# Patient Record
Sex: FEMALE | Race: WHITE | NOT HISPANIC OR LATINO | Employment: OTHER | URBAN - METROPOLITAN AREA
[De-identification: names, ages, dates, MRNs, and addresses within clinical notes are randomized per-mention and may not be internally consistent; named-entity substitution may affect disease eponyms.]

---

## 2017-03-22 ENCOUNTER — LAB CONVERSION - ENCOUNTER (OUTPATIENT)
Dept: OTHER | Facility: OTHER | Age: 72
End: 2017-03-22

## 2017-03-22 DIAGNOSIS — E11.9 TYPE 2 DIABETES MELLITUS WITHOUT COMPLICATIONS (HCC): ICD-10-CM

## 2017-03-22 DIAGNOSIS — Z12.31 ENCOUNTER FOR SCREENING MAMMOGRAM FOR MALIGNANT NEOPLASM OF BREAST: ICD-10-CM

## 2017-03-22 DIAGNOSIS — E78.5 HYPERLIPIDEMIA: ICD-10-CM

## 2017-03-22 DIAGNOSIS — I10 ESSENTIAL (PRIMARY) HYPERTENSION: ICD-10-CM

## 2017-03-30 ENCOUNTER — TRANSCRIBE ORDERS (OUTPATIENT)
Dept: LAB | Facility: MEDICAL CENTER | Age: 72
End: 2017-03-30

## 2017-03-30 ENCOUNTER — ALLSCRIPTS OFFICE VISIT (OUTPATIENT)
Dept: OTHER | Facility: OTHER | Age: 72
End: 2017-03-30

## 2017-03-30 ENCOUNTER — APPOINTMENT (OUTPATIENT)
Dept: LAB | Facility: MEDICAL CENTER | Age: 72
End: 2017-03-30
Payer: MEDICARE

## 2017-03-30 DIAGNOSIS — E11.9 TYPE 2 DIABETES MELLITUS WITHOUT COMPLICATIONS (HCC): ICD-10-CM

## 2017-03-30 DIAGNOSIS — I10 ESSENTIAL (PRIMARY) HYPERTENSION: ICD-10-CM

## 2017-03-30 DIAGNOSIS — E78.5 HYPERLIPIDEMIA: ICD-10-CM

## 2017-03-30 LAB
ALBUMIN SERPL BCP-MCNC: 3.6 G/DL (ref 3.5–5)
ALP SERPL-CCNC: 71 U/L (ref 46–116)
ALT SERPL W P-5'-P-CCNC: 12 U/L (ref 12–78)
ANION GAP SERPL CALCULATED.3IONS-SCNC: 8 MMOL/L (ref 4–13)
AST SERPL W P-5'-P-CCNC: 6 U/L (ref 5–45)
BILIRUB SERPL-MCNC: 0.66 MG/DL (ref 0.2–1)
BUN SERPL-MCNC: 16 MG/DL (ref 5–25)
CALCIUM SERPL-MCNC: 9.2 MG/DL (ref 8.3–10.1)
CHLORIDE SERPL-SCNC: 99 MMOL/L (ref 100–108)
CHOLEST SERPL-MCNC: 180 MG/DL (ref 50–200)
CO2 SERPL-SCNC: 29 MMOL/L (ref 21–32)
CREAT SERPL-MCNC: 0.93 MG/DL (ref 0.6–1.3)
EST. AVERAGE GLUCOSE BLD GHB EST-MCNC: 192 MG/DL
GFR SERPL CREATININE-BSD FRML MDRD: 59.1 ML/MIN/1.73SQ M
GLUCOSE P FAST SERPL-MCNC: 191 MG/DL (ref 65–99)
HBA1C MFR BLD: 8.3 % (ref 4.2–6.3)
HDLC SERPL-MCNC: 43 MG/DL (ref 40–60)
LDLC SERPL CALC-MCNC: 104 MG/DL (ref 0–100)
POTASSIUM SERPL-SCNC: 4 MMOL/L (ref 3.5–5.3)
PROT SERPL-MCNC: 7.1 G/DL (ref 6.4–8.2)
SODIUM SERPL-SCNC: 136 MMOL/L (ref 136–145)
TRIGL SERPL-MCNC: 163 MG/DL

## 2017-03-30 PROCEDURE — 36415 COLL VENOUS BLD VENIPUNCTURE: CPT

## 2017-03-30 PROCEDURE — 80061 LIPID PANEL: CPT

## 2017-03-30 PROCEDURE — 83036 HEMOGLOBIN GLYCOSYLATED A1C: CPT

## 2017-03-30 PROCEDURE — 80053 COMPREHEN METABOLIC PANEL: CPT

## 2017-06-30 DIAGNOSIS — E11.9 TYPE 2 DIABETES MELLITUS WITHOUT COMPLICATIONS (HCC): ICD-10-CM

## 2017-08-16 DIAGNOSIS — Z12.11 ENCOUNTER FOR SCREENING FOR MALIGNANT NEOPLASM OF COLON: ICD-10-CM

## 2017-08-16 DIAGNOSIS — M89.9 DISORDER OF BONE: ICD-10-CM

## 2017-08-16 DIAGNOSIS — E11.9 TYPE 2 DIABETES MELLITUS WITHOUT COMPLICATIONS (HCC): ICD-10-CM

## 2017-08-16 DIAGNOSIS — I10 ESSENTIAL (PRIMARY) HYPERTENSION: ICD-10-CM

## 2017-08-24 ENCOUNTER — TRANSCRIBE ORDERS (OUTPATIENT)
Dept: LAB | Facility: MEDICAL CENTER | Age: 72
End: 2017-08-24

## 2017-08-24 ENCOUNTER — ALLSCRIPTS OFFICE VISIT (OUTPATIENT)
Dept: OTHER | Facility: OTHER | Age: 72
End: 2017-08-24

## 2017-08-24 ENCOUNTER — LAB (OUTPATIENT)
Dept: LAB | Facility: MEDICAL CENTER | Age: 72
End: 2017-08-24
Payer: MEDICARE

## 2017-08-24 DIAGNOSIS — I10 ESSENTIAL (PRIMARY) HYPERTENSION: ICD-10-CM

## 2017-08-24 DIAGNOSIS — E11.9 TYPE 2 DIABETES MELLITUS WITHOUT COMPLICATIONS (HCC): ICD-10-CM

## 2017-08-24 LAB
ALBUMIN SERPL BCP-MCNC: 3.6 G/DL (ref 3.5–5)
ALP SERPL-CCNC: 77 U/L (ref 46–116)
ALT SERPL W P-5'-P-CCNC: 10 U/L (ref 12–78)
ANION GAP SERPL CALCULATED.3IONS-SCNC: 11 MMOL/L (ref 4–13)
AST SERPL W P-5'-P-CCNC: 12 U/L (ref 5–45)
BILIRUB SERPL-MCNC: 0.87 MG/DL (ref 0.2–1)
BUN SERPL-MCNC: 7 MG/DL (ref 5–25)
CALCIUM SERPL-MCNC: 9.4 MG/DL (ref 8.3–10.1)
CHLORIDE SERPL-SCNC: 94 MMOL/L (ref 100–108)
CO2 SERPL-SCNC: 26 MMOL/L (ref 21–32)
CREAT SERPL-MCNC: 0.84 MG/DL (ref 0.6–1.3)
EST. AVERAGE GLUCOSE BLD GHB EST-MCNC: 183 MG/DL
GFR SERPL CREATININE-BSD FRML MDRD: 69 ML/MIN/1.73SQ M
GLUCOSE P FAST SERPL-MCNC: 169 MG/DL (ref 65–99)
HBA1C MFR BLD: 8 % (ref 4.2–6.3)
LDLC SERPL DIRECT ASSAY-MCNC: 101 MG/DL (ref 0–100)
POTASSIUM SERPL-SCNC: 3.9 MMOL/L (ref 3.5–5.3)
PROT SERPL-MCNC: 7.4 G/DL (ref 6.4–8.2)
SODIUM SERPL-SCNC: 131 MMOL/L (ref 136–145)

## 2017-08-24 PROCEDURE — 36415 COLL VENOUS BLD VENIPUNCTURE: CPT

## 2017-08-24 PROCEDURE — 83721 ASSAY OF BLOOD LIPOPROTEIN: CPT

## 2017-08-24 PROCEDURE — 80053 COMPREHEN METABOLIC PANEL: CPT

## 2017-08-24 PROCEDURE — 83036 HEMOGLOBIN GLYCOSYLATED A1C: CPT

## 2017-09-30 DIAGNOSIS — E11.9 TYPE 2 DIABETES MELLITUS WITHOUT COMPLICATIONS (HCC): ICD-10-CM

## 2017-12-30 DIAGNOSIS — E11.9 TYPE 2 DIABETES MELLITUS WITHOUT COMPLICATIONS (HCC): ICD-10-CM

## 2018-01-13 VITALS
OXYGEN SATURATION: 93 % | WEIGHT: 198.38 LBS | TEMPERATURE: 96.4 F | HEIGHT: 65 IN | DIASTOLIC BLOOD PRESSURE: 80 MMHG | HEART RATE: 90 BPM | BODY MASS INDEX: 33.05 KG/M2 | SYSTOLIC BLOOD PRESSURE: 122 MMHG

## 2018-01-14 VITALS
HEIGHT: 65 IN | WEIGHT: 203.19 LBS | OXYGEN SATURATION: 90 % | SYSTOLIC BLOOD PRESSURE: 124 MMHG | DIASTOLIC BLOOD PRESSURE: 72 MMHG | HEART RATE: 108 BPM | BODY MASS INDEX: 33.85 KG/M2 | TEMPERATURE: 96.8 F

## 2018-01-23 NOTE — PROGRESS NOTES
Assessment   1  Encounter for preventive health examination (V70 0) (Z00 00)    Plan  DM type 2 (diabetes mellitus, type 2)    · (1) HEMOGLOBIN A1C; Status:Active; Requested for:2017;    · Follow-up visit in 4 Months Evaluation and Treatment  Follow-up  Status: Hold For -  Scheduling  Requested for: [id number]  Hyperlipidemia    · (1) LIPID PANEL, FASTING; Status:Active; Requested for:2017;   Hypertension    · (1) COMPREHENSIVE METABOLIC PANEL; Status:Active; Requested for:2017;     Discussion/Summary    Rx fbw  Encouraged pt to monitor blood sugar and stop smoking1      Impression: Subsequent Annual Wellness Visit, with preventive exam as well as age and risk appropriate counseling completed1   Cardiovascular screening and counselin  screening is current1  and counseling was given on maintaining a healthy diet1   Diabetes screening and counselin  screening is current1  and counseling was given on ways to improve physical activity1   Colorectal cancer screening and counselin  the patient declines screening1  and counseling was given on ways to eat a high fiber diet1   Breast cancer screening and counselin  the patient declines screening1   Cervical cancer screening and counselin  the patient declines screening1   Osteoporosis screening and counselin  the patient declines screening1   Abdominal aortic aneurysm screening and counselin  screening not indicated1   Glaucoma screening and counseling: screening is current1  and the patient declines screening1   HIV screening and counselin  the patient declines screening1    Immunizations:1  The patient declines the influenza vaccination1 , the patient declines the pneumococcal vaccination1 , hepatitis B vaccination series is not indicated at this time due to the patient's low risk of devika the disease1 , the patient declines the Zostavax vaccine1 , the patient declines the Td vaccine1  and the patient declines the Tdap vaccine1   Advance Directive Plannin  not complete1 , paperwork and instructions were given to the patient1 , she was encouraged to follow-up with me to discuss her questions and/or decisions1   Advice and education were given regarding1  increasing physical activity1   Patient Discussion:1  plan discussed with the patient1 , follow-up visit needed in 4 months1   The treatment plan was reviewed with the patient/guardian  The patient/guardian understands and agrees with the treatment plan1          Self Referrals: No       1 Amended By: Christo Wadsworth; Mar 30 2017 8:24 PM EST    Chief Complaint  Pt presents for Well Exam  Pt feels well and without complaint at this time  Appetite is normal per patient  No falls  No refills needed today  Advance Directives  Advance Directive [facility]:   The patient is in agreement to receive the Advance Care Planning service    YES - Patient has an advance health care directive  The patient has a living will located  in patient's home  Capacity/Competence: This patient has full decision making capacity for discussion of advance care planning and This patient has full decision making competency for discussion of advance care planning  History of Present Illness  HPI: Pt doing ok   had cabg in November  Still smoking  1    Welcome to [employer] and Wellness Visits: The patient is being seen for the subsequent annual wellness visit  Medicare Screening and Risk Factors1    Hospitalizations:1  no previous hospitalizations1   Once per lifetime medicare screening tests:1  ECG1  and AAA screening US has not yet been done1   Medicare Screening Tests Risk Questions   Abdominal aortic aneurysm risk assessment:1  none indicated1   Osteoporosis risk assessment:1  caucasian1 , over 48years of age2  and tobacco use1   HIV risk assessment:1  none indicated1   Drug and Alcohol Use: The patient is a current cigarette smoker   The patient reports never drinking alcohol  She has never used illicit drugs  Diet and Physical Activity: Current diet includes well balanced meals  She exercises daily  Exercise: walking  Mood Disorder and Cognitive Impairment Screenin    Depression screening1   Negative for symptoms1   She denies feeling down, depressed, or hopeless over the past two weeks1   She denies feeling little interest or pleasure in doing things over the past two weeks1   Cognitive impairment screenin  denies difficulty learning/retaining new information1 , denies difficulty handling complex tasks1 , denies difficulty with reasoning1 , denies difficulty with spatial ability and orientation1 , denies difficulty with language1  and denies difficulty with behavior1   Functional Ability/Level of Safety:1  Hearing is1  normal bilaterally1  and a hearing aid is not used1   She denies hearing difficulties1  The patient is currently1  able to do activities of daily living without limitations1 , able to do instrumental activities of daily living without limitations1 , able to participate in social activities without limitations1  and able to drive without limitations1   Activities of daily living details:1  does not need help using the phone1 , no transportation help needed1 , does not need help shopping1 , no meal preparation help needed1 , does not need help doing housework1 , does not need help doing laundry1 , does not need help managing medications1  and does not need help managing money1   Fall risk factors: 1  The patient fell 0 times in the past 12 months  1   Home safety risk factors: 1  no unfamiliar surroundings1 , no loose rugs1 , no poor household lighting1 , no uneven floors1 , no household clutter1 , grab bars in the bathroom1  and handrails on the stairs1   Advance Directives: Advance directives: no living will and no advance directives     Co-Managers and Medical Equipment/Suppliers: See Patient Care Team   Reviewed Updated [facility]:   Last Medicare Wellness Visit Information was reviewed, patient interviewed, no change since last AWV  Preventive Quality Program 65 and Older: Falls Risk: The patient fell 0 times in the past 12 months  The patient is currently asymptomatic Symptoms Include:  Associated symptoms:  No associated symptoms are reported  1 Amended By: Keturah Martinez; Mar 30 2017 8:20 PM EST    Patient Care Team    Care Team Member Role Specialty Office Number   [facility] [place]  [facility] [phone]     Active Problems   1  Advance directive discussed with patient (V65 49) (Z71 89)  2  At risk for bone density loss (V49 89) (Z91 89)  3  DM type 2 (diabetes mellitus, type 2) (250 00) (E11 9)  4  Encounter for screening for malignant neoplasm of colon (V76 51) (Z12 11)  5  Encounter for screening mammogram for malignant neoplasm of breast (V76 12)   (Z12 31)  6  Hyperlipidemia (272 4) (E78 5)  7  Hypertension (401 9) (I10)  8  Refused influenza vaccine (V64 06) (Z28 21)  9  Refused pneumococcal vaccination (V64 06) (Z28 21)  10  Refuses tetanus, diphtheria, and acellular pertussis (TDaP) vaccination (V64 06)    (Z28 21)  11  Symptomatic menopausal or female climacteric states (627 2) (N95 1)    Past Medical History    · History of Cerebral hemorrhage (431) (I61 9)   · History of hypertension (V12 59) (Z86 79)   · Hyperlipidemia (272 4) (E78 5)   · History of Screening for glaucoma (V80 1) (Z13 5)    The active problems and past medical history were reviewed and updated today  Surgical History    · History of Craniotomy (Therapeutic)    The surgical history was reviewed and updated today  Family History  Mother    · Family history of Breast cancer (174 9) (C50 919)   · Family history of Ovarian cancer (183 0) (C56 9)  Father    · Family history of Diabetes    The family history was reviewed and updated today         Social History    · Always uses seat belt   · Always uses sunscreen   · Current every day smoker (305 1) (F17 200)   · Dental care, occasionally   · Denied: Drug use (305 90) (F19 90)   · No alcohol use   · No living will  The social history was reviewed and updated today  The social history was reviewed and is unchanged  Current Meds  1  Aspirin 81 MG TABS; TAKE 1 TABLET DAILY; Therapy: 89VEV5961 to Recorded  2  GlipiZIDE 10 MG Oral Tablet; Take 1 tablet twice daily; Therapy: 18ADN4514 to (Evaluate:21Xom9711)  Requested for: 43MZM2253; Last   Rx:24Mar2017 Ordered  3  Lisinopril-Hydrochlorothiazide 20-12 5 MG Oral Tablet; take 1 tablet by mouth twice a   day; Therapy: 74IJX4551 to (Evaluate:58Npn4942)  Requested for: 65FHV5131; Last   Rx:09Tnq4031 Ordered  4  MetFORMIN HCl - 500 MG Oral Tablet; take 1 tablet by mouth three times a day; Therapy: 23PVU5734 to (Evaluate:10Jun2017)  Requested for: 41Dwo3761; Last   Rx:76Xbj7323 Ordered  5  Simvastatin 40 MG Oral Tablet; take 1 tablet by mouth once daily; Therapy: 45KRH1519 to (Evaluate:90Dqs4619)  Requested for: 06MQZ0148; Last   Rx:97Hmw4968 Ordered    The medication list was reviewed and updated today  Allergies   1  No Known Drug Allergies    Immunizations   1    Influenza  Temporarily Deferred: Pt refuses, As of: 70TCQ9629, Defer for 2 Years    PPSV  Temporarily Deferred: Pt refuses, As of: 67GMT2054, Defer for 2 Years    Tdap  Temporarily Deferred: Pt refuses, As of: 41OAD1912, Defer for 2 Years     Vitals  Signs    Systolic: 4764   Diastolic: 645    Temperature: 96 4 F  Heart Rate: 90  Height: 5 ft 5 in  Weight: 198 lb 6 08 oz  BMI Calculated: 33 01  BSA Calculated: 1 97  O2 Saturation: 93     1 Amended By: Shamika Brown; Mar 30 2017 8:20 PM EST    Physical Exam    Constitutional1    General appearance: No acute distress, well appearing and well nourished  1    Psychiatric1    Judgment and insight: Normal 1    Orientation to person, place, and time: Normal 1    Recent and remote memory: Intact  1    Mood and affect: Normal 1 1 Amended By: Lavonne العراقي; Mar 30 2017 8:21 PM EST    Health Management  Health Maintenance   Medicare Annual Wellness Visit; every 1 year; Last 99KMO0112; Next Due: 63YZC1636;   Active    Signatures   Electronically signed by : Samanta Gomez DO; Mar 30 2017  8:24PM EST                       (Author)

## 2018-03-13 RX ORDER — GLIPIZIDE 10 MG/1
1 TABLET ORAL 2 TIMES DAILY
COMMUNITY
Start: 2015-09-16 | End: 2018-06-21 | Stop reason: SDUPTHER

## 2018-03-13 RX ORDER — SIMVASTATIN 40 MG
1 TABLET ORAL DAILY
COMMUNITY
Start: 2014-10-02 | End: 2018-11-15 | Stop reason: SDUPTHER

## 2018-03-13 RX ORDER — LISINOPRIL AND HYDROCHLOROTHIAZIDE 20; 12.5 MG/1; MG/1
1 TABLET ORAL 2 TIMES DAILY
COMMUNITY
Start: 2014-10-02 | End: 2018-11-15 | Stop reason: SDUPTHER

## 2018-03-15 ENCOUNTER — OFFICE VISIT (OUTPATIENT)
Dept: INTERNAL MEDICINE CLINIC | Facility: CLINIC | Age: 73
End: 2018-03-15
Payer: MEDICARE

## 2018-03-15 ENCOUNTER — TRANSCRIBE ORDERS (OUTPATIENT)
Dept: LAB | Facility: MEDICAL CENTER | Age: 73
End: 2018-03-15

## 2018-03-15 ENCOUNTER — APPOINTMENT (OUTPATIENT)
Dept: LAB | Facility: MEDICAL CENTER | Age: 73
End: 2018-03-15
Payer: MEDICARE

## 2018-03-15 VITALS
HEIGHT: 65 IN | TEMPERATURE: 97.4 F | HEART RATE: 88 BPM | WEIGHT: 201.25 LBS | BODY MASS INDEX: 33.53 KG/M2 | OXYGEN SATURATION: 90 %

## 2018-03-15 DIAGNOSIS — D50.9 IRON DEFICIENCY ANEMIA, UNSPECIFIED IRON DEFICIENCY ANEMIA TYPE: ICD-10-CM

## 2018-03-15 DIAGNOSIS — E78.2 MIXED HYPERLIPIDEMIA: ICD-10-CM

## 2018-03-15 DIAGNOSIS — E11.9 TYPE 2 DIABETES MELLITUS WITHOUT COMPLICATION, WITHOUT LONG-TERM CURRENT USE OF INSULIN (HCC): Primary | ICD-10-CM

## 2018-03-15 DIAGNOSIS — I10 ESSENTIAL HYPERTENSION: ICD-10-CM

## 2018-03-15 LAB
ALBUMIN SERPL BCP-MCNC: 3.8 G/DL (ref 3.5–5)
ALP SERPL-CCNC: 85 U/L (ref 46–116)
ALT SERPL W P-5'-P-CCNC: 8 U/L (ref 12–78)
ANION GAP SERPL CALCULATED.3IONS-SCNC: 8 MMOL/L (ref 4–13)
AST SERPL W P-5'-P-CCNC: 14 U/L (ref 5–45)
BILIRUB SERPL-MCNC: 0.86 MG/DL (ref 0.2–1)
BUN SERPL-MCNC: 12 MG/DL (ref 5–25)
CALCIUM SERPL-MCNC: 9.2 MG/DL (ref 8.3–10.1)
CHLORIDE SERPL-SCNC: 96 MMOL/L (ref 100–108)
CHOLEST SERPL-MCNC: 180 MG/DL (ref 50–200)
CO2 SERPL-SCNC: 28 MMOL/L (ref 21–32)
CREAT SERPL-MCNC: 0.94 MG/DL (ref 0.6–1.3)
ERYTHROCYTE [DISTWIDTH] IN BLOOD BY AUTOMATED COUNT: 12.1 % (ref 11.6–15.1)
EST. AVERAGE GLUCOSE BLD GHB EST-MCNC: 212 MG/DL
GFR SERPL CREATININE-BSD FRML MDRD: 60 ML/MIN/1.73SQ M
GLUCOSE P FAST SERPL-MCNC: 256 MG/DL (ref 65–99)
HBA1C MFR BLD: 9 % (ref 4.2–6.3)
HCT VFR BLD AUTO: 49.3 % (ref 34.8–46.1)
HDLC SERPL-MCNC: 40 MG/DL (ref 40–60)
HGB BLD-MCNC: 17.1 G/DL (ref 11.5–15.4)
LDLC SERPL CALC-MCNC: 106 MG/DL (ref 0–100)
MCH RBC QN AUTO: 32.3 PG (ref 26.8–34.3)
MCHC RBC AUTO-ENTMCNC: 34.7 G/DL (ref 31.4–37.4)
MCV RBC AUTO: 93 FL (ref 82–98)
PLATELET # BLD AUTO: 219 THOUSANDS/UL (ref 149–390)
PMV BLD AUTO: 10.8 FL (ref 8.9–12.7)
POTASSIUM SERPL-SCNC: 4.1 MMOL/L (ref 3.5–5.3)
PROT SERPL-MCNC: 7.4 G/DL (ref 6.4–8.2)
RBC # BLD AUTO: 5.29 MILLION/UL (ref 3.81–5.12)
SODIUM SERPL-SCNC: 132 MMOL/L (ref 136–145)
TRIGL SERPL-MCNC: 170 MG/DL
WBC # BLD AUTO: 10.55 THOUSAND/UL (ref 4.31–10.16)

## 2018-03-15 PROCEDURE — 99214 OFFICE O/P EST MOD 30 MIN: CPT | Performed by: INTERNAL MEDICINE

## 2018-03-15 PROCEDURE — 80053 COMPREHEN METABOLIC PANEL: CPT

## 2018-03-15 PROCEDURE — 83036 HEMOGLOBIN GLYCOSYLATED A1C: CPT

## 2018-03-15 PROCEDURE — 36415 COLL VENOUS BLD VENIPUNCTURE: CPT

## 2018-03-15 PROCEDURE — 85027 COMPLETE CBC AUTOMATED: CPT

## 2018-03-15 PROCEDURE — 80061 LIPID PANEL: CPT

## 2018-03-15 NOTE — PROGRESS NOTES
Assessment/Plan:      Diagnoses and all orders for this visit:    Type 2 diabetes mellitus without complication, without long-term current use of insulin (HCC)  Pt will go for labs this Am but has been under a lot of stress with caring for  as well as loss of three family members including a grandson  Essential hypertension  -     HEMOGLOBIN A1C W/ EAG ESTIMATION; Future  -     Comprehensive metabolic panel; Future    Mixed hyperlipidemia  -     Comprehensive metabolic panel; Future  -     Lipid panel; Future    Iron deficiency anemia, unspecified iron deficiency anemia type  -     CBC and Platelet; Future      Pt going for labs this AM  Encouraged smoking cessation and increase water intake  Rto 6 months     Patient ID: Collette Caraway is a 75 y o  female  HPI   Pt doing ok  Sob with exertion but is smoking more due to a tough 2017  She lost her grandson in the fall and is still primary caregiver for her   He does have an aide weekdays which is a help for patient  No falls  Tires easily  She has some intermittent back issues likely related to caring for   Review of Systems   Constitutional: Negative  HENT: Negative  Eyes: Negative  Respiratory: Positive for shortness of breath  Cardiovascular: Negative  Gastrointestinal: Negative  Endocrine: Negative  Genitourinary: Negative  Musculoskeletal: Positive for arthralgias  Skin: Negative  Allergic/Immunologic: Negative  Neurological: Negative  Hematological: Negative  Psychiatric/Behavioral: Negative  Physical Exam   Constitutional: She is oriented to person, place, and time  She appears well-developed and well-nourished  She appears distressed  Mild conversational dyspnea   HENT:   Head: Normocephalic and atraumatic  Right Ear: External ear normal    Left Ear: External ear normal    Nose: Nose normal    Mouth/Throat: Oropharynx is clear and moist  No oropharyngeal exudate     Eyes: Conjunctivae and EOM are normal  Pupils are equal, round, and reactive to light  No scleral icterus  Neck: Normal range of motion  Neck supple  No JVD present  Cardiovascular: Normal rate, regular rhythm, normal heart sounds and intact distal pulses  Pulmonary/Chest: Effort normal and breath sounds normal  No respiratory distress  She has no wheezes  She has no rales  She exhibits no tenderness  Abdominal: Soft  Bowel sounds are normal  She exhibits no distension  There is no tenderness  There is no rebound and no guarding  Musculoskeletal: Normal range of motion  Lymphadenopathy:     She has no cervical adenopathy  Neurological: She is alert and oriented to person, place, and time  She has normal reflexes  She displays normal reflexes  No cranial nerve deficit  She exhibits normal muscle tone  Coordination normal    Skin: Skin is warm and dry  No rash noted  No erythema  No pallor  Psychiatric: She has a normal mood and affect  Her behavior is normal  Judgment and thought content normal    Nursing note and vitals reviewed

## 2018-03-15 NOTE — PATIENT INSTRUCTIONS

## 2018-03-19 ENCOUNTER — TELEPHONE (OUTPATIENT)
Dept: INTERNAL MEDICINE CLINIC | Facility: CLINIC | Age: 73
End: 2018-03-19

## 2018-03-19 DIAGNOSIS — E11.9 TYPE 2 DIABETES MELLITUS WITHOUT COMPLICATION, WITHOUT LONG-TERM CURRENT USE OF INSULIN (HCC): Primary | ICD-10-CM

## 2018-06-21 DIAGNOSIS — E11.9 TYPE 2 DIABETES MELLITUS WITHOUT COMPLICATION, WITHOUT LONG-TERM CURRENT USE OF INSULIN (HCC): Primary | ICD-10-CM

## 2018-06-21 RX ORDER — GLIPIZIDE 10 MG/1
TABLET ORAL
Qty: 60 TABLET | Refills: 5 | Status: SHIPPED | OUTPATIENT
Start: 2018-06-21 | End: 2018-12-27 | Stop reason: SDUPTHER

## 2018-07-17 ENCOUNTER — APPOINTMENT (OUTPATIENT)
Dept: LAB | Facility: MEDICAL CENTER | Age: 73
End: 2018-07-17
Payer: MEDICARE

## 2018-07-17 ENCOUNTER — OFFICE VISIT (OUTPATIENT)
Dept: INTERNAL MEDICINE CLINIC | Facility: CLINIC | Age: 73
End: 2018-07-17
Payer: MEDICARE

## 2018-07-17 VITALS
WEIGHT: 202.25 LBS | BODY MASS INDEX: 33.7 KG/M2 | HEIGHT: 65 IN | TEMPERATURE: 97.4 F | HEART RATE: 80 BPM | OXYGEN SATURATION: 91 %

## 2018-07-17 DIAGNOSIS — Z72.0 TOBACCO USE: ICD-10-CM

## 2018-07-17 DIAGNOSIS — Z00.00 MEDICARE ANNUAL WELLNESS VISIT, SUBSEQUENT: ICD-10-CM

## 2018-07-17 DIAGNOSIS — E11.9 TYPE 2 DIABETES MELLITUS WITHOUT COMPLICATION, WITHOUT LONG-TERM CURRENT USE OF INSULIN (HCC): ICD-10-CM

## 2018-07-17 DIAGNOSIS — E11.9 TYPE 2 DIABETES MELLITUS WITHOUT COMPLICATIONS (HCC): ICD-10-CM

## 2018-07-17 DIAGNOSIS — Z11.59 ENCOUNTER FOR HEPATITIS C SCREENING TEST FOR LOW RISK PATIENT: ICD-10-CM

## 2018-07-17 DIAGNOSIS — Z11.59 ENCOUNTER FOR HEPATITIS C SCREENING TEST FOR LOW RISK PATIENT: Primary | ICD-10-CM

## 2018-07-17 LAB
ALBUMIN SERPL BCP-MCNC: 3.8 G/DL (ref 3.5–5)
ALP SERPL-CCNC: 65 U/L (ref 46–116)
ALT SERPL W P-5'-P-CCNC: 14 U/L (ref 12–78)
ANION GAP SERPL CALCULATED.3IONS-SCNC: 5 MMOL/L (ref 4–13)
AST SERPL W P-5'-P-CCNC: 14 U/L (ref 5–45)
BILIRUB SERPL-MCNC: 0.78 MG/DL (ref 0.2–1)
BUN SERPL-MCNC: 10 MG/DL (ref 5–25)
CALCIUM SERPL-MCNC: 9.4 MG/DL (ref 8.3–10.1)
CHLORIDE SERPL-SCNC: 100 MMOL/L (ref 100–108)
CO2 SERPL-SCNC: 28 MMOL/L (ref 21–32)
CREAT SERPL-MCNC: 0.9 MG/DL (ref 0.6–1.3)
CREAT UR-MCNC: 153 MG/DL
EST. AVERAGE GLUCOSE BLD GHB EST-MCNC: 166 MG/DL
GFR SERPL CREATININE-BSD FRML MDRD: 63 ML/MIN/1.73SQ M
GLUCOSE P FAST SERPL-MCNC: 177 MG/DL (ref 65–99)
HBA1C MFR BLD: 7.4 % (ref 4.2–6.3)
LDLC SERPL DIRECT ASSAY-MCNC: 101 MG/DL (ref 0–100)
MICROALBUMIN UR-MCNC: 33.5 MG/L (ref 0–20)
MICROALBUMIN/CREAT 24H UR: 22 MG/G CREATININE (ref 0–30)
POTASSIUM SERPL-SCNC: 3.9 MMOL/L (ref 3.5–5.3)
PROT SERPL-MCNC: 7.3 G/DL (ref 6.4–8.2)
SODIUM SERPL-SCNC: 133 MMOL/L (ref 136–145)

## 2018-07-17 PROCEDURE — 36415 COLL VENOUS BLD VENIPUNCTURE: CPT

## 2018-07-17 PROCEDURE — 83036 HEMOGLOBIN GLYCOSYLATED A1C: CPT

## 2018-07-17 PROCEDURE — 82043 UR ALBUMIN QUANTITATIVE: CPT | Performed by: INTERNAL MEDICINE

## 2018-07-17 PROCEDURE — 86803 HEPATITIS C AB TEST: CPT

## 2018-07-17 PROCEDURE — 83721 ASSAY OF BLOOD LIPOPROTEIN: CPT

## 2018-07-17 PROCEDURE — 82570 ASSAY OF URINE CREATININE: CPT | Performed by: INTERNAL MEDICINE

## 2018-07-17 PROCEDURE — G0439 PPPS, SUBSEQ VISIT: HCPCS | Performed by: INTERNAL MEDICINE

## 2018-07-17 PROCEDURE — 80053 COMPREHEN METABOLIC PANEL: CPT

## 2018-07-17 NOTE — PATIENT INSTRUCTIONS

## 2018-07-17 NOTE — PROGRESS NOTES
Assessment and Plan:    Problem List Items Addressed This Visit     Medicare annual wellness visit, subsequent    Relevant Orders    LDL cholesterol, direct    Type 2 diabetes mellitus without complication, without long-term current use of insulin ([facility]) - Primary    Relevant Orders    HEMOGLOBIN A1C W/ EAG ESTIMATION    Comprehensive metabolic panel    Microalbumin / creatinine urine ratio    LDL cholesterol, direct    Tobacco use        Health Maintenance Due   Topic Date Due    DXA SCAN  1945    Medicare Annual Wellness Visit (AWV)  03/30/2018     Pt defers all immunization  Encouraged smoking cessation  Rx for labs - patient going from here to the lab/she feels doing much better on new Rx and can afford  Did discuss AD and patient provided information  Rto 4 months    HPI:  Ramsey Odom is a 75 y o  female here for her Subsequent Wellness Visit      Patient Active Problem List   Diagnosis    Type II or unspecified type diabetes mellitus without mention of complication, not stated as uncontrolled    Hyperlipidemia    Hypertension    Benign essential hypertension    Medicare annual wellness visit, subsequent    Type 2 diabetes mellitus without complication, without long-term current use of insulin ([facility] [address] )    Tobacco use     Past Medical History:   Diagnosis Date    Hyperlipidemia     last assessed  8/24/17    Hypertension     last assessed 10/2/14     Past Surgical History:   Procedure Laterality Date    CRANIOTOMY       Family History   Problem Relation Age of Onset    Breast cancer Mother     Ovarian cancer Mother     Diabetes Father      History   Smoking Status    Current Every Day Smoker    Types: Cigarettes   Smokeless Tobacco    Never Used     History   Alcohol Use No      History   Drug Use No       Current Outpatient Prescriptions   Medication Sig Dispense Refill    aspirin 81 MG tablet Take 1 tablet by mouth daily      glipiZIDE (GLUCOTROL) 10 mg tablet take 1 tablet by mouth twice a day 60 tablet 5    lisinopril-hydrochlorothiazide (PRINZIDE,ZESTORETIC) 20-12 5 MG per tablet Take 1 tablet by mouth 2 (two) times a day      simvastatin (ZOCOR) 40 mg tablet Take 1 tablet by mouth daily      sitaGLIPtin-metFORMIN (JANUMET)  MG per tablet Take 1 tablet by mouth 2 (two) times a day with meals for 30 days 60 tablet 5     No current facility-administered medications for this visit  No Known Allergies  There is no immunization history for the selected administration types on file for this patient      Patient Care Team:  Denisse Dickey DO as PCP - General  Denisse Dickey DO      Medicare Screening Tests and Risk Assessments:  AWV Clinical     ISAR:   Previous hospitalizations?:  No       Once in a Lifetime Medicare Screening:   EKG performed?:  No    AAA screening performed? (if performed, please add date to Health Maintenance):  No       Medicare Screening Tests and Risk Assessment:   AAA Risk Assessment    None Indicated:  Yes    Osteoporosis Risk Assessment    None indicated:  Yes    HIV Risk Assessment    None indicated:  Yes        Drug and Alcohol Use:   Tobacco use    Cigarettes:  current smoker    Smokeless:  never used smokeless tobacco    Tobacco use duration    Tobacco Cessation Readiness    Alcohol use    Alcohol use:  never    Alcohol Treatment Readiness   Illicit Drug Use    Drug use:  never    Drug type:  no sedative use       Diet & Exercise:   Diet   What is your diet?:  Diabetic   How many servings a day of the following:   Fruits and Vegetables:  3-4 Meat:  1-2   Whole Grains:  2 Simple Carbs:  1   Dairy:  2 Soda:  0   Coffee:  1 Tea:  1   Exercise    Do you currently exercise?:  yes    Frequency:  daily   Times per week:  7     Type of exercise:  walking       Cognitive Impairment Screening:   Depression screening preformed:  Yes Depression screen score:  0    PHQ-9 Depression scale score:  0   Depression screening results:  negative for symptoms   Cognitive Impairment Screening    Do you have difficulty learning or retaining new information?:  No Do you have difficulty handling new tasks?:  No   Do you have difficulty with reasoning?:  No Do you have difficulty with spatial ability and orientation?:  No   Do you have difficulty with language?:  No Do you have difficulty with behavior?:  No       Functional Ability/Level of Safety:   Hearing    Hearing difficulties:  No Bilateral:  normal   Hearing aid:  No    Hearing Impairment Assessment    Hearing status:  No impairment   Do your family members ever complain that you turn on the radio or Nerved[place] V  too loudly?:  No Do you find that other people have to repeat themselves when talking to you?:  No   Do you have difficulty hearing while talking on the phone?:  No Has anyone ever told you that you are speaking too loudly when talking with them?:  No   Do you have trouble hearing the doorbell or phone ringing?:  No Do you have difficulty hearing such that you feel frustrated talking to people?:  No   Do you feel sad because you cannot hear well?:  No Do you feel inconvienced due to your hearing problem?:  No   Do you think you would be a happier person if you could hear better?:  No Would you be willing to go for a hearing aid fitting if suggested?:  No   Current Activities    Status:  limited social activities, unlimited IADL's, unlimited ADL's, no driving   Help needed with the folllowing:    Using the phone:  No Transportation:  Yes   Shopping:  No Preparing Meals:  No   Doing Housework:  No Doing Laundry:  No   Managing Medications:  No Managing Money:  No   ADL    Feeding:  Independant   Oral hygiene and Facial grooming:  Independant   Bathing:  Independant   Upper Body Dressing:  Independant   Lower Body Dressing:  Independant   Toileting:  Independant   Bed Mobility:  Independant   Fall Risk   Have you fallen in the last 12 months?:  No Are you unsteady on your feet?:  No    Are you taking any medications that may cause fatigue or dizziness?:  No   Do you have any chronic conditions that may contribute to a fall?:  Diabetes Do you rush to the bathroom potentially risking a fall?:  No   Injury History   Polypharmacy:  No Antidepressant Use:  No   Sedative Use:  No Antihypertensive Use:  No   Previous Fall:  No Alcohol Use:  No   Deconditioning:  No Visual Impairment:  No   Cogitive Impairment:  No Mmobility Impairment:  No   Postural Hypotension:  No Urinary Incontinence:  No       Home Safety:   Are there hazards in your environment?:  No   If you fell, would you need help to get back up from the ground?:  No Do you have problems or concerns getting in/out of a bed, chair, tub, or toilet?:  No   Do you feel unsteady when walking?:  No Is your activity limited by pain?:  No   Do you have handrails and grab-bars in the home?:  Yes Are emergency numbers kept by the phone and regularly updated?:  Yes   Are you and/or family members aware of the dangers of smoking in bed?:  Yes    Do you have working smoke alarms and fire extinguisher?:  Yes Do all household members know how to use them?:  Yes   Have you left the stove on unsupervised?:  No    Home Safety Risk Factors   Unfamilar with surroundings:  No Uneven floors:  No   Stairs or handrail saftey risk:  No Loose rugs:  No   Household clutter:  No Poor household lighting:  No   No grab bars in bathroom:  No Further evaluation needed:  No       Advanced Directives:   Advanced Directives    Living Will:  No Durable POA for healthcare:  No   Advanced directive:  No    Patient's End of Life Decisions        Urinary Incontinence:   Do you have urinary incontinence?:  No Do you have incomplete emptying?:  No   Do you urinate frequently?:  No Do you have urinary urgency?:  No   Do you have urinary hesitancy?:  No Do you have dysuria (painful and/or difficult urination)?:  No   Do you have nocturia (waking up to urinate)?:  No Do you strain when urinating (have to push to urinate)?:  No   Do you have a weak stream when urinating?:  No Do you have intermittent streaming when urinating?:  No   Do you dribble urine after finishing?:  No    Do you have vaginal pressure?:  No Do you have vaginal dryness?:  No       Glaucoma:            Provider Screening    No data filed

## 2018-07-18 LAB — HCV AB SER QL: NORMAL

## 2018-09-16 DIAGNOSIS — E11.9 TYPE 2 DIABETES MELLITUS WITHOUT COMPLICATION, WITHOUT LONG-TERM CURRENT USE OF INSULIN (HCC): ICD-10-CM

## 2018-09-16 RX ORDER — SITAGLIPTIN AND METFORMIN HYDROCHLORIDE 500; 50 MG/1; MG/1
TABLET, FILM COATED ORAL
Qty: 60 TABLET | Refills: 5 | Status: SHIPPED | OUTPATIENT
Start: 2018-09-16 | End: 2019-03-28 | Stop reason: SDUPTHER

## 2018-11-15 DIAGNOSIS — E78.5 HYPERLIPIDEMIA, UNSPECIFIED HYPERLIPIDEMIA TYPE: Primary | ICD-10-CM

## 2018-11-15 DIAGNOSIS — I10 ESSENTIAL HYPERTENSION: ICD-10-CM

## 2018-11-15 RX ORDER — LISINOPRIL AND HYDROCHLOROTHIAZIDE 20; 12.5 MG/1; MG/1
TABLET ORAL
Qty: 180 TABLET | Refills: 3 | Status: SHIPPED | OUTPATIENT
Start: 2018-11-15 | End: 2020-01-20

## 2018-11-15 RX ORDER — SIMVASTATIN 40 MG
TABLET ORAL
Qty: 90 TABLET | Refills: 3 | Status: SHIPPED | OUTPATIENT
Start: 2018-11-15 | End: 2020-01-20

## 2018-12-11 ENCOUNTER — OFFICE VISIT (OUTPATIENT)
Dept: INTERNAL MEDICINE CLINIC | Facility: CLINIC | Age: 73
End: 2018-12-11
Payer: MEDICARE

## 2018-12-11 ENCOUNTER — APPOINTMENT (OUTPATIENT)
Dept: LAB | Facility: MEDICAL CENTER | Age: 73
End: 2018-12-11
Payer: MEDICARE

## 2018-12-11 VITALS
SYSTOLIC BLOOD PRESSURE: 126 MMHG | WEIGHT: 190.13 LBS | TEMPERATURE: 97.4 F | OXYGEN SATURATION: 92 % | HEIGHT: 65 IN | HEART RATE: 75 BPM | DIASTOLIC BLOOD PRESSURE: 70 MMHG | BODY MASS INDEX: 31.68 KG/M2

## 2018-12-11 DIAGNOSIS — E11.9 TYPE 2 DIABETES MELLITUS WITHOUT COMPLICATION, WITHOUT LONG-TERM CURRENT USE OF INSULIN (HCC): ICD-10-CM

## 2018-12-11 DIAGNOSIS — E11.9 TYPE 2 DIABETES MELLITUS WITHOUT COMPLICATION, WITHOUT LONG-TERM CURRENT USE OF INSULIN (HCC): Primary | ICD-10-CM

## 2018-12-11 DIAGNOSIS — F41.9 ANXIETY: ICD-10-CM

## 2018-12-11 DIAGNOSIS — I10 BENIGN ESSENTIAL HYPERTENSION: ICD-10-CM

## 2018-12-11 LAB
ALBUMIN SERPL BCP-MCNC: 4 G/DL (ref 3.5–5)
ALP SERPL-CCNC: 93 U/L (ref 46–116)
ALT SERPL W P-5'-P-CCNC: 14 U/L (ref 12–78)
ANION GAP SERPL CALCULATED.3IONS-SCNC: 12 MMOL/L (ref 4–13)
AST SERPL W P-5'-P-CCNC: 18 U/L (ref 5–45)
BILIRUB SERPL-MCNC: 1.12 MG/DL (ref 0.2–1)
BUN SERPL-MCNC: 9 MG/DL (ref 5–25)
CALCIUM SERPL-MCNC: 9.5 MG/DL (ref 8.3–10.1)
CHLORIDE SERPL-SCNC: 92 MMOL/L (ref 100–108)
CO2 SERPL-SCNC: 26 MMOL/L (ref 21–32)
CREAT SERPL-MCNC: 0.88 MG/DL (ref 0.6–1.3)
GFR SERPL CREATININE-BSD FRML MDRD: 65 ML/MIN/1.73SQ M
GLUCOSE P FAST SERPL-MCNC: 171 MG/DL (ref 65–99)
LDLC SERPL DIRECT ASSAY-MCNC: 114 MG/DL (ref 0–100)
POTASSIUM SERPL-SCNC: 3.9 MMOL/L (ref 3.5–5.3)
PROT SERPL-MCNC: 8 G/DL (ref 6.4–8.2)
SODIUM SERPL-SCNC: 130 MMOL/L (ref 136–145)

## 2018-12-11 PROCEDURE — 83721 ASSAY OF BLOOD LIPOPROTEIN: CPT

## 2018-12-11 PROCEDURE — 80053 COMPREHEN METABOLIC PANEL: CPT

## 2018-12-11 PROCEDURE — 36415 COLL VENOUS BLD VENIPUNCTURE: CPT

## 2018-12-11 PROCEDURE — 99214 OFFICE O/P EST MOD 30 MIN: CPT | Performed by: INTERNAL MEDICINE

## 2018-12-11 RX ORDER — SERTRALINE HYDROCHLORIDE 25 MG/1
25 TABLET, FILM COATED ORAL DAILY
Qty: 30 TABLET | Refills: 5 | Status: SHIPPED | OUTPATIENT
Start: 2018-12-11 | End: 2019-11-05 | Stop reason: SDUPTHER

## 2018-12-11 NOTE — PROGRESS NOTES
Assessment/Plan:         Diagnoses and all orders for this visit:    Type 2 diabetes mellitus without complication, without long-term current use of insulin ([address] )  Going for labs this Am  She is trying to eat less snacks but is stressed recently due to her husbands decline      Benign essential hypertension  No added salt diet and continue present rx    Other orders  -     Cancel: Mammo screening bilateral w 3d & cad; Future    Anxiety - zoloft 25mg daily in pm She will call if thinks she needs 50mg but was hesitant to start    Rto 3 months/prn  Offered to speak with  on her behalf and pt will let us know   Patient ID: Anna Hernandez is a 75 y o  female  HPI   Pt has been overwhelmed with care of her  Recently aides time was cut for unknown reasons and she is stressed  Sounds like  was over last week to assist with papers etc but he may need to be placed  No chest pain or sob She cannot get out because her hubsnad requires 24 hour care at this point she feels depressed and is aksing for rx    The following portions of the patient's history were reviewed and updated as appropriate:   Past Medical History:   Diagnosis Date    Hyperlipidemia     last assessed  8/24/17    Hypertension     last assessed 10/2/14     Past Surgical History:   Procedure Laterality Date    CRANIOTOMY       Social History     Social History    Marital status:      Spouse name: N/A    Number of children: N/A    Years of education: N/A     Occupational History    Not on file       Social History Main Topics    Smoking status: Current Every Day Smoker     Types: Cigarettes    Smokeless tobacco: Never Used    Alcohol use No    Drug use: No    Sexual activity: Not on file     Other Topics Concern    Not on file     Social History Narrative    Always uses seat belt    Always uses sunscreen    Dental care occasionally    No living will     No Known Allergies      Review of Systems   Constitutional: Positive for fatigue  HENT: Negative  Eyes: Negative  Respiratory: Negative  Cardiovascular: Negative  Gastrointestinal: Negative  Genitourinary: Negative  Musculoskeletal: Positive for arthralgias  Skin: Negative  Neurological: Negative  Hematological: Negative  Psychiatric/Behavioral: The patient is nervous/anxious  Past Medical History:   Diagnosis Date    Hyperlipidemia     last assessed  8/24/17    Hypertension     last assessed 10/2/14     Past Surgical History:   Procedure Laterality Date    CRANIOTOMY       Social History     Social History    Marital status:      Spouse name: N/A    Number of children: N/A    Years of education: N/A     Occupational History    Not on file  Social History Main Topics    Smoking status: Current Every Day Smoker     Types: Cigarettes    Smokeless tobacco: Never Used    Alcohol use No    Drug use: No    Sexual activity: Not on file     Other Topics Concern    Not on file     Social History Narrative    Always uses seat belt    Always uses sunscreen    Dental care occasionally    No living will     No Known Allergies          /70   Pulse 75   Temp (!) 97 4 °F (36 3 °C) (Tympanic)   Ht 5' 5" (1 651 m)   Wt 86 2 kg (190 lb 2 oz)   SpO2 92%   BMI 31 64 kg/m²          Physical Exam   Constitutional: She is oriented to person, place, and time  She appears well-developed and well-nourished  No distress  HENT:   Head: Normocephalic and atraumatic  Right Ear: External ear normal    Left Ear: External ear normal    Nose: Nose normal    Mouth/Throat: Oropharynx is clear and moist  No oropharyngeal exudate  Eyes: Pupils are equal, round, and reactive to light  Conjunctivae and EOM are normal  No scleral icterus  Neck: Normal range of motion  Neck supple  No JVD present  Cardiovascular: Normal rate, regular rhythm, normal heart sounds and intact distal pulses  No murmur heard    Pulmonary/Chest: Effort normal and breath sounds normal    Abdominal: Soft  Bowel sounds are normal    Musculoskeletal: Normal range of motion  Lymphadenopathy:     She has no cervical adenopathy  Neurological: She is alert and oriented to person, place, and time  She has normal reflexes  She displays normal reflexes  No cranial nerve deficit  She exhibits normal muscle tone  Coordination normal    Skin: Skin is warm and dry  She is not diaphoretic  Psychiatric: Her behavior is normal  Judgment normal    Overwhelmed with stress of her  care   Nursing note and vitals reviewed

## 2018-12-27 DIAGNOSIS — E11.9 TYPE 2 DIABETES MELLITUS WITHOUT COMPLICATION, WITHOUT LONG-TERM CURRENT USE OF INSULIN (HCC): ICD-10-CM

## 2018-12-27 RX ORDER — GLIPIZIDE 10 MG/1
TABLET ORAL
Qty: 60 TABLET | Refills: 5 | Status: SHIPPED | OUTPATIENT
Start: 2018-12-27 | End: 2019-03-28 | Stop reason: SDUPTHER

## 2019-03-28 DIAGNOSIS — E11.9 TYPE 2 DIABETES MELLITUS WITHOUT COMPLICATION, WITHOUT LONG-TERM CURRENT USE OF INSULIN (HCC): ICD-10-CM

## 2019-03-28 RX ORDER — SITAGLIPTIN AND METFORMIN HYDROCHLORIDE 500; 50 MG/1; MG/1
TABLET, FILM COATED ORAL
Qty: 60 TABLET | Refills: 5 | Status: SHIPPED | OUTPATIENT
Start: 2019-03-28 | End: 2019-10-04

## 2019-03-28 RX ORDER — GLIPIZIDE 10 MG/1
10 TABLET ORAL 2 TIMES DAILY
Qty: 180 TABLET | Refills: 3 | Status: SHIPPED | OUTPATIENT
Start: 2019-03-28 | End: 2020-05-05

## 2019-04-02 ENCOUNTER — TELEPHONE (OUTPATIENT)
Dept: INTERNAL MEDICINE CLINIC | Facility: CLINIC | Age: 74
End: 2019-04-02

## 2019-05-14 ENCOUNTER — APPOINTMENT (OUTPATIENT)
Dept: LAB | Facility: MEDICAL CENTER | Age: 74
End: 2019-05-14
Payer: MEDICARE

## 2019-05-14 ENCOUNTER — OFFICE VISIT (OUTPATIENT)
Dept: INTERNAL MEDICINE CLINIC | Facility: CLINIC | Age: 74
End: 2019-05-14
Payer: MEDICARE

## 2019-05-14 VITALS
TEMPERATURE: 97.3 F | WEIGHT: 197.13 LBS | HEIGHT: 65 IN | HEART RATE: 126 BPM | BODY MASS INDEX: 32.84 KG/M2 | DIASTOLIC BLOOD PRESSURE: 72 MMHG | SYSTOLIC BLOOD PRESSURE: 134 MMHG | OXYGEN SATURATION: 87 %

## 2019-05-14 DIAGNOSIS — I10 ESSENTIAL HYPERTENSION: ICD-10-CM

## 2019-05-14 DIAGNOSIS — E78.2 MIXED HYPERLIPIDEMIA: ICD-10-CM

## 2019-05-14 DIAGNOSIS — J44.9 CHRONIC OBSTRUCTIVE PULMONARY DISEASE, UNSPECIFIED COPD TYPE (HCC): ICD-10-CM

## 2019-05-14 DIAGNOSIS — Z72.0 TOBACCO USE: ICD-10-CM

## 2019-05-14 DIAGNOSIS — E11.9 TYPE 2 DIABETES MELLITUS WITHOUT COMPLICATION, WITHOUT LONG-TERM CURRENT USE OF INSULIN (HCC): Primary | ICD-10-CM

## 2019-05-14 DIAGNOSIS — E11.9 TYPE 2 DIABETES MELLITUS WITHOUT COMPLICATION, WITHOUT LONG-TERM CURRENT USE OF INSULIN (HCC): ICD-10-CM

## 2019-05-14 LAB
ALBUMIN SERPL BCP-MCNC: 4 G/DL (ref 3.5–5)
ALP SERPL-CCNC: 86 U/L (ref 46–116)
ALT SERPL W P-5'-P-CCNC: 14 U/L (ref 12–78)
ANION GAP SERPL CALCULATED.3IONS-SCNC: 5 MMOL/L (ref 4–13)
AST SERPL W P-5'-P-CCNC: 20 U/L (ref 5–45)
BILIRUB SERPL-MCNC: 0.89 MG/DL (ref 0.2–1)
BUN SERPL-MCNC: 8 MG/DL (ref 5–25)
CALCIUM SERPL-MCNC: 9.3 MG/DL (ref 8.3–10.1)
CHLORIDE SERPL-SCNC: 99 MMOL/L (ref 100–108)
CO2 SERPL-SCNC: 29 MMOL/L (ref 21–32)
CREAT SERPL-MCNC: 0.92 MG/DL (ref 0.6–1.3)
EST. AVERAGE GLUCOSE BLD GHB EST-MCNC: 160 MG/DL
GFR SERPL CREATININE-BSD FRML MDRD: 61 ML/MIN/1.73SQ M
GLUCOSE P FAST SERPL-MCNC: 173 MG/DL (ref 65–99)
HBA1C MFR BLD: 7.2 % (ref 4.2–6.3)
LDLC SERPL DIRECT ASSAY-MCNC: 115 MG/DL (ref 0–100)
POTASSIUM SERPL-SCNC: 4 MMOL/L (ref 3.5–5.3)
PROT SERPL-MCNC: 7.7 G/DL (ref 6.4–8.2)
SODIUM SERPL-SCNC: 133 MMOL/L (ref 136–145)

## 2019-05-14 PROCEDURE — 83721 ASSAY OF BLOOD LIPOPROTEIN: CPT

## 2019-05-14 PROCEDURE — 83036 HEMOGLOBIN GLYCOSYLATED A1C: CPT

## 2019-05-14 PROCEDURE — 80053 COMPREHEN METABOLIC PANEL: CPT

## 2019-05-14 PROCEDURE — 99214 OFFICE O/P EST MOD 30 MIN: CPT | Performed by: INTERNAL MEDICINE

## 2019-05-14 PROCEDURE — 36415 COLL VENOUS BLD VENIPUNCTURE: CPT

## 2019-05-14 RX ORDER — BUDESONIDE AND FORMOTEROL FUMARATE DIHYDRATE 80; 4.5 UG/1; UG/1
2 AEROSOL RESPIRATORY (INHALATION) 2 TIMES DAILY
Qty: 1 INHALER | Refills: 3 | Status: SHIPPED | OUTPATIENT
Start: 2019-05-14 | End: 2019-11-05 | Stop reason: SDUPTHER

## 2019-06-28 DIAGNOSIS — B37.9 YEAST INFECTION: Primary | ICD-10-CM

## 2019-06-28 RX ORDER — FLUCONAZOLE 150 MG/1
150 TABLET ORAL ONCE
Qty: 1 TABLET | Refills: 1 | Status: SHIPPED | OUTPATIENT
Start: 2019-06-28 | End: 2019-06-28

## 2019-09-03 ENCOUNTER — TELEPHONE (OUTPATIENT)
Dept: INTERNAL MEDICINE CLINIC | Facility: CLINIC | Age: 74
End: 2019-09-03

## 2019-10-04 DIAGNOSIS — E11.9 TYPE 2 DIABETES MELLITUS WITHOUT COMPLICATION, WITHOUT LONG-TERM CURRENT USE OF INSULIN (HCC): Primary | ICD-10-CM

## 2019-11-05 ENCOUNTER — OFFICE VISIT (OUTPATIENT)
Dept: INTERNAL MEDICINE CLINIC | Facility: CLINIC | Age: 74
End: 2019-11-05
Payer: MEDICARE

## 2019-11-05 ENCOUNTER — APPOINTMENT (OUTPATIENT)
Dept: LAB | Facility: MEDICAL CENTER | Age: 74
End: 2019-11-05
Payer: MEDICARE

## 2019-11-05 VITALS
BODY MASS INDEX: 30.7 KG/M2 | OXYGEN SATURATION: 95 % | HEIGHT: 65 IN | SYSTOLIC BLOOD PRESSURE: 126 MMHG | HEART RATE: 62 BPM | DIASTOLIC BLOOD PRESSURE: 78 MMHG | TEMPERATURE: 98.1 F | WEIGHT: 184.25 LBS

## 2019-11-05 DIAGNOSIS — Z78.0 ENCOUNTER FOR OSTEOPOROSIS SCREENING IN ASYMPTOMATIC POSTMENOPAUSAL PATIENT: ICD-10-CM

## 2019-11-05 DIAGNOSIS — J44.9 CHRONIC OBSTRUCTIVE PULMONARY DISEASE, UNSPECIFIED COPD TYPE (HCC): ICD-10-CM

## 2019-11-05 DIAGNOSIS — E11.9 TYPE 2 DIABETES MELLITUS WITHOUT COMPLICATION, WITHOUT LONG-TERM CURRENT USE OF INSULIN (HCC): ICD-10-CM

## 2019-11-05 DIAGNOSIS — Z13.820 ENCOUNTER FOR OSTEOPOROSIS SCREENING IN ASYMPTOMATIC POSTMENOPAUSAL PATIENT: ICD-10-CM

## 2019-11-05 DIAGNOSIS — Z72.0 TOBACCO USE: ICD-10-CM

## 2019-11-05 DIAGNOSIS — Z00.00 MEDICARE ANNUAL WELLNESS VISIT, SUBSEQUENT: Primary | ICD-10-CM

## 2019-11-05 DIAGNOSIS — F41.9 ANXIETY: ICD-10-CM

## 2019-11-05 LAB
ALBUMIN SERPL BCP-MCNC: 4.1 G/DL (ref 3.5–5)
ALP SERPL-CCNC: 91 U/L (ref 46–116)
ALT SERPL W P-5'-P-CCNC: 12 U/L (ref 12–78)
ANION GAP SERPL CALCULATED.3IONS-SCNC: 7 MMOL/L (ref 4–13)
AST SERPL W P-5'-P-CCNC: 16 U/L (ref 5–45)
BILIRUB SERPL-MCNC: 1.02 MG/DL (ref 0.2–1)
BUN SERPL-MCNC: 9 MG/DL (ref 5–25)
CALCIUM SERPL-MCNC: 9.6 MG/DL (ref 8.3–10.1)
CHLORIDE SERPL-SCNC: 98 MMOL/L (ref 100–108)
CHOLEST SERPL-MCNC: 171 MG/DL (ref 50–200)
CO2 SERPL-SCNC: 28 MMOL/L (ref 21–32)
CREAT SERPL-MCNC: 0.89 MG/DL (ref 0.6–1.3)
GFR SERPL CREATININE-BSD FRML MDRD: 64 ML/MIN/1.73SQ M
GLUCOSE P FAST SERPL-MCNC: 208 MG/DL (ref 65–99)
HDLC SERPL-MCNC: 44 MG/DL
LDLC SERPL CALC-MCNC: 111 MG/DL (ref 0–100)
NONHDLC SERPL-MCNC: 127 MG/DL
POTASSIUM SERPL-SCNC: 4.2 MMOL/L (ref 3.5–5.3)
PROT SERPL-MCNC: 7.3 G/DL (ref 6.4–8.2)
SL AMB POCT HEMOGLOBIN AIC: 9.2 (ref ?–6.5)
SODIUM SERPL-SCNC: 133 MMOL/L (ref 136–145)
TRIGL SERPL-MCNC: 81 MG/DL

## 2019-11-05 PROCEDURE — 83036 HEMOGLOBIN GLYCOSYLATED A1C: CPT | Performed by: INTERNAL MEDICINE

## 2019-11-05 PROCEDURE — G0439 PPPS, SUBSEQ VISIT: HCPCS | Performed by: INTERNAL MEDICINE

## 2019-11-05 PROCEDURE — 80053 COMPREHEN METABOLIC PANEL: CPT

## 2019-11-05 PROCEDURE — 80061 LIPID PANEL: CPT

## 2019-11-05 PROCEDURE — 36415 COLL VENOUS BLD VENIPUNCTURE: CPT

## 2019-11-05 RX ORDER — BUDESONIDE AND FORMOTEROL FUMARATE DIHYDRATE 80; 4.5 UG/1; UG/1
2 AEROSOL RESPIRATORY (INHALATION) 2 TIMES DAILY
Qty: 1 INHALER | Refills: 3 | Status: SHIPPED | OUTPATIENT
Start: 2019-11-05 | End: 2021-10-18 | Stop reason: SDUPTHER

## 2019-11-05 RX ORDER — SERTRALINE HYDROCHLORIDE 25 MG/1
25 TABLET, FILM COATED ORAL DAILY
Qty: 30 TABLET | Refills: 5 | Status: SHIPPED | OUTPATIENT
Start: 2019-11-05

## 2019-11-05 NOTE — PROGRESS NOTES
Assessment and Plan:     Problem List Items Addressed This Visit        Endocrine    Type 2 diabetes mellitus without complication, without long-term current use of insulin ([facility] )    Relevant Orders    POCT hemoglobin A1c (Completed)    Microalbumin / creatinine urine ratio       Other    Medicare annual wellness visit, subsequent - Primary    Tobacco use    Relevant Medications    budesonide-formoterol (SYMBICORT) 80-4 5 MCG/ACT inhaler      Other Visit Diagnoses     Encounter for osteoporosis screening in asymptomatic postmenopausal patient        Relevant Orders    DXA bone density spine hip and pelvis    Chronic obstructive pulmonary disease, unspecified COPD type ([facility] )        Relevant Medications    budesonide-formoterol (SYMBICORT) 80-4 5 MCG/ACT inhaler    Anxiety        Relevant Medications    sertraline (ZOLOFT) 25 mg tablet      Encouraged smoking cessation   Defers pneumonia and flu shot  Discussed AD and POA and patient will complete  Increase water intake  Restart inhaler for sob  Increase protein and limit snacks   Rto 3 months      Tobacco Cessation Counseling: Tobacco cessation counseling was provided  The patient is sincerely urged to quit consumption of tobacco  She is not ready to quit tobacco  Medication options and side effects of medication not discussed  Preventive health issues were discussed with patient, and age appropriate screening tests were ordered as noted in patient's After Visit Summary  Personalized health advice and appropriate referrals for health education or preventive services given if needed, as noted in patient's After Visit Summary       History of Present Illness:     Patient presents for Medicare Annual Wellness visit    Patient Care Team:  Luc Zaragoza DO as PCP - General  Luc Zaragoza DO     Problem List:     Patient Active Problem List   Diagnosis    Hyperlipidemia    Hypertension    Medicare annual wellness visit, subsequent    Type 2 diabetes mellitus without complication, without long-term current use of insulin ([facility] )    Tobacco use      Past Medical and Surgical History:     Past Medical History:   Diagnosis Date    Hyperlipidemia     last assessed  8/24/17    Hypertension     last assessed 10/2/14     Past Surgical History:   Procedure Laterality Date    CRANIOTOMY        Family History:     Family History   Problem Relation Age of Onset    Breast cancer Mother     Ovarian cancer Mother     Diabetes Father       Social History:     Social History     Socioeconomic History    Marital status:       Spouse name: None    Number of children: None    Years of education: None    Highest education level: None   Occupational History    None   Social Needs    Financial resource strain: None    Food insecurity:     Worry: None     Inability: None    Transportation needs:     Medical: None     Non-medical: None   Tobacco Use    Smoking status: Current Every Day Smoker     Types: Cigarettes    Smokeless tobacco: Never Used   Substance and Sexual Activity    Alcohol use: No    Drug use: No    Sexual activity: None   Lifestyle    Physical activity:     Days per week: None     Minutes per session: None    Stress: None   Relationships    Social connections:     Talks on phone: None     Gets together: None     Attends Mu-ism service: None     Active member of club or organization: None     Attends meetings of clubs or organizations: None     Relationship status: None    Intimate partner violence:     Fear of current or ex partner: None     Emotionally abused: None     Physically abused: None     Forced sexual activity: None   Other Topics Concern    None   Social History Narrative    Always uses seat belt    Always uses sunscreen    Dental care occasionally    No living will       Medications and Allergies:     Current Outpatient Medications   Medication Sig Dispense Refill    aspirin 81 MG tablet Take 1 tablet by mouth daily      glipiZIDE (GLUCOTROL) 10 mg tablet Take 1 tablet (10 mg total) by mouth 2 (two) times a day for 90 days 180 tablet 3    lisinopril-hydrochlorothiazide (PRINZIDE,ZESTORETIC) 20-12 5 MG per tablet take 1 tablet by mouth twice a day 180 tablet 3    sertraline (ZOLOFT) 25 mg tablet Take 1 tablet (25 mg total) by mouth daily 30 tablet 5    simvastatin (ZOCOR) 40 mg tablet take 1 tablet by mouth once daily 90 tablet 3    sitaGLIPtin-metFORMIN (JANUMET)  MG per tablet Take 1 tablet by mouth 2 (two) times a day with meals 60 tablet 5    budesonide-formoterol (SYMBICORT) 80-4 5 MCG/ACT inhaler Inhale 2 puffs 2 (two) times a day Rinse mouth after use  1 Inhaler 3     No current facility-administered medications for this visit  No Known Allergies   Immunizations: There is no immunization history for the selected administration types on file for this patient  Health Maintenance:         Topic Date Due    DXA SCAN  1945    CRC Screening: Colonoscopy  1945    Hepatitis C Screening  Completed         Topic Date Due    HEPATITIS B VACCINES (1 of 3 - Risk 3-dose series) 02/23/1963    DTaP,Tdap,and Td Vaccines (1 - Tdap) 02/23/1965      Medicare Health Risk Assessment:     /78   Pulse 62   Temp 98 1 °F (36 7 °C) (Tympanic)   Ht 5' 5" (1 651 m)   Wt 83 6 kg (184 lb 4 oz)   SpO2 95%   BMI 30 66 kg/m²      Herman Mccain is here for her Subsequent Wellness visit  Last Medicare Wellness visit information reviewed, patient interviewed and updates made to the record today  Health Risk Assessment:   Patient rates overall health as fair  Patient feels that their physical health rating is slightly worse  Eyesight was rated as same  Hearing was rated as same  Patient feels that their emotional and mental health rating is same  Pain experienced in the last 7 days has been none  Patient states that she has experienced no weight loss or gain in last 6 months       Depression Screening:   PHQ-2 Score: 2      Fall Risk Screening: In the past year, patient has experienced: no history of falling in past year      Urinary Incontinence Screening:   Patient has not leaked urine accidently in the last six months  Home Safety:  Patient has trouble with stairs inside or outside of their home  Patient has working smoke alarms and has working carbon monoxide detector  Home safety hazards include: none  Nutrition:   Current diet is Regular  Medications:   Patient is currently taking over-the-counter supplements  OTC medications include: see medication list  Patient is able to manage medications  Activities of Daily Living (ADLs)/Instrumental Activities of Daily Living (IADLs):   Walk and transfer into and out of bed and chair?: Yes  Dress and groom yourself?: Yes    Bathe or shower yourself?: Yes    Feed yourself?  Yes  Do your laundry/housekeeping?: Yes  Manage your money, pay your bills and track your expenses?: Yes  Make your own meals?: Yes    Do your own shopping?: Yes    ADL comments: Patient has daughter at home with her    Previous Hospitalizations:   Any hospitalizations or ED visits within the last 12 months?: No      Advance Care Planning:   Living will: No    Durable POA for healthcare: No    Advanced directive: No    Advanced directive counseling given: Yes    Five wishes given: No    End of Life Decisions reviewed with patient: Yes    Provider agrees with end of life decisions: Yes      Cognitive Screening:   Provider or family/friend/caregiver concerned regarding cognition?: Yes    Cognition Comments: Daughter now lives with patient and is helping her at present pt stable and managing for the most part ADls on her own    PREVENTIVE SCREENINGS      Cardiovascular Screening:    General: Screening Not Indicated and History Lipid Disorder      Diabetes Screening:     General: Screening Not Indicated and History Diabetes      Colorectal Cancer Screening:     General: Screening Not Indicated Breast Cancer Screening:     General: Patient Declines      Cervical Cancer Screening:    General: Screening Not Indicated      Osteoporosis Screening:    General: Patient Declines      Abdominal Aortic Aneurysm (AAA) Screening:        General: Screening Not Indicated      Lung Cancer Screening:     General: Patient Declines      Hepatitis C Screening:    General: Screening Current    Other Counseling Topics:   Calcium and vitamin D intake         Fernando Salazar DO

## 2020-01-20 DIAGNOSIS — I10 ESSENTIAL HYPERTENSION: ICD-10-CM

## 2020-01-20 DIAGNOSIS — E78.5 HYPERLIPIDEMIA, UNSPECIFIED HYPERLIPIDEMIA TYPE: ICD-10-CM

## 2020-01-20 RX ORDER — LISINOPRIL AND HYDROCHLOROTHIAZIDE 20; 12.5 MG/1; MG/1
TABLET ORAL
Qty: 180 TABLET | Refills: 0 | Status: SHIPPED | OUTPATIENT
Start: 2020-01-20 | End: 2020-01-21 | Stop reason: SDUPTHER

## 2020-01-20 RX ORDER — SIMVASTATIN 40 MG
TABLET ORAL
Qty: 90 TABLET | Refills: 0 | Status: SHIPPED | OUTPATIENT
Start: 2020-01-20 | End: 2020-01-21 | Stop reason: SDUPTHER

## 2020-01-21 DIAGNOSIS — E11.9 TYPE 2 DIABETES MELLITUS WITHOUT COMPLICATION, WITHOUT LONG-TERM CURRENT USE OF INSULIN (HCC): ICD-10-CM

## 2020-01-21 DIAGNOSIS — E78.5 HYPERLIPIDEMIA, UNSPECIFIED HYPERLIPIDEMIA TYPE: ICD-10-CM

## 2020-01-21 DIAGNOSIS — I10 ESSENTIAL HYPERTENSION: ICD-10-CM

## 2020-01-21 RX ORDER — SIMVASTATIN 40 MG
40 TABLET ORAL DAILY
Qty: 30 TABLET | Refills: 3 | Status: SHIPPED | OUTPATIENT
Start: 2020-01-21 | End: 2020-07-01 | Stop reason: SDUPTHER

## 2020-01-21 RX ORDER — LISINOPRIL AND HYDROCHLOROTHIAZIDE 20; 12.5 MG/1; MG/1
1 TABLET ORAL 2 TIMES DAILY
Qty: 60 TABLET | Refills: 3 | Status: SHIPPED | OUTPATIENT
Start: 2020-01-21 | End: 2020-07-01 | Stop reason: SDUPTHER

## 2020-05-05 DIAGNOSIS — E11.9 TYPE 2 DIABETES MELLITUS WITHOUT COMPLICATION, WITHOUT LONG-TERM CURRENT USE OF INSULIN (HCC): ICD-10-CM

## 2020-05-05 RX ORDER — GLIPIZIDE 10 MG/1
TABLET ORAL
Qty: 180 TABLET | Refills: 3 | Status: SHIPPED | OUTPATIENT
Start: 2020-05-05 | End: 2021-05-31

## 2020-07-01 DIAGNOSIS — I10 ESSENTIAL HYPERTENSION: ICD-10-CM

## 2020-07-01 DIAGNOSIS — E78.5 HYPERLIPIDEMIA, UNSPECIFIED HYPERLIPIDEMIA TYPE: ICD-10-CM

## 2020-07-01 RX ORDER — SIMVASTATIN 40 MG
40 TABLET ORAL DAILY
Qty: 30 TABLET | Refills: 3 | Status: SHIPPED | OUTPATIENT
Start: 2020-07-01 | End: 2021-01-12 | Stop reason: SDUPTHER

## 2020-07-01 RX ORDER — LISINOPRIL AND HYDROCHLOROTHIAZIDE 20; 12.5 MG/1; MG/1
1 TABLET ORAL 2 TIMES DAILY
Qty: 60 TABLET | Refills: 3 | Status: SHIPPED | OUTPATIENT
Start: 2020-07-01 | End: 2021-01-12 | Stop reason: SDUPTHER

## 2020-07-06 DIAGNOSIS — E11.9 TYPE 2 DIABETES MELLITUS WITHOUT COMPLICATION, WITHOUT LONG-TERM CURRENT USE OF INSULIN (HCC): ICD-10-CM

## 2020-10-13 DIAGNOSIS — E11.9 TYPE 2 DIABETES MELLITUS WITHOUT COMPLICATION, WITHOUT LONG-TERM CURRENT USE OF INSULIN (HCC): ICD-10-CM

## 2021-01-12 DIAGNOSIS — I10 ESSENTIAL HYPERTENSION: ICD-10-CM

## 2021-01-12 DIAGNOSIS — E78.5 HYPERLIPIDEMIA, UNSPECIFIED HYPERLIPIDEMIA TYPE: ICD-10-CM

## 2021-01-12 RX ORDER — LISINOPRIL AND HYDROCHLOROTHIAZIDE 20; 12.5 MG/1; MG/1
1 TABLET ORAL 2 TIMES DAILY
Qty: 180 TABLET | Refills: 3 | Status: SHIPPED | OUTPATIENT
Start: 2021-01-12 | End: 2022-02-07 | Stop reason: SDUPTHER

## 2021-01-12 RX ORDER — SIMVASTATIN 40 MG
40 TABLET ORAL DAILY
Qty: 90 TABLET | Refills: 3 | Status: SHIPPED | OUTPATIENT
Start: 2021-01-12 | End: 2021-01-15 | Stop reason: SDUPTHER

## 2021-01-15 ENCOUNTER — TELEMEDICINE (OUTPATIENT)
Dept: INTERNAL MEDICINE CLINIC | Facility: CLINIC | Age: 76
End: 2021-01-15
Payer: MEDICARE

## 2021-01-15 VITALS — BODY MASS INDEX: 30.66 KG/M2 | WEIGHT: 184 LBS | HEIGHT: 65 IN

## 2021-01-15 DIAGNOSIS — E55.9 VITAMIN D DEFICIENCY: ICD-10-CM

## 2021-01-15 DIAGNOSIS — E11.9 TYPE 2 DIABETES MELLITUS WITHOUT COMPLICATION, WITHOUT LONG-TERM CURRENT USE OF INSULIN (HCC): ICD-10-CM

## 2021-01-15 DIAGNOSIS — E78.5 HYPERLIPIDEMIA, UNSPECIFIED HYPERLIPIDEMIA TYPE: ICD-10-CM

## 2021-01-15 DIAGNOSIS — Z00.00 MEDICARE ANNUAL WELLNESS VISIT, SUBSEQUENT: Primary | ICD-10-CM

## 2021-01-15 PROCEDURE — 1123F ACP DISCUSS/DSCN MKR DOCD: CPT | Performed by: INTERNAL MEDICINE

## 2021-01-15 PROCEDURE — G0439 PPPS, SUBSEQ VISIT: HCPCS | Performed by: INTERNAL MEDICINE

## 2021-01-15 RX ORDER — SIMVASTATIN 40 MG
40 TABLET ORAL DAILY
Qty: 90 TABLET | Refills: 3 | Status: SHIPPED | OUTPATIENT
Start: 2021-01-15 | End: 2022-02-07 | Stop reason: SDUPTHER

## 2021-01-15 NOTE — PROGRESS NOTES
Assessment and Plan:     Problem List Items Addressed This Visit        Endocrine    Type 2 diabetes mellitus without complication, without long-term current use of insulin ([address] )    Relevant Orders    HEMOGLOBIN A1C W/ EAG ESTIMATION       Other    Medicare annual wellness visit, subsequent - Primary      Arrange for mobil lab draw  Low fat diet Fall precautions  Stop smoking  Rto 6 months/foot exam    Tobacco Cessation Counseling: Tobacco cessation counseling was provided  The patient is sincerely urged to quit consumption of tobacco  She is not ready to quit tobacco  Medication options not discussed  Preventive health issues were discussed with patient, and age appropriate screening tests were ordered as noted in patient's After Visit Summary  Personalized health advice and appropriate referrals for health education or preventive services given if needed, as noted in patient's After Visit Summary       History of Present Illness:     Patient presents for Medicare Annual Wellness visit    Patient Care Team:  Ashtyn Cevallos DO as PCP - General  Ashtyn Cevallos DO     Problem List:     Patient Active Problem List   Diagnosis    Hyperlipidemia    Hypertension    Medicare annual wellness visit, subsequent    Type 2 diabetes mellitus without complication, without long-term current use of insulin ([address] )    Tobacco use      Past Medical and Surgical History:     Past Medical History:   Diagnosis Date    Hyperlipidemia     last assessed  8/24/17    Hypertension     last assessed 10/2/14     Past Surgical History:   Procedure Laterality Date    CRANIOTOMY        Family History:     Family History   Problem Relation Age of Onset    Breast cancer Mother     Ovarian cancer Mother     Diabetes Father       Social History:     E-Cigarette/Vaping    E-Cigarette Use Never User      E-Cigarette/Vaping Substances    Nicotine No     THC No     CBD No     Flavoring No     Other No     Unknown No      Social History     Socioeconomic History    Marital status:      Spouse name: None    Number of children: None    Years of education: None    Highest education level: None   Occupational History    None   Social Needs    Financial resource strain: None    Food insecurity     Worry: None     Inability: None    Transportation needs     Medical: None     Non-medical: None   Tobacco Use    Smoking status: Current Every Day Smoker     Types: Cigarettes    Smokeless tobacco: Never Used   Substance and Sexual Activity    Alcohol use: No    Drug use: No    Sexual activity: None   Lifestyle    Physical activity     Days per week: None     Minutes per session: None    Stress: None   Relationships    Social connections     Talks on phone: None     Gets together: None     Attends Alevism service: None     Active member of club or organization: None     Attends meetings of clubs or organizations: None     Relationship status: None    Intimate partner violence     Fear of current or ex partner: None     Emotionally abused: None     Physically abused: None     Forced sexual activity: None   Other Topics Concern    None   Social History Narrative    Always uses seat belt    Always uses sunscreen    Dental care occasionally    No living will      Medications and Allergies:     Current Outpatient Medications   Medication Sig Dispense Refill    aspirin 81 MG tablet Take 1 tablet by mouth daily      budesonide-formoterol (SYMBICORT) 80-4 5 MCG/ACT inhaler Inhale 2 puffs 2 (two) times a day Rinse mouth after use   1 Inhaler 3    glipiZIDE (GLUCOTROL) 10 mg tablet take 1 tablet by mouth twice a day 180 tablet 3    lisinopril-hydrochlorothiazide (PRINZIDE,ZESTORETIC) 20-12 5 MG per tablet Take 1 tablet by mouth 2 (two) times a day 180 tablet 3    sertraline (ZOLOFT) 25 mg tablet Take 1 tablet (25 mg total) by mouth daily 30 tablet 5    simvastatin (ZOCOR) 40 mg tablet Take 1 tablet (40 mg total) by mouth daily 90 tablet 3    sitaGLIPtin-metFORMIN (JANUMET)  MG per tablet Take 1 tablet by mouth 2 (two) times a day with meals 180 tablet 3     No current facility-administered medications for this visit  No Known Allergies   Immunizations: There is no immunization history for the selected administration types on file for this patient  Health Maintenance:         Topic Date Due    DXA SCAN  1945    Hepatitis C Screening  Completed         Topic Date Due    DTaP,Tdap,and Td Vaccines (1 - Tdap) 02/23/1965    Pneumococcal Vaccine: 65+ Years (1 of 1 - PPSV23) 02/23/2009      Medicare Health Risk Assessment:     Ht 5' 5" (1 651 m)   Wt 83 5 kg (184 lb)   BMI 30 62 kg/m²      Denise Leggett is here for her Subsequent Wellness visit  Last Medicare Wellness visit information reviewed, patient interviewed, no change since last AWV  Health Risk Assessment:   Patient rates overall health as good  Patient feels that their physical health rating is same  Eyesight was rated as same  Hearing was rated as same  Patient feels that their emotional and mental health rating is same  Pain experienced in the last 7 days has been none  Patient states that she has experienced no weight loss or gain in last 6 months  Depression Screening:   PHQ-2 Score: 0      Fall Risk Screening: In the past year, patient has experienced: no history of falling in past year      Urinary Incontinence Screening:   Patient has not leaked urine accidently in the last six months  Home Safety:  Patient does not have trouble with stairs inside or outside of their home  Patient has working smoke alarms and has working carbon monoxide detector  Home safety hazards include: none  Nutrition:   Current diet is Regular  Medications:   Patient is currently taking over-the-counter supplements  OTC medications include: see medication list  Patient is able to manage medications       Activities of Daily Living (ADLs)/Instrumental Activities of Daily Living (IADLs):   Walk and transfer into and out of bed and chair?: Yes  Dress and groom yourself?: Yes    Bathe or shower yourself?: Yes    Feed yourself? Yes  Do your laundry/housekeeping?: No  Manage your money, pay your bills and track your expenses?: Yes  Make your own meals?: No    Do your own shopping?: No    Previous Hospitalizations:   Any hospitalizations or ED visits within the last 12 months?: No      Advance Care Planning:   Living will: No    Advanced directive: No    End of Life Decisions reviewed with patient: Yes    Provider agrees with end of life decisions: Yes      Cognitive Screening:   Provider or family/friend/caregiver concerned regarding cognition?: No    PREVENTIVE SCREENINGS      Cardiovascular Screening:    General: History Lipid Disorder and Risks and Benefits Discussed    Due for: Lipid Panel      Diabetes Screening:     General: History Diabetes and Risks and Benefits Discussed    Due for: Blood Glucose      Colorectal Cancer Screening:     General: Screening Not Indicated      Breast Cancer Screening:     General: Patient Declines      Cervical Cancer Screening:    General: Screening Not Indicated      Osteoporosis Screening:    General: Patient Declines      Abdominal Aortic Aneurysm (AAA) Screening:        General: Patient Declines      Lung Cancer Screening:     General: Screening Not Indicated      Hepatitis C Screening:    General: Screening Current    Other Counseling Topics:   Calcium and vitamin D intake and regular weightbearing exercise  BMI Counseling: Body mass index is 30 62 kg/m²  The BMI is above normal  Nutrition recommendations include 3-5 servings of fruits/vegetables daily        Yash Dave DO  Virtual AWV Consent    Reason for visit is tcm    Encounter provider Yash Dave DO    Provider located at [address] [place] [phone]      Recent Visits  No visits were found meeting these conditions  Showing recent visits within past 7 days and meeting all other requirements     Today's Visits  Date Type Provider Dept   01/15/21 Telemedicine Colonel Immanuel DO [facility] [address] today's visits and meeting all other requirements     Future Appointments  No visits were found meeting these conditions  Showing future appointments within next 150 days and meeting all other requirements      It was my intent to perform this visit via video technology but the patient was not able to do a video connection so the visit was completed via audio telephone only  After connecting through televideo, the patient was identified by name and date of birth  Karlo Mcallister was informed that this is a telemedicine visit and that the visit is being conducted through telephone  My office door was closed  No one else was in the room  She acknowledged consent and understanding of privacy and security of the video platform  The patient has agreed to participate and understands they can discontinue the visit at any time    Patient is aware this is a billable service

## 2021-05-30 DIAGNOSIS — E11.9 TYPE 2 DIABETES MELLITUS WITHOUT COMPLICATION, WITHOUT LONG-TERM CURRENT USE OF INSULIN (HCC): ICD-10-CM

## 2021-05-31 RX ORDER — GLIPIZIDE 10 MG/1
TABLET ORAL
Qty: 180 TABLET | Refills: 3 | Status: SHIPPED | OUTPATIENT
Start: 2021-05-31 | End: 2022-02-07 | Stop reason: SDUPTHER

## 2021-07-15 ENCOUNTER — TELEMEDICINE (OUTPATIENT)
Dept: INTERNAL MEDICINE CLINIC | Facility: CLINIC | Age: 76
End: 2021-07-15
Payer: MEDICARE

## 2021-07-15 ENCOUNTER — TELEPHONE (OUTPATIENT)
Dept: LAB | Facility: HOSPITAL | Age: 76
End: 2021-07-15

## 2021-07-15 DIAGNOSIS — I10 ESSENTIAL HYPERTENSION: ICD-10-CM

## 2021-07-15 DIAGNOSIS — Z72.0 TOBACCO USE: ICD-10-CM

## 2021-07-15 DIAGNOSIS — E11.9 TYPE 2 DIABETES MELLITUS WITHOUT COMPLICATION, WITHOUT LONG-TERM CURRENT USE OF INSULIN (HCC): Primary | ICD-10-CM

## 2021-07-15 PROCEDURE — 99441 PR PHYS/QHP TELEPHONE EVALUATION 5-10 MIN: CPT | Performed by: INTERNAL MEDICINE

## 2021-07-15 NOTE — PROGRESS NOTES
Virtual Brief Visit    Verification of patient location:    Patient is currently located in the state of [place]  Patient is currently located in a state in which I am licensed    Assessment/Plan:    Problem List Items Addressed This Visit        Endocrine    Type 2 diabetes mellitus without complication, without long-term current use of insulin ([address] ) - Primary       Cardiovascular and Mediastinum    Hypertension       Other    Tobacco use      Arrange for mobile lab draw as per pt request  Smoking cessation encouraged   She does have someone in the home who can get groceries and meds   Will call pt with lab results         Reason for visit is routine followup   Chief Complaint   Patient presents with    Virtual Brief Visit     Pt would like the mobile lab to come if you want lab work  Encounter provider Homar Alex DO    Provider located at [address] [place] [id number]    Recent Visits  No visits were found meeting these conditions  Showing recent visits within past 7 days and meeting all other requirements  Today's Visits  Date Type Provider Dept   07/15/21 Telemedicine Homar Alex DO [facility] [address] today's visits and meeting all other requirements  Future Appointments  No visits were found meeting these conditions  Showing future appointments within next 150 days and meeting all other requirements       After connecting through telephone, the patient was identified by name and date of birth  Marily Smith was informed that this is a telemedicine visit and that the visit is being conducted through telephone  My office door was closed  No one else was in the room  She acknowledged consent and understanding of privacy and security of the platform  The patient has agreed to participate and understands she can discontinue the visit at any time  Patient is aware this is a billable service       Balaji Morales Rosie Mcclure is a 67 y o  female Pt has transport issues and smart phone access She feels well overall and has in home live in now so has company and person to assist with errands Pt stays home almost all the time    HPI   Pt still smoking but no acute respiratory issues No chest pain or sob No falls Appetite normal for her She denies weight loss or change in bowels but does not want invasive testing if issues develop She is asking about mobile labs draw as she does not leave the home generally     Past Medical History:   Diagnosis Date    Hyperlipidemia     last assessed  8/24/17    Hypertension     last assessed 10/2/14       Past Surgical History:   Procedure Laterality Date    CRANIOTOMY         Current Outpatient Medications   Medication Sig Dispense Refill    aspirin 81 MG tablet Take 1 tablet by mouth daily      glipiZIDE (GLUCOTROL) 10 mg tablet take 1 tablet by mouth twice a day 180 tablet 3    lisinopril-hydrochlorothiazide (PRINZIDE,ZESTORETIC) 20-12 5 MG per tablet Take 1 tablet by mouth 2 (two) times a day 180 tablet 3    sertraline (ZOLOFT) 25 mg tablet Take 1 tablet (25 mg total) by mouth daily 30 tablet 5    simvastatin (ZOCOR) 40 mg tablet Take 1 tablet (40 mg total) by mouth daily 90 tablet 3    sitaGLIPtin-metFORMIN (JANUMET)  MG per tablet Take 1 tablet by mouth 2 (two) times a day with meals 180 tablet 3    budesonide-formoterol (SYMBICORT) 80-4 5 MCG/ACT inhaler Inhale 2 puffs 2 (two) times a day Rinse mouth after use  (Patient not taking: Reported on 7/15/2021) 1 Inhaler 3     No current facility-administered medications for this visit  No Known Allergies    Review of Systems   Constitutional: Negative  Negative for chills and fever  HENT: Positive for congestion and postnasal drip  Eyes: Negative for visual disturbance  Respiratory: Positive for shortness of breath  Negative for cough  Cardiovascular: Negative for chest pain, palpitations and leg swelling  Gastrointestinal: Negative for abdominal distention and abdominal pain  Genitourinary: Negative for difficulty urinating, flank pain and frequency  Musculoskeletal: Positive for arthralgias and myalgias  Negative for gait problem  Neurological: Negative for dizziness, light-headedness and headaches  Psychiatric/Behavioral: Negative for sleep disturbance  The patient is not nervous/anxious  There were no vitals filed for this visit  I spent 10 minutes directly with the patient during this visit    VIRTUAL VISIT Estrella Andujar verbally agrees to participate in [employer]  Pt is aware that [employer] could be limited without vital signs or the ability to perform a full hands-on physical exam  Tiffani A Barb Lomax understands she or the provider may request at any time to terminate the video visit and request the patient to seek care or treatment in person

## 2021-07-20 ENCOUNTER — TELEPHONE (OUTPATIENT)
Dept: INTERNAL MEDICINE CLINIC | Facility: CLINIC | Age: 76
End: 2021-07-20

## 2021-07-20 ENCOUNTER — TELEPHONE (OUTPATIENT)
Dept: LAB | Facility: HOSPITAL | Age: 76
End: 2021-07-20

## 2021-07-20 NOTE — TELEPHONE ENCOUNTER
Pt called for next appt , when would you like to see her again, mobile lab did not do draw yet     She has to call them back to schedule appt,

## 2021-07-26 ENCOUNTER — TELEPHONE (OUTPATIENT)
Dept: LAB | Facility: HOSPITAL | Age: 76
End: 2021-07-26

## 2021-08-03 ENCOUNTER — APPOINTMENT (OUTPATIENT)
Dept: LAB | Facility: HOSPITAL | Age: 76
End: 2021-08-03
Payer: MEDICARE

## 2021-08-03 DIAGNOSIS — E78.5 HYPERLIPIDEMIA, UNSPECIFIED HYPERLIPIDEMIA TYPE: ICD-10-CM

## 2021-08-03 DIAGNOSIS — E55.9 VITAMIN D DEFICIENCY: ICD-10-CM

## 2021-08-03 DIAGNOSIS — E11.9 TYPE 2 DIABETES MELLITUS WITHOUT COMPLICATION, WITHOUT LONG-TERM CURRENT USE OF INSULIN (HCC): ICD-10-CM

## 2021-08-03 LAB
25(OH)D3 SERPL-MCNC: 10.4 NG/ML (ref 30–100)
ALBUMIN SERPL BCP-MCNC: 3.5 G/DL (ref 3.5–5)
ALP SERPL-CCNC: 70 U/L (ref 46–116)
ALT SERPL W P-5'-P-CCNC: 14 U/L (ref 12–78)
ANION GAP SERPL CALCULATED.3IONS-SCNC: 5 MMOL/L (ref 4–13)
AST SERPL W P-5'-P-CCNC: 14 U/L (ref 5–45)
BILIRUB SERPL-MCNC: 0.71 MG/DL (ref 0.2–1)
BUN SERPL-MCNC: 11 MG/DL (ref 5–25)
CALCIUM SERPL-MCNC: 9.2 MG/DL (ref 8.3–10.1)
CHLORIDE SERPL-SCNC: 96 MMOL/L (ref 100–108)
CHOLEST SERPL-MCNC: 157 MG/DL (ref 50–200)
CO2 SERPL-SCNC: 32 MMOL/L (ref 21–32)
CREAT SERPL-MCNC: 0.84 MG/DL (ref 0.6–1.3)
EST. AVERAGE GLUCOSE BLD GHB EST-MCNC: 157 MG/DL
GFR SERPL CREATININE-BSD FRML MDRD: 67 ML/MIN/1.73SQ M
GLUCOSE P FAST SERPL-MCNC: 142 MG/DL (ref 65–99)
HBA1C MFR BLD: 7.1 %
HDLC SERPL-MCNC: 47 MG/DL
LDLC SERPL CALC-MCNC: 92 MG/DL (ref 0–100)
NONHDLC SERPL-MCNC: 110 MG/DL
POTASSIUM SERPL-SCNC: 3.6 MMOL/L (ref 3.5–5.3)
PROT SERPL-MCNC: 7 G/DL (ref 6.4–8.2)
SODIUM SERPL-SCNC: 133 MMOL/L (ref 136–145)
TRIGL SERPL-MCNC: 91 MG/DL

## 2021-08-03 PROCEDURE — 82306 VITAMIN D 25 HYDROXY: CPT

## 2021-08-03 PROCEDURE — 36415 COLL VENOUS BLD VENIPUNCTURE: CPT

## 2021-08-03 PROCEDURE — 80061 LIPID PANEL: CPT

## 2021-08-03 PROCEDURE — 83036 HEMOGLOBIN GLYCOSYLATED A1C: CPT

## 2021-08-03 PROCEDURE — 80053 COMPREHEN METABOLIC PANEL: CPT

## 2021-08-05 DIAGNOSIS — E55.9 VITAMIN D DEFICIENCY: Primary | ICD-10-CM

## 2021-08-05 RX ORDER — ERGOCALCIFEROL 1.25 MG/1
50000 CAPSULE ORAL WEEKLY
Qty: 12 CAPSULE | Refills: 3 | Status: SHIPPED | OUTPATIENT
Start: 2021-08-05 | End: 2022-02-07 | Stop reason: SDUPTHER

## 2021-08-05 NOTE — TELEPHONE ENCOUNTER
----- Message from Mery Navarrete DO sent at 8/4/2021  6:09 AM EDT -----  Blood sugar and cholesterol improved control  Vit d is low recommend vit d 15177 iu weekly

## 2021-10-18 ENCOUNTER — OFFICE VISIT (OUTPATIENT)
Dept: FAMILY MEDICINE CLINIC | Facility: CLINIC | Age: 76
End: 2021-10-18
Payer: MEDICARE

## 2021-10-18 ENCOUNTER — TELEPHONE (OUTPATIENT)
Dept: LAB | Facility: HOSPITAL | Age: 76
End: 2021-10-18

## 2021-10-18 VITALS
BODY MASS INDEX: 30.56 KG/M2 | TEMPERATURE: 96.7 F | HEART RATE: 90 BPM | HEIGHT: 65 IN | SYSTOLIC BLOOD PRESSURE: 126 MMHG | WEIGHT: 183.4 LBS | DIASTOLIC BLOOD PRESSURE: 78 MMHG | RESPIRATION RATE: 18 BRPM

## 2021-10-18 DIAGNOSIS — J44.9 CHRONIC OBSTRUCTIVE PULMONARY DISEASE, UNSPECIFIED COPD TYPE (HCC): Primary | ICD-10-CM

## 2021-10-18 DIAGNOSIS — E11.9 TYPE 2 DIABETES MELLITUS WITHOUT COMPLICATION, WITHOUT LONG-TERM CURRENT USE OF INSULIN (HCC): ICD-10-CM

## 2021-10-18 DIAGNOSIS — R26.2 AMBULATORY DYSFUNCTION: ICD-10-CM

## 2021-10-18 DIAGNOSIS — Z72.0 TOBACCO USE: ICD-10-CM

## 2021-10-18 PROCEDURE — 99214 OFFICE O/P EST MOD 30 MIN: CPT | Performed by: INTERNAL MEDICINE

## 2021-10-18 RX ORDER — BUDESONIDE AND FORMOTEROL FUMARATE DIHYDRATE 80; 4.5 UG/1; UG/1
2 AEROSOL RESPIRATORY (INHALATION) 2 TIMES DAILY
Qty: 10.2 G | Refills: 1 | Status: SHIPPED | OUTPATIENT
Start: 2021-10-18

## 2021-11-02 ENCOUNTER — TELEPHONE (OUTPATIENT)
Dept: FAMILY MEDICINE CLINIC | Facility: CLINIC | Age: 76
End: 2021-11-02

## 2021-11-02 DIAGNOSIS — R09.81 CONGESTION OF NASAL SINUS: Primary | ICD-10-CM

## 2021-11-02 RX ORDER — GUAIFENESIN 600 MG
1200 TABLET, EXTENDED RELEASE 12 HR ORAL 2 TIMES DAILY
Qty: 20 TABLET | Refills: 0 | Status: SHIPPED | OUTPATIENT
Start: 2021-11-02 | End: 2021-11-12

## 2021-12-01 ENCOUNTER — TELEPHONE (OUTPATIENT)
Dept: FAMILY MEDICINE CLINIC | Facility: CLINIC | Age: 76
End: 2021-12-01

## 2021-12-25 DIAGNOSIS — E11.9 TYPE 2 DIABETES MELLITUS WITHOUT COMPLICATION, WITHOUT LONG-TERM CURRENT USE OF INSULIN (HCC): ICD-10-CM

## 2021-12-26 RX ORDER — SITAGLIPTIN AND METFORMIN HYDROCHLORIDE 500; 50 MG/1; MG/1
TABLET, FILM COATED ORAL
Qty: 180 TABLET | Refills: 3 | Status: SHIPPED | OUTPATIENT
Start: 2021-12-26 | End: 2022-02-07 | Stop reason: SDUPTHER

## 2022-02-07 DIAGNOSIS — E78.5 HYPERLIPIDEMIA, UNSPECIFIED HYPERLIPIDEMIA TYPE: ICD-10-CM

## 2022-02-07 DIAGNOSIS — E55.9 VITAMIN D DEFICIENCY: ICD-10-CM

## 2022-02-07 DIAGNOSIS — I10 ESSENTIAL HYPERTENSION: ICD-10-CM

## 2022-02-07 DIAGNOSIS — E11.9 TYPE 2 DIABETES MELLITUS WITHOUT COMPLICATION, WITHOUT LONG-TERM CURRENT USE OF INSULIN (HCC): ICD-10-CM

## 2022-02-07 RX ORDER — ERGOCALCIFEROL 1.25 MG/1
50000 CAPSULE ORAL WEEKLY
Qty: 12 CAPSULE | Refills: 3 | Status: SHIPPED | OUTPATIENT
Start: 2022-02-07

## 2022-02-07 RX ORDER — GLIPIZIDE 10 MG/1
10 TABLET ORAL 2 TIMES DAILY
Qty: 180 TABLET | Refills: 3 | Status: SHIPPED | OUTPATIENT
Start: 2022-02-07

## 2022-02-07 RX ORDER — LISINOPRIL AND HYDROCHLOROTHIAZIDE 20; 12.5 MG/1; MG/1
1 TABLET ORAL 2 TIMES DAILY
Qty: 180 TABLET | Refills: 3 | Status: SHIPPED | OUTPATIENT
Start: 2022-02-07

## 2022-02-07 RX ORDER — SITAGLIPTIN AND METFORMIN HYDROCHLORIDE 500; 50 MG/1; MG/1
1 TABLET, FILM COATED ORAL 2 TIMES DAILY WITH MEALS
Qty: 180 TABLET | Refills: 3 | Status: SHIPPED | OUTPATIENT
Start: 2022-02-07

## 2022-02-07 RX ORDER — SIMVASTATIN 40 MG
40 TABLET ORAL DAILY
Qty: 90 TABLET | Refills: 3 | Status: SHIPPED | OUTPATIENT
Start: 2022-02-07

## 2022-05-03 ENCOUNTER — RA CDI HCC (OUTPATIENT)
Dept: OTHER | Facility: HOSPITAL | Age: 77
End: 2022-05-03

## 2022-05-03 NOTE — PROGRESS NOTES
[id number]  [facility], [place] [facility]  coding opportunities          Chart Reviewed number of suggestions sent to Provider: 1     Patients Insurance     Medicare Insurance: [employer]

## 2022-07-26 ENCOUNTER — TELEPHONE (OUTPATIENT)
Dept: FAMILY MEDICINE CLINIC | Facility: CLINIC | Age: 77
End: 2022-07-26

## 2022-07-26 NOTE — TELEPHONE ENCOUNTER
----- Message from Nurys Fisher, DO sent at 7/22/2022 10:08 AM EDT -----  Regarding: r/s  When things stabilize hopefully next week if you want to offer fan phone AWV that is ok - she was here last visit in person but I know transport is a challenge for her

## 2022-08-13 ENCOUNTER — APPOINTMENT (EMERGENCY)
Dept: CT IMAGING | Facility: HOSPITAL | Age: 77
DRG: 190 | End: 2022-08-13
Payer: MEDICARE

## 2022-08-13 ENCOUNTER — HOSPITAL ENCOUNTER (INPATIENT)
Facility: HOSPITAL | Age: 77
LOS: 4 days | Discharge: HOME HEALTH | DRG: 190 | End: 2022-08-17
Attending: EMERGENCY MEDICINE | Admitting: INTERNAL MEDICINE
Payer: MEDICARE

## 2022-08-13 ENCOUNTER — APPOINTMENT (EMERGENCY)
Dept: RADIOLOGY | Facility: HOSPITAL | Age: 77
DRG: 190 | End: 2022-08-13
Payer: MEDICARE

## 2022-08-13 DIAGNOSIS — I48.91 NEW ONSET A-FIB (HCC): ICD-10-CM

## 2022-08-13 DIAGNOSIS — J96.22 ACUTE ON CHRONIC RESPIRATORY FAILURE WITH HYPOXIA AND HYPERCAPNIA (HCC): ICD-10-CM

## 2022-08-13 DIAGNOSIS — L30.9 DERMATITIS: ICD-10-CM

## 2022-08-13 DIAGNOSIS — J44.9 CHRONIC OBSTRUCTIVE PULMONARY DISEASE, UNSPECIFIED COPD TYPE (HCC): ICD-10-CM

## 2022-08-13 DIAGNOSIS — I48.20 CHRONIC ATRIAL FIBRILLATION (HCC): ICD-10-CM

## 2022-08-13 DIAGNOSIS — R09.02 HYPOXIA: Primary | ICD-10-CM

## 2022-08-13 DIAGNOSIS — Z72.0 TOBACCO USE: ICD-10-CM

## 2022-08-13 DIAGNOSIS — R91.8 PULMONARY NODULES: ICD-10-CM

## 2022-08-13 DIAGNOSIS — E11.65 TYPE 2 DIABETES MELLITUS WITH HYPERGLYCEMIA, WITHOUT LONG-TERM CURRENT USE OF INSULIN (HCC): ICD-10-CM

## 2022-08-13 DIAGNOSIS — J96.21 ACUTE ON CHRONIC RESPIRATORY FAILURE WITH HYPOXIA AND HYPERCAPNIA (HCC): ICD-10-CM

## 2022-08-13 DIAGNOSIS — E80.6 HYPERBILIRUBINEMIA: ICD-10-CM

## 2022-08-13 DIAGNOSIS — R91.8 MULTIPLE PULMONARY NODULES: ICD-10-CM

## 2022-08-13 DIAGNOSIS — J44.1 COPD WITH ACUTE EXACERBATION (HCC): ICD-10-CM

## 2022-08-13 DIAGNOSIS — J44.1 COPD EXACERBATION (HCC): ICD-10-CM

## 2022-08-13 PROBLEM — J96.01 ACUTE RESPIRATORY FAILURE WITH HYPOXIA (HCC): Status: ACTIVE | Noted: 2022-08-13

## 2022-08-13 PROBLEM — L25.4: Status: ACTIVE | Noted: 2022-08-13

## 2022-08-13 PROBLEM — L25.4: Status: RESOLVED | Noted: 2022-08-13 | Resolved: 2022-08-13

## 2022-08-13 PROBLEM — N18.9 CKD (CHRONIC KIDNEY DISEASE): Status: ACTIVE | Noted: 2022-08-13

## 2022-08-13 PROBLEM — N18.31 STAGE 3A CHRONIC KIDNEY DISEASE (HCC): Status: ACTIVE | Noted: 2022-08-13

## 2022-08-13 LAB
ALBUMIN SERPL BCP-MCNC: 3.8 G/DL (ref 3.5–5)
ALP SERPL-CCNC: 77 U/L (ref 46–116)
ALT SERPL W P-5'-P-CCNC: 12 U/L (ref 12–78)
ANION GAP SERPL CALCULATED.3IONS-SCNC: 10 MMOL/L (ref 4–13)
APTT PPP: 26 SECONDS (ref 23–37)
AST SERPL W P-5'-P-CCNC: 17 U/L (ref 5–45)
BASE EX.OXY STD BLDV CALC-SCNC: 50 % (ref 60–80)
BASE EXCESS BLDV CALC-SCNC: 7.2 MMOL/L
BASOPHILS # BLD AUTO: 0.11 THOUSANDS/ΜL (ref 0–0.1)
BASOPHILS NFR BLD AUTO: 1 % (ref 0–1)
BILIRUB DIRECT SERPL-MCNC: 0.39 MG/DL (ref 0–0.2)
BILIRUB SERPL-MCNC: 1.01 MG/DL (ref 0.2–1)
BUN SERPL-MCNC: 12 MG/DL (ref 5–25)
CALCIUM SERPL-MCNC: 9 MG/DL (ref 8.3–10.1)
CARDIAC TROPONIN I PNL SERPL HS: 16 NG/L (ref 8–18)
CHLORIDE SERPL-SCNC: 91 MMOL/L (ref 96–108)
CO2 SERPL-SCNC: 34 MMOL/L (ref 21–32)
CREAT SERPL-MCNC: 1.08 MG/DL (ref 0.6–1.3)
D DIMER PPP FEU-MCNC: 1.53 UG/ML FEU
EOSINOPHIL # BLD AUTO: 0.42 THOUSAND/ΜL (ref 0–0.61)
EOSINOPHIL NFR BLD AUTO: 3 % (ref 0–6)
ERYTHROCYTE [DISTWIDTH] IN BLOOD BY AUTOMATED COUNT: 12.3 % (ref 11.6–15.1)
FLUAV RNA RESP QL NAA+PROBE: NEGATIVE
FLUBV RNA RESP QL NAA+PROBE: NEGATIVE
GFR SERPL CREATININE-BSD FRML MDRD: 49 ML/MIN/1.73SQ M
GLUCOSE SERPL-MCNC: 185 MG/DL (ref 65–140)
GLUCOSE SERPL-MCNC: 260 MG/DL (ref 65–140)
HCO3 BLDV-SCNC: 35.5 MMOL/L (ref 24–30)
HCT VFR BLD AUTO: 52.2 % (ref 34.8–46.1)
HGB BLD-MCNC: 17.3 G/DL (ref 11.5–15.4)
IMM GRANULOCYTES # BLD AUTO: 0.05 THOUSAND/UL (ref 0–0.2)
IMM GRANULOCYTES NFR BLD AUTO: 0 % (ref 0–2)
INR PPP: 0.97 (ref 0.84–1.19)
LACTATE SERPL-SCNC: 1.6 MMOL/L (ref 0.5–2)
LYMPHOCYTES # BLD AUTO: 1.26 THOUSANDS/ΜL (ref 0.6–4.47)
LYMPHOCYTES NFR BLD AUTO: 10 % (ref 14–44)
MAGNESIUM SERPL-MCNC: 1.3 MG/DL (ref 1.6–2.6)
MCH RBC QN AUTO: 30.5 PG (ref 26.8–34.3)
MCHC RBC AUTO-ENTMCNC: 33.1 G/DL (ref 31.4–37.4)
MCV RBC AUTO: 92 FL (ref 82–98)
MONOCYTES # BLD AUTO: 0.89 THOUSAND/ΜL (ref 0.17–1.22)
MONOCYTES NFR BLD AUTO: 7 % (ref 4–12)
NEUTROPHILS # BLD AUTO: 10.28 THOUSANDS/ΜL (ref 1.85–7.62)
NEUTS SEG NFR BLD AUTO: 79 % (ref 43–75)
NRBC BLD AUTO-RTO: 0 /100 WBCS
NT-PROBNP SERPL-MCNC: 1426 PG/ML
O2 CT BLDV-SCNC: 12.9 ML/DL
PCO2 BLDV: 62.8 MM HG (ref 42–50)
PH BLDV: 7.37 [PH] (ref 7.3–7.4)
PLATELET # BLD AUTO: 248 THOUSANDS/UL (ref 149–390)
PMV BLD AUTO: 10.6 FL (ref 8.9–12.7)
PO2 BLDV: 27.4 MM HG (ref 35–45)
POTASSIUM SERPL-SCNC: 3.4 MMOL/L (ref 3.5–5.3)
PROT SERPL-MCNC: 7.8 G/DL (ref 6.4–8.4)
PROTHROMBIN TIME: 13.1 SECONDS (ref 11.6–14.5)
RBC # BLD AUTO: 5.68 MILLION/UL (ref 3.81–5.12)
RSV RNA RESP QL NAA+PROBE: NEGATIVE
SARS-COV-2 RNA RESP QL NAA+PROBE: NEGATIVE
SODIUM SERPL-SCNC: 135 MMOL/L (ref 135–147)
TSH SERPL DL<=0.05 MIU/L-ACNC: 1.29 UIU/ML (ref 0.45–4.5)
WBC # BLD AUTO: 13.01 THOUSAND/UL (ref 4.31–10.16)

## 2022-08-13 PROCEDURE — 82948 REAGENT STRIP/BLOOD GLUCOSE: CPT

## 2022-08-13 PROCEDURE — 90715 TDAP VACCINE 7 YRS/> IM: CPT | Performed by: EMERGENCY MEDICINE

## 2022-08-13 PROCEDURE — 82805 BLOOD GASES W/O2 SATURATION: CPT | Performed by: EMERGENCY MEDICINE

## 2022-08-13 PROCEDURE — 80048 BASIC METABOLIC PNL TOTAL CA: CPT | Performed by: EMERGENCY MEDICINE

## 2022-08-13 PROCEDURE — 94640 AIRWAY INHALATION TREATMENT: CPT

## 2022-08-13 PROCEDURE — 96375 TX/PRO/DX INJ NEW DRUG ADDON: CPT

## 2022-08-13 PROCEDURE — 36415 COLL VENOUS BLD VENIPUNCTURE: CPT | Performed by: EMERGENCY MEDICINE

## 2022-08-13 PROCEDURE — 83605 ASSAY OF LACTIC ACID: CPT | Performed by: EMERGENCY MEDICINE

## 2022-08-13 PROCEDURE — 99223 1ST HOSP IP/OBS HIGH 75: CPT | Performed by: INTERNAL MEDICINE

## 2022-08-13 PROCEDURE — 0241U HB NFCT DS VIR RESP RNA 4 TRGT: CPT | Performed by: EMERGENCY MEDICINE

## 2022-08-13 PROCEDURE — 94760 N-INVAS EAR/PLS OXIMETRY 1: CPT

## 2022-08-13 PROCEDURE — 90471 IMMUNIZATION ADMIN: CPT

## 2022-08-13 PROCEDURE — 71045 X-RAY EXAM CHEST 1 VIEW: CPT

## 2022-08-13 PROCEDURE — 87040 BLOOD CULTURE FOR BACTERIA: CPT | Performed by: EMERGENCY MEDICINE

## 2022-08-13 PROCEDURE — 85610 PROTHROMBIN TIME: CPT | Performed by: EMERGENCY MEDICINE

## 2022-08-13 PROCEDURE — 80076 HEPATIC FUNCTION PANEL: CPT | Performed by: EMERGENCY MEDICINE

## 2022-08-13 PROCEDURE — 93005 ELECTROCARDIOGRAM TRACING: CPT

## 2022-08-13 PROCEDURE — 83880 ASSAY OF NATRIURETIC PEPTIDE: CPT | Performed by: EMERGENCY MEDICINE

## 2022-08-13 PROCEDURE — 83735 ASSAY OF MAGNESIUM: CPT | Performed by: EMERGENCY MEDICINE

## 2022-08-13 PROCEDURE — 84443 ASSAY THYROID STIM HORMONE: CPT | Performed by: EMERGENCY MEDICINE

## 2022-08-13 PROCEDURE — 96366 THER/PROPH/DIAG IV INF ADDON: CPT

## 2022-08-13 PROCEDURE — 99285 EMERGENCY DEPT VISIT HI MDM: CPT | Performed by: EMERGENCY MEDICINE

## 2022-08-13 PROCEDURE — G1004 CDSM NDSC: HCPCS

## 2022-08-13 PROCEDURE — 99285 EMERGENCY DEPT VISIT HI MDM: CPT

## 2022-08-13 PROCEDURE — 85025 COMPLETE CBC W/AUTO DIFF WBC: CPT | Performed by: EMERGENCY MEDICINE

## 2022-08-13 PROCEDURE — 85379 FIBRIN DEGRADATION QUANT: CPT | Performed by: EMERGENCY MEDICINE

## 2022-08-13 PROCEDURE — 96365 THER/PROPH/DIAG IV INF INIT: CPT

## 2022-08-13 PROCEDURE — 84484 ASSAY OF TROPONIN QUANT: CPT | Performed by: EMERGENCY MEDICINE

## 2022-08-13 PROCEDURE — 70450 CT HEAD/BRAIN W/O DYE: CPT

## 2022-08-13 PROCEDURE — 85730 THROMBOPLASTIN TIME PARTIAL: CPT | Performed by: EMERGENCY MEDICINE

## 2022-08-13 PROCEDURE — 71275 CT ANGIOGRAPHY CHEST: CPT

## 2022-08-13 RX ORDER — MAGNESIUM SULFATE HEPTAHYDRATE 40 MG/ML
2 INJECTION, SOLUTION INTRAVENOUS ONCE
Status: COMPLETED | OUTPATIENT
Start: 2022-08-13 | End: 2022-08-13

## 2022-08-13 RX ORDER — DIPHENHYDRAMINE HYDROCHLORIDE 50 MG/ML
12.5 INJECTION INTRAMUSCULAR; INTRAVENOUS ONCE
Status: COMPLETED | OUTPATIENT
Start: 2022-08-13 | End: 2022-08-13

## 2022-08-13 RX ORDER — BUDESONIDE AND FORMOTEROL FUMARATE DIHYDRATE 80; 4.5 UG/1; UG/1
2 AEROSOL RESPIRATORY (INHALATION) 2 TIMES DAILY
Status: DISCONTINUED | OUTPATIENT
Start: 2022-08-13 | End: 2022-08-17 | Stop reason: HOSPADM

## 2022-08-13 RX ORDER — IPRATROPIUM BROMIDE AND ALBUTEROL SULFATE 2.5; .5 MG/3ML; MG/3ML
3 SOLUTION RESPIRATORY (INHALATION)
Status: DISCONTINUED | OUTPATIENT
Start: 2022-08-13 | End: 2022-08-13

## 2022-08-13 RX ORDER — ONDANSETRON 2 MG/ML
4 INJECTION INTRAMUSCULAR; INTRAVENOUS EVERY 6 HOURS PRN
Status: DISCONTINUED | OUTPATIENT
Start: 2022-08-13 | End: 2022-08-17 | Stop reason: HOSPADM

## 2022-08-13 RX ORDER — NICOTINE 21 MG/24HR
1 PATCH, TRANSDERMAL 24 HOURS TRANSDERMAL DAILY
Status: DISCONTINUED | OUTPATIENT
Start: 2022-08-14 | End: 2022-08-17 | Stop reason: HOSPADM

## 2022-08-13 RX ORDER — INSULIN LISPRO 100 [IU]/ML
1-5 INJECTION, SOLUTION INTRAVENOUS; SUBCUTANEOUS
Status: DISCONTINUED | OUTPATIENT
Start: 2022-08-14 | End: 2022-08-17 | Stop reason: HOSPADM

## 2022-08-13 RX ORDER — CEFTRIAXONE 1 G/50ML
1000 INJECTION, SOLUTION INTRAVENOUS ONCE
Status: COMPLETED | OUTPATIENT
Start: 2022-08-13 | End: 2022-08-14

## 2022-08-13 RX ORDER — POLYETHYLENE GLYCOL 3350 17 G/17G
17 POWDER, FOR SOLUTION ORAL DAILY PRN
Status: DISCONTINUED | OUTPATIENT
Start: 2022-08-13 | End: 2022-08-17 | Stop reason: HOSPADM

## 2022-08-13 RX ORDER — PERMETHRIN 50 MG/G
CREAM TOPICAL ONCE
Status: DISCONTINUED | OUTPATIENT
Start: 2022-08-13 | End: 2022-08-14

## 2022-08-13 RX ORDER — ALBUTEROL SULFATE 2.5 MG/3ML
2.5 SOLUTION RESPIRATORY (INHALATION) EVERY 6 HOURS PRN
Status: DISCONTINUED | OUTPATIENT
Start: 2022-08-13 | End: 2022-08-17 | Stop reason: HOSPADM

## 2022-08-13 RX ORDER — PRAVASTATIN SODIUM 40 MG
80 TABLET ORAL
Status: DISCONTINUED | OUTPATIENT
Start: 2022-08-14 | End: 2022-08-17 | Stop reason: HOSPADM

## 2022-08-13 RX ORDER — ASPIRIN 81 MG/1
81 TABLET ORAL DAILY
Status: DISCONTINUED | OUTPATIENT
Start: 2022-08-14 | End: 2022-08-17 | Stop reason: HOSPADM

## 2022-08-13 RX ORDER — ENOXAPARIN SODIUM 100 MG/ML
40 INJECTION SUBCUTANEOUS
Status: DISCONTINUED | OUTPATIENT
Start: 2022-08-14 | End: 2022-08-17 | Stop reason: HOSPADM

## 2022-08-13 RX ORDER — CALCIUM CARBONATE 200(500)MG
1000 TABLET,CHEWABLE ORAL DAILY PRN
Status: DISCONTINUED | OUTPATIENT
Start: 2022-08-13 | End: 2022-08-17 | Stop reason: HOSPADM

## 2022-08-13 RX ORDER — ACETAMINOPHEN 325 MG/1
650 TABLET ORAL EVERY 6 HOURS PRN
Status: DISCONTINUED | OUTPATIENT
Start: 2022-08-13 | End: 2022-08-17 | Stop reason: HOSPADM

## 2022-08-13 RX ORDER — HYDROXYZINE HYDROCHLORIDE 10 MG/1
10 TABLET, FILM COATED ORAL EVERY 6 HOURS PRN
Status: DISCONTINUED | OUTPATIENT
Start: 2022-08-13 | End: 2022-08-17 | Stop reason: HOSPADM

## 2022-08-13 RX ORDER — INSULIN LISPRO 100 [IU]/ML
1-5 INJECTION, SOLUTION INTRAVENOUS; SUBCUTANEOUS
Status: DISCONTINUED | OUTPATIENT
Start: 2022-08-13 | End: 2022-08-17 | Stop reason: HOSPADM

## 2022-08-13 RX ORDER — SERTRALINE HYDROCHLORIDE 25 MG/1
25 TABLET, FILM COATED ORAL DAILY
Status: DISCONTINUED | OUTPATIENT
Start: 2022-08-14 | End: 2022-08-17 | Stop reason: HOSPADM

## 2022-08-13 RX ORDER — METHYLPREDNISOLONE SODIUM SUCCINATE 125 MG/2ML
80 INJECTION, POWDER, LYOPHILIZED, FOR SOLUTION INTRAMUSCULAR; INTRAVENOUS ONCE
Status: COMPLETED | OUTPATIENT
Start: 2022-08-13 | End: 2022-08-13

## 2022-08-13 RX ADMIN — METHYLPREDNISOLONE SODIUM SUCCINATE 80 MG: 125 INJECTION, POWDER, FOR SOLUTION INTRAMUSCULAR; INTRAVENOUS at 16:40

## 2022-08-13 RX ADMIN — HYDROXYZINE HYDROCHLORIDE 10 MG: 10 TABLET ORAL at 23:22

## 2022-08-13 RX ADMIN — MAGNESIUM SULFATE HEPTAHYDRATE 2 G: 40 INJECTION, SOLUTION INTRAVENOUS at 17:43

## 2022-08-13 RX ADMIN — DIPHENHYDRAMINE HYDROCHLORIDE 12.5 MG: 50 INJECTION, SOLUTION INTRAMUSCULAR; INTRAVENOUS at 17:30

## 2022-08-13 RX ADMIN — TETANUS TOXOID, REDUCED DIPHTHERIA TOXOID AND ACELLULAR PERTUSSIS VACCINE, ADSORBED 0.5 ML: 5; 2.5; 8; 8; 2.5 SUSPENSION INTRAMUSCULAR at 16:52

## 2022-08-13 RX ADMIN — CEFTRIAXONE 1000 MG: 1 INJECTION, SOLUTION INTRAVENOUS at 23:22

## 2022-08-13 RX ADMIN — IPRATROPIUM BROMIDE AND ALBUTEROL SULFATE 3 ML: 2.5; .5 SOLUTION RESPIRATORY (INHALATION) at 21:16

## 2022-08-13 RX ADMIN — HYDROCHLOROTHIAZIDE: 12.5 TABLET ORAL at 23:23

## 2022-08-13 RX ADMIN — INSULIN LISPRO 2 UNITS: 100 INJECTION, SOLUTION INTRAVENOUS; SUBCUTANEOUS at 23:21

## 2022-08-13 RX ADMIN — IPRATROPIUM BROMIDE AND ALBUTEROL SULFATE 3 ML: 2.5; .5 SOLUTION RESPIRATORY (INHALATION) at 16:40

## 2022-08-13 RX ADMIN — IOHEXOL 70 ML: 350 INJECTION, SOLUTION INTRAVENOUS at 18:25

## 2022-08-13 NOTE — ED PROVIDER NOTES
History  Chief Complaint   Patient presents with    Rash     Pt stated head started itching which then spread to her back, abd, arms, legs, neck with rash noted  Pt states started about 2-3 weeks ago  Pt states her daughter had same thing approximately 2 months ago  79-year-old female with history of COPD not normally on oxygen hypertension hyperlipidemia type 2 diabetes and tobacco use disorder  presents with 2-3 week history of rash  It is itchy started to her head spread to her neck back abdomen and arms  The most recent outbreak was to the deltoid region  Apparently a daughter had a similar rash to 3 months ago the patient cannot tell me if she received see if treatment for this the daughter did call in saying the patient is more confused and that the daughter did treat her with a lice preparation  But there did not seem to be a change  Patient is denying any fever chills she has no headache no cough she feels her breathing is at baseline there has been no nausea vomiting diarrhea no abdominal or back pain she feels her leg swelling is at baseline  No history of any trauma or falls  Patient is oriented to [facility] hospital and date of birth  She was found to be hypoxic at triage with sats of 85%  She is placed on 2 L nasal cannula oxygen is 96% she can give both her made in and  name  Prior to Admission Medications   Prescriptions Last Dose Informant Patient Reported? Taking?   aspirin 81 MG tablet 8/13/2022 at Unknown time  Yes Yes   Sig: Take 1 tablet by mouth daily   budesonide-formoterol (SYMBICORT) 80-4 5 MCG/ACT inhaler More than a month at Unknown time  No No   Sig: Inhale 2 puffs 2 (two) times a day Rinse mouth after use     ergocalciferol (VITAMIN D2) 50,000 units 8/13/2022 at Unknown time  No Yes   Sig: Take 1 capsule (50,000 Units total) by mouth once a week   glipiZIDE (GLUCOTROL) 10 mg tablet 8/13/2022 at Unknown time  No Yes   Sig: Take 1 tablet (10 mg total) by mouth 2 (two) times a day   lisinopril-hydrochlorothiazide (PRINZIDE,ZESTORETIC) 20-12 5 MG per tablet 8/13/2022 at Unknown time  No Yes   Sig: Take 1 tablet by mouth 2 (two) times a day   sertraline (ZOLOFT) 25 mg tablet 8/13/2022 at Unknown time  No Yes   Sig: Take 1 tablet (25 mg total) by mouth daily   simvastatin (ZOCOR) 40 mg tablet 8/13/2022 at Unknown time  No Yes   Sig: Take 1 tablet (40 mg total) by mouth daily   sitaGLIPtin-metFORMIN (Janumet)  MG per tablet 8/13/2022 at Unknown time  No Yes   Sig: Take 1 tablet by mouth 2 (two) times a day with meals      Facility-Administered Medications: None       Past Medical History:   Diagnosis Date    Hyperlipidemia     last assessed  8/24/17    Hypertension     last assessed 10/2/14       Past Surgical History:   Procedure Laterality Date    CRANIOTOMY         Family History   Problem Relation Age of Onset    Breast cancer Mother     Ovarian cancer Mother     Diabetes Father      I have reviewed and agree with the history as documented  E-Cigarette/Vaping    E-Cigarette Use Never User      E-Cigarette/Vaping Substances    Nicotine No     THC No     CBD No     Flavoring No     Other No     Unknown No      Social History     Tobacco Use    Smoking status: Current Every Day Smoker     Packs/day: 1 00     Types: Cigarettes    Smokeless tobacco: Never Used   Vaping Use    Vaping Use: Never used   Substance Use Topics    Alcohol use: Never    Drug use: No       Review of Systems   Constitutional: Negative for activity change, chills and fever  HENT: Negative for congestion, ear pain, rhinorrhea, sneezing and sore throat  Eyes: Negative for discharge  Respiratory: Negative for cough and shortness of breath  Cardiovascular: Negative for chest pain and leg swelling  Gastrointestinal: Negative for abdominal pain, blood in stool, diarrhea, nausea and vomiting  Endocrine: Negative for polyuria     Genitourinary: Negative for dysuria, frequency and urgency  Musculoskeletal: Negative for back pain and myalgias  Skin: Positive for rash  Neurological: Negative for dizziness, speech difficulty, weakness, light-headedness, numbness and headaches  Hematological: Negative for adenopathy  Psychiatric/Behavioral: Positive for confusion (Per daughter over the phone)  All other systems reviewed and are negative  Physical Exam  Physical Exam  Vitals and nursing note reviewed  Constitutional:       General: She is not in acute distress  Appearance: She is not ill-appearing or toxic-appearing  Comments: Patient is scratching her scalp  Patient appears chronically ill with protein calorie malnourishment   HENT:      Head: Normocephalic  Right Ear: Tympanic membrane normal       Left Ear: Tympanic membrane normal       Mouth/Throat:      Mouth: Mucous membranes are moist       Pharynx: No oropharyngeal exudate or posterior oropharyngeal erythema  Comments: Patient has whitish plaques to the tongue  Eyes:      General:         Right eye: No discharge  Left eye: No discharge  Extraocular Movements: Extraocular movements intact  Conjunctiva/sclera: Conjunctivae normal       Pupils: Pupils are equal, round, and reactive to light  Cardiovascular:      Rate and Rhythm: Normal rate and regular rhythm  Pulmonary:      Effort: Pulmonary effort is normal       Comments: Able to speak full sentences distant breath sounds no wheezing sats are 96% on 2 L  Abdominal:      General: Abdomen is flat  Bowel sounds are normal  There is no distension  Tenderness: There is no abdominal tenderness  There is no right CVA tenderness, left CVA tenderness or guarding  Musculoskeletal:      Cervical back: Normal range of motion and neck supple  Right lower leg: Edema present  Left lower leg: Edema present        Comments: Trace edema bilaterally   Skin:     Comments: Patient has excoriated papules to the extensor surfaces of the arms shoulders and low back region the scalp has plaques with some scabbed lesions  There is no rash in the creases such as between the digits in the antecubital region in the inguinal region there is some to the bilateral cheeks  The palms and soles are spared  Neurological:      General: No focal deficit present  Mental Status: She is alert  Cranial Nerves: No cranial nerve deficit  Sensory: No sensory deficit  Motor: No weakness  Coordination: Coordination normal       Deep Tendon Reflexes: Reflexes normal       Comments: Oriented to self date of birth place after hypoxia is corrected   Psychiatric:      Comments: Patient is agitated by the itching  Vital Signs  ED Triage Vitals [08/13/22 1548]   Temperature Pulse Respirations Blood Pressure SpO2   98 2 °F (36 8 °C) 98 18 142/82 (S) (!) 86 %      Temp Source Heart Rate Source Patient Position - Orthostatic VS BP Location FiO2 (%)   Temporal Monitor Sitting Left arm --      Pain Score       No Pain           Vitals:    08/13/22 1900 08/13/22 1930 08/13/22 2113 08/13/22 2125   BP: 102/59 100/64 164/82 164/82   Pulse: 66 79 84 76   Patient Position - Orthostatic VS: Sitting Sitting Sitting          Visual Acuity      ED Medications  Medications   aspirin (ECOTRIN LOW STRENGTH) EC tablet 81 mg (has no administration in time range)   budesonide-formoterol (SYMBICORT) 80-4 5 MCG/ACT inhaler 2 puff (2 puffs Inhalation Not Given 8/13/22 2146)   lisinopril-hydrochlorothiazide (PRINZIDE 20/12  5) combo dose ( Oral Given 8/13/22 2323)   sertraline (ZOLOFT) tablet 25 mg (has no administration in time range)   pravastatin (PRAVACHOL) tablet 80 mg (has no administration in time range)   acetaminophen (TYLENOL) tablet 650 mg (has no administration in time range)   ondansetron (ZOFRAN) injection 4 mg (has no administration in time range)   polyethylene glycol (MIRALAX) packet 17 g (has no administration in time range)   calcium carbonate (TUMS) chewable tablet 1,000 mg (has no administration in time range)   nicotine (NICODERM CQ) 21 mg/24 hr TD 24 hr patch 1 patch (has no administration in time range)   insulin lispro (HumaLOG) 100 units/mL subcutaneous injection 1-5 Units (has no administration in time range)   insulin lispro (HumaLOG) 100 units/mL subcutaneous injection 1-5 Units (2 Units Subcutaneous Given 8/13/22 2321)   hydrOXYzine HCL (ATARAX) tablet 10 mg (10 mg Oral Given 8/13/22 2322)   albuterol inhalation solution 2 5 mg (has no administration in time range)   permethrin (NIX) 1 % lotion ( Topical Not Given 8/13/22 2318)   permethrin (ELIMITE) 5 % cream ( Topical Not Given 8/13/22 2318)   enoxaparin (LOVENOX) subcutaneous injection 40 mg (has no administration in time range)   methylPREDNISolone sodium succinate (Solu-MEDROL) injection 80 mg (80 mg Intravenous Given 8/13/22 1640)   tetanus-diphtheria-acellular pertussis (BOOSTRIX) IM injection 0 5 mL (0 5 mL Intramuscular Given 8/13/22 1652)   diphenhydrAMINE (BENADRYL) injection 12 5 mg (12 5 mg Intravenous Given 8/13/22 1730)   magnesium sulfate 2 g/50 mL IVPB (premix) 2 g (0 g Intravenous Stopped 8/13/22 2005)   iohexol (OMNIPAQUE) 350 MG/ML injection (MULTI-DOSE) 70 mL (70 mL Intravenous Given 8/13/22 1825)   cefTRIAXone (ROCEPHIN) IVPB (premix in dextrose) 1,000 mg 50 mL (0 mg Intravenous Stopped 8/14/22 0005)       Diagnostic Studies  Results Reviewed     Procedure Component Value Units Date/Time    Blood culture #1 [[id number]] Collected: 08/13/22 1630    Lab Status: Preliminary result Specimen: Blood from Arm, Right Updated: 08/14/22 0203     Blood Culture Received in Microbiology Lab  Culture in Progress  Blood culture #2 [[id number]] Collected: 08/13/22 1631    Lab Status: Preliminary result Specimen: Blood from Arm, Left Updated: 08/14/22 0203     Blood Culture Received in Microbiology Lab  Culture in Progress      FLU/RSV/COVID - if FLU/RSV clinically relevant [[id number]]  (Normal) Collected: 08/13/22 1631    Lab Status: Final result Specimen: Nares from Nose Updated: 08/13/22 1821     SARS-CoV-2 Negative     INFLUENZA A PCR Negative     INFLUENZA B PCR Negative     RSV PCR Negative    Narrative:      FOR PEDIATRIC PATIENTS - copy/paste COVID Guidelines URL to browser: [email or web address]    SARS-CoV-2 assay is a Nucleic Acid Amplification assay intended for the  qualitative detection of nucleic acid from SARS-CoV-2 in nasopharyngeal  swabs  Results are for the presumptive identification of SARS-CoV-2 RNA  Positive results are indicative of infection with SARS-CoV-2, the virus  causing COVID-19, but do not rule out bacterial infection or co-infection  with other viruses  Laboratories within the [place] and its  territories are required to report all positive results to the appropriate  public health authorities  Negative results do not preclude SARS-CoV-2  infection and should not be used as the sole basis for treatment or other  patient management decisions  Negative results must be combined with  clinical observations, patient history, and epidemiological information  This test has not been FDA cleared or approved  This test has been authorized by FDA under an Emergency Use Authorization  (EUA)  This test is only authorized for the duration of time the  declaration that circumstances exist justifying the authorization of the  emergency use of an in vitro diagnostic tests for detection of SARS-CoV-2  virus and/or diagnosis of COVID-19 infection under section 564(b)(1) of  the Act, 21 U  S C  166WMU-7(E)(6), unless the authorization is terminated  or revoked sooner  The test has been validated but independent review by FDA  and CLIA is pending  Test performed using [employer] GeneXpert: This RT-PCR assay targets N2,  a region unique to SARS-CoV-2   A conserved region in the E-gene was chosen  for pan-Sarbecovirus detection which includes SARS-CoV-2  Hepatic function panel [[id number]]  (Abnormal) Collected: 08/13/22 1630    Lab Status: Final result Specimen: Blood from Arm, Right Updated: 08/13/22 1703     Total Bilirubin 1 01 mg/dL      Bilirubin, Direct 0 39 mg/dL      Alkaline Phosphatase 77 U/L      AST 17 U/L      ALT 12 U/L      Total Protein 7 8 g/dL      Albumin 3 8 g/dL     TSH, 3rd generation with Free T4 reflex [[id number]]  (Normal) Collected: 08/13/22 1630    Lab Status: Final result Specimen: Blood from Arm, Right Updated: 08/13/22 1703     TSH 3RD GENERATON 1 289 uIU/mL     Narrative:      Patients undergoing fluorescein dye angiography may retain small amounts of fluorescein in the body for 48-72 hours post procedure  Samples containing fluorescein can produce falsely depressed TSH values  If the patient had this procedure,a specimen should be resubmitted post fluorescein clearance  Magnesium [[id number]]  (Abnormal) Collected: 08/13/22 1630    Lab Status: Final result Specimen: Blood from Arm, Right Updated: 08/13/22 1703     Magnesium 1 3 mg/dL     NT-BNP PRO [[id number]]  (Abnormal) Collected: 08/13/22 1630    Lab Status: Final result Specimen: Blood from Arm, Right Updated: 08/13/22 1703     NT-proBNP 1,426 pg/mL     High Sensitivity Troponin I Random [[id number]]  (Normal) Collected: 08/13/22 1630    Lab Status: Final result Specimen: Blood from Arm, Right Updated: 08/13/22 1702     HS TnI random 16 ng/L     Lactic acid [[id number]]  (Normal) Collected: 08/13/22 1630    Lab Status: Final result Specimen: Blood from Arm, Right Updated: 08/13/22 1658     LACTIC ACID 1 6 mmol/L     Narrative:      Result may be elevated if tourniquet was used during collection      Basic metabolic panel [[id number]]  (Abnormal) Collected: 08/13/22 1630    Lab Status: Final result Specimen: Blood from Arm, Right Updated: 08/13/22 1656     Sodium 135 mmol/L      Potassium 3 4 mmol/L      Chloride 91 mmol/L CO2 34 mmol/L      ANION GAP 10 mmol/L      BUN 12 mg/dL      Creatinine 1 08 mg/dL      Glucose 185 mg/dL      Calcium 9 0 mg/dL      eGFR 49 ml/min/1 73sq m     Narrative:      [place] guidelines for Chronic Kidney Disease (CKD):     Stage 1 with normal or high GFR (GFR > 90 mL/min/1 73 square meters)    Stage 2 Mild CKD (GFR = 60-89 mL/min/1 73 square meters)    Stage 3A Moderate CKD (GFR = 45-59 mL/min/1 73 square meters)    Stage 3B Moderate CKD (GFR = 30-44 mL/min/1 73 square meters)    Stage 4 Severe CKD (GFR = 15-29 mL/min/1 73 square meters)    Stage 5 End Stage CKD (GFR <15 mL/min/1 73 square meters)  Note: GFR calculation is accurate only with a steady state creatinine    D-Dimer [[id number]]  (Abnormal) Collected: 08/13/22 1630    Lab Status: Final result Specimen: Blood from Arm, Right Updated: 08/13/22 1655     D-Dimer, Quant 1 53 ug/ml FEU     Narrative: In the evaluation for possible pulmonary embolism, in the appropriate (Well's Score of 4 or less) patient, the age adjusted d-dimer cutoff for this patient can be calculated as:    Age x 0 01 (in ug/mL) for Age-adjusted D-dimer exclusion threshold for a patient over 50 years      Protime-INR [[id number]]  (Normal) Collected: 08/13/22 1630    Lab Status: Final result Specimen: Blood from Arm, Right Updated: 08/13/22 1651     Protime 13 1 seconds      INR 0 97    APTT [[id number]]  (Normal) Collected: 08/13/22 1630    Lab Status: Final result Specimen: Blood from Arm, Right Updated: 08/13/22 1651     PTT 26 seconds     Blood gas, venous [[id number]]  (Abnormal) Collected: 08/13/22 1630    Lab Status: Final result Specimen: Blood from Arm, Right Updated: 08/13/22 1642     pH, [place] 7 370     pCO2, [place] 62 8 mm Hg      pO2, [place] 27 4 mm Hg      HCO3, [place] 35 5 mmol/L      Base Excess, [place] 7 2 mmol/L      O2 Content, [place] 12 9 ml/dL      O2 HGB, VENOUS 50 0 %     CBC and differential [[id number]]  (Abnormal) Collected: 08/13/22 1630    Lab Status: Final result Specimen: Blood from Arm, Right Updated: 08/13/22 1639     WBC 13 01 Thousand/uL      RBC 5 68 Million/uL      Hemoglobin 17 3 g/dL      Hematocrit 52 2 %      MCV 92 fL      MCH 30 5 pg      MCHC 33 1 g/dL      RDW 12 3 %      MPV 10 6 fL      Platelets 785 Thousands/uL      nRBC 0 /100 WBCs      Neutrophils Relative 79 %      Immat GRANS % 0 %      Lymphocytes Relative 10 %      Monocytes Relative 7 %      Eosinophils Relative 3 %      Basophils Relative 1 %      Neutrophils Absolute 10 28 Thousands/µL      Immature Grans Absolute 0 05 Thousand/uL      Lymphocytes Absolute 1 26 Thousands/µL      Monocytes Absolute 0 89 Thousand/µL      Eosinophils Absolute 0 42 Thousand/µL      Basophils Absolute 0 11 Thousands/µL     UA w Reflex to Microscopic w Reflex to Culture [[id number]]     Lab Status: No result Specimen: Urine                  CTA ED chest PE study   Final Result by Juan David Lowry MD (08/13 1841)      There is no pulmonary embolism  There are 2 solid pulmonary nodules, larger 4 mm in the right upper lobe  Based on current Fleischner Society 2017 Guidelines on incidental pulmonary nodule, optional follow-up CT at 12 months can be considered  Severe pectus excavatum deformity  Workstation performed: [id number]         CT head without contrast   Final Result by Juan David Lowry MD (08/13 1833)      No acute intracranial abnormality  Workstation performed: [id number]         XR chest 1 view portable   ED Interpretation by Vannesa Godoy MD (08/13 1734)   Read by me; Radiologist to provide formal interpretation   Cardiomegally increased pulm vasc                   Procedures  ECG 12 Lead Documentation Only    Date/Time: 8/13/2022 4:50 PM  Performed by: Vannesa Godoy MD  Authorized by: Vannsea Godoy MD     Indications / Diagnosis:  Hypoxia  ECG reviewed by me, the ED Provider: yes    Patient location: ED  Previous ECG:     Previous ECG:  Unavailable (no previous)  Rate:     ECG rate:  64    ECG rate assessment: normal    Rhythm:     Rhythm: atrial fibrillation    QRS:     QRS axis:  Right  Comments:      Atrial fib new onset controlled with nonspecific T-wave changes             ED Course  ED Course as of 08/14/22 0204   Sat Aug 13, 2022   1755 Patient updated with planned CT head and CTA chest   1926 Contacting [facility] for hospitalization                               SBIRT 20yo+    Flowsheet Row Most Recent Value   SBIRT (25 yo +)    In order to provide better care to our patients, we are screening all of our patients for alcohol and drug use  Would it be okay to ask you these screening questions? Yes Filed at: 08/13/2022 1655   Initial Alcohol Screen: US AUDIT-C     1  How often do you have a drink containing alcohol? 0 Filed at: 08/13/2022 1655   2  How many drinks containing alcohol do you have on a typical day you are drinking? 0 Filed at: 08/13/2022 1655   3a  Male UNDER 65: How often do you have five or more drinks on one occasion? 0 Filed at: 08/13/2022 1655   3b  FEMALE Any Age, or MALE 65+: How often do you have 4 or more drinks on one occassion? 0 Filed at: 08/13/2022 1655   Audit-C Score 0 Filed at: 08/13/2022 1655   NICOLASA: How many times in the past year have you    Used an illegal drug or used a prescription medication for non-medical reasons?  Never Filed at: 08/13/2022 1655          Wells' Criteria for PE    Flowsheet Row Most Recent Value   Wells' Criteria for PE    Clinical signs and symptoms of DVT 0 Filed at: 08/13/2022 1735   PE is primary diagnosis or equally likely 3 Filed at: 08/13/2022 1735   HR >100 0 Filed at: 08/13/2022 1735   Immobilization at least 3 days or Surgery in the previous 4 weeks 0 Filed at: 08/13/2022 1735   Previous, objectively diagnosed PE or DVT 0 Filed at: 08/13/2022 1735   Hemoptysis 0 Filed at: 08/13/2022 1735   Malignancy with treatment within 6 months or palliative 0 Filed at: 08/13/2022 1735   Wells' Criteria Total 3 Filed at: 08/13/2022 1735                [facility]  Number of Diagnoses or Management Options  Diagnosis management comments: Mdm:  With respect to the patient's dermatitis  Patient has a nonspecific dermatitis  There may be some mild secondary infection to the area but no maximino cellulitis  It is sparing of the palms soles and antecubital and inguinal creases  Not consistent with a parasitic infestation currently  With respect to the patient's hypoxia this may be secondary to COPD patient denies having COPD although this is listed in the chart  Will initiate Solu-Medrol DuoNeb evaluate for CHF pneumonia COVID and pulmonary embolism  This time patient's neuro exam is nonfocal patient has a remote history of head bleed according to the daughter plan on doing CT of the head will await return of other blood work before ordering CT scans        Disposition  Final diagnoses:   Hypoxia   COPD with acute exacerbation ([address] )   New onset a-fib ([address] ) - rate controlled   Dermatitis   Pulmonary nodules - incidental may require followup     Time reflects when diagnosis was documented in both MDM as applicable and the Disposition within this note     Time User Action Codes Description Comment    8/13/2022  7:22 PM Daina Dole Add [R09 02] Hypoxia     8/13/2022  7:22 PM Vallarie Bill [J44 1] COPD with acute exacerbation ([address] )     8/13/2022  7:23 PM Daina Dole Add [I48 91] New onset a-fib ([address] )     8/13/2022  7:23 PM Gregary Coop [I48 91] New onset a-fib ([address] ) rate controlls    8/13/2022  7:23 PM Gregary Coop [I48 91] New onset a-fib ([address] ) rate controlled    8/13/2022  7:24 PM Daina Dole Add [L30 9] Dermatitis     8/13/2022  7:26 PM Daina Dole Add [R91 8] Pulmonary nodules     8/13/2022  7:26 PM Daina Dole Modify [R91 8] Pulmonary nodules incidental may require followup      ED Disposition     ED Disposition   Admit    Condition   Stable    Date/Time   Sat Aug 13, 2022  7:22 PM    Comment   Case was discussed with Bang Dunn, [facility]  and the patient's admission status was agreed to be Admission Status: inpatient status to the service of Dr David Cuevas    None         Current Discharge Medication List      CONTINUE these medications which have NOT CHANGED    Details   aspirin 81 MG tablet Take 1 tablet by mouth daily      ergocalciferol (VITAMIN D2) 50,000 units Take 1 capsule (50,000 Units total) by mouth once a week  Qty: 12 capsule, Refills: 3    Associated Diagnoses: Vitamin D deficiency      glipiZIDE (GLUCOTROL) 10 mg tablet Take 1 tablet (10 mg total) by mouth 2 (two) times a day  Qty: 180 tablet, Refills: 3    Associated Diagnoses: Type 2 diabetes mellitus without complication, without long-term current use of insulin ([facility])      lisinopril-hydrochlorothiazide (PRINZIDE,ZESTORETIC) 20-12 5 MG per tablet Take 1 tablet by mouth 2 (two) times a day  Qty: 180 tablet, Refills: 3    Associated Diagnoses: Essential hypertension      sertraline (ZOLOFT) 25 mg tablet Take 1 tablet (25 mg total) by mouth daily  Qty: 30 tablet, Refills: 5    Associated Diagnoses: Anxiety      simvastatin (ZOCOR) 40 mg tablet Take 1 tablet (40 mg total) by mouth daily  Qty: 90 tablet, Refills: 3    Associated Diagnoses: Hyperlipidemia, unspecified hyperlipidemia type      sitaGLIPtin-metFORMIN (Janumet)  MG per tablet Take 1 tablet by mouth 2 (two) times a day with meals  Qty: 180 tablet, Refills: 3    Associated Diagnoses: Type 2 diabetes mellitus without complication, without long-term current use of insulin ([facility])      budesonide-formoterol (SYMBICORT) 80-4 5 MCG/ACT inhaler Inhale 2 puffs 2 (two) times a day Rinse mouth after use  Qty: 10 2 g, Refills: 1    Associated Diagnoses: Chronic obstructive pulmonary disease, unspecified COPD type ([facility] [address] [address] );  Tobacco use             No discharge procedures on file      PDMP Review     None          ED Provider  Electronically Signed by           Jessica Nj MD  08/14/22 9521

## 2022-08-13 NOTE — ED NOTES
Per registration Daughter states "Patient has history of brain bleed and has since been confused  However today is the first that patient confused last name " Daughter requesting patient be checked for increased confusion  Provider made aware         Lacey Santacruz RN  08/13/22 2376

## 2022-08-13 NOTE — ED NOTES
Patient requesting something to help with itching  Provider made aware       Gustavo [place], LENA  08/13/22 8180

## 2022-08-14 PROBLEM — R09.02 HYPOXIA: Status: ACTIVE | Noted: 2021-10-18

## 2022-08-14 PROBLEM — E87.6 HYPOKALEMIA: Status: ACTIVE | Noted: 2022-08-14

## 2022-08-14 PROBLEM — Z86.79 HISTORY OF INTRACRANIAL HEMORRHAGE: Status: ACTIVE | Noted: 2022-08-14

## 2022-08-14 PROBLEM — R91.8 MULTIPLE PULMONARY NODULES: Status: ACTIVE | Noted: 2022-08-14

## 2022-08-14 PROBLEM — B85.0 HEAD LICE: Status: ACTIVE | Noted: 2022-08-14

## 2022-08-14 PROBLEM — G93.41 ACUTE METABOLIC ENCEPHALOPATHY: Status: ACTIVE | Noted: 2022-08-14

## 2022-08-14 PROBLEM — E80.6 HYPERBILIRUBINEMIA: Status: ACTIVE | Noted: 2022-08-14

## 2022-08-14 PROBLEM — E11.65 TYPE 2 DIABETES MELLITUS WITH HYPERGLYCEMIA, WITHOUT LONG-TERM CURRENT USE OF INSULIN (HCC): Status: ACTIVE | Noted: 2022-08-14

## 2022-08-14 PROBLEM — E11.65 TYPE 2 DIABETES MELLITUS WITH HYPERGLYCEMIA, WITHOUT LONG-TERM CURRENT USE OF INSULIN (HCC): Status: ACTIVE | Noted: 2018-07-17

## 2022-08-14 PROBLEM — E55.9 VITAMIN D DEFICIENCY: Status: ACTIVE | Noted: 2022-08-14

## 2022-08-14 PROBLEM — E87.1 HYPONATREMIA: Status: ACTIVE | Noted: 2022-08-14

## 2022-08-14 PROBLEM — B86 SCABIES: Status: ACTIVE | Noted: 2022-08-14

## 2022-08-14 LAB
ALBUMIN SERPL BCP-MCNC: 3.4 G/DL (ref 3.5–5)
ALP SERPL-CCNC: 69 U/L (ref 46–116)
ALT SERPL W P-5'-P-CCNC: 9 U/L (ref 12–78)
ANION GAP SERPL CALCULATED.3IONS-SCNC: 8 MMOL/L (ref 4–13)
AST SERPL W P-5'-P-CCNC: 8 U/L (ref 5–45)
BACTERIA UR QL AUTO: ABNORMAL /HPF
BILIRUB SERPL-MCNC: 0.61 MG/DL (ref 0.2–1)
BILIRUB UR QL STRIP: ABNORMAL
BUN SERPL-MCNC: 12 MG/DL (ref 5–25)
CALCIUM ALBUM COR SERPL-MCNC: 9.5 MG/DL (ref 8.3–10.1)
CALCIUM SERPL-MCNC: 9 MG/DL (ref 8.3–10.1)
CHLORIDE SERPL-SCNC: 92 MMOL/L (ref 96–108)
CLARITY UR: CLEAR
CO2 SERPL-SCNC: 34 MMOL/L (ref 21–32)
COLOR UR: YELLOW
CREAT SERPL-MCNC: 0.97 MG/DL (ref 0.6–1.3)
ERYTHROCYTE [DISTWIDTH] IN BLOOD BY AUTOMATED COUNT: 12.3 % (ref 11.6–15.1)
GFR SERPL CREATININE-BSD FRML MDRD: 56 ML/MIN/1.73SQ M
GLUCOSE SERPL-MCNC: 117 MG/DL (ref 65–140)
GLUCOSE SERPL-MCNC: 125 MG/DL (ref 65–140)
GLUCOSE SERPL-MCNC: 261 MG/DL (ref 65–140)
GLUCOSE SERPL-MCNC: 279 MG/DL (ref 65–140)
GLUCOSE UR STRIP-MCNC: ABNORMAL MG/DL
HCT VFR BLD AUTO: 50.1 % (ref 34.8–46.1)
HGB BLD-MCNC: 16.6 G/DL (ref 11.5–15.4)
HGB UR QL STRIP.AUTO: NEGATIVE
KETONES UR STRIP-MCNC: ABNORMAL MG/DL
LEUKOCYTE ESTERASE UR QL STRIP: ABNORMAL
MAGNESIUM SERPL-MCNC: 1.9 MG/DL (ref 1.6–2.6)
MCH RBC QN AUTO: 30.6 PG (ref 26.8–34.3)
MCHC RBC AUTO-ENTMCNC: 33.1 G/DL (ref 31.4–37.4)
MCV RBC AUTO: 92 FL (ref 82–98)
MUCOUS THREADS UR QL AUTO: ABNORMAL
NITRITE UR QL STRIP: NEGATIVE
NON-SQ EPI CELLS URNS QL MICRO: ABNORMAL /HPF
PH UR STRIP.AUTO: 6 [PH]
PLATELET # BLD AUTO: 213 THOUSANDS/UL (ref 149–390)
PMV BLD AUTO: 11 FL (ref 8.9–12.7)
POTASSIUM SERPL-SCNC: 4 MMOL/L (ref 3.5–5.3)
PROT SERPL-MCNC: 7.2 G/DL (ref 6.4–8.4)
PROT UR STRIP-MCNC: NEGATIVE MG/DL
RBC # BLD AUTO: 5.43 MILLION/UL (ref 3.81–5.12)
RBC #/AREA URNS AUTO: ABNORMAL /HPF
SODIUM SERPL-SCNC: 134 MMOL/L (ref 135–147)
SP GR UR STRIP.AUTO: 1.02 (ref 1–1.03)
UROBILINOGEN UR QL STRIP.AUTO: 2 E.U./DL
WBC # BLD AUTO: 8.08 THOUSAND/UL (ref 4.31–10.16)
WBC #/AREA URNS AUTO: ABNORMAL /HPF

## 2022-08-14 PROCEDURE — 85027 COMPLETE CBC AUTOMATED: CPT | Performed by: PHYSICIAN ASSISTANT

## 2022-08-14 PROCEDURE — 81001 URINALYSIS AUTO W/SCOPE: CPT | Performed by: INTERNAL MEDICINE

## 2022-08-14 PROCEDURE — 80053 COMPREHEN METABOLIC PANEL: CPT | Performed by: PHYSICIAN ASSISTANT

## 2022-08-14 PROCEDURE — 82948 REAGENT STRIP/BLOOD GLUCOSE: CPT

## 2022-08-14 PROCEDURE — 99232 SBSQ HOSP IP/OBS MODERATE 35: CPT | Performed by: INTERNAL MEDICINE

## 2022-08-14 PROCEDURE — 87086 URINE CULTURE/COLONY COUNT: CPT | Performed by: INTERNAL MEDICINE

## 2022-08-14 PROCEDURE — 83735 ASSAY OF MAGNESIUM: CPT | Performed by: PHYSICIAN ASSISTANT

## 2022-08-14 RX ORDER — INSULIN GLARGINE 100 [IU]/ML
5 INJECTION, SOLUTION SUBCUTANEOUS EVERY MORNING
Status: DISCONTINUED | OUTPATIENT
Start: 2022-08-14 | End: 2022-08-16

## 2022-08-14 RX ORDER — METHYLPREDNISOLONE SODIUM SUCCINATE 40 MG/ML
40 INJECTION, POWDER, LYOPHILIZED, FOR SOLUTION INTRAMUSCULAR; INTRAVENOUS EVERY 12 HOURS SCHEDULED
Status: DISCONTINUED | OUTPATIENT
Start: 2022-08-14 | End: 2022-08-16

## 2022-08-14 RX ORDER — MELATONIN
2000 DAILY
Status: DISCONTINUED | OUTPATIENT
Start: 2022-08-15 | End: 2022-08-17 | Stop reason: HOSPADM

## 2022-08-14 RX ORDER — CEFTRIAXONE 1 G/50ML
1000 INJECTION, SOLUTION INTRAVENOUS EVERY 24 HOURS
Status: DISCONTINUED | OUTPATIENT
Start: 2022-08-14 | End: 2022-08-16

## 2022-08-14 RX ORDER — PERMETHRIN 50 MG/G
CREAM TOPICAL ONCE
Status: COMPLETED | OUTPATIENT
Start: 2022-08-14 | End: 2022-08-14

## 2022-08-14 RX ORDER — LISINOPRIL 20 MG/1
20 TABLET ORAL DAILY
Status: DISCONTINUED | OUTPATIENT
Start: 2022-08-15 | End: 2022-08-17 | Stop reason: HOSPADM

## 2022-08-14 RX ADMIN — HYDROCHLOROTHIAZIDE: 12.5 TABLET ORAL at 08:59

## 2022-08-14 RX ADMIN — SERTRALINE 25 MG: 25 TABLET, FILM COATED ORAL at 08:59

## 2022-08-14 RX ADMIN — PERMETHRIN: 1 LOTION TOPICAL at 08:59

## 2022-08-14 RX ADMIN — PRAVASTATIN SODIUM 80 MG: 40 TABLET ORAL at 16:38

## 2022-08-14 RX ADMIN — METHYLPREDNISOLONE SODIUM SUCCINATE 40 MG: 40 INJECTION, POWDER, FOR SOLUTION INTRAMUSCULAR; INTRAVENOUS at 16:38

## 2022-08-14 RX ADMIN — PERMETHRIN: 50 CREAM TOPICAL at 08:59

## 2022-08-14 RX ADMIN — INSULIN GLARGINE 5 UNITS: 100 INJECTION, SOLUTION SUBCUTANEOUS at 09:02

## 2022-08-14 RX ADMIN — ENOXAPARIN SODIUM 40 MG: 40 INJECTION SUBCUTANEOUS at 08:59

## 2022-08-14 RX ADMIN — BUDESONIDE AND FORMOTEROL FUMARATE DIHYDRATE 2 PUFF: 80; 4.5 AEROSOL RESPIRATORY (INHALATION) at 18:43

## 2022-08-14 RX ADMIN — INSULIN LISPRO 2 UNITS: 100 INJECTION, SOLUTION INTRAVENOUS; SUBCUTANEOUS at 08:07

## 2022-08-14 RX ADMIN — BUDESONIDE AND FORMOTEROL FUMARATE DIHYDRATE 2 PUFF: 80; 4.5 AEROSOL RESPIRATORY (INHALATION) at 09:00

## 2022-08-14 RX ADMIN — CEFTRIAXONE 1000 MG: 1 INJECTION, SOLUTION INTRAVENOUS at 22:09

## 2022-08-14 RX ADMIN — ASPIRIN 81 MG: 81 TABLET, COATED ORAL at 08:59

## 2022-08-14 RX ADMIN — INSULIN LISPRO 3 UNITS: 100 INJECTION, SOLUTION INTRAVENOUS; SUBCUTANEOUS at 21:48

## 2022-08-14 RX ADMIN — HYDROXYZINE HYDROCHLORIDE 10 MG: 10 TABLET ORAL at 21:45

## 2022-08-14 NOTE — PROGRESS NOTES
[address] [facility] [address]  Progress Note - Kamlesh Bray 1945, 65 y o  female MRN: [id number]  Unit/Bed#: 403-01 Encounter: [id number]  Primary Care Provider: Sharon Abdul DO   Date and time admitted to hospital: 8/13/2022  3:59 PM    * Hypoxia  Assessment & Plan  · Presented with an oxygen saturation level in the 80's%  · Not on any home supplemental oxygen  · Possible underlying obstructive lung disease/COPD with her significant tobacco use history  · Currently requiring 2 lpm of continuous supplemental oxygen to maintain oxygen saturation levels at 90% and above  · COVID-19 testing is negative    · Treat with methylprednisone 40 mg IV every 12 hours, which will also treat the dermatitis  · Utilize ceftriaxone 1000 mg IV every 24 hours  · Continue Symbicort inhaler  · Follow the procalcitonin level  · Nebulizer treatments via the respiratory protocol  · Incentive spirometry 10 times per hour while awake  · Consult Pulmonology    Type 2 diabetes mellitus with hyperglycemia, without long-term current use of insulin [facility])  Assessment & Plan  Lab Results   Component Value Date    HGBA1C 7 1 (H) 08/03/2021       Recent Labs     08/13/22  2152 08/14/22  1109   POCGLU 260* 125       Blood Sugar Average: Last 72 hrs:  (P) 192 5     · Check a new hemoglobin A1c level  · Utilize lantus 5 units SQ Qdaily  · Insulin sliding scale with blood glucose monitoring ACHS  · Continue lisinopril for renal protection   Outpatient follow-up with PCP in regards to this matter    Hyperbilirubinemia  Assessment & Plan  · Mild hyperbilirubinemia  · Follow the total bilirubin level    Hypokalemia  Assessment & Plan  · Now resolved  · Follow the potassium level and magnesium level    Hyponatremia  Assessment & Plan  · Likely pseudohyponatremia in the setting of hyperglycemia  · Follow the sodium level as the hypoglycemia is corrected    Results from last 7 days   Lab Units 08/14/22  0436 08/13/22  1630   SODIUM mmol/L 134* 135   POTASSIUM mmol/L 4 0 3 4*   CHLORIDE mmol/L 92* 91*   CO2 mmol/L 34* 34*   BUN mg/dL 12 12   CREATININE mg/dL 0 97 1 08   CALCIUM mg/dL 9 0 9 0         History of intracranial hemorrhage  Assessment & Plan  · History of intracranial hemorrhage  · No acute intracranial abnormality on CT scan of the head without contrast on 65/57/4191    Acute metabolic encephalopathy  Assessment & Plan  · Presented in the emergency department with confusion  · Likely due to hypoxia  · Her mental status has improved now that the hypoxia has resolved  Multiple pulmonary nodules  Assessment & Plan  · Need outpatient surveillance with Pulmonology and with PCP    CTA of the chest/PE protocol (08/13/2022): IMPRESSION:     There is no pulmonary embolism      There are 2 solid pulmonary nodules, larger 4 mm in the right upper lobe  Based on current Fleischner Society 2017 Guidelines on incidental pulmonary nodule, optional follow-up CT at 12 months can be considered      Head lice  Assessment & Plan  · Utilize permethrin shampoo    Dermatitis  Assessment & Plan  · Month long history of skin rash with itching  · Began in scalp and traveled downward, covers entire body  · exoriations with open small macular wounds, no pustules noted  · Daughter had similar rash which resolved, daughter did give patient lice treatment at home  · Scalp has multiple plaques but does not appear overly consistent with psoriasis  · Skin lesions appear most consistent with scabies  · Will treat with permethrin 1% to scalp and 5% to body  · Treat with ceftriaxone 1000 mg IV every 24 hours for a possible superimposed cellulitis in addition to a possible COPD exacerbation  · Should follow up with Dermatology  · Wound care consult    Chronic atrial fibrillation ([facility] [address] )  Assessment & Plan  · Reported as new onset on EKG however pmhx from [facility] shows she has had this for many years  · Currently rate controlled without any AV-mariano blocking agents  · Stroke risk score is a 5, highly recommended for [facility], patient had been initiated on coumadin by Cardiology in the past but was taken off of this due to history of ICH  · Could consider Eliquis 2 5 mg BID, recommend for discussion of risks vs benefits with Cardiology outpatient  · For now will continue aspirin 81 mg daily      Tobacco use  Assessment & Plan  · Long history of cigarette smoking  · Nicotine replacement while inpatient  · Smoking cessation counseling  · The patient is not interested in quitting smoking  VTE Pharmacologic Prophylaxis: VTE Score: 6 High Risk (Score >/= 5) - Pharmacological DVT Prophylaxis Ordered: enoxaparin (Lovenox)  Sequential Compression Devices Ordered  Patient Centered Rounds: I performed bedside rounds with nursing staff today  Education and Discussions with Family / Patient: Updated  (daughter) via phone  [place])    Time Spent for Care: 30 minutes  More than 50% of total time spent on counseling and coordination of care as described above  Current Length of Stay: 1 day(s)  Current Patient Status: Inpatient   Certification Statement: The patient will continue to require additional inpatient hospital stay due to the need for IV antibiotic treatment, for IV methylprednisolone treatment, and with the patient requiring 2 lpm of continuous supplemental oxygen to maintain adequate oxygen saturation levels  Discharge Plan: Anticipate discharge in 24-48 hrs to home with home services  Code Status: Level 3 - DNAR and DNI    Subjective: The patient was seen and examined  The generalized pruritus is somewhat improved  No chest pain  No shortness of breath  No abdominal pain  No nausea or vomiting        Objective:     Vitals:   Temp (24hrs), Av °F (36 7 °C), Min:97 4 °F (36 3 °C), Max:98 3 °F (36 8 °C)    Temp:  [97 4 °F (36 3 °C)-98 3 °F (36 8 °C)] 98 3 °F (36 8 °C)  HR:  [66-98] 77  Resp:  [17-20] 17  BP: (100-164)/(55-84) 149/71  SpO2:  [86 %-97 %] 94 %  Body mass index is 25 42 kg/m²  Input and Output Summary (last 24 hours):      Intake/Output Summary (Last 24 hours) at 8/14/2022 1452  Last data filed at 8/14/2022 1224  Gross per 24 hour   Intake 230 ml   Output --   Net 230 ml       Physical Exam:   Physical Exam   General:  NAD, follows commands  HEENT:  NC/AT, mucous membranes moist  Neck:  Supple, No JVP elevation  CV:  + S1, + S2, Irregularly irregular rhythm, Regular rate  Pulm:  Lung fields are CTA bilaterally  Abd:  Soft, Non-tender, Non-distended  Ext:  No clubbing/cyanosis/edema  Skin:  Multiple erythematous scabbed lesions on the bilateral upper extremities/anterior chest wall/back  Neuro:  Awake, alert, oriented  Psych:  Normal mood and affect      Additional Data:    Labs:  Results from last 7 days   Lab Units 08/14/22  0436 08/13/22  1630   WBC Thousand/uL 8 08 13 01*   HEMOGLOBIN g/dL 16 6* 17 3*   HEMATOCRIT % 50 1* 52 2*   PLATELETS Thousands/uL 213 248   NEUTROS PCT %  --  79*   LYMPHS PCT %  --  10*   MONOS PCT %  --  7   EOS PCT %  --  3     Results from last 7 days   Lab Units 08/14/22  0436   SODIUM mmol/L 134*   POTASSIUM mmol/L 4 0   CHLORIDE mmol/L 92*   CO2 mmol/L 34*   BUN mg/dL 12   CREATININE mg/dL 0 97   ANION GAP mmol/L 8   CALCIUM mg/dL 9 0   ALBUMIN g/dL 3 4*   TOTAL BILIRUBIN mg/dL 0 61   ALK PHOS U/L 69   ALT U/L 9*   AST U/L 8   GLUCOSE RANDOM mg/dL 261*     Results from last 7 days   Lab Units 08/13/22  1630   INR  0 97     Results from last 7 days   Lab Units 08/14/22  1109 08/13/22  2152   POC GLUCOSE mg/dl 125 260*         Results from last 7 days   Lab Units 08/13/22  1630   LACTIC ACID mmol/L 1 6       Lines/Drains:  Invasive Devices  Report    Peripheral Intravenous Line  Duration           Peripheral IV 08/13/22 Left Hand <1 day                      Imaging: Reviewed radiology reports from this admission including: chest CT scan    Recent Cultures (last 7 days):   Results from last 7 days   Lab Units 08/13/22  1631 08/13/22  1630   BLOOD CULTURE  Received in Microbiology Lab  Culture in Progress  Received in Microbiology Lab  Culture in Progress  Last 24 Hours Medication List:   Current Facility-Administered Medications   Medication Dose Route Frequency Provider Last Rate    acetaminophen  650 mg Oral Q6H PRN Nancy Cai PA-C      albuterol  2 5 mg Nebulization Q6H PRN Nadinemarily Sandy,       aspirin  81 mg Oral Daily Nancy Cai PA-C      budesonide-formoterol  2 puff Inhalation BID Nancy Cai PA-C      calcium carbonate  1,000 mg Oral Daily PRN Nancy Cai PA-C      cefTRIAXone  1,000 mg Intravenous Q24H [facility] Vika,       enoxaparin  40 mg Subcutaneous Q24H [facility] Nancy Cai PA-C      hydrOXYzine HCL  10 mg Oral Q6H PRN Nancy Cai PA-C      insulin glargine  5 Units Subcutaneous QAM [facility] Sandy,       insulin lispro  1-5 Units Subcutaneous TID AC Nancy Cai PA-C      insulin lispro  1-5 Units Subcutaneous HS Nancy Cai PA-C      [START ON 8/15/2022] lisinopril  20 mg Oral Daily [facility]      methylPREDNISolone sodium succinate  40 mg Intravenous Q12H [facility]      nicotine  1 patch Transdermal Daily Nancy Cai PA-C      ondansetron  4 mg Intravenous Q6H PRN Nancy Cai PA-C      polyethylene glycol  17 g Oral Daily PRN Nancy Cai PA-C      pravastatin  80 mg Oral Daily With Dinner Nancy Cai PA-C      sertraline  25 mg Oral Daily Nancy Cai PA-C          Today, Patient Was Seen By: Akshat Rich DO    **Please Note: This note may have been constructed using a voice recognition system  **

## 2022-08-14 NOTE — INCIDENTAL FINDINGS
The following findings require follow up:  Radiographic finding   Finding:  CTA of the chest/PE protocol (08/13/2022): There are 2 solid pulmonary nodules, larger 4 mm in the right upper lobe  Based on current Fleischner Society 2017 Guidelines on incidental pulmonary nodule, optional follow-up CT at 12 months can be considered  LUNGS: There are multiple pulmonary nodules, which will be described on series 3:  Image 23, right upper lobe, solid, smooth bordered, 2 mm   Image 30, right upper lobe, solid, smooth bordered, 4 mm      Follow up required: yes   Follow up should be done within 1 week(s)    Please notify the following clinician to assist with the follow up:   Dr Nurys Fisher and outpatient follow-up with Pulmonology

## 2022-08-14 NOTE — ASSESSMENT & PLAN NOTE
Lab Results   Component Value Date    HGBA1C 7 1 (H) 08/03/2021       Recent Labs     08/13/22  2152 08/14/22  1109   POCGLU 260* 125       Blood Sugar Average: Last 72 hrs:  (P) 192 5     · Check a new hemoglobin A1c level  · Utilize lantus 5 units SQ Qdaily  · Insulin sliding scale with blood glucose monitoring ACHS  · Continue lisinopril for renal protection   Outpatient follow-up with PCP in regards to this matter

## 2022-08-14 NOTE — PLAN OF CARE
Problem: Potential for Falls  Goal: Patient will remain free of falls  Description: INTERVENTIONS:  - Educate patient/family on patient safety including physical limitations  - Instruct patient to call for assistance with activity   - Consult OT/PT to assist with strengthening/mobility   - Keep Call bell within reach  - Keep bed low and locked with side rails adjusted as appropriate  - Keep care items and personal belongings within reach  - Initiate and maintain comfort rounds  - Make Fall Risk Sign visible to staff  - Offer Toileting every 1-2 Hours, in advance of need  - Initiate/Maintain bed and chair alarm  - Obtain necessary fall risk management equipment: fall socks and call bell within reach  - Apply yellow socks and bracelet for high fall risk patients  - Consider moving patient to room near nurses station  Outcome: Progressing     Problem: PAIN - ADULT  Goal: Verbalizes/displays adequate comfort level or baseline comfort level  Description: Interventions:  - Encourage patient to monitor pain and request assistance  - Assess pain using appropriate pain scale  - Administer analgesics based on type and severity of pain and evaluate response  - Implement non-pharmacological measures as appropriate and evaluate response  - Consider cultural and social influences on pain and pain management  - Notify physician/advanced practitioner if interventions unsuccessful or patient reports new pain  Outcome: Progressing     Problem: INFECTION - ADULT  Goal: Absence or prevention of progression during hospitalization  Description: INTERVENTIONS:  - Assess and monitor for signs and symptoms of infection  - Monitor lab/diagnostic results  - Monitor all insertion sites, i e  indwelling lines, tubes, and drains  - Monitor endotracheal if appropriate and nasal secretions for changes in amount and color  - [place] appropriate cooling/warming therapies per order  - Administer medications as ordered  - Instruct and encourage patient and family to use good hand hygiene technique  - Identify and instruct in appropriate isolation precautions for identified infection/condition  Outcome: Progressing     Problem: SAFETY ADULT  Goal: Patient will remain free of falls  Description: INTERVENTIONS:  - Educate patient/family on patient safety including physical limitations  - Instruct patient to call for assistance with activity   - Consult OT/PT to assist with strengthening/mobility   - Keep Call bell within reach  - Keep bed low and locked with side rails adjusted as appropriate  - Keep care items and personal belongings within reach  - Initiate and maintain comfort rounds  - Make Fall Risk Sign visible to staff  - Offer Toileting every 1-2 Hours, in advance of need  - Initiate/Maintain bed and chair alarm  - Obtain necessary fall risk management equipment: fall socks and call bell within reach  - Apply yellow socks and bracelet for high fall risk patients  - Consider moving patient to room near nurses station  Outcome: Progressing  Goal: Maintain or return to baseline ADL function  Description: INTERVENTIONS:  -  Assess patient's ability to carry out ADLs; assess patient's baseline for ADL function and identify physical deficits which impact ability to perform ADLs (bathing, care of mouth/teeth, toileting, grooming, dressing, etc )  - Assess/evaluate cause of self-care deficits   - Assess range of motion  - Assess patient's mobility; develop plan if impaired  - Assess patient's need for assistive devices and provide as appropriate  - Encourage maximum independence but intervene and supervise when necessary  - Involve family in performance of ADLs  - Assess for home care needs following discharge   - Consider OT consult to assist with ADL evaluation and planning for discharge  - Provide patient education as appropriate  Outcome: Progressing  Goal: Maintains/Returns to pre admission functional level  Description: INTERVENTIONS:  - Perform BMAT or MOVE assessment daily    - Set and communicate daily mobility goal to care team and patient/family/caregiver  - Collaborate with rehabilitation services on mobility goals if consulted  - Perform Range of Motion 3-4 times a day  - Reposition patient every 1-2 hours  - Dangle patient 3-4 times a day  - Stand patient 3-4 times a day  - Ambulate patient 3-4 times a day  - Out of bed to chair 3-4 times a day   - Out of bed for meals 3-4 times a day  - Out of bed for toileting  - Record patient progress and toleration of activity level   Outcome: Progressing     Problem: DISCHARGE PLANNING  Goal: Discharge to home or other facility with appropriate resources  Description: INTERVENTIONS:  - Identify barriers to discharge w/patient and caregiver  - Arrange for needed discharge resources and transportation as appropriate  - Identify discharge learning needs (meds, wound care, etc )  - Arrange for interpretive services to assist at discharge as needed  - Refer to Case Management Department for coordinating discharge planning if the patient needs post-hospital services based on physician/advanced practitioner order or complex needs related to functional status, cognitive ability, or social support system  Outcome: Progressing     Problem: Knowledge Deficit  Goal: Patient/family/caregiver demonstrates understanding of disease process, treatment plan, medications, and discharge instructions  Description: Complete learning assessment and assess knowledge base    Interventions:  - Provide teaching at level of understanding  - Provide teaching via preferred learning methods  Outcome: Progressing     Problem: RESPIRATORY - ADULT  Goal: Achieves optimal ventilation and oxygenation  Description: INTERVENTIONS:  - Assess for changes in respiratory status  - Assess for changes in mentation and behavior  - Position to facilitate oxygenation and minimize respiratory effort  - Oxygen administered by appropriate delivery if ordered  - Initiate smoking cessation education as indicated  - Encourage broncho-pulmonary hygiene including cough, deep breathe, Incentive Spirometry  - Assess the need for suctioning and aspirate as needed  - Assess and instruct to report SOB or any respiratory difficulty  - Respiratory Therapy support as indicated  Outcome: Progressing     Problem: METABOLIC, FLUID AND ELECTROLYTES - ADULT  Goal: Electrolytes maintained within normal limits  Description: INTERVENTIONS:  - Monitor labs and assess patient for signs and symptoms of electrolyte imbalances  - Administer electrolyte replacement as ordered  - Monitor response to electrolyte replacements, including repeat lab results as appropriate  - Instruct patient on fluid and nutrition as appropriate  Outcome: Progressing  Goal: Fluid balance maintained  Description: INTERVENTIONS:  - Monitor labs   - Monitor I/O and WT  - Instruct patient on fluid and nutrition as appropriate  - Assess for signs & symptoms of volume excess or deficit  Outcome: Progressing  Goal: Glucose maintained within target range  Description: INTERVENTIONS:  - Monitor Blood Glucose as ordered  - Assess for signs and symptoms of hyperglycemia and hypoglycemia  - Administer ordered medications to maintain glucose within target range  - Assess nutritional intake and initiate nutrition service referral as needed  Outcome: Progressing

## 2022-08-14 NOTE — ASSESSMENT & PLAN NOTE
· Need outpatient surveillance with Pulmonology and with PCP    CTA of the chest/PE protocol (08/13/2022): IMPRESSION:     There is no pulmonary embolism      There are 2 solid pulmonary nodules, larger 4 mm in the right upper lobe  Based on current Fleischner Society 2017 Guidelines on incidental pulmonary nodule, optional follow-up CT at 12 months can be considered

## 2022-08-14 NOTE — ASSESSMENT & PLAN NOTE
· Presented in the emergency department with confusion  · Likely due to hypoxia  · Her mental status has improved now that the hypoxia has resolved

## 2022-08-14 NOTE — ASSESSMENT & PLAN NOTE
· Oxygen saturation as low as 75% in the ED  · Good saturations with 2L O2  · Patient denies feeling any symptoms  · Likely that patient has needed oxygen for some time due to her COPD given her VBG shows well compensated respiratory acidosis  · D dimer elevated, negative for PE and no leg swelling or pain  · Does not appear volume overloaded  · Minimal expiratory wheezes on examination, did receive IV solumedrol in the ED for possible COPD exacerbation  · Home O2 eval needed prior to discharge

## 2022-08-14 NOTE — H&P
[address]  1945, 65 y o  female MRN: [id number]  Unit/Bed#: 403-01 Encounter: [id number]  Primary Care Provider: Stefani Mclain DO   Date and time admitted to hospital: 8/13/2022  3:59 PM    * Acute respiratory failure with hypoxia (HCC)  Assessment & Plan  · Oxygen saturation as low as 75% in the ED  · Good saturations with 2L O2  · Patient denies feeling any symptoms  · Likely that patient has needed oxygen for some time due to her COPD given her VBG shows well compensated respiratory acidosis  · D dimer elevated, negative for PE and no leg swelling or pain  · Does not appear volume overloaded  · Minimal expiratory wheezes on examination, did receive IV solumedrol in the ED for possible COPD exacerbation  · Home O2 eval needed prior to discharge    Dermatitis  Assessment & Plan  · Month long history of skin rash with itching  · Began in scalp and traveled downward, covers entire body  · exoriations with open small macular wounds, no pustules noted  · Daughter had similar rash which resolved, daughter did give patient lice treatment at home  · Scalp has multiple plaques but does not appear overly consistent with psoriasis  · Skin lesions appear most consistent with scabies  · Will treat with permethrin 1% to scalp and 5% to body  · Should follow up with dermatology  · Wound care consult    Chronic atrial fibrillation ([facility] [address] )  Assessment & Plan  · Reported as new onset on EKG however pmhx from [facility] shows she has had this for many years  · Currently rate controlled  · Stroke risk score is a 5, highly recommended for [facility] [place], patient had been initiated on coumadin by cardiology in the past but was taken off of this due to history of [address]  · Could consider Eliquis 2 5 mg BID, recommend for discussion of risks vs benefits with cardiology outpatient  · BP is soft and with rates controlled, will hold off on BB/CCB therapy      Stage 3a chronic kidney disease ([facility] [address] )  Assessment & Plan  Lab Results   Component Value Date    EGFR 49 08/13/2022    EGFR 67 08/03/2021    EGFR 64 11/05/2019    CREATININE 1 08 08/13/2022    CREATININE 0 84 08/03/2021    CREATININE 0 89 11/05/2019     Near baseline kidney function  Avoid nephrotoxins if possible    Chronic obstructive pulmonary disease, unspecified COPD type ([facility] [address] )  Assessment & Plan  · Does not appear to be in exacerbation at this time  · Did receive 80 mg IV solumedrol in the ED  · Hold off on further steroids for now  · Respiratory on board  · Patient supposed to be taking symbicort at home but reports that she does not take this  · Should follow with pulmonology on discharge    Tobacco use  Assessment & Plan  · Smokes 1 ppd  · Nicotine replacement while inpatient  · Cessation counseling    Type 2 diabetes mellitus without complication, without long-term current use of insulin [facility])  Assessment & Plan  Lab Results   Component Value Date    HGBA1C 7 1 (H) 08/03/2021       Recent Labs     08/13/22  2152   POCGLU 260*       Blood Sugar Average: Last 72 hrs:  (P) 260     · A1c near goal  · Takes janumet and glipizide at home  · Hold oral hypoglycemics while inpatient  · SSI and ADA diet  · Hypoglycemia protocol        VTE Pharmacologic Prophylaxis: VTE Score: 6 High Risk (Score >/= 5) - Pharmacological DVT Prophylaxis Ordered: enoxaparin (Lovenox)  Sequential Compression Devices Ordered  Code Status: Level 1 - Full Code   Discussion with family: Patient declined call to   Anticipated Length of Stay: Patient will be admitted on an inpatient basis with an anticipated length of stay of greater than 2 midnights secondary to hypoxia, rash  Total Time for Visit, including Counseling / Coordination of Care: 45 minutes Greater than 50% of this total time spent on direct patient counseling and coordination of care      Chief Complaint: diffuse rash    History of Present Illness:  Collette Caraway is a 65 y o  female with a PMH of a fib, ICH, COPD, DM who presents with generalized body rash  Patient has had this rash for approximately 1 month  Daughter also had similar rash which resolved on its own  Patient's daughter did use lice treatment for patient but it did not seem to improve the rash  Rash began in her scalp and work his way down her body  Patient denies fevers, chills, shortness of breath  Although she denies shortness of Breath, the patient was found to have hypoxia in the ED and was placed on 2 L of oxygen with good improvement  She does have history of COPD and does not take her daily Symbicort as had been prescribed  No volume overload and no PE on CTA  Noted to have AFib on her EKG with good rate control  Upon review of her history from [facility] notes patient has a chronic history of AFib which was treated with Coumadin in the past but that was discontinued due to her history of brain hemorrhage  She was given 1 dose of ceftriaxone in the ED along with Benadryl and Solu-Medrol  Solu-Medrol given for both her hypoxia with suspected COPD exacerbation and for the itching  No current wheezes appreciated on examination at this time  Review of Systems:  Review of Systems   Constitutional: Negative for activity change, appetite change, chills, fatigue and fever  Eyes: Negative for photophobia and visual disturbance  Respiratory: Negative for cough, shortness of breath, wheezing and stridor  Cardiovascular: Negative for chest pain, palpitations and leg swelling  Gastrointestinal: Negative for abdominal pain, constipation, diarrhea, nausea and vomiting  Endocrine: Negative for polydipsia, polyphagia and polyuria  Genitourinary: Negative for dysuria, frequency and hematuria  Musculoskeletal: Negative for arthralgias, back pain, gait problem and joint swelling  Skin: Positive for rash and wound  Allergic/Immunologic: Negative for environmental allergies, food allergies and immunocompromised state     Neurological: Negative for dizziness, tremors, weakness and light-headedness  Hematological: Negative for adenopathy  Does not bruise/bleed easily  Psychiatric/Behavioral: Negative for confusion  The patient is not nervous/anxious  Past Medical and Surgical History:   Past Medical History:   Diagnosis Date    Hyperlipidemia     last assessed  8/24/17    Hypertension     last assessed 10/2/14       Past Surgical History:   Procedure Laterality Date    CRANIOTOMY         Meds/Allergies:  Prior to Admission medications    Medication Sig Start Date End Date Taking? Authorizing Provider   aspirin 81 MG tablet Take 1 tablet by mouth daily 10/2/14  Yes Historical Provider, MD   ergocalciferol (VITAMIN D2) 50,000 units Take 1 capsule (50,000 Units total) by mouth once a week 2/7/22  Yes Shaan Meyers DO   glipiZIDE (GLUCOTROL) 10 mg tablet Take 1 tablet (10 mg total) by mouth 2 (two) times a day 2/7/22  Yes Shaan Meyers DO   lisinopril-hydrochlorothiazide (PRINZIDE,ZESTORETIC) 20-12 5 MG per tablet Take 1 tablet by mouth 2 (two) times a day 2/7/22  Yes Shaan Meyers DO   sertraline (ZOLOFT) 25 mg tablet Take 1 tablet (25 mg total) by mouth daily 11/5/19  Yes Shaan Meyers DO   simvastatin (ZOCOR) 40 mg tablet Take 1 tablet (40 mg total) by mouth daily 2/7/22  Yes Shaan Meyers DO   sitaGLIPtin-metFORMIN (Janumet)  MG per tablet Take 1 tablet by mouth 2 (two) times a day with meals 2/7/22  Yes Shaan Meyers DO   budesonide-formoterol (SYMBICORT) 80-4 5 MCG/ACT inhaler Inhale 2 puffs 2 (two) times a day Rinse mouth after use  10/18/21   Shaan Meyers DO     I have reviewed home medications using recent Epic encounter  Allergies: No Known Allergies    Social History:  Marital Status:     Occupation: none  Patient Pre-hospital Living Situation: Home  Patient Pre-hospital Level of Mobility: walks  Patient Pre-hospital Diet Restrictions: none  Substance Use History:   Social History Substance and Sexual Activity   Alcohol Use Never     Social History     Tobacco Use   Smoking Status Current Every Day Smoker    Packs/day: 1 00    Types: Cigarettes   Smokeless Tobacco Never Used     Social History     Substance and Sexual Activity   Drug Use No       Family History:  Family History   Problem Relation Age of Onset    Breast cancer Mother     Ovarian cancer Mother     Diabetes Father        Physical Exam:     Vitals:   Blood Pressure: 164/82 (08/13/22 2125)  Pulse: 76 (08/13/22 2125)  Temperature: 98 °F (36 7 °C) (08/13/22 2113)  Temp Source: Oral (08/13/22 2113)  Respirations: 18 (08/13/22 2113)  Height: 5' 5" (165 1 cm) (08/13/22 2056)  Weight - Scale: 69 3 kg (152 lb 12 5 oz) (08/13/22 2056)  SpO2: 91 % (08/13/22 2125)    Physical Exam  Vitals and nursing note reviewed  Constitutional:       Appearance: She is not ill-appearing  HENT:      Head: Normocephalic and atraumatic  Cardiovascular:      Rate and Rhythm: Normal rate  Rhythm irregular  Pulses: Normal pulses  Heart sounds: Normal heart sounds  No murmur heard  No gallop  Pulmonary:      Effort: Pulmonary effort is normal       Breath sounds: Wheezing present  No rhonchi or rales  Abdominal:      General: Bowel sounds are normal       Palpations: Abdomen is soft  Tenderness: There is no abdominal tenderness  There is no guarding or rebound  Musculoskeletal:         General: Normal range of motion  Cervical back: Normal range of motion and neck supple  Right lower leg: No edema  Left lower leg: No edema  Skin:     Findings: Rash present  Comments: Generalized skin rash of open papules, no pustules  exoriations  Plaque in scalp   Neurological:      General: No focal deficit present  Mental Status: She is alert and oriented to person, place, and time     Psychiatric:         Mood and Affect: Mood normal          Behavior: Behavior normal           Additional Data:     Lab Results:  Results from last 7 days   Lab Units 08/13/22  1630   WBC Thousand/uL 13 01*   HEMOGLOBIN g/dL 17 3*   HEMATOCRIT % 52 2*   PLATELETS Thousands/uL 248   NEUTROS PCT % 79*   LYMPHS PCT % 10*   MONOS PCT % 7   EOS PCT % 3     Results from last 7 days   Lab Units 08/13/22  1630   SODIUM mmol/L 135   POTASSIUM mmol/L 3 4*   CHLORIDE mmol/L 91*   CO2 mmol/L 34*   BUN mg/dL 12   CREATININE mg/dL 1 08   ANION GAP mmol/L 10   CALCIUM mg/dL 9 0   ALBUMIN g/dL 3 8   TOTAL BILIRUBIN mg/dL 1 01*   ALK PHOS U/L 77   ALT U/L 12   AST U/L 17   GLUCOSE RANDOM mg/dL 185*     Results from last 7 days   Lab Units 08/13/22  1630   INR  0 97     Results from last 7 days   Lab Units 08/13/22  2152   POC GLUCOSE mg/dl 260*         Results from last 7 days   Lab Units 08/13/22  1630   LACTIC ACID mmol/L 1 6       Imaging: Reviewed radiology reports from this admission including: chest xray, chest CT scan and CT head  CTA ED chest PE study   Final Result by Chelita Harvey MD (08/13 1841)      There is no pulmonary embolism  There are 2 solid pulmonary nodules, larger 4 mm in the right upper lobe  Based on current Fleischner Society 2017 Guidelines on incidental pulmonary nodule, optional follow-up CT at 12 months can be considered  Severe pectus excavatum deformity  Workstation performed: [id number]         CT head without contrast   Final Result by Chelita Harvey MD (08/13 1833)      No acute intracranial abnormality  Workstation performed: [id number]         XR chest 1 view portable   ED Interpretation by Edvin Kovacs MD (08/13 0324)   Read by me; Radiologist to provide formal interpretation  Cardiomegally increased pulm vasc            EKG and Other Studies Reviewed on Admission:   · EKG: Atrial fibrillation  HR 79     ** Please Note: This note has been constructed using a voice recognition system   **

## 2022-08-14 NOTE — ASSESSMENT & PLAN NOTE
· Reported as new onset on EKG however pmhx from [facility] shows she has had this for many years  · Currently rate controlled without any AV-mariano blocking agents  · Stroke risk score is a 5, highly recommended for [facility], patient had been initiated on coumadin by Cardiology in the past but was taken off of this due to history of [address]  · Could consider Eliquis 2 5 mg BID, recommend for discussion of risks vs benefits with Cardiology outpatient  · For now will continue aspirin 81 mg daily

## 2022-08-14 NOTE — ASSESSMENT & PLAN NOTE
· Month long history of skin rash with itching  · Began in scalp and traveled downward, covers entire body  · exoriations with open small macular wounds, no pustules noted  · Daughter had similar rash which resolved, daughter did give patient lice treatment at home  · Scalp has multiple plaques but does not appear overly consistent with psoriasis  · Skin lesions appear most consistent with scabies  · Will treat with permethrin 1% to scalp and 5% to body  · Should follow up with dermatology  · Wound care consult

## 2022-08-14 NOTE — ASSESSMENT & PLAN NOTE
· Does not appear to be in exacerbation at this time  · Did receive 80 mg IV solumedrol in the ED  · Hold off on further steroids for now  · Respiratory on board  · Patient supposed to be taking symbicort at home but reports that she does not take this  · Should follow with pulmonology on discharge

## 2022-08-14 NOTE — ASSESSMENT & PLAN NOTE
· History of intracranial hemorrhage  · No acute intracranial abnormality on CT scan of the head without contrast on 08/13/2022

## 2022-08-14 NOTE — ASSESSMENT & PLAN NOTE
· Month long history of skin rash with itching  · Began in scalp and traveled downward, covers entire body  · exoriations with open small macular wounds, no pustules noted  · Daughter had similar rash which resolved, daughter did give patient lice treatment at home  · Scalp has multiple plaques but does not appear overly consistent with psoriasis  · Skin lesions appear most consistent with scabies  · Will treat with permethrin 1% to scalp and 5% to body  · Treat with ceftriaxone 1000 mg IV every 24 hours for a possible superimposed cellulitis in addition to a possible COPD exacerbation  · Should follow up with Dermatology  · Wound care consult

## 2022-08-14 NOTE — ASSESSMENT & PLAN NOTE
Lab Results   Component Value Date    EGFR 49 08/13/2022    EGFR 67 08/03/2021    EGFR 64 11/05/2019    CREATININE 1 08 08/13/2022    CREATININE 0 84 08/03/2021    CREATININE 0 89 11/05/2019     Near baseline kidney function  Avoid nephrotoxins if possible

## 2022-08-14 NOTE — ASSESSMENT & PLAN NOTE
· Long history of cigarette smoking  · Nicotine replacement while inpatient  · Smoking cessation counseling  · The patient is not interested in quitting smoking

## 2022-08-14 NOTE — ASSESSMENT & PLAN NOTE
· Reported as new onset on EKG however pmhx from [facility] shows she has had this for many years  · Currently rate controlled  · Stroke risk score is a 5, highly recommended for [facility], patient had been initiated on coumadin by cardiology in the past but was taken off of this due to history of [address]  · Could consider Eliquis 2 5 mg BID, recommend for discussion of risks vs benefits with cardiology outpatient  · BP is soft and with rates controlled, will hold off on BB/CCB therapy

## 2022-08-14 NOTE — ASSESSMENT & PLAN NOTE
Lab Results   Component Value Date    HGBA1C 7 1 (H) 08/03/2021       Recent Labs     08/13/22  2152   POCGLU 260*       Blood Sugar Average: Last 72 hrs:  (P) 260     · A1c near goal  · Takes janumet and glipizide at home  · Hold oral hypoglycemics while inpatient  · SSI and ADA diet  · Hypoglycemia protocol

## 2022-08-14 NOTE — ASSESSMENT & PLAN NOTE
Lab Results   Component Value Date    EGFR 56 08/14/2022    EGFR 49 08/13/2022    EGFR 67 08/03/2021    CREATININE 0 97 08/14/2022    CREATININE 1 08 08/13/2022    CREATININE 0 84 08/03/2021     Near baseline kidney function  Avoid nephrotoxins if possible

## 2022-08-14 NOTE — ASSESSMENT & PLAN NOTE
Lab Results   Component Value Date    HGBA1C 7 1 (H) 08/03/2021       Recent Labs     08/13/22  2152 08/14/22  1109   POCGLU 260* 125       Blood Sugar Average: Last 72 hrs:  (P) 192 5     · A1c near goal  · Takes janumet and glipizide at home  · Hold oral hypoglycemics while inpatient  · SSI and ADA diet  · Hypoglycemia protocol

## 2022-08-14 NOTE — ASSESSMENT & PLAN NOTE
· Presented with an oxygen saturation level in the 80's%  · Not on any home supplemental oxygen  · Possible underlying obstructive lung disease/COPD with her significant tobacco use history  · Currently requiring 2 lpm of continuous supplemental oxygen to maintain oxygen saturation levels at 90% and above  · COVID-19 testing is negative    · Treat with methylprednisone 40 mg IV every 12 hours, which will also treat the dermatitis  · Utilize ceftriaxone 1000 mg IV every 24 hours  · Continue Symbicort inhaler  · Follow the procalcitonin level  · Nebulizer treatments via the respiratory protocol  · Incentive spirometry 10 times per hour while awake  · Consult Pulmonology

## 2022-08-15 ENCOUNTER — APPOINTMENT (INPATIENT)
Dept: NON INVASIVE DIAGNOSTICS | Facility: HOSPITAL | Age: 77
DRG: 190 | End: 2022-08-15
Payer: MEDICARE

## 2022-08-15 ENCOUNTER — PATIENT OUTREACH (OUTPATIENT)
Dept: FAMILY MEDICINE CLINIC | Facility: CLINIC | Age: 77
End: 2022-08-15

## 2022-08-15 DIAGNOSIS — Z71.89 ENCOUNTER FOR COUNSELING FOR CARE MANAGEMENT OF PATIENT WITH CHRONIC CONDITIONS AND COMPLEX HEALTH NEEDS USING NURSE-BASED MODEL: Primary | ICD-10-CM

## 2022-08-15 PROBLEM — J96.22 ACUTE ON CHRONIC RESPIRATORY FAILURE WITH HYPOXIA AND HYPERCAPNIA (HCC): Status: ACTIVE | Noted: 2021-10-18

## 2022-08-15 PROBLEM — J96.21 ACUTE ON CHRONIC RESPIRATORY FAILURE WITH HYPOXIA AND HYPERCAPNIA (HCC): Status: ACTIVE | Noted: 2021-10-18

## 2022-08-15 PROBLEM — J44.1 COPD EXACERBATION (HCC): Status: ACTIVE | Noted: 2022-08-15

## 2022-08-15 LAB
GLUCOSE SERPL-MCNC: 141 MG/DL (ref 65–140)
GLUCOSE SERPL-MCNC: 201 MG/DL (ref 65–140)
GLUCOSE SERPL-MCNC: 238 MG/DL (ref 65–140)
GLUCOSE SERPL-MCNC: 254 MG/DL (ref 65–140)
LAAS-AP2: 26.4 CM2
LAAS-AP4: 23.1 CM2
MV E'TISSUE VEL-SEP: 7 CM/S
RIGHT ATRIAL 2D VOLUME: 18 ML
RIGHT ATRIUM AREA SYSTOLE A4C: 9.2 CM2
RIGHT VENTRICLE ID DIMENSION: 2.3 CM
SL CV LEFT ATRIUM LENGTH A2C: 7.7 CM
SL CV LV EF: 60
TR MAX PG: 11 MMHG
TR PEAK VELOCITY: 1.7 M/S
TRICUSPID VALVE PEAK E WAVE VELOCITY: 0.16 M/S
TRICUSPID VALVE PEAK REGURGITATION VELOCITY: 1.68 M/S

## 2022-08-15 PROCEDURE — 93306 TTE W/DOPPLER COMPLETE: CPT | Performed by: INTERNAL MEDICINE

## 2022-08-15 PROCEDURE — 82948 REAGENT STRIP/BLOOD GLUCOSE: CPT

## 2022-08-15 PROCEDURE — 99232 SBSQ HOSP IP/OBS MODERATE 35: CPT | Performed by: FAMILY MEDICINE

## 2022-08-15 PROCEDURE — 97166 OT EVAL MOD COMPLEX 45 MIN: CPT

## 2022-08-15 PROCEDURE — 93306 TTE W/DOPPLER COMPLETE: CPT

## 2022-08-15 PROCEDURE — 99223 1ST HOSP IP/OBS HIGH 75: CPT | Performed by: INTERNAL MEDICINE

## 2022-08-15 PROCEDURE — 97163 PT EVAL HIGH COMPLEX 45 MIN: CPT

## 2022-08-15 RX ADMIN — ENOXAPARIN SODIUM 40 MG: 40 INJECTION SUBCUTANEOUS at 09:28

## 2022-08-15 RX ADMIN — METHYLPREDNISOLONE SODIUM SUCCINATE 40 MG: 40 INJECTION, POWDER, FOR SOLUTION INTRAMUSCULAR; INTRAVENOUS at 09:28

## 2022-08-15 RX ADMIN — LISINOPRIL 20 MG: 20 TABLET ORAL at 09:28

## 2022-08-15 RX ADMIN — BUDESONIDE AND FORMOTEROL FUMARATE DIHYDRATE 2 PUFF: 80; 4.5 AEROSOL RESPIRATORY (INHALATION) at 09:33

## 2022-08-15 RX ADMIN — PRAVASTATIN SODIUM 80 MG: 40 TABLET ORAL at 17:13

## 2022-08-15 RX ADMIN — SERTRALINE 25 MG: 25 TABLET, FILM COATED ORAL at 09:27

## 2022-08-15 RX ADMIN — METHYLPREDNISOLONE SODIUM SUCCINATE 40 MG: 40 INJECTION, POWDER, FOR SOLUTION INTRAMUSCULAR; INTRAVENOUS at 22:08

## 2022-08-15 RX ADMIN — INSULIN LISPRO 1 UNITS: 100 INJECTION, SOLUTION INTRAVENOUS; SUBCUTANEOUS at 22:08

## 2022-08-15 RX ADMIN — ASPIRIN 81 MG: 81 TABLET, COATED ORAL at 09:27

## 2022-08-15 RX ADMIN — UMECLIDINIUM 1 PUFF: 62.5 AEROSOL, POWDER ORAL at 17:13

## 2022-08-15 RX ADMIN — CEFTRIAXONE 1000 MG: 1 INJECTION, SOLUTION INTRAVENOUS at 22:09

## 2022-08-15 RX ADMIN — INSULIN GLARGINE 5 UNITS: 100 INJECTION, SOLUTION SUBCUTANEOUS at 09:28

## 2022-08-15 RX ADMIN — CHOLECALCIFEROL TAB 25 MCG (1000 UNIT) 2000 UNITS: 25 TAB at 09:27

## 2022-08-15 RX ADMIN — BUDESONIDE AND FORMOTEROL FUMARATE DIHYDRATE 2 PUFF: 80; 4.5 AEROSOL RESPIRATORY (INHALATION) at 17:16

## 2022-08-15 RX ADMIN — INSULIN LISPRO 2 UNITS: 100 INJECTION, SOLUTION INTRAVENOUS; SUBCUTANEOUS at 09:28

## 2022-08-15 RX ADMIN — INSULIN LISPRO 2 UNITS: 100 INJECTION, SOLUTION INTRAVENOUS; SUBCUTANEOUS at 17:13

## 2022-08-15 NOTE — WOUND OSTOMY CARE
Consult Note - Wound   Shalonda Salcedo 65 y o  female MRN: [id number]  Unit/Bed#: 403-01 Encounter: [id number]        History and Present Illness:  65year old female admitted with acute on chronic respiratory failure with hypoxia and hypercapnia  Wound care consulted for generalized rash with excoriations  Patient history significant for tobacco use, a/fib, dermatitis, head lice, DDM2, COPD, and HTN  Assessment Findings:   Patient was not seen in person for assessment  Assessment performed looking at images of rash  Patient has been treated with Promethrin for head lice/scabies to the body on 8/14/2022  Image taken of upper back, appears to be diffuse pink rash  Per RN, rash is over the "entire body"  Patient is documented as continent, minimal assist and can actively turn herself in the bed and feed herself  Skin Care Plan:   1  Skin nourishing cream to the entire skin daily and PRN  2  Elevate heels off of bed with pillows to offload  3  Apply Hydraguard to b/l heels, buttocks and sacrum BID and PRN  4   Turn patient Q2 hours    Wounds:       Reviewed plan of care with primary RN Augustine Barthel  Recommendations written as orders  Wound care team to follow weekly while admitted  Questions or concerns [phone] [facility] Nurse    Nahum ORTEGAN, RN, [facility] [place]

## 2022-08-15 NOTE — CONSULTS
Pulmonary Medicine-Consultation  Viki Howard 65 y o  female MRN: [id number]  Unit/Bed#: 403-01 Encounter: [id number]      Assessment  1  Acute hypoxic respiratory insufficiency, 2 L nasal cannula  2  History of COPD, unknown severity  3  Severe pectus excavatum deformity  4  Multiple pulmonary nodules  5  Type 2 diabetes mellitus  6  Hyperbilirubinemia  7  Hypokalemia/hyponatremia  8  History of intracerebral hemorrhage    Plan:  · Currently on 2 L nasal cannula, suspect acute hypoxic respiratory insufficiency likely related to underlying COPD, also suspect she has chronic hypoxia  · Is a current everyday smoker, has been smoking 1 pack per day for the past 25 years, known underlying obstructive disease severity  · Review of CTA of the chest upon admission does not show any evidence of pulmonary embolism, there to solid pulmonary nodules measuring up to 4 mm in the right upper lobe  · Agree with steroid dosing currently for management of acute COPD exacerbation, continue 40 mg q 12 for today can look to transition to daily by tomorrow  · She is very clear that she does not have any plans to cut down or quit smoking, she says that she will "smoke as soon as she is out of the hospital"   · Has been maintained on Symbicort previously, would add LAMA given likely underlying obstructive lung disease, can plan for discharge with Trelegy  · Will need ambulatory pulse oximetry assessment prior to discharge, suspect she will need to be discharged home on oxygen  · Will need outpatient pulmonary follow-up and outpatient pulmonary function test    Will continue to follow    History of Present Illness   Physician Requesting Consult: Rosie Rodas MD  Reason for Consult / Principal Problem:  Acute hypoxic respiratory insufficiency, COPD exacerbation    HPI: Viki Howard is a 65 y o   female who presented to [address] [place] [address] with complaints of a rash    Penelope Jada has an extensive medical history including atrial fibrillation, COPD, diabetes who initially presented with a 1 month history of a rash  Although she did not complain of shortness of breath upon arrival to the ER on 08/14, she was found to be hypoxic and placed on 2 L nasal cannula with improvement  In terms of her pulmonary history, she does have a history of COPD but does not report taking her Symbicort as prescribed  She underwent a CTA upon arrival to the ER which did not show any evidence of PE  She was given 1 dose of ceftriaxone in the ER as well as Solu-Medrol for her hypoxia and suspected COPD exacerbation  She was admitted to the medical floor, pulmonary is consulted for further recommendations  In terms of pulmonary history, she does have a history of COPD, there are no pulmonary function tests available for review  She has been maintained on 2 L nasal cannula during her admission  She has been started on treatment for COPD exacerbation and continued on her Symbicort  Inpatient consult to Pulmonology  Consult performed by: Caren Tucker MD  Consult ordered by: Deondre Zuñiga DO          Review of Systems   Constitutional: Negative for activity change, chills, fever and unexpected weight change  HENT: Negative for congestion, rhinorrhea and voice change  Respiratory: Negative for cough, chest tightness, shortness of breath and wheezing  Cardiovascular: Negative for chest pain and palpitations  Gastrointestinal: Negative for abdominal distention, constipation, diarrhea, nausea and vomiting  Endocrine: Negative for cold intolerance and heat intolerance  Genitourinary: Negative for dysuria, frequency and urgency  Musculoskeletal: Negative for arthralgias, joint swelling and myalgias  Skin: Negative for color change, pallor and rash  Neurological: Negative for dizziness, weakness and numbness  Psychiatric/Behavioral: Negative for agitation and confusion  The patient is not nervous/anxious          Historical Information   Past Medical History:   Diagnosis Date    Hyperlipidemia     last assessed  8/24/17    Hypertension     last assessed 10/2/14     Past Surgical History:   Procedure Laterality Date    CRANIOTOMY       Social History   Social History     Substance and Sexual Activity   Alcohol Use Never     Social History     Substance and Sexual Activity   Drug Use No     Social History     Tobacco Use   Smoking Status Current Every Day Smoker    Packs/day: 1 00    Types: Cigarettes   Smokeless Tobacco Never Used     E-Cigarette/Vaping    E-Cigarette Use Never User      E-Cigarette/Vaping Substances    Nicotine No     THC No     CBD No     Flavoring No     Other No     Unknown No        Family History: non-contributory    Meds/Allergies   all current active meds have been reviewed    No Known Allergies    Objective   Vitals: Blood pressure 151/76, pulse 86, temperature 98 1 °F (36 7 °C), temperature source Oral, resp  rate 18, height 5' 5" (1 651 m), weight 75 3 kg (166 lb), SpO2 90 %  ,Body mass index is 27 62 kg/m²  Intake/Output Summary (Last 24 hours) at 8/15/2022 0836  Last data filed at 8/14/2022 1717  Gross per 24 hour   Intake 420 ml   Output --   Net 420 ml     Invasive Devices  Report    Peripheral Intravenous Line  Duration           Peripheral IV 08/14/22 Dorsal (posterior); Left Forearm <1 day                Physical Exam  Constitutional:       Appearance: Normal appearance  HENT:      Mouth/Throat:      Mouth: Mucous membranes are moist    Cardiovascular:      Rate and Rhythm: Normal rate and regular rhythm  Pulmonary:      Effort: No respiratory distress  Breath sounds: No wheezing  Comments: Decreased breath sounds at the bases  Abdominal:      General: Abdomen is flat  Palpations: Abdomen is soft  Musculoskeletal:         General: No swelling  Normal range of motion  Cervical back: Normal range of motion and neck supple  Right lower leg: No edema        Left lower leg: No edema  Skin:     General: Skin is warm  Neurological:      General: No focal deficit present  Mental Status: She is alert and oriented to person, place, and time  Psychiatric:         Mood and Affect: Mood normal          Behavior: Behavior normal          Lab Results: I have personally reviewed pertinent lab results  Imaging Studies: I have personally reviewed pertinent reports  and I have personally reviewed pertinent films in PACS     EKG, Pathology, and Other Studies: I have personally reviewed pertinent reports  and I have personally reviewed pertinent films in PACS     Pulmonary Results (PFTs, PSG): I have personally reviewed pertinent reports  and I have personally reviewed pertinent films in PACS     VTE Prophylaxis: Heparin    Code Status: Level 3 - DNAR and DNI    None    Portions of the record may have been created with voice recognition software  Occasional wrong word or "sound a like" substitutions may have occurred due to the inherent limitations of voice recognition software  Read the chart carefully and recognize, using context, where substitutions have occurred

## 2022-08-15 NOTE — PHYSICAL THERAPY NOTE
Physical Therapy Evaluation    Patient Name: Stephanie Salguero    BBMJN'E Date: 8/15/2022     Problem List  Principal Problem:    Acute on chronic respiratory failure with hypoxia and hypercapnia (HCC)  Active Problems:    Tobacco use    Chronic atrial fibrillation ([address] )    Dermatitis    Head lice    Multiple pulmonary nodules    Acute metabolic encephalopathy    History of intracranial hemorrhage    Hyponatremia    Hypokalemia    Hyperbilirubinemia    Type 2 diabetes mellitus with hyperglycemia, without long-term current use of insulin (HCC)    Vitamin D deficiency    COPD exacerbation (HCC)       Past Medical History  Past Medical History:   Diagnosis Date    Hyperlipidemia     last assessed  8/24/17    Hypertension     last assessed 10/2/14        Past Surgical History  Past Surgical History:   Procedure Laterality Date    CRANIOTOMY             08/15/22 1346   PT Last Visit   PT Visit Date 08/15/22   Note Type   Note type Evaluation   Pain Assessment   Pain Assessment Tool 0-10   Pain Score No Pain   Restrictions/Precautions   Weight Bearing Precautions Per Order No   Other Precautions Contact/isolation;Multiple lines;O2;Fall Risk   Home Living   Type of [address] [place] [address] Ave One level;Stairs to enter with rails  ([address])   Bathroom Accessibility Accessible   Additional Comments pt reports using RW at baseline, however then states she does not own a RW   Prior Function   Level of [place] Independent with ADLs and functional mobility   Lives With Alone   Receives Help From Family   ADL Assistance Independent   IADLs Independent   Falls in the last 6 months 1 to 4   Vocational Retired   Comments (-) driving   General   Family/Caregiver Present No   Cognition   Overall Cognitive Status Impaired   Attention Attends with cues to redirect   Orientation Level Oriented to person;Oriented to place;Oriented to time;Disoriented to situation   Following Commands Follows one step commands without difficulty   Subjective   Subjective "I'm a "   RLE Assessment   RLE Assessment WFL  (assessed functionally)   LLE Assessment   LLE Assessment WFL  (assessed functionally)   Bed Mobility   Additional Comments pt seated EOB at start of session; OOB at end of session   Transfers   Sit to Stand 5  Supervision   Additional items Increased time required;Verbal cues   Stand to Sit 5  Supervision   Additional items Increased time required;Verbal cues   Additional Comments RW used   Ambulation/Elevation   Gait pattern Short stride; Foward flexed;Decreased foot clearance; Improper Weight shift   Gait Assistance 5  Supervision   Additional items Verbal cues   Assistive Device Rolling walker   Distance 50'   Balance   Static Sitting Good   Dynamic Sitting Good   Static Standing Fair +   Dynamic Standing [address]  (with RW)   Endurance Deficit   Endurance Deficit Yes   Activity Tolerance   Activity Tolerance Patient limited by fatigue   Assessment   Prognosis Good   Problem List Decreased strength;Decreased endurance; Impaired balance;Decreased mobility; Decreased safety awareness   Assessment Patient is a 65 y o  female evaluated by Physical Therapy s/p admit to [address] [facility] on 8/13/2022 with admitting diagnosis of: Dermatitis, Rash, Hypoxia, Pulmonary nodules, New onset a-fib, COPD with acute exacerbation, and principal problem of: Acute on chronic respiratory failure with hypoxia and hypercapnia  PT was consulted to assess patient's functional mobility and discharge needs  Ordered are PT Evaluation and treatment with activity level of: up with assistance  Comorbidities affecting patient's physical performance at time of assessment include: HTN, HLD, hx of craniotomy  Personal factors affecting the patient at time of IE include: ambulating with assistive device, step(s) to enter home, inability to navigate community distances and limited insight into impairments  Please locate objective findings from PT assessment regarding body systems outlined above  Upon evaluation, pt able to perform all functional mobility with SUP, RW, and increased time  Moderate verbal cuing provided for safety awareness and direction  Pt able to ambulate 50' before taking seated rest break; no true LOB experienced  SpO2 and HR remained WFL on 3L O2 throughout  Pt requesting use of RW for ambulation and states that's what she uses, however upon further questioning pt reports she does not have one at home  Recommend pt be given RW prior to d/c  The patient's AM-PAC Basic Mobility Inpatient Short Form Raw Score is 22  A Raw score of greater than 16 suggests the patient may benefit from discharge to home  Please also refer to the recommendation of the Physical Therapist for safe discharge planning  Co treatment with OT secondary to complex medical condition of pt, possible A of 2 required to achieve and maintain transitional movements, requiring the need of skilled therapeutic intervention of 2 therapists to achieve delivery of services  Pt will benefit from continued PT intervention during LOS to address current deficits, increase LOF, and facilitate safe d/c to next level of care when medically appropriate  D/c recommendation at this time is home with home health rehabilitation  Goals   Patient Goals to go home   LTG Expiration Date 08/29/22   Long Term Goal #1 Pt will participate in B LE strengthening exercises to facilitate improved functional activity tolerance  Pt will perform all functional transfers and bed mobility mod(I) with good safety awareness  Pt will ambulate 250' mod(I) with LRAD while maintaining good functional dynamic balance  Pt will ascend/descend 3 stairs with HR and SUP to reflect the ability to safely navigate the home  Plan   Treatment/Interventions Functional transfer training;LE strengthening/ROM; Elevations; Therapeutic exercise; Endurance training;Bed mobility;Gait training   Recommendation   PT Discharge Recommendation Home with home health rehabilitation   [place] [facility]   [address]   Turning in Bed Without Bedrails 4   Lying on Back to Sitting on Edge of Flat Bed 4   Moving Bed to Chair 4   Standing Up From Chair 4   Walk in Room 3   Climb 3-5 Stairs 3   Basic Mobility Inpatient Raw Score 22   Basic Mobility Standardized Score 47 4   Highest Level Of Mobility   JH-HLM Goal 7: Walk 25 feet or more   JH-HLM Achieved 7: Walk 25 feet or more   End of Consult   Patient Position at End of Consult Bedside chair;Bed/Chair alarm activated; All needs within reach

## 2022-08-15 NOTE — PLAN OF CARE
Problem: Potential for Falls  Goal: Patient will remain free of falls  Description: INTERVENTIONS:  - Educate patient/family on patient safety including physical limitations  - Instruct patient to call for assistance with activity   - Consult OT/PT to assist with strengthening/mobility   - Keep Call bell within reach  - Keep bed low and locked with side rails adjusted as appropriate  - Keep care items and personal belongings within reach  - Initiate and maintain comfort rounds  - Make Fall Risk Sign visible to staff  - Offer Toileting every 1-2 Hours, in advance of need  - Initiate/Maintain bed and chair alarm  - Obtain necessary fall risk management equipment: fall socks and call bell within reach  - Apply yellow socks and bracelet for high fall risk patients  - Consider moving patient to room near nurses station  Outcome: Progressing     Problem: SAFETY ADULT  Goal: Patient will remain free of falls  Description: INTERVENTIONS:  - Educate patient/family on patient safety including physical limitations  - Instruct patient to call for assistance with activity   - Consult OT/PT to assist with strengthening/mobility   - Keep Call bell within reach  - Keep bed low and locked with side rails adjusted as appropriate  - Keep care items and personal belongings within reach  - Initiate and maintain comfort rounds  - Make Fall Risk Sign visible to staff  - Offer Toileting every 1-2 Hours, in advance of need  - Initiate/Maintain bed and chair alarm  - Obtain necessary fall risk management equipment: fall socks and call bell within reach  - Apply yellow socks and bracelet for high fall risk patients  - Consider moving patient to room near nurses station  Outcome: Progressing     Problem: DISCHARGE PLANNING  Goal: Discharge to home or other facility with appropriate resources  Description: INTERVENTIONS:  - Identify barriers to discharge w/patient and caregiver  - Arrange for needed discharge resources and transportation as appropriate  - Identify discharge learning needs (meds, wound care, etc )  - Arrange for interpretive services to assist at discharge as needed  - Refer to Case Management Department for coordinating discharge planning if the patient needs post-hospital services based on physician/advanced practitioner order or complex needs related to functional status, cognitive ability, or social support system  Outcome: Progressing     Problem: RESPIRATORY - ADULT  Goal: Achieves optimal ventilation and oxygenation  Description: INTERVENTIONS:  - Assess for changes in respiratory status  - Assess for changes in mentation and behavior  - Position to facilitate oxygenation and minimize respiratory effort  - Oxygen administered by appropriate delivery if ordered  - Initiate smoking cessation education as indicated  - Encourage broncho-pulmonary hygiene including cough, deep breathe, Incentive Spirometry  - Assess the need for suctioning and aspirate as needed  - Assess and instruct to report SOB or any respiratory difficulty  - Respiratory Therapy support as indicated  Outcome: Progressing     Problem: MOBILITY - ADULT  Goal: Maintain or return to baseline ADL function  Description: INTERVENTIONS:  -  Assess patient's ability to carry out ADLs; assess patient's baseline for ADL function and identify physical deficits which impact ability to perform ADLs (bathing, care of mouth/teeth, toileting, grooming, dressing, etc )  - Assess/evaluate cause of self-care deficits   - Assess range of motion  - Assess patient's mobility; develop plan if impaired  - Assess patient's need for assistive devices and provide as appropriate  - Encourage maximum independence but intervene and supervise when necessary  - Involve family in performance of ADLs  - Assess for home care needs following discharge   - Consider OT consult to assist with ADL evaluation and planning for discharge  - Provide patient education as appropriate  Outcome: Progressing

## 2022-08-15 NOTE — ASSESSMENT & PLAN NOTE
· Reported as new onset on EKG however pmhx from [facility] shows she has had this for many years  · Currently rate controlled without any AV-mariano blocking agents  · Stroke risk score is a 5, highly recommended for [facility], patient had been initiated on coumadin by Cardiology in the past but was taken off of this due to history of [address]  · Could consider Eliquis 2 5 mg BID, recommend for discussion of risks vs benefits with Cardiology outpatient  · For now will continue aspirin 81 mg daily

## 2022-08-15 NOTE — PROGRESS NOTES
[address] [facility]  Progress Note - Jose Ritchie 1945, 65 y o  female MRN: [id number]  Unit/Bed#: 403-01 Encounter: [id number]  Primary Care Provider: Berry Lane DO   Date and time admitted to hospital: 8/13/2022  3:59 PM    * Acute on chronic respiratory failure with hypoxia and hypercapnia (HCC)  Assessment & Plan  · Presented with an oxygen saturation level in the 80's%  · Not on any home supplemental oxygen  · Possible underlying obstructive lung disease/COPD with her significant tobacco use history  · Currently requiring 2 lpm of continuous supplemental oxygen to maintain oxygen saturation levels at 90% and above  · COVID-19 testing is negative  · Treat with methylprednisone 40 mg IV every 12 hours, which will also treat the dermatitis  · Utilize ceftriaxone 1000 mg IV every 24 hours  · Continue Symbicort inhaler  · Follow the procalcitonin level  · Nebulizer treatments via the respiratory protocol  · Incentive spirometry 10 times per hour while awake  · Consult Pulmonology  · Suspect underlying chronic hypoxia and will need oxygen requirement study, may require oxygen on discharge    COPD exacerbation [facility])  Assessment & Plan  Patient presents with respiratory failure  Suspected COPD exacerbation, will need formal diagnosis upon discharge  Continue IV steroids, nebulizer treatments  Pulmonology consult, appreciate input  Will need to establish outpatient follow-up  Smoking cessation counseling    Vitamin D deficiency  Assessment & Plan  · Treat with cholecalciferol 2000 I  U  PO Qdaily    Type 2 diabetes mellitus with hyperglycemia, without long-term current use of insulin [facility])  Assessment & Plan  Lab Results   Component Value Date    HGBA1C 7 1 (H) 08/03/2021       Recent Labs     08/14/22  1109 08/14/22  1619 08/14/22  2140 08/15/22  0719   POCGLU 125 117 279* 254*       Blood Sugar Average: Last 72 hrs:  (P) 207     · Check a new hemoglobin A1c level  · Utilize lantus 5 units SQ Qdaily  · Insulin sliding scale with blood glucose monitoring ACHS  · Continue lisinopril for renal protection   Outpatient follow-up with PCP in regards to this matter    History of intracranial hemorrhage  Assessment & Plan  · History of intracranial hemorrhage  · No acute intracranial abnormality on CT scan of the head without contrast on 08/13/2022  · Anticoagulation has been on hold, will need follow-up with Neurosurgery to evaluate if anticoagulation can be resumed    Acute metabolic encephalopathy  Assessment & Plan  · Presented in the emergency department with confusion  · Likely due to hypoxia  · Her mental status has improved now that the hypoxia has resolved  Multiple pulmonary nodules  Assessment & Plan  · Need outpatient surveillance with Pulmonology and with PCP    CTA of the chest/PE protocol (08/13/2022): IMPRESSION:     There is no pulmonary embolism      There are 2 solid pulmonary nodules, larger 4 mm in the right upper lobe  Based on current Fleischner Society 2017 Guidelines on incidental pulmonary nodule, optional follow-up CT at 12 months can be considered      Head lice  Assessment & Plan  · Utilize permethrin shampoo    Dermatitis  Assessment & Plan  · Month long history of skin rash with itching  · Began in scalp and traveled downward, covers entire body  · exoriations with open small macular wounds, no pustules noted  · Daughter had similar rash which resolved, daughter did give patient lice treatment at home  · Scalp has multiple plaques but does not appear overly consistent with psoriasis  · Skin lesions appear most consistent with scabies  · Will treat with permethrin 1% to scalp and 5% to body  · Treat with ceftriaxone 1000 mg IV every 24 hours for a possible superimposed cellulitis in addition to a possible COPD exacerbation  · Should follow up with Dermatology  · Wound care consult    Chronic atrial fibrillation ([address] )  Assessment & Plan  · Reported as new onset on EKG however pmhx from [facility] shows she has had this for many years  · Currently rate controlled without any AV-mariano blocking agents  · Stroke risk score is a 5, highly recommended for [facility], patient had been initiated on coumadin by Cardiology in the past but was taken off of this due to history of ICH  · Could consider Eliquis 2 5 mg BID, recommend for discussion of risks vs benefits with Cardiology outpatient  · For now will continue aspirin 81 mg daily      Tobacco use  Assessment & Plan  · Long history of cigarette smoking  · Nicotine replacement while inpatient  · Smoking cessation counseling  · The patient is not interested in quitting smoking  VTE Pharmacologic Prophylaxis: VTE Score: 6 High Risk (Score >/= 5) - Pharmacological DVT Prophylaxis Ordered: enoxaparin (Lovenox)  Sequential Compression Devices Ordered  Patient Centered Rounds: I performed bedside rounds with nursing staff today  Discussions with Specialists or Other Care Team Provider:  Multidisciplinary meeting    Education and Discussions with Family / Patient: Updated  (daughter) via phone  Time Spent for Care: 30 minutes  More than 50% of total time spent on counseling and coordination of care as described above  Current Length of Stay: 2 day(s)  Current Patient Status: Inpatient   Certification Statement: The patient will continue to require additional inpatient hospital stay due to close monitoring, IV steroids   Discharge Plan: Anticipate discharge in 24-48 hrs to home with home services  Code Status: Level 3 - DNAR and DNI    Subjective:   Patient seen and examined  She did not offer significant complaints  She is noted have low oxygen saturations and is placed back to nasal cannula  She denies shortness of breath      Objective:     Vitals:   Temp (24hrs), Av 1 °F (36 7 °C), Min:98 °F (36 7 °C), Max:98 3 °F (36 8 °C)    Temp:  [98 °F (36 7 °C)-98 3 °F (36 8 °C)] 98 1 °F (36 7 °C)  HR:  [77-86] 86  Resp:  [17-18] 18  BP: (149-151)/(71-77) 151/76  SpO2:  [81 %-94 %] 90 %  Body mass index is 27 62 kg/m²  Input and Output Summary (last 24 hours): Intake/Output Summary (Last 24 hours) at 8/15/2022 1140  Last data filed at 8/15/2022 0842  Gross per 24 hour   Intake 540 ml   Output --   Net 540 ml       Physical Exam:   Physical Exam  Constitutional:       General: She is not in acute distress  HENT:      Head: Normocephalic and atraumatic  Nose: No congestion  Mouth/Throat:      Pharynx: Oropharynx is clear  Eyes:      Conjunctiva/sclera: Conjunctivae normal    Cardiovascular:      Rate and Rhythm: Normal rate and regular rhythm  Heart sounds: No murmur heard  Pulmonary:      Effort: No respiratory distress  Breath sounds: Decreased air movement present  Wheezing present  Abdominal:      General: There is no distension  Tenderness: There is no abdominal tenderness  There is no guarding  Musculoskeletal:      Right lower leg: No edema  Left lower leg: No edema  Skin:     Comments: Diffuse erythematous rash chest and back coalescing maculopapular    Neurological:      Mental Status: Mental status is at baseline     Psychiatric:         Mood and Affect: Mood normal           Additional Data:     Labs:  Results from last 7 days   Lab Units 08/14/22  0436 08/13/22  1630   WBC Thousand/uL 8 08 13 01*   HEMOGLOBIN g/dL 16 6* 17 3*   HEMATOCRIT % 50 1* 52 2*   PLATELETS Thousands/uL 213 248   NEUTROS PCT %  --  79*   LYMPHS PCT %  --  10*   MONOS PCT %  --  7   EOS PCT %  --  3     Results from last 7 days   Lab Units 08/14/22  0436   SODIUM mmol/L 134*   POTASSIUM mmol/L 4 0   CHLORIDE mmol/L 92*   CO2 mmol/L 34*   BUN mg/dL 12   CREATININE mg/dL 0 97   ANION GAP mmol/L 8   CALCIUM mg/dL 9 0   ALBUMIN g/dL 3 4*   TOTAL BILIRUBIN mg/dL 0 61   ALK PHOS U/L 69   ALT U/L 9*   AST U/L 8   GLUCOSE RANDOM mg/dL 261*     Results from last 7 days   Lab Units 08/13/22  1630   INR  0 97     Results from last 7 days   Lab Units 08/15/22  0719 08/14/22  2140 08/14/22  1619 08/14/22  1109 08/13/22  2152   POC GLUCOSE mg/dl 254* 279* 117 125 260*         Results from last 7 days   Lab Units 08/13/22  1630   LACTIC ACID mmol/L 1 6       Lines/Drains:  Invasive Devices  Report    Peripheral Intravenous Line  Duration           Peripheral IV 08/14/22 Dorsal (posterior); Left Forearm <1 day                      Imaging: No pertinent imaging reviewed  Recent Cultures (last 7 days):   Results from last 7 days   Lab Units 08/13/22  1631 08/13/22  1630   BLOOD CULTURE  No Growth at 24 hrs  No Growth at 24 hrs         Last 24 Hours Medication List:   Current Facility-Administered Medications   Medication Dose Route Frequency Provider Last Rate    acetaminophen  650 mg Oral Q6H PRN Craige Reagin, PA-C      albuterol  2 5 mg Nebulization Q6H PRN [facility], DO      aspirin  81 mg Oral Daily Craige Reagin, PA-C      budesonide-formoterol  2 puff Inhalation BID Octavioige Reagin, PA-C      calcium carbonate  1,000 mg Oral Daily PRN Craige Reagin, PA-C      cefTRIAXone  1,000 mg Intravenous Q24H [facility], DO 1,000 mg (08/14/22 2209)    cholecalciferol  2,000 Units Oral Daily [facility], DO      enoxaparin  40 mg Subcutaneous Q24H [address] Octavioige Reagin, PA-C      hydrOXYzine HCL  10 mg Oral Q6H PRN Craige Reagin, PA-C      insulin glargine  5 Units Subcutaneous QAM [facility], DO      insulin lispro  1-5 Units Subcutaneous TID AC Octavioige Reagin, PA-C      insulin lispro  1-5 Units Subcutaneous HS Jacqui Reagin, PA-C      lisinopril  20 mg Oral Daily [facility], DO      methylPREDNISolone sodium succinate  40 mg Intravenous Q12H [address] [facility],       nicotine  1 patch Transdermal Daily Craige Reagin, PA-C      ondansetron  4 mg Intravenous Q6H PRN Craige Reagin, PA-C      polyethylene glycol  17 g Oral Daily PRN Octavioige Reagin, PA-C      pravastatin  80 mg Oral Daily With [employer] Angelia Conroy PA-C      sertraline  25 mg Oral Daily Alex Timmons PA-C          Today, Patient Was Seen By: Sarah Mora MD    **Please Note: This note may have been constructed using a voice recognition system  **

## 2022-08-15 NOTE — PROGRESS NOTES
Outpatient Care Management Note: In basket referral for complex care management received from IP CM  Chart review completed  Will outreach after discharge

## 2022-08-15 NOTE — ASSESSMENT & PLAN NOTE
· Presented with an oxygen saturation level in the 80's%  · Not on any home supplemental oxygen  · Possible underlying obstructive lung disease/COPD with her significant tobacco use history  · Currently requiring 2 lpm of continuous supplemental oxygen to maintain oxygen saturation levels at 90% and above  · COVID-19 testing is negative    · Treat with methylprednisone 40 mg IV every 12 hours, which will also treat the dermatitis  · Utilize ceftriaxone 1000 mg IV every 24 hours  · Continue Symbicort inhaler  · Follow the procalcitonin level  · Nebulizer treatments via the respiratory protocol  · Incentive spirometry 10 times per hour while awake  · Consult Pulmonology  · Suspect underlying chronic hypoxia and will need oxygen requirement study, may require oxygen on discharge

## 2022-08-15 NOTE — PLAN OF CARE
Problem: OCCUPATIONAL THERAPY ADULT  Goal: Performs self-care activities at highest level of function for planned discharge setting  See evaluation for individualized goals  Description: Treatment Interventions: ADL retraining, Functional transfer training, UE strengthening/ROM, Endurance training, Patient/family training, Equipment evaluation/education, Activityengagement, Cognitive reorientation          See flowsheet documentation for full assessment, interventions and recommendations  Note: Limitation: Decreased ADL status, Decreased UE strength, Decreased Safe judgement during ADL, Decreased endurance, Decreased high-level ADLs, Decreased self-care trans     Assessment: Pt is a 65 y o  female seen for OT evaluation s/p admit to [facility] on 8/13/2022 w/ Acute on chronic respiratory failure with hypoxia and hypercapnia ([facility] [address] )  Comorbidities affecting pt's functional performance at time of assessment include: HTN, hyperlipidemia, hypoxia and hypercapnia, COPD, DM 2, intracranial hemorrhage, metabolic encephalopathy, head lice, dermatitis  Personal factors affecting pt at time of IE include:steps to enter environment, limited home support, behavioral pattern, difficulty performing ADLS, difficulty performing IADLS , limited insight into deficits, compliance, decreased initiation and engagement  and health management   Prior to admission, pt was (I) with ADLs and (A) with IADLs with use of no device  Upon evaluation: Pt requires (S) level with use of RW during mobility 2* the following deficits impacting occupational performance: weakness, decreased strength, decreased balance, decreased tolerance, impaired initiation, impaired problem solving, decreased safety awareness and impaired interpersonal skills  Pt to benefit from continued skilled OT tx while in the hospital to address deficits as defined above and maximize level of functional independence w ADL's and functional mobility   Occupational Performance areas to address include: grooming, bathing/shower, toilet hygiene, dressing, functional mobility, community mobility and clothing management  The patient's raw score on the AM-PAC Daily Activity inpatient short form is 21, standardized score is 44 27, greater than 39 4  Patients at this level are likely to benefit from discharge to home  Please refer to the recommendation of the Occupational Therapist for safe discharge planning  Co treatment with PT secondary to complex medical condition of pt, possible A of 2 required to achieve and maintain transitional movements, requiring the need of skilled therapeutic intervention of 2 therapists to achieve delivery of services       OT Discharge Recommendation: Home with home health rehabilitation

## 2022-08-15 NOTE — ASSESSMENT & PLAN NOTE
· History of intracranial hemorrhage  · No acute intracranial abnormality on CT scan of the head without contrast on 08/13/2022  · Anticoagulation has been on hold, will need follow-up with Neurosurgery to evaluate if anticoagulation can be resumed

## 2022-08-15 NOTE — ASSESSMENT & PLAN NOTE
Patient presents with respiratory failure  Suspected COPD exacerbation, will need formal diagnosis upon discharge  Continue IV steroids, nebulizer treatments  Pulmonology consult, appreciate input  Will need to establish outpatient follow-up  Smoking cessation counseling

## 2022-08-15 NOTE — PLAN OF CARE
Problem: Potential for Falls  Goal: Patient will remain free of falls  Description: INTERVENTIONS:  - Educate patient/family on patient safety including physical limitations  - Instruct patient to call for assistance with activity (standby assist and walker)  - Consult OT/PT to assist with strengthening/mobility   - Keep Call bell within reach  - Keep bed low and locked with side rails adjusted as appropriate  - Keep care items and personal belongings within reach  - Initiate and maintain comfort rounds  - Make Fall Risk Sign visible to staff (high fall risk)  - Offer Toileting every 1-2 Hours, in advance of need  - Initiate/Maintain bed/chair alarm  - Obtain necessary fall risk management equipment: nonskid footwear, call bell within reach  - Apply yellow socks and bracelet for high fall risk patients  - Consider moving patient to room near nurses station  Outcome: Progressing     Problem: PAIN - ADULT  Goal: Verbalizes/displays adequate comfort level or baseline comfort level  Description: Interventions:  - Encourage patient to monitor pain and request assistance  - Assess pain using appropriate pain scale (0-10 pain scale)  - Administer analgesics based on type and severity of pain and evaluate response  - Implement non-pharmacological measures as appropriate and evaluate response  - Consider cultural and social influences on pain and pain management  - Notify physician/advanced practitioner if interventions unsuccessful or patient reports new pain  Outcome: Progressing     Problem: INFECTION - ADULT  Goal: Absence or prevention of progression during hospitalization  Description: INTERVENTIONS:  - Assess and monitor for signs and symptoms of infection  - Monitor lab/diagnostic results  - Monitor all insertion sites, i e  indwelling lines  - Administer medications as ordered  - Instruct and encourage patient and family to use good hand hygiene technique  - Identify and instruct in appropriate isolation precautions for identified infection/condition (strict contact precautions)  Outcome: Progressing     Problem: SAFETY ADULT  Goal: Patient will remain free of falls  Description: INTERVENTIONS:  - Educate patient/family on patient safety including physical limitations  - Instruct patient to call for assistance with activity (standby assist and walker)  - Consult OT/PT to assist with strengthening/mobility   - Keep Call bell within reach  - Keep bed low and locked with side rails adjusted as appropriate  - Keep care items and personal belongings within reach  - Initiate and maintain comfort rounds  - Make Fall Risk Sign visible to staff (high fall risk)  - Offer Toileting every 1-2 Hours, in advance of need  - Initiate/Maintain bed/chair alarm  - Obtain necessary fall risk management equipment: nonskid footwear, call bell within reach  - Apply yellow socks and bracelet for high fall risk patients  - Consider moving patient to room near nurses station  Outcome: Progressing  Goal: Maintain or return to baseline ADL function  Description: INTERVENTIONS:  - Educate patient/family on patient safety including physical limitations  - Instruct patient to call for assistance with activity (standby assist and walker)  - Consult OT/PT to assist with strengthening/mobility   - Keep Call bell within reach  - Keep bed low and locked with side rails adjusted as appropriate  - Keep care items and personal belongings within reach  - Initiate and maintain comfort rounds  - Make Fall Risk Sign visible to staff (high fall risk)  - Offer Toileting every 1-2 Hours, in advance of need  - Initiate/Maintain bed/chair alarm  - Obtain necessary fall risk management equipment: nonskid footwear, call bell within reach  - Apply yellow socks and bracelet for high fall risk patients  - Consider moving patient to room near nurses station  Outcome: Progressing  Goal: Maintains/Returns to pre admission functional level  Description: INTERVENTIONS:  - Assess patient's ability to carry out ADLs; (min assist)  - Assess/evaluate cause of self-care deficits (fatigue, forgetfulness, oxygen)  - Assess range of motion  - Assess patient's mobility; (standby assist and walker)  - Assess patient's need for assistive devices and provide as appropriate  - Encourage maximum independence but intervene and supervise when necessary  - Involve family in performance of ADLs  - Assess for home care needs following discharge   - Consider OT consult to assist with ADL evaluation and planning for discharge  - Provide patient education as appropriate  Outcome: Progressing     Problem: DISCHARGE PLANNING  Goal: Discharge to home or other facility with appropriate resources  Description: INTERVENTIONS:  - Identify barriers to discharge w/patient and caregiver  - Arrange for needed discharge resources and transportation as appropriate  - Identify discharge learning needs (meds, wound care, etc )  - Refer to Case Management Department for coordinating discharge planning if the patient needs post-hospital services based on physician/advanced practitioner order or complex needs related to functional status, cognitive ability, or social support system  Outcome: Progressing     Problem: Knowledge Deficit  Goal: Patient/family/caregiver demonstrates understanding of disease process, treatment plan, medications, and discharge instructions  Description: Complete learning assessment and assess knowledge base    Interventions:  - Provide teaching at level of understanding  - Provide teaching via preferred learning methods  Outcome: Progressing     Problem: RESPIRATORY - ADULT  Goal: Achieves optimal ventilation and oxygenation  Description: INTERVENTIONS:  - Assess for changes in respiratory status  - Assess for changes in mentation and behavior  - Position to facilitate oxygenation and minimize respiratory effort  - Oxygen administered by appropriate delivery if ordered  - Initiate smoking cessation education (cessation materials provided)  - Encourage broncho-pulmonary hygiene including cough, deep breathe, Incentive Spirometry  - Assess and instruct to report SOB or any respiratory difficulty  - Respiratory Therapy support as indicated  Outcome: Progressing     Problem: METABOLIC, FLUID AND ELECTROLYTES - ADULT  Goal: Electrolytes maintained within normal limits  Description: INTERVENTIONS:  - Monitor labs and assess patient for signs and symptoms of electrolyte imbalances  - Administer electrolyte replacement as ordered  - Monitor response to electrolyte replacements, including repeat lab results as appropriate  - Instruct patient on fluid and nutrition as appropriate  Outcome: Progressing  Goal: Fluid balance maintained  Description: INTERVENTIONS:  - Monitor labs   - Monitor I/O and WT  - Instruct patient on fluid and nutrition as appropriate  - Assess for signs & symptoms of volume excess or deficit  Outcome: Progressing  Goal: Glucose maintained within target range  Description: INTERVENTIONS:  - Monitor Blood Glucose as ordered  - Assess for signs and symptoms of hyperglycemia and hypoglycemia  - Administer ordered medications to maintain glucose within target range  - Assess nutritional intake and initiate nutrition service referral as needed  Outcome: Progressing     Problem: MOBILITY - ADULT  Goal: Maintain or return to baseline ADL function  Description: INTERVENTIONS:  - Educate patient/family on patient safety including physical limitations  - Instruct patient to call for assistance with activity (standby assist and walker)  - Consult OT/PT to assist with strengthening/mobility   - Keep Call bell within reach  - Keep bed low and locked with side rails adjusted as appropriate  - Keep care items and personal belongings within reach  - Initiate and maintain comfort rounds  - Make Fall Risk Sign visible to staff (high fall risk)  - Offer Toileting every 1-2 Hours, in advance of need  - Initiate/Maintain bed/chair alarm  - Obtain necessary fall risk management equipment: nonskid footwear, call bell within reach  - Apply yellow socks and bracelet for high fall risk patients  - Consider moving patient to room near nurses station  Outcome: Progressing  Goal: Maintains/Returns to pre admission functional level  Description: INTERVENTIONS:  -  Assess patient's ability to carry out ADLs; (min assist)  - Assess/evaluate cause of self-care deficits (fatigue, forgetfulness, oxygen)  - Assess range of motion  - Assess patient's mobility; (standby assist and walker)  - Assess patient's need for assistive devices and provide as appropriate  - Encourage maximum independence but intervene and supervise when necessary  - Involve family in performance of ADLs  - Assess for home care needs following discharge   - Consider OT consult to assist with ADL evaluation and planning for discharge  - Provide patient education as appropriate  Outcome: Progressing     Problem: SKIN/TISSUE INTEGRITY - ADULT  Goal: Skin Integrity remains intact(Skin Breakdown Prevention)  Description: Assess:  -Perform Steven assessment every shift  -Clean and moisturize skin every shift  -Inspect skin when repositioning, toileting, and assisting with ADLS  -Assess under medical devices   -Assess extremities for adequate circulation and sensation     Bed Management:  -Have minimal linens on bed & keep smooth, unwrinkled  -Change linens as needed when moist or perspiring  -Avoid sitting or lying in one position for more than 1-2 hours while in bed    Toileting:  -Offer bedside commode  -Assess for incontinence every 1-2 hours and prn  -Use incontinent care products after each incontinent episode     Activity:  -Mobilize patient 3-4 times a day  -Encourage activity and walks on unit  -Encourage or provide ROM exercises   -Turn and reposition patient every 2 Hours  -Use appropriate equipment to lift or move patient in bed  -Instruct/ Assist with weight shifting every 2 hours when out of bed in chair  -Consider limitation of chair time 4 hour intervals    Skin Care:  -Avoid use of baby powder, tape, friction and shearing, hot water or constrictive clothing  -Relieve pressure over bony prominences   -Do not massage red bony areas    Next Steps:  -Teach patient strategies to minimize risks    -Consider consults to  interdisciplinary teams   Outcome: Progressing

## 2022-08-15 NOTE — ASSESSMENT & PLAN NOTE
Lab Results   Component Value Date    HGBA1C 7 1 (H) 08/03/2021       Recent Labs     08/14/22  1109 08/14/22  1619 08/14/22  2140 08/15/22  0719   POCGLU 125 117 279* 254*       Blood Sugar Average: Last 72 hrs:  (P) 207     · Check a new hemoglobin A1c level  · Utilize lantus 5 units SQ Qdaily  · Insulin sliding scale with blood glucose monitoring ACHS  · Continue lisinopril for renal protection   Outpatient follow-up with PCP in regards to this matter

## 2022-08-15 NOTE — OCCUPATIONAL THERAPY NOTE
Occupational Therapy Evaluation     Patient Name: Lesa Curry Date: 8/15/2022  Problem List  Principal Problem:    Acute on chronic respiratory failure with hypoxia and hypercapnia (HCC)  Active Problems:    Tobacco use    Chronic atrial fibrillation (HCC)    Dermatitis    Head lice    Multiple pulmonary nodules    Acute metabolic encephalopathy    History of intracranial hemorrhage    Hyponatremia    Hypokalemia    Hyperbilirubinemia    Type 2 diabetes mellitus with hyperglycemia, without long-term current use of insulin (HCC)    Vitamin D deficiency    COPD exacerbation ([facility] )    Past Medical History  Past Medical History:   Diagnosis Date    Hyperlipidemia     last assessed  8/24/17    Hypertension     last assessed 10/2/14     Past Surgical History  Past Surgical History:   Procedure Laterality Date    CRANIOTOMY             08/15/22 1345   OT Last Visit   OT Visit Date 08/15/22   Note Type   Note type Evaluation   Restrictions/Precautions   Weight Bearing Precautions Per Order No   Other Precautions Contact/isolation; Chair Alarm; Fall Risk;O2   Pain Assessment   Pain Score No Pain   Home Living   Type of [address] [place] [address] Av One level;Performs ADLs on one level; Able to live on main level with bedroom/bathroom;Stairs to enter with rails; Other (Comment)  (3 CLARK c HR)   Bathroom Accessibility Accessible   Home Equipment Other (Comment)  (pt reports RW however then states she does not have RW)   Additional Comments pt is poor historian   Prior Function   Level of [place] Independent with ADLs and functional mobility   Lives With Alone   ADL Assistance Independent   IADLs Independent   Falls in the last 6 months 1 to 4   Vocational Retired   Comments pt does not drive   Psychosocial   Psychosocial (WDL) WDL   Subjective   Subjective "are we ready to go? ADL   Where Assessed Edge of bed   LB Dressing Assistance 5  Supervision/Setup   LB Dressing Deficit Don/doff R sock; Don/doff L sock   Bed Mobility   Additional Comments pt seated EOB at start of session and in chair at end of session; 3L O2 during session and SpO2 >90% throughout session   Transfers   Sit to Stand 5  Supervision   Additional items Impulsive;Verbal cues  (RW)   Stand to Sit 5  Supervision   Additional items Impulsive;Verbal cues  (RW)   Additional Comments pt performs functional transfers with (S) and use of RW; no significant LOB, however mild instability requiring cues for safety   Functional Mobility   Functional Mobility 5  Supervision   Additional Comments pt performs functional mobility in room ~50 ft with mild instability and cues for safety with RW   Additional items Rolling walker   Balance   Static Sitting Good   Dynamic Sitting Good   Static Standing Fair +   Dynamic Standing Fair   Ambulatory Fair   Activity Tolerance   Activity Tolerance Patient limited by fatigue   RUE Assessment   RUE Assessment WFL   LUE Assessment   LUE Assessment WFL   Hand Function   Gross Motor Coordination Functional   Fine Motor Coordination Functional   Sensation   Light Touch No apparent deficits   Sharp/Dull No apparent deficits   Cognition   Overall Cognitive Status Impaired   Arousal/Participation Alert   Attention Within functional limits   Orientation Level Oriented to person;Oriented to place;Oriented to time;Disoriented to situation   Memory Decreased long term memory   Following Commands Follows one step commands without difficulty   Assessment   Limitation Decreased ADL status; Decreased UE strength;Decreased Safe judgement during ADL;Decreased endurance;Decreased high-level ADLs; Decreased self-care trans   Assessment Pt is a 65 y o  female seen for OT evaluation s/p admit to [facility] on 8/13/2022 w/ Acute on chronic respiratory failure with hypoxia and hypercapnia ([facility] )    Comorbidities affecting pt's functional performance at time of assessment include: HTN, hyperlipidemia, hypoxia and hypercapnia, COPD, DM 2, intracranial hemorrhage, metabolic encephalopathy, head lice, dermatitis  Personal factors affecting pt at time of IE include:steps to enter environment, limited home support, behavioral pattern, difficulty performing ADLS, difficulty performing IADLS , limited insight into deficits, compliance, decreased initiation and engagement  and health management   Prior to admission, pt was (I) with ADLs and (A) with IADLs with use of no device  Upon evaluation: Pt requires (S) level with use of RW during mobility 2* the following deficits impacting occupational performance: weakness, decreased strength, decreased balance, decreased tolerance, impaired initiation, impaired problem solving, decreased safety awareness and impaired interpersonal skills  Pt to benefit from continued skilled OT tx while in the hospital to address deficits as defined above and maximize level of functional independence w ADL's and functional mobility  Occupational Performance areas to address include: grooming, bathing/shower, toilet hygiene, dressing, functional mobility, community mobility and clothing management  The patient's raw score on the AM-PAC Daily Activity inpatient short form is 21, standardized score is 44 27, greater than 39 4  Patients at this level are likely to benefit from discharge to home  Please refer to the recommendation of the Occupational Therapist for safe discharge planning  Co treatment with PT secondary to complex medical condition of pt, possible A of 2 required to achieve and maintain transitional movements, requiring the need of skilled therapeutic intervention of 2 therapists to achieve delivery of services     Goals   Patient Goals to go home   Short Term Goal  pt will perform UE strengthening exercises   Long Term Goal #1 pt will demonstrate toilet transfers and hygiene at (I) level   Long Term Goal #2 pt will demonstrate functional mobility with RW at mod (I) level   Long Term Goal pt will demonstrate UB/LB bathing and grooming tasks at (I) level   Plan   Treatment Interventions ADL retraining;Functional transfer training;UE strengthening/ROM; Endurance training;Patient/family training;Equipment evaluation/education; Activityengagement;Cognitive reorientation   Goal Expiration Date 08/29/22   OT Frequency 3-5x/wk   Recommendation   OT Discharge Recommendation Home with home health rehabilitation   AM-PAC Daily Activity Inpatient   Lower Body Dressing 3   Bathing 3   Toileting 3   Upper Body Dressing 4   Grooming 4   Eating 4   Daily Activity Raw Score 21   Daily Activity Standardized Score (Calc for Raw Score >=11) 44 27   AM-PAC Applied Cognition Inpatient   Following a Speech/Presentation 4   Understanding Ordinary Conversation 4   Taking Medications 3   Remembering Where Things Are Placed or Put Away 2   Remembering List of 4-5 Errands 2   Taking Care of Complicated Tasks 2   Applied Cognition Raw Score 17   Applied Cognition Standardized Score 36 52

## 2022-08-15 NOTE — CASE MANAGEMENT
Case Management Assessment & Discharge Planning Note    Patient name Gasper Tobar /[id number] MRN [id number]  : 1945 Date 8/15/2022       Current Admission Date: 2022  Current Admission Diagnosis:Acute on chronic respiratory failure with hypoxia and hypercapnia [facility])   Patient Active Problem List    Diagnosis Date Noted    COPD exacerbation ([facility] )     Head lice     Multiple pulmonary nodules 2022    Acute metabolic encephalopathy     History of intracranial hemorrhage 2022    Hyponatremia 2022    Hypokalemia 2022    Hyperbilirubinemia 2022    Type 2 diabetes mellitus with hyperglycemia, without long-term current use of insulin ([facility] ) 2022    Vitamin D deficiency 2022    [facility] 2022    Dermatitis 2022    Acute on chronic respiratory failure with hypoxia and hypercapnia ([facility] ) 10/18/2021    Ambulatory dysfunction 10/18/2021    Medicare annual wellness visit, subsequent 2018    Tobacco use 2018    Type II or unspecified type diabetes mellitus without mention of complication, not stated as uncontrolled 10/02/2014    Hyperlipidemia 10/02/2014    Hypertension 10/02/2014      LOS (days): 2  Geometric Mean LOS (GMLOS) (days): 4 30  Days to GMLOS:2 6     OBJECTIVE:    Risk of Unplanned Readmission Score: 12 08         Current admission status: Inpatient       Preferred Pharmacy:   [address] [place] [address], [address] [place] [id number]  Phone: [phone] Fax: [phone]    Primary Care Provider: Nolvia Brown DO    Primary Insurance: MEDICARE  Secondary Insurance:     ASSESSMENT:  [facility], [facility] Representative - Daughter   Primary Phone: [phone] (Mobile)               Advance Directives  Does patient have a [facility]?: No  Was patient offered paperwork?: Yes (declines)  Does patient currently have a Health Care decision maker?: No  Does patient have Advance Directives?: Yes (declines)  Primary Contact: Courtney Metzger (Daughter)   [phone] (M)         Readmission Root Cause  30 Day Readmission: No    Patient Information  Admitted from[de-identified] Home  Mental Status: Alert  During Assessment patient was accompanied by: Not accompanied during assessment  Assessment information provided by[de-identified] Patient  Primary Caregiver: Self  Support Systems: Daughter  [place] of Residence: [facility] do you live in?: [address] entry access options   Select all that apply : Stairs  Number of steps to enter home : 3  Type of Current Residence: [address] [place] home  Upon entering residence, is there a bedroom on the main floor (no further steps)?: Yes  Upon entering residence, is there a bathroom on the main floor (no further steps)?: Yes  In the last 12 months, was there a time when you were not able to pay the mortgage or rent on time?: No  In the last 12 months, how many places have you lived?: 1  In the last 12 months, was there a time when you did not have a steady place to sleep or slept in a shelter (including now)?: No  Homeless/housing insecurity resource given?: N/A  Living Arrangements: Lives Alone (states her daughter stays there sometimes)  Is patient a ?: No  Pt states its a bungalow type home    Activities of Daily Living Prior to Admission  Functional Status: Independent  Completes ADLs independently?: Yes  Ambulates independently?: Yes  Does patient use assisted devices?: No  Does patient currently own DME?: No  Does patient have a history of Outpatient Therapy (PT/OT)?: No  Does the patient have a history of Short-Term Rehab?: No  Does patient have a history of HHC?: No  Does patient currently have [address]?: No         Patient Information Continued  Does patient have prescription coverage?: Yes  Within the past 12 months, you worried that your food would run out before you got the money to buy more : Never true  Within the past 12 months, the food you bought just didn't last and you didn't have money to get more : Never true  Food insecurity resource given?: N/A  Does patient receive dialysis treatments?: No  Does patient have a history of substance abuse?: No  Does patient have a history of Mental Health Diagnosis?: No    PHQ 2/9 Screening   Reviewed PHQ 2/9 Depression Screening Score?: No    Means of Transportation  Means of Transport to Appts[de-identified] Other (Comment) (states issues with transportation  CM will provide pt with STS application and will also refer pt to OP CM)  In the past 12 months, has lack of transportation kept you from medical appointments or from getting medications?: Yes  In the past 12 months, has lack of transportation kept you from meetings, work, or from getting things needed for daily living?: Yes  Was application for public transport provided?: Yes        DISCHARGE DETAILS:    Discharge planning discussed with[de-identified] patient        CM contacted family/caregiver?: No- see comments (declines)  Were Treatment Team discharge recommendations reviewed with patient/caregiver?: Yes  Did patient/caregiver verbalize understanding of patient care needs?: Yes  Were patient/caregiver advised of the risks associated with not following Treatment Team discharge recommendations?: Yes         [address]         Is the patient interested in [facility] at discharge?: No    DME Referral Provided  Referral made for DME?: No         Would you like to participate in our [facility] service program?  : Yes       Discharge Destination Plan[de-identified] Home  Transport at Discharge : Other (Comment) (pt likely will need transportation home on dc)      Plans at this time are home on dc with OP follow up; provided pt with STS application and pt also referred to OP CM to follow up after dc  Will continue to follow and assist in dc planning

## 2022-08-15 NOTE — CASE MANAGEMENT
Case Management Assessment & Discharge Planning Note    Patient name Ama Minor  Location /744-39 MRN [id number]  : 1945 Date 8/15/2022       Current Admission Date: 2022  Current Admission Diagnosis:Hypoxia   Patient Active Problem List    Diagnosis Date Noted    Head lice     Multiple pulmonary nodules 2022    Acute metabolic encephalopathy 7959    History of intracranial hemorrhage 2022    Hyponatremia 2022    Hypokalemia 2022    Hyperbilirubinemia 2022    Type 2 diabetes mellitus with hyperglycemia, without long-term current use of insulin ([facility] ) 2022    Vitamin D deficiency 2022    [facility] ([facility] ) 2022    Dermatitis 2022    Hypoxia 10/18/2021    Ambulatory dysfunction 10/18/2021    Medicare annual wellness visit, subsequent 2018    Tobacco use 2018    Type II or unspecified type diabetes mellitus without mention of complication, not stated as uncontrolled 10/02/2014    Hyperlipidemia 10/02/2014    Hypertension 10/02/2014      LOS (days): 2  Geometric Mean LOS (GMLOS) (days): 4 30  Days to GMLOS:2 7     OBJECTIVE:    Risk of Unplanned Readmission Score: 12 08         Current admission status: Inpatient       Preferred Pharmacy:   [address] [place] [address], [address] [place] [id number]  Phone: [phone] Fax: [phone]    Primary Care Provider: Iris Barton DO    Primary Insurance: MEDICARE  Secondary Insurance:     ASSESSMENT:  [facility], [address] [place] [address] Representative - Daughter   Primary Phone: [phone] (Mobile)               Advance Directives  Does patient have a [address] [facility] [address]?: No  Was patient offered paperwork?: Yes (declines)  Does patient currently have a Health Care decision maker?: No  Does patient have Advance Directives?: Yes (declines)  Primary Contact: Giulia Fields (Daughter)   [phone] (M)         Readmission Root Cause  30 Day Readmission: No    Patient Information  Admitted from[de-identified] Home  Mental Status: Alert  During Assessment patient was accompanied by: Not accompanied during assessment  Assessment information provided by[de-identified] Patient  Primary Caregiver: Self  Support Systems: Daughter  [address] of Residence: One [facility] do you live in?: [address] entry access options   Select all that apply : Stairs  Number of steps to enter home : 3  Type of Current Residence: 2 story home  Upon entering residence, is there a bedroom on the main floor (no further steps)?: Yes  Upon entering residence, is there a bathroom on the main floor (no further steps)?: Yes  In the last 12 months, was there a time when you were not able to pay the mortgage or rent on time?: No  In the last 12 months, how many places have you lived?: 1  In the last 12 months, was there a time when you did not have a steady place to sleep or slept in a shelter (including now)?: No  Homeless/housing insecurity resource given?: N/A  Living Arrangements: Lives Alone (states her daughter stays there sometimes)  Is patient a ?: No    Activities of Daily Living Prior to Admission  Functional Status: Independent  Completes ADLs independently?: Yes  Ambulates independently?: Yes  Does patient use assisted devices?: No  Does patient currently own DME?: No  Does patient have a history of Outpatient Therapy (PT/OT)?: No  Does the patient have a history of Short-Term Rehab?: No  Does patient have a history of HHC?: No  Does patient currently have [facility]?: No         Patient Information Continued  Does patient have prescription coverage?: Yes  Within the past 12 months, you worried that your food would run out before you got the money to buy more : Never true  Within the past 12 months, the food you bought just didn't last and you didn't have money to get more : Never true  Food insecurity resource given?: N/A  Does patient receive dialysis treatments?: No  Does patient have a history of substance abuse?: No  Does patient have a history of Mental Health Diagnosis?: No    PHQ 2/9 Screening   Reviewed PHQ 2/9 Depression Screening Score?: No    Means of Transportation  Means of Transport to Appts[de-identified] Other (Comment) (states issues with transportation  CM will provide pt with STS application and will also refer pt to OP CM)  In the past 12 months, has lack of transportation kept you from medical appointments or from getting medications?: Yes  In the past 12 months, has lack of transportation kept you from meetings, work, or from getting things needed for daily living?: Yes  Was application for public transport provided?: Yes        DISCHARGE DETAILS:    Discharge planning discussed with[de-identified] patient        CM contacted family/caregiver?: No- see comments (declines)  Were Treatment Team discharge recommendations reviewed with patient/caregiver?: Yes  Did patient/caregiver verbalize understanding of patient care needs?: Yes  Were patient/caregiver advised of the risks associated with not following Treatment Team discharge recommendations?: Yes         [address] [place] [address]         Is the patient interested in [place] [address] at discharge?: No    DME Referral Provided  Referral made for DME?: No         Would you like to participate in our [address] service program?  : Yes       Discharge Destination Plan[de-identified] Home  Transport at Discharge : Other (Comment) (pt likely will need transportation home on dc)      Plans at this time are home on dc with OP follow up; pt was provided with STS application and pt also referred to OP CM to follow up after dc  Cm will continue to follow and assist in dc planning

## 2022-08-15 NOTE — DISCHARGE INSTR - OTHER ORDERS
Skin Care Plan:   1  Skin nourishing cream to the entire skin daily and PRN  2  Elevate heels off of bed with pillows to offload  3  Apply Hydraguard to b/l heels, buttocks and sacrum BID and PRN  4   Turn patient Q2 hours

## 2022-08-16 ENCOUNTER — TELEPHONE (OUTPATIENT)
Dept: PULMONOLOGY | Facility: CLINIC | Age: 77
End: 2022-08-16

## 2022-08-16 LAB
ATRIAL RATE: 64 BPM
DME PARACHUTE DELIVERY DATE REQUESTED: NORMAL
DME PARACHUTE DELIVERY DATE REQUESTED: NORMAL
DME PARACHUTE DELIVERY NOTE: NORMAL
DME PARACHUTE ITEM DESCRIPTION: NORMAL
DME PARACHUTE ORDER STATUS: NORMAL
DME PARACHUTE ORDER STATUS: NORMAL
DME PARACHUTE SUPPLIER NAME: NORMAL
DME PARACHUTE SUPPLIER NAME: NORMAL
DME PARACHUTE SUPPLIER PHONE: NORMAL
DME PARACHUTE SUPPLIER PHONE: NORMAL
GLUCOSE SERPL-MCNC: 124 MG/DL (ref 65–140)
GLUCOSE SERPL-MCNC: 186 MG/DL (ref 65–140)
GLUCOSE SERPL-MCNC: 229 MG/DL (ref 65–140)
GLUCOSE SERPL-MCNC: 290 MG/DL (ref 65–140)
QRS AXIS: 252 DEGREES
QRSD INTERVAL: 94 MS
QT INTERVAL: 412 MS
QTC INTERVAL: 425 MS
T WAVE AXIS: 56 DEGREES
VENTRICULAR RATE: 64 BPM

## 2022-08-16 PROCEDURE — 99232 SBSQ HOSP IP/OBS MODERATE 35: CPT | Performed by: INTERNAL MEDICINE

## 2022-08-16 PROCEDURE — 99232 SBSQ HOSP IP/OBS MODERATE 35: CPT | Performed by: FAMILY MEDICINE

## 2022-08-16 PROCEDURE — 93010 ELECTROCARDIOGRAM REPORT: CPT | Performed by: INTERNAL MEDICINE

## 2022-08-16 PROCEDURE — 87081 CULTURE SCREEN ONLY: CPT | Performed by: INTERNAL MEDICINE

## 2022-08-16 PROCEDURE — 97530 THERAPEUTIC ACTIVITIES: CPT

## 2022-08-16 PROCEDURE — 82948 REAGENT STRIP/BLOOD GLUCOSE: CPT

## 2022-08-16 PROCEDURE — 97116 GAIT TRAINING THERAPY: CPT

## 2022-08-16 RX ORDER — PREDNISONE 10 MG/1
TABLET ORAL
Qty: 30 TABLET | Refills: 0 | Status: SHIPPED | OUTPATIENT
Start: 2022-08-16 | End: 2022-08-28

## 2022-08-16 RX ORDER — METHYLPREDNISOLONE SODIUM SUCCINATE 40 MG/ML
40 INJECTION, POWDER, LYOPHILIZED, FOR SOLUTION INTRAMUSCULAR; INTRAVENOUS DAILY
Status: DISCONTINUED | OUTPATIENT
Start: 2022-08-17 | End: 2022-08-17 | Stop reason: HOSPADM

## 2022-08-16 RX ORDER — NICOTINE 21 MG/24HR
1 PATCH, TRANSDERMAL 24 HOURS TRANSDERMAL DAILY
Qty: 28 PATCH | Refills: 0 | Status: SHIPPED | OUTPATIENT
Start: 2022-08-17 | End: 2022-09-23

## 2022-08-16 RX ORDER — INSULIN GLARGINE 100 [IU]/ML
10 INJECTION, SOLUTION SUBCUTANEOUS EVERY MORNING
Status: DISCONTINUED | OUTPATIENT
Start: 2022-08-17 | End: 2022-08-17 | Stop reason: HOSPADM

## 2022-08-16 RX ORDER — BUDESONIDE AND FORMOTEROL FUMARATE DIHYDRATE 80; 4.5 UG/1; UG/1
2 AEROSOL RESPIRATORY (INHALATION) 2 TIMES DAILY
Qty: 10.2 G | Refills: 0 | Status: SHIPPED | OUTPATIENT
Start: 2022-08-16

## 2022-08-16 RX ORDER — ALBUTEROL SULFATE 2.5 MG/3ML
2.5 SOLUTION RESPIRATORY (INHALATION) EVERY 6 HOURS PRN
Qty: 10 ML | Refills: 0 | Status: SHIPPED | OUTPATIENT
Start: 2022-08-16

## 2022-08-16 RX ADMIN — CHOLECALCIFEROL TAB 25 MCG (1000 UNIT) 2000 UNITS: 25 TAB at 08:31

## 2022-08-16 RX ADMIN — ENOXAPARIN SODIUM 40 MG: 40 INJECTION SUBCUTANEOUS at 08:29

## 2022-08-16 RX ADMIN — UMECLIDINIUM 1 PUFF: 62.5 AEROSOL, POWDER ORAL at 08:32

## 2022-08-16 RX ADMIN — LISINOPRIL 20 MG: 20 TABLET ORAL at 08:31

## 2022-08-16 RX ADMIN — SERTRALINE 25 MG: 25 TABLET, FILM COATED ORAL at 08:31

## 2022-08-16 RX ADMIN — PRAVASTATIN SODIUM 80 MG: 40 TABLET ORAL at 17:21

## 2022-08-16 RX ADMIN — METHYLPREDNISOLONE SODIUM SUCCINATE 40 MG: 40 INJECTION, POWDER, FOR SOLUTION INTRAMUSCULAR; INTRAVENOUS at 08:31

## 2022-08-16 RX ADMIN — INSULIN LISPRO 3 UNITS: 100 INJECTION, SOLUTION INTRAVENOUS; SUBCUTANEOUS at 17:22

## 2022-08-16 RX ADMIN — INSULIN GLARGINE 5 UNITS: 100 INJECTION, SOLUTION SUBCUTANEOUS at 08:29

## 2022-08-16 RX ADMIN — BUDESONIDE AND FORMOTEROL FUMARATE DIHYDRATE 2 PUFF: 80; 4.5 AEROSOL RESPIRATORY (INHALATION) at 17:22

## 2022-08-16 RX ADMIN — INSULIN LISPRO 1 UNITS: 100 INJECTION, SOLUTION INTRAVENOUS; SUBCUTANEOUS at 12:17

## 2022-08-16 RX ADMIN — ASPIRIN 81 MG: 81 TABLET, COATED ORAL at 08:31

## 2022-08-16 RX ADMIN — BUDESONIDE AND FORMOTEROL FUMARATE DIHYDRATE 2 PUFF: 80; 4.5 AEROSOL RESPIRATORY (INHALATION) at 08:32

## 2022-08-16 RX ADMIN — INSULIN LISPRO 2 UNITS: 100 INJECTION, SOLUTION INTRAVENOUS; SUBCUTANEOUS at 08:29

## 2022-08-16 NOTE — CASE MANAGEMENT
Case Management Discharge Planning Note    Patient name Alberta Baca  Location /433-06 MRN [id number]  : 1945 Date 2022       Current Admission Date: 2022  Current Admission Diagnosis:Acute on chronic respiratory failure with hypoxia and hypercapnia [facility])   Patient Active Problem List    Diagnosis Date Noted    [facility] ) 2249    Head lice     Multiple pulmonary nodules 2022    Acute metabolic encephalopathy     History of intracranial hemorrhage 2022    Hyponatremia 2022    Hypokalemia 2022    Hyperbilirubinemia 2022    Type 2 diabetes mellitus with hyperglycemia, without long-term current use of insulin ([place] [address] ) 2022    Vitamin D deficiency 2022    Chronic atrial fibrillation ([facility] ) 2022    Dermatitis 2022    Acute on chronic respiratory failure with hypoxia and hypercapnia ([facility] ) 10/18/2021    Ambulatory dysfunction 10/18/2021    Medicare annual wellness visit, subsequent 2018    Tobacco use 2018    Type II or unspecified type diabetes mellitus without mention of complication, not stated as uncontrolled 10/02/2014    Hyperlipidemia 10/02/2014    Hypertension 10/02/2014      LOS (days): 3  Geometric Mean LOS (GMLOS) (days): 4 30  Days to GMLOS:1 5     OBJECTIVE:  Risk of Unplanned Readmission Score: 12 44         Current admission status: Inpatient   Preferred Pharmacy:   [address] [place] [address]  Phone: [phone] Fax: [phone]    [address] [place], [address] [place] [address] [facility] 94 [facility] 38 210 [facility]  Phone: [phone] Fax: [phone]    Primary Care Provider: Adriane Florez DO    Primary Insurance: MEDICARE  Secondary Insurance:     DISCHARGE DETAILS:    Discharge planning discussed with[de-identified] patient and daughter:Razia Palominorhiannon  Freedom of Choice: Yes  Comments - Freedom of Choice: referral [facility]  CM contacted family/caregiver?: Yes  Were Treatment Team discharge recommendations reviewed with patient/caregiver?: Yes  Did patient/caregiver verbalize understanding of patient care needs?: Yes  Were patient/caregiver advised of the risks associated with not following Treatment Team discharge recommendations?: Yes    Contacts  Patient Contacts: Kalyn Evans  Relationship to Patient[de-identified] Family  Contact Method: Phone  Phone Number: [phone]  Reason/Outcome: Discharge [address]         Is the patient interested in [place] at discharge?: Yes  [address] requested[de-identified] Nursing, Occupational Therapy, Physical Therapy, Medical Social Work  Home Health Agency Name[de-identified] Other ([facility])  [facility] Provider[de-identified] PCP  [address] Needed[de-identified] COPD Management, Oxygen Via Nasal Cannula, Diabetes Management  Oxygen LPM Ordered (if applicable based on home health services needed):: 2 LPM  Homebound Criteria Met[de-identified] Uses an Assist Device (i e  cane, walker, etc), Requires the Assistance of Another Person for Safe Ambulation or to Leave the Home  Supporting Clincal Findings[de-identified] Requires Oxygen, Fatigues Easliy in [employer] with pt's daughter Kalyn Evans [phone]  Per Brii Curran, she resides with her mom and assists her at home  Brii Curran aware we went through [facility] beds as there is no transportation at the present time for pt/family to be able to get to the pharmacy to  her prescriptions  Pt also does not have a nebulizer and going home on nebulizer treatments, pt to be provided with a nebulizer on dc from [address]  Brii Curran also states her mom will need transportation home on dc which CM will arrange  CM will also be following up with pt's Care Manager:Rosalinda at [facility] as pt's daughter stated Zoilaciera Noland calls and outreaches them at home   CM called [facility] and Jose Eduardo Noland is out today and will be back in tomorrow  CM left a message for Melina Sorto to call CM back tomorrow  Pt and daughter are also agreeable to [facility] on dc (RN, PT, OT and SW)  Referral made to [facility], waiting to hear back  Daughter also aware we provided pt with STS application and daughter will complete and mail to STS

## 2022-08-16 NOTE — ASSESSMENT & PLAN NOTE
· Presented with an oxygen saturation level in the 80's%  · Not on any home supplemental oxygen  · Possible underlying obstructive lung disease/COPD with her significant tobacco use history  · Currently requiring 2 lpm of continuous supplemental oxygen to maintain oxygen saturation levels at 90% and above  · COVID-19 testing is negative    · Treat with methylprednisone 40 mg IV every 12 hours, which will also treat the dermatitis  · Will discontinue ceftriaxone with no evidence of infection  · Continue Symbicort inhaler  · Follow the procalcitonin level  · Nebulizer treatments via the respiratory protocol  · Incentive spirometry 10 times per hour while awake  · Pulmonology input appreciated, will need to establish an outpatient follow up   · Suspect underlying chronic hypoxia and will need oxygen requirement study, may require oxygen on discharge  · 2D echo unremarkable, preserved LVEF, no evidence of pulmonary hypertension

## 2022-08-16 NOTE — PHYSICAL THERAPY NOTE
PHYSICAL THERAPY NOTE          Patient Name: Flakita Peng  RBNOQ'I Date: 8/16/2022 08/16/22 0921   PT Last Visit   PT Visit Date 08/16/22   Note Type   Note Type Treatment   Pain Assessment   Pain Assessment Tool 0-10   Pain Score No Pain   Restrictions/Precautions   Weight Bearing Precautions Per Order No   Other Precautions   (Contact/isolation; Multiple lines; O2; Fall Risk)   General   Family/Caregiver Present No   Cognition   Overall Cognitive Status Impaired   Arousal/Participation Alert   Following Commands Follows one step commands without difficulty   Subjective   Subjective Agreeable to therapy   Bed Mobility   Additional Comments seated EOB at start of session   Transfers   Sit to Stand 5  Supervision   Additional items Increased time required;Verbal cues   Stand to Sit 5  Supervision   Additional items Increased time required   Stand pivot 5  Supervision   Additional items Verbal cues   Toilet transfer 4  Minimal assistance   Additional items Assist x 1; Increased time required;Verbal cues;Standard toilet   Additional Comments RW used   Ambulation/Elevation   Gait pattern   (Short stride; Foward flexed; Decreased foot clearance; Improper Weight shift)   Gait Assistance 5  Supervision   Additional items Verbal cues   Assistive Device Rolling walker   Distance 200'   Balance   Static Sitting Good   Dynamic Sitting Good   Static Standing Fair +   Dynamic Standing Fair   Ambulatory Fair  (RW)   Endurance Deficit   Endurance Deficit Yes   Activity Tolerance   Activity Tolerance Patient limited by fatigue   Assessment   Prognosis Good   Problem List Decreased strength;Decreased endurance; Impaired balance;Decreased mobility; Decreased safety awareness   Assessment Pt  seen for PT treatment session this date with interventions consisting of transfers and  gait training w/ emphasis on improving pt's ability to ambulate   Pt  Requiring cues for sequence and safety  In comparison to previous session, Pt  With improvements in activity tolerance  Good tolerance to increased distance with no episodes LOB  Pt is in need of continued activity in PT to improve strength balance endurance mobility transfers and ambulation with return to maximize LOF  From PT/mobility standpoint, recommendation at time of d/c would be home health rehab  in order to promote return to PLOF and independence  The patient's AM-PAC Basic Mobility Inpatient Short Form Raw Score is 22  A Raw score of greater than 16 suggests the patient may benefit from discharge to home  Please also refer to physical therapy recommendation for safe DC planning  Goals   LTG Expiration Date 08/29/22   PT Treatment Day 1   Plan   Treatment/Interventions   (Functional transfer training; LE strengthening/ROM; Elevations; Therapeutic exercise; Endurance training; Bed mobility; Gait training)   Progress Progressing toward goals   PT Frequency 3-5x/wk   Recommendation   PT Discharge Recommendation Home with home health rehabilitation   Yamilka 8 in Bed Without Bedrails 4   Lying on Back to Sitting on Edge of Flat Bed 4   Moving Bed to Chair 4   Standing Up From Chair 4   Walk in Room 3   Climb 3-5 Stairs 3   Basic Mobility Inpatient Raw Score 22   Basic Mobility Standardized Score 47 4   Highest Level Of Mobility   JH-HLM Goal 7: Walk 25 feet or more   JH-HLM Achieved 7: Walk 25 feet or more   Education   Education Provided Mobility training   Patient Demonstrates verbal understanding   End of Consult   Patient Position at End of Consult Seated edge of bed; All needs within reach   End of Consult Comments discussed POC with PT

## 2022-08-16 NOTE — CASE MANAGEMENT
Case Management Discharge Planning Note    Patient name Collette Caraway  Location /308-50 MRN [id number]  : 1945 Date 2022       Current Admission Date: 2022  Current Admission Diagnosis:Acute on chronic respiratory failure with hypoxia and hypercapnia [facility])   Patient Active Problem List    Diagnosis Date Noted    [facility]     Head lice     Multiple pulmonary nodules 2022    Acute metabolic encephalopathy     History of intracranial hemorrhage 2022    Hyponatremia 2022    Hypokalemia 2022    Hyperbilirubinemia 2022    Type 2 diabetes mellitus with hyperglycemia, without long-term current use of insulin ([place] [address] ) 2022    Vitamin D deficiency 2022    Chronic atrial fibrillation ([facility] 75 ) 2022    Dermatitis 2022    Acute on chronic respiratory failure with hypoxia and hypercapnia ([facility] ) 10/18/2021    Ambulatory dysfunction 10/18/2021    Medicare annual wellness visit, subsequent 2018    Tobacco use 2018    Type II or unspecified type diabetes mellitus without mention of complication, not stated as uncontrolled 10/02/2014    Hyperlipidemia 10/02/2014    Hypertension 10/02/2014      LOS (days): 3  Geometric Mean LOS (GMLOS) (days): 4 30  Days to GMLOS:1 5     OBJECTIVE:  Risk of Unplanned Readmission Score: 12 44         Current admission status: Inpatient   Preferred Pharmacy:   [facility] [address], [address] [place] [address]  Phone: [phone] Fax: [phone]    [address] [place], [address] [place] [address] [facility] 94 [place] [facility] 38 210 [facility]  Phone: [phone] Fax: [phone]    Primary Care Provider: Deedee Olguin DO    Primary Insurance: MEDICARE  Secondary Insurance:     DISCHARGE DETAILS:  Went through [address] to [facility] for pt's medications for dc (5 new prescriptions)   Will be courieried up here with tomorrows run in the am

## 2022-08-16 NOTE — TELEPHONE ENCOUNTER
Patient scheduled for 09/01/2022 at 8:40 AM with Dr Rosanna Gudino             ----- Message from Ambrose Breaux MD sent at 8/16/2022 12:02 PM EDT -----  Can we please plan for hospital follow up appointment in 2-3 weeks with any provider      [place]

## 2022-08-16 NOTE — PROGRESS NOTES
[address] [facility] [address]  Progress Note - Kamlesh Bray 1945, 65 y o  female MRN: [id number]  Unit/Bed#: 403-01 Encounter: [id number]  Primary Care Provider: Sharon Abdul DO   Date and time admitted to hospital: 8/13/2022  3:59 PM    * Acute on chronic respiratory failure with hypoxia and hypercapnia (HCC)  Assessment & Plan  · Presented with an oxygen saturation level in the 80's%  · Not on any home supplemental oxygen  · Possible underlying obstructive lung disease/COPD with her significant tobacco use history  · Currently requiring 2 lpm of continuous supplemental oxygen to maintain oxygen saturation levels at 90% and above  · COVID-19 testing is negative    · Treat with methylprednisone 40 mg IV every 12 hours, which will also treat the dermatitis  · Will discontinue ceftriaxone with no evidence of infection  · Continue Symbicort inhaler  · Follow the procalcitonin level  · Nebulizer treatments via the respiratory protocol  · Incentive spirometry 10 times per hour while awake  · Pulmonology input appreciated, will need to establish an outpatient follow up   · Suspect underlying chronic hypoxia and will need oxygen requirement study, may require oxygen on discharge  · 2D echo unremarkable, preserved LVEF, no evidence of pulmonary hypertension    COPD exacerbation ([facility] [address] )  Assessment & Plan  Patient presents with respiratory failure  Suspected COPD exacerbation, will need formal diagnosis upon discharge  Continue IV steroids, nebulizer treatments  Pulmonology consult, appreciate input  Will need to establish outpatient follow-up  Smoking cessation counseling    Type 2 diabetes mellitus with hyperglycemia, without long-term current use of insulin [facility])  Assessment & Plan  Lab Results   Component Value Date    HGBA1C 7 1 (H) 08/03/2021       Recent Labs     08/15/22  1607 08/15/22  2030 08/16/22  0739 08/16/22  1140   POCGLU 238* 201* 229* 186*       Blood Sugar Average: Last 72 hrs:  (P) 203 · Check a new hemoglobin A1c level  · Currently hyperglycemic, suspect steroid induced  · Increased lantus to 10 units SQ Qdaily  · Insulin sliding scale with blood glucose monitoring ACHS  · Continue lisinopril for renal protection   Outpatient follow-up with PCP in regards to this matter    History of intracranial hemorrhage  Assessment & Plan  · History of intracranial hemorrhage  · No acute intracranial abnormality on CT scan of the head without contrast on 08/13/2022  · Anticoagulation has been on hold, will need follow-up with Neurosurgery to evaluate if anticoagulation can be resumed    Acute metabolic encephalopathy  Assessment & Plan  · Presented in the emergency department with confusion  · Likely due to hypoxia  · Her mental status has improved now that the hypoxia has resolved  Multiple pulmonary nodules  Assessment & Plan  · Need outpatient surveillance with Pulmonology and with PCP    CTA of the chest/PE protocol (08/13/2022): IMPRESSION:     There is no pulmonary embolism      There are 2 solid pulmonary nodules, larger 4 mm in the right upper lobe  Based on current Fleischner Society 2017 Guidelines on incidental pulmonary nodule, optional follow-up CT at 12 months can be considered      Head lice  Assessment & Plan  · Utilize permethrin shampoo    Chronic atrial fibrillation (HCC)  Assessment & Plan  · Reported as new onset on EKG however pmhx from [facility] shows she has had this for many years  · Currently rate controlled without any AV-mariano blocking agents  · Stroke risk score is a 5, highly recommended for [facility], patient had been initiated on coumadin by Cardiology in the past but was taken off of this due to history of [address]  · For now will continue aspirin 81 mg daily  · Will need to discuss with Neurosurgery if [facility] could be resumed   · 2D echo reviewed, preserved LVEF, unremarkable      Tobacco use  Assessment & Plan  · Long history of cigarette smoking  · Nicotine replacement while inpatient  · Smoking cessation counseling  · The patient is not interested in quitting smoking  VTE Pharmacologic Prophylaxis: VTE Score: 6 High Risk (Score >/= 5) - Pharmacological DVT Prophylaxis Ordered: enoxaparin (Lovenox)  Sequential Compression Devices Ordered  Patient Centered Rounds: I performed bedside rounds with nursing staff today  Discussions with Specialists or Other Care Team Provider: multidisciplinary meeting    Education and Discussions with Family / Patient: Updated  (daughter) via phone  Time Spent for Care: 30 minutes  More than 50% of total time spent on counseling and coordination of care as described above  Current Length of Stay: 3 day(s)  Current Patient Status: Inpatient   Certification Statement: The patient will continue to require additional inpatient hospital stay due to IV steroids  Discharge Plan: Anticipate discharge tomorrow to home with home services  Code Status: Level 3 - DNAR and DNI    Subjective:   Patient seen and examined  She reports feeling well, no complaints  Working with PT/OT  Objective:     Vitals:   Temp (24hrs), Av 2 °F (36 8 °C), Min:98 °F (36 7 °C), Max:98 5 °F (36 9 °C)    Temp:  [98 °F (36 7 °C)-98 5 °F (36 9 °C)] 98 °F (36 7 °C)  HR:  [61-72] 61  Resp:  [17-20] 18  BP: (100-176)/(62-85) 176/85  SpO2:  [93 %-95 %] 95 %  Body mass index is 27 62 kg/m²  Input and Output Summary (last 24 hours): Intake/Output Summary (Last 24 hours) at 2022 1242  Last data filed at 2022 1236  Gross per 24 hour   Intake 1730 ml   Output --   Net 1730 ml       Physical Exam:   Physical Exam  Constitutional:       General: She is not in acute distress  HENT:      Mouth/Throat:      Pharynx: Oropharynx is clear  Cardiovascular:      Rate and Rhythm: Normal rate and regular rhythm  Heart sounds: No murmur heard  Pulmonary:      Effort: No respiratory distress        Breath sounds: Decreased breath sounds and wheezing present  Abdominal:      General: There is no distension  Tenderness: There is no abdominal tenderness  There is no guarding  Musculoskeletal:      Right lower leg: No edema  Left lower leg: No edema  Skin:     General: Skin is warm and dry  Comments: Diffuse erythematous maculopapular rash   Neurological:      Mental Status: Mental status is at baseline  Additional Data:     Labs:  Results from last 7 days   Lab Units 08/14/22  0436 08/13/22  1630   WBC Thousand/uL 8 08 13 01*   HEMOGLOBIN g/dL 16 6* 17 3*   HEMATOCRIT % 50 1* 52 2*   PLATELETS Thousands/uL 213 248   NEUTROS PCT %  --  79*   LYMPHS PCT %  --  10*   MONOS PCT %  --  7   EOS PCT %  --  3     Results from last 7 days   Lab Units 08/14/22  0436   SODIUM mmol/L 134*   POTASSIUM mmol/L 4 0   CHLORIDE mmol/L 92*   CO2 mmol/L 34*   BUN mg/dL 12   CREATININE mg/dL 0 97   ANION GAP mmol/L 8   CALCIUM mg/dL 9 0   ALBUMIN g/dL 3 4*   TOTAL BILIRUBIN mg/dL 0 61   ALK PHOS U/L 69   ALT U/L 9*   AST U/L 8   GLUCOSE RANDOM mg/dL 261*     Results from last 7 days   Lab Units 08/13/22  1630   INR  0 97     Results from last 7 days   Lab Units 08/16/22  1140 08/16/22  0739 08/15/22  2030 08/15/22  1607 08/15/22  1155 08/15/22  0719 08/14/22  2140 08/14/22  1619 08/14/22  1109 08/13/22  2152   POC GLUCOSE mg/dl 186* 229* 201* 238* 141* 254* 279* 117 125 260*         Results from last 7 days   Lab Units 08/13/22  1630   LACTIC ACID mmol/L 1 6       Lines/Drains:  Invasive Devices  Report    Peripheral Intravenous Line  Duration           Peripheral IV 08/14/22 Dorsal (posterior); Left Forearm 1 day                      Imaging: Reviewed radiology reports from this admission including: ECHO and Personally reviewed the following imaging: ECHO     Recent Cultures (last 7 days):   Results from last 7 days   Lab Units 08/14/22  1600 08/13/22  1631 08/13/22  1630   BLOOD CULTURE   --  No Growth at 48 hrs  No Growth at 48 hrs     URINE CULTURE Culture too young- will reincubate  --   --        Last 24 Hours Medication List:   Current Facility-Administered Medications   Medication Dose Route Frequency Provider Last Rate    acetaminophen  650 mg Oral Q6H PRN Rudene Roles, PA-C      albuterol  2 5 mg Nebulization Q6H PRN [place] Vika, DO      aspirin  81 mg Oral Daily Rudene Roles, PA-C      budesonide-formoterol  2 puff Inhalation BID Rudene Roles, PA-C      calcium carbonate  1,000 mg Oral Daily PRN Rudene Roles, PA-C      cholecalciferol  2,000 Units Oral Daily [place] Fine, DO      enoxaparin  40 mg Subcutaneous Q24H [address] Rudene Roles, PA-C      hydrOXYzine HCL  10 mg Oral Q6H PRN Rudene Roles, PA-C      insulin glargine  5 Units Subcutaneous QAM [facility] [place] [facility], DO      insulin lispro  1-5 Units Subcutaneous TID AC Rudene Roles, PA-C      insulin lispro  1-5 Units Subcutaneous HS Rudene Roles, PA-C      lisinopril  20 mg Oral Daily [place] Vika, DO      methylPREDNISolone sodium succinate  40 mg Intravenous Q12H [address] [place] [facility], DO      nicotine  1 patch Transdermal Daily Rudene Roles, PA-C      ondansetron  4 mg Intravenous Q6H PRN Rudene Roles, PA-C      polyethylene glycol  17 g Oral Daily PRN Rudene Roles, PA-C      pravastatin  80 mg Oral Daily With Dinner Rudene Roles, PA-C      sertraline  25 mg Oral Daily Rudene Roles, PA-C      umeclidinium bromide  1 puff Inhalation Daily Russell Louise MD          Today, Patient Was Seen By: Jo Valdez MD    **Please Note: This note may have been constructed using a voice recognition system  **

## 2022-08-16 NOTE — PROGRESS NOTES
Progress Note - Pulmonary   Hirabrett Briggs 65 y o  female MRN: [id number]  Unit/Bed#: 403-01 Encounter: [id number]      Assessment:  1  Acute hypoxic respiratory insufficiency, 2 L nasal cannula  2  History of COPD, unknown severity  3  Severe pectus excavatum deformity  4  Multiple pulmonary nodules  5  Type 2 diabetes mellitus  6  Hyperbilirubinemia  7  Hypokalemia/hyponatremia  8  History of intracerebral hemorrhage    Plan:  · Currently on 2 L nasal cannula, suspect acute hypoxic respiratory insufficiency likely related to underlying COPD, also suspect she has chronic hypoxia  · Is a current everyday smoker, has been smoking 1 pack per day for the past 25 years, known underlying obstructive disease severity  · Review of CTA of the chest upon admission does not show any evidence of pulmonary embolism, there to solid pulmonary nodules measuring up to 4 mm in the right upper lobe  · Continue IV steroids for today, plan to transition to prednisone 40 mg tomorrow with wean by 10 mg every 3 days  · She is very clear that she does not have any plans to cut down or quit smoking, she says that she will "smoke as soon as she is out of the hospital"   · Has been maintained on Symbicort previously, added LAMA given likely underlying obstructive lung disease, can plan for discharge with Trelegy  · Will need ambulatory pulse oximetry assessment prior to discharge, suspect she will need to be discharged home on oxygen  · Will need outpatient pulmonary follow-up and outpatient pulmonary function test  · Will set up outpatient appointment   · Will sign off, please re-call with specific questions     Subjective:   "I feel okay"    Vitals: Blood pressure (!) 176/85, pulse 61, temperature 98 °F (36 7 °C), temperature source Oral, resp  rate 18, height 5' 5" (1 651 m), weight 75 3 kg (166 lb), SpO2 95 %  , Body mass index is 27 62 kg/m²        Intake/Output Summary (Last 24 hours) at 8/16/2022 9693  Last data filed at 8/16/2022 8639  Gross per 24 hour   Intake 1550 ml   Output --   Net 1550 ml       Physical Exam  Gen: Awake, alert, oriented x 3, no acute distress  HEENT: Mucous membranes moist, no oral lesions, no thrush  NECK: No accessory muscle use, JVP not elevated  Cardiac: Regular, single S1, single S2, no murmurs, no rubs, no gallops  Lungs: Decreased breath sounds at the bases  Abdomen: normoactive bowel sounds, soft nontender, nondistended, no rebound or rigidity, no guarding  Extremities: no cyanosis, no clubbing, no edema    Labs: I have personally reviewed pertinent lab results          Cha Elias MD

## 2022-08-16 NOTE — CASE MANAGEMENT
Case Management Discharge Planning Note    Patient name Perlita Soto  Location /322-73 MRN [id number]  : 1945 Date 2022       Current Admission Date: 2022  Current Admission Diagnosis:Acute on chronic respiratory failure with hypoxia and hypercapnia [facility])   Patient Active Problem List    Diagnosis Date Noted    [facility]     Head lice     Multiple pulmonary nodules 2022    Acute metabolic encephalopathy     History of intracranial hemorrhage 2022    Hyponatremia 2022    Hypokalemia 2022    Hyperbilirubinemia 2022    Type 2 diabetes mellitus with hyperglycemia, without long-term current use of insulin ([facility] ) 2022    Vitamin D deficiency 2022    Chronic atrial fibrillation ([facility] ) 2022    Dermatitis 2022    Acute on chronic respiratory failure with hypoxia and hypercapnia ([facility] ) 10/18/2021    Ambulatory dysfunction 10/18/2021    Medicare annual wellness visit, subsequent 2018    Tobacco use 2018    Type II or unspecified type diabetes mellitus without mention of complication, not stated as uncontrolled 10/02/2014    Hyperlipidemia 10/02/2014    Hypertension 10/02/2014      LOS (days): 3  Geometric Mean LOS (GMLOS) (days): 4 30  Days to GMLOS:1 5     OBJECTIVE:  Risk of Unplanned Readmission Score: 12 44         Current admission status: Inpatient   Preferred Pharmacy:   [address] [place] [address], [address] [place] [address] [address] [address]  Phone: [phone] Fax: [phone]    [address] [place], [address] [facility] [address]  Phone: [phone] Fax: [phone]    Primary Care Provider: Jesu Tobar DO    Primary Insurance: MEDICARE  Secondary Insurance:     DISCHARGE DETAILS:    DME Referral Provided  Referral made for DME?: Yes  DME referral completed for the following items[de-identified] Nebulizer  DME Supplier Name[de-identified] [employer]  Pt being provided with nebulizer form consignment closet-order put through [address] [place] [address]

## 2022-08-16 NOTE — PLAN OF CARE
Problem: Potential for Falls  Goal: Patient will remain free of falls  Description: INTERVENTIONS:  - Educate patient/family on patient safety including physical limitations  - Instruct patient to call for assistance with activity (standby assist and walker)  - Consult OT/PT to assist with strengthening/mobility   - Keep Call bell within reach  - Keep bed low and locked with side rails adjusted as appropriate  - Keep care items and personal belongings within reach  - Initiate and maintain comfort rounds  - Make Fall Risk Sign visible to staff (high fall risk)  - Offer Toileting every 1-2 Hours, in advance of need  - Initiate/Maintain bed/chair alarm  - Obtain necessary fall risk management equipment: nonskid footwear, call bell within reach  - Apply yellow socks and bracelet for high fall risk patients  - Consider moving patient to room near nurses station  Outcome: Progressing     Problem: PAIN - ADULT  Goal: Verbalizes/displays adequate comfort level or baseline comfort level  Description: Interventions:  - Encourage patient to monitor pain and request assistance  - Assess pain using appropriate pain scale (0-10 pain scale)  - Administer analgesics based on type and severity of pain and evaluate response  - Implement non-pharmacological measures as appropriate and evaluate response  - Consider cultural and social influences on pain and pain management  - Notify physician/advanced practitioner if interventions unsuccessful or patient reports new pain  Outcome: Progressing     Problem: INFECTION - ADULT  Goal: Absence or prevention of progression during hospitalization  Description: INTERVENTIONS:  - Assess and monitor for signs and symptoms of infection  - Monitor lab/diagnostic results  - Monitor all insertion sites, i e  indwelling lines  - Administer medications as ordered  - Instruct and encourage patient and family to use good hand hygiene technique  - Identify and instruct in appropriate isolation precautions for identified infection/condition (strict contact precautions)  Outcome: Progressing     Problem: SAFETY ADULT  Goal: Patient will remain free of falls  Description: INTERVENTIONS:  - Educate patient/family on patient safety including physical limitations  - Instruct patient to call for assistance with activity (standby assist and walker)  - Consult OT/PT to assist with strengthening/mobility   - Keep Call bell within reach  - Keep bed low and locked with side rails adjusted as appropriate  - Keep care items and personal belongings within reach  - Initiate and maintain comfort rounds  - Make Fall Risk Sign visible to staff (high fall risk)  - Offer Toileting every 1-2 Hours, in advance of need  - Initiate/Maintain bed/chair alarm  - Obtain necessary fall risk management equipment: nonskid footwear, call bell within reach  - Apply yellow socks and bracelet for high fall risk patients  - Consider moving patient to room near nurses station  Outcome: Progressing  Goal: Maintain or return to baseline ADL function  Description: INTERVENTIONS:  - Educate patient/family on patient safety including physical limitations  - Instruct patient to call for assistance with activity (standby assist and walker)  - Consult OT/PT to assist with strengthening/mobility   - Keep Call bell within reach  - Keep bed low and locked with side rails adjusted as appropriate  - Keep care items and personal belongings within reach  - Initiate and maintain comfort rounds  - Make Fall Risk Sign visible to staff (high fall risk)  - Offer Toileting every 1-2 Hours, in advance of need  - Initiate/Maintain bed/chair alarm  - Obtain necessary fall risk management equipment: nonskid footwear, call bell within reach  - Apply yellow socks and bracelet for high fall risk patients  - Consider moving patient to room near nurses station  Outcome: Progressing  Goal: Maintains/Returns to pre admission functional level  Description: INTERVENTIONS:  - Assess patient's ability to carry out ADLs; (min assist)  - Assess/evaluate cause of self-care deficits (fatigue, forgetfulness, oxygen)  - Assess range of motion  - Assess patient's mobility; (standby assist and walker)  - Assess patient's need for assistive devices and provide as appropriate  - Encourage maximum independence but intervene and supervise when necessary  - Involve family in performance of ADLs  - Assess for home care needs following discharge   - Consider OT consult to assist with ADL evaluation and planning for discharge  - Provide patient education as appropriate  Outcome: Progressing     Problem: DISCHARGE PLANNING  Goal: Discharge to home or other facility with appropriate resources  Description: INTERVENTIONS:  - Identify barriers to discharge w/patient and caregiver  - Arrange for needed discharge resources and transportation as appropriate  - Identify discharge learning needs (meds, wound care, etc )  - Refer to Case Management Department for coordinating discharge planning if the patient needs post-hospital services based on physician/advanced practitioner order or complex needs related to functional status, cognitive ability, or social support system  Outcome: Progressing     Problem: Knowledge Deficit  Goal: Patient/family/caregiver demonstrates understanding of disease process, treatment plan, medications, and discharge instructions  Description: Complete learning assessment and assess knowledge base    Interventions:  - Provide teaching at level of understanding  - Provide teaching via preferred learning methods  Outcome: Progressing     Problem: RESPIRATORY - ADULT  Goal: Achieves optimal ventilation and oxygenation  Description: INTERVENTIONS:  - Assess for changes in respiratory status  - Assess for changes in mentation and behavior  - Position to facilitate oxygenation and minimize respiratory effort  - Oxygen administered by appropriate delivery if ordered  - Initiate smoking cessation education (cessation materials provided)  - Encourage broncho-pulmonary hygiene including cough, deep breathe, Incentive Spirometry  - Assess and instruct to report SOB or any respiratory difficulty  - Respiratory Therapy support as indicated  Outcome: Progressing     Problem: METABOLIC, FLUID AND ELECTROLYTES - ADULT  Goal: Electrolytes maintained within normal limits  Description: INTERVENTIONS:  - Monitor labs and assess patient for signs and symptoms of electrolyte imbalances  - Administer electrolyte replacement as ordered  - Monitor response to electrolyte replacements, including repeat lab results as appropriate  - Instruct patient on fluid and nutrition as appropriate  Outcome: Progressing  Goal: Fluid balance maintained  Description: INTERVENTIONS:  - Monitor labs   - Monitor I/O and WT  - Instruct patient on fluid and nutrition as appropriate  - Assess for signs & symptoms of volume excess or deficit  Outcome: Progressing  Goal: Glucose maintained within target range  Description: INTERVENTIONS:  - Monitor Blood Glucose as ordered  - Assess for signs and symptoms of hyperglycemia and hypoglycemia  - Administer ordered medications to maintain glucose within target range  - Assess nutritional intake and initiate nutrition service referral as needed  Outcome: Progressing     Problem: SKIN/TISSUE INTEGRITY - ADULT  Goal: Skin Integrity remains intact(Skin Breakdown Prevention)  Description: Assess:  -Perform Steven assessment every shift  -Clean and moisturize skin every shift  -Inspect skin when repositioning, toileting, and assisting with ADLS  -Assess under medical devices   -Assess extremities for adequate circulation and sensation     Bed Management:  -Have minimal linens on bed & keep smooth, unwrinkled  -Change linens as needed when moist or perspiring  -Avoid sitting or lying in one position for more than 1-2 hours while in bed    Toileting:  -Offer bedside commode  -Assess for incontinence every 1-2 hours and prn  -Use incontinent care products after each incontinent episode     Activity:  -Mobilize patient 3-4 times a day  -Encourage activity and walks on unit  -Encourage or provide ROM exercises   -Turn and reposition patient every 2 Hours  -Use appropriate equipment to lift or move patient in bed  -Instruct/ Assist with weight shifting every 2 hours when out of bed in chair  -Consider limitation of chair time 4 hour intervals    Skin Care:  -Avoid use of baby powder, tape, friction and shearing, hot water or constrictive clothing  -Relieve pressure over bony prominences   -Do not massage red bony areas    Next Steps:  -Teach patient strategies to minimize risks    -Consider consults to  interdisciplinary teams   Outcome: Progressing     Problem: MOBILITY - ADULT  Goal: Maintain or return to baseline ADL function  Description: INTERVENTIONS:  - Educate patient/family on patient safety including physical limitations  - Instruct patient to call for assistance with activity (standby assist and walker)  - Consult OT/PT to assist with strengthening/mobility   - Keep Call bell within reach  - Keep bed low and locked with side rails adjusted as appropriate  - Keep care items and personal belongings within reach  - Initiate and maintain comfort rounds  - Make Fall Risk Sign visible to staff (high fall risk)  - Offer Toileting every 1-2 Hours, in advance of need  - Initiate/Maintain bed/chair alarm  - Obtain necessary fall risk management equipment: nonskid footwear, call bell within reach  - Apply yellow socks and bracelet for high fall risk patients  - Consider moving patient to room near nurses station  Outcome: Progressing  Goal: Maintains/Returns to pre admission functional level  Description: INTERVENTIONS:  -  Assess patient's ability to carry out ADLs; (min assist)  - Assess/evaluate cause of self-care deficits (fatigue, forgetfulness, oxygen)  - Assess range of motion  - Assess patient's mobility; (standby assist and walker)  - Assess patient's need for assistive devices and provide as appropriate  - Encourage maximum independence but intervene and supervise when necessary  - Involve family in performance of ADLs  - Assess for home care needs following discharge   - Consider OT consult to assist with ADL evaluation and planning for discharge  - Provide patient education as appropriate  Outcome: Progressing

## 2022-08-16 NOTE — ASSESSMENT & PLAN NOTE
· Reported as new onset on EKG however pmhx from [facility] shows she has had this for many years  · Currently rate controlled without any AV-mariano blocking agents  · Stroke risk score is a 5, highly recommended for [facility], patient had been initiated on coumadin by Cardiology in the past but was taken off of this due to history of [address]  · For now will continue aspirin 81 mg daily  · Will need to discuss with Neurosurgery if [facility] could be resumed   · 2D echo reviewed, preserved LVEF, unremarkable

## 2022-08-16 NOTE — ASSESSMENT & PLAN NOTE
Lab Results   Component Value Date    HGBA1C 7 1 (H) 08/03/2021       Recent Labs     08/15/22  1607 08/15/22  2030 08/16/22  0739 08/16/22  1140   POCGLU 238* 201* 229* 186*       Blood Sugar Average: Last 72 hrs:  (P) 203     · Check a new hemoglobin A1c level  · Currently hyperglycemic, suspect steroid induced  · Increased lantus to 10 units SQ Qdaily  · Insulin sliding scale with blood glucose monitoring ACHS  · Continue lisinopril for renal protection   Outpatient follow-up with PCP in regards to this matter

## 2022-08-16 NOTE — CASE MANAGEMENT
Case Management Discharge Planning Note    Patient name Ama Minor  Location /412-96 MRN [id number]  : 1945 Date 2022       Current Admission Date: 2022  Current Admission Diagnosis:Acute on chronic respiratory failure with hypoxia and hypercapnia [facility])   Patient Active Problem List    Diagnosis Date Noted    COPD exacerbation ([facility] )     Head lice     Multiple pulmonary nodules 2022    Acute metabolic encephalopathy     History of intracranial hemorrhage 2022    Hyponatremia 2022    Hypokalemia 2022    Hyperbilirubinemia 2022    Type 2 diabetes mellitus with hyperglycemia, without long-term current use of insulin ([place] [address] ) 2022    Vitamin D deficiency 2022    Chronic atrial fibrillation ([place] [address] ) 2022    Dermatitis 2022    Acute on chronic respiratory failure with hypoxia and hypercapnia ([place] [address] ) 10/18/2021    Ambulatory dysfunction 10/18/2021    Medicare annual wellness visit, subsequent 2018    Tobacco use 2018    Type II or unspecified type diabetes mellitus without mention of complication, not stated as uncontrolled 10/02/2014    Hyperlipidemia 10/02/2014    Hypertension 10/02/2014      LOS (days): 3  Geometric Mean LOS (GMLOS) (days): 4 30  Days to GMLOS:1 6     OBJECTIVE:  Risk of Unplanned Readmission Score: 12 44         Current admission status: Inpatient   Preferred Pharmacy:   [address] [place] [address], [address] [place] [id number]  Phone: [phone] Fax: [phone]    Primary Care Provider: Iris Barton DO    Primary Insurance: MEDICARE  Secondary Insurance:     DISCHARGE DETAILS:    Discharge planning discussed with[de-identified] patient  Freedom of Choice: Yes  Comments - Freedom of Choice: [employer] for a walker       DME Referral Provided  Referral made for DME?: Yes  DME referral completed for the following items[de-identified] Debbie Mo  DME Supplier Name[de-identified] [employer]

## 2022-08-17 ENCOUNTER — TRANSITIONAL CARE MANAGEMENT (OUTPATIENT)
Dept: FAMILY MEDICINE CLINIC | Facility: CLINIC | Age: 77
End: 2022-08-17

## 2022-08-17 VITALS
SYSTOLIC BLOOD PRESSURE: 129 MMHG | DIASTOLIC BLOOD PRESSURE: 98 MMHG | WEIGHT: 166 LBS | HEIGHT: 65 IN | OXYGEN SATURATION: 90 % | HEART RATE: 76 BPM | RESPIRATION RATE: 18 BRPM | BODY MASS INDEX: 27.66 KG/M2 | TEMPERATURE: 97.9 F

## 2022-08-17 PROBLEM — D72.829 LEUKOCYTOSIS: Status: ACTIVE | Noted: 2022-08-17

## 2022-08-17 LAB
ANION GAP SERPL CALCULATED.3IONS-SCNC: 6 MMOL/L (ref 4–13)
BACTERIA UR CULT: NORMAL
BASOPHILS # BLD AUTO: 0.02 THOUSANDS/ΜL (ref 0–0.1)
BASOPHILS NFR BLD AUTO: 0 % (ref 0–1)
BUN SERPL-MCNC: 23 MG/DL (ref 5–25)
CALCIUM SERPL-MCNC: 9.1 MG/DL (ref 8.3–10.1)
CHLORIDE SERPL-SCNC: 96 MMOL/L (ref 96–108)
CO2 SERPL-SCNC: 34 MMOL/L (ref 21–32)
CREAT SERPL-MCNC: 0.98 MG/DL (ref 0.6–1.3)
EOSINOPHIL # BLD AUTO: 0.08 THOUSAND/ΜL (ref 0–0.61)
EOSINOPHIL NFR BLD AUTO: 1 % (ref 0–6)
ERYTHROCYTE [DISTWIDTH] IN BLOOD BY AUTOMATED COUNT: 12.4 % (ref 11.6–15.1)
GFR SERPL CREATININE-BSD FRML MDRD: 55 ML/MIN/1.73SQ M
GLUCOSE SERPL-MCNC: 146 MG/DL (ref 65–140)
GLUCOSE SERPL-MCNC: 160 MG/DL (ref 65–140)
GLUCOSE SERPL-MCNC: 195 MG/DL (ref 65–140)
HCT VFR BLD AUTO: 51.1 % (ref 34.8–46.1)
HGB BLD-MCNC: 16.7 G/DL (ref 11.5–15.4)
IMM GRANULOCYTES # BLD AUTO: 0.06 THOUSAND/UL (ref 0–0.2)
IMM GRANULOCYTES NFR BLD AUTO: 0 % (ref 0–2)
LYMPHOCYTES # BLD AUTO: 1.97 THOUSANDS/ΜL (ref 0.6–4.47)
LYMPHOCYTES NFR BLD AUTO: 13 % (ref 14–44)
MCH RBC QN AUTO: 30.5 PG (ref 26.8–34.3)
MCHC RBC AUTO-ENTMCNC: 32.7 G/DL (ref 31.4–37.4)
MCV RBC AUTO: 93 FL (ref 82–98)
MONOCYTES # BLD AUTO: 1.12 THOUSAND/ΜL (ref 0.17–1.22)
MONOCYTES NFR BLD AUTO: 7 % (ref 4–12)
NEUTROPHILS # BLD AUTO: 12.32 THOUSANDS/ΜL (ref 1.85–7.62)
NEUTS SEG NFR BLD AUTO: 79 % (ref 43–75)
NRBC BLD AUTO-RTO: 0 /100 WBCS
PLATELET # BLD AUTO: 322 THOUSANDS/UL (ref 149–390)
PMV BLD AUTO: 12 FL (ref 8.9–12.7)
POTASSIUM SERPL-SCNC: 4.1 MMOL/L (ref 3.5–5.3)
RBC # BLD AUTO: 5.48 MILLION/UL (ref 3.81–5.12)
SODIUM SERPL-SCNC: 136 MMOL/L (ref 135–147)
WBC # BLD AUTO: 15.57 THOUSAND/UL (ref 4.31–10.16)

## 2022-08-17 PROCEDURE — 85025 COMPLETE CBC W/AUTO DIFF WBC: CPT | Performed by: FAMILY MEDICINE

## 2022-08-17 PROCEDURE — 97530 THERAPEUTIC ACTIVITIES: CPT

## 2022-08-17 PROCEDURE — 82948 REAGENT STRIP/BLOOD GLUCOSE: CPT

## 2022-08-17 PROCEDURE — 99239 HOSP IP/OBS DSCHRG MGMT >30: CPT | Performed by: FAMILY MEDICINE

## 2022-08-17 PROCEDURE — 97116 GAIT TRAINING THERAPY: CPT

## 2022-08-17 PROCEDURE — 80048 BASIC METABOLIC PNL TOTAL CA: CPT | Performed by: FAMILY MEDICINE

## 2022-08-17 PROCEDURE — 94761 N-INVAS EAR/PLS OXIMETRY MLT: CPT

## 2022-08-17 PROCEDURE — 94760 N-INVAS EAR/PLS OXIMETRY 1: CPT

## 2022-08-17 RX ADMIN — METHYLPREDNISOLONE SODIUM SUCCINATE 40 MG: 40 INJECTION, POWDER, FOR SOLUTION INTRAMUSCULAR; INTRAVENOUS at 08:30

## 2022-08-17 RX ADMIN — LISINOPRIL 20 MG: 20 TABLET ORAL at 08:30

## 2022-08-17 RX ADMIN — CHOLECALCIFEROL TAB 25 MCG (1000 UNIT) 2000 UNITS: 25 TAB at 08:30

## 2022-08-17 RX ADMIN — ASPIRIN 81 MG: 81 TABLET, COATED ORAL at 08:29

## 2022-08-17 RX ADMIN — SERTRALINE 25 MG: 25 TABLET, FILM COATED ORAL at 08:30

## 2022-08-17 RX ADMIN — INSULIN GLARGINE 10 UNITS: 100 INJECTION, SOLUTION SUBCUTANEOUS at 08:30

## 2022-08-17 RX ADMIN — INSULIN LISPRO 1 UNITS: 100 INJECTION, SOLUTION INTRAVENOUS; SUBCUTANEOUS at 12:15

## 2022-08-17 RX ADMIN — UMECLIDINIUM 1 PUFF: 62.5 AEROSOL, POWDER ORAL at 08:30

## 2022-08-17 RX ADMIN — INSULIN LISPRO 1 UNITS: 100 INJECTION, SOLUTION INTRAVENOUS; SUBCUTANEOUS at 08:31

## 2022-08-17 RX ADMIN — BUDESONIDE AND FORMOTEROL FUMARATE DIHYDRATE 2 PUFF: 80; 4.5 AEROSOL RESPIRATORY (INHALATION) at 08:32

## 2022-08-17 NOTE — RESPIRATORY THERAPY NOTE
Home Oxygen Qualifying Test     Patient name: Alexandro Dumont        : 1945   Date of Test:  2022  Nyla Rendon on chronic respiratory failure with hypoxia and hypercapnia    Home Oxygen Test:    **Medicare Guidelines require item(s) 1-5 on all ambulatory patients or 1 and 2 on non-ambulatory patients  1  Baseline SPO2 on Room Air at rest 93 %   a  If <= 88% on Room Air add O2 via NC to obtain SpO2 >=88%  If LPM needed, document LPM  needed to reach =>88%    2  SPO2 during exertion on Room Air 90  %  a  During exertion monitor SPO2  If SPO2 increases >=89%, do not add supplemental oxygen    3  SPO2 on Oxygen at Rest na % at na LPM    4  SPO2 during exertion on Oxygen na % at na LPM    5  Test performed during exertion activity  Walking  236 ft, 10 minutes      []  Supplemental Home Oxygen is indicated  [x]  Client does not qualify for home oxygen      Respiratory Additional Notes- pt ambulated with PT and respiratory with 2 pulse ox's for accuracy, pt used walker, RA, mask, gown, head hat    Marquis Lazcano, RT

## 2022-08-17 NOTE — PHYSICAL THERAPY NOTE
PHYSICAL THERAPY NOTE          Patient Name: Perlita GUZMÁN Date: 8/17/2022 08/17/22 1000   PT Last Visit   PT Visit Date 08/17/22   Note Type   Note Type Treatment   Restrictions/Precautions   Weight Bearing Precautions Per Order No   Other Precautions   (Contact/isolation; Chair Alarm; Fall Risk)   General   Response to Previous Treatment Patient with no complaints from previous session  Family/Caregiver Present No   Cognition   Overall Cognitive Status Impaired   Arousal/Participation Alert; Cooperative   Subjective   Subjective Pt  would like to ambulate  Reports she doesn't walk much at home, maybe occasionally out to her back porch  Bed Mobility   Additional Comments OOB sitting on couch at start of session   Transfers   Sit to Stand 5  Supervision   Additional items Verbal cues   Stand to Sit 5  Supervision   Additional items Armrests; Verbal cues   Stand pivot 5  Supervision   Additional items Verbal cues   Additional Comments tx training sit<>stand x 5 with cues for technique and safety   Ambulation/Elevation   Gait pattern   (Short stride; Foward flexed; Decreased foot clearance; Improper Weight shift)   Gait Assistance 5  Supervision   Additional items Assist x 1;Verbal cues   Assistive Device Rolling walker   Distance 240'   Balance   Static Sitting Good   Dynamic Sitting Fair +   Static Standing Fair   Dynamic Standing Fair   Ambulatory Fair -  (RW)   Endurance Deficit   Endurance Deficit Yes   Endurance Deficit Description SpO2 WFL's during ambulation RA   Activity Tolerance   Activity Tolerance Patient limited by fatigue   Assessment   Prognosis Good   Problem List Decreased strength;Decreased endurance; Impaired balance;Decreased mobility; Decreased safety awareness   Assessment Pt  seen for PT treatment session this date with interventions consisting of transfers and  gait training w/ emphasis on improving pt's ability to ambulate  Pt  Requiring cues for sequence and safety  Cues for hand placement and safety for sit<>stand transfers  In comparison to previous session, Pt  With improvements in activity tolerance  Pt is in need of continued activity in PT to improve strength balance endurance mobility transfers and ambulation with return to maximize LOF  From PT/mobility standpoint, recommendation at time of d/c would be home health rehab  in order to promote return to PLOF and independence  The patient's AM-PAC Basic Mobility Inpatient Short Form Raw Score is 22  A Raw score of greater than 16 suggests the patient may benefit from discharge to home  Please also refer to physical therapy recommendation for safe DC planning  Goals   LTG Expiration Date 08/29/22   PT Treatment Day 2   Plan   Treatment/Interventions   (Functional transfer training; LE strengthening/ROM; Elevations; Therapeutic exercise; Endurance training; Bed mobility; Gait training)   Progress Progressing toward goals   PT Frequency 3-5x/wk   Recommendation   PT Discharge Recommendation Home with home health rehabilitation   Yamilka 8 in Bed Without Bedrails 4   Lying on Back to Sitting on Edge of Flat Bed 4   Moving Bed to Chair 4   Standing Up From Chair 4   Walk in Room 3   Climb 3-5 Stairs 3   Basic Mobility Inpatient Raw Score 22   Basic Mobility Standardized Score 47 4   Highest Level Of Mobility   JH-HLM Goal 7: Walk 25 feet or more   JH-HLM Achieved 7: Walk 25 feet or more   Education   Education Provided Mobility training   Patient Demonstrates verbal understanding   End of Consult   Patient Position at End of Consult Bedside chair;Bed/Chair alarm activated; All needs within reach   End of Consult Comments discussed POC with PT

## 2022-08-17 NOTE — DISCHARGE SUMMARY
[facility] [address] [place]  Discharge- Kary Sifuentes 1945, 65 y o  female MRN: [id number]  Unit/Bed#: 403-01 Encounter: [id number]  Primary Care Provider: Emanuel Yusuf DO   Date and time admitted to hospital: 8/13/2022  3:59 PM    * Acute respiratory failure with hypoxia ([facility] )  Assessment & Plan  · Presented with an oxygen saturation level in the 80's%  · Not on any home supplemental oxygen - initially suspected underlying chronic hypoxic, however, this has been ruled out as patient is successfully weaned to room air  · Suspected underlying obstructive lung disease/COPD with her significant tobacco use history  · Required 2 lpm supplemental oxygen, transitioned to room air with no oxygen requirements at time of discharge at rest or on exertion per respiratory therapy evaluation  · COVID-19 testing is negative    · Received IV steroids, discharged on prednisone taper  · Continue Symbicort inhaler on discharge  · Nebulizer treatments on discharge, nebulizer machine provided to the patient prior to discharge  · Pulmonology input appreciated, will need to establish an outpatient follow up   · 2D echo unremarkable, preserved LVEF, no evidence of pulmonary hypertension      Leukocytosis  Assessment & Plan  Most likely steroid-induced  Will need to obtain repeat CBC after completion of course of steroids     COPD exacerbation ([facility] )  Assessment & Plan  Patient presents with respiratory failure  Suspected COPD exacerbation, will need formal diagnosis upon discharge, PFT's  No oxygen requirements at time of discharge  Discharged on a prednisone taper, nebulizer treatments and Symbicort as well as Incruse Ellipta    Type 2 diabetes mellitus with hyperglycemia, without long-term current use of insulin [facility])  Assessment & Plan  Lab Results   Component Value Date    HGBA1C 7 1 (H) 08/03/2021       Recent Labs     08/16/22  1140 08/16/22  1535 08/16/22  2051 08/17/22  0734   POCGLU 186* 290* 124 160*       Blood Sugar Average: Last 72 hrs:  (P) 992 [id number]     · Hemoglobin A1c 7 1, acceptable for geriatric age  · Currently hyperglycemic, suspect steroid induced   Resume home regimen, outpatient follow-up    History of intracranial hemorrhage  Assessment & Plan  · History of intracranial hemorrhage  · No acute intracranial abnormality on CT scan of the head without contrast on 08/13/2022  · Anticoagulation has been on hold, will need follow-up with Neurosurgery to evaluate if anticoagulation can be resumed    Acute metabolic encephalopathy  Assessment & Plan  · Presented in the emergency department with confusion  · Likely due to hypoxia  · Her mental status has improved now that the hypoxia has resolved  Multiple pulmonary nodules  Assessment & Plan  · Need outpatient surveillance with Pulmonology and with PCP    CTA of the chest/PE protocol (08/13/2022): IMPRESSION:     There is no pulmonary embolism      There are 2 solid pulmonary nodules, larger 4 mm in the right upper lobe  Based on current Fleischner Society 2017 Guidelines on incidental pulmonary nodule, optional follow-up CT at 12 months can be considered      Head lice  Assessment & Plan  · Treated with permethrin shampoo    Dermatitis  Assessment & Plan  · Month long history of skin rash with itching  · Began in scalp and traveled downward, covers entire body  · exoriations with open small macular wounds, no pustules noted  · Daughter had similar rash which resolved, daughter did give patient lice treatment at home  · Scalp has multiple plaques but does not appear overly consistent with psoriasis  · Skin lesions appear most consistent with scabies  · Treated with permethrin 1% to scalp and 5% to body  · Treat with ceftriaxone 1000 mg IV every 24 hours for a possible superimposed cellulitis in addition to a possible COPD exacerbation  · Should follow up with Dermatology      Chronic atrial fibrillation [facility])  Assessment & Plan  · Reported as new onset on EKG however pmhx from [facility] shows she has had this for many years  · Currently rate controlled without any AV-mariano blocking agents  · Stroke risk score is a 5, highly recommended for [facility] [place], patient had been initiated on coumadin by Cardiology in the past but was taken off of this due to history of ICH  · For now will continue aspirin 81 mg daily  · Will need to f/u with Neurosurgery if [facility] [place] could be resumed   · 2D echo reviewed, preserved LVEF, unremarkable      Tobacco use  Assessment & Plan  · Long history of cigarette smoking  · Nicotine replacement while inpatient  · Smoking cessation counseling  · The patient is not interested in quitting smoking  Medical Problems             Resolved Problems  Date Reviewed: 8/17/2022          Resolved    Topical food dermatitis 8/13/2022     Resolved by  Alex Timmons PA-C              Discharging Physician / Practitioner: Sarah Mora MD  PCP: Paul Rand DO  Admission Date:   Admission Orders (From admission, onward)     Ordered        08/13/22 2000  INPATIENT ADMISSION  Once                      Discharge Date: 08/17/22    Consultations During Hospital Stay:  · Pulmonology    Procedures Performed:   CTA ED chest PE study   Final Result by Juan David Lowry MD (08/13 1841)      There is no pulmonary embolism  There are 2 solid pulmonary nodules, larger 4 mm in the right upper lobe  Based on current Fleischner Society 2017 Guidelines on incidental pulmonary nodule, optional follow-up CT at 12 months can be considered  Severe pectus excavatum deformity  Workstation performed: [id number]         CT head without contrast   Final Result by uJan David Lowry MD (08/13 2543)      No acute intracranial abnormality  Workstation performed: [id number]         XR chest 1 view portable   ED Interpretation by Vannesa Godoy MD (08/13 3401)   Read by me; Radiologist to provide formal interpretation   Cardiomegally increased pulm [facility]        Final Result by Manohar العراقي MD (08/14 3759)      No acute cardiopulmonary disease  Workstation performed: [id number]               Significant Findings / Test Results:   Results from last 7 days   Lab Units 08/17/22  0501   WBC Thousand/uL 15 57*   HEMOGLOBIN g/dL 16 7*   HEMATOCRIT % 51 1*   PLATELETS Thousands/uL 322     Results from last 7 days   Lab Units 08/17/22  0501   SODIUM mmol/L 136   POTASSIUM mmol/L 4 1   CHLORIDE mmol/L 96   CO2 mmol/L 34*   BUN mg/dL 23   CREATININE mg/dL 0 98   CALCIUM mg/dL 9 1         Incidental Findings:   · None      Test Results Pending at Discharge (will require follow up): · None     Outpatient Tests Requested:  · None    Complications:  None    Reason for Admission:  Shortness of breath    Hospital Course:     Alberta Baca is a 65 y o  female patient who originally presented to the hospital on 8/13/2022 due to generalized rash, shortness of breath  Found be in acute hypoxic respiratory failure secondary to suspected not previously diagnosed COPD exacerbation  Patient initially required supplemental oxygen, fortunately was able to weaned to room air time discharge  She did undergo treatment IV steroids and is being discharged on a prednisone taper  In regards to patient's diffuse rash, there was concern for possibility of scabies  Patient was also suspected for head lice  She underwent treatment with permethrin and is urged to follow-up with her PCP short-term basis for further diagnostics and treatment  The patient was discharged home with VNA in improved and stable condition  Plan of care was discussed with the patient's daughter over the phone  Medications were provided to the patient prior to her discharge  Please see above list of diagnoses and related plan for additional information  Condition at Discharge: stable    Discharge Day Visit / Exam:   Subjective:  Patient seen and examined    She voices no new complaints and is eager to be discharged home  She is on room air  Vitals: Blood Pressure: 129/98 (08/17/22 0737)  Pulse: 76 (08/17/22 0737)  Temperature: 97 9 °F (36 6 °C) (08/17/22 0737)  Temp Source: Oral (08/17/22 0737)  Respirations: 18 (08/17/22 0737)  Height: 5' 5" (165 1 cm) (08/15/22 1055)  Weight - Scale: 75 3 kg (166 lb) (08/15/22 1055)  SpO2: 90 % (08/17/22 0737)  Exam:   Physical Exam  Constitutional:       General: She is not in acute distress  HENT:      Head: Normocephalic and atraumatic  Nose: No congestion  Eyes:      Conjunctiva/sclera: Conjunctivae normal    Cardiovascular:      Rate and Rhythm: Normal rate and regular rhythm  Pulmonary:      Effort: No respiratory distress  Breath sounds: Decreased air movement present  Abdominal:      General: There is no distension  Tenderness: There is no abdominal tenderness  There is no guarding  Musculoskeletal:      Right lower leg: No edema  Left lower leg: No edema  Skin:     Comments: Generalized pruritic erythematous maculopapular rash   Neurological:      Mental Status: Mental status is at baseline  Psychiatric:         Mood and Affect: Mood is anxious  Discussion with Family: Updated  (daughter) via phone  Discharge instructions/Information to patient and family:   See after visit summary for information provided to patient and family  Provisions for Follow-Up Care:  See after visit summary for information related to follow-up care and any pertinent home health orders  Disposition:   Home with VNA Services (Reminder: Complete face to face encounter)    Planned Readmission:  No     Discharge Statement:  I spent 35 minutes discharging the patient  This time was spent on the day of discharge  I had direct contact with the patient on the day of discharge   Greater than 50% of the total time was spent examining patient, answering all patient questions, arranging and discussing plan of care with patient as well as directly providing post-discharge instructions  Additional time then spent on discharge activities  Discharge Medications:  See after visit summary for reconciled discharge medications provided to patient and/or family        **Please Note: This note may have been constructed using a voice recognition system**

## 2022-08-17 NOTE — NURSING NOTE
Pt meds were delivered via bedside   Meds included; incruse, nicotine patch, Prednisone, albuterol, symbicort,

## 2022-08-17 NOTE — ASSESSMENT & PLAN NOTE
· Month long history of skin rash with itching  · Began in scalp and traveled downward, covers entire body  · exoriations with open small macular wounds, no pustules noted  · Daughter had similar rash which resolved, daughter did give patient lice treatment at home  · Scalp has multiple plaques but does not appear overly consistent with psoriasis  · Skin lesions appear most consistent with scabies  · Treated with permethrin 1% to scalp and 5% to body  · Treat with ceftriaxone 1000 mg IV every 24 hours for a possible superimposed cellulitis in addition to a possible COPD exacerbation  · Should follow up with Dermatology

## 2022-08-17 NOTE — ASSESSMENT & PLAN NOTE
· Reported as new onset on EKG however pmhx from [facility] shows she has had this for many years  · Currently rate controlled without any AV-mariano blocking agents  · Stroke risk score is a 5, highly recommended for [facility], patient had been initiated on coumadin by Cardiology in the past but was taken off of this due to history of [address]  · For now will continue aspirin 81 mg daily  · Will need to f/u with Neurosurgery if [facility] could be resumed   · 2D echo reviewed, preserved LVEF, unremarkable

## 2022-08-17 NOTE — WOUND OSTOMY CARE
Progress Note - Wound   Di Ripple 65 y o  female MRN: [id number]  Unit/Bed#: 403-01 Encounter: [id number]       History and Present Illness:  65year old female admitted with acute on chronic respiratory failure with hypoxia and hypercapnia  Wound care consulted for generalized rash with excoriations  Patient history significant for tobacco use, a/fib, dermatitis, head lice, DDM2, COPD, and HTN  Seen today for follow up as initial consult was remote  Treated for head lice scabies  Patient scheduled for discharge to home today  Reviewed skin care with patient  Assessment:   Dry rash of bilateral upper extremities  Rash resolved, upper back  Wound 08/17/22 Arm Anterior;Right; Inferior (Active)   Wound Image   08/17/22 0947   Treatments Cleansed 08/17/22 0947   Dressing Open to air 08/17/22 0947       Wound 08/17/22 Arm Anterior;Left; Inferior (Active)   Wound Image   08/17/22 0948   Treatments Cleansed;Site care 08/17/22 0948   Dressing Open to air 08/17/22 0948          Skin Care Plan:   1  Skin nourishing cream to the entire skin daily and PRN  2  Elevate heels off of bed with pillows to offload  3  Apply Hydraguard to b/l heels, buttocks and sacrum BID and PRN  4   Turn patient Q2 hours      Wound Care will sign off  Call or Tigertext with any questions    Tonie ORTEGAN RN

## 2022-08-17 NOTE — ASSESSMENT & PLAN NOTE
· Presented with an oxygen saturation level in the 80's%  · Not on any home supplemental oxygen  · Possible underlying obstructive lung disease/COPD with her significant tobacco use history  · Required 2 lpm supplemental oxygen, transitioned to room air with no oxygen requirements at time of discharge at rest or on exertion per respiratory therapy evaluation  · COVID-19 testing is negative    · Received IV steroids, discharged on prednisone taper  · Continue Symbicort inhaler on discharge  · Nebulizer treatments on discharge, nebulizer machine provided to the patient prior to discharge  · Pulmonology input appreciated, will need to establish an outpatient follow up   · 2D echo unremarkable, preserved LVEF, no evidence of pulmonary hypertension

## 2022-08-17 NOTE — CASE MANAGEMENT
Case Management Discharge Planning Note    Patient name Bozena Capps  Location /810-56 MRN [id number]  : 1945 Date 2022       Current Admission Date: 2022  Current Admission Diagnosis:Acute on chronic respiratory failure with hypoxia and hypercapnia [facility])   Patient Active Problem List    Diagnosis Date Noted    Leukocytosis 2022    [facility] exacerb[facility] )     Head lice     Multiple pulmonary nodules 2022    Acute metabolic encephalopathy     History of intracranial hemorrhage 2022    Hyponatremia 2022    Hypokalemia 2022    Hyperbilirubinemia 2022    Type 2 diabetes mellitus with hyperglycemia, without long-term current use of insulin ([facility] ) 2022    Vitamin D deficiency 2022    [facility] 2022    Dermatitis 2022    Acute on chronic respiratory failure with hypoxia and hypercapnia ([facility] ) 10/18/2021    Ambulatory dysfunction 10/18/2021    Medicare annual wellness visit, subsequent 2018    Tobacco use 2018    Type II or unspecified type diabetes mellitus without mention of complication, not stated as uncontrolled 10/02/2014    Hyperlipidemia 10/02/2014    Hypertension 10/02/2014      LOS (days): 4  Geometric Mean LOS (GMLOS) (days): 4 30  Days to GMLOS:0 6     OBJECTIVE:  Risk of Unplanned Readmission Score: 12 66         Current admission status: Inpatient   Preferred Pharmacy:   [address] [place] [address], [address] [place] [address] [id number] [facility] [place] [address]  Phone: [phone] Fax: [phone]    [address], [address] [address] [facility] [address] [facility]  Phone: [phone] Fax: [phone]    Primary Care Provider: Sonny Sargent DO    Primary Insurance: MEDICARE  Secondary Insurance:     DISCHARGE DETAILS:    Discharge planning discussed with[de-identified] Patient and Dtr Brandon Kin  Freedom of Choice: Yes     CM contacted family/caregiver?: Yes    Contacts  Patient Contacts: Shellydevon Mcknight  Relationship to Patient[de-identified] Family  Contact Method: Phone  Phone Number: [phone]  Reason/Outcome: Emergency Contact, Discharge Planning    Other Referral/Resources/Interventions Provided:  Interventions: [facility]    Treatment Team Recommendation: Home with [address]  Discharge Destination Plan[de-identified] Home with [place] at Discharge : Other (Comment) ([facility])  Dispatcher Contacted: Yes  Number/Name of Dispatcher: [facility] Spoke with [facility] by Hao and Unit #): [place] [facility]  ETA of Transport (Date): 08/17/22  ETA of Transport (Time): 1500        Additional Comments: Met with patient provided with nebulizer  Discussed HHC and name of agency  Patient reports dtr Brandon Kin will be home and will assist into house at discharge  Patient needs transport  Call to [employer] and spoke with [facility] and scheduled [facility] between Zachery Ortiz  Updated nurse, and patient    VM left for Brandon Kin with update

## 2022-08-17 NOTE — ASSESSMENT & PLAN NOTE
Lab Results   Component Value Date    HGBA1C 7 1 (H) 08/03/2021       Recent Labs     08/16/22  1140 08/16/22  1535 08/16/22 2051 08/17/22  0734   POCGLU 186* 290* 124 160*       Blood Sugar Average: Last 72 hrs:  (P) 933 [id number]     · Hemoglobin A1c 7 1, acceptable for geriatric age  · Currently hyperglycemic, suspect steroid induced   Resume home regimen, outpatient follow-up

## 2022-08-17 NOTE — ASSESSMENT & PLAN NOTE
Patient presents with respiratory failure  Suspected COPD exacerbation, will need formal diagnosis upon discharge, PFT's  No oxygen requirements at time of discharge  Discharged on a prednisone taper, nebulizer treatments and Symbicort as well as Incruse Ellipta

## 2022-08-18 ENCOUNTER — PATIENT OUTREACH (OUTPATIENT)
Dept: FAMILY MEDICINE CLINIC | Facility: CLINIC | Age: 77
End: 2022-08-18

## 2022-08-18 ENCOUNTER — TELEPHONE (OUTPATIENT)
Dept: FAMILY MEDICINE CLINIC | Facility: CLINIC | Age: 77
End: 2022-08-18

## 2022-08-18 DIAGNOSIS — E11.9 TYPE 2 DIABETES MELLITUS WITHOUT COMPLICATION, WITHOUT LONG-TERM CURRENT USE OF INSULIN (HCC): Primary | ICD-10-CM

## 2022-08-18 NOTE — TELEPHONE ENCOUNTER
Trudy with [facility] called, said when pt was d/c from hospital, zoloft 25mg was listed on her med list, but pt's daughter said she hasn't taken this in a while  Asking if she's still supposed to be on this? Also, knows she is on Janumet, but asking if you wanted pt to be checking her BS at home?

## 2022-08-18 NOTE — PROGRESS NOTES
Outpatient Care Management Note:  Outreach call attempted to Catalino Hoffman  Number listed is not in service  Call attempted to Tiffani's daughter, Patricia Oconnell  Message left for patient to please return call  Contact information left on message  Call placed to Pola Salazar at [facility]  Catalino Hoffman has only care management services in place  She was interested in home delivered meals but as the daughter lives there and is able to provide meals, she is not eligible  Questioned transportation  Per Edgard Panda daughter told her that her SO was providing transportation for her mother  Advised Pola Salazar that was apparently no longer the case  I also made Pola Salazar aware that an STS application was provided by the IP CM  Pola Salazar has also been unable to reach Catalino Hoffman for several weeks due to the number not being in service

## 2022-08-19 LAB
BACTERIA BLD CULT: NORMAL
BACTERIA BLD CULT: NORMAL
DME PARACHUTE DELIVERY DATE ACTUAL: NORMAL
DME PARACHUTE DELIVERY DATE REQUESTED: NORMAL
DME PARACHUTE ITEM DESCRIPTION: NORMAL
DME PARACHUTE ITEM DESCRIPTION: NORMAL
DME PARACHUTE ORDER STATUS: NORMAL
DME PARACHUTE SUPPLIER NAME: NORMAL
DME PARACHUTE SUPPLIER PHONE: NORMAL

## 2022-08-19 RX ORDER — BLOOD-GLUCOSE METER
1 EACH MISCELLANEOUS DAILY
Qty: 1 KIT | Refills: 0 | Status: SHIPPED | OUTPATIENT
Start: 2022-08-19

## 2022-08-19 RX ORDER — LANCETS
1 EACH MISCELLANEOUS DAILY
COMMUNITY
End: 2022-08-19 | Stop reason: SDUPTHER

## 2022-08-19 RX ORDER — LANCETS
1 EACH MISCELLANEOUS DAILY
Qty: 100 EACH | Refills: 2 | Status: SHIPPED | OUTPATIENT
Start: 2022-08-19

## 2022-08-19 RX ORDER — BLOOD-GLUCOSE METER
1 EACH MISCELLANEOUS DAILY
COMMUNITY
End: 2022-08-19 | Stop reason: SDUPTHER

## 2022-08-19 NOTE — TELEPHONE ENCOUNTER
If pt has not been taking zoloft ok to hold for now  Pt is diabetic so if they can check sugars that would be helpful

## 2022-08-22 ENCOUNTER — PATIENT OUTREACH (OUTPATIENT)
Dept: FAMILY MEDICINE CLINIC | Facility: CLINIC | Age: 77
End: 2022-08-22

## 2022-08-22 DIAGNOSIS — E11.9 TYPE 2 DIABETES MELLITUS WITHOUT COMPLICATION, WITHOUT LONG-TERM CURRENT USE OF INSULIN (HCC): ICD-10-CM

## 2022-08-22 DIAGNOSIS — Z78.9 NEED FOR FOLLOW-UP BY SOCIAL WORKER: Primary | ICD-10-CM

## 2022-08-22 NOTE — PROGRESS NOTES
Referral received from [facility]  CM to offer assistance to patient  Patient was provided with STS application  Placed call to patient to introduce self and offer assistance  Phone rang for several minutes and there was no answer  SW CM will attempt another outreach within one week

## 2022-08-22 NOTE — TELEPHONE ENCOUNTER
The glucometer you sent to pharmacy for her is to expensive  She does have an [address] at home but the test strips are   Needs test strips for this machine  Would you please prescribe these for her?

## 2022-08-23 ENCOUNTER — TELEPHONE (OUTPATIENT)
Dept: FAMILY MEDICINE CLINIC | Facility: CLINIC | Age: 77
End: 2022-08-23

## 2022-08-23 LAB — MRSA NOSE QL CULT: NORMAL

## 2022-08-23 NOTE — TELEPHONE ENCOUNTER
Elpidio Rodriguez with [facility] PT said he did the initial eval with pt today, but pt doesn't want PT so they will not be following up with her

## 2022-08-25 ENCOUNTER — PATIENT OUTREACH (OUTPATIENT)
Dept: FAMILY MEDICINE CLINIC | Facility: CLINIC | Age: 77
End: 2022-08-25

## 2022-08-25 DIAGNOSIS — E11.9 TYPE 2 DIABETES MELLITUS WITHOUT COMPLICATION, WITHOUT LONG-TERM CURRENT USE OF INSULIN (HCC): ICD-10-CM

## 2022-08-25 NOTE — PROGRESS NOTES
Outpatient Care Management Note:  Outreach call attempted to Tiffani's daughter, Radhika Spicer, as Tiffani's phone is not in service  Message left for patient to please return call  Contact information left on message

## 2022-08-29 ENCOUNTER — PATIENT OUTREACH (OUTPATIENT)
Dept: FAMILY MEDICINE CLINIC | Facility: CLINIC | Age: 77
End: 2022-08-29

## 2022-08-29 NOTE — PROGRESS NOTES
Outpatient Care Management Note:  No response to telephone outreach attempts  [facility] letter mailed per protocol

## 2022-08-29 NOTE — LETTER
Date: 08/29/22    Dear Lavern Leslie,   My name is Radha Dickens; I am a registered nurse care manager working with [address] [place] [address]  I have not been able to reach you and would like to set a time that I can talk with you over the phone or in-person  My work is to help patients that have complex medical conditions get the care they need  This includes patients who may have been in the hospital or emergency room  I have enclosed information for you  Please call me with any questions you may have  I look forward to speaking with you    Sincerely,  Radha Dickens RN, BSN  [phone]  Outpatient Care Manager

## 2022-08-30 ENCOUNTER — PATIENT OUTREACH (OUTPATIENT)
Dept: FAMILY MEDICINE CLINIC | Facility: CLINIC | Age: 77
End: 2022-08-30

## 2022-08-30 NOTE — PROGRESS NOTES
JERAMIE CM closing referral at this time as patient has not responded to outreach attempts  RN CM sent [facility] letter and JERAMIE CM will remain available for any needs

## 2022-09-12 ENCOUNTER — PATIENT OUTREACH (OUTPATIENT)
Dept: FAMILY MEDICINE CLINIC | Facility: CLINIC | Age: 77
End: 2022-09-12

## 2022-09-12 NOTE — PROGRESS NOTES
Outpatient Care Management Note:  No response to telephone outreach or [facility] letter  Closing care management episode at this time

## 2022-09-14 ENCOUNTER — TELEPHONE (OUTPATIENT)
Dept: FAMILY MEDICINE CLINIC | Facility: CLINIC | Age: 77
End: 2022-09-14

## 2022-09-14 NOTE — TELEPHONE ENCOUNTER
Marya Pro with [facility] called and said pt's BP has been running low, was 98/58 today  Pt takes her lisinopril-hctz 20-12 5mg BID  Said she has swelling in her left lower extremity and denies any HA, dizziness, or SOB  Please advise

## 2022-09-20 ENCOUNTER — TELEPHONE (OUTPATIENT)
Dept: FAMILY MEDICINE CLINIC | Facility: CLINIC | Age: 77
End: 2022-09-20

## 2022-09-20 NOTE — TELEPHONE ENCOUNTER
Decrease lisinopril to once daily and if already ont hat then hold for bp less than or equal to 100    Pateint as per last note needs to setup followup visit as I have not seen since the hospital for me to address ongoing issues

## 2022-09-20 NOTE — TELEPHONE ENCOUNTER
Hayde with [facility] called to give update on pt  Said her BP is still low, was 92/54 today, BS was 224  Said she has a moist cough and wheezing so she did a covid test on her which was negative  Pt is doing her nebulizer treatments  Please advise

## 2022-09-20 NOTE — TELEPHONE ENCOUNTER
Pt cannot monitor BP at home, does not have a cuff and only has home health once a week  Daughter asking if you want pt to take only half a tab daily instead until she comes in for appt? I will have  schedule pt

## 2022-09-20 NOTE — TELEPHONE ENCOUNTER
Leatha Ingram and daughter made aware  Daughter currently working on getting pt transportation to bring her into office

## 2022-09-21 ENCOUNTER — APPOINTMENT (OUTPATIENT)
Dept: RADIOLOGY | Facility: HOSPITAL | Age: 77
DRG: 177 | End: 2022-09-21
Payer: MEDICARE

## 2022-09-21 ENCOUNTER — TELEPHONE (OUTPATIENT)
Dept: FAMILY MEDICINE CLINIC | Facility: CLINIC | Age: 77
End: 2022-09-21

## 2022-09-21 ENCOUNTER — HOSPITAL ENCOUNTER (INPATIENT)
Facility: HOSPITAL | Age: 77
LOS: 1 days | Discharge: HOME HEALTH | DRG: 177 | End: 2022-09-23
Attending: EMERGENCY MEDICINE | Admitting: INTERNAL MEDICINE
Payer: MEDICARE

## 2022-09-21 DIAGNOSIS — U07.1 COVID-19 VIRUS INFECTION: Primary | ICD-10-CM

## 2022-09-21 DIAGNOSIS — I48.21 PERMANENT ATRIAL FIBRILLATION (HCC): ICD-10-CM

## 2022-09-21 DIAGNOSIS — R09.02 HYPOXIA: ICD-10-CM

## 2022-09-21 DIAGNOSIS — J44.1 COPD EXACERBATION (HCC): ICD-10-CM

## 2022-09-21 DIAGNOSIS — R79.0 LOW MAGNESIUM LEVEL: ICD-10-CM

## 2022-09-21 DIAGNOSIS — E11.9 TYPE 2 DIABETES MELLITUS WITHOUT COMPLICATION, WITHOUT LONG-TERM CURRENT USE OF INSULIN (HCC): ICD-10-CM

## 2022-09-21 DIAGNOSIS — E87.6 LOW BLOOD POTASSIUM: ICD-10-CM

## 2022-09-21 DIAGNOSIS — E55.9 VITAMIN D DEFICIENCY: ICD-10-CM

## 2022-09-21 DIAGNOSIS — I10 PRIMARY HYPERTENSION: ICD-10-CM

## 2022-09-21 DIAGNOSIS — J18.9 LLL PNEUMONIA: ICD-10-CM

## 2022-09-21 DIAGNOSIS — J96.01 ACUTE RESPIRATORY FAILURE WITH HYPOXIA (HCC): ICD-10-CM

## 2022-09-21 DIAGNOSIS — E83.52 HYPERCALCEMIA: ICD-10-CM

## 2022-09-21 DIAGNOSIS — E83.42 HYPOMAGNESEMIA: ICD-10-CM

## 2022-09-21 DIAGNOSIS — R79.89 D-DIMER, ELEVATED: ICD-10-CM

## 2022-09-21 DIAGNOSIS — R31.29 MICROSCOPIC HEMATURIA: ICD-10-CM

## 2022-09-21 LAB
ALBUMIN SERPL BCP-MCNC: 3 G/DL (ref 3.5–5)
ALP SERPL-CCNC: 81 U/L (ref 46–116)
ALT SERPL W P-5'-P-CCNC: 19 U/L (ref 12–78)
ANION GAP SERPL CALCULATED.3IONS-SCNC: 7 MMOL/L (ref 4–13)
AST SERPL W P-5'-P-CCNC: 17 U/L (ref 5–45)
BASOPHILS # BLD AUTO: 0.1 THOUSANDS/ΜL (ref 0–0.1)
BASOPHILS NFR BLD AUTO: 1 % (ref 0–1)
BILIRUB SERPL-MCNC: 0.88 MG/DL (ref 0.2–1)
BUN SERPL-MCNC: 19 MG/DL (ref 5–25)
CALCIUM ALBUM COR SERPL-MCNC: 10.3 MG/DL (ref 8.3–10.1)
CALCIUM SERPL-MCNC: 9.5 MG/DL (ref 8.3–10.1)
CARDIAC TROPONIN I PNL SERPL HS: 21 NG/L
CHLORIDE SERPL-SCNC: 96 MMOL/L (ref 96–108)
CO2 SERPL-SCNC: 37 MMOL/L (ref 21–32)
CREAT SERPL-MCNC: 1.07 MG/DL (ref 0.6–1.3)
D DIMER PPP FEU-MCNC: 1.2 UG/ML FEU
EOSINOPHIL # BLD AUTO: 0.06 THOUSAND/ΜL (ref 0–0.61)
EOSINOPHIL NFR BLD AUTO: 1 % (ref 0–6)
ERYTHROCYTE [DISTWIDTH] IN BLOOD BY AUTOMATED COUNT: 12.1 % (ref 11.6–15.1)
FLUAV RNA RESP QL NAA+PROBE: NEGATIVE
FLUBV RNA RESP QL NAA+PROBE: NEGATIVE
GFR SERPL CREATININE-BSD FRML MDRD: 49 ML/MIN/1.73SQ M
GLUCOSE SERPL-MCNC: 174 MG/DL (ref 65–140)
HCT VFR BLD AUTO: 50.6 % (ref 34.8–46.1)
HGB BLD-MCNC: 16.3 G/DL (ref 11.5–15.4)
IMM GRANULOCYTES # BLD AUTO: 0.08 THOUSAND/UL (ref 0–0.2)
IMM GRANULOCYTES NFR BLD AUTO: 1 % (ref 0–2)
LYMPHOCYTES # BLD AUTO: 1.22 THOUSANDS/ΜL (ref 0.6–4.47)
LYMPHOCYTES NFR BLD AUTO: 10 % (ref 14–44)
MAGNESIUM SERPL-MCNC: 1.5 MG/DL (ref 1.6–2.6)
MCH RBC QN AUTO: 30.5 PG (ref 26.8–34.3)
MCHC RBC AUTO-ENTMCNC: 32.2 G/DL (ref 31.4–37.4)
MCV RBC AUTO: 95 FL (ref 82–98)
MONOCYTES # BLD AUTO: 1.01 THOUSAND/ΜL (ref 0.17–1.22)
MONOCYTES NFR BLD AUTO: 8 % (ref 4–12)
NEUTROPHILS # BLD AUTO: 10.09 THOUSANDS/ΜL (ref 1.85–7.62)
NEUTS SEG NFR BLD AUTO: 79 % (ref 43–75)
NRBC BLD AUTO-RTO: 0 /100 WBCS
NT-PROBNP SERPL-MCNC: 2158 PG/ML
PLATELET # BLD AUTO: 281 THOUSANDS/UL (ref 149–390)
PMV BLD AUTO: 12.2 FL (ref 8.9–12.7)
POTASSIUM SERPL-SCNC: 3 MMOL/L (ref 3.5–5.3)
PROCALCITONIN SERPL-MCNC: 0.08 NG/ML
PROT SERPL-MCNC: 7.6 G/DL (ref 6.4–8.4)
RBC # BLD AUTO: 5.34 MILLION/UL (ref 3.81–5.12)
RSV RNA RESP QL NAA+PROBE: NEGATIVE
SARS-COV-2 RNA RESP QL NAA+PROBE: POSITIVE
SODIUM SERPL-SCNC: 140 MMOL/L (ref 135–147)
TSH SERPL DL<=0.05 MIU/L-ACNC: 1.79 UIU/ML (ref 0.45–4.5)
WBC # BLD AUTO: 12.56 THOUSAND/UL (ref 4.31–10.16)

## 2022-09-21 PROCEDURE — 93005 ELECTROCARDIOGRAM TRACING: CPT

## 2022-09-21 PROCEDURE — 99285 EMERGENCY DEPT VISIT HI MDM: CPT

## 2022-09-21 PROCEDURE — 83880 ASSAY OF NATRIURETIC PEPTIDE: CPT | Performed by: EMERGENCY MEDICINE

## 2022-09-21 PROCEDURE — 94640 AIRWAY INHALATION TREATMENT: CPT

## 2022-09-21 PROCEDURE — 84145 PROCALCITONIN (PCT): CPT | Performed by: EMERGENCY MEDICINE

## 2022-09-21 PROCEDURE — 71045 X-RAY EXAM CHEST 1 VIEW: CPT

## 2022-09-21 PROCEDURE — 96365 THER/PROPH/DIAG IV INF INIT: CPT

## 2022-09-21 PROCEDURE — 85379 FIBRIN DEGRADATION QUANT: CPT | Performed by: EMERGENCY MEDICINE

## 2022-09-21 PROCEDURE — 80053 COMPREHEN METABOLIC PANEL: CPT | Performed by: EMERGENCY MEDICINE

## 2022-09-21 PROCEDURE — 0241U HB NFCT DS VIR RESP RNA 4 TRGT: CPT | Performed by: EMERGENCY MEDICINE

## 2022-09-21 PROCEDURE — 87040 BLOOD CULTURE FOR BACTERIA: CPT | Performed by: EMERGENCY MEDICINE

## 2022-09-21 PROCEDURE — 36415 COLL VENOUS BLD VENIPUNCTURE: CPT | Performed by: EMERGENCY MEDICINE

## 2022-09-21 PROCEDURE — 85025 COMPLETE CBC W/AUTO DIFF WBC: CPT | Performed by: EMERGENCY MEDICINE

## 2022-09-21 PROCEDURE — 84443 ASSAY THYROID STIM HORMONE: CPT | Performed by: EMERGENCY MEDICINE

## 2022-09-21 PROCEDURE — 96375 TX/PRO/DX INJ NEW DRUG ADDON: CPT

## 2022-09-21 PROCEDURE — 96367 TX/PROPH/DG ADDL SEQ IV INF: CPT

## 2022-09-21 PROCEDURE — 84484 ASSAY OF TROPONIN QUANT: CPT | Performed by: EMERGENCY MEDICINE

## 2022-09-21 PROCEDURE — 83735 ASSAY OF MAGNESIUM: CPT | Performed by: EMERGENCY MEDICINE

## 2022-09-21 PROCEDURE — 99285 EMERGENCY DEPT VISIT HI MDM: CPT | Performed by: EMERGENCY MEDICINE

## 2022-09-21 RX ORDER — IPRATROPIUM BROMIDE AND ALBUTEROL SULFATE 2.5; .5 MG/3ML; MG/3ML
3 SOLUTION RESPIRATORY (INHALATION)
Status: DISCONTINUED | OUTPATIENT
Start: 2022-09-21 | End: 2022-09-22

## 2022-09-21 RX ORDER — CEFTRIAXONE 1 G/50ML
1000 INJECTION, SOLUTION INTRAVENOUS ONCE
Status: COMPLETED | OUTPATIENT
Start: 2022-09-21 | End: 2022-09-21

## 2022-09-21 RX ORDER — POTASSIUM CHLORIDE 20 MEQ/1
40 TABLET, EXTENDED RELEASE ORAL ONCE
Status: COMPLETED | OUTPATIENT
Start: 2022-09-21 | End: 2022-09-21

## 2022-09-21 RX ORDER — MAGNESIUM SULFATE HEPTAHYDRATE 40 MG/ML
2 INJECTION, SOLUTION INTRAVENOUS ONCE
Status: COMPLETED | OUTPATIENT
Start: 2022-09-21 | End: 2022-09-21

## 2022-09-21 RX ORDER — METHYLPREDNISOLONE SODIUM SUCCINATE 125 MG/2ML
80 INJECTION, POWDER, LYOPHILIZED, FOR SOLUTION INTRAMUSCULAR; INTRAVENOUS ONCE
Status: COMPLETED | OUTPATIENT
Start: 2022-09-21 | End: 2022-09-21

## 2022-09-21 RX ORDER — AZITHROMYCIN 250 MG/1
500 TABLET, FILM COATED ORAL ONCE
Status: COMPLETED | OUTPATIENT
Start: 2022-09-21 | End: 2022-09-21

## 2022-09-21 RX ADMIN — CEFTRIAXONE 1000 MG: 1 INJECTION, SOLUTION INTRAVENOUS at 21:47

## 2022-09-21 RX ADMIN — MAGNESIUM SULFATE HEPTAHYDRATE 2 G: 40 INJECTION, SOLUTION INTRAVENOUS at 20:12

## 2022-09-21 RX ADMIN — IPRATROPIUM BROMIDE AND ALBUTEROL SULFATE 3 ML: 2.5; .5 SOLUTION RESPIRATORY (INHALATION) at 17:36

## 2022-09-21 RX ADMIN — POTASSIUM CHLORIDE 40 MEQ: 1500 TABLET, EXTENDED RELEASE ORAL at 20:07

## 2022-09-21 RX ADMIN — METHYLPREDNISOLONE SODIUM SUCCINATE 80 MG: 125 INJECTION, POWDER, FOR SOLUTION INTRAMUSCULAR; INTRAVENOUS at 17:35

## 2022-09-21 RX ADMIN — AZITHROMYCIN MONOHYDRATE 500 MG: 250 TABLET ORAL at 20:07

## 2022-09-21 RX ADMIN — IPRATROPIUM BROMIDE AND ALBUTEROL SULFATE 3 ML: 2.5; .5 SOLUTION RESPIRATORY (INHALATION) at 20:14

## 2022-09-21 NOTE — ED PROVIDER NOTES
History  Chief Complaint   Patient presents with    Decreased Oxygen Level     Patient arrives via EMS; per EMS, home health nurses were at patient's home and stated patient's O2 saturation dropped into the 80s; patient placed on 4 L nasal cannula oxygen by EMS and O2 saturation increased to 92-93%; patient 87-88% on room air upon triage; patient stating "I would like to go home" upon triage     23-year-old female with history of type 2 diabetes hyperlipidemia hypertension COPD not on home oxygen chronic atrial fib on aspirin 81 mg for anticoagulation history of intracranial hemorrhage multiple pulmonary nodules and a chronic dermatitis patient was admitted in August from 31 60 98 with a COPD exacerbation she received antibiotics IV steroids and a prednisone taper she is brought in by ambulance today after home health noted she was hypoxic with oxygen on room air into the mid 80s  She was placed on 4 L nasal cannula her sats improved 93%  Patient does have a cough but does not bring up any phlegm she is denying any fever chills she has no chest pain or pleuritic pain she has had no lower extremity edema she denies upper respiratory complaints no abdominal pain no nausea vomiting diarrhea no dysuria or increased urinary frequency  Prior to Admission Medications   Prescriptions Last Dose Informant Patient Reported? Taking? Accu-Chek FastClix Lancets MISC Unknown at Unknown time  No No   Sig: Use 1 each daily to test blood sugars  Blood Glucose Monitoring Suppl (Accu-Chek Flores Plus) w/Device KIT   No No   Sig: Use 1 kit daily to test blood sugars     albuterol (2 5 mg/3 mL) 0 083 % nebulizer solution Unknown at Unknown time  No No   Sig: Take 3 mL (2 5 mg total) by nebulization every 6 (six) hours as needed for shortness of breath   aspirin 81 MG tablet Unknown at Unknown time  Yes No   Sig: Take 1 tablet by mouth daily   budesonide-formoterol (SYMBICORT) 80-4 5 MCG/ACT inhaler 9/20/2022 at Unknown time No Yes   Sig: Inhale 2 puffs 2 (two) times a day Rinse mouth after use  ergocalciferol (VITAMIN D2) 50,000 units Unknown at Unknown time  No No   Sig: Take 1 capsule (50,000 Units total) by mouth once a week   glipiZIDE (GLUCOTROL) 10 mg tablet Unknown at Unknown time  No No   Sig: Take 1 tablet (10 mg total) by mouth 2 (two) times a day   glucose blood test strip   Yes No   Sig: Use 1 each 4 (four) times a day Use as instructed   glucose blood test strip   No No   Sig: Use 1 each daily to test blood sugars  lisinopril-hydrochlorothiazide (PRINZIDE,ZESTORETIC) 20-12 5 MG per tablet Unknown at Unknown time  No No   Sig: Take 1 tablet by mouth 2 (two) times a day   nicotine (NICODERM CQ) 21 mg/24 hr TD 24 hr patch Not Taking at Unknown time  No No   Sig: Place 1 patch on the skin daily   Patient not taking: Reported on 9/21/2022   sertraline (ZOLOFT) 25 mg tablet Unknown at Unknown time  No No   Sig: Take 1 tablet (25 mg total) by mouth daily   simvastatin (ZOCOR) 40 mg tablet 9/20/2022 at Unknown time  No Yes   Sig: Take 1 tablet (40 mg total) by mouth daily   sitaGLIPtin-metFORMIN (Janumet)  MG per tablet Unknown at Unknown time  No No   Sig: Take 1 tablet by mouth 2 (two) times a day with meals   umeclidinium bromide (INCRUSE ELLIPTA) 62 5 mcg/inh AEPB inhaler Unknown at Unknown time  No No   Sig: Inhale 1 puff daily      Facility-Administered Medications: None       Past Medical History:   Diagnosis Date    Hyperlipidemia     last assessed  8/24/17    Hypertension     last assessed 10/2/14       Past Surgical History:   Procedure Laterality Date    CRANIOTOMY         Family History   Problem Relation Age of Onset    Breast cancer Mother     Ovarian cancer Mother     Diabetes Father      I have reviewed and agree with the history as documented      E-Cigarette/Vaping    E-Cigarette Use Never User      E-Cigarette/Vaping Substances    Nicotine No     THC No     CBD No     Flavoring No     Other No     Unknown No      Social History     Tobacco Use    Smoking status: Current Every Day Smoker     Packs/day: 1 00     Types: Cigarettes    Smokeless tobacco: Never Used   Vaping Use    Vaping Use: Never used   Substance Use Topics    Alcohol use: Never    Drug use: No       Review of Systems   Constitutional: Negative for activity change, chills, diaphoresis and fever  HENT: Negative for congestion, ear pain, rhinorrhea, sneezing and sore throat  Eyes: Negative for discharge  Respiratory: Positive for cough and shortness of breath  Cardiovascular: Negative for chest pain and leg swelling  Gastrointestinal: Negative for abdominal pain, blood in stool, diarrhea, nausea and vomiting  Endocrine: Negative for polyuria  Genitourinary: Negative for difficulty urinating, dysuria, frequency and urgency  Musculoskeletal: Negative for back pain and myalgias  Skin: Negative for rash  Neurological: Negative for dizziness, weakness, light-headedness, numbness and headaches  Hematological: Negative for adenopathy  Psychiatric/Behavioral: Negative for confusion  All other systems reviewed and are negative  Physical Exam  Physical Exam  Vitals and nursing note reviewed  Constitutional:       General: She is not in acute distress  Appearance: She is well-developed  She is not diaphoretic  HENT:      Head: Normocephalic and atraumatic  Right Ear: Tympanic membrane and external ear normal       Left Ear: Tympanic membrane and external ear normal       Nose: Nose normal       Mouth/Throat:      Pharynx: No oropharyngeal exudate  Eyes:      General:         Right eye: No discharge  Left eye: No discharge  Conjunctiva/sclera: Conjunctivae normal       Pupils: Pupils are equal, round, and reactive to light  Neck:      Comments: No midline or paraspinous tenderness  Cardiovascular:      Rate and Rhythm: Normal rate and regular rhythm        Heart sounds: Normal heart sounds  Pulmonary:      Effort: No respiratory distress  Comments: rr 18 no conversational dyspnea; Sat 90% on 4L distant BS no rales no wheezing  Abdominal:      General: Bowel sounds are normal  There is no distension  Palpations: Abdomen is soft  Tenderness: There is no abdominal tenderness  There is no right CVA tenderness, left CVA tenderness, guarding or rebound  Comments: Back no midline or CVA tenderness   Musculoskeletal:         General: No tenderness or deformity  Normal range of motion  Cervical back: Normal range of motion and neck supple  Skin:     General: Skin is warm and dry  Neurological:      General: No focal deficit present  Mental Status: She is alert and oriented to person, place, and time  Cranial Nerves: No cranial nerve deficit  Sensory: No sensory deficit  Motor: No weakness or abnormal muscle tone        Coordination: Coordination normal    Psychiatric:         Mood and Affect: Mood normal          Vital Signs  ED Triage Vitals [09/21/22 1510]   Temperature Pulse Respirations Blood Pressure SpO2   98 °F (36 7 °C) 82 18 148/80 (!) 88 %      Temp Source Heart Rate Source Patient Position - Orthostatic VS BP Location FiO2 (%)   Temporal Monitor -- -- --      Pain Score       No Pain           Vitals:    09/21/22 1510 09/21/22 1912   BP: 148/80 125/62   Pulse: 82 96         Visual Acuity      ED Medications  Medications   ipratropium-albuterol (DUO-NEB) 0 5-2 5 mg/3 mL inhalation solution 3 mL (3 mL Nebulization Given 9/21/22 2014)   methylPREDNISolone sodium succinate (Solu-MEDROL) injection 80 mg (80 mg Intravenous Given 9/21/22 1735)   magnesium sulfate 2 g/50 mL IVPB (premix) 2 g (0 g Intravenous Stopped 9/21/22 2042)   potassium chloride (K-DUR,KLOR-CON) CR tablet 40 mEq (40 mEq Oral Given 9/21/22 2007)   azithromycin (ZITHROMAX) tablet 500 mg (500 mg Oral Given 9/21/22 2007)   cefTRIAXone (ROCEPHIN) IVPB (premix in dextrose) 1,000 mg 50 mL (0 mg Intravenous Stopped 9/21/22 3256)       Diagnostic Studies  Results Reviewed     Procedure Component Value Units Date/Time    HS Troponin I 4hr [[id number]]     Lab Status: No result Specimen: Blood     FLU/RSV/COVID - if FLU/RSV clinically relevant [[id number]]  (Abnormal) Collected: 09/21/22 1659    Lab Status: Final result Specimen: Nares from Nasopharyngeal Swab Updated: 09/21/22 1801     SARS-CoV-2 Positive     INFLUENZA A PCR Negative     INFLUENZA B PCR Negative     RSV PCR Negative    Narrative:      FOR PEDIATRIC PATIENTS - copy/paste COVID Guidelines URL to browser: [email or web address]  [employer]    SARS-CoV-2 assay is a Nucleic Acid Amplification assay intended for the  qualitative detection of nucleic acid from SARS-CoV-2 in nasopharyngeal  swabs  Results are for the presumptive identification of SARS-CoV-2 RNA  Positive results are indicative of infection with SARS-CoV-2, the virus  causing COVID-19, but do not rule out bacterial infection or co-infection  with other viruses  Laboratories within the [place] and its  territories are required to report all positive results to the appropriate  public health authorities  Negative results do not preclude SARS-CoV-2  infection and should not be used as the sole basis for treatment or other  patient management decisions  Negative results must be combined with  clinical observations, patient history, and epidemiological information  This test has not been FDA cleared or approved  This test has been authorized by FDA under an Emergency Use Authorization  (EUA)  This test is only authorized for the duration of time the  declaration that circumstances exist justifying the authorization of the  emergency use of an in vitro diagnostic tests for detection of SARS-CoV-2  virus and/or diagnosis of COVID-19 infection under section 564(b)(1) of  the Act, 21 U  S C  317TGS-7(G)(7), unless the authorization is terminated  or revoked sooner  The test has been validated but independent review by FDA  and CLIA is pending  Test performed using [employer] Gene[employer]pert: This RT-PCR assay targets N2,  a region unique to SARS-CoV-2  A conserved region in the E-gene was chosen  for pan-Sarbecovirus detection which includes SARS-CoV-2  According to CMS-2020-01-R, this platform meets the definition of high-throughput technology  Procalcitonin [[id number]]  (Normal) Collected: 09/21/22 1659    Lab Status: Final result Specimen: Blood from Arm, Right Updated: 09/21/22 1751     Procalcitonin 0 08 ng/ml     NT-BNP PRO [[id number]]  (Abnormal) Collected: 09/21/22 1659    Lab Status: Final result Specimen: Blood from Arm, Right Updated: 09/21/22 1749     NT-proBNP 2,158 pg/mL     Magnesium [[id number]]  (Abnormal) Collected: 09/21/22 1659    Lab Status: Final result Specimen: Blood from Arm, Right Updated: 09/21/22 1749     Magnesium 1 5 mg/dL     TSH, 3rd generation with Free T4 reflex [[id number]]  (Normal) Collected: 09/21/22 1659    Lab Status: Final result Specimen: Blood from Arm, Right Updated: 09/21/22 1749     TSH 3RD GENERATON 1 793 uIU/mL     Narrative:      Patients undergoing fluorescein dye angiography may retain small amounts of fluorescein in the body for 48-72 hours post procedure  Samples containing fluorescein can produce falsely depressed TSH values  If the patient had this procedure,a specimen should be resubmitted post fluorescein clearance        HS Troponin I 2hr [[id number]]     Lab Status: No result Specimen: Blood     HS Troponin 0hr (reflex protocol) [[id number]]  (Normal) Collected: 09/21/22 1659    Lab Status: Final result Specimen: Blood from Arm, Right Updated: 09/21/22 1748     hs TnI 0hr 21 ng/L     Comprehensive metabolic panel [[id number]]  (Abnormal) Collected: 09/21/22 1659    Lab Status: Final result Specimen: Blood from Arm, Right Updated: 09/21/22 1741     Sodium 140 mmol/L      Potassium 3 0 mmol/L Chloride 96 mmol/L      CO2 37 mmol/L      ANION GAP 7 mmol/L      BUN 19 mg/dL      Creatinine 1 07 mg/dL      Glucose 174 mg/dL      Calcium 9 5 mg/dL      Corrected Calcium 10 3 mg/dL      AST 17 U/L      ALT 19 U/L      Alkaline Phosphatase 81 U/L      Total Protein 7 6 g/dL      Albumin 3 0 g/dL      Total Bilirubin 0 88 mg/dL      eGFR 49 ml/min/1 73sq m     Narrative:      [place] guidelines for Chronic Kidney Disease (CKD):     Stage 1 with normal or high GFR (GFR > 90 mL/min/1 73 square meters)    Stage 2 Mild CKD (GFR = 60-89 mL/min/1 73 square meters)    Stage 3A Moderate CKD (GFR = 45-59 mL/min/1 73 square meters)    Stage 3B Moderate CKD (GFR = 30-44 mL/min/1 73 square meters)    Stage 4 Severe CKD (GFR = 15-29 mL/min/1 73 square meters)    Stage 5 End Stage CKD (GFR <15 mL/min/1 73 square meters)  Note: GFR calculation is accurate only with a steady state creatinine    D-Dimer [[id number]]  (Abnormal) Collected: 09/21/22 1659    Lab Status: Final result Specimen: Blood from Arm, Right Updated: 09/21/22 1739     D-Dimer, Quant 1 20 ug/ml FEU     Narrative: In the evaluation for possible pulmonary embolism, in the appropriate (Well's Score of 4 or less) patient, the age adjusted d-dimer cutoff for this patient can be calculated as:    Age x 0 01 (in ug/mL) for Age-adjusted D-dimer exclusion threshold for a patient over 50 years      CBC and differential [[id number]]  (Abnormal) Collected: 09/21/22 1659    Lab Status: Final result Specimen: Blood from Arm, Right Updated: 09/21/22 1724     WBC 12 56 Thousand/uL      RBC 5 34 Million/uL      Hemoglobin 16 3 g/dL      Hematocrit 50 6 %      MCV 95 fL      MCH 30 5 pg      MCHC 32 2 g/dL      RDW 12 1 %      MPV 12 2 fL      Platelets 640 Thousands/uL      nRBC 0 /100 WBCs      Neutrophils Relative 79 %      Immat GRANS % 1 %      Lymphocytes Relative 10 %      Monocytes Relative 8 %      Eosinophils Relative 1 % Basophils Relative 1 %      Neutrophils Absolute 10 09 Thousands/µL      Immature Grans Absolute 0 08 Thousand/uL      Lymphocytes Absolute 1 22 Thousands/µL      Monocytes Absolute 1 01 Thousand/µL      Eosinophils Absolute 0 06 Thousand/µL      Basophils Absolute 0 10 Thousands/µL     Blood culture #2 [[id number]] Collected: 09/21/22 1659    Lab Status: In process Specimen: Blood from Arm, Right Updated: 09/21/22 1721    Blood culture #1 [[id number]] Collected: 09/21/22 1659    Lab Status: In process Specimen: Blood from Arm, Left Updated: 09/21/22 1721                 XR chest 1 view portable   ED Interpretation by Goldy Robles MD (09/21 1938)   Read by me; Radiologist to provide formal interpretation  Verses 8/13/2022 chest x-ray concern for left basilar infiltrate as there is increased loss of the left hemidiaphragm versus prior                 Procedures  ECG 12 Lead Documentation Only    Date/Time: 9/21/2022 5:30 PM  Performed by: Goldy Robles MD  Authorized by: Goldy Robles MD     Indications / Diagnosis:  Sob   ECG reviewed by me, the ED Provider: yes    Patient location:  ED  Previous ECG:     Previous ECG:  Unavailable (epic muse unavailable)  Rate:     ECG rate:  76    ECG rate assessment: normal    Rhythm:     Rhythm: atrial fibrillation    QRS:     QRS axis:  Normal  Comments:      Incomplete RBBB nonspecific twave changes             ED Course  ED Course as of 09/21/22 2337   Wed Sep 21, 2022   1619 Patient is refusing hopitialization has medical decision making capacity agreeable to ER evaluation   1627 Checked in with respiratory therapy only way patient is able to qualify for Home O2 is to be admitted  1929 Extensively reviewed lab and x-ray findings with her chart open  Patient continues to refuse admission she does not want admission for consideration of home oxygen or for consideration of remdesivir IV therapy for COVID    She declines a CTA of the chest to evaluate for possibility of pulmonary embolism or pneumonia  She is agreeable to outpatient Paxil of id agreeable to supplementation of her magnesium here in the emergency department as well as oral supplementation of her potassium  She received a dose of IV Rocephin and Zithromax  For potentially a COPD exacerbation or pneumonia and she is willing to consider Paxlovid as an outpatient for her [address] She is alert and oriented and has medical decision capacity her current oxygen saturations are 92% on supplemental oxygen  She knows that she could die from [place] that she could have a clot in her lungs which could also cause death   She adamantly refuses to be hospitalized she just wants to go home to be with her dog   2151 Patient is declining admission but has no transporation to return home RN contacting hospital supervisor   2211 [facility] will take patient home; RN to verify meds will continue meds and O2 until transport arrives   2321 I reviewed with the patient that [facility] would not be able to pick the patient up till the morning she cannot recall her medications specifically patient is [facility] and date of birth does not know the president she is now agreeable to staying as even though she prefers to go home she has no way of getting there under her own power so she may as well received treatment in the hospital   Will contact [facility] for hospitalization   2331 Case reivewed with KEVIN Mims recommends obs med surg to Dr Poonam Rendon service                                             MDM  Number of Diagnoses or Management Options  Diagnosis management comments: Mdm:  Patient is adamantly refusing admission will proceed with ER workup to determine whether there is any life-threatening condition such as acute coronary syndrome significant infection possible pulmonary embolism re-presented patient patient continues to refuse hospitalization will discharge against medical advice covering for COPD antibiotics exacerbation with antibiotics and steroids  Disposition  Final diagnoses:   COVID-19 virus infection   Hypoxia   LLL pneumonia   D-dimer, elevated   Low magnesium level   Low blood potassium     Time reflects when diagnosis was documented in both MDM as applicable and the Disposition within this note     Time User Action Codes Description Comment    9/21/2022  8:14 PM Haroldine Amen Add [U07 1] COVID-19 virus infection     9/21/2022  8:14 PM Haroldine Amen Add [R09 02] Hypoxia     9/21/2022  8:14 PM Haroldine Amen Add [J18 9] LLL pneumonia     9/21/2022  8:14 PM Haroldine Amen Add [R79 89] D-dimer, elevated     9/21/2022  8:15 PM Juliana Alejo Add [R79 0] Low magnesium level     9/21/2022  8:15 PM Haroldine Amen Add [E87 6] Low blood potassium       ED Disposition     ED Disposition   Admit    Condition   Stable    Date/Time   Wed Sep 21, 2022 11:33 PM    Comment   Case reivewed with Marilin Nettles, PAC observations admission to   [facility]    None         Patient's Medications   Discharge Prescriptions    No medications on file       No discharge procedures on file      PDMP Review     None          ED Provider  Electronically Signed by           Yajaira Lyle MD  09/21/22 4066

## 2022-09-21 NOTE — TELEPHONE ENCOUNTER
called visiting pt at this time pt was seen by nurses aide this morning as well  Daughter asked  to call because pts SPO2 stats were between 88 and 83 all day, pt was also having trouble breathing, unable to stand, and episodes of altered mental status  Did advise pt needed to go to ER

## 2022-09-22 ENCOUNTER — APPOINTMENT (OUTPATIENT)
Dept: CT IMAGING | Facility: HOSPITAL | Age: 77
DRG: 177 | End: 2022-09-22
Payer: MEDICARE

## 2022-09-22 ENCOUNTER — APPOINTMENT (OUTPATIENT)
Dept: NON INVASIVE DIAGNOSTICS | Facility: HOSPITAL | Age: 77
DRG: 177 | End: 2022-09-22
Payer: MEDICARE

## 2022-09-22 PROBLEM — J18.9 LLL PNEUMONIA: Status: ACTIVE | Noted: 2022-09-22

## 2022-09-22 PROBLEM — J44.9 COPD (CHRONIC OBSTRUCTIVE PULMONARY DISEASE) (HCC): Status: ACTIVE | Noted: 2022-09-22

## 2022-09-22 PROBLEM — E83.42 HYPOMAGNESEMIA: Status: ACTIVE | Noted: 2022-09-22

## 2022-09-22 PROBLEM — J96.01 ACUTE RESPIRATORY FAILURE WITH HYPOXIA (HCC): Status: ACTIVE | Noted: 2022-09-22

## 2022-09-22 PROBLEM — U07.1 COVID-19: Status: ACTIVE | Noted: 2022-09-22

## 2022-09-22 LAB
ALBUMIN SERPL BCP-MCNC: 2.6 G/DL (ref 3.5–5)
ALP SERPL-CCNC: 74 U/L (ref 46–116)
ALT SERPL W P-5'-P-CCNC: 8 U/L (ref 12–78)
ANION GAP SERPL CALCULATED.3IONS-SCNC: 6 MMOL/L (ref 4–13)
AST SERPL W P-5'-P-CCNC: 14 U/L (ref 5–45)
ATRIAL RATE: 125 BPM
ATRIAL RATE: 136 BPM
BACTERIA UR QL AUTO: ABNORMAL /HPF
BASOPHILS # BLD AUTO: 0.02 THOUSANDS/ΜL (ref 0–0.1)
BASOPHILS NFR BLD AUTO: 0 % (ref 0–1)
BILIRUB SERPL-MCNC: 0.49 MG/DL (ref 0.2–1)
BILIRUB UR QL STRIP: NEGATIVE
BUN SERPL-MCNC: 20 MG/DL (ref 5–25)
CALCIUM ALBUM COR SERPL-MCNC: 10.3 MG/DL (ref 8.3–10.1)
CALCIUM SERPL-MCNC: 9.2 MG/DL (ref 8.3–10.1)
CHLORIDE SERPL-SCNC: 97 MMOL/L (ref 96–108)
CK SERPL-CCNC: 36 U/L (ref 26–192)
CLARITY UR: CLEAR
CO2 SERPL-SCNC: 33 MMOL/L (ref 21–32)
COLOR UR: YELLOW
CREAT SERPL-MCNC: 0.98 MG/DL (ref 0.6–1.3)
CRP SERPL QL: 49.8 MG/L
EOSINOPHIL # BLD AUTO: 0 THOUSAND/ΜL (ref 0–0.61)
EOSINOPHIL NFR BLD AUTO: 0 % (ref 0–6)
ERYTHROCYTE [DISTWIDTH] IN BLOOD BY AUTOMATED COUNT: 12.2 % (ref 11.6–15.1)
GFR SERPL CREATININE-BSD FRML MDRD: 55 ML/MIN/1.73SQ M
GLUCOSE P FAST SERPL-MCNC: 318 MG/DL (ref 65–99)
GLUCOSE SERPL-MCNC: 124 MG/DL (ref 65–140)
GLUCOSE SERPL-MCNC: 167 MG/DL (ref 65–140)
GLUCOSE SERPL-MCNC: 177 MG/DL (ref 65–140)
GLUCOSE SERPL-MCNC: 248 MG/DL (ref 65–140)
GLUCOSE SERPL-MCNC: 276 MG/DL (ref 65–140)
GLUCOSE SERPL-MCNC: 318 MG/DL (ref 65–140)
GLUCOSE UR STRIP-MCNC: NEGATIVE MG/DL
HCT VFR BLD AUTO: 46.7 % (ref 34.8–46.1)
HGB BLD-MCNC: 15.2 G/DL (ref 11.5–15.4)
HGB UR QL STRIP.AUTO: NEGATIVE
IMM GRANULOCYTES # BLD AUTO: 0.07 THOUSAND/UL (ref 0–0.2)
IMM GRANULOCYTES NFR BLD AUTO: 1 % (ref 0–2)
KETONES UR STRIP-MCNC: NEGATIVE MG/DL
LEUKOCYTE ESTERASE UR QL STRIP: ABNORMAL
LYMPHOCYTES # BLD AUTO: 0.6 THOUSANDS/ΜL (ref 0.6–4.47)
LYMPHOCYTES NFR BLD AUTO: 7 % (ref 14–44)
MCH RBC QN AUTO: 31 PG (ref 26.8–34.3)
MCHC RBC AUTO-ENTMCNC: 32.5 G/DL (ref 31.4–37.4)
MCV RBC AUTO: 95 FL (ref 82–98)
MONOCYTES # BLD AUTO: 0.19 THOUSAND/ΜL (ref 0.17–1.22)
MONOCYTES NFR BLD AUTO: 2 % (ref 4–12)
NEUTROPHILS # BLD AUTO: 8.29 THOUSANDS/ΜL (ref 1.85–7.62)
NEUTS SEG NFR BLD AUTO: 90 % (ref 43–75)
NITRITE UR QL STRIP: NEGATIVE
NON-SQ EPI CELLS URNS QL MICRO: ABNORMAL /HPF
NRBC BLD AUTO-RTO: 0 /100 WBCS
PH UR STRIP.AUTO: 5.5 [PH]
PLATELET # BLD AUTO: 222 THOUSANDS/UL (ref 149–390)
PMV BLD AUTO: 12.2 FL (ref 8.9–12.7)
POTASSIUM SERPL-SCNC: 4.3 MMOL/L (ref 3.5–5.3)
PROCALCITONIN SERPL-MCNC: 0.05 NG/ML
PROT SERPL-MCNC: 6.8 G/DL (ref 6.4–8.4)
PROT UR STRIP-MCNC: NEGATIVE MG/DL
QRS AXIS: 205 DEGREES
QRS AXIS: 237 DEGREES
QRSD INTERVAL: 92 MS
QRSD INTERVAL: 94 MS
QT INTERVAL: 414 MS
QT INTERVAL: 430 MS
QTC INTERVAL: 440 MS
QTC INTERVAL: 483 MS
RBC # BLD AUTO: 4.9 MILLION/UL (ref 3.81–5.12)
RBC #/AREA URNS AUTO: ABNORMAL /HPF
SODIUM SERPL-SCNC: 136 MMOL/L (ref 135–147)
SP GR UR STRIP.AUTO: 1.02 (ref 1–1.03)
T WAVE AXIS: -47 DEGREES
T WAVE AXIS: -54 DEGREES
UROBILINOGEN UR QL STRIP.AUTO: 2 E.U./DL
VENTRICULAR RATE: 68 BPM
VENTRICULAR RATE: 76 BPM
WBC # BLD AUTO: 9.17 THOUSAND/UL (ref 4.31–10.16)
WBC #/AREA URNS AUTO: ABNORMAL /HPF

## 2022-09-22 PROCEDURE — G1004 CDSM NDSC: HCPCS

## 2022-09-22 PROCEDURE — 84145 PROCALCITONIN (PCT): CPT

## 2022-09-22 PROCEDURE — 36415 COLL VENOUS BLD VENIPUNCTURE: CPT

## 2022-09-22 PROCEDURE — 94664 DEMO&/EVAL PT USE INHALER: CPT

## 2022-09-22 PROCEDURE — 93970 EXTREMITY STUDY: CPT

## 2022-09-22 PROCEDURE — 71275 CT ANGIOGRAPHY CHEST: CPT

## 2022-09-22 PROCEDURE — 80053 COMPREHEN METABOLIC PANEL: CPT

## 2022-09-22 PROCEDURE — 99223 1ST HOSP IP/OBS HIGH 75: CPT | Performed by: INTERNAL MEDICINE

## 2022-09-22 PROCEDURE — 85025 COMPLETE CBC W/AUTO DIFF WBC: CPT

## 2022-09-22 PROCEDURE — 81001 URINALYSIS AUTO W/SCOPE: CPT | Performed by: INTERNAL MEDICINE

## 2022-09-22 PROCEDURE — 93010 ELECTROCARDIOGRAM REPORT: CPT | Performed by: INTERNAL MEDICINE

## 2022-09-22 PROCEDURE — 86140 C-REACTIVE PROTEIN: CPT

## 2022-09-22 PROCEDURE — 82550 ASSAY OF CK (CPK): CPT

## 2022-09-22 PROCEDURE — 94760 N-INVAS EAR/PLS OXIMETRY 1: CPT

## 2022-09-22 PROCEDURE — 82948 REAGENT STRIP/BLOOD GLUCOSE: CPT

## 2022-09-22 PROCEDURE — 93971 EXTREMITY STUDY: CPT | Performed by: SURGERY

## 2022-09-22 RX ORDER — AZITHROMYCIN 250 MG/1
500 TABLET, FILM COATED ORAL EVERY 24 HOURS
Status: DISCONTINUED | OUTPATIENT
Start: 2022-09-22 | End: 2022-09-23 | Stop reason: HOSPADM

## 2022-09-22 RX ORDER — INSULIN GLARGINE 100 [IU]/ML
10 INJECTION, SOLUTION SUBCUTANEOUS EVERY MORNING
Status: DISCONTINUED | OUTPATIENT
Start: 2022-09-22 | End: 2022-09-23 | Stop reason: HOSPADM

## 2022-09-22 RX ORDER — INSULIN LISPRO 100 [IU]/ML
1-5 INJECTION, SOLUTION INTRAVENOUS; SUBCUTANEOUS
Status: DISCONTINUED | OUTPATIENT
Start: 2022-09-22 | End: 2022-09-22

## 2022-09-22 RX ORDER — PRAVASTATIN SODIUM 40 MG
80 TABLET ORAL
Status: DISCONTINUED | OUTPATIENT
Start: 2022-09-22 | End: 2022-09-23 | Stop reason: HOSPADM

## 2022-09-22 RX ORDER — LISINOPRIL 10 MG/1
10 TABLET ORAL DAILY
Status: DISCONTINUED | OUTPATIENT
Start: 2022-09-23 | End: 2022-09-23 | Stop reason: HOSPADM

## 2022-09-22 RX ORDER — ASPIRIN 81 MG/1
81 TABLET, CHEWABLE ORAL DAILY
Status: DISCONTINUED | OUTPATIENT
Start: 2022-09-22 | End: 2022-09-23 | Stop reason: HOSPADM

## 2022-09-22 RX ORDER — DEXAMETHASONE SODIUM PHOSPHATE 4 MG/ML
6 INJECTION, SOLUTION INTRA-ARTICULAR; INTRALESIONAL; INTRAMUSCULAR; INTRAVENOUS; SOFT TISSUE EVERY 24 HOURS
Status: DISCONTINUED | OUTPATIENT
Start: 2022-09-22 | End: 2022-09-23 | Stop reason: HOSPADM

## 2022-09-22 RX ORDER — INSULIN LISPRO 100 [IU]/ML
1-5 INJECTION, SOLUTION INTRAVENOUS; SUBCUTANEOUS
Status: DISCONTINUED | OUTPATIENT
Start: 2022-09-22 | End: 2022-09-23 | Stop reason: HOSPADM

## 2022-09-22 RX ORDER — METHYLPREDNISOLONE SODIUM SUCCINATE 40 MG/ML
40 INJECTION, POWDER, LYOPHILIZED, FOR SOLUTION INTRAMUSCULAR; INTRAVENOUS DAILY
Status: DISCONTINUED | OUTPATIENT
Start: 2022-09-22 | End: 2022-09-22

## 2022-09-22 RX ORDER — BENZONATATE 100 MG/1
100 CAPSULE ORAL 3 TIMES DAILY PRN
Status: DISCONTINUED | OUTPATIENT
Start: 2022-09-22 | End: 2022-09-23 | Stop reason: HOSPADM

## 2022-09-22 RX ORDER — NICOTINE 21 MG/24HR
1 PATCH, TRANSDERMAL 24 HOURS TRANSDERMAL DAILY
Status: DISCONTINUED | OUTPATIENT
Start: 2022-09-22 | End: 2022-09-23 | Stop reason: HOSPADM

## 2022-09-22 RX ORDER — METHYLPREDNISOLONE SODIUM SUCCINATE 40 MG/ML
40 INJECTION, POWDER, LYOPHILIZED, FOR SOLUTION INTRAMUSCULAR; INTRAVENOUS EVERY 8 HOURS
Status: DISCONTINUED | OUTPATIENT
Start: 2022-09-22 | End: 2022-09-22

## 2022-09-22 RX ORDER — BUDESONIDE AND FORMOTEROL FUMARATE DIHYDRATE 80; 4.5 UG/1; UG/1
2 AEROSOL RESPIRATORY (INHALATION) 2 TIMES DAILY
Status: DISCONTINUED | OUTPATIENT
Start: 2022-09-22 | End: 2022-09-23 | Stop reason: HOSPADM

## 2022-09-22 RX ORDER — DEXAMETHASONE SODIUM PHOSPHATE 4 MG/ML
6 INJECTION, SOLUTION INTRA-ARTICULAR; INTRALESIONAL; INTRAMUSCULAR; INTRAVENOUS; SOFT TISSUE EVERY 24 HOURS
Status: DISCONTINUED | OUTPATIENT
Start: 2022-09-22 | End: 2022-09-22

## 2022-09-22 RX ORDER — SERTRALINE HYDROCHLORIDE 25 MG/1
25 TABLET, FILM COATED ORAL DAILY
Status: DISCONTINUED | OUTPATIENT
Start: 2022-09-22 | End: 2022-09-23 | Stop reason: HOSPADM

## 2022-09-22 RX ORDER — CEFTRIAXONE 1 G/50ML
1000 INJECTION, SOLUTION INTRAVENOUS EVERY 24 HOURS
Status: DISCONTINUED | OUTPATIENT
Start: 2022-09-22 | End: 2022-09-23 | Stop reason: HOSPADM

## 2022-09-22 RX ORDER — ENOXAPARIN SODIUM 100 MG/ML
1 INJECTION SUBCUTANEOUS EVERY 12 HOURS SCHEDULED
Status: DISCONTINUED | OUTPATIENT
Start: 2022-09-22 | End: 2022-09-23 | Stop reason: HOSPADM

## 2022-09-22 RX ORDER — ALBUTEROL SULFATE 2.5 MG/3ML
2.5 SOLUTION RESPIRATORY (INHALATION) EVERY 6 HOURS PRN
Status: DISCONTINUED | OUTPATIENT
Start: 2022-09-22 | End: 2022-09-23 | Stop reason: HOSPADM

## 2022-09-22 RX ORDER — ALBUTEROL SULFATE 90 UG/1
2 AEROSOL, METERED RESPIRATORY (INHALATION)
Status: DISCONTINUED | OUTPATIENT
Start: 2022-09-22 | End: 2022-09-23 | Stop reason: HOSPADM

## 2022-09-22 RX ORDER — ACETAMINOPHEN 325 MG/1
650 TABLET ORAL EVERY 6 HOURS PRN
Status: DISCONTINUED | OUTPATIENT
Start: 2022-09-22 | End: 2022-09-23 | Stop reason: HOSPADM

## 2022-09-22 RX ADMIN — INSULIN LISPRO 3 UNITS: 100 INJECTION, SOLUTION INTRAVENOUS; SUBCUTANEOUS at 08:02

## 2022-09-22 RX ADMIN — ASPIRIN 81 MG CHEWABLE TABLET 81 MG: 81 TABLET CHEWABLE at 09:50

## 2022-09-22 RX ADMIN — PRAVASTATIN SODIUM 80 MG: 40 TABLET ORAL at 16:37

## 2022-09-22 RX ADMIN — ENOXAPARIN SODIUM 60 MG: 60 INJECTION SUBCUTANEOUS at 20:41

## 2022-09-22 RX ADMIN — DEXAMETHASONE SODIUM PHOSPHATE 6 MG: 4 INJECTION, SOLUTION INTRAMUSCULAR; INTRAVENOUS at 09:50

## 2022-09-22 RX ADMIN — IOHEXOL 85 ML: 350 INJECTION, SOLUTION INTRAVENOUS at 00:54

## 2022-09-22 RX ADMIN — BUDESONIDE AND FORMOTEROL FUMARATE DIHYDRATE 2 PUFF: 80; 4.5 AEROSOL RESPIRATORY (INHALATION) at 21:48

## 2022-09-22 RX ADMIN — CEFTRIAXONE 1000 MG: 1 INJECTION, SOLUTION INTRAVENOUS at 20:42

## 2022-09-22 RX ADMIN — ALBUTEROL SULFATE 2 PUFF: 90 AEROSOL, METERED RESPIRATORY (INHALATION) at 08:03

## 2022-09-22 RX ADMIN — SERTRALINE 25 MG: 25 TABLET, FILM COATED ORAL at 09:48

## 2022-09-22 RX ADMIN — IPRATROPIUM BROMIDE AND ALBUTEROL SULFATE 3 ML: 2.5; .5 SOLUTION RESPIRATORY (INHALATION) at 02:18

## 2022-09-22 RX ADMIN — AZITHROMYCIN MONOHYDRATE 500 MG: 250 TABLET ORAL at 20:41

## 2022-09-22 RX ADMIN — HYDROCHLOROTHIAZIDE: 12.5 TABLET ORAL at 09:48

## 2022-09-22 RX ADMIN — BUDESONIDE AND FORMOTEROL FUMARATE DIHYDRATE 2 PUFF: 80; 4.5 AEROSOL RESPIRATORY (INHALATION) at 09:55

## 2022-09-22 RX ADMIN — INSULIN LISPRO 2 UNITS: 100 INJECTION, SOLUTION INTRAVENOUS; SUBCUTANEOUS at 11:48

## 2022-09-22 RX ADMIN — REMDESIVIR 200 MG: 100 INJECTION, POWDER, LYOPHILIZED, FOR SOLUTION INTRAVENOUS at 02:03

## 2022-09-22 RX ADMIN — ENOXAPARIN SODIUM 60 MG: 60 INJECTION SUBCUTANEOUS at 09:52

## 2022-09-22 RX ADMIN — INSULIN LISPRO 1 UNITS: 100 INJECTION, SOLUTION INTRAVENOUS; SUBCUTANEOUS at 21:48

## 2022-09-22 RX ADMIN — ALBUTEROL SULFATE 2 PUFF: 90 AEROSOL, METERED RESPIRATORY (INHALATION) at 16:17

## 2022-09-22 RX ADMIN — INSULIN GLARGINE 10 UNITS: 100 INJECTION, SOLUTION SUBCUTANEOUS at 09:52

## 2022-09-22 RX ADMIN — ALBUTEROL SULFATE 2 PUFF: 90 AEROSOL, METERED RESPIRATORY (INHALATION) at 20:43

## 2022-09-22 RX ADMIN — UMECLIDINIUM 1 PUFF: 62.5 AEROSOL, POWDER ORAL at 09:54

## 2022-09-22 RX ADMIN — ENOXAPARIN SODIUM 60 MG: 60 INJECTION SUBCUTANEOUS at 02:04

## 2022-09-22 NOTE — ASSESSMENT & PLAN NOTE
· Patient initially saturating on room air at mid 80s  Arrived to ED on 4 L saturating at 93%  She has now been weaned to room air saturating at 91%  · Patient did test positive for COVID-19 in the ED  Please see assessment plan for COVID-19  · Does not appear to be COPD exacerbation  · Chest x-ray did show questionable evidence of left lower lobe patchy infiltrate    · White blood cells are elevated 12 56  · Procalcitonin within normal limits  · Please see assessment plan for left lower lobe pneumonia  · Respiratory protocol  · Continue nasal cannula oxygen as needed to maintain O2 sats above 92%

## 2022-09-22 NOTE — ASSESSMENT & PLAN NOTE
Lab Results   Component Value Date    HGBA1C 7 1 (H) 08/03/2021       No results for input(s): POCGLU in the last 72 hours      Blood Sugar Average: Last 72 hrs:  ·  blood glucose 174 upon arrival  · Will hold Janumet, Glucotrol  · Sliding scale insulin  · A c  HS fingerstick glucose checks

## 2022-09-22 NOTE — H&P
[address]  1945, 65 y o  female MRN: [id number]  Unit/Bed#: UF89 Encounter: [id number]  Primary Care Provider: Pervis Primrose, DO   Date and time admitted to hospital: 9/21/2022  3:06 PM    * Acute respiratory failure with hypoxia [facility])  Assessment & Plan  · Patient initially saturating on room air at mid 80s  Arrived to ED on 4 L saturating at 93%  She has now been weaned to room air saturating at 91%  · Patient did test positive for COVID-19 in the ED  Please see assessment plan for COVID-19  · Does not appear to be COPD exacerbation  · Chest x-ray did show questionable evidence of left lower lobe patchy infiltrate  · White blood cells are elevated 12 56  · Procalcitonin within normal limits  · Please see assessment plan for left lower lobe pneumonia  · Respiratory protocol  · Continue nasal cannula oxygen as needed to maintain O2 sats above 92%    COVID-19  Assessment & Plan  · Patient reports feeling under the weather over the past several days  Presented after being found by home health to have O2 sats in mid 80s on room air  Required 4 L upon presentation of nasal cannula oxygen  Has now been weaned to room air saturating at 91%  · Patient did test positive for COVID-19 in the ED  · Chest x-ray did show evidence of left lower lobe patchy infiltrate  · White blood cells are elevated at 12 56  · Procalcitonin within normal limits  · D-dimer elevated at 1 20  · Initially, patient refused CTA of chest   She is now agreeable  · Will get CT of chest PE study  · Due to elevated D-dimer, COVID per COVID pathway will initiate on weight based Lovenox  · Solu-Medrol 40 mg IV Q 24 per COVID pathway  · Remdesivir IV Q 24  · Blood culture sent  · Monitor white blood cells, procalcitonin, CRP,    LLL pneumonia  Assessment & Plan  · Patient presented with no acute complaints of shortness of breath  However, she was saturating in mid 80s on room air    She does not use oxygen at home  · Chest x-ray did show evidence of left lower lobe patchy infiltrate  · White blood cells elevated at 12 56  · Procalcitonin within normal limits  · Blood culture sent  · Sputum culture ordered  · Respiratory protocol  · One issue patient on ceftriaxone 1000 mg IV Q 24  · Azithromycin 500 mg p o  Q 24  · Follow-up white blood cells, procalcitonin a m  COPD (chronic obstructive pulmonary disease) ([facility])  Assessment & Plan  · Does not appear to be in acute exacerbation  · Continue home inhalers  · Solu-Medrol 40 mg IV Q 24    Hypomagnesemia  Assessment & Plan  · Magnesium 1 5 upon presentation  · Patient administered 2 g IV magnesium sulfate in the ED  · Recheck with a m  Labs    Type 2 diabetes mellitus with hyperglycemia, without long-term current use of insulin ([facility])  Assessment & Plan  Lab Results   Component Value Date    HGBA1C 7 1 (H) 08/03/2021       No results for input(s): POCGLU in the last 72 hours  Blood Sugar Average: Last 72 hrs:  ·  blood glucose 174 upon arrival  · Will hold Janumet, Glucotrol  · Sliding scale insulin  · A c  HS fingerstick glucose checks    Hypokalemia  Assessment & Plan  · Potassium 3 0 upon arrival  · Patient received 40 mEq K-Dur in the ED  · Recheck with a m  Labs    Tobacco use  Assessment & Plan  · Nicotine patch    Hypertension  Assessment & Plan  · Continue Prinzide 20/12 5 oral daily  · Current blood pressure is 125/62       VTE Pharmacologic Prophylaxis: VTE Score: 9 High Risk (Score >/= 5) - Pharmacological DVT Prophylaxis Ordered: enoxaparin (Lovenox)  Sequential Compression Devices Ordered  Code Status: Level 3 - DNAR and DNI   Discussion with family: Patient declined call to   Anticipated Length of Stay: Patient will be admitted on an observation basis with an anticipated length of stay of less than 2 midnights secondary to acute respiratory failure with hypoxia, LLL pneumonia, COVId 19       Total Time for Visit, including Counseling / Coordination of Care: 60 minutes Greater than 50% of this total time spent on direct patient counseling and coordination of care  Chief Complaint: decreased O2 level     History of Present Illness:  Harpreet Spencer is a 65 y o  female with a PMH of COPD, hyperlipidemia, hypertension, type 2 diabetes who presents with a low oxygen level noticed by home health earlier today  The patient states over the past several days she has felt under the weather  As well, she has developed a dry cough which is not bring up any phlegm  She denies any fevers, chills, chest pain  Denies any lower extremity edema  She denies any congestion, rhinorrhea, headaches, lightheadedness, dizziness  She was recently hospitalized with COPD exacerbation  She was brought in by EMS today after a home health aide noticed that she was hypoxic on room air  It is noted that her oxygen saturations were in the mid 80s on room air at home  She was placed on 4 L nasal cannula with sats improving to 93%  Since then, her O2 sats have improved to 91% on room air  The patient was found to be COVID positive  As well, found have elevated white blood cells  Procalcitonin normal   Chest x-ray suspicious for left lower lobe pneumonia  D-dimer elevated  Patient initially not agreeable to CTA  She is now agreeable to CTA  Review of Systems:  Review of Systems   Constitutional: Positive for fatigue  Negative for chills and fever  HENT: Negative for congestion, ear pain, rhinorrhea and sore throat  Eyes: Negative for pain and visual disturbance  Respiratory: Positive for cough  Negative for shortness of breath and wheezing  Cardiovascular: Negative for chest pain, palpitations and leg swelling  Gastrointestinal: Negative for abdominal distention, abdominal pain, constipation, diarrhea, nausea and vomiting  Endocrine: Negative for polydipsia and polyuria  Genitourinary: Negative for dysuria and hematuria  Musculoskeletal: Negative for arthralgias and back pain  Skin: Negative for color change and rash  Neurological: Negative for dizziness, seizures, syncope, facial asymmetry, weakness, light-headedness, numbness and headaches  Psychiatric/Behavioral: Negative for agitation and confusion  All other systems reviewed and are negative  Past Medical and Surgical History:   Past Medical History:   Diagnosis Date    Hyperlipidemia     last assessed  8/24/17    Hypertension     last assessed 10/2/14       Past Surgical History:   Procedure Laterality Date    CRANIOTOMY         Meds/Allergies:  Prior to Admission medications    Medication Sig Start Date End Date Taking? Authorizing Provider   budesonide-formoterol (SYMBICORT) 80-4 5 MCG/ACT inhaler Inhale 2 puffs 2 (two) times a day Rinse mouth after use  8/16/22  Yes Katherine Patricia MD   simvastatin (ZOCOR) 40 mg tablet Take 1 tablet (40 mg total) by mouth daily 2/7/22  Yes Kunal Brown, DO   Accu-Chek FastClix Lancets MISC Use 1 each daily to test blood sugars  8/19/22   Kunal Brown DO   albuterol (2 5 mg/3 mL) 0 083 % nebulizer solution Take 3 mL (2 5 mg total) by nebulization every 6 (six) hours as needed for shortness of breath 8/16/22   Katherine Patricia MD   aspirin 81 MG tablet Take 1 tablet by mouth daily 10/2/14   Historical Provider, MD   Blood Glucose Monitoring Suppl (Accu-Chek Flores Plus) w/Device KIT Use 1 kit daily to test blood sugars  8/19/22   Kunal Brown DO   ergocalciferol (VITAMIN D2) 50,000 units Take 1 capsule (50,000 Units total) by mouth once a week 2/7/22   Kunal Brown DO   glipiZIDE (GLUCOTROL) 10 mg tablet Take 1 tablet (10 mg total) by mouth 2 (two) times a day 2/7/22   Kunal Brown DO   glucose blood test strip Use 1 each 4 (four) times a day Use as instructed    Historical Provider, MD   glucose blood test strip Use 1 each daily to test blood sugars   8/25/22   Kunal Brown DO lisinopril-hydrochlorothiazide (PRINZIDE,ZESTORETIC) 20-12 5 MG per tablet Take 1 tablet by mouth 2 (two) times a day 2/7/22   Sandra Damon DO   nicotine (NICODERM CQ) 21 mg/24 hr TD 24 hr patch Place 1 patch on the skin daily  Patient not taking: Reported on 9/21/2022 8/17/22   Zonia Daniel MD   sertraline (ZOLOFT) 25 mg tablet Take 1 tablet (25 mg total) by mouth daily 11/5/19   Sandra Damon DO   sitaGLIPtin-metFORMIN (Janumet)  MG per tablet Take 1 tablet by mouth 2 (two) times a day with meals 2/7/22   Sandra Damon DO   umeclidinium bromide (INCRUSE ELLIPTA) 62 5 mcg/inh AEPB inhaler Inhale 1 puff daily 8/17/22   Zonia Daniel MD     I have reviewed home medications with patient personally  Allergies: No Known Allergies    Social History:  Marital Status:    Occupation: retired  Patient Pre-hospital Living Situation: Home  Patient Pre-hospital Level of Mobility: walks  Patient Pre-hospital Diet Restrictions: none   Substance Use History:   Social History     Substance and Sexual Activity   Alcohol Use Never     Social History     Tobacco Use   Smoking Status Current Every Day Smoker    Packs/day: 1 00    Types: Cigarettes   Smokeless Tobacco Never Used     Social History     Substance and Sexual Activity   Drug Use No       Family History:  Family History   Problem Relation Age of Onset    Breast cancer Mother     Ovarian cancer Mother     Diabetes Father        Physical Exam:     Vitals:   Blood Pressure: 125/62 (09/21/22 1912)  Pulse: 96 (09/21/22 1912)  Temperature: 98 °F (36 7 °C) (09/21/22 1510)  Temp Source: Temporal (09/21/22 1510)  Respirations: 20 (09/21/22 1912)  Height: 5' 5" (165 1 cm) (09/21/22 1510)  Weight - Scale: 61 4 kg (135 lb 5 8 oz) (09/21/22 1510)  SpO2: 91 % (09/21/22 1912)    Physical Exam  Vitals and nursing note reviewed  Constitutional:       General: She is not in acute distress  Appearance: Normal appearance   She is well-developed  She is ill-appearing  HENT:      Head: Normocephalic and atraumatic  Nose: Congestion present  No rhinorrhea  Mouth/Throat:      Mouth: Mucous membranes are moist       Pharynx: Oropharynx is clear  No oropharyngeal exudate or posterior oropharyngeal erythema  Eyes:      General: No scleral icterus  Right eye: No discharge  Left eye: No discharge  Extraocular Movements: Extraocular movements intact  Conjunctiva/sclera: Conjunctivae normal       Pupils: Pupils are equal, round, and reactive to light  Cardiovascular:      Rate and Rhythm: Normal rate and regular rhythm  Pulses: Normal pulses  Heart sounds: Normal heart sounds  No murmur heard  No friction rub  No gallop  Pulmonary:      Effort: Pulmonary effort is normal  No respiratory distress  Breath sounds: Normal breath sounds  No wheezing, rhonchi or rales  Abdominal:      General: Abdomen is flat  Bowel sounds are normal  There is no distension  Palpations: Abdomen is soft  Tenderness: There is no abdominal tenderness  There is no right CVA tenderness, left CVA tenderness, guarding or rebound  Musculoskeletal:      Cervical back: Neck supple  Right lower leg: No edema  Left lower leg: No edema  Skin:     General: Skin is warm and dry  Capillary Refill: Capillary refill takes less than 2 seconds  Neurological:      General: No focal deficit present  Mental Status: She is alert and oriented to person, place, and time  Mental status is at baseline  Cranial Nerves: No cranial nerve deficit  Motor: No weakness        Coordination: Coordination normal    Psychiatric:         Mood and Affect: Mood normal          Behavior: Behavior normal           Additional Data:     Lab Results:  Results from last 7 days   Lab Units 09/21/22  1659   WBC Thousand/uL 12 56*   HEMOGLOBIN g/dL 16 3*   HEMATOCRIT % 50 6*   PLATELETS Thousands/uL 281   NEUTROS PCT % 79*   LYMPHS PCT % 10*   MONOS PCT % 8   EOS PCT % 1     Results from last 7 days   Lab Units 09/21/22  1659   SODIUM mmol/L 140   POTASSIUM mmol/L 3 0*   CHLORIDE mmol/L 96   CO2 mmol/L 37*   BUN mg/dL 19   CREATININE mg/dL 1 07   ANION GAP mmol/L 7   CALCIUM mg/dL 9 5   ALBUMIN g/dL 3 0*   TOTAL BILIRUBIN mg/dL 0 88   ALK PHOS U/L 81   ALT U/L 19   AST U/L 17   GLUCOSE RANDOM mg/dL 174*                 Results from last 7 days   Lab Units 09/21/22  1659   PROCALCITONIN ng/ml 0 08       Imaging: Reviewed radiology reports from this admission including: chest xray  XR chest 1 view portable   ED Interpretation by Michael Chase MD (09/21 1938)   Read by me; Radiologist to provide formal interpretation  Verses 8/13/2022 chest x-ray concern for left basilar infiltrate as there is increased loss of the left hemidiaphragm versus prior      CTA chest pe study    (Results Pending)       EKG and Other Studies Reviewed on Admission:   · EKG: NSR  HR 87     ** Please Note: This note has been constructed using a voice recognition system   **

## 2022-09-22 NOTE — ASSESSMENT & PLAN NOTE
· Magnesium 1 5 upon presentation  · Patient administered 2 g IV magnesium sulfate in the ED  · Recheck with berta goode   Labs

## 2022-09-22 NOTE — ED NOTES
Patient to be admitted  [facility] notified and aware of transport no longer being needed at this time       Génesis Keen RN  09/21/22 2033

## 2022-09-22 NOTE — RESPIRATORY THERAPY NOTE
RT Protocol Note  Meaghan Moreno 65 y o  female MRN: [id number]  Unit/Bed#: FV53 Encounter: [id number]    Assessment    Principal Problem:    Acute respiratory failure with hypoxia ([facility] )  Active Problems:    Hypertension    Tobacco use    Hypokalemia    Type 2 diabetes mellitus with hyperglycemia, without long-term current use of insulin ([facility])    COVID-19    LLL pneumonia    Hypomagnesemia    COPD (chronic obstructive pulmonary disease) ([facility] )      Home Pulmonary Medications:    Symbicort MDI       Past Medical History:   Diagnosis Date    Hyperlipidemia     last assessed  8/24/17    Hypertension     last assessed 10/2/14     Social History     Socioeconomic History    Marital status:      Spouse name: None    Number of children: None    Years of education: None    Highest education level: None   Occupational History    None   Tobacco Use    Smoking status: Current Every Day Smoker     Packs/day: 1 00     Types: Cigarettes    Smokeless tobacco: Never Used   Vaping Use    Vaping Use: Never used   Substance and Sexual Activity    Alcohol use: Never    Drug use: No    Sexual activity: Not Currently   Other Topics Concern    None   Social History Narrative    Always uses seat belt    Always uses sunscreen    Dental care occasionally    No living will     Social Determinants of Health     Financial Resource Strain: Not on file   Food Insecurity: No Food Insecurity    Worried About Running Out of Food in the Last Year: Never true    Jessa of Food in the Last Year: Never true   Transportation Needs: Unmet Transportation Needs    Lack of Transportation (Medical):  Yes    Lack of Transportation (Non-Medical): Yes   Physical Activity: Not on file   Stress: Not on file   Social Connections: Not on file   Intimate Partner Violence: Not on file   Housing Stability: [address] Unable to Pay for Housing in the Last Year: No    Number of [place] in the Last Year: 1    Unstable Housing in the Last Year: No       Subjective         Objective    Physical Exam:   Assessment Type: Assess only  General Appearance: Alert, Awake  Respiratory Pattern: Normal  Chest Assessment: Chest expansion symmetrical  Bilateral Breath Sounds: Diminished  Cough: Non-productive  O2 Device: NC    Vitals:  Blood pressure 144/63, pulse 62, temperature 98 °F (36 7 °C), temperature source Temporal, resp  rate 16, height 5' 5" (1 651 m), weight 61 4 kg (135 lb 5 8 oz), SpO2 97 %  Imaging and other studies: I have personally reviewed pertinent reports  O2 Device: NC     Plan    Respiratory Plan: Home Bronchodilator Patient pathway  Airway Clearance Plan: Incentive Spirometer       Will make MDi with Alkbuterol TID 2 puffs

## 2022-09-22 NOTE — ASSESSMENT & PLAN NOTE
· Patient reports feeling under the weather over the past several days  Presented after being found by home health to have O2 sats in mid 80s on room air  Required 4 L upon presentation of nasal cannula oxygen    Has now been weaned to room air saturating at 91%  · Patient did test positive for COVID-19 in the ED  · Chest x-ray did show evidence of left lower lobe patchy infiltrate  · White blood cells are elevated at 12 56  · Procalcitonin within normal limits  · D-dimer elevated at 1 20  · Initially, patient refused CTA of chest   She is now agreeable  · Will get CT of chest PE study  · Due to elevated D-dimer, COVID per COVID pathway will initiate on weight based Lovenox  · Solu-Medrol 40 mg IV Q 24 per COVID pathway  · Remdesivir IV Q 24  · Blood culture sent  · Monitor white blood cells, procalcitonin, CRP,

## 2022-09-22 NOTE — PLAN OF CARE
Problem: PAIN - ADULT  Goal: Verbalizes/displays adequate comfort level or baseline comfort level  Description: Interventions:  - Encourage patient to monitor pain and request assistance  - Assess pain using appropriate pain scale  - Administer analgesics based on type and severity of pain and evaluate response  - Implement non-pharmacological measures as appropriate and evaluate response  - Consider cultural and social influences on pain and pain management  - Notify physician/advanced practitioner if interventions unsuccessful or patient reports new pain  Outcome: Progressing     Problem: INFECTION - ADULT  Goal: Absence or prevention of progression during hospitalization  Description: INTERVENTIONS:  - Assess and monitor for signs and symptoms of infection  - Monitor lab/diagnostic results  - Monitor all insertion sites, i e  indwelling lines, tubes, and drains  - Monitor endotracheal if appropriate and nasal secretions for changes in amount and color  - [place] appropriate cooling/warming therapies per order  - Administer medications as ordered  - Instruct and encourage patient and family to use good hand hygiene technique  - Identify and instruct in appropriate isolation precautions for identified infection/condition  Outcome: Progressing  Goal: Absence of fever/infection during neutropenic period  Description: INTERVENTIONS:  - Monitor WBC    Outcome: Progressing     Problem: SAFETY ADULT  Goal: Patient will remain free of falls  Description: INTERVENTIONS:  - Educate patient/family on patient safety including physical limitations  - Instruct patient to call for assistance with activity   - Consult OT/PT to assist with strengthening/mobility   - Keep Call bell within reach  - Keep bed low and locked with side rails adjusted as appropriate  - Keep care items and personal belongings within reach  - Initiate and maintain comfort rounds  - Make Fall Risk Sign visible to staff  - Offer Toileting every 2 Hours, in advance of need  - Initiate/Maintain bed/chair alarm  - Obtain necessary fall risk management equipment: alarms  - Apply yellow socks and bracelet for high fall risk patients  - Consider moving patient to room near nurses station  Outcome: Progressing  Goal: Maintain or return to baseline ADL function  Description: INTERVENTIONS:  -  Assess patient's ability to carry out ADLs; assess patient's baseline for ADL function and identify physical deficits which impact ability to perform ADLs (bathing, care of mouth/teeth, toileting, grooming, dressing, etc )  - Assess/evaluate cause of self-care deficits   - Assess range of motion  - Assess patient's mobility; develop plan if impaired  - Assess patient's need for assistive devices and provide as appropriate  - Encourage maximum independence but intervene and supervise when necessary  - Involve family in performance of ADLs  - Assess for home care needs following discharge   - Consider OT consult to assist with ADL evaluation and planning for discharge  - Provide patient education as appropriate  Outcome: Progressing  Goal: Maintains/Returns to pre admission functional level  Description: INTERVENTIONS:  - Perform BMAT or MOVE assessment daily    - Set and communicate daily mobility goal to care team and patient/family/caregiver  - Collaborate with rehabilitation services on mobility goals if consulted  - Perform Range of Motion 4 times a day  - Reposition patient every 2 hours    - Dangle patient 4 times a day  - Stand patient 4 times a day  - Ambulate patient 4 times a day  - Out of bed to chair 3 times a day   - Out of bed for meals 3 times a day  - Out of bed for toileting  - Record patient progress and toleration of activity level   Outcome: Progressing     Problem: DISCHARGE PLANNING  Goal: Discharge to home or other facility with appropriate resources  Description: INTERVENTIONS:  - Identify barriers to discharge w/patient and caregiver  - Arrange for needed discharge resources and transportation as appropriate  - Identify discharge learning needs (meds, wound care, etc )  - Arrange for interpretive services to assist at discharge as needed  - Refer to Case Management Department for coordinating discharge planning if the patient needs post-hospital services based on physician/advanced practitioner order or complex needs related to functional status, cognitive ability, or social support system  Outcome: Progressing     Problem: Knowledge Deficit  Goal: Patient/family/caregiver demonstrates understanding of disease process, treatment plan, medications, and discharge instructions  Description: Complete learning assessment and assess knowledge base    Interventions:  - Provide teaching at level of understanding  - Provide teaching via preferred learning methods  Outcome: Progressing     Problem: CARDIOVASCULAR - ADULT  Goal: Maintains optimal cardiac output and hemodynamic stability  Description: INTERVENTIONS:  - Monitor I/O, vital signs and rhythm  - Monitor for S/S and trends of decreased cardiac output  - Administer and titrate ordered vasoactive medications to optimize hemodynamic stability  - Assess quality of pulses, skin color and temperature  - Assess for signs of decreased coronary artery perfusion  - Instruct patient to report change in severity of symptoms  Outcome: Progressing  Goal: Absence of cardiac dysrhythmias or at baseline rhythm  Description: INTERVENTIONS:  - Continuous cardiac monitoring, vital signs, obtain 12 lead EKG if ordered  - Administer antiarrhythmic and heart rate control medications as ordered  - Monitor electrolytes and administer replacement therapy as ordered  Outcome: Progressing     Problem: RESPIRATORY - ADULT  Goal: Achieves optimal ventilation and oxygenation  Description: INTERVENTIONS:  - Assess for changes in respiratory status  - Assess for changes in mentation and behavior  - Position to facilitate oxygenation and minimize respiratory effort  - Oxygen administered by appropriate delivery if ordered  - Initiate smoking cessation education as indicated  - Encourage broncho-pulmonary hygiene including cough, deep breathe, Incentive Spirometry  - Assess the need for suctioning and aspirate as needed  - Assess and instruct to report SOB or any respiratory difficulty  - Respiratory Therapy support as indicated  Outcome: Progressing     Problem: SKIN/TISSUE INTEGRITY - ADULT  Goal: Skin Integrity remains intact(Skin Breakdown Prevention)  Description: Assess:  -Perform Steven assessment every shift  -Clean and moisturize skin every 2 ours  -Inspect skin when repositioning, toileting, and assisting with ADLS  -Assess under medical devices such as p ulse ox every shift  -Assess extremities for adequate circulation and sensation     Bed Management:  -Have minimal linens on bed & keep smooth, unwrinkled  -Change linens as needed when moist or perspiring  -Avoid sitting or lying in one position for more than 2 hours while in bed  -Keep HOB at 30 degrees     Toileting:  -Offer bedside commode  -Assess for incontinence every 2 hours  -Use incontinent care products after each incontinent episode such as breifs    Activity:  -Mobilize patient 4 times a day  -Encourage activity and walks on unit  -Encourage or provide ROM exercises   -Turn and reposition patient every 2 Hours  -Use appropriate equipment to lift or move patient in bed  -Instruct/ Assist with weight shifting every 2 hours when out of bed in chair  -Consider limitation of chair time 4 hour intervals    Skin Care:  -Avoid use of baby powder, tape, friction and shearing, hot water or constrictive clothing  -Relieve pressure over bony prominences using alevyn  -Do not massage red bony areas    Next Steps:  -Teach patient strategies to minimize risks such as weight shift    Outcome: Progressing

## 2022-09-22 NOTE — INCIDENTAL FINDINGS
The following findings require follow up:  Radiographic finding   Finding:  CTA of the chest/PE protocol (09/22/2022): IMPRESSION:     Left lower lobe consolidation with small patchy opacity in the lingula, concerning for pneumonia      No pulmonary embolus identified      Small pulmonary nodules, as described  Based on current Fleischner Society 2017 Guidelines on incidental pulmonary nodule, no routine follow-up is needed if the patient is low risk  If the patient is high risk, optional follow-up chest CT at 12 months can be considered       Follow up required:  Yes   Follow up should be done within 1 week(s)    Please notify the following clinician to assist with the follow up:   Dr Iris Barton (PCP) and outpatient follow-up with Pulmonology

## 2022-09-22 NOTE — CONSULTS
Consultation - Pulmonary Medicine   Stephanie Salguero 65 y o  female MRN: [id number]  Unit/Bed#: 311-52 Encounter: [id number]      Assessment/Plan:    1  Acute hypoxic respiratory failure  1  Currently requiring 2L NC   2  No O2 requirement at baseline  3  Recommend home O2 eval prior to discharge  4  Keep oxygen saturation above 88%  5  Pulmonary toilet:  Increase activity as tolerated, out of bed as tolerated, cough and deep breathing as encouraged, incentive spirometry q 1 hour  2  Suspected COPD of unknown severity without acute exacerbation  1  Systemic steroids as below  2  Continue Symbicort and Incruse  3  Continue p r n  Albuterol HFA/nebs  4  Would benefit from outpatient pulmonary follow-up and PFTs  3  COVID infection  1  Follow mild protocol   2  Agree with 5 days of remdesivir   3  Complete 10 days of decadron 6 mg daily  4  No need for baricitinib currently  5  Trend CRP every 1-2 days based on clinical response  6  Ruled out PE on CTA- continue weight based lovenox per covid protocol   4  LLL PNA  1  Pending:  Blood cultures x2, sputum culture  2  Trend WBC  3  Continue ceftriaxone and azithromycin to complete minimum 5 day course  4  Repeat CXR in 6-8 weeks to confirm resolution  5  Pulmonary nodules  1  Small sub-4mm nodules noted on CTA chest PE study that will require 12 month follow up CT chest given patient high risk status    History of Present Illness   Physician Requesting Consult: Benjy Jimenez DO  Reason for Consult / Principal Problem: COVID, COPD  Hx and PE limited by: n/a  Chief Complaint: shortness of breath  HPI: Stephanie Salguero is a 65 y o   female who presented to [facility] with complaints of shortness of breath/hypoxia  Patient's past medical history positive for suspected COPD of unknown severity, hyperlipidemia, hypertension, diabetes mellitus type 2  Patient had recent hospitalization last month for COPD exacerbation    Patient reported feeling well at time of discharge but over time reported vague progressive worsening dyspnea on exertion  Also endorses chronic cough productive of clear sputum intermittent wheezing  No chest pain or palpitations  Denies fevers or chills prior to admission  On the day of admission she reports that her home health aide noticed that her oxygen levels were low  EMS was called and she was brought to the ED  There she was placed on 4 L nasal cannula with improvement in saturations to 93%  Patient found to have hypokalemia, leukocytosis, elevated CRP, positive D-dimer, positive COVID PCR  CTA chest PE study negative for PE but did show left lower lobe consolidation along with small stable pulmonary nodules  Pulmonary was consulted help manage this  Presently, patient is seen eating her lunch in no acute distress  Patient states that she feels much better compared to when she 1st came in thin that her shortness breath is improved but she continues to have cough  She also feels weak  From a pulmonary perspective, patient does not follow a pulmonologist   She reports she has had history of COPD for many years  Does not recall she had pulmonary function tests in the past   Patient does not have supplemental oxygen at baseline  Per chart review she has Symbicort and Incruse at home however patient states that she does not use anything on a daily basis and usually only uses albuterol when she feels she needs it  Patient is a current everyday smoker stating that she smoked at least a pack a day for 40 years  Actively trying to quit back  She states that she will quit smoking after this admission      Inpatient consult to Pulmonology  Consult performed by: Javi Mercado PA-C  Consult ordered by: Jacqui Delgado PA-C          Review of Systems    Historical Information   Past Medical History:   Diagnosis Date    Hyperlipidemia     last assessed  8/24/17    Hypertension     last assessed 10/2/14     Past Surgical History:   Procedure Laterality Date    CRANIOTOMY       Social History   Social History     Substance and Sexual Activity   Alcohol Use Never     Social History     Substance and Sexual Activity   Drug Use No     Social History     Tobacco Use   Smoking Status Current Every Day Smoker    Packs/day: 1 00    Types: Cigarettes   Smokeless Tobacco Never Used     E-Cigarette/Vaping    E-Cigarette Use Never User      E-Cigarette/Vaping Substances    Nicotine No     THC No     CBD No     Flavoring No     Other No     Unknown No      Occupational History: never worked    Family History:   Family History   Problem Relation Age of Onset   [facility] Breast cancer Mother     Ovarian cancer Mother     Diabetes Father        Meds/Allergies   all current active meds have been reviewed, pertinent pulmonary meds have been reviewed and current meds:   Current Facility-Administered Medications   Medication Dose Route Frequency    acetaminophen (TYLENOL) tablet 650 mg  650 mg Oral Q6H PRN    albuterol (PROVENTIL HFA,VENTOLIN HFA) inhaler 2 puff  2 puff Inhalation TID    albuterol inhalation solution 2 5 mg  2 5 mg Nebulization Q6H PRN    aspirin chewable tablet 81 mg  81 mg Oral Daily    azithromycin (ZITHROMAX) tablet 500 mg  500 mg Oral Q24H    benzonatate (TESSALON PERLES) capsule 100 mg  100 mg Oral TID PRN    budesonide-formoterol (SYMBICORT) 80-4 5 MCG/ACT inhaler 2 puff  2 puff Inhalation BID    cefTRIAXone (ROCEPHIN) IVPB (premix in dextrose) 1,000 mg 50 mL  1,000 mg Intravenous Q24H    dexamethasone (DECADRON) injection 6 mg  6 mg Intravenous Q24H    enoxaparin (LOVENOX) subcutaneous injection 60 mg  1 mg/kg Subcutaneous Q12H [facility]    insulin glargine (LANTUS) subcutaneous injection 10 Units 0 1 mL  10 Units Subcutaneous QAM    insulin lispro (HumaLOG) 100 units/mL subcutaneous injection 1-5 Units  1-5 Units Subcutaneous TID AC    insulin lispro (HumaLOG) 100 units/mL subcutaneous injection 1-5 Units  1-5 Units Subcutaneous HS    [START ON 9/23/2022] lisinopril (ZESTRIL) tablet 10 mg  10 mg Oral Daily    nicotine (NICODERM CQ) 21 mg/24 hr TD 24 hr patch 1 patch  1 patch Transdermal Daily    pravastatin (PRAVACHOL) tablet 80 mg  80 mg Oral Daily With Dinner    [START ON 9/23/2022] remdesivir (Veklury) 100 mg in sodium chloride 0 9 % 270 mL IVPB  100 mg Intravenous Q24H    sertraline (ZOLOFT) tablet 25 mg  25 mg Oral Daily    umeclidinium bromide (INCRUSE ELLIPTA) 62 5 mcg/inh inhaler AEPB 1 puff  1 puff Inhalation Daily       No Known Allergies    Objective   Vitals: Blood pressure 126/68, pulse 102, temperature 98 °F (36 7 °C), temperature source Temporal, resp  rate 16, height 5' 5" (1 651 m), weight 61 4 kg (135 lb 5 8 oz), SpO2 91 %  2L NC,Body mass index is 22 53 kg/m²  Intake/Output Summary (Last 24 hours) at 9/22/2022 1429  Last data filed at 9/21/2022 2217  Gross per 24 hour   Intake 100 ml   Output --   Net 100 ml     Invasive Devices  Report    Peripheral Intravenous Line  Duration           Peripheral IV 09/21/22 Right Antecubital <1 day                Physical Exam  Vitals and nursing note reviewed  Constitutional:       General: She is not in acute distress  Appearance: Normal appearance  HENT:      Head: Normocephalic and atraumatic  Nose: Nose normal       Mouth/Throat:      Mouth: Mucous membranes are moist       Pharynx: Oropharynx is clear  Eyes:      Extraocular Movements: Extraocular movements intact  Conjunctiva/sclera: Conjunctivae normal    Cardiovascular:      Rate and Rhythm: Normal rate and regular rhythm  Pulses: Normal pulses  Heart sounds: No murmur heard  Pulmonary:      Effort: Pulmonary effort is normal       Breath sounds: No wheezing  Comments: Decreased in left base  Abdominal:      General: Bowel sounds are normal       Palpations: Abdomen is soft  Tenderness: There is no abdominal tenderness     Musculoskeletal:      Cervical back: Normal range of motion and neck supple  No muscular tenderness  Right lower leg: No edema  Left lower leg: No edema  Skin:     General: Skin is warm and dry  Neurological:      General: No focal deficit present  Mental Status: She is alert  Mental status is at baseline  Psychiatric:         Mood and Affect: Mood normal          Behavior: Behavior normal          Lab Results:   I have personally reviewed pertinent lab results  , ABG: No results found for: PHART, DIT0CNM, PO2ART, KDA7CQI, B6EUGAUE, BEART, SOURCE, BNP: No results found for: BNP, CBC:   Lab Results   Component Value Date    WBC 9 17 09/22/2022    HGB 15 2 09/22/2022    HCT 46 7 (H) 09/22/2022    MCV 95 09/22/2022     09/22/2022    MCH 31 0 09/22/2022    MCHC 32 5 09/22/2022    RDW 12 2 09/22/2022    MPV 12 2 09/22/2022    NRBC 0 09/22/2022   , CMP:   Lab Results   Component Value Date    SODIUM 136 09/22/2022    K 4 3 09/22/2022    CL 97 09/22/2022    CO2 33 (H) 09/22/2022    BUN 20 09/22/2022    CREATININE 0 98 09/22/2022    CALCIUM 9 2 09/22/2022    AST 14 09/22/2022    ALT 8 (L) 09/22/2022    ALKPHOS 74 09/22/2022    EGFR 55 09/22/2022   , PT/INR: No results found for: PT, INR, Troponin: No results found for: TROPONINI    Imaging Studies: I have personally reviewed pertinent reports  and I have personally reviewed pertinent films in PACS     CTA chest PE study 09/22/2022     IMPRESSION:     Left lower lobe consolidation with small patchy opacity in the lingula, concerning for pneumonia      No pulmonary embolus identified      Small pulmonary nodules, as described  Based on current Fleischner Society 2017 Guidelines on incidental pulmonary nodule, no routine follow-up is needed if the patient is low risk  If the patient is high risk, optional follow-up chest CT at 12 months   can be considered  EKG, Pathology, and Other Studies: I have personally reviewed pertinent reports         Pulmonary Results (PFTs, PSG): none to review      VTE Prophylaxis: Enoxaparin (Lovenox)    Code Status: Level 3 - DNAR and DNI    Portions of the record may have been created with voice recognition software  Occasional wrong word or "sound a like" substitutions may have occurred due to the inherent limitations of voice recognition software  Read the chart carefully and recognize, using context, where substitutions have occurred

## 2022-09-22 NOTE — ASSESSMENT & PLAN NOTE
· Patient presented with no acute complaints of shortness of breath  However, she was saturating in mid 80s on room air  She does not use oxygen at home  · Chest x-ray did show evidence of left lower lobe patchy infiltrate  · White blood cells elevated at 12 56  · Procalcitonin within normal limits  · Blood culture sent  · Sputum culture ordered  · Respiratory protocol  · One issue patient on ceftriaxone 1000 mg IV Q 24  · Azithromycin 500 mg p o  Q 24  · Follow-up white blood cells, procalcitonin a m

## 2022-09-22 NOTE — ED NOTES
[facility] contacted regarding patient transport  Per [facility], ED charge nurse will be notified via [facility] of pickup time when available       Wes Pastor RN  09/21/22 [phone]

## 2022-09-23 ENCOUNTER — TRANSITIONAL CARE MANAGEMENT (OUTPATIENT)
Dept: FAMILY MEDICINE CLINIC | Facility: CLINIC | Age: 77
End: 2022-09-23

## 2022-09-23 VITALS
HEART RATE: 66 BPM | HEIGHT: 65 IN | TEMPERATURE: 97.4 F | RESPIRATION RATE: 17 BRPM | OXYGEN SATURATION: 89 % | DIASTOLIC BLOOD PRESSURE: 65 MMHG | SYSTOLIC BLOOD PRESSURE: 121 MMHG | WEIGHT: 135.36 LBS | BODY MASS INDEX: 22.55 KG/M2

## 2022-09-23 PROBLEM — R09.02 HYPOXIA: Status: ACTIVE | Noted: 2022-09-22

## 2022-09-23 PROBLEM — R31.29 MICROSCOPIC HEMATURIA: Status: ACTIVE | Noted: 2022-09-23

## 2022-09-23 PROBLEM — R82.81 PYURIA: Status: ACTIVE | Noted: 2022-09-23

## 2022-09-23 PROBLEM — I48.91 NEW ONSET ATRIAL FIBRILLATION (HCC): Status: ACTIVE | Noted: 2022-09-23

## 2022-09-23 PROBLEM — M79.89 SWELLING OF RIGHT HAND: Status: ACTIVE | Noted: 2022-09-23

## 2022-09-23 PROBLEM — I48.21 PERMANENT ATRIAL FIBRILLATION (HCC): Status: ACTIVE | Noted: 2022-09-23

## 2022-09-23 PROBLEM — E83.52 HYPERCALCEMIA: Status: ACTIVE | Noted: 2022-09-23

## 2022-09-23 PROBLEM — J12.82 PNEUMONIA DUE TO COVID-19 VIRUS: Status: ACTIVE | Noted: 2022-09-22

## 2022-09-23 LAB
ALBUMIN SERPL BCP-MCNC: 2.8 G/DL (ref 3.5–5)
ALP SERPL-CCNC: 80 U/L (ref 46–116)
ALT SERPL W P-5'-P-CCNC: 10 U/L (ref 12–78)
ANION GAP SERPL CALCULATED.3IONS-SCNC: 8 MMOL/L (ref 4–13)
AST SERPL W P-5'-P-CCNC: 30 U/L (ref 5–45)
BASOPHILS # BLD AUTO: 0.05 THOUSANDS/ΜL (ref 0–0.1)
BASOPHILS NFR BLD AUTO: 0 % (ref 0–1)
BILIRUB SERPL-MCNC: 0.66 MG/DL (ref 0.2–1)
BUN SERPL-MCNC: 27 MG/DL (ref 5–25)
CALCIUM ALBUM COR SERPL-MCNC: 10.5 MG/DL (ref 8.3–10.1)
CALCIUM SERPL-MCNC: 9.5 MG/DL (ref 8.3–10.1)
CHLORIDE SERPL-SCNC: 98 MMOL/L (ref 96–108)
CK SERPL-CCNC: 96 U/L (ref 26–192)
CO2 SERPL-SCNC: 31 MMOL/L (ref 21–32)
CREAT SERPL-MCNC: 0.97 MG/DL (ref 0.6–1.3)
CRP SERPL QL: 28.5 MG/L
D DIMER PPP FEU-MCNC: 1.09 UG/ML FEU
DME PARACHUTE DELIVERY DATE EXPECTED: NORMAL
DME PARACHUTE DELIVERY DATE EXPECTED: NORMAL
DME PARACHUTE DELIVERY DATE REQUESTED: NORMAL
DME PARACHUTE DELIVERY DATE REQUESTED: NORMAL
DME PARACHUTE DELIVERY NOTE: NORMAL
DME PARACHUTE ITEM DESCRIPTION: NORMAL
DME PARACHUTE ORDER STATUS: NORMAL
DME PARACHUTE ORDER STATUS: NORMAL
DME PARACHUTE SUPPLIER NAME: NORMAL
DME PARACHUTE SUPPLIER NAME: NORMAL
DME PARACHUTE SUPPLIER PHONE: NORMAL
DME PARACHUTE SUPPLIER PHONE: NORMAL
EOSINOPHIL # BLD AUTO: 0 THOUSAND/ΜL (ref 0–0.61)
EOSINOPHIL NFR BLD AUTO: 0 % (ref 0–6)
ERYTHROCYTE [DISTWIDTH] IN BLOOD BY AUTOMATED COUNT: 12 % (ref 11.6–15.1)
EST. AVERAGE GLUCOSE BLD GHB EST-MCNC: 177 MG/DL
GFR SERPL CREATININE-BSD FRML MDRD: 56 ML/MIN/1.73SQ M
GLUCOSE SERPL-MCNC: 111 MG/DL (ref 65–140)
GLUCOSE SERPL-MCNC: 112 MG/DL (ref 65–140)
HBA1C MFR BLD: 7.8 %
HCT VFR BLD AUTO: 46.5 % (ref 34.8–46.1)
HGB BLD-MCNC: 15.5 G/DL (ref 11.5–15.4)
IMM GRANULOCYTES # BLD AUTO: 0.16 THOUSAND/UL (ref 0–0.2)
IMM GRANULOCYTES NFR BLD AUTO: 1 % (ref 0–2)
LYMPHOCYTES # BLD AUTO: 1.73 THOUSANDS/ΜL (ref 0.6–4.47)
LYMPHOCYTES NFR BLD AUTO: 9 % (ref 14–44)
MAGNESIUM SERPL-MCNC: 1.7 MG/DL (ref 1.6–2.6)
MCH RBC QN AUTO: 30.3 PG (ref 26.8–34.3)
MCHC RBC AUTO-ENTMCNC: 33.3 G/DL (ref 31.4–37.4)
MCV RBC AUTO: 91 FL (ref 82–98)
MONOCYTES # BLD AUTO: 1.08 THOUSAND/ΜL (ref 0.17–1.22)
MONOCYTES NFR BLD AUTO: 5 % (ref 4–12)
NEUTROPHILS # BLD AUTO: 17.25 THOUSANDS/ΜL (ref 1.85–7.62)
NEUTS SEG NFR BLD AUTO: 85 % (ref 43–75)
NRBC BLD AUTO-RTO: 0 /100 WBCS
PHOSPHATE SERPL-MCNC: 3 MG/DL (ref 2.3–4.1)
PLATELET # BLD AUTO: 231 THOUSANDS/UL (ref 149–390)
PMV BLD AUTO: 12.3 FL (ref 8.9–12.7)
POTASSIUM SERPL-SCNC: 4.8 MMOL/L (ref 3.5–5.3)
PROT SERPL-MCNC: 7.1 G/DL (ref 6.4–8.4)
RBC # BLD AUTO: 5.11 MILLION/UL (ref 3.81–5.12)
SODIUM SERPL-SCNC: 137 MMOL/L (ref 135–147)
WBC # BLD AUTO: 20.27 THOUSAND/UL (ref 4.31–10.16)

## 2022-09-23 PROCEDURE — 80053 COMPREHEN METABOLIC PANEL: CPT | Performed by: INTERNAL MEDICINE

## 2022-09-23 PROCEDURE — 85025 COMPLETE CBC W/AUTO DIFF WBC: CPT | Performed by: INTERNAL MEDICINE

## 2022-09-23 PROCEDURE — 84100 ASSAY OF PHOSPHORUS: CPT | Performed by: INTERNAL MEDICINE

## 2022-09-23 PROCEDURE — 83036 HEMOGLOBIN GLYCOSYLATED A1C: CPT | Performed by: INTERNAL MEDICINE

## 2022-09-23 PROCEDURE — 85379 FIBRIN DEGRADATION QUANT: CPT | Performed by: INTERNAL MEDICINE

## 2022-09-23 PROCEDURE — 83735 ASSAY OF MAGNESIUM: CPT | Performed by: INTERNAL MEDICINE

## 2022-09-23 PROCEDURE — 86140 C-REACTIVE PROTEIN: CPT | Performed by: INTERNAL MEDICINE

## 2022-09-23 PROCEDURE — 82550 ASSAY OF CK (CPK): CPT | Performed by: INTERNAL MEDICINE

## 2022-09-23 PROCEDURE — 94761 N-INVAS EAR/PLS OXIMETRY MLT: CPT

## 2022-09-23 PROCEDURE — 82948 REAGENT STRIP/BLOOD GLUCOSE: CPT

## 2022-09-23 PROCEDURE — 99239 HOSP IP/OBS DSCHRG MGMT >30: CPT | Performed by: INTERNAL MEDICINE

## 2022-09-23 RX ORDER — LISINOPRIL 10 MG/1
10 TABLET ORAL DAILY
Qty: 30 TABLET | Refills: 0 | Status: SHIPPED | OUTPATIENT
Start: 2022-09-24 | End: 2022-09-23 | Stop reason: SDUPTHER

## 2022-09-23 RX ORDER — MAGNESIUM SULFATE 1 G/100ML
1 INJECTION INTRAVENOUS ONCE
Status: COMPLETED | OUTPATIENT
Start: 2022-09-23 | End: 2022-09-23

## 2022-09-23 RX ORDER — DEXAMETHASONE 6 MG/1
6 TABLET ORAL
Qty: 8 TABLET | Refills: 0 | Status: SHIPPED | OUTPATIENT
Start: 2022-09-24

## 2022-09-23 RX ORDER — DEXAMETHASONE 6 MG/1
6 TABLET ORAL
Qty: 8 TABLET | Refills: 0 | Status: SHIPPED | OUTPATIENT
Start: 2022-09-24 | End: 2022-09-23 | Stop reason: SDUPTHER

## 2022-09-23 RX ORDER — MELATONIN
2000 DAILY
Qty: 60 TABLET | Refills: 0 | Status: SHIPPED | OUTPATIENT
Start: 2022-09-23

## 2022-09-23 RX ORDER — AZITHROMYCIN 500 MG/1
500 TABLET, FILM COATED ORAL EVERY 24 HOURS
Qty: 3 TABLET | Refills: 0 | Status: SHIPPED | OUTPATIENT
Start: 2022-09-23 | End: 2022-09-26

## 2022-09-23 RX ORDER — AZITHROMYCIN 500 MG/1
500 TABLET, FILM COATED ORAL EVERY 24 HOURS
Qty: 3 TABLET | Refills: 0 | Status: SHIPPED | OUTPATIENT
Start: 2022-09-23 | End: 2022-09-23 | Stop reason: SDUPTHER

## 2022-09-23 RX ORDER — LISINOPRIL 10 MG/1
10 TABLET ORAL DAILY
Qty: 30 TABLET | Refills: 0 | Status: SHIPPED | OUTPATIENT
Start: 2022-09-24

## 2022-09-23 RX ORDER — MELATONIN
2000 DAILY
Qty: 60 TABLET | Refills: 0 | Status: SHIPPED | OUTPATIENT
Start: 2022-09-23 | End: 2022-09-23 | Stop reason: SDUPTHER

## 2022-09-23 RX ORDER — SITAGLIPTIN AND METFORMIN HYDROCHLORIDE 500; 50 MG/1; MG/1
1 TABLET, FILM COATED ORAL
Qty: 180 TABLET | Refills: 0
Start: 2022-09-23

## 2022-09-23 RX ORDER — CEFPODOXIME PROXETIL 200 MG/1
200 TABLET, FILM COATED ORAL 2 TIMES DAILY
Qty: 6 TABLET | Refills: 0 | Status: SHIPPED | OUTPATIENT
Start: 2022-09-23 | End: 2022-09-23 | Stop reason: SDUPTHER

## 2022-09-23 RX ORDER — ACETAMINOPHEN 325 MG/1
650 TABLET ORAL EVERY 6 HOURS PRN
Refills: 0
Start: 2022-09-23

## 2022-09-23 RX ORDER — CEFPODOXIME PROXETIL 200 MG/1
200 TABLET, FILM COATED ORAL 2 TIMES DAILY
Qty: 6 TABLET | Refills: 0 | Status: SHIPPED | OUTPATIENT
Start: 2022-09-23 | End: 2022-09-26

## 2022-09-23 RX ADMIN — ASPIRIN 81 MG CHEWABLE TABLET 81 MG: 81 TABLET CHEWABLE at 08:41

## 2022-09-23 RX ADMIN — MAGNESIUM SULFATE IN DEXTROSE 1 G: 10 INJECTION, SOLUTION INTRAVENOUS at 08:42

## 2022-09-23 RX ADMIN — DEXAMETHASONE SODIUM PHOSPHATE 6 MG: 4 INJECTION, SOLUTION INTRAMUSCULAR; INTRAVENOUS at 08:41

## 2022-09-23 RX ADMIN — INSULIN GLARGINE 10 UNITS: 100 INJECTION, SOLUTION SUBCUTANEOUS at 08:41

## 2022-09-23 RX ADMIN — UMECLIDINIUM 1 PUFF: 62.5 AEROSOL, POWDER ORAL at 08:41

## 2022-09-23 RX ADMIN — BUDESONIDE AND FORMOTEROL FUMARATE DIHYDRATE 2 PUFF: 80; 4.5 AEROSOL RESPIRATORY (INHALATION) at 17:33

## 2022-09-23 RX ADMIN — ALBUTEROL SULFATE 2 PUFF: 90 AEROSOL, METERED RESPIRATORY (INHALATION) at 16:05

## 2022-09-23 RX ADMIN — BUDESONIDE AND FORMOTEROL FUMARATE DIHYDRATE 2 PUFF: 80; 4.5 AEROSOL RESPIRATORY (INHALATION) at 08:56

## 2022-09-23 RX ADMIN — SERTRALINE 25 MG: 25 TABLET, FILM COATED ORAL at 08:42

## 2022-09-23 RX ADMIN — PRAVASTATIN SODIUM 80 MG: 40 TABLET ORAL at 16:05

## 2022-09-23 RX ADMIN — ENOXAPARIN SODIUM 60 MG: 60 INJECTION SUBCUTANEOUS at 08:41

## 2022-09-23 RX ADMIN — ALBUTEROL SULFATE 2 PUFF: 90 AEROSOL, METERED RESPIRATORY (INHALATION) at 08:56

## 2022-09-23 RX ADMIN — MAGNESIUM OXIDE TAB 400 MG (241.3 MG ELEMENTAL MG) 400 MG: 400 (241.3 MG) TAB at 16:05

## 2022-09-23 RX ADMIN — REMDESIVIR 100 MG: 100 INJECTION, POWDER, LYOPHILIZED, FOR SOLUTION INTRAVENOUS at 01:29

## 2022-09-23 RX ADMIN — LISINOPRIL 10 MG: 10 TABLET ORAL at 08:42

## 2022-09-23 NOTE — RESPIRATORY THERAPY NOTE
[phone] pt given POC by respiratory therapist Ryan Partida and instructions for use  Pt had no further questions, said daughter will be helping her  Case management also gave a finger pulse ox to pt for home use  batterries placed in and instructions on use given

## 2022-09-23 NOTE — ASSESSMENT & PLAN NOTE
· Due to infiltration of a peripheral IV  · Apply ice to the right hand   Outpatient follow-up with PCP in regards to this matter

## 2022-09-23 NOTE — ASSESSMENT & PLAN NOTE
· Checked a urine culture is pending at the time of discharge  · Receiving antibiotic treatment for the pneumonia, which will also treat possible acute cystitis  · Outpatient Urology evaluation

## 2022-09-23 NOTE — ASSESSMENT & PLAN NOTE
· The patient was seen in consultation by Pulmonology    · Not in acute COPD exacerbation per Pulmonology  · Treated with dexamethasone 6 mg IV every 24 hours per the COVID-19 treatment protocol during the hospitalization  · Discharge on dexamethasone 6 mg PO every 24 hours for 8 days to complete the 10-day treatment course per the COVID-19 treatment protocol  · Continue her home inhaler regimen upon discharge  · Outpatient follow-up with Pulmonology

## 2022-09-23 NOTE — DISCHARGE SUMMARY
[address] [facility] [address] [place]  Discharge- [place] 1945, 65 y o  female MRN: [id number]  Unit/Bed#: 987-64 Encounter: [id number]  Primary Care Provider: Sang Ruiz DO   Date and time admitted to hospital: 9/21/2022  3:06 PM    * Pneumonia due to COVID-19 virus  Assessment & Plan  · Not on any home supplemental oxygen treatment chronically  · Initially required 4 lpm of continuous supplemental oxygen to maintain oxygen saturation levels at 90% and above  · Received 2 doses of IV remdesivir during the hospitalization  · The patient did not want to remain hospitalized any longer for additional IV remdesivir treatment  · Treated with dexamethasone 6 mg IV every 24 hours per the COVID-19 treatment protocol during the hospitalization  · Discharge on dexamethasone 6 mg PO every 24 hours for 8 days to complete the 10-day treatment course per the COVID-19 treatment protocol  · Utilized lovenox 1 mg/kg SQ every 12 hours based on the elevated D-dimer level per the COVID-19 treatment protocol  · Discharge on eliquis 2 5 mg PO BID for 30 days with the patient still requiring continuous supplemental oxygen at the time of discharge  · Likely a superimposed bacterial pneumonia  · The patient was seen in consultation by Pulmonology    · She was treated with ceftriaxone 1000 mg IV every 24 hours and azithromycin 500 mg PO every 24 hours for 2 days during the hospitalization  · Discharge on cefpodoxime 200 mg PO BID for 3 days and azithromycin 500 mg PO every 24 hours for 3 days to complete a 5-day antibiotic treatment course  · Will need outpatient surveillance imaging with her PCP  · Outpatient follow-up with Pulmonology     Latest Reference Range & Units 09/21/22 16:59 09/22/22 05:47   C-REACTIVE PROTEIN <3 0 mg/L  49 8 (H)   D-Dimer, Quant <0 50 ug/ml FEU 1 20 (H)    (H): Data is abnormally high    Hypoxia  Assessment & Plan  · Not on any home supplemental oxygen treatment chronically  · Initially required 4 lpm of continuous supplemental oxygen to maintain oxygen saturation levels at 90% and above  · Due to COVID-19 virus infection in addition to a superimposed bacterial pneumonia  · The patient did qualify for home supplemental oxygen therapy upon discharge based on the following evaluation by Respiratory Therapy:  "Home Oxygen Qualifying Test      Patient name:  Hai Becker        :  1945   Date of Test:   2022             Diagnosis:    Home Oxygen Test:    **Medicare Guidelines require item(s) 1-5 on all ambulatory patients or 1 and 2 on non-ambulatory patients      1  Baseline SPO2 on Room Air at rest 91 %   a        1  SPO2 during exertion on Room Air 86  %  a  During exertion monitor SPO2  If SPO2 increases >=89%, do not add supplemental oxygen     2  SPO2 on Oxygen at Rest 95 % at 2 LPM     3  SPO2 during exertion on Oxygen 91 % at 2 LPM     4  Test performed during exertion activity  [x]? Supplemental Home Oxygen is indicated      []? Client does not qualify for home oxygen      Respiratory Additional Notes- Patient has Covid diagnosis and was walked in her room  With minimal exertion she was noted to have increased SOB  Discussed living situation at home and she stated that her living space is small with bathroom nearby, no steps   Daughter does her shopping and some care giving      Jina Credit, RT"    · Outpatient follow-up with Pulmonology      Permanent atrial fibrillation [facility])  Assessment & Plan  · Had previously been taken off of anticoagulation due to intracranial hemorrhage  · Will be on eliquis 2 5 mg PO BID for 30 days as DVT prophylaxis for the COVID-19 virus infection  · Has been on aspirin 81 mg PO Qdaily  · Needs outpatient follow-up with Cardiology to determine if she should be on long-term anticoagulation again    Microscopic hematuria  Assessment & Plan  · Checked a urine culture is pending at the time of discharge  · Receiving antibiotic treatment for the pneumonia, which will also treat possible acute cystitis  · Outpatient Urology evaluation    Pyuria  Assessment & Plan  · Checked a urine culture is pending at the time of discharge  · Receiving antibiotic treatment for the pneumonia, which will also treat possible acute cystitis    Hypercalcemia  Assessment & Plan  · Check an ionized calcium level, intact-PTH level, and PTH-related peptide after discharge  · Outpatient Endocrinology evaluation    Swelling of right hand  Assessment & Plan  · Due to infiltration of a peripheral IV  · Apply ice to the right hand   Outpatient follow-up with PCP in regards to this matter    COPD (chronic obstructive pulmonary disease) ([facility] [address] )  Assessment & Plan  · The patient was seen in consultation by Pulmonology  · Not in acute COPD exacerbation per Pulmonology  · Treated with dexamethasone 6 mg IV every 24 hours per the COVID-19 treatment protocol during the hospitalization  · Discharge on dexamethasone 6 mg PO every 24 hours for 8 days to complete the 10-day treatment course per the COVID-19 treatment protocol  · Continue her home inhaler regimen upon discharge  · Outpatient follow-up with Pulmonology    Hypomagnesemia  Assessment & Plan  · Improved with IV magnesium sulfate replacement therapy  · Follow the magnesium level after discharge with her PCP    Results from last 7 days   Lab Units 09/23/22  0455 09/21/22  1659   MAGNESIUM mg/dL 1 7 1 5*         LLL pneumonia  Assessment & Plan  · Likely a superimposed bacterial pneumonia  · The patient was seen in consultation by Pulmonology    · She was treated with ceftriaxone 1000 mg IV every 24 hours and azithromycin 500 mg PO every 24 hours for 2 days during the hospitalization  · Discharge on cefpodoxime 200 mg PO BID for 3 days and azithromycin 500 mg PO every 24 hours for 3 days to complete a 5-day antibiotic treatment course  · Will need outpatient surveillance imaging with her PCP  · Outpatient follow-up with Pulmonology    CTA of the chest/PE protocol (09/22/2022): IMPRESSION:     Left lower lobe consolidation with small patchy opacity in the lingula, concerning for pneumonia      No pulmonary embolus identified  Vitamin D deficiency  Assessment & Plan  · Discontinue ergocalciferol  · Discharged on cholecalciferol 2000 I  U  PO Qdaily  · Check a vitamin D 25-OH level in 1-2 months   Outpatient follow-up with PCP in regards to this matter    Type 2 diabetes mellitus with hyperglycemia, without long-term current use of insulin [facility])  Assessment & Plan  Lab Results   Component Value Date    HGBA1C 7 1 (H) 08/03/2021       Recent Labs     09/22/22  1557 09/22/22  2034 09/22/22  2126 09/23/22  0802   POCGLU 124 167* 177* 112       Blood Sugar Average: Last 72 hrs:  · (P) 184     · Treated with lantus 10 units daily during the hospitalization with her PO diabetic medications all held during the hospitalization  · Restart her PO diabetic medication regimen upon discharge  · Continue lisinopril for renal protection  · Outpatient Endocrinology evaluation    Hypokalemia  Assessment & Plan  · Resolved with potassium chloride supplementation treatment during the hospitalization  · Follow the potassium level after discharge with her PCP    Results from last 7 days   Lab Units 09/23/22  0455 09/22/22  0547 09/21/22  1659   SODIUM mmol/L 137 136 140   POTASSIUM mmol/L 4 8 4 3 3 0*   CHLORIDE mmol/L 98 97 96   CO2 mmol/L 31 33* 37*   BUN mg/dL 27* 20 19   CREATININE mg/dL 0 97 0 98 1 07   CALCIUM mg/dL 9 5 9 2 9 5         Multiple pulmonary nodules  Assessment & Plan  · Outpatient Pulmonology evaluation  · Outpatient surveillance imaging with PCP    CTA of the chest/PE protocol (09/22/2022): IMPRESSION:     Left lower lobe consolidation with small patchy opacity in the lingula, concerning for pneumonia      No pulmonary embolus identified      Small pulmonary nodules, as described   Based on current Fleischner Society 2017 Guidelines on incidental pulmonary nodule, no routine follow-up is needed if the patient is low risk  If the patient is high risk, optional follow-up chest CT at 12 months can be considered  Tobacco use  Assessment & Plan  · A nicotine patch was utilized during the hospitalization  · Smoking cessation counseling was given to the patient during the hospitalization  Essential hypertension  Assessment & Plan  · Discontinue the lisinopril-hydrochlorothiazide combination medication  · Discharge on lisinopril 10 mg PO Qdaily   Outpatient follow-up with PCP in regards to this matter    Discharging Physician / Practitioner: Agustín Cam DO  PCP: Irma Sam DO  Admission Date:   Admission Orders (From admission, onward)     Ordered        09/22/22 0826  Inpatient Admission  Once            09/21/22 2334  Place in Observation  Once                      Discharge Date: 09/23/22    Consultations During Hospital Stay:  · Pulmonology    Procedures Performed:   · None    Significant Findings / Test Results:   XR chest 1 view portable    Result Date: 9/22/2022  Impression: Infiltrates in the lingula and left lower lobe  Workstation performed: [id number]     CTA chest pe study    Status: Final result               PACS Images     Show images for CTA chest pe study      Study Result    Narrative & Impression   CTA - CHEST WITH IV CONTRAST - PULMONARY ANGIOGRAM     INDICATION:   Dyspnea, covid, elevated Ddimer  Patient has confirmed COVID-19      COMPARISON: Multiple priors most recently chest radiograph 9/21/2022     TECHNIQUE: CTA examination of the chest was performed using angiographic technique according to a protocol specifically tailored to evaluate for pulmonary embolism  Axial, sagittal, and coronal 2D reformatted images were created from the source data and   submitted for interpretation    In addition, coronal 3D MIP postprocessing was performed on the acquisition scanner      Radiation dose length product (DLP) for this visit:  311 mGy-cm   This examination, like all CT scans performed in the [facility], was performed utilizing techniques to minimize radiation dose exposure, including the use of iterative   reconstruction and automated exposure control      IV Contrast:  85 mL of iohexol (OMNIPAQUE)     FINDINGS:     PULMONARY ARTERIAL TREE:  No pulmonary embolus is seen       LUNGS:  Left lower lobe consolidation with small patchy opacity in the lingula  Small subpleural opacity medial right upper lobe (series 3, image 31), may represent subsegmental atelectasis      0 2 cm subpleural pulmonary nodule right upper lobe (series 3, image 24) appear  0 3 cm right upper lobe subpleural pulmonary nodule (series 3 image 38)  These are unchanged from recent CT of 8/13/2022      PLEURA:  Unremarkable      HEART/GREAT VESSELS:  Thoracic aortic and coronary artery atherosclerosis  No thoracic aortic aneurysm      MEDIASTINUM AND MICHAEL:  Unremarkable      CHEST WALL AND LOWER NECK:   Pectus excavatum with mass effect on the heart      VISUALIZED STRUCTURES IN THE UPPER ABDOMEN:  Unremarkable      OSSEOUS STRUCTURES:  Spinal degenerative changes are noted  No acute fracture or destructive osseous lesion      IMPRESSION:     Left lower lobe consolidation with small patchy opacity in the lingula, concerning for pneumonia      No pulmonary embolus identified      Small pulmonary nodules, as described  Based on current Fleischner Society 2017 Guidelines on incidental pulmonary nodule, no routine follow-up is needed if the patient is low risk  If the patient is high risk, optional follow-up chest CT at 12 months   can be considered           The study was marked in EPIC for immediate notification       VAS lower limb venous duplex study, complete bilateral    Status: Final result               PACS Images     Show images for VAS lower limb venous duplex study, complete bilateral      Study Result    Narrative & Impression [facility] REPORT  CLINICAL:  Indications:  Physician wants to determine patency of the venous system secondary to elevated  d-dimer, hypoxia and covid positive  CONCLUSION:     Impression:  RIGHT LOWER LIMB: Limited  No gross evidence of acute deep vein thrombosis in the CFV, FV, Popliteal,  Gastrocnemius, Posterior Tibial and Peroneal Veins  Doppler evaluation shows a normal response to augmentation maneuvers     LEFT LOWER LIMB: Limited  No gross evidence of acute deep vein thrombosis in the CFV, FV, Popliteal,  Gastrocnemius, Posterior Tibial and Peroneal Veins  Doppler evaluation shows a normal response to augmentation maneuvers     Limited protocol performed     Technical findings were faxed to chart     SIGNATURE:  Electronically Signed by: Lucho Serna on 2022-09-22 03:51:15 PM     ECG 12 lead  Order: [id number]   Status: Final result     Visible to patient: No (inaccessible in  [facility])     Next appt: None     0 Result Notes         important suggestion  Newer results are available  Click to view them now       Component Ref Range & Units 9/21/22 1712 8/13/22 1646   Ventricular Rate BPM 76  64    Atrial Rate   64    IN Interval ms      QRSD Interval ms 94  94    QT Interval ms 430  412    QTC Interval ms 483  425    P Axis degrees      QRS Axis degrees 205  252    T Wave Axis degrees -54  56              Narrative & Impression    -Suspect arm lead reversal, interpretation assumes no reversal  Atrial fibrillation  Incomplete right bundle branch block  Right ventricular hypertrophy with repolarization abnormality  Anterolateral infarct (cited on or before 13-AUG-2022)  Abnormal ECG  Confirmed by Abdullahi Abraham ([id number]) on 9/22/2022 1:34:31 PM      Specimen Collected: 09/21/22 17:12 Last Resulted: 09/22/22 13:34           ECG 12 lead  Order: [id number]   Status: Final result     Visible to patient: No (inaccessible in [facility])     Next appt: None     0 Result Notes    Component Ref Range & Units 9/21/22 1718 9/21/22 1712 8/13/22 1646   Ventricular Rate BPM 68  76  64    Atrial Rate   136  64    MN Interval ms        QRSD Interval ms 92  94  94    QT Interval ms 414  430  412    QTC Interval ms 440  483  425    P [place] degrees        QRS [place] degrees 237  205  252    T Wave Axis degrees -47  -54  56              Narrative & Impression    -Suspect arm lead reversal, interpretation assumes no reversal  Atrial fibrillation  Incomplete right bundle branch block  Right ventricular hypertrophy with repolarization abnormality  Anterolateral infarct (cited on or before 13-AUG-2022)  Abnormal ECG  When compared with ECG of 21-SEP-2022 17:12,  No significant change was found  Confirmed by Anisha Smallwood ([id number]) on 9/22/2022 1:34:39 PM      Specimen Collected: 09/21/22 17:18 Last Resulted: 09/22/22 13:34           Results from last 7 days   Lab Units 09/23/22  0455 09/22/22  0547 09/21/22  1659   WBC Thousand/uL 20 27* 9 17 12 56*   HEMOGLOBIN g/dL 15 5* 15 2 16 3*   HEMATOCRIT % 46 5* 46 7* 50 6*   PLATELETS Thousands/uL 231 222 281     Results from last 7 days   Lab Units 09/23/22  0455 09/22/22  0547 09/21/22  1659   SODIUM mmol/L 137 136 140   POTASSIUM mmol/L 4 8 4 3 3 0*   CHLORIDE mmol/L 98 97 96   CO2 mmol/L 31 33* 37*   BUN mg/dL 27* 20 19   CREATININE mg/dL 0 97 0 98 1 07   CALCIUM mg/dL 9 5 9 2 9 5     Results from last 7 days   Lab Units 09/23/22  0455 09/21/22  1659   MAGNESIUM mg/dL 1 7 1 5*     Results from last 7 days   Lab Units 09/23/22  0455 09/22/22  0547 09/21/22  1659   ALK PHOS U/L 80 74 81   ALT U/L 10* 8* 19   AST U/L 30 14 17     Microbiology Results (last 21 days)    Collected Updated Procedure Result Status Patient Facility Result Comment    09/21/2022 1659 09/21/2022 1801 FLU/RSV/COVID - if FLU/RSV clinically relevant [[id number]]    (Abnormal)   Nares from Nasopharyngeal Swab    Final result [facility] PATIENTS - copy/paste COVID Guidelines URL to browser: [email or web address]   SARS-CoV-2 assay is a Nucleic Acid Amplification assay intended for the   qualitative detection of nucleic acid from SARS-CoV-2 in nasopharyngeal   swabs  Results are for the presumptive identification of SARS-CoV-2 RNA  Positive results are indicative of infection with SARS-CoV-2, the virus   causing COVID-19, but do not rule out bacterial infection or co-infection   with other viruses  Laboratories within the [place] and its   territories are required to report all positive results to the appropriate   public health authorities  Negative results do not preclude SARS-CoV-2   infection and should not be used as the sole basis for treatment or other   patient management decisions  Negative results must be combined with   clinical observations, patient history, and epidemiological information  This test has not been FDA cleared or approved  This test has been authorized by FDA under an Emergency Use Authorization   (EUA)  This test is only authorized for the duration of time the   declaration that circumstances exist justifying the authorization of the   emergency use of an in vitro diagnostic tests for detection of SARS-CoV-2   virus and/or diagnosis of COVID-19 infection under section 564(b)(1) of   the Act, 21 U  S C  438LWL-1(E)(4), unless the authorization is terminated   or revoked sooner  The test has been validated but independent review by FDA   and CLIA is pending  Test performed using [employer] DEPT.pert: This RT-PCR assay targets N2,   a region unique to SARS-CoV-2  A conserved region in the E-gene was chosen   for pan-Sarbecovirus detection which includes SARS-CoV-2  According to CMS-2020-01-R, this platform meets the definition of high-[employer] technology      Component Value   SARS-CoV-2 Positive Abnormal    INFLUENZA A PCR Negative   INFLUENZA B PCR Negative RSV PCR Negative          09/21/2022 1659 09/22/2022 2303 Blood culture #1 [[id number]]   Blood from Arm, Left    Preliminary result [address]  Component Value   Blood Culture No Growth at 24 hrs  P             09/21/2022 1659 09/22/2022 2303 Blood culture #2 [[id number]]   Blood from Arm, Right    Preliminary result [address]  Component Value   Blood Culture No Growth at 24 hrs  P                 Incidental Findings:   · As above     Test Results Pending at Discharge (will require follow-up): · Blood cultures x 2 sets from 09/21/2022     Outpatient Tests Requested:  · CBC with diff , CMP, Magnesium level, Phosphorus level, Ionized Calcium level, Intact PTH level, and PTH-related peptide level  · Outpatient PFT's (pulmonary function tests) and outpatient sleep study with Pulmonology    Complications:  None    Reason for Admission:  COVID-19 pneumonia with hypoxia    Hospital Course:   Luca Ferguson is a 65 y o  female patient who originally presented to the hospital on 9/21/2022 due to shortness of breath and hypoxia  Please see above list of diagnoses and related plan for additional information  Condition at Discharge: stable    Discharge Day Visit / Exam:   Subjective: The patient was seen and examined  The patient is doing better  No chest pain  No shortness of breath  No abdominal pain  No nausea or vomiting      Vitals: Blood Pressure: 121/65 (09/23/22 0806)  Pulse: 66 (09/23/22 0806)  Temperature: (!) 97 4 °F (36 3 °C) (09/23/22 0806)  Temp Source: Temporal (09/21/22 1510)  Respirations: 17 (09/23/22 0804)  Height: 5' 5" (165 1 cm) (09/21/22 1510)  Weight - Scale: 61 4 kg (135 lb 5 8 oz) (09/21/22 1510)  SpO2: (!) 89 % (09/23/22 0806)  Exam:   Physical Exam   General:  NAD, follows commands  HEENT:  NC/AT, mucous membranes moist  Neck:  Supple, No JVP elevation  CV:  + S1, + S2, Irregularly irregular rhythm, Regular rate  Pulm:  Decreased breath sounds bilaterally with crackles at the left base  Abd:  Soft, Non-tender, Non-distended  Ext:  No clubbing/cyanosis/edema  Skin:  No rashes  Neuro:  Awake, alert, oriented  Psych:  Normal mood and affect      Discussion with Family: Updated  (daughter) via phone  Discharge instructions/Information to patient and family:  See after visit summary for information provided to patient and family  Provisions for Follow-Up Care:  See after visit summary for information related to follow-up care and any pertinent home health orders  Disposition:   Home with VNA Services (Reminder: Complete face to face encounter)    Planned Readmission:  No     Discharge Statement:  I spent 45 minutes discharging the patient  This time was spent on the day of discharge  I had direct contact with the patient on the day of discharge  Greater than 50% of the total time was spent examining patient, answering all patient questions, arranging and discussing plan of care with patient as well as directly providing post-discharge instructions  Additional time then spent on discharge activities  Discharge Medications:  See after visit summary for reconciled discharge medications provided to patient and/or family        **Please Note: This note may have been constructed using a voice recognition system**

## 2022-09-23 NOTE — CASE MANAGEMENT
Case Management Assessment & Discharge Planning Note    Patient name Lavern Leslie  Location /740-39 MRN [id number]  : 1945 Date 2022       Current Admission Date: 2022  Current Admission Diagnosis:Acute respiratory failure with hypoxia [facility])   Patient Active Problem List    Diagnosis Date Noted    [facility] ) 2022    COVID-19 2022    LLL pneumonia 2022    Hypomagnesemia 2022    COPD (chronic obstructive pulmonary disease) ([facility] ) 2022    Leukocytosis 2022    COPD exacerbation ([place] [address] ) 10/79/2394    Head lice 48/10/3378    Multiple pulmonary nodules 2022    Acute metabolic encephalopathy     History of intracranial hemorrhage 2022    Hyponatremia 2022    Hypokalemia 2022    Hyperbilirubinemia 2022    Type 2 diabetes mellitus with hyperglycemia, without long-term current use of insulin ([facility] ) 2022    Vitamin D deficiency 2022    Chronic atrial fibrillation ([place] [address] ) 2022    Dermatitis 2022    Acute on chronic respiratory failure with hypoxia and hypercapnia ([facility] ) 10/18/2021    Ambulatory dysfunction 10/18/2021    Medicare annual wellness visit, subsequent 2018    Tobacco use 2018    Type II diabetes mellitus ([facility] ) 10/02/2014    Hyperlipidemia 10/02/2014    Hypertension 10/02/2014      LOS (days): 1  Geometric Mean LOS (GMLOS) (days): 5 40  Days to GMLOS:4 3     OBJECTIVE:    Risk of Unplanned Readmission Score: 18 96         Current admission status: Inpatient       Preferred Pharmacy:   [address] [place] [id number]  Phone: [phone] Fax: [phone]    [address]  Phone: [phone] Fax: [phone]    Primary Care Provider: DO Jahaira Cortez Insurance: MEDICARE  Secondary Insurance:     ASSESSMENT:  [facility], [address] - Daughter   Primary Phone: [phone] (Mobile)               Advance Directives  Does patient have a [address]?: Yes (Dolly Calix)  Does patient have Advance Directives?: No  Was patient offered paperwork?: Yes (pt/family declines)  Primary Contact: Marysol Hernandez (Daughter)   [phone] (M)         Readmission Root Cause  30 Day Readmission: No    Patient Information  Admitted from[de-identified] Home  Mental Status: Alert  During Assessment patient was accompanied by: Not accompanied during assessment  Assessment information provided by[de-identified] Patient, Daughter  Primary Caregiver: Self  Support Systems: Daughter  [place] of Residence: [address]  What city do you live in?: [address] entry access options   Select all that apply : Stairs  Number of steps to enter home : 3  Type of Current Residence: [address] [place] home  Upon entering residence, is there a bedroom on the main floor (no further steps)?: Yes  Upon entering residence, is there a bathroom on the main floor (no further steps)?: Yes  In the last 12 months, was there a time when you were not able to pay the mortgage or rent on time?: No  In the last 12 months, how many places have you lived?: 1  Homeless/housing insecurity resource given?: N/A  Living Arrangements: Lives w/ Daughter  Is patient a ?: No    Activities of Daily Living Prior to Admission  Functional Status: Assistance  Completes ADLs independently?: No  Level of ADL dependence: Assistance  Ambulates independently?: Yes  Does patient use assisted devices?: Yes  Assisted Devices (DME) used: Luanne Smith  Does patient currently own DME?: Yes  What DME does the patient currently own?: Luanne Smith  Does patient have a history of Outpatient Therapy (PT/OT)?: No  Does the patient have a history of Short-Term Rehab?: No  Does patient have a history of HHC?: Yes  Does patient currently have [facility]?: Yes (active [facility])    Current Home Health Care  Type of Current Home Care Services: Nurse visit, Home PT, [address][de-identified] Other (please enter name in comment) ([facility])  [address] Provider[de-identified] PCP    Patient Information Continued  Income Source: Pension/detention  Does patient have prescription coverage?: Yes  Within the past 12 months, you worried that your food would run out before you got the money to buy more : Never true  Within the past 12 months, the food you bought just didn't last and you didn't have money to get more : Never true  Food insecurity resource given?: N/A  Does patient receive dialysis treatments?: No  Does patient have a history of substance abuse?: No  Does patient have a history of Mental Health Diagnosis?: No    PHQ 2/9 Screening   Reviewed PHQ 2/9 Depression Screening Score?: No    Means of Transportation  Means of Transport to Appts[de-identified] Other (Comment) (issues with transportation, no one drives  Daughter sent in application for [facility] (pending))  In the past 12 months, has lack of transportation kept you from medical appointments or from getting medications?: Yes  In the past 12 months, has lack of transportation kept you from meetings, work, or from getting things needed for daily living?: Yes  Was application for public transport provided?: Yes (last hospitalization, daughter completed and sent in to STS)        DISCHARGE DETAILS:  Plans at this time are home on dc with OP follow up; resumption of [facility]; probable need for home 02 on dc; and pt will need transportation home on dc  CM will continue to follow and assist in dc planning    (OP CM had attempted outreach after last hospitalization and closed it out as they were unable to reach pt/family)

## 2022-09-23 NOTE — ASSESSMENT & PLAN NOTE
· Outpatient Pulmonology evaluation  · Outpatient surveillance imaging with PCP    CTA of the chest/PE protocol (09/22/2022): IMPRESSION:     Left lower lobe consolidation with small patchy opacity in the lingula, concerning for pneumonia      No pulmonary embolus identified      Small pulmonary nodules, as described  Based on current Fleischner Society 2017 Guidelines on incidental pulmonary nodule, no routine follow-up is needed if the patient is low risk  If the patient is high risk, optional follow-up chest CT at 12 months can be considered

## 2022-09-23 NOTE — CASE MANAGEMENT
Case Management Discharge Planning Note    Patient name Reno Layman  Location /039-82 MRN [id number]  : 1945 Date 2022       Current Admission Date: 2022  Current Admission Diagnosis:Acute respiratory failure with hypoxia [facility])   Patient Active Problem List    Diagnosis Date Noted    Swelling of right hand 2022    Hypercalcemia 2022    [facility] failure with [facility] ) 2022    COVID-19 2022    LLL pneumonia 2022    Hypomagnesemia 2022    COPD (chronic obstructive pulmonary disease) ([facility] ) 2022    Leukocytosis 2022    COPD exacerbation ([facility] )     Head lice     Multiple pulmonary nodules 2022    Acute metabolic encephalopathy     History of intracranial hemorrhage 2022    Hyponatremia 2022    Hypokalemia 2022    Hyperbilirubinemia 2022    Type 2 diabetes mellitus with hyperglycemia, without long-term current use of insulin ([facility] ) 2022    Vitamin D deficiency 2022    Chronic atrial fibrillation [facility] ) 2022    Dermatitis 2022    Acute on chronic respiratory failure with hypoxia and hypercapnia ([facility] ) 10/18/2021    Ambulatory dysfunction 10/18/2021    Medicare annual wellness visit, subsequent 2018    Tobacco use 2018    Type II diabetes mellitus ([facility] ) 10/02/2014    Hyperlipidemia 10/02/2014    Hypertension 10/02/2014      LOS (days): 1  Geometric Mean LOS (GMLOS) (days): 5 40  Days to GMLOS:4 2     OBJECTIVE:  Risk of Unplanned Readmission Score: 20 39         Current admission status: Inpatient   Preferred Pharmacy:   [address] [place] [address], [address] [place] [id number]  Phone: [phone] Fax: [phone] [address] [place] [address] [place] [address]  Phone: [phone] Fax: [phone]    Primary Care Provider: Pervis Primrose, DO    Primary Insurance: MEDICARE  Secondary Insurance:     DISCHARGE DETAILS:CM needs to go through Med[facility] beds from [facility] as pt's daughter states they do not have money until the 2rd  Physician sent to [address] to cover the cost  CM pushed the ambulance transport off until 5:30pm as the  will not be here until 4-4:30 with the medications  Pt's daughter was notified

## 2022-09-23 NOTE — ASSESSMENT & PLAN NOTE
· Checked a urine culture is pending at the time of discharge  · Receiving antibiotic treatment for the pneumonia, which will also treat possible acute cystitis

## 2022-09-23 NOTE — RESPIRATORY THERAPY NOTE
Home Oxygen Qualifying Test     Patient name: Dale Patel        : 1945   Date of Test:  2022  Diagnosis:    Home Oxygen Test:    **Medicare Guidelines require item(s) 1-5 on all ambulatory patients or 1 and 2 on non-ambulatory patients  1  Baseline SPO2 on Room Air at rest 91 %   a      2  SPO2 during exertion on Room Air 86  %  a  During exertion monitor SPO2  If SPO2 increases >=89%, do not add supplemental oxygen    3  SPO2 on Oxygen at Rest 95 % at 2 LPM    4  SPO2 during exertion on Oxygen 91 % at 2 LPM    5  Test performed during exertion activity  [x]  Supplemental Home Oxygen is indicated  []  Client does not qualify for home oxygen  Respiratory Additional Notes- Patient has Covid diagnosis and was walked in her room  With minimal exertion she was noted to have increased SOB  Discussed living situation at home and she stated that her living space is small with bathroom nearby, no steps  Daughter does her shopping and some care giving      Thelma Rowland, RT

## 2022-09-23 NOTE — PLAN OF CARE
Problem: PAIN - ADULT  Goal: Verbalizes/displays adequate comfort level or baseline comfort level  Description: Interventions:  - Encourage patient to monitor pain and request assistance  - Assess pain using appropriate pain scale  - Administer analgesics based on type and severity of pain and evaluate response  - Implement non-pharmacological measures as appropriate and evaluate response  - Consider cultural and social influences on pain and pain management  - Notify physician/advanced practitioner if interventions unsuccessful or patient reports new pain  Outcome: Progressing     Problem: INFECTION - ADULT  Goal: Absence or prevention of progression during hospitalization  Description: INTERVENTIONS:  - Assess and monitor for signs and symptoms of infection  - Monitor lab/diagnostic results  - Monitor all insertion sites, i e  indwelling lines, tubes, and drains  - Monitor endotracheal if appropriate and nasal secretions for changes in amount and color  - [place] appropriate cooling/warming therapies per order  - Administer medications as ordered  - Instruct and encourage patient and family to use good hand hygiene technique  - Identify and instruct in appropriate isolation precautions for identified infection/condition  Outcome: Progressing  Goal: Absence of fever/infection during neutropenic period  Description: INTERVENTIONS:  - Monitor WBC    Outcome: Progressing     Problem: SAFETY ADULT  Goal: Patient will remain free of falls  Description: INTERVENTIONS:  - Educate patient/family on patient safety including physical limitations  - Instruct patient to call for assistance with activity   - Consult OT/PT to assist with strengthening/mobility   - Keep Call bell within reach  - Keep bed low and locked with side rails adjusted as appropriate  - Keep care items and personal belongings within reach  - Initiate and maintain comfort rounds  - Make Fall Risk Sign visible to staff  - Offer Toileting every 2 Hours, in advance of need  - Initiate/Maintain bed/chair alarm  - Obtain necessary fall risk management equipment: alarms  - Apply yellow socks and bracelet for high fall risk patients  - Consider moving patient to room near nurses station  Outcome: Progressing  Goal: Maintain or return to baseline ADL function  Description: INTERVENTIONS:  -  Assess patient's ability to carry out ADLs; assess patient's baseline for ADL function and identify physical deficits which impact ability to perform ADLs (bathing, care of mouth/teeth, toileting, grooming, dressing, etc )  - Assess/evaluate cause of self-care deficits   - Assess range of motion  - Assess patient's mobility; develop plan if impaired  - Assess patient's need for assistive devices and provide as appropriate  - Encourage maximum independence but intervene and supervise when necessary  - Involve family in performance of ADLs  - Assess for home care needs following discharge   - Consider OT consult to assist with ADL evaluation and planning for discharge  - Provide patient education as appropriate  Outcome: Progressing  Goal: Maintains/Returns to pre admission functional level  Description: INTERVENTIONS:  - Perform BMAT or MOVE assessment daily    - Set and communicate daily mobility goal to care team and patient/family/caregiver  - Collaborate with rehabilitation services on mobility goals if consulted  - Perform Range of Motion 4 times a day  - Reposition patient every 2 hours    - Dangle patient 4 times a day  - Stand patient 4 times a day  - Ambulate patient 4 times a day  - Out of bed to chair 3 times a day   - Out of bed for meals 3 times a day  - Out of bed for toileting  - Record patient progress and toleration of activity level   Outcome: Progressing     Problem: DISCHARGE PLANNING  Goal: Discharge to home or other facility with appropriate resources  Description: INTERVENTIONS:  - Identify barriers to discharge w/patient and caregiver  - Arrange for needed discharge resources and transportation as appropriate  - Identify discharge learning needs (meds, wound care, etc )  - Arrange for interpretive services to assist at discharge as needed  - Refer to Case Management Department for coordinating discharge planning if the patient needs post-hospital services based on physician/advanced practitioner order or complex needs related to functional status, cognitive ability, or social support system  Outcome: Progressing     Problem: Knowledge Deficit  Goal: Patient/family/caregiver demonstrates understanding of disease process, treatment plan, medications, and discharge instructions  Description: Complete learning assessment and assess knowledge base    Interventions:  - Provide teaching at level of understanding  - Provide teaching via preferred learning methods  Outcome: Progressing     Problem: CARDIOVASCULAR - ADULT  Goal: Maintains optimal cardiac output and hemodynamic stability  Description: INTERVENTIONS:  - Monitor I/O, vital signs and rhythm  - Monitor for S/S and trends of decreased cardiac output  - Administer and titrate ordered vasoactive medications to optimize hemodynamic stability  - Assess quality of pulses, skin color and temperature  - Assess for signs of decreased coronary artery perfusion  - Instruct patient to report change in severity of symptoms  Outcome: Progressing  Goal: Absence of cardiac dysrhythmias or at baseline rhythm  Description: INTERVENTIONS:  - Continuous cardiac monitoring, vital signs, obtain 12 lead EKG if ordered  - Administer antiarrhythmic and heart rate control medications as ordered  - Monitor electrolytes and administer replacement therapy as ordered  Outcome: Progressing     Problem: RESPIRATORY - ADULT  Goal: Achieves optimal ventilation and oxygenation  Description: INTERVENTIONS:  - Assess for changes in respiratory status  - Assess for changes in mentation and behavior  - Position to facilitate oxygenation and minimize respiratory effort  - Oxygen administered by appropriate delivery if ordered  - Initiate smoking cessation education as indicated  - Encourage broncho-pulmonary hygiene including cough, deep breathe, Incentive Spirometry  - Assess the need for suctioning and aspirate as needed  - Assess and instruct to report SOB or any respiratory difficulty  - Respiratory Therapy support as indicated  Outcome: Progressing     Problem: SKIN/TISSUE INTEGRITY - ADULT  Goal: Skin Integrity remains intact(Skin Breakdown Prevention)  Description: Assess:  -Perform Steven assessment every shift  -Clean and moisturize skin every 2 ours  -Inspect skin when repositioning, toileting, and assisting with ADLS  -Assess under medical devices such as p ulse ox every shift  -Assess extremities for adequate circulation and sensation     Bed Management:  -Have minimal linens on bed & keep smooth, unwrinkled  -Change linens as needed when moist or perspiring  -Avoid sitting or lying in one position for more than 2 hours while in bed  -Keep HOB at 30 degrees     Toileting:  -Offer bedside commode  -Assess for incontinence every 2 hours  -Use incontinent care products after each incontinent episode such as breifs    Activity:  -Mobilize patient 4 times a day  -Encourage activity and walks on unit  -Encourage or provide ROM exercises   -Turn and reposition patient every 2 Hours  -Use appropriate equipment to lift or move patient in bed  -Instruct/ Assist with weight shifting every 2 hours when out of bed in chair  -Consider limitation of chair time 4 hour intervals    Skin Care:  -Avoid use of baby powder, tape, friction and shearing, hot water or constrictive clothing  -Relieve pressure over bony prominences using alevyn  -Do not massage red bony areas    Next Steps:  -Teach patient strategies to minimize risks such as weight shift    Outcome: Progressing     Problem: Nutrition/Hydration-ADULT  Goal: Nutrient/Hydration intake appropriate for improving, restoring or maintaining nutritional needs  Description: Monitor and assess patient's nutrition/hydration status for malnutrition  Collaborate with interdisciplinary team and initiate plan and interventions as ordered  Monitor patient's weight and dietary intake as ordered or per policy  Utilize nutrition screening tool and intervene as necessary  Determine patient's food preferences and provide high-protein, high-caloric foods as appropriate       INTERVENTIONS:  - Monitor oral intake, urinary output, labs, and treatment plans  - Assess nutrition and hydration status and recommend course of action  - Evaluate amount of meals eaten  - Assist patient with eating if necessary   - Allow adequate time for meals  - Recommend/ encourage appropriate diets, oral nutritional supplements, and vitamin/mineral supplements  - Order, calculate, and assess calorie counts as needed  - Recommend, monitor, and adjust tube feedings and TPN/PPN based on assessed needs  - Assess need for intravenous fluids  - Provide specific nutrition/hydration education as appropriate  - Include patient/family/caregiver in decisions related to nutrition  Outcome: Progressing     Problem: Potential for Falls  Goal: Patient will remain free of falls  Description: INTERVENTIONS:  - Educate patient/family on patient safety including physical limitations  - Instruct patient to call for assistance with activity   - Consult OT/PT to assist with strengthening/mobility   - Keep Call bell within reach  - Keep bed low and locked with side rails adjusted as appropriate  - Keep care items and personal belongings within reach  - Initiate and maintain comfort rounds  - Make Fall Risk Sign visible to staff  - Offer Toileting every 2 Hours, in advance of need  - Initiate/Maintain bed/chair alarm  - Obtain necessary fall risk management equipment: alarms  - Apply yellow socks and bracelet for high fall risk patients  - Consider moving patient to room near nurses station  Outcome: Progressing     Problem: MOBILITY - ADULT  Goal: Maintain or return to baseline ADL function  Description: INTERVENTIONS:  -  Assess patient's ability to carry out ADLs; assess patient's baseline for ADL function and identify physical deficits which impact ability to perform ADLs (bathing, care of mouth/teeth, toileting, grooming, dressing, etc )  - Assess/evaluate cause of self-care deficits   - Assess range of motion  - Assess patient's mobility; develop plan if impaired  - Assess patient's need for assistive devices and provide as appropriate  - Encourage maximum independence but intervene and supervise when necessary  - Involve family in performance of ADLs  - Assess for home care needs following discharge   - Consider OT consult to assist with ADL evaluation and planning for discharge  - Provide patient education as appropriate  Outcome: Progressing  Goal: Maintains/Returns to pre admission functional level  Description: INTERVENTIONS:  - Perform BMAT or MOVE assessment daily    - Set and communicate daily mobility goal to care team and patient/family/caregiver  - Collaborate with rehabilitation services on mobility goals if consulted  - Perform Range of Motion 4 times a day  - Reposition patient every 2 hours    - Dangle patient 4 times a day  - Stand patient 4 times a day  - Ambulate patient 4 times a day  - Out of bed to chair 3 times a day   - Out of bed for meals 3 times a day  - Out of bed for toileting  - Record patient progress and toleration of activity level   Outcome: Progressing

## 2022-09-23 NOTE — ASSESSMENT & PLAN NOTE
· Discontinue ergocalciferol  · Discharged on cholecalciferol 2000 I  U  PO Qdaily  · Check a vitamin D 25-OH level in 1-2 months   Outpatient follow-up with PCP in regards to this matter

## 2022-09-23 NOTE — ASSESSMENT & PLAN NOTE
· Likely a superimposed bacterial pneumonia  · The patient was seen in consultation by Pulmonology  · She was treated with ceftriaxone 1000 mg IV every 24 hours and azithromycin 500 mg PO every 24 hours for 2 days during the hospitalization  · Discharge on cefpodoxime 200 mg PO BID for 3 days and azithromycin 500 mg PO every 24 hours for 3 days to complete a 5-day antibiotic treatment course  · Will need outpatient surveillance imaging with her PCP  · Outpatient follow-up with Pulmonology    CTA of the chest/PE protocol (09/22/2022): IMPRESSION:     Left lower lobe consolidation with small patchy opacity in the lingula, concerning for pneumonia      No pulmonary embolus identified

## 2022-09-23 NOTE — ASSESSMENT & PLAN NOTE
· Not on any home supplemental oxygen treatment chronically  · Initially required 4 lpm of continuous supplemental oxygen to maintain oxygen saturation levels at 90% and above  · Received 2 doses of IV remdesivir during the hospitalization  · The patient did not want to remain hospitalized any longer for additional IV remdesivir treatment  · Treated with dexamethasone 6 mg IV every 24 hours per the COVID-19 treatment protocol during the hospitalization  · Discharge on dexamethasone 6 mg PO every 24 hours for 8 days to complete the 10-day treatment course per the COVID-19 treatment protocol  · Utilized lovenox 1 mg/kg SQ every 12 hours based on the elevated D-dimer level per the COVID-19 treatment protocol  · Discharge on eliquis 2 5 mg PO BID for 30 days with the patient still requiring continuous supplemental oxygen at the time of discharge  · Likely a superimposed bacterial pneumonia  · The patient was seen in consultation by Pulmonology    · She was treated with ceftriaxone 1000 mg IV every 24 hours and azithromycin 500 mg PO every 24 hours for 2 days during the hospitalization  · Discharge on cefpodoxime 200 mg PO BID for 3 days and azithromycin 500 mg PO every 24 hours for 3 days to complete a 5-day antibiotic treatment course  · Will need outpatient surveillance imaging with her PCP  · Outpatient follow-up with Pulmonology     Latest Reference Range & Units 09/21/22 16:59 09/22/22 05:47   C-REACTIVE PROTEIN <3 0 mg/L  49 8 (H)   D-Dimer, Quant <0 50 ug/ml FEU 1 20 (H)    (H): Data is abnormally high

## 2022-09-23 NOTE — ASSESSMENT & PLAN NOTE
· Not on any home supplemental oxygen treatment chronically  · Initially required 4 lpm of continuous supplemental oxygen to maintain oxygen saturation levels at 90% and above  · Due to COVID-19 virus infection in addition to a superimposed bacterial pneumonia  · The patient did qualify for home supplemental oxygen therapy upon discharge based on the following evaluation by Respiratory Therapy:  "Home Oxygen Qualifying Test      Patient name: Indio Gillespie        : 1945   Date of Test:  2022             Diagnosis:    Home Oxygen Test:    **Medicare Guidelines require item(s) 1-5 on all ambulatory patients or 1 and 2 on non-ambulatory patients      1  Baseline SPO2 on Room Air at rest 91 %   a        1  SPO2 during exertion on Room Air 86  %  a  During exertion monitor SPO2  If SPO2 increases >=89%, do not add supplemental oxygen     2  SPO2 on Oxygen at Rest 95 % at 2 LPM     3  SPO2 during exertion on Oxygen 91 % at 2 LPM     4  Test performed during exertion activity  [x]? Supplemental Home Oxygen is indicated      []? Client does not qualify for home oxygen      Respiratory Additional Notes- Patient has Covid diagnosis and was walked in her room  With minimal exertion she was noted to have increased SOB  Discussed living situation at home and she stated that her living space is small with bathroom nearby, no steps   Daughter does her shopping and some care giving      Ced Angry, RT"    · Outpatient follow-up with Pulmonology

## 2022-09-23 NOTE — ASSESSMENT & PLAN NOTE
· Improved with IV magnesium sulfate replacement therapy  · Follow the magnesium level after discharge with her PCP    Results from last 7 days   Lab Units 09/23/22  0455 09/21/22  1659   MAGNESIUM mg/dL 1 7 1 5*

## 2022-09-23 NOTE — ASSESSMENT & PLAN NOTE
· Resolved with potassium chloride supplementation treatment during the hospitalization  · Follow the potassium level after discharge with her PCP    Results from last 7 days   Lab Units 09/23/22  0455 09/22/22  0547 09/21/22  1659   SODIUM mmol/L 137 136 140   POTASSIUM mmol/L 4 8 4 3 3 0*   CHLORIDE mmol/L 98 97 96   CO2 mmol/L 31 33* 37*   BUN mg/dL 27* 20 19   CREATININE mg/dL 0 97 0 98 1 07   CALCIUM mg/dL 9 5 9 2 9 5

## 2022-09-23 NOTE — ASSESSMENT & PLAN NOTE
Lab Results   Component Value Date    HGBA1C 7 1 (H) 08/03/2021       Recent Labs     09/22/22  1557 09/22/22 2034 09/22/22 2126 09/23/22  0802   POCGLU 124 167* 177* 112       Blood Sugar Average: Last 72 hrs:  · (P) 184     · Treated with lantus 10 units daily during the hospitalization with her PO diabetic medications all held during the hospitalization  · Restart her PO diabetic medication regimen upon discharge  · Continue lisinopril for renal protection  · Outpatient Endocrinology evaluation

## 2022-09-23 NOTE — ASSESSMENT & PLAN NOTE
· Had previously been taken off of anticoagulation due to intracranial hemorrhage  · Will be on eliquis 2 5 mg PO BID for 30 days as DVT prophylaxis for the COVID-19 virus infection  · Has been on aspirin 81 mg PO Qdaily  · Needs outpatient follow-up with Cardiology to determine if she should be on long-term anticoagulation again

## 2022-09-23 NOTE — CASE MANAGEMENT
Case Management Discharge Planning Note    Patient name Collette Caraway  Location /514-26 MRN [id number]  : 1945 Date 2022       Current Admission Date: 2022  Current Admission Diagnosis:Acute respiratory failure with hypoxia [facility])   Patient Active Problem List    Diagnosis Date Noted    Swelling of right hand 2022    Hypercalcemia 2022    Acute respiratory failure with hypoxia ([facility] ) 2022    COVID-19 2022    LLL pneumonia 2022    Hypomagnesemia 2022    COPD (chronic obstructive pulmonary disease) ([facility] ) 2022    Leukocytosis 2022    COPD exacerbation ([facility] )     Head lice     Multiple pulmonary nodules 2022    Acute metabolic encephalopathy     History of intracranial hemorrhage 2022    Hyponatremia 2022    Hypokalemia 2022    Hyperbilirubinemia 2022    Type 2 diabetes mellitus with hyperglycemia, without long-term current use of insulin [facility] 2022    Vitamin D deficiency 2022    Chronic atrial fibrillation ([facility] ) 2022    Dermatitis 2022    Acute on chronic respiratory failure with hypoxia and hypercapnia ([facility] ) 10/18/2021    Ambulatory dysfunction 10/18/2021    Medicare annual wellness visit, subsequent 2018    Tobacco use 2018    Type II diabetes mellitus ([facility] ) 10/02/2014    Hyperlipidemia 10/02/2014    Hypertension 10/02/2014      LOS (days): 1  Geometric Mean LOS (GMLOS) (days): 5 40  Days to GMLOS:4 3     OBJECTIVE:  Risk of Unplanned Readmission Score: 18 96         Current admission status: Inpatient   Preferred Pharmacy:   [address] [place] [address], [address] [place] [id number]  Phone: [phone] Fax: [phone] [address] [place],, [facility] [address] [place] [address]  Phone: [phone] Fax: [phone]    Primary Care Provider: Francisco J Vela DO    Primary Insurance: MEDICARE  Secondary Insurance:     DISCHARGE DETAILS:     Discharge Destination Plan[de-identified] Home with [address] [employer] [facility] ([address])  Transport at Discharge : BLS Ambulance        Transported by Hao and Unit #):  Arielle  ETA of Transport (Date): 09/23/22  ETA of Transport (Time): 1600

## 2022-09-23 NOTE — PLAN OF CARE
Problem: PAIN - ADULT  Goal: Verbalizes/displays adequate comfort level or baseline comfort level  Description: Interventions:  - Encourage patient to monitor pain and request assistance  - Assess pain using appropriate pain scale  - Administer analgesics based on type and severity of pain and evaluate response  - Implement non-pharmacological measures as appropriate and evaluate response  - Consider cultural and social influences on pain and pain management  - Notify physician/advanced practitioner if interventions unsuccessful or patient reports new pain  Outcome: Progressing     Problem: INFECTION - ADULT  Goal: Absence or prevention of progression during hospitalization  Description: INTERVENTIONS:  - Assess and monitor for signs and symptoms of infection  - Monitor lab/diagnostic results  - Monitor all insertion sites, i e  indwelling lines, tubes, and drains  - Monitor endotracheal if appropriate and nasal secretions for changes in amount and color  - [place] appropriate cooling/warming therapies per order  - Administer medications as ordered  - Instruct and encourage patient and family to use good hand hygiene technique  - Identify and instruct in appropriate isolation precautions for identified infection/condition  Outcome: Progressing  Goal: Absence of fever/infection during neutropenic period  Description: INTERVENTIONS:  - Monitor WBC    Outcome: Progressing     Problem: SAFETY ADULT  Goal: Patient will remain free of falls  Description: INTERVENTIONS:  - Educate patient/family on patient safety including physical limitations  - Instruct patient to call for assistance with activity   - Consult OT/PT to assist with strengthening/mobility   - Keep Call bell within reach  - Keep bed low and locked with side rails adjusted as appropriate  - Keep care items and personal belongings within reach  - Initiate and maintain comfort rounds  - Make Fall Risk Sign visible to staff  - Offer Toileting every 2 Hours, in advance of need  - Initiate/Maintain bed/chair alarm  - Obtain necessary fall risk management equipment: alarms  - Apply yellow socks and bracelet for high fall risk patients  - Consider moving patient to room near nurses station  Outcome: Progressing  Goal: Maintain or return to baseline ADL function  Description: INTERVENTIONS:  -  Assess patient's ability to carry out ADLs; assess patient's baseline for ADL function and identify physical deficits which impact ability to perform ADLs (bathing, care of mouth/teeth, toileting, grooming, dressing, etc )  - Assess/evaluate cause of self-care deficits   - Assess range of motion  - Assess patient's mobility; develop plan if impaired  - Assess patient's need for assistive devices and provide as appropriate  - Encourage maximum independence but intervene and supervise when necessary  - Involve family in performance of ADLs  - Assess for home care needs following discharge   - Consider OT consult to assist with ADL evaluation and planning for discharge  - Provide patient education as appropriate  Outcome: Progressing  Goal: Maintains/Returns to pre admission functional level  Description: INTERVENTIONS:  - Perform BMAT or MOVE assessment daily    - Set and communicate daily mobility goal to care team and patient/family/caregiver  - Collaborate with rehabilitation services on mobility goals if consulted  - Perform Range of Motion 4 times a day  - Reposition patient every 2 hours    - Dangle patient 4 times a day  - Stand patient 4 times a day  - Ambulate patient 4 times a day  - Out of bed to chair 3 times a day   - Out of bed for meals 3 times a day  - Out of bed for toileting  - Record patient progress and toleration of activity level   Outcome: Progressing     Problem: DISCHARGE PLANNING  Goal: Discharge to home or other facility with appropriate resources  Description: INTERVENTIONS:  - Identify barriers to discharge w/patient and caregiver  - Arrange for needed discharge resources and transportation as appropriate  - Identify discharge learning needs (meds, wound care, etc )  - Arrange for interpretive services to assist at discharge as needed  - Refer to Case Management Department for coordinating discharge planning if the patient needs post-hospital services based on physician/advanced practitioner order or complex needs related to functional status, cognitive ability, or social support system  Outcome: Progressing     Problem: Knowledge Deficit  Goal: Patient/family/caregiver demonstrates understanding of disease process, treatment plan, medications, and discharge instructions  Description: Complete learning assessment and assess knowledge base    Interventions:  - Provide teaching at level of understanding  - Provide teaching via preferred learning methods  Outcome: Progressing     Problem: CARDIOVASCULAR - ADULT  Goal: Maintains optimal cardiac output and hemodynamic stability  Description: INTERVENTIONS:  - Monitor I/O, vital signs and rhythm  - Monitor for S/S and trends of decreased cardiac output  - Administer and titrate ordered vasoactive medications to optimize hemodynamic stability  - Assess quality of pulses, skin color and temperature  - Assess for signs of decreased coronary artery perfusion  - Instruct patient to report change in severity of symptoms  Outcome: Progressing  Goal: Absence of cardiac dysrhythmias or at baseline rhythm  Description: INTERVENTIONS:  - Continuous cardiac monitoring, vital signs, obtain 12 lead EKG if ordered  - Administer antiarrhythmic and heart rate control medications as ordered  - Monitor electrolytes and administer replacement therapy as ordered  Outcome: Progressing     Problem: RESPIRATORY - ADULT  Goal: Achieves optimal ventilation and oxygenation  Description: INTERVENTIONS:  - Assess for changes in respiratory status  - Assess for changes in mentation and behavior  - Position to facilitate oxygenation and minimize respiratory effort  - Oxygen administered by appropriate delivery if ordered  - Initiate smoking cessation education as indicated  - Encourage broncho-pulmonary hygiene including cough, deep breathe, Incentive Spirometry  - Assess the need for suctioning and aspirate as needed  - Assess and instruct to report SOB or any respiratory difficulty  - Respiratory Therapy support as indicated  Outcome: Progressing     Problem: SKIN/TISSUE INTEGRITY - ADULT  Goal: Skin Integrity remains intact(Skin Breakdown Prevention)  Description: Assess:  -Perform Steven assessment every shift  -Clean and moisturize skin every 2 ours  -Inspect skin when repositioning, toileting, and assisting with ADLS  -Assess under medical devices such as p ulse ox every shift  -Assess extremities for adequate circulation and sensation     Bed Management:  -Have minimal linens on bed & keep smooth, unwrinkled  -Change linens as needed when moist or perspiring  -Avoid sitting or lying in one position for more than 2 hours while in bed  -Keep HOB at 30 degrees     Toileting:  -Offer bedside commode  -Assess for incontinence every 2 hours  -Use incontinent care products after each incontinent episode such as breifs    Activity:  -Mobilize patient 4 times a day  -Encourage activity and walks on unit  -Encourage or provide ROM exercises   -Turn and reposition patient every 2 Hours  -Use appropriate equipment to lift or move patient in bed  -Instruct/ Assist with weight shifting every 2 hours when out of bed in chair  -Consider limitation of chair time 4 hour intervals    Skin Care:  -Avoid use of baby powder, tape, friction and shearing, hot water or constrictive clothing  -Relieve pressure over bony prominences using alevyn  -Do not massage red bony areas    Next Steps:  -Teach patient strategies to minimize risks such as weight shift    Outcome: Progressing     Problem: Nutrition/Hydration-ADULT  Goal: Nutrient/Hydration intake appropriate for improving, restoring or maintaining nutritional needs  Description: Monitor and assess patient's nutrition/hydration status for malnutrition  Collaborate with interdisciplinary team and initiate plan and interventions as ordered  Monitor patient's weight and dietary intake as ordered or per policy  Utilize nutrition screening tool and intervene as necessary  Determine patient's food preferences and provide high-protein, high-caloric foods as appropriate       INTERVENTIONS:  - Monitor oral intake, urinary output, labs, and treatment plans  - Assess nutrition and hydration status and recommend course of action  - Evaluate amount of meals eaten  - Assist patient with eating if necessary   - Allow adequate time for meals  - Recommend/ encourage appropriate diets, oral nutritional supplements, and vitamin/mineral supplements  - Order, calculate, and assess calorie counts as needed  - Recommend, monitor, and adjust tube feedings and TPN/PPN based on assessed needs  - Assess need for intravenous fluids  - Provide specific nutrition/hydration education as appropriate  - Include patient/family/caregiver in decisions related to nutrition  Outcome: Progressing     Problem: Potential for Falls  Goal: Patient will remain free of falls  Description: INTERVENTIONS:  - Educate patient/family on patient safety including physical limitations  - Instruct patient to call for assistance with activity   - Consult OT/PT to assist with strengthening/mobility   - Keep Call bell within reach  - Keep bed low and locked with side rails adjusted as appropriate  - Keep care items and personal belongings within reach  - Initiate and maintain comfort rounds  - Make Fall Risk Sign visible to staff  - Offer Toileting every 2 Hours, in advance of need  - Initiate/Maintain bed/chair alarm  - Obtain necessary fall risk management equipment: alarms  - Apply yellow socks and bracelet for high fall risk patients  - Consider moving patient to room near nurses station  Outcome: Progressing     Problem: MOBILITY - ADULT  Goal: Maintain or return to baseline ADL function  Description: INTERVENTIONS:  -  Assess patient's ability to carry out ADLs; assess patient's baseline for ADL function and identify physical deficits which impact ability to perform ADLs (bathing, care of mouth/teeth, toileting, grooming, dressing, etc )  - Assess/evaluate cause of self-care deficits   - Assess range of motion  - Assess patient's mobility; develop plan if impaired  - Assess patient's need for assistive devices and provide as appropriate  - Encourage maximum independence but intervene and supervise when necessary  - Involve family in performance of ADLs  - Assess for home care needs following discharge   - Consider OT consult to assist with ADL evaluation and planning for discharge  - Provide patient education as appropriate  Outcome: Progressing  Goal: Maintains/Returns to pre admission functional level  Description: INTERVENTIONS:  - Perform BMAT or MOVE assessment daily    - Set and communicate daily mobility goal to care team and patient/family/caregiver  - Collaborate with rehabilitation services on mobility goals if consulted  - Perform Range of Motion 4 times a day  - Reposition patient every 2 hours    - Dangle patient 4 times a day  - Stand patient 4 times a day  - Ambulate patient 4 times a day  - Out of bed to chair 3 times a day   - Out of bed for meals 3 times a day  - Out of bed for toileting  - Record patient progress and toleration of activity level   Outcome: Progressing

## 2022-09-23 NOTE — ASSESSMENT & PLAN NOTE
· Check an ionized calcium level, intact-PTH level, and PTH-related peptide after discharge  · Outpatient Endocrinology evaluation

## 2022-09-23 NOTE — NURSING NOTE
Pt discharged to home with [facility]   D/c instructions and meds to bed sent with pt to home  Pt left via stretcher

## 2022-09-25 LAB
BACTERIA BLD CULT: NORMAL
BACTERIA BLD CULT: NORMAL

## 2022-09-26 LAB
BACTERIA BLD CULT: NORMAL
BACTERIA BLD CULT: NORMAL
DME PARACHUTE DELIVERY DATE ACTUAL: NORMAL
DME PARACHUTE DELIVERY DATE ACTUAL: NORMAL
DME PARACHUTE DELIVERY DATE EXPECTED: NORMAL
DME PARACHUTE DELIVERY DATE EXPECTED: NORMAL
DME PARACHUTE DELIVERY DATE REQUESTED: NORMAL
DME PARACHUTE DELIVERY DATE REQUESTED: NORMAL
DME PARACHUTE DELIVERY NOTE: NORMAL
DME PARACHUTE ITEM DESCRIPTION: NORMAL
DME PARACHUTE ORDER STATUS: NORMAL
DME PARACHUTE ORDER STATUS: NORMAL
DME PARACHUTE SUPPLIER NAME: NORMAL
DME PARACHUTE SUPPLIER NAME: NORMAL
DME PARACHUTE SUPPLIER PHONE: NORMAL
DME PARACHUTE SUPPLIER PHONE: NORMAL

## 2022-09-28 ENCOUNTER — TELEMEDICINE (OUTPATIENT)
Dept: FAMILY MEDICINE CLINIC | Facility: CLINIC | Age: 77
End: 2022-09-28
Payer: MEDICARE

## 2022-09-28 ENCOUNTER — TELEPHONE (OUTPATIENT)
Dept: FAMILY MEDICINE CLINIC | Facility: CLINIC | Age: 77
End: 2022-09-28

## 2022-09-28 DIAGNOSIS — J96.21 ACUTE ON CHRONIC RESPIRATORY FAILURE WITH HYPOXIA AND HYPERCAPNIA (HCC): ICD-10-CM

## 2022-09-28 DIAGNOSIS — E11.65 TYPE 2 DIABETES MELLITUS WITH HYPERGLYCEMIA, WITHOUT LONG-TERM CURRENT USE OF INSULIN (HCC): ICD-10-CM

## 2022-09-28 DIAGNOSIS — U07.1 PNEUMONIA DUE TO COVID-19 VIRUS: Primary | ICD-10-CM

## 2022-09-28 DIAGNOSIS — J44.9 CHRONIC OBSTRUCTIVE PULMONARY DISEASE, UNSPECIFIED COPD TYPE (HCC): ICD-10-CM

## 2022-09-28 DIAGNOSIS — G93.41 ACUTE METABOLIC ENCEPHALOPATHY: ICD-10-CM

## 2022-09-28 DIAGNOSIS — J12.82 PNEUMONIA DUE TO COVID-19 VIRUS: Primary | ICD-10-CM

## 2022-09-28 DIAGNOSIS — J96.22 ACUTE ON CHRONIC RESPIRATORY FAILURE WITH HYPOXIA AND HYPERCAPNIA (HCC): ICD-10-CM

## 2022-09-28 PROBLEM — J44.1 COPD EXACERBATION (HCC): Status: RESOLVED | Noted: 2022-08-15 | Resolved: 2022-09-28

## 2022-09-28 PROCEDURE — 99496 TRANSJ CARE MGMT HIGH F2F 7D: CPT | Performed by: INTERNAL MEDICINE

## 2022-09-28 NOTE — PROGRESS NOTES
Virtual TCM Visit:    Verification of patient location:    Patient is located in the following state in which I hold an active license [place]        Assessment/Plan:        Problem List Items Addressed This Visit        Endocrine    Type 2 diabetes mellitus with hyperglycemia, without long-term current use of insulin (HCC)       Respiratory    Acute on chronic respiratory failure with hypoxia and hypercapnia (HCC)    Pneumonia due to COVID-19 virus - Primary    COPD (chronic obstructive pulmonary disease) ([facility] )       Nervous and Auditory    Acute metabolic encephalopathy       Pt daughter states she is applying for STS and will be able to come to appts in future  'Plan followup in about 6 weeks so she can get here with STS  Increase fluids  Oxygen at least at HS and prn  Should have pulmonary followup once transportation set  Fall preacutions  Has rx for labs to be done - either by homecare or when she comes to our office can get at lab or even mobile draw if needed Rx for wheelchair as pt would benefit from having in the home to make transfer and movement within the home safer and more accessible for patient and her daughter         Reason for visit is tcm    Encounter provider Nolvia Brown DO       Provider located at [facility] [address] [id number]      Recent Visits  Date Type Provider Dept   [facility]   Showing recent visits within past 7 days and meeting all other requirements  Today's Visits  Date Type Provider Dept   09/28/22 Telemedicine Nolvia Brown DO [facility]   Showing today's visits and meeting all other requirements  Future Appointments  No visits were found meeting these conditions  Showing future appointments within next 150 days and meeting all other requirements       After connecting through televideo, the patient was identified by name and date of birth   Gasper Panda was informed that this is a telemedicine visit and that the visit is being conducted through [facility] and patient was informed this is a secure, HIPAA-complaint platform  She agrees to proceed     My office door was closed  No one else was in the room  She acknowledged consent and understanding of privacy and security of the video platform  The patient has agreed to participate and understands they can discontinue the visit at any time  Patient is aware this is a billable service  Subjective:     Patient ID: Shayla Pelletier is a 65 y o  female  HPI   Pt recently admitted with generalized weakness and sob Known copd and found to have covid pneumonia She is home with her daughter and doing better She now can get around the house and to the bathroom She needs assist with her showering but has homecare and aides no falls Appetite fair her daughter helps with meals and medications Bp has been running lower so meds being adjusted with homecare followup BP She is working on getting sts setup so she can get to appts better in future She has home oxygen and uses more as needed and at night She no longer smokes She does not regularly check BS but grazing more recently and not eating as much     Review of Systems   Constitutional: Positive for activity change and fatigue  Negative for chills and fever  HENT: Positive for congestion and postnasal drip  Respiratory: Positive for cough and shortness of breath  Cardiovascular: Positive for leg swelling  Negative for chest pain and palpitations  Gastrointestinal: Negative for abdominal distention and abdominal pain  Genitourinary: Positive for frequency  Musculoskeletal: Positive for arthralgias and gait problem  Neurological: Positive for light-headedness  Negative for dizziness and headaches  Psychiatric/Behavioral: Negative for sleep disturbance  The patient is not nervous/anxious  Objective: There were no vitals filed for this visit      Physical Exam  Vitals reviewed  Constitutional:       General: She is not in acute distress  Appearance: Normal appearance  She is ill-appearing  She is not toxic-appearing or diaphoretic  HENT:      Head: Normocephalic and atraumatic  Right Ear: External ear normal       Left Ear: External ear normal       Nose: Nose normal       Mouth/Throat:      Mouth: Mucous membranes are dry  Eyes:      General: No scleral icterus  Pulmonary:      Effort: No respiratory distress  Comments: Conversational dyspnea intermittent cough  Skin:     General: Skin is dry  Coloration: Skin is pale  Neurological:      General: No focal deficit present  Mental Status: She is alert and oriented to person, place, and time  Mental status is at baseline  Psychiatric:         Mood and Affect: Mood normal          Behavior: Behavior normal          Thought Content: Thought content normal          Judgment: Judgment normal              Transitional Care Management Review:  Nina Allen is a 65 y o  female here for TCM follow up  During the TCM phone call patient stated:    TCM Call     Date and time call was made  9/23/2022 12:42 PM    Hospital care reviewed  Records reviewed    Patient was hospitialized at  [address] [place] [address]    Date of Admission  09/21/22    Date of discharge  09/23/22    Diagnosis  covid 19, acute respiratory failure w hypoxia    Disposition  Home    Were the patients medications reviewed and updated  No      TCM Call     Scheduled for follow up? Yes    I have advised the patient to call PCP with any new or worsening symptoms  Celeste Epley,     Counseling  Family          I spent 15 minutes with the patient during this visit  Tico Myles DO      VIRTUAL VISIT DISCLAIMER    Nina Allen verbally agrees to participate in [employer]   Pt is aware that [employer] could be limited without vital signs or the ability to perform a full hands-on physical exam  Tiffani TOOTIE Noyola understands she or the provider may request at any time to terminate the video visit and request the patient to seek care or treatment in person

## 2022-09-29 ENCOUNTER — TELEPHONE (OUTPATIENT)
Dept: FAMILY MEDICINE CLINIC | Facility: CLINIC | Age: 77
End: 2022-09-29

## 2022-09-29 DIAGNOSIS — J44.1 COPD EXACERBATION (HCC): ICD-10-CM

## 2022-09-29 NOTE — TELEPHONE ENCOUNTER
Physical therapist called stated he saw pt yesterday for evaluation  He stated he is going to plan to see pt once a week for 3 weeks for physical therapy  Physical therapist asking if you think that would be ok for pt

## 2022-10-04 ENCOUNTER — TELEPHONE (OUTPATIENT)
Dept: FAMILY MEDICINE CLINIC | Facility: CLINIC | Age: 77
End: 2022-10-04

## 2022-10-04 DIAGNOSIS — R21 RASH: Primary | ICD-10-CM

## 2022-10-04 RX ORDER — DIAPER,BRIEF,INFANT-TODD,DISP
EACH MISCELLANEOUS 4 TIMES DAILY PRN
Qty: 30 G | Refills: 0 | Status: SHIPPED | OUTPATIENT
Start: 2022-10-04

## 2022-10-04 NOTE — TELEPHONE ENCOUNTER
Pt called stating she is having some itchy with a rash she has  Pt's daughter also was on phone stated pt was hospitalized in September when d/c she saw dermatology which prescribed a triamcinolone acetonide 0 1% cream which doesn't seem to be helping with the itch  Daughter wondering if you can prescribe something to help with the itching and rash  If prescribe please send to [facility] in [place]

## 2022-10-05 ENCOUNTER — TELEPHONE (OUTPATIENT)
Dept: FAMILY MEDICINE CLINIC | Facility: CLINIC | Age: 77
End: 2022-10-05

## 2022-10-05 DIAGNOSIS — J44.9 CHRONIC OBSTRUCTIVE PULMONARY DISEASE, UNSPECIFIED COPD TYPE (HCC): ICD-10-CM

## 2022-10-05 DIAGNOSIS — J44.1 COPD EXACERBATION (HCC): Primary | ICD-10-CM

## 2022-10-05 NOTE — TELEPHONE ENCOUNTER
Pt was quite ill and has underlying lung disase so her recovery will take some time and she may require the oxygen longer term  Alena will be calling to setup in person visit as discussed once she has STS   They could consider meals on wheels and using Boost or ensure to rebuild muscle/help with weight gain   She should also setup followup with Pulmonary and Cardiology as she will need followup for underlying conditions

## 2022-10-05 NOTE — TELEPHONE ENCOUNTER
Arsen Mela expressed concern with pt's mental status and overall health declining  Said she's noticed more confusion, weight loss (165lbs down to 135 within a few months), and said her legs look very thin  Said she's using her O2 at home (2 Liters) and her O2 sats have been okay  Asking if you want a virtual visit with her/ what you recommend?

## 2022-10-06 ENCOUNTER — TELEPHONE (OUTPATIENT)
Dept: FAMILY MEDICINE CLINIC | Facility: CLINIC | Age: 77
End: 2022-10-06

## 2022-10-06 DIAGNOSIS — R26.2 AMBULATORY DYSFUNCTION: Primary | ICD-10-CM

## 2022-10-06 DIAGNOSIS — J44.9 CHRONIC OBSTRUCTIVE PULMONARY DISEASE, UNSPECIFIED COPD TYPE (HCC): ICD-10-CM

## 2022-10-06 DIAGNOSIS — J44.1 COPD EXACERBATION (HCC): ICD-10-CM

## 2022-10-06 NOTE — TELEPHONE ENCOUNTER
I put an order in for a wheelchair for pt, Romulo Apple from [facility] asking if you can add that you and the pt discussed and agreed upon the need for a wheelchair in last chart note  Said the virtual visit is fine to use

## 2022-10-13 PROBLEM — B85.0 HEAD LICE: Status: RESOLVED | Noted: 2022-08-14 | Resolved: 2022-10-13

## 2022-10-19 ENCOUNTER — TELEPHONE (OUTPATIENT)
Dept: FAMILY MEDICINE CLINIC | Facility: CLINIC | Age: 77
End: 2022-10-19

## 2022-11-10 LAB
DME PARACHUTE DELIVERY DATE ACTUAL: NORMAL
DME PARACHUTE DELIVERY DATE REQUESTED: NORMAL
DME PARACHUTE DELIVERY NOTE: NORMAL
DME PARACHUTE ITEM DESCRIPTION: NORMAL
DME PARACHUTE ORDER STATUS: NORMAL
DME PARACHUTE SUPPLIER NAME: NORMAL
DME PARACHUTE SUPPLIER PHONE: NORMAL

## 2022-11-16 ENCOUNTER — TELEPHONE (OUTPATIENT)
Dept: FAMILY MEDICINE CLINIC | Facility: CLINIC | Age: 77
End: 2022-11-16

## 2022-11-16 DIAGNOSIS — F32.A DEPRESSION, UNSPECIFIED DEPRESSION TYPE: ICD-10-CM

## 2022-11-16 DIAGNOSIS — E55.9 VITAMIN D DEFICIENCY: Primary | ICD-10-CM

## 2022-11-16 RX ORDER — ERGOCALCIFEROL 1.25 MG/1
50000 CAPSULE ORAL WEEKLY
Qty: 12 CAPSULE | Refills: 0 | Status: SHIPPED | OUTPATIENT
Start: 2022-11-16

## 2022-11-16 RX ORDER — SERTRALINE HYDROCHLORIDE 25 MG/1
25 TABLET, FILM COATED ORAL DAILY
Qty: 30 TABLET | Refills: 5 | Status: SHIPPED | OUTPATIENT
Start: 2022-11-16

## 2022-11-16 NOTE — TELEPHONE ENCOUNTER
Pt's visiting nurse called stated pt's feet and calfs are swollen again  Visiting nurse was wondering if pt should go back on the lisinopril hctz 20-12 5  Also nurse had mentioned pt is on vitamin d3 1000 two tablets once a day  Pt's family asking if pt can go back on the 50,000 once a week due to it being covered by insurances  Pt's bp was 122/76,hr was 79, spo2 was 95%, and temp was 97 9 today  Pt's daughter had mentioned about Zoloft for pt since she is experiencing some depression  Pt uses [facility] in [place]

## 2022-11-16 NOTE — TELEPHONE ENCOUNTER
Vit d sent and 25mg zoloft    If she would resume the lisinopril hctz only take half a tablet and should not continue plain lisinopril 10mg

## 2022-11-21 PROBLEM — J18.9 LLL PNEUMONIA: Status: RESOLVED | Noted: 2022-09-22 | Resolved: 2022-11-21

## 2022-11-22 DIAGNOSIS — I10 PRIMARY HYPERTENSION: ICD-10-CM

## 2022-11-22 PROBLEM — J12.82 PNEUMONIA DUE TO COVID-19 VIRUS: Status: RESOLVED | Noted: 2022-09-22 | Resolved: 2022-11-22

## 2022-11-22 PROBLEM — U07.1 PNEUMONIA DUE TO COVID-19 VIRUS: Status: RESOLVED | Noted: 2022-09-22 | Resolved: 2022-11-22

## 2022-11-22 RX ORDER — LISINOPRIL AND HYDROCHLOROTHIAZIDE 12.5; 1 MG/1; MG/1
1 TABLET ORAL DAILY
COMMUNITY
End: 2022-11-22 | Stop reason: SDUPTHER

## 2022-11-22 RX ORDER — LISINOPRIL AND HYDROCHLOROTHIAZIDE 12.5; 1 MG/1; MG/1
1 TABLET ORAL DAILY
Qty: 30 TABLET | Refills: 3 | Status: SHIPPED | OUTPATIENT
Start: 2022-11-22

## 2022-11-22 NOTE — TELEPHONE ENCOUNTER
Nurse feels the dose of Lisinopril / HCTZ 10mg/12 5 would be fine  Pt still has =2 pitting edema and is non-compliant with elevating her legs  Would you send a script to [facility] for that dose?   TY

## 2022-11-22 NOTE — TELEPHONE ENCOUNTER
Ekta Jerry, nurse with Home care called and family would like to know if you could prescribe her Lisinopril/HCTZ 10mg/6 25 as the 20mg/12 5 are to hard to break in half  This nurse did not see that the medication comes in 10mg/6 25  Please advise  Uses [facility] in [address] [place] [address] [place]

## 2022-12-01 ENCOUNTER — TELEPHONE (OUTPATIENT)
Dept: FAMILY MEDICINE CLINIC | Facility: CLINIC | Age: 77
End: 2022-12-01

## 2022-12-01 DIAGNOSIS — J98.8 CONGESTION OF RESPIRATORY TRACT: Primary | ICD-10-CM

## 2022-12-01 RX ORDER — DEXTROMETHORPHAN HYDROBROMIDE AND PROMETHAZINE HYDROCHLORIDE 15; 6.25 MG/5ML; MG/5ML
5 SOLUTION ORAL 4 TIMES DAILY PRN
Qty: 240 ML | Refills: 1 | Status: SHIPPED | OUTPATIENT
Start: 2022-12-01

## 2022-12-01 NOTE — TELEPHONE ENCOUNTER
C/o nose running all the time, clear, wanted to know if you can rx something for her to dry it up? She is on oxygen, the nose piece is irritating her nose, is off sometimes  Can you rx for a mask for her?

## 2022-12-01 NOTE — TELEPHONE ENCOUNTER
Sent phenergan dm but often with the oxygen patients have clear runny drainage  Generally with oxygen it is nasal cannula - only hi flow oxygen at the hospital uses the mask

## 2022-12-02 ENCOUNTER — TELEPHONE (OUTPATIENT)
Dept: FAMILY MEDICINE CLINIC | Facility: CLINIC | Age: 77
End: 2022-12-02

## 2022-12-02 DIAGNOSIS — F32.A DEPRESSION, UNSPECIFIED DEPRESSION TYPE: Primary | ICD-10-CM

## 2022-12-02 NOTE — TELEPHONE ENCOUNTER
She feels she can use an antidepressant, she sits all day with her head down, and has no motivation    ? zoloft  Uses [facility] [place]

## 2022-12-08 ENCOUNTER — TELEPHONE (OUTPATIENT)
Dept: FAMILY MEDICINE CLINIC | Facility: CLINIC | Age: 77
End: 2022-12-08

## 2022-12-08 DIAGNOSIS — R60.0 LOCALIZED EDEMA: Primary | ICD-10-CM

## 2022-12-08 RX ORDER — FUROSEMIDE 20 MG/1
20 TABLET ORAL DAILY
Qty: 3 TABLET | Refills: 0 | Status: SHIPPED | OUTPATIENT
Start: 2022-12-08 | End: 2022-12-12 | Stop reason: SDUPTHER

## 2022-12-08 NOTE — TELEPHONE ENCOUNTER
She has +2 pitting edema, she was put on lisinopril with hctz 12 5 about one and half weeks ago, and there is no difference  She even tried compression stocking  She is non compliant with leg elevation  ? Diuretic for a few days?

## 2022-12-08 NOTE — TELEPHONE ENCOUNTER
Lasxi daily for 3 days sent She was to setup outpatient cardiology followup I do not see done/appt so they should call to schedule

## 2022-12-12 ENCOUNTER — TELEPHONE (OUTPATIENT)
Dept: FAMILY MEDICINE CLINIC | Facility: CLINIC | Age: 77
End: 2022-12-12

## 2022-12-12 DIAGNOSIS — R60.0 LOCALIZED EDEMA: ICD-10-CM

## 2022-12-12 RX ORDER — FUROSEMIDE 20 MG/1
20 TABLET ORAL DAILY
Qty: 7 TABLET | Refills: 0 | Status: SHIPPED | OUTPATIENT
Start: 2022-12-12

## 2022-12-12 NOTE — TELEPHONE ENCOUNTER
7 days sent for Lasix  Needs to see cardiology  BMP ordered - should have done this week to check kidney function/potassium  If no change in symptoms go to ER May need IV medications

## 2022-12-12 NOTE — TELEPHONE ENCOUNTER
You had ordered a three days worth of Lasix 20 milligrams  Patient has completed it  However, she still has about a + 2 to a + 3 adima in her left lower extremity and a + 1 to her right lower extremity  And she still has not made an appointment with cardiology  Please advise

## 2023-02-12 ENCOUNTER — HOSPITAL ENCOUNTER (INPATIENT)
Facility: HOSPITAL | Age: 78
LOS: 3 days | Discharge: NURSING, REHAB OR LONG-TERM CARE | End: 2023-02-16
Attending: EMERGENCY MEDICINE | Admitting: INTERNAL MEDICINE

## 2023-02-12 ENCOUNTER — APPOINTMENT (EMERGENCY)
Dept: CT IMAGING | Facility: HOSPITAL | Age: 78
End: 2023-02-12

## 2023-02-12 ENCOUNTER — APPOINTMENT (EMERGENCY)
Dept: RADIOLOGY | Facility: HOSPITAL | Age: 78
End: 2023-02-12

## 2023-02-12 DIAGNOSIS — R29.90 STROKE-LIKE SYMPTOMS: ICD-10-CM

## 2023-02-12 DIAGNOSIS — E87.6 ACUTE HYPOKALEMIA: ICD-10-CM

## 2023-02-12 DIAGNOSIS — E11.649 TYPE 2 DIABETES MELLITUS WITH HYPOGLYCEMIA WITHOUT COMA, WITHOUT LONG-TERM CURRENT USE OF INSULIN (HCC): ICD-10-CM

## 2023-02-12 DIAGNOSIS — E11.9 TYPE 2 DIABETES MELLITUS WITHOUT COMPLICATION, WITHOUT LONG-TERM CURRENT USE OF INSULIN (HCC): ICD-10-CM

## 2023-02-12 DIAGNOSIS — I10 ESSENTIAL HYPERTENSION: ICD-10-CM

## 2023-02-12 DIAGNOSIS — E11.649 HYPOGLYCEMIA ASSOCIATED WITH TYPE 2 DIABETES MELLITUS (HCC): Primary | ICD-10-CM

## 2023-02-12 DIAGNOSIS — L60.9 NAIL ABNORMALITY: ICD-10-CM

## 2023-02-12 DIAGNOSIS — I10 PRIMARY HYPERTENSION: ICD-10-CM

## 2023-02-12 DIAGNOSIS — R19.7 DIARRHEA: ICD-10-CM

## 2023-02-12 DIAGNOSIS — I48.91 ATRIAL FIBRILLATION (HCC): ICD-10-CM

## 2023-02-12 LAB
ANION GAP SERPL CALCULATED.3IONS-SCNC: 8 MMOL/L (ref 4–13)
APTT PPP: 29 SECONDS (ref 23–37)
BUN SERPL-MCNC: 10 MG/DL (ref 5–25)
CALCIUM SERPL-MCNC: 8.9 MG/DL (ref 8.4–10.2)
CHLORIDE SERPL-SCNC: 94 MMOL/L (ref 96–108)
CO2 SERPL-SCNC: 35 MMOL/L (ref 21–32)
CREAT SERPL-MCNC: 0.73 MG/DL (ref 0.6–1.3)
ERYTHROCYTE [DISTWIDTH] IN BLOOD BY AUTOMATED COUNT: 13.6 % (ref 11.6–15.1)
GFR SERPL CREATININE-BSD FRML MDRD: 79 ML/MIN/1.73SQ M
GLUCOSE SERPL-MCNC: 79 MG/DL (ref 65–140)
HCT VFR BLD AUTO: 48.9 % (ref 34.8–46.1)
HGB BLD-MCNC: 15.6 G/DL (ref 11.5–15.4)
INR PPP: 1.04 (ref 0.84–1.19)
MCH RBC QN AUTO: 28.9 PG (ref 26.8–34.3)
MCHC RBC AUTO-ENTMCNC: 31.9 G/DL (ref 31.4–37.4)
MCV RBC AUTO: 91 FL (ref 82–98)
PLATELET # BLD AUTO: 239 THOUSANDS/UL (ref 149–390)
PMV BLD AUTO: 10.4 FL (ref 8.9–12.7)
POTASSIUM SERPL-SCNC: 3 MMOL/L (ref 3.5–5.3)
PROTHROMBIN TIME: 13.8 SECONDS (ref 11.6–14.5)
RBC # BLD AUTO: 5.39 MILLION/UL (ref 3.81–5.12)
SODIUM SERPL-SCNC: 137 MMOL/L (ref 135–147)
WBC # BLD AUTO: 12.35 THOUSAND/UL (ref 4.31–10.16)

## 2023-02-12 RX ADMIN — IOHEXOL 85 ML: 350 INJECTION, SOLUTION INTRAVENOUS at 23:41

## 2023-02-13 ENCOUNTER — APPOINTMENT (INPATIENT)
Dept: CT IMAGING | Facility: HOSPITAL | Age: 78
End: 2023-02-13

## 2023-02-13 PROBLEM — R19.7 DIARRHEA: Status: ACTIVE | Noted: 2023-02-13

## 2023-02-13 PROBLEM — E11.649 TYPE 2 DIABETES MELLITUS WITH HYPOGLYCEMIA WITHOUT COMA, WITHOUT LONG-TERM CURRENT USE OF INSULIN (HCC): Status: ACTIVE | Noted: 2022-08-14
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
## 2023-02-13 LAB
2HR DELTA HS TROPONIN: 4 NG/L
4HR DELTA HS TROPONIN: 5 NG/L
ALBUMIN SERPL BCP-MCNC: 3.7 G/DL (ref 3.5–5)
ALP SERPL-CCNC: 64 U/L (ref 34–104)
ALT SERPL W P-5'-P-CCNC: 4 U/L (ref 7–52)
ANION GAP SERPL CALCULATED.3IONS-SCNC: 9 MMOL/L (ref 4–13)
AST SERPL W P-5'-P-CCNC: 17 U/L (ref 13–39)
BASOPHILS # BLD AUTO: 0.08 THOUSANDS/ÂΜL (ref 0–0.1)
BASOPHILS NFR BLD AUTO: 1 % (ref 0–1)
BILIRUB SERPL-MCNC: 0.83 MG/DL (ref 0.2–1)
BILIRUB UR QL STRIP: NEGATIVE
BUN SERPL-MCNC: 8 MG/DL (ref 5–25)
CALCIUM SERPL-MCNC: 8.5 MG/DL (ref 8.4–10.2)
CARDIAC TROPONIN I PNL SERPL HS: 27 NG/L
CARDIAC TROPONIN I PNL SERPL HS: 31 NG/L
CARDIAC TROPONIN I PNL SERPL HS: 32 NG/L
CHLORIDE SERPL-SCNC: 98 MMOL/L (ref 96–108)
CLARITY UR: CLEAR
CO2 SERPL-SCNC: 31 MMOL/L (ref 21–32)
COLOR UR: YELLOW
CREAT SERPL-MCNC: 0.66 MG/DL (ref 0.6–1.3)
EOSINOPHIL # BLD AUTO: 0.17 THOUSAND/ÂΜL (ref 0–0.61)
EOSINOPHIL NFR BLD AUTO: 2 % (ref 0–6)
ERYTHROCYTE [DISTWIDTH] IN BLOOD BY AUTOMATED COUNT: 13.5 % (ref 11.6–15.1)
FLUAV RNA RESP QL NAA+PROBE: NEGATIVE
FLUBV RNA RESP QL NAA+PROBE: NEGATIVE
GFR SERPL CREATININE-BSD FRML MDRD: 84 ML/MIN/1.73SQ M
GLUCOSE P FAST SERPL-MCNC: 13 MG/DL (ref 65–99)
GLUCOSE SERPL-MCNC: 102 MG/DL (ref 65–140)
GLUCOSE SERPL-MCNC: 102 MG/DL (ref 65–140)
GLUCOSE SERPL-MCNC: 104 MG/DL (ref 65–140)
GLUCOSE SERPL-MCNC: 13 MG/DL (ref 65–140)
GLUCOSE SERPL-MCNC: 22 MG/DL (ref 65–140)
GLUCOSE SERPL-MCNC: 23 MG/DL (ref 65–140)
GLUCOSE SERPL-MCNC: 24 MG/DL (ref 65–140)
GLUCOSE SERPL-MCNC: 27 MG/DL (ref 65–140)
GLUCOSE SERPL-MCNC: 28 MG/DL (ref 65–140)
GLUCOSE SERPL-MCNC: 31 MG/DL (ref 65–140)
GLUCOSE SERPL-MCNC: 34 MG/DL (ref 65–140)
GLUCOSE SERPL-MCNC: 67 MG/DL (ref 65–140)
GLUCOSE SERPL-MCNC: 88 MG/DL (ref 65–140)
GLUCOSE SERPL-MCNC: 93 MG/DL (ref 65–140)
GLUCOSE SERPL-MCNC: 93 MG/DL (ref 65–140)
GLUCOSE UR STRIP-MCNC: NEGATIVE MG/DL
HCT VFR BLD AUTO: 49.7 % (ref 34.8–46.1)
HGB BLD-MCNC: 15.4 G/DL (ref 11.5–15.4)
HGB UR QL STRIP.AUTO: NEGATIVE
IMM GRANULOCYTES # BLD AUTO: 0.04 THOUSAND/UL (ref 0–0.2)
IMM GRANULOCYTES NFR BLD AUTO: 0 % (ref 0–2)
KETONES UR STRIP-MCNC: NEGATIVE MG/DL
LEUKOCYTE ESTERASE UR QL STRIP: NEGATIVE
LYMPHOCYTES # BLD AUTO: 1.38 THOUSANDS/ÂΜL (ref 0.6–4.47)
LYMPHOCYTES NFR BLD AUTO: 12 % (ref 14–44)
MAGNESIUM SERPL-MCNC: 1.7 MG/DL (ref 1.9–2.7)
MCH RBC QN AUTO: 27.9 PG (ref 26.8–34.3)
MCHC RBC AUTO-ENTMCNC: 31 G/DL (ref 31.4–37.4)
MCV RBC AUTO: 90 FL (ref 82–98)
MONOCYTES # BLD AUTO: 0.91 THOUSAND/ÂΜL (ref 0.17–1.22)
MONOCYTES NFR BLD AUTO: 8 % (ref 4–12)
NEUTROPHILS # BLD AUTO: 9 THOUSANDS/ÂΜL (ref 1.85–7.62)
NEUTS SEG NFR BLD AUTO: 77 % (ref 43–75)
NITRITE UR QL STRIP: NEGATIVE
NRBC BLD AUTO-RTO: 0 /100 WBCS
PH UR STRIP.AUTO: 6 [PH]
PHOSPHATE SERPL-MCNC: 3.3 MG/DL (ref 2.3–4.1)
PLATELET # BLD AUTO: 231 THOUSANDS/UL (ref 149–390)
PLATELET # BLD AUTO: 261 THOUSANDS/UL (ref 149–390)
PMV BLD AUTO: 10.4 FL (ref 8.9–12.7)
PMV BLD AUTO: 11 FL (ref 8.9–12.7)
POTASSIUM SERPL-SCNC: 3.1 MMOL/L (ref 3.5–5.3)
PROT SERPL-MCNC: 6.3 G/DL (ref 6.4–8.4)
PROT UR STRIP-MCNC: NEGATIVE MG/DL
RBC # BLD AUTO: 5.52 MILLION/UL (ref 3.81–5.12)
RSV RNA RESP QL NAA+PROBE: NEGATIVE
SARS-COV-2 RNA RESP QL NAA+PROBE: NEGATIVE
SODIUM SERPL-SCNC: 138 MMOL/L (ref 135–147)
SP GR UR STRIP.AUTO: 1.01 (ref 1–1.03)
UROBILINOGEN UR QL STRIP.AUTO: 0.2 E.U./DL
WBC # BLD AUTO: 11.58 THOUSAND/UL (ref 4.31–10.16)

## 2023-02-13 RX ORDER — ALBUTEROL SULFATE 2.5 MG/3ML
2.5 SOLUTION RESPIRATORY (INHALATION) EVERY 6 HOURS PRN
Status: DISCONTINUED | OUTPATIENT
Start: 2023-02-13 | End: 2023-02-16 | Stop reason: HOSPADM

## 2023-02-13 RX ORDER — DEXTROSE MONOHYDRATE 25 G/50ML
50 INJECTION, SOLUTION INTRAVENOUS ONCE
Status: COMPLETED | OUTPATIENT
Start: 2023-02-13 | End: 2023-02-13

## 2023-02-13 RX ORDER — POTASSIUM CHLORIDE 20 MEQ/1
40 TABLET, EXTENDED RELEASE ORAL ONCE
Status: COMPLETED | OUTPATIENT
Start: 2023-02-13 | End: 2023-02-13

## 2023-02-13 RX ORDER — BUDESONIDE AND FORMOTEROL FUMARATE DIHYDRATE 80; 4.5 UG/1; UG/1
2 AEROSOL RESPIRATORY (INHALATION) 2 TIMES DAILY
Status: DISCONTINUED | OUTPATIENT
Start: 2023-02-13 | End: 2023-02-16 | Stop reason: HOSPADM

## 2023-02-13 RX ORDER — SODIUM CHLORIDE, SODIUM GLUCONATE, SODIUM ACETATE, POTASSIUM CHLORIDE, MAGNESIUM CHLORIDE, SODIUM PHOSPHATE, DIBASIC, AND POTASSIUM PHOSPHATE .53; .5; .37; .037; .03; .012; .00082 G/100ML; G/100ML; G/100ML; G/100ML; G/100ML; G/100ML; G/100ML
75 INJECTION, SOLUTION INTRAVENOUS CONTINUOUS
Status: DISCONTINUED | OUTPATIENT
Start: 2023-02-13 | End: 2023-02-13

## 2023-02-13 RX ORDER — ACETAMINOPHEN 325 MG/1
650 TABLET ORAL EVERY 6 HOURS PRN
Status: DISCONTINUED | OUTPATIENT
Start: 2023-02-13 | End: 2023-02-16 | Stop reason: HOSPADM

## 2023-02-13 RX ORDER — FUROSEMIDE 20 MG/1
20 TABLET ORAL DAILY
Status: DISCONTINUED | OUTPATIENT
Start: 2023-02-13 | End: 2023-02-13

## 2023-02-13 RX ORDER — POTASSIUM CHLORIDE 14.9 MG/ML
20 INJECTION INTRAVENOUS ONCE
Status: COMPLETED | OUTPATIENT
Start: 2023-02-13 | End: 2023-02-13

## 2023-02-13 RX ORDER — ONDANSETRON 2 MG/ML
4 INJECTION INTRAMUSCULAR; INTRAVENOUS EVERY 6 HOURS PRN
Status: DISCONTINUED | OUTPATIENT
Start: 2023-02-13 | End: 2023-02-16 | Stop reason: HOSPADM

## 2023-02-13 RX ORDER — NICOTINE 21 MG/24HR
1 PATCH, TRANSDERMAL 24 HOURS TRANSDERMAL DAILY
Status: DISCONTINUED | OUTPATIENT
Start: 2023-02-13 | End: 2023-02-16 | Stop reason: HOSPADM

## 2023-02-13 RX ORDER — HEPARIN SODIUM 5000 [USP'U]/ML
5000 INJECTION, SOLUTION INTRAVENOUS; SUBCUTANEOUS EVERY 8 HOURS SCHEDULED
Status: DISCONTINUED | OUTPATIENT
Start: 2023-02-13 | End: 2023-02-16 | Stop reason: HOSPADM

## 2023-02-13 RX ORDER — OXYCODONE HYDROCHLORIDE 5 MG/1
2.5 TABLET ORAL EVERY 4 HOURS PRN
Status: DISCONTINUED | OUTPATIENT
Start: 2023-02-13 | End: 2023-02-14

## 2023-02-13 RX ORDER — MELATONIN
2000 DAILY
Status: DISCONTINUED | OUTPATIENT
Start: 2023-02-13 | End: 2023-02-16 | Stop reason: HOSPADM

## 2023-02-13 RX ORDER — ASPIRIN 81 MG/1
81 TABLET ORAL DAILY
Status: DISCONTINUED | OUTPATIENT
Start: 2023-02-13 | End: 2023-02-16 | Stop reason: HOSPADM

## 2023-02-13 RX ORDER — MAGNESIUM SULFATE HEPTAHYDRATE 40 MG/ML
2 INJECTION, SOLUTION INTRAVENOUS ONCE
Status: COMPLETED | OUTPATIENT
Start: 2023-02-13 | End: 2023-02-13

## 2023-02-13 RX ORDER — DEXTROSE MONOHYDRATE 100 MG/ML
125 INJECTION, SOLUTION INTRAVENOUS CONTINUOUS
Status: DISCONTINUED | OUTPATIENT
Start: 2023-02-13 | End: 2023-02-14

## 2023-02-13 RX ORDER — DEXTROSE AND SODIUM CHLORIDE 5; .9 G/100ML; G/100ML
100 INJECTION, SOLUTION INTRAVENOUS CONTINUOUS
Status: DISCONTINUED | OUTPATIENT
Start: 2023-02-13 | End: 2023-02-13

## 2023-02-13 RX ORDER — PRAVASTATIN SODIUM 40 MG
80 TABLET ORAL
Status: DISCONTINUED | OUTPATIENT
Start: 2023-02-13 | End: 2023-02-16 | Stop reason: HOSPADM

## 2023-02-13 RX ORDER — LISINOPRIL 10 MG/1
10 TABLET ORAL DAILY
Status: DISCONTINUED | OUTPATIENT
Start: 2023-02-13 | End: 2023-02-16 | Stop reason: HOSPADM

## 2023-02-13 RX ORDER — HYDROCHLOROTHIAZIDE 12.5 MG/1
12.5 TABLET ORAL DAILY
Status: DISCONTINUED | OUTPATIENT
Start: 2023-02-13 | End: 2023-02-16 | Stop reason: HOSPADM

## 2023-02-13 RX ORDER — INSULIN LISPRO 100 [IU]/ML
1-5 INJECTION, SOLUTION INTRAVENOUS; SUBCUTANEOUS
Status: DISCONTINUED | OUTPATIENT
Start: 2023-02-13 | End: 2023-02-13

## 2023-02-13 RX ADMIN — DEXTROSE MONOHYDRATE 50 ML: 25 INJECTION, SOLUTION INTRAVENOUS at 06:25

## 2023-02-13 RX ADMIN — DEXTROSE MONOHYDRATE 125 ML/HR: 100 INJECTION, SOLUTION INTRAVENOUS at 11:41

## 2023-02-13 RX ADMIN — ACETAMINOPHEN 650 MG: 325 TABLET, FILM COATED ORAL at 16:49

## 2023-02-13 RX ADMIN — HEPARIN SODIUM 5000 UNITS: 5000 INJECTION INTRAVENOUS; SUBCUTANEOUS at 05:43

## 2023-02-13 RX ADMIN — CHOLECALCIFEROL TAB 25 MCG (1000 UNIT) 2000 UNITS: 25 TAB at 08:21

## 2023-02-13 RX ADMIN — SODIUM CHLORIDE 500 ML: 0.9 INJECTION, SOLUTION INTRAVENOUS at 01:27

## 2023-02-13 RX ADMIN — BUDESONIDE AND FORMOTEROL FUMARATE DIHYDRATE 2 PUFF: 80; 4.5 AEROSOL RESPIRATORY (INHALATION) at 18:03

## 2023-02-13 RX ADMIN — DEXTROSE MONOHYDRATE 125 ML/HR: 100 INJECTION, SOLUTION INTRAVENOUS at 22:50

## 2023-02-13 RX ADMIN — UMECLIDINIUM 1 PUFF: 62.5 AEROSOL, POWDER ORAL at 08:23

## 2023-02-13 RX ADMIN — HEPARIN SODIUM 5000 UNITS: 5000 INJECTION INTRAVENOUS; SUBCUTANEOUS at 21:15

## 2023-02-13 RX ADMIN — SERTRALINE HYDROCHLORIDE 50 MG: 50 TABLET ORAL at 08:21

## 2023-02-13 RX ADMIN — DEXTROSE MONOHYDRATE 50 ML: 25 INJECTION, SOLUTION INTRAVENOUS at 12:59

## 2023-02-13 RX ADMIN — DEXTROSE AND SODIUM CHLORIDE 100 ML/HR: 5; .9 INJECTION, SOLUTION INTRAVENOUS at 09:31

## 2023-02-13 RX ADMIN — OXYCODONE HYDROCHLORIDE 2.5 MG: 5 TABLET ORAL at 17:11

## 2023-02-13 RX ADMIN — HYDROCHLOROTHIAZIDE 12.5 MG: 12.5 TABLET ORAL at 08:21

## 2023-02-13 RX ADMIN — BUDESONIDE AND FORMOTEROL FUMARATE DIHYDRATE 2 PUFF: 80; 4.5 AEROSOL RESPIRATORY (INHALATION) at 08:23

## 2023-02-13 RX ADMIN — PRAVASTATIN SODIUM 80 MG: 40 TABLET ORAL at 16:49

## 2023-02-13 RX ADMIN — POTASSIUM CHLORIDE 20 MEQ: 14.9 INJECTION, SOLUTION INTRAVENOUS at 01:27

## 2023-02-13 RX ADMIN — DEXTROSE AND SODIUM CHLORIDE 1000 ML: 5; .9 INJECTION, SOLUTION INTRAVENOUS at 00:12

## 2023-02-13 RX ADMIN — SODIUM CHLORIDE, SODIUM GLUCONATE, SODIUM ACETATE, POTASSIUM CHLORIDE, MAGNESIUM CHLORIDE, SODIUM PHOSPHATE, DIBASIC, AND POTASSIUM PHOSPHATE 75 ML/HR: .53; .5; .37; .037; .03; .012; .00082 INJECTION, SOLUTION INTRAVENOUS at 03:00

## 2023-02-13 RX ADMIN — HEPARIN SODIUM 5000 UNITS: 5000 INJECTION INTRAVENOUS; SUBCUTANEOUS at 14:12

## 2023-02-13 RX ADMIN — MAGNESIUM SULFATE HEPTAHYDRATE 2 G: 40 INJECTION, SOLUTION INTRAVENOUS at 11:15

## 2023-02-13 RX ADMIN — LISINOPRIL 10 MG: 10 TABLET ORAL at 08:21

## 2023-02-13 RX ADMIN — ASPIRIN 81 MG: 81 TABLET, COATED ORAL at 08:21

## 2023-02-13 RX ADMIN — POTASSIUM CHLORIDE 40 MEQ: 1500 TABLET, EXTENDED RELEASE ORAL at 01:26

## 2023-02-13 RX ADMIN — FUROSEMIDE 20 MG: 20 TABLET ORAL at 08:21

## 2023-02-13 NOTE — PLAN OF CARE
Problem: PAIN - ADULT  Goal: Verbalizes/displays adequate comfort level or baseline comfort level  Description: Interventions:  - Encourage patient to monitor pain and request assistance  - Assess pain using appropriate pain scale  - Administer analgesics based on type and severity of pain and evaluate response  - Implement non-pharmacological measures as appropriate and evaluate response  - Consider cultural and social influences on pain and pain management  - Notify physician/advanced practitioner if interventions unsuccessful or patient reports new pain  Outcome: Progressing     Problem: INFECTION - ADULT  Goal: Absence or prevention of progression during hospitalization  Description: INTERVENTIONS:  - Assess and monitor for signs and symptoms of infection  - Monitor lab/diagnostic results  - [place] appropriate cooling/warming therapies per order  - Administer medications as ordered  - Instruct and encourage patient and family to use good hand hygiene technique  - Identify and instruct in appropriate isolation precautions for identified infection/condition  Outcome: Progressing     Problem: SAFETY ADULT  Goal: Patient will remain free of falls  Description: INTERVENTIONS:  - Educate patient/family on patient safety including physical limitations  - Instruct patient to call for assistance with activity   - Consult OT/PT to assist with strengthening/mobility   - Keep Call bell within reach  - Keep bed low and locked with side rails adjusted as appropriate  - Keep care items and personal belongings within reach  - Initiate and maintain comfort rounds  - Make Fall Risk Sign visible to staff  - Offer Toileting every 4 Hours, in advance of need  - Initiate/Maintain bed alarm  - Obtain necessary fall risk management equipment:nonskid socks  - Apply yellow socks and bracelet for high fall risk patients  - Consider moving patient to room near nurses station  Outcome: Progressing  Goal: Maintain or return to baseline ADL function  Description: INTERVENTIONS:  -  Assess patient's ability to carry out ADLs; assess patient's baseline for ADL function and identify physical deficits which impact ability to perform ADLs (bathing, care of mouth/teeth, toileting, grooming, dressing, etc )  - Assess/evaluate cause of self-care deficits   - Assess range of motion  - Assess patient's mobility; develop plan if impaired  - Assess patient's need for assistive devices and provide as appropriate  - Encourage maximum independence but intervene and supervise when necessary  - Involve family in performance of ADLs  - Assess for home care needs following discharge   - Consider OT consult to assist with ADL evaluation and planning for discharge  - Provide patient education as appropriate  Outcome: Progressing  Goal: Maintains/Returns to pre admission functional level  Description: INTERVENTIONS:  - Perform BMAT or MOVE assessment daily    - Set and communicate daily mobility goal to care team and patient/family/caregiver  - Collaborate with rehabilitation services on mobility goals if consulted  - Perform Range of Motion 3 times a day  - Reposition patient every 3 hours    - Dangle patient 3 times a day  - Stand patient 3 times a day  - Ambulate patient 3 times a day  - Out of bed to chair 3 times a day   - Out of bed for meals 3 times a day  - Out of bed for toileting  - Record patient progress and toleration of activity level   Outcome: Progressing     Problem: DISCHARGE PLANNING  Goal: Discharge to home or other facility with appropriate resources  Description: INTERVENTIONS:  - Identify barriers to discharge w/patient and caregiver  - Arrange for needed discharge resources and transportation as appropriate  - Identify discharge learning needs (meds, wound care, etc )  - Arrange for interpretive services to assist at discharge as needed  - Refer to Case Management Department for coordinating discharge planning if the patient needs post-hospital services based on physician/advanced practitioner order or complex needs related to functional status, cognitive ability, or social support system  Outcome: Progressing     Problem: Knowledge Deficit  Goal: Patient/family/caregiver demonstrates understanding of disease process, treatment plan, medications, and discharge instructions  Description: Complete learning assessment and assess knowledge base    Interventions:  - Provide teaching at level of understanding  - Provide teaching via preferred learning methods  Outcome: Progressing     Problem: RESPIRATORY - ADULT  Goal: Achieves optimal ventilation and oxygenation  Description: INTERVENTIONS:  - Assess for changes in respiratory status  - Assess for changes in mentation and behavior  - Position to facilitate oxygenation and minimize respiratory effort  - Oxygen administered by appropriate delivery if ordered  - Initiate smoking cessation education as indicated  - Encourage broncho-pulmonary hygiene including cough, deep breathe, Incentive Spirometry  - Assess the need for suctioning and aspirate as needed  - Assess and instruct to report SOB or any respiratory difficulty  - Respiratory Therapy support as indicated  Outcome: Progressing     Problem: METABOLIC, FLUID AND ELECTROLYTES - ADULT  Goal: Electrolytes maintained within normal limits  Description: INTERVENTIONS:  - Monitor labs and assess patient for signs and symptoms of electrolyte imbalances  - Administer electrolyte replacement as ordered  - Monitor response to electrolyte replacements, including repeat lab results as appropriate  - Instruct patient on fluid and nutrition as appropriate  Outcome: Progressing

## 2023-02-13 NOTE — H&P
[address]  1945, 65 y o  female MRN: [id number]  Unit/Bed#: BT17 Encounter: [id number]  Primary Care Provider: Simone Trujillo DO   Date and time admitted to hospital: 2/12/2023 11:20 PM    * Acute metabolic encephalopathy  Assessment & Plan  · Patient presented with acute onset of confusion, dysarthria, right arm weakness  Initially, stroke alert was called in ED  Patient was found to be hypoglycemic on presentation  1 hypoglycemia improved, patient's symptoms entirely resolved  · Blood sugar upon presentation 79   53 for EMS in the field  · CTA of head negative for any large vessel occlusion  · Case was discussed with neurology who does not recommend any further stroke work-up as patient's symptoms improved entirely following treatment of blood sugar  · will continue to monitor patient's symptoms  · Please see assessment and plan for type 2 diabetes mellitus with hypoglycemia without coma without long-term current use of insulin    Type 2 diabetes mellitus with hypoglycemia without coma, without long-term current use of insulin (HCC)  Assessment & Plan  Lab Results   Component Value Date    HGBA1C 7 8 (H) 09/23/2022       No results for input(s): POCGLU in the last 72 hours  Blood Sugar Average: Last 72 hrs:  · Blood glucose 79 on arrival   53 in field for EMS  Patient was initially having dysarthria, confusion however, after treated with dextrose in the field as well as fluids containing dextrose in the ED symptoms entirely resolved  · Suspect hypoglycemia in setting of diarrheal illness  · We will hold home oral medications  · Sliding scale insulin ordered  · ACHS fingerstick glucose checks    Diarrhea  Assessment & Plan  · Patient presents with diarrhea over the past 24 hours  · IV fluids isolate 75 mL/h  · Check stool bacterial PCR, rotavirus    No risk factors for C  difficile identified      COPD (chronic obstructive pulmonary disease) [facility])  Assessment & Plan  · Not in acute exacerbation at this time  · Continue home inhalers    Essential hypertension  Assessment & Plan  · Current blood pressure is 153/63  · Continue lisinopril hydrochlorothiazide 10-12 5 oral daily      VTE Pharmacologic Prophylaxis: VTE Score: 10 High Risk (Score >/= 5) - Pharmacological DVT Prophylaxis Ordered: heparin  Sequential Compression Devices Ordered  Code Status: Level 3 DNR DNI  Discussion with family: Patient declined call to   Anticipated Length of Stay: Patient will be admitted on an observation basis with an anticipated length of stay of less than 2 midnights secondary to Metabolic encephalopathy, hypoglycemia, diarrhea   Total Time for Visit, including Counseling / Coordination of Care: 60 minutes Greater than 50% of this total time spent on direct patient counseling and coordination of care  Chief Complaint: Slurred speech, confusion    History of Present Illness:  River Henson is a 65 y o  female with a PMH of hypertension, type 2 diabetes, COPD, hyperlipidemia who presents with acute onset of slurred speech, confusion ending earlier tonight  Patient had an episode of difficulty speaking as well as mild confusion  As well per EMS, patient had a right-sided arm drift  Patient has had several days of nonbloody diarrhea  No vomiting  No fevers, chills  In field, patient was found to have blood sugar of 53 by EMS  She was treated with dextrose in the field  Upon arrival, stroke alert was called  CT, CTA of head were negative for any intracranial abnormalities  Patient greatly improved after receiving IV fluids containing dextrose  Currently has no deficits  Speech is understandable  Case was discussed with neuro by ED provider who recommends no further stroke work-up as symptoms fully resolved with improvement of hypoglycemia  Review of Systems:  Review of Systems   Constitutional: Positive for fatigue   Negative for chills and fever  HENT: Negative for ear pain and sore throat  Eyes: Negative for pain and visual disturbance  Respiratory: Negative for cough, shortness of breath and wheezing  Cardiovascular: Negative for chest pain, palpitations and leg swelling  Gastrointestinal: Positive for diarrhea  Negative for abdominal pain, constipation, nausea and vomiting  Genitourinary: Negative for dysuria and hematuria  Musculoskeletal: Negative for arthralgias and back pain  Skin: Negative for color change and rash  Neurological: Positive for speech difficulty and weakness  Negative for seizures and syncope  Psychiatric/Behavioral: Positive for confusion  Negative for agitation  All other systems reviewed and are negative  Past Medical and Surgical History:   Past Medical History:   Diagnosis Date   • Hyperlipidemia     last assessed  8/24/17   • Hypertension     last assessed 10/2/14       Past Surgical History:   Procedure Laterality Date   • CRANIOTOMY         Meds/Allergies:  Prior to Admission medications    Medication Sig Start Date End Date Taking? Authorizing Provider   Accu-Chek FastClix Lancets MISC Use 1 each daily to test blood sugars  8/19/22   Gabrielle Gomez,    acetaminophen (TYLENOL) 325 mg tablet Take 2 tablets (650 mg total) by mouth every 6 (six) hours as needed for mild pain Do not exceed a total of 3 grams of tylenol/acetaminophen in a 24-hour period   9/23/22   Pernell Sandy DO   albuterol (2 5 mg/3 mL) 0 083 % nebulizer solution Take 3 mL (2 5 mg total) by nebulization every 6 (six) hours as needed for shortness of breath 8/16/22   Regina Gale MD   apixaban (Eliquis) 2 5 mg Take 1 tablet (2 5 mg total) by mouth 2 (two) times a day To prevent a blood clot/DVT prophylaxis with COVID and requiring oxygen only for 30 days then stop 9/23/22 10/23/22  Pernell Sandy DO   aspirin 81 MG tablet Take 1 tablet by mouth daily 10/2/14   Historical Provider, MD   Blood Glucose Monitoring Suppl (Accu-Chek Flores Plus) w/Device KIT Use 1 kit daily to test blood sugars  8/19/22   Sandra Damon DO   budesonide-formoterol (SYMBICORT) 80-4 5 MCG/ACT inhaler Inhale 2 puffs 2 (two) times a day Rinse mouth after use   8/16/22   Regina Anand MD   cholecalciferol (VITAMIN D3) 1,000 units tablet Take 2 tablets (2,000 Units total) by mouth daily Take in place of ergocalciferol to treat vitamin D deficiency 9/23/22   Uziel Sandy DO   dexamethasone (DECADRON) 6 mg tablet Take 1 tablet (6 mg total) by mouth daily with breakfast For COVID treatment requiring supplemental oxygen 9/24/22   Uziel Sandy DO   ergocalciferol (VITAMIN D2) 50,000 units Take 1 capsule (50,000 Units total) by mouth once a week 11/16/22   Sandra Damon DO   furosemide (LASIX) 20 mg tablet Take 1 tablet (20 mg total) by mouth daily 12/12/22   Sandra Damon DO   glipiZIDE (GLUCOTROL) 10 mg tablet Take 1 tablet (10 mg total) by mouth 2 (two) times a day 2/7/22   Sandra Damon DO   glucose blood test strip Use 1 each 4 (four) times a day Use as instructed    Historical Provider, MD   hydrocortisone 1 % cream Apply topically 4 (four) times a day as needed for rash 10/4/22   Sandra Damon DO   lisinopril (ZESTRIL) 10 mg tablet Take 1 tablet (10 mg total) by mouth daily Take this in place of the combination medication with lisinopril and hydrochlorothiazide to treat high blood pressure 9/24/22   Uziel Sandy DO   lisinopril-hydrochlorothiazide (PRINZIDE,ZESTORETIC) 10-12 5 MG per tablet Take 1 tablet by mouth daily 11/22/22   Sandra Damon DO   Promethazine-DM [facility]) 6 25-15 mg/5 mL oral syrup Take 5 mL by mouth 4 (four) times a day as needed (congestion) 12/1/22   Sandra Damon DO   sertraline (Zoloft) 50 mg tablet Take 1 tablet (50 mg total) by mouth daily 12/2/22   Sandra Damon DO   simvastatin (ZOCOR) 40 mg tablet Take 1 tablet (40 mg total) by mouth daily 2/7/22 Eyad Mccloud DO   sitaGLIPtin-metFORMIN (Janumet)  MG per tablet Take 1 tablet by mouth daily with breakfast 9/23/22   Demetrio Sandy DO   umeclidinium bromide (INCRUSE ELLIPTA) 62 5 mcg/inh AEPB inhaler Inhale 1 puff daily 9/29/22   Eyad Mccloud DO     I have reviewed home medications with patient personally  Allergies: No Known Allergies    Social History:  Marital Status:    Occupation: retired  Patient Pre-hospital Living Situation: Home  Patient Pre-hospital Level of Mobility: walks  Patient Pre-hospital Diet Restrictions: none   Substance Use History:   Social History     Substance and Sexual Activity   Alcohol Use Never     Social History     Tobacco Use   Smoking Status Every Day   • Packs/day: 1 00   • Types: Cigarettes   Smokeless Tobacco Never     Social History     Substance and Sexual Activity   Drug Use No       Family History:  Family History   Problem Relation Age of Onset   • Breast cancer Mother    • Ovarian cancer Mother    • Diabetes Father        Physical Exam:     Vitals:   Blood Pressure: 156/70 (02/13/23 0100)  Pulse: 83 (02/13/23 0100)  Temperature: 98 °F (36 7 °C) (02/13/23 0027)  Temp Source: Oral (02/13/23 0027)  Respirations: 20 (02/13/23 0100)  Weight - Scale: 66 8 kg (147 lb 4 3 oz) (02/12/23 2323)  SpO2: 97 % (02/13/23 0100)    Physical Exam  Vitals and nursing note reviewed  Constitutional:       General: She is not in acute distress  Appearance: Normal appearance  She is well-developed  She is not ill-appearing  HENT:      Head: Normocephalic and atraumatic  Nose: Nose normal  No congestion or rhinorrhea  Mouth/Throat:      Mouth: Mucous membranes are moist       Pharynx: Oropharynx is clear  No oropharyngeal exudate or posterior oropharyngeal erythema  Eyes:      General: No scleral icterus  Right eye: No discharge  Left eye: No discharge  Extraocular Movements: Extraocular movements intact  Conjunctiva/sclera: Conjunctivae normal       Pupils: Pupils are equal, round, and reactive to light  Cardiovascular:      Rate and Rhythm: Normal rate  Rhythm irregularly irregular  Pulses: Normal pulses  Heart sounds: Normal heart sounds, S1 normal and S2 normal  No murmur heard  No friction rub  No gallop  Pulmonary:      Effort: Pulmonary effort is normal  No respiratory distress  Breath sounds: Normal breath sounds  No wheezing, rhonchi or rales  Abdominal:      General: Abdomen is flat  Bowel sounds are normal  There is no distension  Palpations: Abdomen is soft  Tenderness: There is no abdominal tenderness  There is no guarding or rebound  Musculoskeletal:         General: No swelling  Cervical back: Neck supple  Right lower leg: No edema  Left lower leg: No edema  Skin:     General: Skin is warm and dry  Capillary Refill: Capillary refill takes less than 2 seconds  Neurological:      General: No focal deficit present  Mental Status: She is alert and oriented to person, place, and time  Mental status is at baseline  GCS: GCS eye subscore is 4  GCS verbal subscore is 5  GCS motor subscore is 6  Cranial Nerves: Cranial nerves 2-12 are intact  No cranial nerve deficit or dysarthria  Sensory: Sensation is intact  Motor: Motor function is intact  No weakness  Coordination: Coordination is intact   Coordination normal    Psychiatric:         Mood and Affect: Mood normal          Behavior: Behavior normal           Additional Data:     Lab Results:  Results from last 7 days   Lab Units 02/12/23  2329   WBC Thousand/uL 12 35*   HEMOGLOBIN g/dL 15 6*   HEMATOCRIT % 48 9*   PLATELETS Thousands/uL 239     Results from last 7 days   Lab Units 02/12/23  2329   SODIUM mmol/L 137   POTASSIUM mmol/L 3 0*   CHLORIDE mmol/L 94*   CO2 mmol/L 35*   BUN mg/dL 10   CREATININE mg/dL 0 73   ANION GAP mmol/L 8   CALCIUM mg/dL 8 9   GLUCOSE RANDOM mg/dL 79     Results from last 7 days   Lab Units 02/12/23  2329   INR  1 04                   Lines/Drains:  Invasive Devices     Peripheral Intravenous Line  Duration           Peripheral IV 02/12/23 Right;Ventral (anterior) Forearm <1 day                    Imaging: Reviewed radiology reports from this admission including: chest xray and CT head  X-ray chest 1 view portable   ED Interpretation by Remo Barthel, DO (02/13 0014)   No acute cardiopulmonary disease  CTA stroke alert (head/neck)   Final Result by Neville Heard DO (02/13 0008)      No evidence of large vessel occlusion  If symptoms persist MRI should be obtained to evaluate for occult infarct  I personally discussed this study with Latoya Martinez on 2/12/2023 at 11:53 PM                                     Workstation performed: [id number]         CT stroke alert brain   Final Result by Neville Heard DO (02/12 2343)      No acute intracranial abnormality  I personally discussed this study with Latoya Martinez on 2/12/2023 at 11:39 PM                 Workstation performed: [id number]             EKG and Other Studies Reviewed on Admission:   · EKG: Atrial fibrillation  HR 84     ** Please Note: This note has been constructed using a voice recognition system   **

## 2023-02-13 NOTE — ASSESSMENT & PLAN NOTE
Lab Results   Component Value Date    HGBA1C 7 8 (H) 09/23/2022       No results for input(s): POCGLU in the last 72 hours  Blood Sugar Average: Last 72 hrs:  · Blood glucose 79 on arrival   53 in field for EMS    Patient was initially having dysarthria, confusion however, after treated with dextrose in the field as well as fluids containing dextrose in the ED symptoms entirely resolved  · Suspect hypoglycemia in setting of diarrheal illness  · We will hold home oral medications  · Sliding scale insulin ordered  · ACHS fingerstick glucose checks

## 2023-02-13 NOTE — PROGRESS NOTES
[facility] [address] [place]  Progress Note - Perlita Soto 1945, 65 y o  female MRN: [id number]  Unit/Bed#: 403-01 Encounter: [id number]  Primary Care Provider: Jesu Tobar DO   Date and time admitted to hospital: 2/12/2023 11:20 PM    * Type 2 diabetes mellitus with hypoglycemia without coma, without long-term current use of insulin [facility])  Assessment & Plan  Lab Results   Component Value Date    HGBA1C 7 8 (H) 09/23/2022       Recent Labs     02/13/23  1013 02/13/23  1104 02/13/23  1217 02/13/23  1305   POCGLU 24* 31* 27* 34*       Blood Sugar Average: Last 72 hrs:  · (P) 34 625 (02/12/23) Blood glucose 79 on arrival  53 in field for EMS  Patient initially having dysarthria, confusion however, after treated with dextrose in the field as well as fluids containing dextrose in the ED symptoms entirely resolved  · Suspect hypoglycemia in setting of diarrheal illness  · We will hold home oral medications  · Discontinue sliding scale insulin  · Hourly fingerstick glucose checks  · Discontinue D5 in NS; replace with D10 infusion    Acute metabolic encephalopathy  Assessment & Plan  · Patient is AAOx4  CN 2-12 are intact  No focal deficit  No confusion, dysarthria, or extremity weakness  · Blood sugar is presently stable on D10 infusion  · CTA of head negative for any large vessel occlusion  · Will continue to monitor patient's symptoms  · Please see assessment and plan for type 2 diabetes mellitus with hypoglycemia without coma without long-term current use of insulin    Diarrhea  Assessment & Plan  · No bowel movement since admission  · IV fluids isolate 75 mL/h  · Awaiting results of stool bacterial PCR, rotavirus   No risk factors for C  difficile identified      Fall  Assessment & Plan  · Patient sustained fall on unit when attempting to ambulate without assitance  · CT head revealed no abnormalities  · CT cervical spine revealed no fracture or malalignment, and revealed mild septal thickening and groundglass opacity at the lung apices    COPD (chronic obstructive pulmonary disease) (HCC)  Assessment & Plan  · Not in acute exacerbation at this time  · Continue home inhalers    Essential hypertension  Assessment & Plan  · Current blood pressure is 185/86  · Continue lisinopril hydrochlorothiazide 10-12 5 oral daily      VTE Pharmacologic Prophylaxis: VTE Score: 10 High Risk (Score >/= 5) - Pharmacological DVT Prophylaxis Ordered: heparin  Sequential Compression Devices Ordered  Discussions with Specialists or Other Care Team Provider: Dr Brandy Quezada, nurse team, case management    Education and Discussions with Family / Patient: Provider will contact family for collateral information    Time Spent for Care: 70 minutes  More than 50% of total time spent on counseling and coordination of care as described above  Current Length of Stay: 0 day(s)  Current Patient Status: Inpatient   Certification Statement: The patient, admitted on an observation basis, will now require > 2 midnight hospital stay due to need for medical treatment of hypoglycemia  Code Status: Level 3 - DNAR and DNI    Subjective:   Patient seen at bedside in acute distress  She appears to have labored breathing  Unable to assess patient as she was minimally responsive due to current hypoglycemic episode with blood glucose at 23  She was stabilized on D10 infusion  Reportedly, patient sustained a fall in her room earlier today when attempting to ambulate without assistance  Provider returned to reassess when patient was alert and oriented  Patient is AAOx4  She reports pain in her buttocks due to fall today and moderate light-headedness  She reports falling several times since yesterday  No further complaints  Collateral information obtained from daughter, Ashley Huff: Patient began to have diarrhea on Friday night, with no other symptoms  Behavior abruptly changed last night  No recent change in weight, appetite, or fluid intake   Patient does not have routine at-home blood sugar checks due to inability for family to afford glucose meter or strips  Daughter is asking if patient can be discharged with a glucose meter and strips  Review of Systems - History obtained from the patient  General ROS: negative for - chills, fatigue, fever or sleep disturbance  Respiratory ROS: denies any SOB or wheezing  Cardiovascular ROS: no chest pain or dyspnea on exertion  Gastrointestinal ROS: no abdominal pain, change in bowel habits, or black or bloody stools  Genito-Urinary ROS: no dysuria, trouble voiding, or hematuria  Musculoskeletal ROS: positive for - pain in buttock following fall today  Neurological ROS: no TIA or stroke symptoms; negative for - behavioral changes, confusion, dizziness, headaches, numbness/tingling, speech problems or visual changes; positive for - light-headedness, memory issues    Objective:     Vitals:   Temp (24hrs), Av 3 °F (36 8 °C), Min:98 °F (36 7 °C), Max:98 7 °F (37 1 °C)    Temp:  [98 °F (36 7 °C)-98 7 °F (37 1 °C)] 98 4 °F (36 9 °C)  HR:  [60-89] 76  Resp:  [15-20] 15  BP: (134-190)/(63-86) 168/86  SpO2:  [83 %-99 %] 95 %  Body mass index is 22 66 kg/m²  Input and Output Summary (last 24 hours): Intake/Output Summary (Last 24 hours) at 2023 1701  Last data filed at 2023 0838  Gross per 24 hour   Intake 450 ml   Output 100 ml   Net 350 ml       Physical Exam:   Physical Exam  Vitals reviewed  Constitutional:       General: She is awake  She is in acute distress  HENT:      Head: Normocephalic  Cardiovascular:      Rate and Rhythm: Normal rate and regular rhythm  Heart sounds: Normal heart sounds  Pulmonary:      Effort: Pulmonary effort is normal       Breath sounds: Stridor present  Wheezing present  Abdominal:      General: Bowel sounds are normal  There is no distension  Palpations: Abdomen is soft  Skin:     General: Skin is warm and dry        Capillary Refill: Capillary refill takes less than 2 seconds  Neurological:      General: No focal deficit present  Mental Status: She is alert and oriented to person, place, and time  GCS: GCS eye subscore is 4  GCS verbal subscore is 5  GCS motor subscore is 6  Cranial Nerves: Cranial nerves 2-12 are intact  Sensory: No sensory deficit  Motor: Weakness: pain with movement due to recent fall  Coordination: Coordination is intact  Finger-Nose-Finger Test normal    Psychiatric:         Mood and Affect: Mood normal          Behavior: Behavior normal          Thought Content: Thought content normal        Additional Data:     Labs:  Results from last 7 days   Lab Units 02/13/23  0427   WBC Thousand/uL 11 58*   HEMOGLOBIN g/dL 15 4   HEMATOCRIT % 49 7*   PLATELETS Thousands/uL 261   NEUTROS PCT % 77*   LYMPHS PCT % 12*   MONOS PCT % 8   EOS PCT % 2     Results from last 7 days   Lab Units 02/13/23  0427   SODIUM mmol/L 138   POTASSIUM mmol/L 3 1*   CHLORIDE mmol/L 98   CO2 mmol/L 31   BUN mg/dL 8   CREATININE mg/dL 0 66   ANION GAP mmol/L 9   CALCIUM mg/dL 8 5   ALBUMIN g/dL 3 7   TOTAL BILIRUBIN mg/dL 0 83   ALK PHOS U/L 64   ALT U/L 4*   AST U/L 17   GLUCOSE RANDOM mg/dL 13*     Results from last 7 days   Lab Units 02/12/23  2329   INR  1 04     Results from last 7 days   Lab Units 02/13/23  1622 02/13/23  1507 02/13/23  1305 02/13/23  1217 02/13/23  1104 02/13/23  1013 02/13/23  0952 02/13/23  0928 02/13/23  0855 02/13/23  0709   POC GLUCOSE mg/dl 93 104 34* 27* 31* 24* 22* 28* 23* 88               Lines/Drains:  Invasive Devices     Peripheral Intravenous Line  Duration           Peripheral IV 02/13/23 Left Antecubital <1 day                Imaging: Reviewed radiology reports from this admission including: chest xray  X-ray Impression: Chronic left lower lobe opacity - history of Covid pneumonia      Recent Cultures (last 7 days):       Last 24 Hours Medication List:   Current Facility-Administered Medications Medication Dose Route Frequency Provider Last Rate   • acetaminophen  650 mg Oral Q6H PRN Vanda Balbuena PA-C     • albuterol  2 5 mg Nebulization Q6H PRN Vanda Balbuena PA-C     • aspirin  81 mg Oral Daily Vanda Balbuena PA-C     • budesonide-formoterol  2 puff Inhalation BID Vanda Balbuena PA-C     • cholecalciferol  2,000 Units Oral Daily Vanda Balbuena PA-C     • dextrose  125 mL/hr Intravenous Continuous Tucker Cheng  mL/hr (02/13/23 1141)   • heparin (porcine)  5,000 Units Subcutaneous [facility] Elmer Butler PA-C     • lisinopril  10 mg Oral Daily Vanda Balbuena PA-C      And   • hydrochlorothiazide  12 5 mg Oral Daily Vanda Balbuena PA-C     • nicotine  1 patch Transdermal Daily Vanda Balbuena PA-C     • ondansetron  4 mg Intravenous Q6H PRN Vanda Balbuena PA-C     • oxyCODONE  2 5 mg Oral Q4H PRN Tucker Cheng DO     • pravastatin  80 mg Oral Daily With AILEEN Lagos     • sertraline  50 mg Oral Daily Vanda Balbuena PA-C     • umeclidinium  1 puff Inhalation Daily Vanda Balbuena PA-C          Today, Patient Was Seen By: Cookie Sepulveda DO    **Please Note: This note may have been constructed using a voice recognition system  **

## 2023-02-13 NOTE — ASSESSMENT & PLAN NOTE
· Patient presents with diarrhea over the past 24 hours  · IV fluids isolate 75 mL/h  · Check stool bacterial PCR, rotavirus    No risk factors for C  difficile identified

## 2023-02-13 NOTE — NURSING NOTE
Pt became lethargic and confused sugar was checked and was less than 24  Pt given orange juice, soda, and some ice cream which she ate half  Dr Viet Altman was notified and D5 in NS was administered as ordered

## 2023-02-13 NOTE — PHYSICAL THERAPY NOTE
Physical Therapy Evaluation    Patient Name: Simon Muniz    ZYHUM'X Date: 2/13/2023     Problem List  Principal Problem:    Acute metabolic encephalopathy  Active Problems:    Essential hypertension    Type 2 diabetes mellitus with hypoglycemia without coma, without long-term current use of insulin ([facility])    COPD (chronic obstructive pulmonary disease) ([facility] [address] )    Diarrhea       Past Medical History  Past Medical History:   Diagnosis Date    Hyperlipidemia     last assessed  8/24/17    Hypertension     last assessed 10/2/14        Past Surgical History  Past Surgical History:   Procedure Laterality Date    CRANIOTOMY             02/13/23 0858   PT Last Visit   PT Visit Date 02/13/23   Note Type   Note type Evaluation   Pain Assessment   Pain Assessment Tool 0-10   Pain Score No Pain   Restrictions/Precautions   Weight Bearing Precautions Per Order No   Other Precautions Fall Risk;Bed Alarm;Cognitive   Home Living   Type of [address] [place] [address] One level;Stairs to enter with rails  (3 CLARK c HR)   Bathroom Shower/Tub Tub/shower unit   Bathroom Toilet Standard   Bathroom Accessibility Accessible   Home Equipment Walker   Prior Function   Level of [place] Independent with functional mobility; Independent with ADLs; Independent with IADLS   Lives With Alone   Receives Help From Family   IADLs Family/Friend/Other provides transportation   Falls in the last 6 months 0   Vocational Retired   General   Family/Caregiver Present No   Cognition   Overall Cognitive Status Impaired   Arousal/Participation Lethargic   Orientation Level Oriented to person;Disoriented to place; Disoriented to time;Disoriented to situation   Following Commands Follows one step commands inconsistently   Subjective   Subjective "not really"   RLE Assessment   RLE Assessment X  (4-/5 grossly)   LLE Assessment   LLE Assessment X  (4-/5 grossly)   Bed Mobility   Supine to Sit 3  Moderate assistance Additional items Assist x 1; Increased time required;Verbal cues   Sit to Supine 3  Moderate assistance   Additional items Assist x 2; Increased time required;Verbal cues;LE management   Transfers   Sit to Stand Unable to assess   Stand to Sit Unable to assess   Ambulation/Elevation   Gait pattern Not appropriate; Not tested   Balance   Static Sitting Fair -   Dynamic Sitting Poor +   Static Standing Zero   Endurance Deficit   Endurance Deficit Yes   Endurance Deficit Description pt presenting lethargic at baseline   Activity Tolerance   Activity Tolerance Patient limited by fatigue   Assessment   Prognosis Good   Problem List Decreased strength;Decreased endurance; Impaired balance;Decreased mobility; Decreased cognition; Impaired judgement;Decreased safety awareness   Assessment Patient is a 65 y o  female evaluated by Physical Therapy s/p admit to [address] [place] [address],[address] on 2/12/2023 with admitting diagnosis of: Atrial fibrillation, Diarrhea, Acute hypokalemia, Stroke-like symptoms, Hypoglycemia associated with type 2 diabetes mellitus, and principal problem of: Acute metabolic encephalopathy  PT was consulted to assess patient's functional mobility and discharge needs  Ordered are PT Evaluation and treatment  Comorbidities affecting patient's physical performance at time of assessment include: HTN, HLD  Personal factors affecting the patient at time of IE include: step(s) to enter home, inability to navigate community distances, impaired cognition, impaired safety awareness, decreased initiation and engagement, inability/difficulty performing IADLs and inability/difficulty performing ADLs  Please locate objective findings from PT assessment regarding body systems outlined above  Upon evaluation, pt requiring modA x 1-2, increased time, and frequent verbal cuing to perform bed mobility  Pt lethargic and slow to respond in conversation, but appearing agreeable to PT intervention   Seated EOB, pt unable to maintain upright seated posture without min-modA and demonstrated difficulty following simple 1-step commands  BP found to be elevated at 190/78  D/t safety concerns, further mobility not attempted and pt returned to supine  Nursing staff informed about pt's condition  PCA presented to check BS and found it to be < 23; then encouraging pt to drink soda and juice  HR and SpO2 remained WFL on RA throughout  The patient's AM-PAC Basic Mobility Inpatient Short Form Raw Score is 8  A Raw score of less than or equal to 16 suggests the patient may benefit from discharge to post-acute rehabilitation services  Please also refer to the recommendation of the Physical Therapist for safe discharge planning  Co treatment with OT secondary to complex medical condition of pt, possible A of 2 required to achieve and maintain transitional movements, requiring the need of skilled therapeutic intervention of 2 therapists to achieve delivery of services  Pt will benefit from continued PT intervention during LOS to address current deficits, increase LOF, and facilitate safe d/c to next level of care when medically appropriate  D/c recommendation at this time is post-acute rehabilitation services  Goals   Patient Goals none stated   LTG Expiration Date 02/27/23   Long Term Goal #1 Pt will participate in B LE strengthening exercises to facilitate improved functional activity tolerance  Pt will perform all functional transfers and bed mobility mod(I) with good safety awareness  Pt will ambulate 250' mod(I) with LRAD while maintaining good functional dynamic balance  Pt will ascend/descend 3 stairs with HR and SUP to reflect the ability to safely navigate the home  Plan   Treatment/Interventions Functional transfer training;LE strengthening/ROM; Elevations; Therapeutic exercise; Endurance training;Bed mobility;Gait training   PT Frequency 3-5x/wk   Recommendation   PT Discharge Recommendation Post acute rehabilitation services   AM-PAC Basic Mobility Inpatient   Turning in Flat Bed Without Bedrails 2   Lying on Back to Sitting on Edge of Flat Bed Without Bedrails 2   Moving Bed to Chair 1   Standing Up From Chair Using Arms 1   Walk in Room 1   Climb 3-5 Stairs With Railing 1   Basic Mobility Inpatient Raw Score 8   Turning Head Towards Sound 3   Follow Simple Instructions 2   Low Function Basic Mobility Raw Score 13   Low Function Basic Mobility Standardized Score 20 14   Highest Level Of Mobility   -[facility] Goal 3: Sit at edge of bed   -[facility] Achieved 3: Sit at edge of bed   End of Consult   Patient Position at End of Consult Supine;Bed/Chair alarm activated; All needs within reach

## 2023-02-13 NOTE — PLAN OF CARE
Problem: OCCUPATIONAL THERAPY ADULT  Goal: Performs self-care activities at highest level of function for planned discharge setting  See evaluation for individualized goals  Description: Treatment Interventions: ADL retraining, Functional transfer training, UE strengthening/ROM, Patient/family training, Endurance training, Equipment evaluation/education, Activityengagement, Cognitive reorientation          See flowsheet documentation for full assessment, interventions and recommendations  Note: Limitation: Decreased ADL status, Decreased UE strength, Decreased Safe judgement during ADL, Decreased endurance, Decreased self-care trans, Decreased high-level ADLs     Assessment: Pt is a 65 y o  female seen for OT evaluation s/p admit to [facility] on 0/69/7127 w/ Acute metabolic encephalopathy  Comorbidities affecting pt's functional performance at time of assessment include: HTN, HLD, metabolic encephalopathy, DM, diarrhea, COPD  Personal factors affecting pt at time of IE include:steps to enter environment, difficulty performing ADLS, difficulty performing IADLS , decreased initiation and engagement  and health management   Prior to admission, pt was (I) with ADLs and IADLs with use of no device during mobility  Upon evaluation: Pt requires mod-max (A) with bed mobility 2* the following deficits impacting occupational performance: weakness, decreased strength, decreased balance, decreased tolerance, impaired initiation, impaired memory, impaired sequencing, impaired problem solving, decreased safety awareness and impaired interpersonal skills  Pt to benefit from continued skilled OT tx while in the hospital to address deficits as defined above and maximize level of functional independence w ADL's and functional mobility  Occupational Performance areas to address include: grooming, bathing/shower, toilet hygiene, dressing, functional mobility, community mobility and clothing management   The patient's raw score on the AM-PAC Daily Activity inpatient short form is 13, standardized score is 32 03, less than 39 4  Patients at this level are likely to benefit from discharge to post-acute rehabilitation services  Please refer to the recommendation of the Occupational Therapist for safe discharge planning  Pt benefited from co-evaluation of skilled OT and PT therapists in order to most appropriately address functional deficits d/t extensive assistance required for safe functional mobility, decreased activity tolerance, and regression from functioning level prior to admission and/or onset of present illness  OT/PT objectives were addressed separately; please see PT note for specific goal areas targeted       OT Discharge Recommendation: Post acute rehabilitation services

## 2023-02-13 NOTE — ASSESSMENT & PLAN NOTE
· Patient sustained fall on unit when attempting to ambulate without assitance  · CT head revealed no abnormalities  · CT cervical spine revealed no fracture or malalignment, and revealed mild septal thickening and groundglass opacity at the lung apices

## 2023-02-13 NOTE — PLAN OF CARE
Problem: PHYSICAL THERAPY ADULT  Goal: Performs mobility at highest level of function for planned discharge setting  See evaluation for individualized goals  Description: Treatment/Interventions: Functional transfer training, LE strengthening/ROM, Elevations, Therapeutic exercise, Endurance training, Bed mobility, Gait training          See flowsheet documentation for full assessment, interventions and recommendations  Note: Prognosis: Good  Problem List: Decreased strength, Decreased endurance, Impaired balance, Decreased mobility, Decreased cognition, Impaired judgement, Decreased safety awareness  Assessment: Patient is a 65 y o  female evaluated by Physical Therapy s/p admit to [address] [place] [address],[address] on 2/12/2023 with admitting diagnosis of: Atrial fibrillation, Diarrhea, Acute hypokalemia, Stroke-like symptoms, Hypoglycemia associated with type 2 diabetes mellitus, and principal problem of: Acute metabolic encephalopathy  PT was consulted to assess patient's functional mobility and discharge needs  Ordered are PT Evaluation and treatment  Comorbidities affecting patient's physical performance at time of assessment include: HTN, HLD  Personal factors affecting the patient at time of IE include: step(s) to enter home, inability to navigate community distances, impaired cognition, impaired safety awareness, decreased initiation and engagement, inability/difficulty performing IADLs and inability/difficulty performing ADLs  Please locate objective findings from PT assessment regarding body systems outlined above  Upon evaluation, pt requiring modA x 1-2, increased time, and frequent verbal cuing to perform bed mobility  Pt lethargic and slow to respond in conversation, but appearing agreeable to PT intervention  Seated EOB, pt unable to maintain upright seated posture without min-modA and demonstrated difficulty following simple 1-step commands  BP found to be elevated at 190/78   D/t safety concerns, further mobility not attempted and pt returned to supine  Nursing staff informed about pt's condition  PCA presented to check BS and found it to be < 23; then encouraging pt to drink soda and juice  HR and SpO2 remained WFL on RA throughout  The patient's AM-PAC Basic Mobility Inpatient Short Form Raw Score is 8  A Raw score of less than or equal to 16 suggests the patient may benefit from discharge to post-acute rehabilitation services  Please also refer to the recommendation of the Physical Therapist for safe discharge planning  Co treatment with OT secondary to complex medical condition of pt, possible A of 2 required to achieve and maintain transitional movements, requiring the need of skilled therapeutic intervention of 2 therapists to achieve delivery of services  Pt will benefit from continued PT intervention during LOS to address current deficits, increase LOF, and facilitate safe d/c to next level of care when medically appropriate  D/c recommendation at this time is post-acute rehabilitation services  PT Discharge Recommendation: Post acute rehabilitation services    See flowsheet documentation for full assessment

## 2023-02-13 NOTE — CASE MANAGEMENT
Case Management Assessment    Patient name Xiang Greene  Location /[id number] MRN [id number]  : 1945 Date 2023       Current Admission Date: 2023  Current Admission Diagnosis:Type 2 diabetes mellitus with hypoglycemia without coma, without long-term current use of insulin [facility])   Patient Active Problem List    Diagnosis Date Noted   • Diarrhea 2023   • Swelling of right hand 2022   • Hypercalcemia 2022   • Pyuria 2022   • Microscopic hematuria 2022   • Permanent atrial fibrillation ([facility] [address] ) 2022   • Hypoxia 2022   • Hypomagnesemia 2022   • COPD (chronic obstructive pulmonary disease) ([facility] [address] ) 2022   • Leukocytosis 2022   • Multiple pulmonary nodules 2022   • Acute metabolic encephalopathy    • History of intracranial hemorrhage 2022   • Hyponatremia 2022   • Hypokalemia 2022   • Hyperbilirubinemia 2022   • Type 2 diabetes mellitus with hypoglycemia without coma, without long-term current use of insulin ([facility] [address] ) 2022   • Vitamin D deficiency 2022   • Chronic atrial fibrillation ([facility] [address] ) 2022   • Dermatitis 2022   • Acute on chronic respiratory failure with hypoxia and hypercapnia ([facility] [address] ) 10/18/2021   • Ambulatory dysfunction 10/18/2021   • Medicare annual wellness visit, subsequent 2018   • Tobacco use 2018   • Hyperlipidemia 10/02/2014   • Essential hypertension 10/02/2014      LOS (days): 0  Geometric Mean LOS (GMLOS) (days): 3 90  Days to GMLOS:3 8     OBJECTIVE:    Risk of Unplanned Readmission Score: 19 2         Current admission status: Inpatient       Preferred Pharmacy:   [facility] [address], [address] [place] [id number]  Phone: [phone] Fax: [phone]    [address] [facility]  Phone: [phone] Fax: [phone]    Primary Care Provider: Sonny Sargent DO    Primary Insurance: MEDICARE  Secondary Insurance: COMMERCIAL MISCELLANEOUS    ASSESSMENT:  Active Health Care Proxies     [address] - Daughter   Primary Phone: [phone] (Mobile)               Advance Directives  Does patient have a [address]?: No  Was patient offered paperwork?: Yes (daughter Marita Peterson states she is POA, request for copy)  Does patient currently have a Health Care decision maker?: Yes, please see Health Care Proxy section  Does patient have Advance Directives?: Yes  Advance Directives: Power of  for health care  Was patient offered paperwork?: Yes  Primary Contact: david medellin         Readmission Root Cause  30 Day Readmission: No    Patient Information  Admitted from[de-identified] Home  Mental Status: Alert (patient not lethargic due to low blood sugar this am  spoke with daughter via phone)  During Assessment patient was accompanied by: Not accompanied during assessment  Assessment information provided by[de-identified] Daughter (spoke with daughter david via phone)  Primary Caregiver: Family  Caregiver's Name[de-identified] Mortimer Lizz Relationship to Patient[de-identified] Family Member  Support Systems: Self, Daughter  [place] of Residence: [address] do you live in?:  [facility] entry access options   Select all that apply : Stairs  Number of steps to enter home : 2  Do the steps have railings?: No  Type of Current Residence: [facility]  In the last 12 months, was there a time when you were not able to pay the mortgage or rent on time?: No  In the last 12 months, how many places have you lived?: 1  In the last 12 months, was there a time when you did not have a steady place to sleep or slept in a shelter (including now)?: No  Homeless/housing insecurity resource given?: N/A  Living Arrangements: Lives w/ Daughter  Is patient a ?: No    Activities of Daily Living Prior to Admission  Functional Status: Assistance  Completes ADLs independently?: No  Level of ADL dependence: Assistance  Ambulates independently?: Yes  Does patient use assisted devices?: Yes  Assisted Devices (DME) used: Luanne Foyer, Shower Chair, Wheelchair, Home Oxygen concentrator  DME Company Name (respiratory supplies): [employer]  O2 Rate(s): 2  Does patient currently own DME?: Yes  What DME does the patient currently own?: Luanne Foyer, Wheelchair, Shower Chair  Does patient have a history of Outpatient Therapy (PT/OT)?: No  Does the patient have a history of Short-Term Rehab?: No  Does patient have a history of HHC?: Yes ([facility] home care)  Does patient currently have [address]?: No         Patient Information Continued  Income Source: SSI/SSD  Does patient have prescription coverage?: Yes  Within the past 12 months, you worried that your food would run out before you got the money to buy more : Never true  Within the past 12 months, the food you bought just didn't last and you didn't have money to get more : Never true  Food insecurity resource given?: N/A  Does patient receive dialysis treatments?: No  Does patient have a history of substance abuse?: No  Does patient have a history of Mental Health Diagnosis?: Yes  Is patient receiving treatment for mental health?: No  Patient declined treatment information  (anxiety and depression   follows with pcp for same)  Has patient received inpatient treatment related to mental health in the last 2 years?: No         Means of Transportation  Means of Transport to Appts[de-identified] Other (Comment) (patient most recently set up with [facility], daughter can not currently drive)  In the past 12 months, has lack of transportation kept you from medical appointments or from getting medications?: No  In the past 12 months, has lack of transportation kept you from meetings, work, or from getting things needed for daily living?: No  Was application for public transport provided?: No (patient recently [facility] with [facility])  Cm met with the patient to evaluate the patients prior function and living situation and any barriers to d/c and form a safe d/c plan  Cm also evaluated the patient for any services in the home or needs for services  CM also discussed with patient that their preferences will be taken into account/consideration  Pt admitted with acute metabolic encephalopathy  Therapy currently recommending STR but daughter is hoping for her to medically improve and come back home with [facility]  She thinks her mother will decline STR once her confusion clears  Daughter is currently in process of getting approved to be her paid caregiver  She is with her 24/7  [facility] and [facility]  Her goal would be for her mom to return home with [facility]  Cm to follow for discharge needs

## 2023-02-13 NOTE — ED PROVIDER NOTES
History  Chief Complaint   Patient presents with   • STROKE Alert     Per EMS right sided arm drift  Slurred speech  BS 53, given D10 by EMS     Major Greenfield is a 65y o  year old female with PMH of Afib, DM, HTN, HLD Traumatic brain injury presenting to the [facility] ED for evaluation of acute onset weakness and slurred speech  Patient reported to have generalized abdominal cramping and numerous episodes of diarrhea over the last 2 days  Approximately 1 5 hours prior to arrival, the patient was with her daughter when she became acutely confused  The patient was reported to have slurring of her speech and weakness on the right side of her body prompting the daughter to [facility]  EMS noted patient to have right hemiparesis, slurred speech  A fingerstick glucose was checked by EMS and reported a value of 53  The patient was given 15 g of D10 IV with resolution of hypoglycemia  The patient was reported to have resolution of the right arm/leg weakness though persistent dysarthria on arrival to the emergency department  Per patient's daughter, medication list includes 81 mg aspirin daily though reportedly does not take Eliquis daily  History provided by:  Patient, EMS personnel and relative  History limited by:  Mental status change   used: No    STROKE Alert      Prior to Admission Medications   Prescriptions Last Dose Informant Patient Reported? Taking? Accu-Chek FastClix Lancets MISC   No No   Sig: Use 1 each daily to test blood sugars  Blood Glucose Monitoring Suppl (Accu-Chek Flores Plus) w/Device KIT   No No   Sig: Use 1 kit daily to test blood sugars     Promethazine-DM (PHENERGAN-DM) 6 25-15 mg/5 mL oral syrup   No No   Sig: Take 5 mL by mouth 4 (four) times a day as needed (congestion)   acetaminophen (TYLENOL) 325 mg tablet   No No   Sig: Take 2 tablets (650 mg total) by mouth every 6 (six) hours as needed for mild pain Do not exceed a total of 3 grams of tylenol/acetaminophen in a 24-hour period  albuterol (2 5 mg/3 mL) 0 083 % nebulizer solution   No No   Sig: Take 3 mL (2 5 mg total) by nebulization every 6 (six) hours as needed for shortness of breath   apixaban (Eliquis) 2 5 mg   No No   Sig: Take 1 tablet (2 5 mg total) by mouth 2 (two) times a day To prevent a blood clot/DVT prophylaxis with COVID and requiring oxygen only for 30 days then stop   aspirin 81 MG tablet   Yes No   Sig: Take 1 tablet by mouth daily   budesonide-formoterol (SYMBICORT) 80-4 5 MCG/ACT inhaler   No No   Sig: Inhale 2 puffs 2 (two) times a day Rinse mouth after use     cholecalciferol (VITAMIN D3) 1,000 units tablet   No No   Sig: Take 2 tablets (2,000 Units total) by mouth daily Take in place of ergocalciferol to treat vitamin D deficiency   dexamethasone (DECADRON) 6 mg tablet   No No   Sig: Take 1 tablet (6 mg total) by mouth daily with breakfast For COVID treatment requiring supplemental oxygen   ergocalciferol (VITAMIN D2) 50,000 units   No No   Sig: Take 1 capsule (50,000 Units total) by mouth once a week   furosemide (LASIX) 20 mg tablet   No No   Sig: Take 1 tablet (20 mg total) by mouth daily   glipiZIDE (GLUCOTROL) 10 mg tablet   No No   Sig: Take 1 tablet (10 mg total) by mouth 2 (two) times a day   glucose blood test strip   Yes No   Sig: Use 1 each 4 (four) times a day Use as instructed   hydrocortisone 1 % cream   No No   Sig: Apply topically 4 (four) times a day as needed for rash   lisinopril (ZESTRIL) 10 mg tablet   No No   Sig: Take 1 tablet (10 mg total) by mouth daily Take this in place of the combination medication with lisinopril and hydrochlorothiazide to treat high blood pressure   lisinopril-hydrochlorothiazide (PRINZIDE,ZESTORETIC) 10-12 5 MG per tablet   No No   Sig: Take 1 tablet by mouth daily   sertraline (Zoloft) 50 mg tablet   No No   Sig: Take 1 tablet (50 mg total) by mouth daily   simvastatin (ZOCOR) 40 mg tablet   No No   Sig: Take 1 tablet (40 mg total) by mouth daily sitaGLIPtin-metFORMIN (Janumet)  MG per tablet   No No   Sig: Take 1 tablet by mouth daily with breakfast   umeclidinium bromide (INCRUSE ELLIPTA) 62 5 mcg/inh AEPB inhaler   No No   Sig: Inhale 1 puff daily      Facility-Administered Medications: None       Past Medical History:   Diagnosis Date   • Hyperlipidemia     last assessed  8/24/17   • Hypertension     last assessed 10/2/14       Past Surgical History:   Procedure Laterality Date   • CRANIOTOMY         Family History   Problem Relation Age of Onset   • Breast cancer Mother    • Ovarian cancer Mother    • Diabetes Father      I have reviewed and agree with the history as documented  E-Cigarette/Vaping   • E-Cigarette Use Never User      E-Cigarette/Vaping Substances   • Nicotine No    • THC No    • CBD No    • Flavoring No    • Other No    • Unknown No      Social History     Tobacco Use   • Smoking status: Every Day     Packs/day: 1 00     Types: Cigarettes   • Smokeless tobacco: Never   Vaping Use   • Vaping Use: Never used   Substance Use Topics   • Alcohol use: Never   • Drug use: No       Review of Systems   Unable to perform ROS: Mental status change       Physical Exam  Physical Exam  Vitals and nursing note reviewed  Constitutional:       General: She is not in acute distress  Appearance: Normal appearance  She is well-developed  She is not ill-appearing, toxic-appearing or diaphoretic  HENT:      Head: Normocephalic and atraumatic  Nose: No congestion or rhinorrhea  Mouth/Throat:      Mouth: Mucous membranes are dry  Eyes:      General:         Right eye: No discharge  Left eye: No discharge  Cardiovascular:      Rate and Rhythm: Normal rate and regular rhythm  Pulmonary:      Effort: Pulmonary effort is normal  No accessory muscle usage or respiratory distress  Breath sounds: Normal breath sounds  No stridor  No decreased breath sounds, wheezing, rhonchi or rales     Abdominal:      General: Bowel sounds are normal  There is no distension  Palpations: Abdomen is soft  Tenderness: There is no abdominal tenderness  There is no guarding or rebound  Musculoskeletal:      Cervical back: Normal range of motion and neck supple  No rigidity  Right lower leg: No tenderness  No edema  Left lower leg: No tenderness  No edema  Skin:     Capillary Refill: Capillary refill takes less than 2 seconds  Findings: No rash  Neurological:      Mental Status: She is alert  She is confused  GCS: GCS eye subscore is 4  GCS verbal subscore is 4  GCS motor subscore is 6  Cranial Nerves: Dysarthria present  No facial asymmetry  Sensory: Sensation is intact  Motor: Motor function is intact  Coordination: Coordination is intact  Comments: Oriented to person and place, not year  Slow, dysarthric speech  Strength +5/5 in bilateral UE/LE  No vertical nystagmus noted  CN III, IV and VI intact  Cerebellar testing: Normal FNF without ataxia  No pronator drift noted     Psychiatric:         Mood and Affect: Mood normal          Behavior: Behavior normal          Vital Signs  ED Triage Vitals   Temperature Pulse Respirations Blood Pressure SpO2   02/13/23 0027 02/12/23 2323 02/12/23 2323 02/12/23 2323 02/12/23 2323   98 °F (36 7 °C) 78 20 (!) 184/74 95 %      Temp Source Heart Rate Source Patient Position - Orthostatic VS BP Location FiO2 (%)   02/13/23 0027 02/12/23 2323 02/12/23 2323 02/12/23 2323 --   Oral Monitor Lying Right arm       Pain Score       02/13/23 0030       No Pain           Vitals:    02/13/23 0015 02/13/23 0030 02/13/23 0045 02/13/23 0100   BP: 153/63 162/66 134/63 156/70   Pulse: 66 63 62 83   Patient Position - Orthostatic VS: Sitting Sitting Lying Sitting         Visual Acuity  Visual Acuity    Flowsheet Row Most Recent Value   L Pupil Size (mm) 3   R Pupil Size (mm) 3          ED Medications  Medications   dextrose 5 % and sodium chloride 0 9 % bolus 1,000 mL (1,000 mL Intravenous New Bag 2/13/23 0012)   potassium chloride 20 mEq IVPB (premix) (has no administration in time range)   potassium chloride (K-DUR,KLOR-CON) CR tablet 40 mEq (has no administration in time range)   sodium chloride 0 9 % bolus 500 mL (has no administration in time range)   iohexol (OMNIPAQUE) 350 MG/ML injection (SINGLE-DOSE) 85 mL (85 mL Intravenous Given 2/12/23 2341)       Diagnostic Studies  Results Reviewed     Procedure Component Value Units Date/Time    FLU/RSV/COVID - if FLU/RSV clinically relevant [[id number]]  (Normal) Collected: 02/12/23 2355    Lab Status: Final result Specimen: Nares from Nose Updated: 02/13/23 0040     SARS-CoV-2 Negative     INFLUENZA A PCR Negative     INFLUENZA B PCR Negative     RSV PCR Negative    Narrative:      FOR PEDIATRIC PATIENTS - copy/paste COVID Guidelines URL to browser: [email or web address]  [employer] [employer]    SARS-CoV-2 assay is a Nucleic Acid Amplification assay intended for the  qualitative detection of nucleic acid from SARS-CoV-2 in nasopharyngeal  swabs  Results are for the presumptive identification of SARS-CoV-2 RNA  Positive results are indicative of infection with SARS-CoV-2, the virus  causing COVID-19, but do not rule out bacterial infection or co-infection  with other viruses  Laboratories within the [place] and its  territories are required to report all positive results to the appropriate  public health authorities  Negative results do not preclude SARS-CoV-2  infection and should not be used as the sole basis for treatment or other  patient management decisions  Negative results must be combined with  clinical observations, patient history, and epidemiological information  This test has not been FDA cleared or approved  This test has been authorized by FDA under an Emergency Use Authorization  (EUA)   This test is only authorized for the duration of time the  declaration that circumstances exist justifying the authorization of the  emergency use of an in vitro diagnostic tests for detection of SARS-CoV-2  virus and/or diagnosis of COVID-19 infection under section 564(b)(1) of  the Act, 21 U  S C  635OED-5(R)(5), unless the authorization is terminated  or revoked sooner  The test has been validated but independent review by FDA  and CLIA is pending  Test performed using [employer] Gene[employer]pert: This RT-PCR assay targets N2,  a region unique to SARS-CoV-2  A conserved region in the E-gene was chosen  for pan-Sarbecovirus detection which includes SARS-CoV-2  According to CMS-2020-01-R, this platform meets the definition of high-throughput technology      UA w Reflex to Microscopic w Reflex to Culture [[id number]]     Lab Status: No result Specimen: Urine     HS Troponin I 4hr [[id number]]     Lab Status: No result Specimen: Blood     HS Troponin I 2hr [[id number]]     Lab Status: No result Specimen: Blood     HS Troponin 0hr (reflex protocol) [[id number]]  (Normal) Collected: 02/12/23 2329    Lab Status: Final result Specimen: Blood from Arm, Left Updated: 02/13/23 0002     hs TnI 0hr 27 ng/L     Basic metabolic panel [[id number]]  (Abnormal) Collected: 02/12/23 2329    Lab Status: Final result Specimen: Blood from Arm, Left Updated: 02/12/23 2352     Sodium 137 mmol/L      Potassium 3 0 mmol/L      Chloride 94 mmol/L      CO2 35 mmol/L      ANION GAP 8 mmol/L      BUN 10 mg/dL      Creatinine 0 73 mg/dL      Glucose 79 mg/dL      Calcium 8 9 mg/dL      eGFR 79 ml/min/1 73sq m     Narrative:      G[place]mez guidelines for Chronic Kidney Disease (CKD):   •  Stage 1 with normal or high GFR (GFR > 90 mL/min/1 73 square meters)  •  Stage 2 Mild CKD (GFR = 60-89 mL/min/1 73 square meters)  •  Stage 3A Moderate CKD (GFR = 45-59 mL/min/1 73 square meters)  •  Stage 3B Moderate CKD (GFR = 30-44 mL/min/1 73 square meters)  •  Stage 4 Severe CKD (GFR = 15-29 mL/min/1 73 square meters)  •  Stage 5 End Stage CKD (GFR <15 mL/min/1 73 square meters)  Note: GFR calculation is accurate only with a steady state creatinine    Protime-INR [[id number]]  (Normal) Collected: 02/12/23 2329    Lab Status: Final result Specimen: Blood from Arm, Left Updated: 02/12/23 2350     Protime 13 8 seconds      INR 1 04    APTT [[id number]]  (Normal) Collected: 02/12/23 2329    Lab Status: Final result Specimen: Blood from Arm, Left Updated: 02/12/23 2350     PTT 29 seconds     CBC and Platelet [[id number]]  (Abnormal) Collected: 02/12/23 2329    Lab Status: Final result Specimen: Blood from Arm, Left Updated: 02/12/23 2338     WBC 12 35 Thousand/uL      RBC 5 39 Million/uL      Hemoglobin 15 6 g/dL      Hematocrit 48 9 %      MCV 91 fL      MCH 28 9 pg      MCHC 31 9 g/dL      RDW 13 6 %      Platelets 666 Thousands/uL      MPV 10 4 fL                  X-ray chest 1 view portable   ED Interpretation by Remo Barthel, DO (02/13 0014)   No acute cardiopulmonary disease  CTA stroke alert (head/neck)   Final Result by Neville Heard DO (02/13 0008)      No evidence of large vessel occlusion  If symptoms persist MRI should be obtained to evaluate for occult infarct  I personally discussed this study with Latoya Martinez on 2/12/2023 at 11:53 PM                                     Workstation performed: [id number]         CT stroke alert brain   Final Result by Neville Heard DO (02/12 2343)      No acute intracranial abnormality               I personally discussed this study with Latoya Martinez on 2/12/2023 at 11:39 PM                 Workstation performed: [id number]                    Procedures  CriticalCare Time  Performed by: Remo Barthel, DO  Authorized by: Remo Barthel, DO     Critical care provider statement:     Critical care time (minutes):  41    Critical care start time:  2/12/2023 11:20 PM    Critical care end time:  2/13/2023 12:01 AM    Critical care time was exclusive of: Separately billable procedures and treating other patients and teaching time    Critical care was necessary to treat or prevent imminent or life-threatening deterioration of the following conditions:  CNS failure or compromise, endocrine crisis and dehydration    Critical care was time spent personally by me on the following activities:  Blood draw for specimens, development of treatment plan with patient or surrogate, obtaining history from patient or surrogate, discussions with consultants, evaluation of patient's response to treatment, examination of patient, review of old charts, re-evaluation of patient's condition, ordering and review of radiographic studies, ordering and review of laboratory studies and ordering and performing treatments and interventions    I assumed direction of critical care for this patient from another provider in my specialty: no               ED Course  ED Course as of 02/13/23 0120   Sun Feb 12, 2023   2350 Procedure Note: EKG  Date/Time: 02/12/23 11:50 PM   Interpreted by: Clay Christianson DO  Indications / Diagnosis: altered mental status  ECG reviewed by me, the ED Provider: yes   The EKG demonstrates:  Rhythm: atrial fibrillation 77 BPM  Intervals: QT intervals 466ms  Axis: Right axis deviation  QRS/Blocks: Normal QRS  ST Changes: No acute ST/T waves changes  No CLARK  No TWI  Mon Feb 13, 2023   0000 D/w radiology, no LVO or intracranial hemorrhage  D/w neurology Dr Ravi He, more likely metabolic in nature, not candidate for TNK    0019 Patient reassessed  She is alert and oriented x3, speech is not dysarthric and is reportedly improved per patient  I reviewed the labs and CT results with the patient  Given hypoglycemia and severe dehydration secondary to diarrhea, will admit for observation                    Stroke Assessment     Row Name 02/12/23 5002             NIH Stroke Scale    Interval Baseline      Level of Consciousness (1a ) 0      LOC Questions (1b ) 1      LOC Commands (1c ) 0      Best Gaze (2 ) 0      Visual (3 ) 0      Facial Palsy (4 ) 0      Motor Arm, Left (5a ) 0      Motor Arm, Right (5b ) 0      Motor Leg, Left (6a ) 0      Motor Leg, Right (6b ) 0      Limb Ataxia (7 ) 0      Sensory (8 ) 0      Best Language (9 ) 1      Dysarthria (10 ) 1      Extinction and Inattention (11 ) (Formerly Neglect) 0      Total 3              Flowsheet Row Most Recent Value   Thrombolytic Decision Options    Thrombolytic Decision Patient not a candidate  Patient is not a candidate options Symptoms resolved/clearly non disabling  Medical Decision Making    65 y o  female presenting for hypoglycemia and stroke-like symptoms  Patient evaluated on arrival to the emergency department  Symptoms initially thought to be related to hypoglycemia though persistent dysarthria following correction of hypoglycemia by EMS prior to arrival for which a stroke alert was initiated  Will order labs and CT to evaluate for acute ischemic stroke, intracranial hemorrhage, ACS or other electrolyte abnormalities  Diarrhea possibly viral in nature though was admitted to hospital for pneumonia and treated with antibiotics September 2022, may require additional testing to exclude Cdiff infection  Patient treated with D5 normal saline bolus with improvement of neurologic status symptoms and return to baseline mental status  I reviewed the labs and imaging results with the patient and also her daughter via telephone  Given the weakness, dehydration and hypoglycemia following diarrheal illness, will admit for further treatment and evaluation  Impression: hypoglycemia and dehydration in the setting of acute diarrheal illness  Will admit for further evaluation and management  Patient care discussed with SLIM who will assume care and admit patient to the hospital  I have discussed with the patient our recommendation of inpatient admission for further medical care   I have answered all of the patient's questions and concerns  The patient is in agreement with the plan to proceed with admission to the hospital      Acute hypokalemia: acute illness or injury  Atrial fibrillation [facility]): acute illness or injury  Diarrhea: self-limited or minor problem  Hypoglycemia associated with type 2 diabetes mellitus ([facility] ): acute illness or injury  Stroke-like symptoms: acute illness or injury  Amount and/or Complexity of Data Reviewed  Independent Historian: guardian and EMS     Details: d/w daughter via phone  Labs: ordered  Radiology: ordered and independent interpretation performed  Risk  Prescription drug management  Decision regarding hospitalization  Disposition  Final diagnoses:   Stroke-like symptoms   Hypoglycemia associated with type 2 diabetes mellitus (HCC)   Atrial fibrillation ([facility] )   Diarrhea   Acute hypokalemia     Time reflects when diagnosis was documented in both MDM as applicable and the Disposition within this note     Time User Action Codes Description Comment    2/12/2023 11:24 PM Mumtaz Meron Add [R29 90] Stroke-like symptoms     2/13/2023 12:47 AM Mumtaz Meron Add [E11 649] Hypoglycemia associated with type 2 diabetes mellitus ([facility] )     2/13/2023 12:47 AM Mumtaz Meron Add [I48 91] Atrial fibrillation ([facility] )     2/13/2023 12:47 AM Mumtaz Meron Add [R19 7] Diarrhea     2/13/2023  1:20 AM Mumtaz Meron Add [E87 6] Acute hypokalemia       ED Disposition     ED Disposition   Admit    Condition   Stable    Date/Time   Mon Feb 13, 2023 12:46 AM    Comment   Case was discussed with NEGRITA and the patient's admission status was agreed to be Admission Status: observation status to the service of Dr Gavin Keane  Follow-up Information    None         Patient's Medications   Discharge Prescriptions    No medications on file       No discharge procedures on file      PDMP Review     None          ED Provider  Electronically Signed by Geronimo Shelton DO  02/13/23 6168

## 2023-02-13 NOTE — NURSING NOTE
Pt sugar's remain low Dr Nicole Taylor discontinued the D5 in NS and replaced with D10  Pt ate some of her lunch tray as well  Will recheck BS in 20 min

## 2023-02-13 NOTE — ASSESSMENT & PLAN NOTE
· Patient presented with acute onset of confusion, dysarthria, right arm weakness  Initially, stroke alert was called in ED  Patient was found to be hypoglycemic on presentation  1 hypoglycemia improved, patient's symptoms entirely resolved  · Blood sugar upon presentation 79   53 for EMS in the field  · CTA of head negative for any large vessel occlusion  · Case was discussed with neurology who does not recommend any further stroke work-up as patient's symptoms improved entirely following treatment of blood sugar  · will continue to monitor patient's symptoms    · Please see assessment and plan for type 2 diabetes mellitus with hypoglycemia without coma without long-term current use of insulin

## 2023-02-13 NOTE — ASSESSMENT & PLAN NOTE
· Patient is AAOx4  CN 2-12 are intact  No focal deficit  No confusion, dysarthria, or extremity weakness  · Blood sugar is presently stable on D10 infusion  · CTA of head negative for any large vessel occlusion  · Will continue to monitor patient's symptoms    · Please see assessment and plan for type 2 diabetes mellitus with hypoglycemia without coma without long-term current use of insulin

## 2023-02-13 NOTE — ASSESSMENT & PLAN NOTE
· No bowel movement since admission  · IV fluids isolate 75 mL/h  · Awaiting results of stool bacterial PCR, rotavirus   No risk factors for C  difficile identified

## 2023-02-13 NOTE — OCCUPATIONAL THERAPY NOTE
Occupational Therapy Evaluation     Patient Name: Collette Caraway  [id number] Date: 2/13/2023  Problem List  Principal Problem:    Acute metabolic encephalopathy  Active Problems:    Essential hypertension    Type 2 diabetes mellitus with hypoglycemia without coma, without long-term current use of insulin ([facility])    COPD (chronic obstructive pulmonary disease) ([facility] )    Diarrhea    Past Medical History  Past Medical History:   Diagnosis Date    Hyperlipidemia     last assessed  8/24/17    Hypertension     last assessed 10/2/14     Past Surgical History  Past Surgical History:   Procedure Laterality Date    CRANIOTOMY               02/13/23 0859   OT Last Visit   OT Visit Date 02/13/23   Note Type   Note type Evaluation   Pain Assessment   Pain Score No Pain   Restrictions/Precautions   Weight Bearing Precautions Per Order No   Other Precautions Bed Alarm; Fall Risk; Chair Alarm   Home Living   Type of [address] [place] [address] One level;Performs ADLs on one level; Able to live on main level with bedroom/bathroom;Stairs to enter with rails; Other (Comment)  (3 [address] c HR)   Bathroom Shower/Tub Tub/shower unit   Bathroom Toilet Standard   Bathroom Equipment Grab bars in shower; Shower chair   [address] [place]   Additional Comments pt reports no device at baseline during functional mobility   Prior Function   Level of [place] Independent with ADLs; Independent with functional mobility; Independent with IADLS   Lives With Alone   IADLs Family/Friend/Other provides transportation   Falls in the last 6 months 0   Vocational Retired   Comments pt is poor historian currently due to low BS during evaluation; accuracy is questionable   Subjective   Subjective "i can do it"   ADL   Where Assessed Edge of bed   LB Dressing Assistance 2  Maximal Assistance   LB Dressing Deficit Don/doff R sock; Don/doff L sock   Additional Comments pt with max (A) currently due to decreased inability to follow commands or initiate tasks   Bed Mobility   Supine to Sit 3  Moderate assistance   Additional items Assist x 1;Bedrails; Increased time required;Verbal cues   Sit to Supine 3  Moderate assistance   Additional items Assist x 2;Bedrails; Increased time required;Verbal cues;LE management   Additional Comments pt seated EOB for ~10 min with mod (A) for sitting balance at times; pt with poor trunk stability and appears confused; pt with R facial droop and elevated BP at 190/78 as well as decreased  strength to the R UE; pt with blank expression; nurse called to room and BS taken at less than 23; returned pt to supind position and PCA present to administer orange juice   Transfers   Sit to Stand Unable to assess   Stand to Sit Unable to assess   Additional Comments did not assess due to low BS   Functional Mobility   Additional Comments did not assess   Balance   Static Sitting Fair -   Dynamic Sitting Poor +   Activity Tolerance   Activity Tolerance Patient limited by fatigue;Treatment limited secondary to medical complications (Comment)   RUE Assessment   RUE Assessment X  (decreased  strength as well as 4/5 grossly; WFL AROM)   LUE Assessment   LUE Assessment WFL   Hand Function   Gross Motor Coordination Functional   Fine Motor Coordination Impaired   Hand Function Comments impaired to R UE   Sensation   Light Touch No apparent deficits   Sharp/Dull No apparent deficits   Proprioception   Proprioception No apparent deficits   Vision-Basic Assessment   Current Vision No visual deficits   Vision - Complex Assessment   Ocular Range of Motion Intact   Psychosocial   Psychosocial (WDL) X   Patient Behaviors/Mood Flat affect   Cognition   Overall Cognitive Status Impaired   Arousal/Participation Alert   Attention Attends with cues to redirect   Orientation Level Oriented to person;Disoriented to place; Disoriented to time;Disoriented to situation   Memory Decreased long term memory;Decreased short term memory;Decreased recall of recent events   Following Commands Follows one step commands without difficulty   Assessment   Limitation Decreased ADL status; Decreased UE strength;Decreased Safe judgement during ADL;Decreased endurance;Decreased self-care trans;Decreased high-level ADLs   Assessment Pt is a 65 y o  female seen for OT evaluation s/p admit to [facility] on 9/63/7109 w/ Acute metabolic encephalopathy  Comorbidities affecting pt's functional performance at time of assessment include: HTN, HLD, metabolic encephalopathy, DM, diarrhea, COPD  Personal factors affecting pt at time of IE include:steps to enter environment, difficulty performing ADLS, difficulty performing IADLS , decreased initiation and engagement  and health management   Prior to admission, pt was (I) with ADLs and IADLs with use of no device during mobility  Upon evaluation: Pt requires mod-max (A) with bed mobility 2* the following deficits impacting occupational performance: weakness, decreased strength, decreased balance, decreased tolerance, impaired initiation, impaired memory, impaired sequencing, impaired problem solving, decreased safety awareness and impaired interpersonal skills  Pt to benefit from continued skilled OT tx while in the hospital to address deficits as defined above and maximize level of functional independence w ADL's and functional mobility  Occupational Performance areas to address include: grooming, bathing/shower, toilet hygiene, dressing, functional mobility, community mobility and clothing management  The patient's raw score on the AM-PAC Daily Activity inpatient short form is 13, standardized score is 32 03, less than 39 4  Patients at this level are likely to benefit from discharge to post-acute rehabilitation services  Please refer to the recommendation of the Occupational Therapist for safe discharge planning   Pt benefited from co-evaluation of skilled OT and PT therapists in order to most appropriately address functional deficits d/t extensive assistance required for safe functional mobility, decreased activity tolerance, and regression from functioning level prior to admission and/or onset of present illness  OT/PT objectives were addressed separately; please see PT note for specific goal areas targeted  Goals   Patient Goals to go home   Short Term Goal  pt will perform UE strengthening exercises   Long Term Goal #1 pt will demonstrate toilet transfers and hygiene at (I) level   Long Term Goal #2 pt will demonstrate functional mobility with RW at mod (I) level   Long Term Goal pt will demonstrate UB/LB bathing and grooming tasks at min (A) level   Plan   Treatment Interventions ADL retraining;Functional transfer training;UE strengthening/ROM; Patient/family training; Endurance training;Equipment evaluation/education; Activityengagement;Cognitive reorientation   Goal Expiration Date 02/27/23   OT Frequency 3-5x/wk   Recommendation   OT Discharge Recommendation Post acute rehabilitation services   AM-PAC Daily Activity Inpatient   Lower Body Dressing 2   Bathing 2   Toileting 2   Upper Body Dressing 2   Grooming 2   Eating 3   Daily Activity Raw Score 13   Daily Activity Standardized Score (Calc for Raw Score >=11) 32 03   AM-PAC Applied Cognition Inpatient   Following a Speech/Presentation 3   Understanding Ordinary Conversation 3   Taking Medications 2   Remembering Where Things Are Placed or Put Away 1   Remembering List of 4-5 Errands 1   Taking Care of Complicated Tasks 1   Applied Cognition Raw Score 11   Applied Cognition Standardized Score 27 03

## 2023-02-13 NOTE — ASSESSMENT & PLAN NOTE
Lab Results   Component Value Date    HGBA1C 7 8 (H) 09/23/2022       Recent Labs     02/13/23  1013 02/13/23  1104 02/13/23  1217 02/13/23  1305   POCGLU 24* 31* 27* 34*       Blood Sugar Average: Last 72 hrs:  · (P) 34 625 (02/12/23) Blood glucose 79 on arrival  53 in field for EMS   Patient initially having dysarthria, confusion however, after treated with dextrose in the field as well as fluids containing dextrose in the ED symptoms entirely resolved  · Suspect hypoglycemia in setting of diarrheal illness  · We will hold home oral medications  · Discontinue sliding scale insulin  · Hourly fingerstick glucose checks  · Discontinue D5 in NS; replace with D10 infusion

## 2023-02-14 ENCOUNTER — APPOINTMENT (INPATIENT)
Dept: RADIOLOGY | Facility: HOSPITAL | Age: 78
End: 2023-02-14

## 2023-02-14 LAB
CAMPYLOBACTER DNA SPEC NAA+PROBE: NORMAL
GLUCOSE SERPL-MCNC: 126 MG/DL (ref 65–140)
GLUCOSE SERPL-MCNC: 152 MG/DL (ref 65–140)
GLUCOSE SERPL-MCNC: 164 MG/DL (ref 65–140)
GLUCOSE SERPL-MCNC: 169 MG/DL (ref 65–140)
GLUCOSE SERPL-MCNC: 196 MG/DL (ref 65–140)
GLUCOSE SERPL-MCNC: 198 MG/DL (ref 65–140)
GLUCOSE SERPL-MCNC: 225 MG/DL (ref 65–140)
GLUCOSE SERPL-MCNC: 230 MG/DL (ref 65–140)
RV AG STL QL: NEGATIVE
SALMONELLA DNA SPEC QL NAA+PROBE: NORMAL
SHIGA TOXIN STX GENE SPEC NAA+PROBE: NORMAL
SHIGELLA DNA SPEC QL NAA+PROBE: NORMAL

## 2023-02-14 RX ORDER — PERPHENAZINE 16 MG/1
TABLET, FILM COATED ORAL
Qty: 200 STRIP | Refills: 0 | Status: SHIPPED | OUTPATIENT
Start: 2023-02-14 | End: 2023-02-16

## 2023-02-14 RX ORDER — LIDOCAINE 50 MG/G
1 PATCH TOPICAL DAILY
Status: DISCONTINUED | OUTPATIENT
Start: 2023-02-14 | End: 2023-02-16 | Stop reason: HOSPADM

## 2023-02-14 RX ORDER — OXYCODONE HYDROCHLORIDE 5 MG/1
2.5 TABLET ORAL EVERY 4 HOURS PRN
Status: DISCONTINUED | OUTPATIENT
Start: 2023-02-14 | End: 2023-02-16 | Stop reason: HOSPADM

## 2023-02-14 RX ORDER — BLOOD-GLUCOSE TRANSMITTER
1 EACH MISCELLANEOUS CONTINUOUS
Qty: 1 EACH | Refills: 0 | Status: ON HOLD | OUTPATIENT
Start: 2023-02-14 | End: 2023-05-15

## 2023-02-14 RX ORDER — OXYCODONE HYDROCHLORIDE 5 MG/1
5 TABLET ORAL EVERY 4 HOURS PRN
Status: DISCONTINUED | OUTPATIENT
Start: 2023-02-14 | End: 2023-02-16 | Stop reason: HOSPADM

## 2023-02-14 RX ORDER — LANCETS
EACH MISCELLANEOUS
Qty: 2 EACH | Refills: 0 | Status: SHIPPED | OUTPATIENT
Start: 2023-02-14 | End: 2023-02-16

## 2023-02-14 RX ORDER — ACETAMINOPHEN 325 MG/1
650 TABLET ORAL EVERY 6 HOURS
Status: DISCONTINUED | OUTPATIENT
Start: 2023-02-14 | End: 2023-02-16 | Stop reason: HOSPADM

## 2023-02-14 RX ORDER — BLOOD-GLUCOSE,RECEIVER,CONT
1 EACH MISCELLANEOUS CONTINUOUS
Qty: 1 EACH | Refills: 0 | Status: ON HOLD | OUTPATIENT
Start: 2023-02-14

## 2023-02-14 RX ORDER — BLOOD-GLUCOSE SENSOR
1 EACH MISCELLANEOUS CONTINUOUS
Qty: 4 EACH | Refills: 0 | Status: SHIPPED | OUTPATIENT
Start: 2023-02-18 | End: 2023-02-28

## 2023-02-14 RX ORDER — BLOOD-GLUCOSE METER
1 KIT MISCELLANEOUS
Qty: 1 KIT | Refills: 0 | Status: SHIPPED | OUTPATIENT
Start: 2023-02-14 | End: 2023-02-16 | Stop reason: CLARIF

## 2023-02-14 RX ADMIN — SERTRALINE HYDROCHLORIDE 50 MG: 50 TABLET ORAL at 09:23

## 2023-02-14 RX ADMIN — HEPARIN SODIUM 5000 UNITS: 5000 INJECTION INTRAVENOUS; SUBCUTANEOUS at 21:50

## 2023-02-14 RX ADMIN — ACETAMINOPHEN 650 MG: 325 TABLET ORAL at 21:50

## 2023-02-14 RX ADMIN — HYDROCHLOROTHIAZIDE 12.5 MG: 12.5 TABLET ORAL at 09:23

## 2023-02-14 RX ADMIN — DEXTROSE MONOHYDRATE 125 ML/HR: 100 INJECTION, SOLUTION INTRAVENOUS at 07:23

## 2023-02-14 RX ADMIN — LIDOCAINE 1 PATCH: 50 PATCH CUTANEOUS at 10:28

## 2023-02-14 RX ADMIN — BUDESONIDE AND FORMOTEROL FUMARATE DIHYDRATE 2 PUFF: 80; 4.5 AEROSOL RESPIRATORY (INHALATION) at 09:24

## 2023-02-14 RX ADMIN — LISINOPRIL 10 MG: 10 TABLET ORAL at 09:23

## 2023-02-14 RX ADMIN — ACETAMINOPHEN 650 MG: 325 TABLET ORAL at 10:26

## 2023-02-14 RX ADMIN — BUDESONIDE AND FORMOTEROL FUMARATE DIHYDRATE 2 PUFF: 80; 4.5 AEROSOL RESPIRATORY (INHALATION) at 17:36

## 2023-02-14 RX ADMIN — HEPARIN SODIUM 5000 UNITS: 5000 INJECTION INTRAVENOUS; SUBCUTANEOUS at 05:10

## 2023-02-14 RX ADMIN — UMECLIDINIUM 1 PUFF: 62.5 AEROSOL, POWDER ORAL at 09:24

## 2023-02-14 RX ADMIN — HEPARIN SODIUM 5000 UNITS: 5000 INJECTION INTRAVENOUS; SUBCUTANEOUS at 14:57

## 2023-02-14 RX ADMIN — ASPIRIN 81 MG: 81 TABLET, COATED ORAL at 09:23

## 2023-02-14 RX ADMIN — NICOTINE 1 PATCH: 21 PATCH, EXTENDED RELEASE TRANSDERMAL at 09:23

## 2023-02-14 RX ADMIN — OXYCODONE HYDROCHLORIDE 2.5 MG: 5 TABLET ORAL at 09:23

## 2023-02-14 RX ADMIN — PRAVASTATIN SODIUM 80 MG: 40 TABLET ORAL at 17:35

## 2023-02-14 RX ADMIN — CHOLECALCIFEROL TAB 25 MCG (1000 UNIT) 2000 UNITS: 25 TAB at 09:23

## 2023-02-14 RX ADMIN — ACETAMINOPHEN 650 MG: 325 TABLET ORAL at 17:35

## 2023-02-14 NOTE — PROGRESS NOTES
[facility] [address]  Progress Note - Nina Allen 1945, 65 y o  female MRN: [id number]  Unit/Bed#: 403-01 Encounter: [id number]  Primary Care Provider: Tico Myles DO   Date and time admitted to hospital: 2/12/2023 11:20 PM    Type 2 diabetes mellitus with hypoglycemia without coma, without long-term current use of insulin ([facility] [address] )  Overview  · Blood glucose 79 on ED arrival  53 in field for EMS  Patient initially having dysarthria, confusion however, after treated with dextrose as well as fluids containing dextrose in the ED, symptoms entirely resolved  · Suspect hypoglycemia in setting of diarrheal illness      Assessment & Plan  Lab Results   Component Value Date    HGBA1C 7 8 (H) 09/23/2022       Recent Labs     02/14/23  0035 02/14/23  0240 02/14/23  0340 02/14/23  0553   POCGLU 126 152* 169* 230*       Blood Sugar Average: Last 72 hrs:  · (P) 52 [id number]  · We will hold home oral medications  · Continue to monitor blood sugar with ACHS accuchecks  · Discontinue D10 W infusion  · Repeat HbA1C  · Follow AM labs: CBC, CMP, Mg, Phos, Procalcitonin, PT-INR  · Discuss with case management - options for assistance to obtain free glucometer and strips    Acute metabolic encephalopathy  Assessment & Plan  · Patient is AAOx4  CN 2-12 are intact  No focal deficit  No confusion, dysarthria, or extremity weakness  · Blood sugar is   · CTA of head negative for any large vessel occlusion  · Will continue to monitor patient's symptoms  · Please see assessment and plan for type 2 diabetes mellitus with hypoglycemia without coma without long-term current use of insulin    Diarrhea  Assessment & Plan  · No bowel movement since admission  · IV fluids isolate 75 mL/h  · Awaiting results of stool bacterial PCR, rotavirus   No risk factors for C  difficile identified      Fall  Assessment & Plan  · Patient sustained fall on unit when attempting to ambulate without assitance  · No reported of loss of consciousness, CT head negative for acute abnormality, CT cervical spine reviewed, no acute traumatic injury  · CT cervical spine revealed mild septal thickening and groundglass opacity at the lung apices    COPD (chronic obstructive pulmonary disease) (HCC)  Assessment & Plan  · Not in acute exacerbation at this time  · Continue home inhalers    Essential hypertension  Assessment & Plan  · Current blood pressure is 144/61  · Continue lisinopril hydrochlorothiazide 10-12 5 oral daily      VTE Pharmacologic Prophylaxis: VTE Score: 10 High Risk (Score >/= 5) - Pharmacological DVT Prophylaxis Ordered: heparin  Sequential Compression Devices Ordered  Discussions with Specialists or Other Care Team Provider: Dr Cori Castillo, nurse team, case management    Education and Discussions with Family / Patient: Provider will contact family for collateral information    Time Spent for Care: 70 minutes  More than 50% of total time spent on counseling and coordination of care as described above  Current Length of Stay: 3 day(s)  Current Patient Status: Inpatient   Certification Statement: The patient, admitted on an observation basis, will now require > 2 midnight hospital stay due to need for medical treatment of hypoglycemia  Code Status: Level 3 - DNAR and DNI    Subjective:   Patient seen sitting comfortably in recliner chair today  She is AAOx3 today and appears to be in good spirits  Speech is more slurred and slower today  She continues to report pain in her buttock from recent fall rated 10/10, however, her pain rating is inconsistent with her current mood and behavior  She is unable to ambulate without assistance and states she is too afraid to walk or stand due to risk of falling again  She reports having a normal BM yesterday with no diarrhea   She continues to report falling several times at home prior to admission, which is inconsistent with collateral statement from daughter who denied witnessing any prior falls  No further complaints  Review of Systems - History obtained from the patient  General ROS: negative for - chills, fatigue, fever or sleep disturbance  Respiratory ROS: denies any SOB or wheezing  Cardiovascular ROS: no chest pain or dyspnea on exertion  Gastrointestinal ROS: no abdominal pain, change in bowel habits, or black or bloody stools  Genito-Urinary ROS: no dysuria, trouble voiding, or hematuria  Musculoskeletal ROS: positive for - pain in buttock following recent fall  Neurological ROS: no TIA or stroke symptoms; negative for - behavioral changes, confusion, dizziness, headaches, numbness/tingling, visual changes; positive for - slurred speech, memory issues    Objective:     Vitals:   Temp (24hrs), Av 6 °F (37 °C), Min:98 4 °F (36 9 °C), Max:98 9 °F (37 2 °C)    Temp:  [98 4 °F (36 9 °C)-98 9 °F (37 2 °C)] 98 5 °F (36 9 °C)  HR:  [70-79] 79  Resp:  [15-18] 18  BP: (144-168)/(61-86) 144/61  SpO2:  [94 %-97 %] 97 %  Body mass index is 22 8 kg/m²  Input and Output Summary (last 24 hours): Intake/Output Summary (Last 24 hours) at 2023 1126  Last data filed at 2023 0814  Gross per 24 hour   Intake 280 ml   Output 900 ml   Net -620 ml       Physical Exam:   Physical Exam  Vitals reviewed  Constitutional:       General: She is awake  She is not in acute distress  HENT:      Head: Normocephalic  Right Ear: Decreased hearing noted  Left Ear: Decreased hearing noted  Cardiovascular:      Rate and Rhythm: Normal rate and regular rhythm  Heart sounds: Normal heart sounds  Pulmonary:      Effort: Pulmonary effort is normal       Breath sounds: Stridor (mild) present  Wheezing (mild) present  Abdominal:      General: Bowel sounds are normal  There is no distension  Palpations: Abdomen is soft  Musculoskeletal:      Comments: Slightly improved ROM   Skin:     General: Skin is warm and dry  Capillary Refill: Capillary refill takes less than 2 seconds  Neurological:      General: No focal deficit present  Mental Status: She is alert and oriented to person, place, and time  GCS: GCS eye subscore is 4  GCS verbal subscore is 5  GCS motor subscore is 6  Cranial Nerves: Cranial nerves 2-12 are intact  Sensory: No sensory deficit  Motor: Weakness: pain with movement due to recent fall  Coordination: Coordination is intact  Finger-Nose-Finger Test normal       Comments: Unable to ambulate without assistance   Psychiatric:         Mood and Affect: Mood normal          Speech: Speech is slurred  Behavior: Behavior normal          Thought Content: Thought content normal          Cognition and Memory: Memory is impaired         Additional Data:     Labs:  Results from last 7 days   Lab Units 02/13/23  0427   WBC Thousand/uL 11 58*   HEMOGLOBIN g/dL 15 4   HEMATOCRIT % 49 7*   PLATELETS Thousands/uL 261   NEUTROS PCT % 77*   LYMPHS PCT % 12*   MONOS PCT % 8   EOS PCT % 2     Results from last 7 days   Lab Units 02/13/23  0427   SODIUM mmol/L 138   POTASSIUM mmol/L 3 1*   CHLORIDE mmol/L 98   CO2 mmol/L 31   BUN mg/dL 8   CREATININE mg/dL 0 66   ANION GAP mmol/L 9   CALCIUM mg/dL 8 5   ALBUMIN g/dL 3 7   TOTAL BILIRUBIN mg/dL 0 83   ALK PHOS U/L 64   ALT U/L 4*   AST U/L 17   GLUCOSE RANDOM mg/dL 13*     Results from last 7 days   Lab Units 02/12/23  2329   INR  1 04     Results from last 7 days   Lab Units 02/14/23  0801 02/14/23  0553 02/14/23  0340 02/14/23  0240 02/14/23  0035 02/13/23  2234 02/13/23  2003 02/13/23  1857 02/13/23  1805 02/13/23  1622 02/13/23  1507 02/13/23  1305   POC GLUCOSE mg/dl 225* 230* 169* 152* 126 102 93 102 67 93 104 34*               Lines/Drains:  Invasive Devices     Peripheral Intravenous Line  Duration           Peripheral IV 02/13/23 Left Antecubital <1 day                Imaging: Reviewed radiology reports from this admission including: chest xray  X-ray Impression: Chronic left lower lobe opacity - history of Covid pneumonia  Recent Cultures (last 7 days):       Last 24 Hours Medication List:   Current Facility-Administered Medications   Medication Dose Route Frequency Provider Last Rate   • acetaminophen  650 mg Oral Q6H PRN Bennie Denise PA-C     • acetaminophen  650 mg Oral Q6H Verito Budd Sloan Alcaraz,      • albuterol  2 5 mg Nebulization Q6H PRN Bennie Denise PA-C     • aspirin  81 mg Oral Daily Bennie Denise PA-C     • budesonide-formoterol  2 puff Inhalation BID Bennie Denise PA-C     • cholecalciferol  2,000 Units Oral Daily Bennie Denise PA-C     • heparin (porcine)  5,000 Units Subcutaneous [facility] Bennie Denise PA-C     • lisinopril  10 mg Oral Daily Bennie Denise PA-C      And   • hydrochlorothiazide  12 5 mg Oral Daily Bennie Denise PA-C     • lidocaine  1 patch Topical Daily Verito Budd Skylar, DO     • nicotine  1 patch Transdermal Daily Bennie Denise PA-C     • ondansetron  4 mg Intravenous Q6H PRN Bennie Denise PA-C     • oxyCODONE  2 5 mg Oral Q4H PRN Verito Budd Skylar, DO     • oxyCODONE  5 mg Oral Q4H PRN Verito Budd Skylar, DO     • pravastatin  80 mg Oral Daily With AILEEN Lagos     • sertraline  50 mg Oral Daily Bennie Denise PA-C     • umeclidinium  1 puff Inhalation Daily Bennie Denise PA-C          Today, Patient Was Seen By: Per Campbell DO    **Please Note: This note may have been constructed using a voice recognition system  **

## 2023-02-14 NOTE — ASSESSMENT & PLAN NOTE
· No bowel movement since admission  · IV fluids isolate 75 mL/h  · Awaiting results of stool bacterial PCR, rotavirus   No risk factors for C  difficile identified Statement Selected Statement Selected Statement Selected

## 2023-02-14 NOTE — CASE MANAGEMENT
Case Management Discharge Planning Note    Patient name Shalonda Salcedo  Location /570-10 MRN [id number]  : 1945 Date 2023       Current Admission Date: 2023  Current Admission Diagnosis:Type 2 diabetes mellitus with hypoglycemia without coma, without long-term current use of insulin [facility])   Patient Active Problem List    Diagnosis Date Noted   • Diarrhea 2023   • Fall 2023   • Swelling of right hand 2022   • Hypercalcemia 2022   • Pyuria 2022   • Microscopic hematuria 2022   • Permanent atrial fibrillation ([facility] ) 2022   • Hypoxia 2022   • Hypomagnesemia 2022   • COPD (chronic obstructive pulmonary disease) ([facility] [address] ) 2022   • Leukocytosis 2022   • Multiple pulmonary nodules 2022   • Acute metabolic encephalopathy    • History of intracranial hemorrhage 2022   • Hyponatremia 2022   • Hypokalemia 2022   • Hyperbilirubinemia 2022   • Type 2 diabetes mellitus with hypoglycemia without coma, without long-term current use of insulin ([facility] ) 2022   • Vitamin D deficiency 2022   • Chronic atrial fibrillation ([facility] ) 2022   • Dermatitis 2022   • Acute on chronic respiratory failure with hypoxia and hypercapnia ([facility] ) 10/18/2021   • Ambulatory dysfunction 10/18/2021   • Medicare annual wellness visit, subsequent 2018   • Tobacco use 2018   • Hyperlipidemia 10/02/2014   • Essential hypertension 10/02/2014      LOS (days): 1  Geometric Mean LOS (GMLOS) (days): 3 90  Days to GMLOS:2 8     OBJECTIVE:  Risk of Unplanned Readmission Score: 20 07         Current admission status: Inpatient   Preferred Pharmacy:   [address] [place] [address] [address] [place] [address], [address] [place] [id number]  Phone: [phone] Fax: [phone]    [address] [place], [address] [facility] [place]  Phone: [phone] Fax: [phone]    Primary Care Provider: Dellar Fabry, DO    Primary Insurance: MEDICARE  Secondary Insurance: COMMERCIAL MISCELLANEOUS    DISCHARGE DETAILS:    Discharge planning discussed with[de-identified] spoke with daughter via phone  Freedom of Choice: Yes  Comments - Freedom of Choice: agreeable to blanket referral  CM contacted family/caregiver?: Yes  Were Treatment Team discharge recommendations reviewed with patient/caregiver?: Yes  Did patient/caregiver verbalize understanding of patient care needs?: Yes  Were patient/caregiver advised of the risks associated with not following Treatment Team discharge recommendations?: Yes    Contacts  Contact Method: Phone  Reason/Outcome: Discharge Planning      Other Referral/Resources/Interventions Provided:  Interventions: Short Term Rehab    Would you like to participate in our [address] service program?  : No - Declined    Treatment Team Recommendation: Short Term Rehab  Discharge Destination Plan[de-identified] Short Term Rehab   spoke with daughter via phone  Explained that therapy is still recommending STR  Daughter states she was really hoping she can come home  She agreed to blanket referrals  I asked her if pt had any falls at home because pt is insisting she did  Daughter denying  She also states she was not checking sugars at home because she didn't have strips for the machine she had  MD going to send new script for glucometer, strips and will do price check at pharmacy

## 2023-02-14 NOTE — PLAN OF CARE
Problem: PAIN - ADULT  Goal: Verbalizes/displays adequate comfort level or baseline comfort level  Description: Interventions:  - Encourage patient to monitor pain and request assistance  - Assess pain using appropriate pain scale  - Administer analgesics based on type and severity of pain and evaluate response  - Implement non-pharmacological measures as appropriate and evaluate response  - Consider cultural and social influences on pain and pain management  - Notify physician/advanced practitioner if interventions unsuccessful or patient reports new pain  Outcome: Progressing     Problem: INFECTION - ADULT  Goal: Absence or prevention of progression during hospitalization  Description: INTERVENTIONS:  - Assess and monitor for signs and symptoms of infection  - Monitor lab/diagnostic results  - Monitor all insertion sites, i e  indwelling lines, tubes, and drains  - Monitor endotracheal if appropriate and nasal secretions for changes in amount and color  - [place] appropriate cooling/warming therapies per order  - Administer medications as ordered  - Instruct and encourage patient and family to use good hand hygiene technique  - Identify and instruct in appropriate isolation precautions for identified infection/condition  Outcome: Progressing     Problem: SAFETY ADULT  Goal: Patient will remain free of falls  Description: INTERVENTIONS:  - Educate patient/family on patient safety including physical limitations  - Instruct patient to call for assistance with activity   - Consult OT/PT to assist with strengthening/mobility   - Keep Call bell within reach  - Keep bed low and locked with side rails adjusted as appropriate  - Keep care items and personal belongings within reach  - Initiate and maintain comfort rounds  - Make Fall Risk Sign visible to staff  - Offer Toileting every 2 Hours, in advance of need  - Initiate/Maintain bed/chair alarm  - Obtain necessary fall risk management equipment: non skid socks/fall braclet  - Apply yellow socks and bracelet for high fall risk patients  - Consider moving patient to room near nurses station  Outcome: Progressing  Goal: Maintain or return to baseline ADL function  Description: INTERVENTIONS:  -  Assess patient's ability to carry out ADLs; assess patient's baseline for ADL function and identify physical deficits which impact ability to perform ADLs (bathing, care of mouth/teeth, toileting, grooming, dressing, etc )  - Assess/evaluate cause of self-care deficits   - Assess range of motion  - Assess patient's mobility; develop plan if impaired  - Assess patient's need for assistive devices and provide as appropriate  - Encourage maximum independence but intervene and supervise when necessary  - Involve family in performance of ADLs  - Assess for home care needs following discharge   - Consider OT consult to assist with ADL evaluation and planning for discharge  - Provide patient education as appropriate  Outcome: Progressing  Goal: Maintains/Returns to pre admission functional level  Description: INTERVENTIONS:  - Perform BMAT or MOVE assessment daily    - Set and communicate daily mobility goal to care team and patient/family/caregiver  - Collaborate with rehabilitation services on mobility goals if consulted  - Perform Range of Motion 3 times a day  - Reposition patient every 3 hours    - Dangle patient 3 times a day  - Stand patient 3 times a day  - Ambulate patient 3 times a day  - Out of bed to chair 3 times a day   - Out of bed for meals 3 times a day  - Out of bed for toileting  - Record patient progress and toleration of activity level   Outcome: Progressing     Problem: DISCHARGE PLANNING  Goal: Discharge to home or other facility with appropriate resources  Description: INTERVENTIONS:  - Identify barriers to discharge w/patient and caregiver  - Arrange for needed discharge resources and transportation as appropriate  - Identify discharge learning needs (meds, wound care, etc )  - Arrange for interpretive services to assist at discharge as needed  - Refer to Case Management Department for coordinating discharge planning if the patient needs post-hospital services based on physician/advanced practitioner order or complex needs related to functional status, cognitive ability, or social support system  Outcome: Progressing     Problem: Knowledge Deficit  Goal: Patient/family/caregiver demonstrates understanding of disease process, treatment plan, medications, and discharge instructions  Description: Complete learning assessment and assess knowledge base    Interventions:  - Provide teaching at level of understanding  - Provide teaching via preferred learning methods  Outcome: Progressing     Problem: RESPIRATORY - ADULT  Goal: Achieves optimal ventilation and oxygenation  Description: INTERVENTIONS:  - Assess for changes in respiratory status  - Assess for changes in mentation and behavior  - Position to facilitate oxygenation and minimize respiratory effort  - Oxygen administered by appropriate delivery if ordered  - Initiate smoking cessation education as indicated  - Encourage broncho-pulmonary hygiene including cough, deep breathe, Incentive Spirometry  - Assess the need for suctioning and aspirate as needed  - Assess and instruct to report SOB or any respiratory difficulty  - Respiratory Therapy support as indicated  Outcome: Progressing     Problem: METABOLIC, FLUID AND ELECTROLYTES - ADULT  Goal: Electrolytes maintained within normal limits  Description: INTERVENTIONS:  - Monitor labs and assess patient for signs and symptoms of electrolyte imbalances  - Administer electrolyte replacement as ordered  - Monitor response to electrolyte replacements, including repeat lab results as appropriate  - Instruct patient on fluid and nutrition as appropriate  Outcome: Progressing     Problem: Prexisting or High Potential for Compromised Skin Integrity  Goal: Skin integrity is maintained or improved  Description: INTERVENTIONS:  - Identify patients at risk for skin breakdown  - Assess and monitor skin integrity  - Assess and monitor nutrition and hydration status  - Monitor labs   - Assess for incontinence   - Turn and reposition patient  - Assist with mobility/ambulation  - Relieve pressure over bony prominences  - Avoid friction and shearing  - Provide appropriate hygiene as needed including keeping skin clean and dry  - Evaluate need for skin moisturizer/barrier cream  - Collaborate with interdisciplinary team   - Patient/family teaching  - Consider wound care consult   Outcome: Progressing     Problem: MOBILITY - ADULT  Goal: Maintain or return to baseline ADL function  Description: INTERVENTIONS:  -  Assess patient's ability to carry out ADLs; assess patient's baseline for ADL function and identify physical deficits which impact ability to perform ADLs (bathing, care of mouth/teeth, toileting, grooming, dressing, etc )  - Assess/evaluate cause of self-care deficits   - Assess range of motion  - Assess patient's mobility; develop plan if impaired  - Assess patient's need for assistive devices and provide as appropriate  - Encourage maximum independence but intervene and supervise when necessary  - Involve family in performance of ADLs  - Assess for home care needs following discharge   - Consider OT consult to assist with ADL evaluation and planning for discharge  - Provide patient education as appropriate  Outcome: Progressing  Goal: Maintains/Returns to pre admission functional level  Description: INTERVENTIONS:  - Perform BMAT or MOVE assessment daily    - Set and communicate daily mobility goal to care team and patient/family/caregiver  - Collaborate with rehabilitation services on mobility goals if consulted  - Perform Range of Motion 3 times a day  - Reposition patient every 3 hours    - Dangle patient 3 times a day  - Stand patient 3 times a day  - Ambulate patient 3 times a day  - Out of bed to chair 3 times a day   - Out of bed for meals 3 times a day  - Out of bed for toileting  - Record patient progress and toleration of activity level   Outcome: Progressing

## 2023-02-14 NOTE — ASSESSMENT & PLAN NOTE
· Patient sustained fall on unit (2/13) when attempting to ambulate without assitance  · No reported of loss of consciousness, CT head negative for acute abnormality, CT cervical spine reviewed, no acute traumatic injury    · Patient continues to report significant pain on buttocks  · Tylenol 650 mg every 6 hours  · Lidoderm patch  · Sacral x-ray to rule-out fracture  · Nursing obtained coccyx pillow for patient  · Oxycodone 2 5 mg q4 hours PRN - moderate pain  · Oxycodone 5 mg q4 hours PRN - severe pain

## 2023-02-14 NOTE — PLAN OF CARE
Problem: PHYSICAL THERAPY ADULT  Goal: Performs mobility at highest level of function for planned discharge setting  See evaluation for individualized goals  Description: Treatment/Interventions: Functional transfer training, LE strengthening/ROM, Elevations, Therapeutic exercise, Endurance training, Bed mobility, Gait training          See flowsheet documentation for full assessment, interventions and recommendations  Outcome: Progressing  Note: Prognosis: Good  Problem List: Decreased strength, Decreased endurance, Impaired balance, Decreased mobility, Pain, Decreased cognition, Impaired judgement, Decreased safety awareness  Assessment: Patient seen this date for PT treatment session to increase level of mobility and functional activity tolerance  This date, pt able to perform all functional mobility with Min A x 1-2 , RW, and increased time  Frequent verbal cuing provided for safety awareness and sequencing  Pt demonstrating poor command following and safety concerns; currently only oriented to self  Pt able to recall she fell yesterday and knows she is in the hospital, but does not know the name of it  Seated rest break taken following 10' x 2 of ambulation d/t fatigue and SOB  HR and SpO2 remained WFL on RA throughout  No true LOB experienced  The patient's AM-PAC Basic Mobility Inpatient Short Form Raw Score is 14  A Raw score of less than or equal to 16 suggests the patient may benefit from discharge to post-acute rehabilitation services  Please also refer to the recommendation of the Physical Therapist for safe discharge planning  Co treatment with OT secondary to complex medical condition of pt, possible A of 2 required to achieve and maintain transitional movements, requiring the need of skilled therapeutic intervention of 2 therapists to achieve delivery of services  D/c recommendation continues to be post-acute rehabilitation services          PT Discharge Recommendation: Post acute rehabilitation services    See flowsheet documentation for full assessment

## 2023-02-14 NOTE — PHYSICAL THERAPY NOTE
Physical Therapy Progress Note    Patient Name: Simon Muniz    FQNVO'D Date: 2/14/2023     Problem List  Active Problems:    Essential hypertension    Acute metabolic encephalopathy    Type 2 diabetes mellitus with hypoglycemia without coma, without long-term current use of insulin ([facility])    COPD (chronic obstructive pulmonary disease) ([facility] [address] )    Diarrhea    Fall       Past Medical History  Past Medical History:   Diagnosis Date    Hyperlipidemia     last assessed  8/24/17    Hypertension     last assessed 10/2/14        Past Surgical History  Past Surgical History:   Procedure Laterality Date    CRANIOTOMY             02/14/23 0906   PT Last Visit   PT Visit Date 02/14/23   Note Type   Note Type Treatment   Pain Assessment   Pain Assessment Tool 0-10   Pain Score 10 - Worst Possible Pain   Restrictions/Precautions   Weight Bearing Precautions Per Order No   Other Precautions Fall Risk;Pain;Multiple lines;O2;Chair Alarm;Cognitive   General   Chart Reviewed No   Family/Caregiver Present No   Cognition   Overall Cognitive Status Impaired   Arousal/Participation Alert   Attention Attends with cues to redirect   Orientation Level Oriented to person;Disoriented to place; Disoriented to time;Disoriented to situation   Following Commands Follows one step commands with increased time or repetition   Subjective   Subjective "I've fallen three times"   Bed Mobility   Additional Comments pt OOB at start/end of session   Transfers   Sit to Stand 4  Minimal assistance   Additional items Assist x 1; Increased time required;Verbal cues   Stand to Sit 4  Minimal assistance   Additional items Assist x 1; Increased time required;Verbal cues   Toilet transfer 4  Minimal assistance   Additional items Assist x 1; Increased time required;Standard toilet;Verbal cues   Additional Comments RW used   Ambulation/Elevation   Gait pattern Excessively slow; Short stride; Foward flexed;Decreased foot clearance;Narrow SHANA; Improper Weight shift   Gait Assistance 4  Minimal assist   Additional items Assist x 2;Verbal cues   Assistive Device Rolling walker   Distance 10' x 2   Balance   Static Sitting Fair   Dynamic Sitting Fair -   Static Standing Fair -   Dynamic Standing Fair -   Ambulatory Poor +  (with RW)   Endurance Deficit   Endurance Deficit Yes   Endurance Deficit Description pt easily fatigued with ambulation   Activity Tolerance   Activity Tolerance Patient limited by fatigue;Patient limited by pain   Assessment   Prognosis Good   Problem List Decreased strength;Decreased endurance; Impaired balance;Decreased mobility;Pain;Decreased cognition; Impaired judgement;Decreased safety awareness   Assessment Patient seen this date for PT treatment session to increase level of mobility and functional activity tolerance  This date, pt able to perform all functional mobility with Min A x 1-2 , RW, and increased time  Frequent verbal cuing provided for safety awareness and sequencing  Pt demonstrating poor command following and safety concerns; currently only oriented to self  Pt able to recall she fell yesterday and knows she is in the hospital, but does not know the name of it  Seated rest break taken following 10' x 2 of ambulation d/t fatigue and SOB  HR and SpO2 remained WFL on RA throughout  No true LOB experienced  The patient's AM-PAC Basic Mobility Inpatient Short Form Raw Score is 14  A Raw score of less than or equal to 16 suggests the patient may benefit from discharge to post-acute rehabilitation services  Please also refer to the recommendation of the Physical Therapist for safe discharge planning  Co treatment with OT secondary to complex medical condition of pt, possible A of 2 required to achieve and maintain transitional movements, requiring the need of skilled therapeutic intervention of 2 therapists to achieve delivery of services   D/c recommendation continues to be post-acute rehabilitation services  Goals   LTG Expiration Date 02/27/23   PT Treatment Day 1   Plan   Treatment/Interventions Functional transfer training;LE strengthening/ROM; Elevations; Therapeutic exercise; Endurance training;Bed mobility;Gait training   Progress Progressing toward goals   PT Frequency 3-5x/wk   Recommendation   PT Discharge Recommendation Post acute rehabilitation services   AM-PAC Basic Mobility Inpatient   Turning in Flat Bed Without Bedrails 2   Lying on Back to Sitting on Edge of Flat Bed Without Bedrails 2   Moving Bed to Chair 3   Standing Up From Chair Using Arms 3   Walk in Room 2   Climb 3-5 Stairs With Railing 2   Basic Mobility Inpatient Raw Score 14   Basic Mobility Standardized Score 35 55   Highest Level Of Mobility   -HLM Goal 4: Move to chair/commode   JH-HLM Achieved 6: Walk 10 steps or more   Education   Education Provided Mobility training;Assistive device   Patient Reinforcement needed   End of Consult   Patient Position at End of Consult Bedside chair;Bed/Chair alarm activated; All needs within reach

## 2023-02-14 NOTE — ASSESSMENT & PLAN NOTE
· Patient is AAOx4  CN 2-12 are intact  No focal deficit  No confusion, dysarthria, or extremity weakness  Mildly slurred speech  · Blood sugar elevated this morning at 230  · CTA of head negative for any large vessel occlusion  · Will continue to monitor patient's symptoms    · Please see assessment and plan for type 2 diabetes mellitus with hypoglycemia without coma without long-term current use of insulin

## 2023-02-14 NOTE — NURSING NOTE
Pt allowed us to try blood draw x2 with no success  Pt is refusing lab draw at this time  Pt educated about the importance of blood draw, Dr Sloan Alcaraz made aware  Will continue to monitor

## 2023-02-14 NOTE — OCCUPATIONAL THERAPY NOTE
Occupational Therapy Evaluation     Patient Name: Viki Howard  IRAXW'X Date: 2/14/2023  Problem List  Active Problems:    Essential hypertension    Acute metabolic encephalopathy    Type 2 diabetes mellitus with hypoglycemia without coma, without long-term current use of insulin ([facility])    COPD (chronic obstructive pulmonary disease) ([facility] [address] )    Diarrhea    Fall    Past Medical History  Past Medical History:   Diagnosis Date    Hyperlipidemia     last assessed  8/24/17    Hypertension     last assessed 10/2/14     Past Surgical History  Past Surgical History:   Procedure Laterality Date    CRANIOTOMY               02/14/23 0904   OT Last Visit   OT Visit Date 02/14/23   Note Type   Note Type Treatment   Pain Assessment   Pain Assessment Tool 0-10   Pain Score 10 - Worst Possible Pain   Pain Location/Orientation Location: Buttocks   Restrictions/Precautions   Weight Bearing Precautions Per Order No   Other Precautions Fall Risk;Bed Alarm; Chair Alarm;Multiple lines;Pain   ADL   Where Assessed Other (Comment)  (bathroom)   Grooming Assistance 4  Minimal Assistance   Grooming Deficit Wash/dry hands;Standing with assistive device; Increased time to complete;Supervision/safety;Verbal cueing;Steadying   Grooming Comments pt with task initiation difficulties and problem solving with sequencing deficits during functional tasks in bathroom requiring verbal and at times, physical cues   LB Bathing Assistance 4  Minimal Assistance   LB Bathing Deficit Increased time to complete;Supervision/safety; Perineal area; Buttocks   LB Dressing Assistance 2  Maximal Assistance   LB Dressing Deficit Thread RLE into underwear; Thread LLE into underwear;Pull up over hips; Increased time to complete;Supervision/safety;Verbal cueing   LB Dressing Comments pt attempts to perform LB dressing requiring max (A); seated in chair and requires significant cues for task initiation; pt exhibits problem solving and sequencing deficits   Toileting Assistance  3  Moderate Assistance   Toileting Deficit Clothing management up;Clothing management down;Perineal hygiene   Toileting Comments see above comments for details   Bed Mobility   Additional Comments pt seated in chair at start and end of session; SpO2 WFL on RA during session   Transfers   Sit to Stand 4  Minimal assistance   Additional items Assist x 1; Increased time required;Verbal cues  (RW)   Stand to Sit 4  Minimal assistance   Additional items Assist x 1; Increased time required;Verbal cues  (RW)   Toilet transfer 4  Minimal assistance   Additional items Assist x 1; Increased time required;Verbal cues;Standard toilet  (RW)   Additional Comments pt performs functional transfers with min (A) x1-2; significant problem solving and safety deficits; requires cues for hand placement as well as task initiation   Functional Mobility   Functional Mobility 4  Minimal assistance   Additional Comments x1-2 with multiple near LOB and no significant LOB; pt with impulsive and unsafe transfer techniques as well as mobility; limited due to buttocks pain from fall sustained in this facility on 2-13   Additional items Rolling walker   Subjective   Subjective "I just need something for this pain, wow"   Cognition   Overall Cognitive Status Impaired   Arousal/Participation Alert; Cooperative   Attention Difficulty attending to directions   Orientation Level Oriented to person;Disoriented to place; Disoriented to time;Disoriented to situation   Memory Decreased long term memory;Decreased short term memory;Decreased recall of recent events   Following Commands Follows one step commands without difficulty   Comments pt does state she remembers her fall here yesterday, however unable to relay other information accurately   Activity Tolerance   Activity Tolerance Patient limited by fatigue;Patient limited by pain   Assessment   Assessment Patient participated in Skilled OT session this date with interventions consisting of ADL re training with the use of correct body mechnaics, safety awareness and fall prevention techniques,  therapeutic activities to: increase activity tolerance, increase standing tolerance time with unilateral UE support to complete sink level ADLs, increase cardiovascular endurance , increase dynamic sit/ stand balance during functional activity , increase trunk control and increase OOB/ sitting tolerance   Co treatment with PT secondary to complex medical condition of pt, mod-max A of 2 required to achieve and maintain transitional movements, requiring the need of skilled therapeutic intervention of 2 therapists to achieve delivery of services  Patient agreeable to OT treatment session, upon arrival patient was found seated OOB to Chair  Patient requiring frequent re direction, verbal cues for safety, verbal cues for correct technique, verbal cues for pacing thru activity steps, cognitive assistance to anticipate next step, one step directives and frequent rest periods  Patient continues to be functioning below baseline level, occupational performance remains limited secondary to factors listed above and increased risk for falls and injury  The patient's raw score on the AM-PAC Daily Activity Inpatient Short Form is 15  A raw score of less than 19 suggests the patient may benefit from discharge to post-acute rehabilitation services  Please refer to the recommendation of the Occupational Therapist for safe discharge planning  From OT standpoint, recommendation at time of d/c would be Post acute rehabilitation services     Plan   Goal Expiration Date 02/27/23   OT Treatment Day 1   OT Frequency 3-5x/wk   Recommendation   OT Discharge Recommendation Post acute rehabilitation services   AM-[facility] Daily Activity Inpatient   Lower Body Dressing 2   Bathing 2   Toileting 2   Upper Body Dressing 3   Grooming 3   Eating 3   Daily Activity Raw Score 15   Daily Activity Standardized Score (Calc for Raw Score >=11) 34 69   AM-PAC Applied Cognition Inpatient   Following a Speech/Presentation 3   Understanding Ordinary Conversation 3   Taking Medications 2   Remembering Where Things Are Placed or Put Away 2   Remembering List of 4-5 Errands 2   Taking Care of Complicated Tasks 2   Applied Cognition Raw Score 14   Applied Cognition Standardized Score 32 02

## 2023-02-14 NOTE — PLAN OF CARE
Problem: PAIN - ADULT  Goal: Verbalizes/displays adequate comfort level or baseline comfort level  Description: Interventions:  - Encourage patient to monitor pain and request assistance  - Assess pain using appropriate pain scale  - Administer analgesics based on type and severity of pain and evaluate response  - Implement non-pharmacological measures as appropriate and evaluate response  - Consider cultural and social influences on pain and pain management  - Notify physician/advanced practitioner if interventions unsuccessful or patient reports new pain  Outcome: Progressing     Problem: INFECTION - ADULT  Goal: Absence or prevention of progression during hospitalization  Description: INTERVENTIONS:  - Assess and monitor for signs and symptoms of infection  - Monitor lab/diagnostic results  - Monitor all insertion sites, i e  indwelling lines, tubes, and drains  - Monitor endotracheal if appropriate and nasal secretions for changes in amount and color  - [place] appropriate cooling/warming therapies per order  - Administer medications as ordered  - Instruct and encourage patient and family to use good hand hygiene technique  - Identify and instruct in appropriate isolation precautions for identified infection/condition  Outcome: Progressing     Problem: SAFETY ADULT  Goal: Patient will remain free of falls  Description: INTERVENTIONS:  - Educate patient/family on patient safety including physical limitations  - Instruct patient to call for assistance with activity   - Consult OT/PT to assist with strengthening/mobility   - Keep Call bell within reach  - Keep bed low and locked with side rails adjusted as appropriate  - Keep care items and personal belongings within reach  - Initiate and maintain comfort rounds  - Make Fall Risk Sign visible to staff  - Offer Toileting every 2 Hours, in advance of need  - Initiate/Maintain bed/chair alarm  - Obtain necessary fall risk management equipment: non skid socks/fall braclet  - Apply yellow socks and bracelet for high fall risk patients  - Consider moving patient to room near nurses station  Outcome: Progressing  Goal: Maintain or return to baseline ADL function  Description: INTERVENTIONS:  -  Assess patient's ability to carry out ADLs; assess patient's baseline for ADL function and identify physical deficits which impact ability to perform ADLs (bathing, care of mouth/teeth, toileting, grooming, dressing, etc )  - Assess/evaluate cause of self-care deficits   - Assess range of motion  - Assess patient's mobility; develop plan if impaired  - Assess patient's need for assistive devices and provide as appropriate  - Encourage maximum independence but intervene and supervise when necessary  - Involve family in performance of ADLs  - Assess for home care needs following discharge   - Consider OT consult to assist with ADL evaluation and planning for discharge  - Provide patient education as appropriate  Outcome: Progressing  Goal: Maintains/Returns to pre admission functional level  Description: INTERVENTIONS:  - Perform BMAT or MOVE assessment daily    - Set and communicate daily mobility goal to care team and patient/family/caregiver  - Collaborate with rehabilitation services on mobility goals if consulted  - Perform Range of Motion 3 times a day  - Reposition patient every 3 hours  - Dangle patient 3 times a day  - Stand patient 3 times a day  - Ambulate patient 3 times a day  - Out of bed to chair 3 times a day   - Out of bed for meals 3 times a day  - Out of bed for toileting  - Record patient progress and toleration of activity level   Outcome: Progressing     Problem: Knowledge Deficit  Goal: Patient/family/caregiver demonstrates understanding of disease process, treatment plan, medications, and discharge instructions  Description: Complete learning assessment and assess knowledge base    Interventions:  - Provide teaching at level of understanding  - Provide teaching via preferred learning methods  Outcome: Progressing     Problem: RESPIRATORY - ADULT  Goal: Achieves optimal ventilation and oxygenation  Description: INTERVENTIONS:  - Assess for changes in respiratory status  - Assess for changes in mentation and behavior  - Position to facilitate oxygenation and minimize respiratory effort  - Oxygen administered by appropriate delivery if ordered  - Initiate smoking cessation education as indicated  - Encourage broncho-pulmonary hygiene including cough, deep breathe, Incentive Spirometry  - Assess the need for suctioning and aspirate as needed  - Assess and instruct to report SOB or any respiratory difficulty  - Respiratory Therapy support as indicated  Outcome: Progressing     Problem: METABOLIC, FLUID AND ELECTROLYTES - ADULT  Goal: Electrolytes maintained within normal limits  Description: INTERVENTIONS:  - Monitor labs and assess patient for signs and symptoms of electrolyte imbalances  - Administer electrolyte replacement as ordered  - Monitor response to electrolyte replacements, including repeat lab results as appropriate  - Instruct patient on fluid and nutrition as appropriate  Outcome: Progressing

## 2023-02-14 NOTE — PLAN OF CARE
Problem: OCCUPATIONAL THERAPY ADULT  Goal: Performs self-care activities at highest level of function for planned discharge setting  See evaluation for individualized goals  Description: Treatment Interventions: ADL retraining, Functional transfer training, UE strengthening/ROM, Patient/family training, Endurance training, Equipment evaluation/education, Activityengagement, Cognitive reorientation          See flowsheet documentation for full assessment, interventions and recommendations  Outcome: Progressing  Note: Limitation: Decreased ADL status, Decreased UE strength, Decreased Safe judgement during ADL, Decreased endurance, Decreased self-care trans, Decreased high-level ADLs     Assessment: Patient participated in Skilled OT session this date with interventions consisting of ADL re training with the use of correct body mechnaics, safety awareness and fall prevention techniques,  therapeutic activities to: increase activity tolerance, increase standing tolerance time with unilateral UE support to complete sink level ADLs, increase cardiovascular endurance , increase dynamic sit/ stand balance during functional activity , increase trunk control and increase OOB/ sitting tolerance   Co treatment with PT secondary to complex medical condition of pt, mod-max A of 2 required to achieve and maintain transitional movements, requiring the need of skilled therapeutic intervention of 2 therapists to achieve delivery of services  Patient agreeable to OT treatment session, upon arrival patient was found seated OOB to Chair  Patient requiring frequent re direction, verbal cues for safety, verbal cues for correct technique, verbal cues for pacing thru activity steps, cognitive assistance to anticipate next step, one step directives and frequent rest periods   Patient continues to be functioning below baseline level, occupational performance remains limited secondary to factors listed above and increased risk for falls and injury  The patient's raw score on the AM-PAC Daily Activity Inpatient Short Form is 15  A raw score of less than 19 suggests the patient may benefit from discharge to post-acute rehabilitation services  Please refer to the recommendation of the Occupational Therapist for safe discharge planning  From OT standpoint, recommendation at time of d/c would be Post acute rehabilitation services       OT Discharge Recommendation: Post acute rehabilitation services

## 2023-02-15 LAB
ALBUMIN SERPL BCP-MCNC: 3 G/DL (ref 3.5–5)
ALP SERPL-CCNC: 55 U/L (ref 34–104)
ALT SERPL W P-5'-P-CCNC: 5 U/L (ref 7–52)
ANION GAP SERPL CALCULATED.3IONS-SCNC: 8 MMOL/L (ref 4–13)
AST SERPL W P-5'-P-CCNC: 12 U/L (ref 13–39)
BASOPHILS # BLD AUTO: 0.09 THOUSANDS/ÂΜL (ref 0–0.1)
BASOPHILS NFR BLD AUTO: 1 % (ref 0–1)
BILIRUB SERPL-MCNC: 1.08 MG/DL (ref 0.2–1)
BUN SERPL-MCNC: 15 MG/DL (ref 5–25)
CALCIUM ALBUM COR SERPL-MCNC: 8.9 MG/DL (ref 8.3–10.1)
CALCIUM SERPL-MCNC: 8.1 MG/DL (ref 8.4–10.2)
CHLORIDE SERPL-SCNC: 94 MMOL/L (ref 96–108)
CO2 SERPL-SCNC: 31 MMOL/L (ref 21–32)
CREAT SERPL-MCNC: 0.8 MG/DL (ref 0.6–1.3)
EOSINOPHIL # BLD AUTO: 0.29 THOUSAND/ÂΜL (ref 0–0.61)
EOSINOPHIL NFR BLD AUTO: 3 % (ref 0–6)
ERYTHROCYTE [DISTWIDTH] IN BLOOD BY AUTOMATED COUNT: 13.4 % (ref 11.6–15.1)
GFR SERPL CREATININE-BSD FRML MDRD: 70 ML/MIN/1.73SQ M
GLUCOSE SERPL-MCNC: 146 MG/DL (ref 65–140)
GLUCOSE SERPL-MCNC: 149 MG/DL (ref 65–140)
GLUCOSE SERPL-MCNC: 184 MG/DL (ref 65–140)
GLUCOSE SERPL-MCNC: 194 MG/DL (ref 65–140)
GLUCOSE SERPL-MCNC: 205 MG/DL (ref 65–140)
HCT VFR BLD AUTO: 38.6 % (ref 34.8–46.1)
HGB BLD-MCNC: 12.5 G/DL (ref 11.5–15.4)
IMM GRANULOCYTES # BLD AUTO: 0.03 THOUSAND/UL (ref 0–0.2)
IMM GRANULOCYTES NFR BLD AUTO: 0 % (ref 0–2)
INR PPP: 1.12 (ref 0.84–1.19)
LYMPHOCYTES # BLD AUTO: 1.57 THOUSANDS/ÂΜL (ref 0.6–4.47)
LYMPHOCYTES NFR BLD AUTO: 18 % (ref 14–44)
MAGNESIUM SERPL-MCNC: 1.8 MG/DL (ref 1.9–2.7)
MCH RBC QN AUTO: 28.8 PG (ref 26.8–34.3)
MCHC RBC AUTO-ENTMCNC: 32.4 G/DL (ref 31.4–37.4)
MCV RBC AUTO: 89 FL (ref 82–98)
MONOCYTES # BLD AUTO: 0.79 THOUSAND/ÂΜL (ref 0.17–1.22)
MONOCYTES NFR BLD AUTO: 9 % (ref 4–12)
NEUTROPHILS # BLD AUTO: 5.98 THOUSANDS/ÂΜL (ref 1.85–7.62)
NEUTS SEG NFR BLD AUTO: 69 % (ref 43–75)
NRBC BLD AUTO-RTO: 0 /100 WBCS
PHOSPHATE SERPL-MCNC: 3.9 MG/DL (ref 2.3–4.1)
PLATELET # BLD AUTO: 223 THOUSANDS/UL (ref 149–390)
PMV BLD AUTO: 11.1 FL (ref 8.9–12.7)
POTASSIUM SERPL-SCNC: 3.1 MMOL/L (ref 3.5–5.3)
PROCALCITONIN SERPL-MCNC: 0.07 NG/ML
PROT SERPL-MCNC: 5.3 G/DL (ref 6.4–8.4)
PROTHROMBIN TIME: 14.6 SECONDS (ref 11.6–14.5)
RBC # BLD AUTO: 4.34 MILLION/UL (ref 3.81–5.12)
SODIUM SERPL-SCNC: 133 MMOL/L (ref 135–147)
WBC # BLD AUTO: 8.75 THOUSAND/UL (ref 4.31–10.16)

## 2023-02-15 RX ORDER — POTASSIUM CHLORIDE 20 MEQ/1
40 TABLET, EXTENDED RELEASE ORAL ONCE
Status: COMPLETED | OUTPATIENT
Start: 2023-02-15 | End: 2023-02-15

## 2023-02-15 RX ORDER — MAGNESIUM SULFATE HEPTAHYDRATE 40 MG/ML
2 INJECTION, SOLUTION INTRAVENOUS ONCE
Status: COMPLETED | OUTPATIENT
Start: 2023-02-15 | End: 2023-02-15

## 2023-02-15 RX ADMIN — BUDESONIDE AND FORMOTEROL FUMARATE DIHYDRATE 2 PUFF: 80; 4.5 AEROSOL RESPIRATORY (INHALATION) at 18:00

## 2023-02-15 RX ADMIN — UMECLIDINIUM 1 PUFF: 62.5 AEROSOL, POWDER ORAL at 09:30

## 2023-02-15 RX ADMIN — PRAVASTATIN SODIUM 80 MG: 40 TABLET ORAL at 16:28

## 2023-02-15 RX ADMIN — ASPIRIN 81 MG: 81 TABLET, COATED ORAL at 09:29

## 2023-02-15 RX ADMIN — POTASSIUM CHLORIDE 40 MEQ: 1500 TABLET, EXTENDED RELEASE ORAL at 09:28

## 2023-02-15 RX ADMIN — SERTRALINE HYDROCHLORIDE 50 MG: 50 TABLET ORAL at 09:29

## 2023-02-15 RX ADMIN — HYDROCHLOROTHIAZIDE 12.5 MG: 12.5 TABLET ORAL at 09:29

## 2023-02-15 RX ADMIN — LISINOPRIL 10 MG: 10 TABLET ORAL at 09:28

## 2023-02-15 RX ADMIN — HEPARIN SODIUM 5000 UNITS: 5000 INJECTION INTRAVENOUS; SUBCUTANEOUS at 21:33

## 2023-02-15 RX ADMIN — CHOLECALCIFEROL TAB 25 MCG (1000 UNIT) 2000 UNITS: 25 TAB at 09:28

## 2023-02-15 RX ADMIN — ACETAMINOPHEN 650 MG: 325 TABLET ORAL at 09:28

## 2023-02-15 RX ADMIN — HEPARIN SODIUM 5000 UNITS: 5000 INJECTION INTRAVENOUS; SUBCUTANEOUS at 05:31

## 2023-02-15 RX ADMIN — MAGNESIUM SULFATE HEPTAHYDRATE 2 G: 40 INJECTION, SOLUTION INTRAVENOUS at 09:27

## 2023-02-15 RX ADMIN — ACETAMINOPHEN 650 MG: 325 TABLET ORAL at 05:32

## 2023-02-15 RX ADMIN — LIDOCAINE 1 PATCH: 50 PATCH CUTANEOUS at 09:28

## 2023-02-15 RX ADMIN — HEPARIN SODIUM 5000 UNITS: 5000 INJECTION INTRAVENOUS; SUBCUTANEOUS at 13:48

## 2023-02-15 RX ADMIN — ACETAMINOPHEN 650 MG: 325 TABLET ORAL at 21:33

## 2023-02-15 RX ADMIN — ACETAMINOPHEN 650 MG: 325 TABLET ORAL at 16:27

## 2023-02-15 RX ADMIN — BUDESONIDE AND FORMOTEROL FUMARATE DIHYDRATE 2 PUFF: 80; 4.5 AEROSOL RESPIRATORY (INHALATION) at 09:30

## 2023-02-15 NOTE — ASSESSMENT & PLAN NOTE
· Patient is AAOx4  CN 2-12 are intact  No focal deficit  No confusion, dysarthria, or extremity weakness  Mildly slurred speech  · Blood sugar elevated this morning at 149  · CTA of head negative for any large vessel occlusion  · Will continue to monitor patient's symptoms    · Please see assessment and plan for type 2 diabetes mellitus with hypoglycemia without coma without long-term current use of insulin

## 2023-02-15 NOTE — ASSESSMENT & PLAN NOTE
· Normal bowel movement yesterday with no diarrhea  · IV fluids isolate 75 mL/h  · Negative stool bacterial PCR and rotavirus   No risk factors for C  difficile identified

## 2023-02-15 NOTE — ASSESSMENT & PLAN NOTE
Lab Results   Component Value Date    HGBA1C 7 8 (H) 09/23/2022       Recent Labs     02/14/23  0801 02/14/23  1137 02/14/23  1621 02/14/23  2205   POCGLU 225* 196* 198* 164*       Blood Sugar Average: Last 72 hrs:  (P) 113 [id number]  · We will hold home oral medications  · Continue to monitor blood sugar with ACHS accuchecks  · Blood sugar elevated this AM   · Repeat HbA1C - pending  · Follow AM labs: CBC, CMP, Mg, Phos, Procalcitonin, PT-INR  · Ordered glucose meter kit to go home with patient upon discharge  · Discussed with case management - will contact pharmacy to see if glucometer and strips can be covered by patient's insurance  · Will likely discharge tomorrow

## 2023-02-15 NOTE — PROGRESS NOTES
[address] [facility] [address]  Progress Note - Flakita Peng 1945, 65 y o  female MRN: [id number]  Unit/Bed#: 403-01 Encounter: [id number]  Primary Care Provider: Kunal Brown DO   Date and time admitted to hospital: 2/12/2023 11:20 PM    * Type 2 diabetes mellitus with hypoglycemia without coma, without long-term current use of insulin [facility])  Assessment & Plan  Lab Results   Component Value Date    HGBA1C 7 8 (H) 09/23/2022       Recent Labs     02/14/23  0801 02/14/23  1137 02/14/23  1621 02/14/23  2205   POCGLU 225* 196* 198* 164*       Blood Sugar Average: Last 72 hrs:  (P) 332 [id number]  · We will hold home oral medications  · Continue to monitor blood sugar with ACHS accuchecks  · Blood sugar elevated this AM  Discontinue D10 W infusion  · Repeat HbA1C  · Follow AM labs: CBC, CMP, Mg, Phos, Procalcitonin, PT-INR  · Ordered glucose meter kit to go home with patient upon discharge  · Discussed with case management - will contact pharmacy to see if glucometer and strips can be covered by patient's insurance    Acute metabolic encephalopathy  Assessment & Plan  · Patient is AAOx4  CN 2-12 are intact  No focal deficit  No confusion, dysarthria, or extremity weakness  Mildly slurred speech  · Blood sugar elevated this morning at 146  · CTA of head negative for any large vessel occlusion  · Will continue to monitor patient's symptoms  · Please see assessment and plan for type 2 diabetes mellitus with hypoglycemia without coma without long-term current use of insulin    Diarrhea  Assessment & Plan  · Normal bowel movement yesterday with no diarrhea  · IV fluids isolate 75 mL/h  · Awaiting results of stool bacterial PCR, rotavirus   No risk factors for C  difficile identified      Fall  Assessment & Plan  · Patient sustained fall on unit (2/13) when attempting to ambulate without assitance  · No reported of loss of consciousness, CT head negative for acute abnormality, CT cervical spine reviewed, no acute traumatic injury  · Patient continues to report significant pain on buttocks  · Tylenol 650 mg every 6 hours  · Lidoderm patch  · Awaiting results of sacral x-ray to rule-out fracture  · Nursing obtained coccyx pillow for patient  · Oxycodone 2 5 mg q4 hours PRN - moderate pain  · Oxycodone 5 mg q4 hours PRN - severe pain    COPD (chronic obstructive pulmonary disease) ([facility])  Assessment & Plan  · Not in acute exacerbation at this time  · Continue home inhalers    Essential hypertension  Assessment & Plan  · Current blood pressure is 144/61  · Continue lisinopril hydrochlorothiazide 10-12 5 oral daily        VTE Pharmacologic Prophylaxis: VTE Score: 10 High Risk (Score >/= 5) - Pharmacological DVT Prophylaxis Ordered: heparin  Sequential Compression Devices Ordered  Discussions with Specialists or Other Care Team Provider: Dr Natali Cadena, nurse team, case management    Education and Discussions with Family / Patient: Provider will contact family for collateral information    Time Spent for Care: 61  More than 50% of total time spent on counseling and coordination of care as described above  Current Length of Stay: 3 day(s)  Current Patient Status: Inpatient   Certification Statement: The patient, admitted on an observation basis, will now require > 2 midnight hospital stay due to need for medical treatment of hypoglycemia  Code Status: Level 3 - DNAR and DNI    Subjective:   Patient seen sitting comfortably in recliner chair today  She is AAOx3 today and is more engaging today  Speech is less slurred and cognition is improved  She continues to report pain in her buttock and reports right-sided rib pain both rated 8/10  She remains unable to ambulate or stand without assistance  Today, she reports falling 3x in room since admission and denies any recent falls at home   She also reports recent, unintentional weight loss of ~ 30 lbs, which is consistent with chart review showing a weight loss of 17 46% in the last 6 months  Pt is agreeable to begin physical rehabilitation after discharge  No further complaints  Review of Systems - History obtained from the patient  General ROS: negative for - chills, fatigue, fever or sleep disturbance  Respiratory ROS: denies any SOB or wheezing  Cardiovascular ROS: no chest pain or dyspnea on exertion  Gastrointestinal ROS: no abdominal pain, change in bowel habits, or black or bloody stools  Genito-Urinary ROS: no dysuria, trouble voiding, or hematuria  Musculoskeletal ROS: positive for - pain in buttock following recent fall  Neurological ROS: no TIA or stroke symptoms; negative for - behavioral changes, confusion, dizziness, headaches, numbness/tingling, visual changes; positive for - slurred speech, memory issues    Objective:     Vitals:   Temp (24hrs), Av 2 °F (36 8 °C), Min:98 °F (36 7 °C), Max:98 5 °F (36 9 °C)    Temp:  [98 °F (36 7 °C)-98 5 °F (36 9 °C)] 98 1 °F (36 7 °C)  HR:  [63-74] 74  Resp:  [18] 18  BP: ()/() 101/57  SpO2:  [90 %-97 %] 91 %  Body mass index is 22 78 kg/m²  Input and Output Summary (last 24 hours): Intake/Output Summary (Last 24 hours) at 2/15/2023 0729  Last data filed at 2023 1705  Gross per 24 hour   Intake 560 ml   Output 100 ml   Net 460 ml       Physical Exam:   Physical Exam  Vitals reviewed  Constitutional:       General: She is awake  She is not in acute distress  HENT:      Head: Normocephalic  Right Ear: Decreased hearing noted  Left Ear: Decreased hearing noted  Cardiovascular:      Rate and Rhythm: Normal rate and regular rhythm  Heart sounds: Normal heart sounds  Pulmonary:      Effort: Pulmonary effort is normal       Breath sounds: Stridor (mild) present  Wheezing (mild) present  Abdominal:      General: Bowel sounds are normal  There is no distension  Palpations: Abdomen is soft     Musculoskeletal:      Comments: Slightly improved ROM   Skin:     General: Skin is warm and dry  Capillary Refill: Capillary refill takes less than 2 seconds  Neurological:      General: No focal deficit present  Mental Status: She is alert and oriented to person, place, and time  GCS: GCS eye subscore is 4  GCS verbal subscore is 5  GCS motor subscore is 6  Cranial Nerves: Cranial nerves 2-12 are intact  Sensory: No sensory deficit  Motor: Weakness: pain with movement due to recent fall  Coordination: Coordination is intact  Finger-Nose-Finger Test normal       Comments: Unable to ambulate without assistance   Psychiatric:         Mood and Affect: Mood normal          Speech: Speech is slurred  Behavior: Behavior normal          Thought Content: Thought content normal          Cognition and Memory: Memory is impaired         Additional Data:     Labs:  Results from last 7 days   Lab Units 02/15/23  0405   WBC Thousand/uL 8 75   HEMOGLOBIN g/dL 12 5   HEMATOCRIT % 38 6   PLATELETS Thousands/uL 223   NEUTROS PCT % 69   LYMPHS PCT % 18   MONOS PCT % 9   EOS PCT % 3     Results from last 7 days   Lab Units 02/15/23  0405   SODIUM mmol/L 133*   POTASSIUM mmol/L 3 1*   CHLORIDE mmol/L 94*   CO2 mmol/L 31   BUN mg/dL 15   CREATININE mg/dL 0 80   ANION GAP mmol/L 8   CALCIUM mg/dL 8 1*   ALBUMIN g/dL 3 0*   TOTAL BILIRUBIN mg/dL 1 08*   ALK PHOS U/L 55   ALT U/L 5*   AST U/L 12*   GLUCOSE RANDOM mg/dL 146*     Results from last 7 days   Lab Units 02/15/23  0405   INR  1 12     Results from last 7 days   Lab Units 02/14/23  2205 02/14/23  1621 02/14/23  1137 02/14/23  0801 02/14/23  0553 02/14/23  0340 02/14/23  0240 02/14/23  0035 02/13/23  2234 02/13/23  2003 02/13/23  1857 02/13/23  1805   POC GLUCOSE mg/dl 164* 198* 196* 225* 230* 169* 152* 126 102 93 102 67         Results from last 7 days   Lab Units 02/15/23  0405   PROCALCITONIN ng/ml 0 07       Lines/Drains:  Invasive Devices     Peripheral Intravenous Line  Duration           Peripheral IV 02/13/23 Left Antecubital 1 day                Imaging: Reviewed radiology reports from this admission including: chest xray  X-ray Impression: Chronic left lower lobe opacity - history of Covid pneumonia  Recent Cultures (last 7 days):       Last 24 Hours Medication List:   Current Facility-Administered Medications   Medication Dose Route Frequency Provider Last Rate   • acetaminophen  650 mg Oral Q6H PRN Donnell Nissen, PA-C     • acetaminophen  650 mg Oral Q6H Rene levy DO     • albuterol  2 5 mg Nebulization Q6H PRN Donnell Nissen, PA-C     • aspirin  81 mg Oral Daily Donnell Nissen, PA-C     • budesonide-formoterol  2 puff Inhalation BID Donnell Nissen, PA-C     • cholecalciferol  2,000 Units Oral Daily Donnell Nissen, PA-C     • heparin (porcine)  5,000 Units Subcutaneous [facility] Donnell Nissen, PA-C     • lisinopril  10 mg Oral Daily Donnell Nissen, PA-C      And   • hydrochlorothiazide  12 5 mg Oral Daily Donnell Nissen, PA-C     • lidocaine  1 patch Topical Daily Rene Cheng DO     • nicotine  1 patch Transdermal Daily Donnell Nissen, PA-C     • ondansetron  4 mg Intravenous Q6H PRN Donnell Nissen, PA-C     • oxyCODONE  2 5 mg Oral Q4H PRN Rene Gamingol, DO     • oxyCODONE  5 mg Oral Q4H PRN Rene Gamingol, DO     • pravastatin  80 mg Oral Daily With AILEEN Lagos     • sertraline  50 mg Oral Daily Donnell Nissen, PA-C     • umeclidinium  1 puff Inhalation Daily Donnell Nissen, PA-C          Today, Patient Was Seen By: Alysha Reno DO    **Please Note: This note may have been constructed using a voice recognition system  **

## 2023-02-15 NOTE — ASSESSMENT & PLAN NOTE
· Patient sustained fall on unit (2/13) when attempting to ambulate without assitance  · No reported of loss of consciousness, CT head negative for acute abnormality, CT cervical spine reviewed, no acute traumatic injury  · Patient continues to report significant pain on buttocks  · Tylenol 650 mg every 6 hours  · Lidoderm patch  · Sacral x-ray was unremarkable for findings - rule-out fracture  · Nursing obtained coccyx pillow for patient  · Oxycodone 2 5 mg q4 hours PRN - moderate pain  · Oxycodone 5 mg q4 hours PRN - severe pain  · Discussed importance of physical rehabilitation to regain ability to ambulate without assistance

## 2023-02-15 NOTE — PLAN OF CARE
Problem: PAIN - ADULT  Goal: Verbalizes/displays adequate comfort level or baseline comfort level  Description: Interventions:  - Encourage patient to monitor pain and request assistance  - Assess pain using appropriate pain scale  - Administer analgesics based on type and severity of pain and evaluate response  - Implement non-pharmacological measures as appropriate and evaluate response  - Consider cultural and social influences on pain and pain management  - Notify physician/advanced practitioner if interventions unsuccessful or patient reports new pain  Outcome: Progressing     Problem: INFECTION - ADULT  Goal: Absence or prevention of progression during hospitalization  Description: INTERVENTIONS:  - Assess and monitor for signs and symptoms of infection  - Monitor lab/diagnostic results  - Monitor all insertion sites, i e  indwelling lines, tubes, and drains  - Monitor endotracheal if appropriate and nasal secretions for changes in amount and color  - [place] appropriate cooling/warming therapies per order  - Administer medications as ordered  - Instruct and encourage patient and family to use good hand hygiene technique  - Identify and instruct in appropriate isolation precautions for identified infection/condition  Outcome: Progressing     Problem: SAFETY ADULT  Goal: Patient will remain free of falls  Description: INTERVENTIONS:  - Educate patient/family on patient safety including physical limitations  - Instruct patient to call for assistance with activity   - Consult OT/PT to assist with strengthening/mobility   - Keep Call bell within reach  - Keep bed low and locked with side rails adjusted as appropriate  - Keep care items and personal belongings within reach  - Initiate and maintain comfort rounds  - Make Fall Risk Sign visible to staff  - Offer Toileting every 2 Hours, in advance of need  - Initiate/Maintain bed/chair alarm  - Obtain necessary fall risk management equipment: non skid socks/fall braclet  - Apply yellow socks and bracelet for high fall risk patients  - Consider moving patient to room near nurses station  Outcome: Progressing  Goal: Maintain or return to baseline ADL function  Description: INTERVENTIONS:  -  Assess patient's ability to carry out ADLs; assess patient's baseline for ADL function and identify physical deficits which impact ability to perform ADLs (bathing, care of mouth/teeth, toileting, grooming, dressing, etc )  - Assess/evaluate cause of self-care deficits   - Assess range of motion  - Assess patient's mobility; develop plan if impaired  - Assess patient's need for assistive devices and provide as appropriate  - Encourage maximum independence but intervene and supervise when necessary  - Involve family in performance of ADLs  - Assess for home care needs following discharge   - Consider OT consult to assist with ADL evaluation and planning for discharge  - Provide patient education as appropriate  Outcome: Progressing  Goal: Maintains/Returns to pre admission functional level  Description: INTERVENTIONS:  - Perform BMAT or MOVE assessment daily    - Set and communicate daily mobility goal to care team and patient/family/caregiver  - Collaborate with rehabilitation services on mobility goals if consulted  - Perform Range of Motion 3 times a day  - Reposition patient every 3 hours    - Dangle patient 3 times a day  - Stand patient 3 times a day  - Ambulate patient 3 times a day  - Out of bed to chair 3 times a day   - Out of bed for meals 3 times a day  - Out of bed for toileting  - Record patient progress and toleration of activity level   Outcome: Progressing     Problem: DISCHARGE PLANNING  Goal: Discharge to home or other facility with appropriate resources  Description: INTERVENTIONS:  - Identify barriers to discharge w/patient and caregiver  - Arrange for needed discharge resources and transportation as appropriate  - Identify discharge learning needs (meds, wound care, etc )  - Arrange for interpretive services to assist at discharge as needed  - Refer to Case Management Department for coordinating discharge planning if the patient needs post-hospital services based on physician/advanced practitioner order or complex needs related to functional status, cognitive ability, or social support system  Outcome: Progressing     Problem: Knowledge Deficit  Goal: Patient/family/caregiver demonstrates understanding of disease process, treatment plan, medications, and discharge instructions  Description: Complete learning assessment and assess knowledge base    Interventions:  - Provide teaching at level of understanding  - Provide teaching via preferred learning methods  Outcome: Progressing     Problem: RESPIRATORY - ADULT  Goal: Achieves optimal ventilation and oxygenation  Description: INTERVENTIONS:  - Assess for changes in respiratory status  - Assess for changes in mentation and behavior  - Position to facilitate oxygenation and minimize respiratory effort  - Oxygen administered by appropriate delivery if ordered  - Initiate smoking cessation education as indicated  - Encourage broncho-pulmonary hygiene including cough, deep breathe, Incentive Spirometry  - Assess the need for suctioning and aspirate as needed  - Assess and instruct to report SOB or any respiratory difficulty  - Respiratory Therapy support as indicated  Outcome: Progressing     Problem: METABOLIC, FLUID AND ELECTROLYTES - ADULT  Goal: Electrolytes maintained within normal limits  Description: INTERVENTIONS:  - Monitor labs and assess patient for signs and symptoms of electrolyte imbalances  - Administer electrolyte replacement as ordered  - Monitor response to electrolyte replacements, including repeat lab results as appropriate  - Instruct patient on fluid and nutrition as appropriate  Outcome: Progressing     Problem: Prexisting or High Potential for Compromised Skin Integrity  Goal: Skin integrity is maintained or improved  Description: INTERVENTIONS:  - Identify patients at risk for skin breakdown  - Assess and monitor skin integrity  - Assess and monitor nutrition and hydration status  - Monitor labs   - Assess for incontinence   - Turn and reposition patient  - Assist with mobility/ambulation  - Relieve pressure over bony prominences  - Avoid friction and shearing  - Provide appropriate hygiene as needed including keeping skin clean and dry  - Evaluate need for skin moisturizer/barrier cream  - Collaborate with interdisciplinary team   - Patient/family teaching  - Consider wound care consult   Outcome: Progressing     Problem: MOBILITY - ADULT  Goal: Maintain or return to baseline ADL function  Description: INTERVENTIONS:  -  Assess patient's ability to carry out ADLs; assess patient's baseline for ADL function and identify physical deficits which impact ability to perform ADLs (bathing, care of mouth/teeth, toileting, grooming, dressing, etc )  - Assess/evaluate cause of self-care deficits   - Assess range of motion  - Assess patient's mobility; develop plan if impaired  - Assess patient's need for assistive devices and provide as appropriate  - Encourage maximum independence but intervene and supervise when necessary  - Involve family in performance of ADLs  - Assess for home care needs following discharge   - Consider OT consult to assist with ADL evaluation and planning for discharge  - Provide patient education as appropriate  Outcome: Progressing  Goal: Maintains/Returns to pre admission functional level  Description: INTERVENTIONS:  - Perform BMAT or MOVE assessment daily    - Set and communicate daily mobility goal to care team and patient/family/caregiver  - Collaborate with rehabilitation services on mobility goals if consulted  - Perform Range of Motion 3 times a day  - Reposition patient every 3 hours    - Dangle patient 3 times a day  - Stand patient 3 times a day  - Ambulate patient 3 times a day  - Out of bed to chair 3 times a day   - Out of bed for meals 3 times a day  - Out of bed for toileting  - Record patient progress and toleration of activity level   Outcome: Progressing

## 2023-02-16 VITALS
HEIGHT: 65 IN | OXYGEN SATURATION: 91 % | RESPIRATION RATE: 20 BRPM | HEART RATE: 75 BPM | BODY MASS INDEX: 22.92 KG/M2 | SYSTOLIC BLOOD PRESSURE: 139 MMHG | DIASTOLIC BLOOD PRESSURE: 54 MMHG | TEMPERATURE: 98.8 F | WEIGHT: 137.57 LBS

## 2023-02-16 PROBLEM — E11.649 HYPOGLYCEMIA ASSOCIATED WITH TYPE 2 DIABETES MELLITUS (HCC): Status: ACTIVE | Noted: 2023-02-16

## 2023-02-16 LAB
GLUCOSE SERPL-MCNC: 133 MG/DL (ref 65–140)
GLUCOSE SERPL-MCNC: 161 MG/DL (ref 65–140)
SARS-COV-2 RNA RESP QL NAA+PROBE: NEGATIVE

## 2023-02-16 RX ORDER — ASPIRIN 81 MG/1
81 TABLET ORAL DAILY
Qty: 30 TABLET | Refills: 0 | Status: ON HOLD | OUTPATIENT
Start: 2023-02-17 | End: 2023-03-19

## 2023-02-16 RX ORDER — LISINOPRIL 10 MG/1
10 TABLET ORAL DAILY
Qty: 30 TABLET | Refills: 0 | Status: ON HOLD | OUTPATIENT
Start: 2023-02-16

## 2023-02-16 RX ORDER — HYDROCHLOROTHIAZIDE 12.5 MG/1
12.5 TABLET ORAL DAILY
Qty: 30 TABLET | Refills: 0 | Status: ON HOLD | OUTPATIENT
Start: 2023-02-17 | End: 2023-03-19

## 2023-02-16 RX ORDER — LANCETS
1 EACH MISCELLANEOUS DAILY
Qty: 102 EACH | Refills: 0 | Status: ON HOLD | OUTPATIENT
Start: 2023-02-16

## 2023-02-16 RX ORDER — PRAVASTATIN SODIUM 80 MG/1
80 TABLET ORAL
Qty: 30 TABLET | Refills: 0 | Status: ON HOLD | OUTPATIENT
Start: 2023-02-16 | End: 2023-03-18

## 2023-02-16 RX ADMIN — ASPIRIN 81 MG: 81 TABLET, COATED ORAL at 08:24

## 2023-02-16 RX ADMIN — CHOLECALCIFEROL TAB 25 MCG (1000 UNIT) 2000 UNITS: 25 TAB at 08:24

## 2023-02-16 RX ADMIN — BUDESONIDE AND FORMOTEROL FUMARATE DIHYDRATE 2 PUFF: 80; 4.5 AEROSOL RESPIRATORY (INHALATION) at 08:41

## 2023-02-16 RX ADMIN — LIDOCAINE 1 PATCH: 50 PATCH CUTANEOUS at 08:25

## 2023-02-16 RX ADMIN — LISINOPRIL 10 MG: 10 TABLET ORAL at 08:24

## 2023-02-16 RX ADMIN — HEPARIN SODIUM 5000 UNITS: 5000 INJECTION INTRAVENOUS; SUBCUTANEOUS at 14:00

## 2023-02-16 RX ADMIN — UMECLIDINIUM 1 PUFF: 62.5 AEROSOL, POWDER ORAL at 08:41

## 2023-02-16 RX ADMIN — ACETAMINOPHEN 650 MG: 325 TABLET ORAL at 10:27

## 2023-02-16 RX ADMIN — SERTRALINE HYDROCHLORIDE 50 MG: 50 TABLET ORAL at 08:25

## 2023-02-16 RX ADMIN — ACETAMINOPHEN 650 MG: 325 TABLET ORAL at 05:03

## 2023-02-16 RX ADMIN — HYDROCHLOROTHIAZIDE 12.5 MG: 12.5 TABLET ORAL at 08:24

## 2023-02-16 RX ADMIN — HEPARIN SODIUM 5000 UNITS: 5000 INJECTION INTRAVENOUS; SUBCUTANEOUS at 05:03

## 2023-02-16 NOTE — PLAN OF CARE
Problem: PAIN - ADULT  Goal: Verbalizes/displays adequate comfort level or baseline comfort level  Description: Interventions:  - Encourage patient to monitor pain and request assistance  - Assess pain using appropriate pain scale  - Administer analgesics based on type and severity of pain and evaluate response  - Implement non-pharmacological measures as appropriate and evaluate response  - Consider cultural and social influences on pain and pain management  - Notify physician/advanced practitioner if interventions unsuccessful or patient reports new pain  2/16/2023 1457 by Samra Kulkarni RN  Outcome: Completed  2/16/2023 0733 by Samra Kulkarni RN  Outcome: Progressing     Problem: INFECTION - ADULT  Goal: Absence or prevention of progression during hospitalization  Description: INTERVENTIONS:  - Assess and monitor for signs and symptoms of infection  - Monitor lab/diagnostic results  - Monitor all insertion sites, i e  indwelling lines, tubes, and drains  - Monitor endotracheal if appropriate and nasal secretions for changes in amount and color  - [place] appropriate cooling/warming therapies per order  - Administer medications as ordered  - Instruct and encourage patient and family to use good hand hygiene technique  - Identify and instruct in appropriate isolation precautions for identified infection/condition  2/16/2023 1457 by Samra Kulkarni RN  Outcome: Completed  2/16/2023 0733 by Samra Kulkarni RN  Outcome: Progressing     Problem: SAFETY ADULT  Goal: Patient will remain free of falls  Description: INTERVENTIONS:  - Educate patient/family on patient safety including physical limitations  - Instruct patient to call for assistance with activity   - Consult OT/PT to assist with strengthening/mobility   - Keep Call bell within reach  - Keep bed low and locked with side rails adjusted as appropriate  - Keep care items and personal belongings within reach  - Initiate and maintain comfort rounds  - Make Fall Risk Sign visible to staff  - Offer Toileting every 2 Hours, in advance of need  - Initiate/Maintain bed/chair alarm  - Obtain necessary fall risk management equipment: non skid socks/fall braclet  - Apply yellow socks and bracelet for high fall risk patients  - Consider moving patient to room near nurses station  2/16/2023 1457 by Tristan Casper RN  Outcome: Completed  2/16/2023 0733 by Tristan Casper RN  Outcome: Progressing  Goal: Maintain or return to baseline ADL function  Description: INTERVENTIONS:  -  Assess patient's ability to carry out ADLs; assess patient's baseline for ADL function and identify physical deficits which impact ability to perform ADLs (bathing, care of mouth/teeth, toileting, grooming, dressing, etc )  - Assess/evaluate cause of self-care deficits   - Assess range of motion  - Assess patient's mobility; develop plan if impaired  - Assess patient's need for assistive devices and provide as appropriate  - Encourage maximum independence but intervene and supervise when necessary  - Involve family in performance of ADLs  - Assess for home care needs following discharge   - Consider OT consult to assist with ADL evaluation and planning for discharge  - Provide patient education as appropriate  2/16/2023 1457 by Tristan Casper RN  Outcome: Completed  2/16/2023 0733 by Tristan Casper RN  Outcome: Progressing  Goal: Maintains/Returns to pre admission functional level  Description: INTERVENTIONS:  - Perform BMAT or MOVE assessment daily    - Set and communicate daily mobility goal to care team and patient/family/caregiver  - Collaborate with rehabilitation services on mobility goals if consulted  - Perform Range of Motion 3 times a day  - Reposition patient every 3 hours    - Dangle patient 3 times a day  - Stand patient 3 times a day  - Ambulate patient 3 times a day  - Out of bed to chair 3 times a day   - Out of bed for meals 3 times a day  - Out of bed for toileting  - Record patient progress and toleration of activity level   2/16/2023 1457 by Astrid Benavides RN  Outcome: Completed  2/16/2023 0733 by Astrid Benavides RN  Outcome: Progressing     Problem: DISCHARGE PLANNING  Goal: Discharge to home or other facility with appropriate resources  Description: INTERVENTIONS:  - Identify barriers to discharge w/patient and caregiver  - Arrange for needed discharge resources and transportation as appropriate  - Identify discharge learning needs (meds, wound care, etc )  - Arrange for interpretive services to assist at discharge as needed  - Refer to Case Management Department for coordinating discharge planning if the patient needs post-hospital services based on physician/advanced practitioner order or complex needs related to functional status, cognitive ability, or social support system  2/16/2023 1457 by Astrid Benavides RN  Outcome: Completed  2/16/2023 0733 by Astrid Benavides RN  Outcome: Progressing     Problem: Knowledge Deficit  Goal: Patient/family/caregiver demonstrates understanding of disease process, treatment plan, medications, and discharge instructions  Description: Complete learning assessment and assess knowledge base    Interventions:  - Provide teaching at level of understanding  - Provide teaching via preferred learning methods  2/16/2023 1457 by Astrid Benavides RN  Outcome: Completed  2/16/2023 0733 by Astrid Benavides RN  Outcome: Progressing     Problem: RESPIRATORY - ADULT  Goal: Achieves optimal ventilation and oxygenation  Description: INTERVENTIONS:  - Assess for changes in respiratory status  - Assess for changes in mentation and behavior  - Position to facilitate oxygenation and minimize respiratory effort  - Oxygen administered by appropriate delivery if ordered  - Initiate smoking cessation education as indicated  - Encourage broncho-pulmonary hygiene including cough, deep breathe, Incentive Spirometry  - Assess the need for suctioning and aspirate as needed  - Assess and instruct to report SOB or any respiratory difficulty  - Respiratory Therapy support as indicated  2/16/2023 1457 by Marlen De La Rosa RN  Outcome: Completed  2/16/2023 0733 by Marlen De La Rosa RN  Outcome: Progressing     Problem: METABOLIC, FLUID AND ELECTROLYTES - ADULT  Goal: Electrolytes maintained within normal limits  Description: INTERVENTIONS:  - Monitor labs and assess patient for signs and symptoms of electrolyte imbalances  - Administer electrolyte replacement as ordered  - Monitor response to electrolyte replacements, including repeat lab results as appropriate  - Instruct patient on fluid and nutrition as appropriate  2/16/2023 1457 by Marlen De La Rosa RN  Outcome: Completed  2/16/2023 0733 by Marlen De La Rosa RN  Outcome: Progressing     Problem: Prexisting or High Potential for Compromised Skin Integrity  Goal: Skin integrity is maintained or improved  Description: INTERVENTIONS:  - Identify patients at risk for skin breakdown  - Assess and monitor skin integrity  - Assess and monitor nutrition and hydration status  - Monitor labs   - Assess for incontinence   - Turn and reposition patient  - Assist with mobility/ambulation  - Relieve pressure over bony prominences  - Avoid friction and shearing  - Provide appropriate hygiene as needed including keeping skin clean and dry  - Evaluate need for skin moisturizer/barrier cream  - Collaborate with interdisciplinary team   - Patient/family teaching  - Consider wound care consult   2/16/2023 1457 by Marlen De La Rosa RN  Outcome: Completed  2/16/2023 0733 by Marlen De La Rosa RN  Outcome: Progressing     Problem: MOBILITY - ADULT  Goal: Maintain or return to baseline ADL function  Description: INTERVENTIONS:  -  Assess patient's ability to carry out ADLs; assess patient's baseline for ADL function and identify physical deficits which impact ability to perform ADLs (bathing, care of mouth/teeth, toileting, grooming, dressing, etc )  - Assess/evaluate cause of self-care deficits   - Assess range of motion  - Assess patient's mobility; develop plan if impaired  - Assess patient's need for assistive devices and provide as appropriate  - Encourage maximum independence but intervene and supervise when necessary  - Involve family in performance of ADLs  - Assess for home care needs following discharge   - Consider OT consult to assist with ADL evaluation and planning for discharge  - Provide patient education as appropriate  2/16/2023 1457 by Brad Gaston RN  Outcome: Completed  2/16/2023 0733 by Brad Gaston RN  Outcome: Progressing  Goal: Maintains/Returns to pre admission functional level  Description: INTERVENTIONS:  - Perform BMAT or MOVE assessment daily    - Set and communicate daily mobility goal to care team and patient/family/caregiver  - Collaborate with rehabilitation services on mobility goals if consulted  - Perform Range of Motion 3 times a day  - Reposition patient every 3 hours    - Dangle patient 3 times a day  - Stand patient 3 times a day  - Ambulate patient 3 times a day  - Out of bed to chair 3 times a day   - Out of bed for meals 3 times a day  - Out of bed for toileting  - Record patient progress and toleration of activity level   2/16/2023 1457 by Brad Gaston RN  Outcome: Completed  2/16/2023 0733 by Brad Gaston RN  Outcome: Progressing

## 2023-02-16 NOTE — PLAN OF CARE
Problem: PHYSICAL THERAPY ADULT  Goal: Performs mobility at highest level of function for planned discharge setting  See evaluation for individualized goals  Description: Treatment/Interventions: Functional transfer training, LE strengthening/ROM, Elevations, Therapeutic exercise, Endurance training, Bed mobility, Gait training          See flowsheet documentation for full assessment, interventions and recommendations  Outcome: Progressing  Note: Prognosis: Good  Problem List:  (Decreased strength; Decreased endurance; Impaired balance; Decreased mobility; Decreased cognition; Impaired judgement; Decreased safety awareness)  Assessment: Pt  seen for PT treatment session this date with interventions consisting of  Toilet transfers, transfers and  gait training w/ emphasis on improving pt's ability to ambulate  Pt  Requiring frequent  cues for sequence and safety  In comparison to previous session, Pt  With improvements in activity tolerance  Pt is in need of continued activity in PT to improve strength balance endurance mobility transfers and ambulation with return to maximize LOF  From PT/mobility standpoint, recommendation at time of d/c would be post acute rehab  in order to promote return to PLOF and independence  The patient's AM-[facility] Basic Mobility Inpatient Short Form Raw Score is 16  A Raw score of less than or equal to 16 suggests the patient may benefit from discharge to post-acute rehabilitation services  Please also refer to physical therapy recommendation for safe DC planning  PT Discharge Recommendation: Post acute rehabilitation services    See flowsheet documentation for full assessment

## 2023-02-16 NOTE — DISCHARGE SUMMARY
[address]  Discharge- Luca Ferguson 1945, 65 y o  female MRN: [id number]  Unit/Bed#: 403-01 Encounter: [id number]  Primary Care Provider: Abi Diallo DO   Date and time admitted to hospital: 2/12/2023 11:20 PM    * Type 2 diabetes mellitus with hypoglycemia without coma, without long-term current use of insulin [facility])  Assessment & Plan  Lab Results   Component Value Date    HGBA1C 7 8 (H) 09/23/2022       Recent Labs     02/15/23  1530 02/15/23  2059 02/16/23  0733 02/16/23  1054   POCGLU 194* 205* 133 161*       Blood Sugar Average: Last 72 hrs:  (P) 118 [id number]  · Continue Janumet home prescription; discontinue glipizide home medication  · Continue to monitor blood sugar with daily accu-checks at home  · Blood sugar elevated this AM   · Discharge to [facility] - [address] [facility]    Acute metabolic encephalopathy  Assessment & Plan  · Patient is AAOx4  CN 2-12 are intact  No focal deficit  No confusion, dysarthria, or extremity weakness  Improved speech  · CTA of head negative for any large vessel occlusion  · Please see assessment and plan for type 2 diabetes mellitus with hypoglycemia without coma without long-term current use of insulin    Diarrhea  Assessment & Plan  · Resolved  · Normal bowel movements daily  · IV fluids isolate 75 mL/h  · Negative stool bacterial PCR and rotavirus  No risk factors for C  difficile identified      Fall  Assessment & Plan  · Patient sustained fall on unit (2/13) when attempting to ambulate without assitance  · No reported of loss of consciousness, CT head negative for acute abnormality, CT cervical spine - no acute traumatic injury    · Patient reports minimal pain on buttocks, and mild pain on right-side ribs  · Sacral x-ray was unremarkable for findings - rule-out fracture  · Patient discharged to [facility] - [address] [facility] for physical rehabilitation to regain ability to stand and ambulate without assistance  COPD (chronic obstructive pulmonary disease) ([facility])  Assessment & Plan  · Not in acute exacerbation at this time  · Continue home inhalers    Essential hypertension  Assessment & Plan  · Current blood pressure is 139/54  · Continue lisinopril hydrochlorothiazide 10-12 5 oral daily    Patient discharged to [facility] for short term rehab  Medical Problems     Resolved Problems  Date Reviewed: 2/13/2023   None       Discharging Physician / Practitioner: Nichelle Garrett DO  PCP: Jose Conti DO  Admission Date:   Admission Orders (From admission, onward)     Ordered        02/13/23 1130  Inpatient Admission  Once            02/13/23 0047  Place in Observation  Once                      Discharge Date: 02/16/23    Consultations During Hospital Stay:  · None    Procedures Performed:   · None    Significant Findings / Test Results:   · None    Incidental Findings:   · None    Test Results Pending at Discharge (will require follow up): · None     Outpatient Tests Requested:  · None    Complications:  Unable to stand or ambulate without assistance    Reason for Admission: Acute onset of slurred speech, confusion in the setting of hypoglycemia    Hospital Course:   River Henson is a 65 y o  female patient with a PMH of hypertension, type 2 diabetes, COPD, hyperlipidemia who originally presented to the hospital on 2/12/2023 due to acute onset of slurred speech and confusion along with a right-sided arm drift  Patient had several days of nonbloody diarrhea  In field, found to have blood sugar of 53 by EMS  She was treated with IV fluids containing dextrose in the field and greatly improved  Initially, stroke alert was called, but due to improvement with dextrose, neurology did not recommend futrher stroke workup  CT head negative for any intracranial abnormalities  Patient admitted 2/13 for observation of metabolic encephalopathy, hypoglycemia, diarrhea      Day 1: Patient displaying persistent symptomatic hypoglycemia  Lowest blood sugar at 22  Patient in an obtunded, unarousable state  Patient transitioned to inpatient status  Changed D5 in NS IV fluids to D10 W  Ordered stool bacterial PCR and rotavirus lab to rule-out causes of diarrhea  Suspected hypoglycemia in setting of diarrheal illness  Later, mental status improved  Blood sugar stabilized on D10  Pt sustained fall in room when attempting to ambulate without assistance  No reported of loss of consciousness, CT head negative for acute abnormality, CT cervical spine negative for acute traumatic injury  Patient reporting significant pain in buttock from fall  Patient displaying slurred speech and poor memory  Continue monitoring hourly Accu-checks and monitoring mental status  Obtained collateral information from family  Follow-up with am labs  Day 2: Improved mental status  Elevated blood sugars between 150-230  Discontinued D10 W infusion  Continued with [facility] accu-checks  Patient continued to report significant pain in buttocks  Ordered sacral x-ray  Nursing obtained coccyx pillow  Reported normal BM without diarrhea  Continued pain control  Discussed with case management for short-term rehab placement since patient is a fall risk and unable to ambulate independently  Day 3: Mental status, speech, and cognition improved  Patient reports pain in buttocks and ribs with no improvement  Unable to ambulate without assistance  Blood sugars remained elevated  Continued to monitor sugars with [facility] accu-checks  Ordered standing Tylenol and lidoderm patch for pain control  Day 4: Patient reported significant improvement of pain in buttocks and continued to reports rib pain  Stable blood sugars  Negative stool cultures  Patient agreeable to transfer to rehab facility  Discharged to [facility] for short term rehabilitation  Please see above list of diagnoses and related plan for additional information       Condition at Discharge: stable    Discharge Day Visit / Exam:   Subjective:  Patient seen and evaluated in no acute distress in room, seen sitting comfortably in recliner chair  She reports improvement of pain in buttocks and reports more pain in right-sided ribs  She remains unable to stand or ambulate independently  She is agreeable to discharge to physical rehabilitation facility today  Vitals: Blood Pressure: 139/54 (02/16/23 0806)  Pulse: 75 (02/16/23 0806)  Temperature: 98 8 °F (37 1 °C) (02/16/23 0806)  Temp Source: Tympanic (02/14/23 2206)  Respirations: 20 (02/16/23 0806)  Height: 5' 5" (165 1 cm) (02/13/23 0152)  Weight - Scale: 62 4 kg (137 lb 9 1 oz) (02/16/23 0600)  SpO2: 91 % (02/16/23 0806)    Exam:   Physical Exam  Vitals reviewed  Constitutional:       General: She is not in acute distress  Appearance: Normal appearance  HENT:      Head: Normocephalic  Cardiovascular:      Rate and Rhythm: Normal rate and regular rhythm  Pulses: Normal pulses  Heart sounds: Normal heart sounds  Pulmonary:      Effort: Pulmonary effort is normal       Breath sounds: Normal breath sounds  Abdominal:      General: Bowel sounds are normal       Palpations: Abdomen is soft  Tenderness: There is no abdominal tenderness  There is no right CVA tenderness  Musculoskeletal:         General: Normal range of motion  Neurological:      General: No focal deficit present  Mental Status: She is alert and oriented to person, place, and time  Psychiatric:         Mood and Affect: Mood normal          Behavior: Behavior normal          Thought Content: Thought content normal           Discussion with Family: Updated  (daughter) via phone  Discharge instructions/Information to patient and family:   See after visit summary for information provided to patient and family  Continue daily monitoring of blood sugars with daily Accu-checks  Restart Janumet      Provisions for Follow-Up Care:  See after visit summary for information related to follow-up care and any pertinent home health orders  Disposition:   Willem Dejesus Adi at [facility]    Planned Readmission: None     Discharge Statement:  I spent 60 minutes discharging the patient  This time was spent on the day of discharge  I had direct contact with the patient on the day of discharge  Greater than 50% of the total time was spent examining patient, answering all patient questions, arranging and discussing plan of care with patient as well as directly providing post-discharge instructions  Additional time then spent on discharge activities  Discharge Medications:  See after visit summary for reconciled discharge medications provided to patient and/or family        **Please Note: This note may have been constructed using a voice recognition system**

## 2023-02-16 NOTE — PLAN OF CARE
Problem: PAIN - ADULT  Goal: Verbalizes/displays adequate comfort level or baseline comfort level  Description: Interventions:  - Encourage patient to monitor pain and request assistance  - Assess pain using appropriate pain scale  - Administer analgesics based on type and severity of pain and evaluate response  - Implement non-pharmacological measures as appropriate and evaluate response  - Consider cultural and social influences on pain and pain management  - Notify physician/advanced practitioner if interventions unsuccessful or patient reports new pain  Outcome: Progressing     Problem: INFECTION - ADULT  Goal: Absence or prevention of progression during hospitalization  Description: INTERVENTIONS:  - Assess and monitor for signs and symptoms of infection  - Monitor lab/diagnostic results  - Monitor all insertion sites, i e  indwelling lines, tubes, and drains  - Monitor endotracheal if appropriate and nasal secretions for changes in amount and color  - [place] appropriate cooling/warming therapies per order  - Administer medications as ordered  - Instruct and encourage patient and family to use good hand hygiene technique  - Identify and instruct in appropriate isolation precautions for identified infection/condition  Outcome: Progressing     Problem: SAFETY ADULT  Goal: Patient will remain free of falls  Description: INTERVENTIONS:  - Educate patient/family on patient safety including physical limitations  - Instruct patient to call for assistance with activity   - Consult OT/PT to assist with strengthening/mobility   - Keep Call bell within reach  - Keep bed low and locked with side rails adjusted as appropriate  - Keep care items and personal belongings within reach  - Initiate and maintain comfort rounds  - Make Fall Risk Sign visible to staff  - Offer Toileting every 2 Hours, in advance of need  - Initiate/Maintain bed/chair alarm  - Obtain necessary fall risk management equipment: non skid socks/fall braclet  - Apply yellow socks and bracelet for high fall risk patients  - Consider moving patient to room near nurses station  Outcome: Progressing  Goal: Maintain or return to baseline ADL function  Description: INTERVENTIONS:  -  Assess patient's ability to carry out ADLs; assess patient's baseline for ADL function and identify physical deficits which impact ability to perform ADLs (bathing, care of mouth/teeth, toileting, grooming, dressing, etc )  - Assess/evaluate cause of self-care deficits   - Assess range of motion  - Assess patient's mobility; develop plan if impaired  - Assess patient's need for assistive devices and provide as appropriate  - Encourage maximum independence but intervene and supervise when necessary  - Involve family in performance of ADLs  - Assess for home care needs following discharge   - Consider OT consult to assist with ADL evaluation and planning for discharge  - Provide patient education as appropriate  Outcome: Progressing  Goal: Maintains/Returns to pre admission functional level  Description: INTERVENTIONS:  - Perform BMAT or MOVE assessment daily    - Set and communicate daily mobility goal to care team and patient/family/caregiver  - Collaborate with rehabilitation services on mobility goals if consulted  - Perform Range of Motion 3 times a day  - Reposition patient every 3 hours    - Dangle patient 3 times a day  - Stand patient 3 times a day  - Ambulate patient 3 times a day  - Out of bed to chair 3 times a day   - Out of bed for meals 3 times a day  - Out of bed for toileting  - Record patient progress and toleration of activity level   Outcome: Progressing     Problem: DISCHARGE PLANNING  Goal: Discharge to home or other facility with appropriate resources  Description: INTERVENTIONS:  - Identify barriers to discharge w/patient and caregiver  - Arrange for needed discharge resources and transportation as appropriate  - Identify discharge learning needs (meds, wound care, etc )  - Arrange for interpretive services to assist at discharge as needed  - Refer to Case Management Department for coordinating discharge planning if the patient needs post-hospital services based on physician/advanced practitioner order or complex needs related to functional status, cognitive ability, or social support system  Outcome: Progressing     Problem: Knowledge Deficit  Goal: Patient/family/caregiver demonstrates understanding of disease process, treatment plan, medications, and discharge instructions  Description: Complete learning assessment and assess knowledge base    Interventions:  - Provide teaching at level of understanding  - Provide teaching via preferred learning methods  Outcome: Progressing     Problem: RESPIRATORY - ADULT  Goal: Achieves optimal ventilation and oxygenation  Description: INTERVENTIONS:  - Assess for changes in respiratory status  - Assess for changes in mentation and behavior  - Position to facilitate oxygenation and minimize respiratory effort  - Oxygen administered by appropriate delivery if ordered  - Initiate smoking cessation education as indicated  - Encourage broncho-pulmonary hygiene including cough, deep breathe, Incentive Spirometry  - Assess the need for suctioning and aspirate as needed  - Assess and instruct to report SOB or any respiratory difficulty  - Respiratory Therapy support as indicated  Outcome: Progressing     Problem: METABOLIC, FLUID AND ELECTROLYTES - ADULT  Goal: Electrolytes maintained within normal limits  Description: INTERVENTIONS:  - Monitor labs and assess patient for signs and symptoms of electrolyte imbalances  - Administer electrolyte replacement as ordered  - Monitor response to electrolyte replacements, including repeat lab results as appropriate  - Instruct patient on fluid and nutrition as appropriate  Outcome: Progressing     Problem: Prexisting or High Potential for Compromised Skin Integrity  Goal: Skin integrity is maintained or improved  Description: INTERVENTIONS:  - Identify patients at risk for skin breakdown  - Assess and monitor skin integrity  - Assess and monitor nutrition and hydration status  - Monitor labs   - Assess for incontinence   - Turn and reposition patient  - Assist with mobility/ambulation  - Relieve pressure over bony prominences  - Avoid friction and shearing  - Provide appropriate hygiene as needed including keeping skin clean and dry  - Evaluate need for skin moisturizer/barrier cream  - Collaborate with interdisciplinary team   - Patient/family teaching  - Consider wound care consult   Outcome: Progressing     Problem: MOBILITY - ADULT  Goal: Maintain or return to baseline ADL function  Description: INTERVENTIONS:  -  Assess patient's ability to carry out ADLs; assess patient's baseline for ADL function and identify physical deficits which impact ability to perform ADLs (bathing, care of mouth/teeth, toileting, grooming, dressing, etc )  - Assess/evaluate cause of self-care deficits   - Assess range of motion  - Assess patient's mobility; develop plan if impaired  - Assess patient's need for assistive devices and provide as appropriate  - Encourage maximum independence but intervene and supervise when necessary  - Involve family in performance of ADLs  - Assess for home care needs following discharge   - Consider OT consult to assist with ADL evaluation and planning for discharge  - Provide patient education as appropriate  Outcome: Progressing  Goal: Maintains/Returns to pre admission functional level  Description: INTERVENTIONS:  - Perform BMAT or MOVE assessment daily    - Set and communicate daily mobility goal to care team and patient/family/caregiver  - Collaborate with rehabilitation services on mobility goals if consulted  - Perform Range of Motion 3 times a day  - Reposition patient every 3 hours    - Dangle patient 3 times a day  - Stand patient 3 times a day  - Ambulate patient 3 times a day  - Out of bed to chair 3 times a day   - Out of bed for meals 3 times a day  - Out of bed for toileting  - Record patient progress and toleration of activity level   Outcome: Progressing

## 2023-02-16 NOTE — NURSING NOTE
AVS printed and reviewed with Tari Chacko at [place]  Patient and belongings transported by wheelchair  Emily Rciketts

## 2023-02-16 NOTE — ASSESSMENT & PLAN NOTE
· Resolved  · Normal bowel movements daily  · IV fluids isolate 75 mL/h  · Negative stool bacterial PCR and rotavirus   No risk factors for C  difficile identified

## 2023-02-16 NOTE — ASSESSMENT & PLAN NOTE
· Patient is AAOx4  CN 2-12 are intact  No focal deficit  No confusion, dysarthria, or extremity weakness  Improved speech  · CTA of head negative for any large vessel occlusion    · Please see assessment and plan for type 2 diabetes mellitus with hypoglycemia without coma without long-term current use of insulin

## 2023-02-16 NOTE — ASSESSMENT & PLAN NOTE
Lab Results   Component Value Date    HGBA1C 7 8 (H) 09/23/2022       Recent Labs     02/15/23  1530 02/15/23  2059 02/16/23  0733 02/16/23  1054   POCGLU 194* 205* 133 161*       Blood Sugar Average: Last 72 hrs:  (P) 948 [id number]  · Continue Janumet home prescription; discontinue glipizide home medication  · Continue to monitor blood sugar with daily accu-checks at home  · Blood sugar elevated this AM   · Discharge to [facility] - [address] [place] [address]

## 2023-02-16 NOTE — CASE MANAGEMENT
Case Management Discharge Planning Note    Patient name Kary Sifuentes  Location /285-06 MRN [id number]  : 1945 Date 2023       Current Admission Date: 2023  Current Admission Diagnosis:Type 2 diabetes mellitus with hypoglycemia without coma, without long-term current use of insulin [facility])   Patient Active Problem List    Diagnosis Date Noted   • Hypoglycemia associated with type 2 diabetes mellitus ([facility] ) 2023   • Diarrhea 2023   • Fall 2023   • Swelling of right hand 2022   • Hypercalcemia 2022   • Pyuria 2022   • Microscopic hematuria 2022   • Permanent atrial fibrillation ([facility] ) 2022   • Hypoxia 2022   • Hypomagnesemia 2022   • COPD (chronic obstructive pulmonary disease) ([facility] ) 2022   • Leukocytosis 2022   • Multiple pulmonary nodules 2022   • Acute metabolic encephalopathy    • History of intracranial hemorrhage 2022   • Hyponatremia 2022   • Hypokalemia 2022   • Hyperbilirubinemia 2022   • Type 2 diabetes mellitus with hypoglycemia without coma, without long-term current use of insulin ([facility] ) 2022   • Vitamin D deficiency 2022   • Chronic atrial fibrillation ([facility]  ) 2022   • Dermatitis 2022   • Acute on chronic respiratory failure with hypoxia and hypercapnia ([facility]  ) 10/18/2021   • Ambulatory dysfunction 10/18/2021   • Medicare annual wellness visit, subsequent 2018   • Tobacco use 2018   • Hyperlipidemia 10/02/2014   • Essential hypertension 10/02/2014      LOS (days): 3  Geometric Mean LOS (GMLOS) (days): 3 90  Days to GMLOS:0 9     OBJECTIVE:  Risk of Unplanned Readmission Score: 24 88         Current admission status: Inpatient   Preferred Pharmacy:   [facility], [address] [place] [id number]  Phone: [phone] Fax: [phone]    [address], [place] [address] [address] Stacie  Phone: [phone] Fax: [phone]    Primary Care Provider: Sang Ruiz DO    Primary Insurance: MEDICARE  Secondary Insurance: COMMERCIAL MISCELLANEOUS    DISCHARGE DETAILS:         Discharge Destination Plan[de-identified] Short Term Rehab, SNF ([facility])  Transport at Discharge : Wheelchair [employer] [facility] and Unit #):  [place]elle  ETA of Transport (Date): 02/16/23  ETA of Transport (Time): 1430

## 2023-02-16 NOTE — PHYSICAL THERAPY NOTE
PHYSICAL THERAPY NOTE          Patient Name: Bozena Capps  CIMTW'L Date: 2/16/2023 02/16/23 1339   PT Last Visit   PT Visit Date 02/16/23   Note Type   Note Type Treatment   Pain Assessment   Pain Assessment Tool 0-10   Pain Score No Pain   Restrictions/Precautions   Weight Bearing Precautions Per Order No   Other Precautions   (Fall Risk; Bed Alarm; Cognitive)   General   Response to Previous Treatment Patient with no complaints from previous session  Family/Caregiver Present No   Cognition   Overall Cognitive Status Impaired   Arousal/Participation Alert   Following Commands Follows one step commands without difficulty   Subjective   Subjective Agreeable to therapy  Needs to go to the bathroom  Bed Mobility   Additional Comments OOB in chair start and end of PT session  Transfers   Sit to Stand 4  Minimal assistance   Additional items Assist x 1; Armrests; Increased time required;Verbal cues   Stand to Sit 4  Minimal assistance   Additional items Assist x 1; Armrests; Increased time required   Toilet transfer 4  Minimal assistance   Additional items Increased time required;Standard toilet;Verbal cues   Additional Comments RW used  Able to manage clothing and hygiene with increased time  Ambulation/Elevation   Gait pattern Excessively slow;Decreased foot clearance; Improper Weight shift   Gait Assistance 4  Minimal assist   Additional items Assist x 1;Verbal cues   Assistive Device Rolling walker   Distance 60'   Balance   Static Sitting Fair +   Dynamic Sitting Fair   Static Standing Fair -   Dynamic Standing Poor +   Ambulatory Poor +  (RW)   Endurance Deficit   Endurance Deficit Yes   Activity Tolerance   Activity Tolerance Patient limited by fatigue   Assessment   Prognosis Good   Problem List   (Decreased strength; Decreased endurance; Impaired balance; Decreased mobility; Decreased cognition;  Impaired judgement; Decreased safety awareness)   Assessment Pt  seen for PT treatment session this date with interventions consisting of  Toilet transfers, transfers and  gait training w/ emphasis on improving pt's ability to ambulate  Pt  Requiring frequent  cues for sequence and safety  In comparison to previous session, Pt  With improvements in activity tolerance  Pt is in need of continued activity in PT to improve strength balance endurance mobility transfers and ambulation with return to maximize LOF  From PT/mobility standpoint, recommendation at time of d/c would be post acute rehab  in order to promote return to PLOF and independence  The patient's AM-PAC Basic Mobility Inpatient Short Form Raw Score is 16  A Raw score of less than or equal to 16 suggests the patient may benefit from discharge to post-acute rehabilitation services  Please also refer to physical therapy recommendation for safe DC planning  Goals   LTG Expiration Date 02/27/23   Plan   Treatment/Interventions   (Functional transfer training; LE strengthening/ROM; Elevations; Therapeutic exercise; Endurance training; Bed mobility; Gait training)   Progress Progressing toward goals   PT Frequency 3-5x/wk   Recommendation   PT Discharge Recommendation Post acute rehabilitation services   AM-PAC Basic Mobility Inpatient   Turning in Flat Bed Without Bedrails 3   Lying on Back to Sitting on Edge of Flat Bed Without Bedrails 2   Moving Bed to Chair 3   Standing Up From Chair Using Arms 3   Walk in Room 3   Climb 3-5 Stairs With Railing 2   Basic Mobility Inpatient Raw Score 16   Basic Mobility Standardized Score 38 32   Highest Level Of Mobility   JH-HLM Goal 5: Stand one or more mins   JH-HLM Achieved 7: Walk 25 feet or more   Education   Education Provided Mobility training   Patient Reinforcement needed   End of Consult   Patient Position at End of Consult Bedside chair;Bed/Chair alarm activated; All needs within reach   End of Consult Comments discussed POC with PT

## 2023-02-16 NOTE — PLAN OF CARE
Problem: PAIN - ADULT  Goal: Verbalizes/displays adequate comfort level or baseline comfort level  Description: Interventions:  - Encourage patient to monitor pain and request assistance  - Assess pain using appropriate pain scale  - Administer analgesics based on type and severity of pain and evaluate response  - Implement non-pharmacological measures as appropriate and evaluate response  - Consider cultural and social influences on pain and pain management  - Notify physician/advanced practitioner if interventions unsuccessful or patient reports new pain  Outcome: Progressing     Problem: INFECTION - ADULT  Goal: Absence or prevention of progression during hospitalization  Description: INTERVENTIONS:  - Assess and monitor for signs and symptoms of infection  - Monitor lab/diagnostic results  - Monitor all insertion sites, i e  indwelling lines, tubes, and drains  - Monitor endotracheal if appropriate and nasal secretions for changes in amount and color  - [place] appropriate cooling/warming therapies per order  - Administer medications as ordered  - Instruct and encourage patient and family to use good hand hygiene technique  - Identify and instruct in appropriate isolation precautions for identified infection/condition  Outcome: Progressing

## 2023-02-16 NOTE — ASSESSMENT & PLAN NOTE
· Patient sustained fall on unit (2/13) when attempting to ambulate without assitance  · No reported of loss of consciousness, CT head negative for acute abnormality, CT cervical spine - no acute traumatic injury  · Patient reports minimal pain on buttocks, and mild pain on right-side ribs  · Sacral x-ray was unremarkable for findings - rule-out fracture  · Patient discharged to [facility] facility for physical rehabilitation to regain ability to stand and ambulate without assistance

## 2023-02-16 NOTE — CASE MANAGEMENT
Case Management Discharge Planning Note    Patient name Nolberto Arriaza  Location /673-10 MRN [id number]  : 1945 Date 2023       Current Admission Date: 2023  Current Admission Diagnosis:Type 2 diabetes mellitus with hypoglycemia without coma, without long-term current use of insulin [facility])   Patient Active Problem List    Diagnosis Date Noted   • Hypoglycemia associated with type 2 diabetes mellitus ([facility] ) 2023   • Diarrhea 2023   • Fall 2023   • Swelling of right hand 2022   • Hypercalcemia 2022   • Pyuria 2022   • Microscopic hematuria 2022   • Permanent atrial fibrillation ([facility] ) 2022   • Hypoxia 2022   • Hypomagnesemia 2022   • COPD (chronic obstructive pulmonary disease) ([facility] ) 2022   • Leukocytosis 2022   • Multiple pulmonary nodules 2022   • Acute metabolic encephalopathy    • History of intracranial hemorrhage 2022   • Hyponatremia 2022   • Hypokalemia 2022   • Hyperbilirubinemia 2022   • Type 2 diabetes mellitus with hypoglycemia without coma, without long-term current use of insulin ([facility] ) 2022   • Vitamin D deficiency 2022   • Chronic atrial fibrillation ([facility]  ) 2022   • Dermatitis 2022   • Acute on chronic respiratory failure with hypoxia and hypercapnia ([facility]  ) 10/18/2021   • Ambulatory dysfunction 10/18/2021   • Medicare annual wellness visit, subsequent 2018   • Tobacco use 2018   • Hyperlipidemia 10/02/2014   • Essential hypertension 10/02/2014      LOS (days): 3  Geometric Mean LOS (GMLOS) (days): 3 90  Days to GMLOS:1     OBJECTIVE:  Risk of Unplanned Readmission Score: 24 82         Current admission status: Inpatient   Preferred Pharmacy:   [address], [address] [place] [id number]  Phone: [phone] Fax: [phone] [address] [place], [facility] [address]  Phone: [phone] Fax: [phone]    Primary Care Provider: Iris Barton DO    Primary Insurance: MEDICARE  Secondary Insurance: COMMERCIAL MISCELLANEOUS    DISCHARGE DETAILS:  Spoke with pt's daughter -Giulia Fields this am  Aware her mom likely will be ready for dc today to   [facility] ([address] [place] [facility])  CM also went over IMM with Melissa Almanza

## 2023-02-16 NOTE — ASSESSMENT & PLAN NOTE
Lab Results   Component Value Date    HGBA1C 7 8 (H) 09/23/2022       Recent Labs     02/15/23  1530 02/15/23  2059 02/16/23  0733 02/16/23  1054   POCGLU 194* 205* 133 161*       Blood Sugar Average: Last 72 hrs:  (P) 052 [id number]

## 2023-02-16 NOTE — CASE MANAGEMENT
Case Management Discharge Planning Note    Patient name Flakita Peng  Location /953-66 MRN [id number]  : 1945 Date 2023       Current Admission Date: 2023  Current Admission Diagnosis:Type 2 diabetes mellitus with hypoglycemia without coma, without long-term current use of insulin [facility])   Patient Active Problem List    Diagnosis Date Noted   • Hypoglycemia associated with type 2 diabetes mellitus ([facility] ) 2023   • Diarrhea 2023   • Fall 2023   • Swelling of right hand 2022   • Hypercalcemia 2022   • Pyuria 2022   • Microscopic hematuria 2022   • Permanent atrial fibrillation ([facility] ) 2022   • Hypoxia 2022   • Hypomagnesemia 2022   • COPD (chronic obstructive pulmonary disease) ([facility] ) 2022   • Leukocytosis 2022   • Multiple pulmonary nodules 2022   • Acute metabolic encephalopathy    • History of intracranial hemorrhage 2022   • Hyponatremia 2022   • Hypokalemia 2022   • Hyperbilirubinemia 2022   • Type 2 diabetes mellitus with hypoglycemia without coma, without long-term current use of insulin ([facility] ) 2022   • Vitamin D deficiency 2022   • Chronic atrial fibrillation ([facility]  ) 2022   • Dermatitis 2022   • Acute on chronic respiratory failure with hypoxia and hypercapnia ([facility]  ) 10/18/2021   • Ambulatory dysfunction 10/18/2021   • Medicare annual wellness visit, subsequent 2018   • Tobacco use 2018   • Hyperlipidemia 10/02/2014   • Essential hypertension 10/02/2014      LOS (days): 3  Geometric Mean LOS (GMLOS) (days): 3 90  Days to GMLOS:0 8     OBJECTIVE:  Risk of Unplanned Readmission Score: 24 88         Current admission status: Inpatient   Preferred Pharmacy:   [address], [address] [place] [id number]  Phone: [phone] Fax: [phone] [address] [place], [facility] [address]  Phone: [phone] Fax: [phone]    Primary Care Provider: Cali Butler DO    Primary Insurance: MEDICARE  Secondary Insurance: COMMERCIAL MISCELLANEOUS    DISCHARGE DETAILS:  Pt is agreeable to going to [facility] for STR and aware that she is getting picked up at 2:30pm today  Discharge Destination Plan[de-identified] Short Term Rehab, SNF ([facility])  Transport at Discharge : [facility] and Unit #):  [place]  ETA of Transport (Date): 02/16/23  ETA of Transport (Time): 6750

## 2023-02-16 NOTE — PROGRESS NOTES
-- Patient:  -- MRN: [id number]  -- Aidin Request ID: [id number]  -- Level of care reserved: Oleg Joshi  -- Partner Reserved: [address] [place] [address], [address] [place] [address],  [address] [phone]  -- Clinical needs requested:  -- Geography searched: [place] miles around [phone]  -- Start of Service:  -- Request sent: 12:42pm EST on 2/14/2023 by Cameron Baig  -- Partner reserved: 8:29am EST on 2/16/2023 by Varsha Portillo  -- Choice list shared: 8:27am EST on 2/16/2023 by Varsha Portillo

## 2023-02-26 LAB
GLUCOSE SERPL-MCNC: 22 MG/DL (ref 65–140)
GLUCOSE SERPL-MCNC: 23 MG/DL (ref 65–140)
GLUCOSE SERPL-MCNC: 24 MG/DL (ref 65–140)
GLUCOSE SERPL-MCNC: 27 MG/DL (ref 65–140)
GLUCOSE SERPL-MCNC: 28 MG/DL (ref 65–140)
GLUCOSE SERPL-MCNC: 31 MG/DL (ref 65–140)
GLUCOSE SERPL-MCNC: 34 MG/DL (ref 65–140)

## 2023-03-03 ENCOUNTER — HOSPITAL ENCOUNTER (INPATIENT)
Facility: HOSPITAL | Age: 78
LOS: 5 days | Discharge: HOME HEALTH | End: 2023-03-08
Attending: EMERGENCY MEDICINE | Admitting: INTERNAL MEDICINE

## 2023-03-03 ENCOUNTER — APPOINTMENT (EMERGENCY)
Dept: CT IMAGING | Facility: HOSPITAL | Age: 78
End: 2023-03-03

## 2023-03-03 DIAGNOSIS — E83.52 HYPERCALCEMIA: ICD-10-CM

## 2023-03-03 DIAGNOSIS — A41.9 SEPSIS (HCC): ICD-10-CM

## 2023-03-03 DIAGNOSIS — J44.1 COPD WITH ACUTE EXACERBATION (HCC): ICD-10-CM

## 2023-03-03 DIAGNOSIS — J96.21 ACUTE ON CHRONIC RESPIRATORY FAILURE WITH HYPOXIA (HCC): Primary | ICD-10-CM

## 2023-03-03 DIAGNOSIS — J18.9 PNEUMONIA: ICD-10-CM

## 2023-03-03 DIAGNOSIS — E11.65 TYPE 2 DIABETES MELLITUS WITH HYPERGLYCEMIA, WITHOUT LONG-TERM CURRENT USE OF INSULIN (HCC): ICD-10-CM

## 2023-03-03 DIAGNOSIS — E83.42 HYPOMAGNESEMIA: ICD-10-CM

## 2023-03-03 DIAGNOSIS — N17.9 AKI (ACUTE KIDNEY INJURY) (HCC): ICD-10-CM

## 2023-03-03 PROBLEM — I48.20 CHRONIC ATRIAL FIBRILLATION (HCC): Status: RESOLVED | Noted: 2022-08-13 | Resolved: 2023-03-03

## 2023-03-03 LAB
ALBUMIN SERPL BCP-MCNC: 3.3 G/DL (ref 3.5–5)
ALP SERPL-CCNC: 62 U/L (ref 34–104)
ALT SERPL W P-5'-P-CCNC: 3 U/L (ref 7–52)
ANION GAP SERPL CALCULATED.3IONS-SCNC: 11 MMOL/L (ref 4–13)
APTT PPP: 29 SECONDS (ref 23–37)
AST SERPL W P-5'-P-CCNC: 10 U/L (ref 13–39)
BASOPHILS # BLD AUTO: 0.04 THOUSANDS/ÂΜL (ref 0–0.1)
BASOPHILS NFR BLD AUTO: 0 % (ref 0–1)
BILIRUB SERPL-MCNC: 1.5 MG/DL (ref 0.2–1)
BNP SERPL-MCNC: 479 PG/ML (ref 0–100)
BUN SERPL-MCNC: 74 MG/DL (ref 5–25)
CALCIUM ALBUM COR SERPL-MCNC: 10 MG/DL (ref 8.3–10.1)
CALCIUM SERPL-MCNC: 9.4 MG/DL (ref 8.4–10.2)
CHLORIDE SERPL-SCNC: 89 MMOL/L (ref 96–108)
CO2 SERPL-SCNC: 30 MMOL/L (ref 21–32)
CREAT SERPL-MCNC: 1.41 MG/DL (ref 0.6–1.3)
EOSINOPHIL # BLD AUTO: 0 THOUSAND/ÂΜL (ref 0–0.61)
EOSINOPHIL NFR BLD AUTO: 0 % (ref 0–6)
ERYTHROCYTE [DISTWIDTH] IN BLOOD BY AUTOMATED COUNT: 15.4 % (ref 11.6–15.1)
FLUAV RNA RESP QL NAA+PROBE: NEGATIVE
FLUBV RNA RESP QL NAA+PROBE: NEGATIVE
GFR SERPL CREATININE-BSD FRML MDRD: 35 ML/MIN/1.73SQ M
GLUCOSE SERPL-MCNC: 201 MG/DL (ref 65–140)
HCT VFR BLD AUTO: 46.7 % (ref 34.8–46.1)
HGB BLD-MCNC: 15 G/DL (ref 11.5–15.4)
IMM GRANULOCYTES # BLD AUTO: 0.11 THOUSAND/UL (ref 0–0.2)
IMM GRANULOCYTES NFR BLD AUTO: 1 % (ref 0–2)
INR PPP: 1.12 (ref 0.84–1.19)
LACTATE SERPL-SCNC: 1 MMOL/L (ref 0.5–2)
LYMPHOCYTES # BLD AUTO: 0.78 THOUSANDS/ÂΜL (ref 0.6–4.47)
LYMPHOCYTES NFR BLD AUTO: 4 % (ref 14–44)
MCH RBC QN AUTO: 29.7 PG (ref 26.8–34.3)
MCHC RBC AUTO-ENTMCNC: 32.1 G/DL (ref 31.4–37.4)
MCV RBC AUTO: 93 FL (ref 82–98)
MONOCYTES # BLD AUTO: 0.79 THOUSAND/ÂΜL (ref 0.17–1.22)
MONOCYTES NFR BLD AUTO: 4 % (ref 4–12)
NEUTROPHILS # BLD AUTO: 17.77 THOUSANDS/ÂΜL (ref 1.85–7.62)
NEUTS SEG NFR BLD AUTO: 91 % (ref 43–75)
NRBC BLD AUTO-RTO: 0 /100 WBCS
PLATELET # BLD AUTO: 158 THOUSANDS/UL (ref 149–390)
PMV BLD AUTO: 11.6 FL (ref 8.9–12.7)
POTASSIUM SERPL-SCNC: 4.1 MMOL/L (ref 3.5–5.3)
PROCALCITONIN SERPL-MCNC: 2.11 NG/ML
PROT SERPL-MCNC: 6.1 G/DL (ref 6.4–8.4)
PROTHROMBIN TIME: 14.6 SECONDS (ref 11.6–14.5)
RBC # BLD AUTO: 5.05 MILLION/UL (ref 3.81–5.12)
RSV RNA RESP QL NAA+PROBE: NEGATIVE
SARS-COV-2 RNA RESP QL NAA+PROBE: NEGATIVE
SODIUM SERPL-SCNC: 130 MMOL/L (ref 135–147)
WBC # BLD AUTO: 19.49 THOUSAND/UL (ref 4.31–10.16)

## 2023-03-03 RX ORDER — SODIUM CHLORIDE, SODIUM GLUCONATE, SODIUM ACETATE, POTASSIUM CHLORIDE, MAGNESIUM CHLORIDE, SODIUM PHOSPHATE, DIBASIC, AND POTASSIUM PHOSPHATE .53; .5; .37; .037; .03; .012; .00082 G/100ML; G/100ML; G/100ML; G/100ML; G/100ML; G/100ML; G/100ML
1000 INJECTION, SOLUTION INTRAVENOUS ONCE
Status: COMPLETED | OUTPATIENT
Start: 2023-03-03 | End: 2023-03-04

## 2023-03-03 RX ORDER — ACETAMINOPHEN 325 MG/1
650 TABLET ORAL EVERY 6 HOURS PRN
Status: DISCONTINUED | OUTPATIENT
Start: 2023-03-03 | End: 2023-03-08 | Stop reason: HOSPADM

## 2023-03-03 RX ORDER — INSULIN LISPRO 100 [IU]/ML
1-5 INJECTION, SOLUTION INTRAVENOUS; SUBCUTANEOUS
Status: DISCONTINUED | OUTPATIENT
Start: 2023-03-03 | End: 2023-03-08 | Stop reason: HOSPADM

## 2023-03-03 RX ORDER — CEFEPIME HYDROCHLORIDE 2 G/50ML
2000 INJECTION, SOLUTION INTRAVENOUS ONCE
Status: COMPLETED | OUTPATIENT
Start: 2023-03-03 | End: 2023-03-03

## 2023-03-03 RX ORDER — METHYLPREDNISOLONE SODIUM SUCCINATE 40 MG/ML
40 INJECTION, POWDER, LYOPHILIZED, FOR SOLUTION INTRAMUSCULAR; INTRAVENOUS EVERY 8 HOURS SCHEDULED
Status: DISCONTINUED | OUTPATIENT
Start: 2023-03-03 | End: 2023-03-05

## 2023-03-03 RX ORDER — BUDESONIDE AND FORMOTEROL FUMARATE DIHYDRATE 80; 4.5 UG/1; UG/1
2 AEROSOL RESPIRATORY (INHALATION) 2 TIMES DAILY
Status: DISCONTINUED | OUTPATIENT
Start: 2023-03-04 | End: 2023-03-08 | Stop reason: HOSPADM

## 2023-03-03 RX ORDER — SODIUM CHLORIDE 9 MG/ML
3 INJECTION INTRAVENOUS ONCE AS NEEDED
Status: DISCONTINUED | OUTPATIENT
Start: 2023-03-03 | End: 2023-03-08 | Stop reason: HOSPADM

## 2023-03-03 RX ORDER — MELATONIN
2000 DAILY
Status: DISCONTINUED | OUTPATIENT
Start: 2023-03-04 | End: 2023-03-08 | Stop reason: HOSPADM

## 2023-03-03 RX ORDER — INSULIN LISPRO 100 [IU]/ML
1-5 INJECTION, SOLUTION INTRAVENOUS; SUBCUTANEOUS
Status: DISCONTINUED | OUTPATIENT
Start: 2023-03-04 | End: 2023-03-08 | Stop reason: HOSPADM

## 2023-03-03 RX ORDER — PRAVASTATIN SODIUM 40 MG
80 TABLET ORAL
Status: DISCONTINUED | OUTPATIENT
Start: 2023-03-04 | End: 2023-03-08 | Stop reason: HOSPADM

## 2023-03-03 RX ORDER — IPRATROPIUM BROMIDE AND ALBUTEROL SULFATE 2.5; .5 MG/3ML; MG/3ML
3 SOLUTION RESPIRATORY (INHALATION)
Status: DISCONTINUED | OUTPATIENT
Start: 2023-03-03 | End: 2023-03-04

## 2023-03-03 RX ORDER — GUAIFENESIN 600 MG/1
600 TABLET, EXTENDED RELEASE ORAL 2 TIMES DAILY
Status: DISCONTINUED | OUTPATIENT
Start: 2023-03-04 | End: 2023-03-08 | Stop reason: HOSPADM

## 2023-03-03 RX ORDER — ASPIRIN 81 MG/1
81 TABLET ORAL DAILY
Status: DISCONTINUED | OUTPATIENT
Start: 2023-03-04 | End: 2023-03-08 | Stop reason: HOSPADM

## 2023-03-03 RX ORDER — CEFEPIME HYDROCHLORIDE 1 G/50ML
1000 INJECTION, SOLUTION INTRAVENOUS EVERY 12 HOURS
Status: DISCONTINUED | OUTPATIENT
Start: 2023-03-04 | End: 2023-03-04

## 2023-03-03 RX ADMIN — SODIUM CHLORIDE, SODIUM GLUCONATE, SODIUM ACETATE, POTASSIUM CHLORIDE AND MAGNESIUM CHLORIDE 1000 ML: 526; 502; 368; 37; 30 INJECTION, SOLUTION INTRAVENOUS at 21:43

## 2023-03-03 RX ADMIN — CEFEPIME HYDROCHLORIDE 2000 MG: 2 INJECTION, SOLUTION INTRAVENOUS at 20:18

## 2023-03-03 RX ADMIN — IOHEXOL 80 ML: 350 INJECTION, SOLUTION INTRAVENOUS at 21:10

## 2023-03-03 RX ADMIN — VANCOMYCIN HYDROCHLORIDE 1500 MG: 5 INJECTION, POWDER, LYOPHILIZED, FOR SOLUTION INTRAVENOUS at 20:39

## 2023-03-04 PROBLEM — E11.9 TYPE 2 DIABETES MELLITUS, WITHOUT LONG-TERM CURRENT USE OF INSULIN (HCC): Status: ACTIVE | Noted: 2022-08-14

## 2023-03-04 LAB
ALBUMIN SERPL BCP-MCNC: 3.1 G/DL (ref 3.5–5)
ALP SERPL-CCNC: 62 U/L (ref 34–104)
ALT SERPL W P-5'-P-CCNC: <3 U/L (ref 7–52)
ANION GAP SERPL CALCULATED.3IONS-SCNC: 10 MMOL/L (ref 4–13)
AST SERPL W P-5'-P-CCNC: 7 U/L (ref 13–39)
BASOPHILS # BLD MANUAL: 0.17 THOUSAND/UL (ref 0–0.1)
BASOPHILS NFR MAR MANUAL: 1 % (ref 0–1)
BILIRUB SERPL-MCNC: 1.29 MG/DL (ref 0.2–1)
BUN SERPL-MCNC: 65 MG/DL (ref 5–25)
CALCIUM ALBUM COR SERPL-MCNC: 9.7 MG/DL (ref 8.3–10.1)
CALCIUM SERPL-MCNC: 9 MG/DL (ref 8.4–10.2)
CHLORIDE SERPL-SCNC: 91 MMOL/L (ref 96–108)
CO2 SERPL-SCNC: 29 MMOL/L (ref 21–32)
CREAT SERPL-MCNC: 1.08 MG/DL (ref 0.6–1.3)
EOSINOPHIL # BLD MANUAL: 0 THOUSAND/UL (ref 0–0.4)
EOSINOPHIL NFR BLD MANUAL: 0 % (ref 0–6)
ERYTHROCYTE [DISTWIDTH] IN BLOOD BY AUTOMATED COUNT: 15.4 % (ref 11.6–15.1)
GFR SERPL CREATININE-BSD FRML MDRD: 48 ML/MIN/1.73SQ M
GLUCOSE SERPL-MCNC: 160 MG/DL (ref 65–140)
GLUCOSE SERPL-MCNC: 188 MG/DL (ref 65–140)
GLUCOSE SERPL-MCNC: 207 MG/DL (ref 65–140)
GLUCOSE SERPL-MCNC: 222 MG/DL (ref 65–140)
GLUCOSE SERPL-MCNC: 228 MG/DL (ref 65–140)
GLUCOSE SERPL-MCNC: 242 MG/DL (ref 65–140)
HCT VFR BLD AUTO: 44.8 % (ref 34.8–46.1)
HGB BLD-MCNC: 14.3 G/DL (ref 11.5–15.4)
LYMPHOCYTES # BLD AUTO: 0.17 THOUSAND/UL (ref 0.6–4.47)
LYMPHOCYTES # BLD AUTO: 1 % (ref 14–44)
MAGNESIUM SERPL-MCNC: 2.1 MG/DL (ref 1.9–2.7)
MCH RBC QN AUTO: 28.8 PG (ref 26.8–34.3)
MCHC RBC AUTO-ENTMCNC: 31.9 G/DL (ref 31.4–37.4)
MCV RBC AUTO: 90 FL (ref 82–98)
MONOCYTES # BLD AUTO: 0.85 THOUSAND/UL (ref 0–1.22)
MONOCYTES NFR BLD: 5 % (ref 4–12)
NEUTROPHILS # BLD MANUAL: 15.75 THOUSAND/UL (ref 1.85–7.62)
NEUTS BAND NFR BLD MANUAL: 1 % (ref 0–8)
NEUTS SEG NFR BLD AUTO: 92 % (ref 43–75)
PLATELET # BLD AUTO: 208 THOUSANDS/UL (ref 149–390)
PLATELET BLD QL SMEAR: ADEQUATE
PMV BLD AUTO: 11.2 FL (ref 8.9–12.7)
POTASSIUM SERPL-SCNC: 3.9 MMOL/L (ref 3.5–5.3)
PROT SERPL-MCNC: 5.8 G/DL (ref 6.4–8.4)
RBC # BLD AUTO: 4.96 MILLION/UL (ref 3.81–5.12)
SODIUM SERPL-SCNC: 130 MMOL/L (ref 135–147)
WBC # BLD AUTO: 16.94 THOUSAND/UL (ref 4.31–10.16)

## 2023-03-04 RX ORDER — CEFEPIME HYDROCHLORIDE 1 G/50ML
1000 INJECTION, SOLUTION INTRAVENOUS EVERY 12 HOURS
Status: DISCONTINUED | OUTPATIENT
Start: 2023-03-04 | End: 2023-03-05

## 2023-03-04 RX ORDER — LEVALBUTEROL INHALATION SOLUTION 1.25 MG/3ML
1.25 SOLUTION RESPIRATORY (INHALATION)
Status: DISCONTINUED | OUTPATIENT
Start: 2023-03-04 | End: 2023-03-08 | Stop reason: HOSPADM

## 2023-03-04 RX ADMIN — BUDESONIDE AND FORMOTEROL FUMARATE DIHYDRATE 2 PUFF: 80; 4.5 AEROSOL RESPIRATORY (INHALATION) at 08:21

## 2023-03-04 RX ADMIN — VANCOMYCIN HYDROCHLORIDE 1000 MG: 5 INJECTION, POWDER, LYOPHILIZED, FOR SOLUTION INTRAVENOUS at 19:31

## 2023-03-04 RX ADMIN — METHYLPREDNISOLONE SODIUM SUCCINATE 40 MG: 40 INJECTION, POWDER, FOR SOLUTION INTRAMUSCULAR; INTRAVENOUS at 05:27

## 2023-03-04 RX ADMIN — IPRATROPIUM BROMIDE 0.5 MG: 0.5 SOLUTION RESPIRATORY (INHALATION) at 15:14

## 2023-03-04 RX ADMIN — LEVALBUTEROL HYDROCHLORIDE 1.25 MG: 1.25 SOLUTION RESPIRATORY (INHALATION) at 15:14

## 2023-03-04 RX ADMIN — METHYLPREDNISOLONE SODIUM SUCCINATE 40 MG: 40 INJECTION, POWDER, FOR SOLUTION INTRAMUSCULAR; INTRAVENOUS at 00:08

## 2023-03-04 RX ADMIN — GUAIFENESIN 600 MG: 600 TABLET ORAL at 08:20

## 2023-03-04 RX ADMIN — INSULIN LISPRO 1 UNITS: 100 INJECTION, SOLUTION INTRAVENOUS; SUBCUTANEOUS at 11:38

## 2023-03-04 RX ADMIN — CEFEPIME HYDROCHLORIDE 1000 MG: 1 INJECTION, SOLUTION INTRAVENOUS at 20:44

## 2023-03-04 RX ADMIN — SERTRALINE HYDROCHLORIDE 50 MG: 50 TABLET ORAL at 08:20

## 2023-03-04 RX ADMIN — INSULIN LISPRO 1 UNITS: 100 INJECTION, SOLUTION INTRAVENOUS; SUBCUTANEOUS at 00:19

## 2023-03-04 RX ADMIN — INSULIN LISPRO 2 UNITS: 100 INJECTION, SOLUTION INTRAVENOUS; SUBCUTANEOUS at 18:06

## 2023-03-04 RX ADMIN — IPRATROPIUM BROMIDE 0.5 MG: 0.5 SOLUTION RESPIRATORY (INHALATION) at 07:27

## 2023-03-04 RX ADMIN — INSULIN LISPRO 2 UNITS: 100 INJECTION, SOLUTION INTRAVENOUS; SUBCUTANEOUS at 21:58

## 2023-03-04 RX ADMIN — IPRATROPIUM BROMIDE AND ALBUTEROL SULFATE 3 ML: 2.5; .5 SOLUTION RESPIRATORY (INHALATION) at 00:08

## 2023-03-04 RX ADMIN — IPRATROPIUM BROMIDE 0.5 MG: 0.5 SOLUTION RESPIRATORY (INHALATION) at 19:45

## 2023-03-04 RX ADMIN — METHYLPREDNISOLONE SODIUM SUCCINATE 40 MG: 40 INJECTION, POWDER, FOR SOLUTION INTRAMUSCULAR; INTRAVENOUS at 13:53

## 2023-03-04 RX ADMIN — INSULIN LISPRO 1 UNITS: 100 INJECTION, SOLUTION INTRAVENOUS; SUBCUTANEOUS at 08:21

## 2023-03-04 RX ADMIN — LEVALBUTEROL HYDROCHLORIDE 1.25 MG: 1.25 SOLUTION RESPIRATORY (INHALATION) at 19:45

## 2023-03-04 RX ADMIN — LEVALBUTEROL HYDROCHLORIDE 1.25 MG: 1.25 SOLUTION RESPIRATORY (INHALATION) at 07:27

## 2023-03-04 RX ADMIN — ASPIRIN 81 MG: 81 TABLET, COATED ORAL at 08:20

## 2023-03-04 RX ADMIN — UMECLIDINIUM 1 PUFF: 62.5 AEROSOL, POWDER ORAL at 08:22

## 2023-03-04 RX ADMIN — METHYLPREDNISOLONE SODIUM SUCCINATE 40 MG: 40 INJECTION, POWDER, FOR SOLUTION INTRAMUSCULAR; INTRAVENOUS at 21:58

## 2023-03-04 RX ADMIN — CHOLECALCIFEROL TAB 25 MCG (1000 UNIT) 2000 UNITS: 25 TAB at 08:21

## 2023-03-04 NOTE — ASSESSMENT & PLAN NOTE
Lab Results   Component Value Date    HGBA1C 6 6 (H) 02/21/2023       No results for input(s): POCGLU in the last 72 hours      Blood Sugar Average: Last 72 hrs:     · Sliding scale insulin 3 times daily with meals

## 2023-03-04 NOTE — ED PROVIDER NOTES
History  Chief Complaint   Patient presents with   • Medical Problem     [facility] patient currently being treated for pneumonia  Per EMS WBC went from 12 to 29      HPI  78-year-old female past medical history significant for T2DM, chronic atrial fibrillation, COPD, hypertension, hyperlipidemia, frequent falls, ambulatory dysfunction, dysphagia, depression, history of TIA, chronic respiratory failure with hypoxia on supplemental oxygen, recent diagnosis of human metapneumovirus on 2/27/2023, subsequent development of right lower lobe pneumonia started on Levaquin on 3/1/2023 who presents to the ER from [address] for evaluation of worsening labs and worsening oxygen requirements in the setting of pneumonia  Patient provides limited history due to age, comorbidities, acuity of condition, generalized illness  Additional history from EMS and review of records  It appears the patient had a positive test for human metapneumovirus obtained as an outpatient on 2/27/2023 which was positive  It also appears that she was recently found to have a pneumonia and placed on Levaquin 2 days ago for which she is prescribed through 3/8/2023  Outpatient labs were obtained on 3/1/2023 which revealed a leukocytosis at 16 and repeat labs 2 days later, today 3/3/2023 revealed a significant increase in the leukocytosis to 26 K as well as an increase in her creatinine and BUN  Patient noted to be on 6 L nasal cannula oxygen by EMS on arrival to the facility which she remains on here  Noted coarse breath sounds in route  Patient herself denies any specific complaints  Denies cough  Denies shortness of breath  Prior to Admission Medications   Prescriptions Last Dose Informant Patient Reported? Taking? Accu-Chek FastClix Lancets MISC 3/4/2023  No Yes   Sig: Use 1 each daily to test blood sugars     Blood Glucose Monitoring Suppl (Accu-Chek Flores Plus) w/Device KIT 3/4/2023  No Yes   Sig: Use 1 kit daily to test blood sugars  Continuous Blood Gluc  (Dexcom G6 ) LIZA 3/4/2023  No Yes   Sig: Use 1 Device continuous   Continuous Blood Gluc Transmit (Dexcom G6 Transmitter) MISC 3/4/2023  No Yes   Sig: Use 1 Device continuous   albuterol (2 5 mg/3 mL) 0 083 % nebulizer solution 3/4/2023  No Yes   Sig: Take 3 mL (2 5 mg total) by nebulization every 6 (six) hours as needed for shortness of breath   aspirin (ECOTRIN LOW STRENGTH) 81 mg EC tablet 3/4/2023  No Yes   Sig: Take 1 tablet (81 mg total) by mouth daily Do not start before February 17, 2023  aspirin 81 MG tablet 3/4/2023  Yes Yes   Sig: Take 1 tablet by mouth daily   budesonide-formoterol (SYMBICORT) 80-4 5 MCG/ACT inhaler 3/4/2023  No Yes   Sig: Inhale 2 puffs 2 (two) times a day Rinse mouth after use  cholecalciferol (VITAMIN D3) 1,000 units tablet 3/4/2023  No Yes   Sig: Take 2 tablets (2,000 Units total) by mouth daily Take in place of ergocalciferol to treat vitamin D deficiency   dexamethasone (DECADRON) 6 mg tablet 3/4/2023  No Yes   Sig: Take 1 tablet (6 mg total) by mouth daily with breakfast For COVID treatment requiring supplemental oxygen   ergocalciferol (VITAMIN D2) 50,000 units 3/4/2023  No Yes   Sig: Take 1 capsule (50,000 Units total) by mouth once a week   furosemide (LASIX) 20 mg tablet 3/4/2023  No Yes   Sig: Take 1 tablet (20 mg total) by mouth daily   glucose blood test strip 3/4/2023  Yes Yes   Sig: Use 1 each 4 (four) times a day Use as instructed   hydrochlorothiazide (HYDRODIURIL) 12 5 mg tablet 3/4/2023  No Yes   Sig: Take 1 tablet (12 5 mg total) by mouth daily Do not start before February 17, 2023     lisinopril (ZESTRIL) 10 mg tablet 3/4/2023  No Yes   Sig: Take 1 tablet (10 mg total) by mouth daily Take this in place of the combination medication with lisinopril and hydrochlorothiazide to treat high blood pressure   pravastatin (PRAVACHOL) 80 mg tablet 3/4/2023  No Yes   Sig: Take 1 tablet (80 mg total) by mouth daily with dinner   sertraline (Zoloft) 50 mg tablet 3/4/2023  No Yes   Sig: Take 1 tablet (50 mg total) by mouth daily   sitaGLIPtin-metFORMIN (Janumet)  MG per tablet 3/4/2023  No Yes   Sig: Take 1 tablet by mouth daily with breakfast   umeclidinium bromide (INCRUSE ELLIPTA) 62 5 mcg/inh AEPB inhaler 3/4/2023  No Yes   Sig: Inhale 1 puff daily      Facility-Administered Medications: None       Past Medical History:   Diagnosis Date   • Hyperlipidemia     last assessed  8/24/17   • Hypertension     last assessed 10/2/14       Past Surgical History:   Procedure Laterality Date   • CRANIOTOMY         Family History   Problem Relation Age of Onset   • Breast cancer Mother    • Ovarian cancer Mother    • Diabetes Father      I have reviewed and agree with the history as documented  E-Cigarette/Vaping   • E-Cigarette Use Never User      E-Cigarette/Vaping Substances   • Nicotine No    • THC No    • CBD No    • Flavoring No    • Other No    • Unknown No      Social History     Tobacco Use   • Smoking status: Every Day     Packs/day: 1 00     Types: Cigarettes   • Smokeless tobacco: Never   Vaping Use   • Vaping Use: Never used   Substance Use Topics   • Alcohol use: Never   • Drug use: No       Review of Systems   Constitutional: Positive for activity change and appetite change  Negative for chills and fever  HENT: Negative for ear pain and sore throat  Eyes: Negative for pain and visual disturbance  Respiratory: Positive for cough and shortness of breath  Cardiovascular: Negative for chest pain and palpitations  Gastrointestinal: Negative for abdominal pain and vomiting  Genitourinary: Negative for dysuria and hematuria  Musculoskeletal: Negative for arthralgias and back pain  Skin: Negative for color change and rash  Neurological: Positive for weakness  Negative for seizures and syncope  All other systems reviewed and are negative        Physical Exam  Physical Exam  Vitals and nursing note reviewed  Exam conducted with a chaperone present  Constitutional:       Appearance: Normal appearance  She is ill-appearing  HENT:      Head: Normocephalic and atraumatic  Right Ear: External ear normal       Left Ear: External ear normal       Nose: Nose normal  No congestion  Mouth/Throat:      Mouth: Mucous membranes are moist       Pharynx: Oropharynx is clear  Eyes:      Conjunctiva/sclera: Conjunctivae normal    Cardiovascular:      Rate and Rhythm: Normal rate and regular rhythm  Pulses: Normal pulses  Heart sounds: Normal heart sounds  Pulmonary:      Breath sounds: Wheezing and rales present  Comments: Nasal cannula 6lpm  Chest:      Chest wall: No tenderness  Abdominal:      Palpations: Abdomen is soft  Tenderness: There is no abdominal tenderness  Musculoskeletal:         General: No tenderness  Right lower leg: No edema  Left lower leg: No edema  Skin:     General: Skin is warm and dry  Capillary Refill: Capillary refill takes less than 2 seconds  Neurological:      General: No focal deficit present  Mental Status: She is alert  Mental status is at baseline           Vital Signs  ED Triage Vitals   Temperature Pulse Respirations Blood Pressure SpO2   03/03/23 1913 03/03/23 1913 03/03/23 1913 03/03/23 1913 03/03/23 1913   98 °F (36 7 °C) 75 20 112/60 96 %      Temp Source Heart Rate Source Patient Position - Orthostatic VS BP Location FiO2 (%)   03/03/23 1913 03/03/23 1913 03/03/23 1913 03/03/23 1913 --   Temporal Monitor Lying Right arm       Pain Score       03/04/23 0930       No Pain           Vitals:    03/04/23 1508 03/04/23 2157 03/05/23 0744 03/05/23 1256   BP: 141/59 144/55 143/55 141/59   Pulse: 81 78 67 77   Patient Position - Orthostatic VS:  Lying Lying          Visual Acuity      ED Medications  Medications   sodium chloride (PF) 0 9 % injection 3 mL (has no administration in time range)   aspirin (ECOTRIN LOW STRENGTH) EC tablet 81 mg (81 mg Oral Given 3/5/23 0927)   budesonide-formoterol (SYMBICORT) 80-4 5 MCG/ACT inhaler 2 puff (2 puffs Inhalation Given 3/5/23 0927)   cholecalciferol (VITAMIN D3) tablet 2,000 Units (2,000 Units Oral Given 3/5/23 0927)   pravastatin (PRAVACHOL) tablet 80 mg (80 mg Oral Not Given 3/4/23 1807)   sertraline (ZOLOFT) tablet 50 mg (50 mg Oral Given 3/5/23 0927)   umeclidinium 62 5 mcg/actuation inhaler AEPB 1 puff (1 puff Inhalation Given 3/5/23 0927)   acetaminophen (TYLENOL) tablet 650 mg (has no administration in time range)   guaiFENesin (MUCINEX) 12 hr tablet 600 mg (600 mg Oral Given 3/5/23 0927)   insulin lispro (HumaLOG) 100 units/mL subcutaneous injection 1-5 Units (3 Units Subcutaneous Given 3/5/23 1204)   insulin lispro (HumaLOG) 100 units/mL subcutaneous injection 1-5 Units (2 Units Subcutaneous Given 3/4/23 2158)   levalbuterol (XOPENEX) inhalation solution 1 25 mg (1 25 mg Nebulization Given 3/5/23 1420)   ipratropium (ATROVENT) 0 02 % inhalation solution 0 5 mg (0 5 mg Nebulization Given 3/5/23 1420)   cefepime (MAXIPIME) IVPB (premix in dextrose) 1,000 mg 50 mL (1,000 mg Intravenous New Bag 3/5/23 0927)     And   vancomycin (VANCOCIN) 1,250 mg in sodium chloride 0 9 % 250 mL IVPB (has no administration in time range)   nystatin (MYCOSTATIN) oral suspension 500,000 Units (500,000 Units Swish & Swallow Given 3/5/23 1204)   lisinopril (ZESTRIL) tablet 10 mg (10 mg Oral Given 3/5/23 1253)   furosemide (LASIX) tablet 20 mg (20 mg Oral Given 3/5/23 1253)   methylPREDNISolone sodium succinate (Solu-MEDROL) injection 20 mg (has no administration in time range)   vancomycin (VANCOCIN) 1500 mg in sodium chloride 0 9% 250 mL IVPB (0 mg Intravenous Stopped 3/3/23 2227)   cefepime (MAXIPIME) IVPB (premix in dextrose) 2,000 mg 50 mL (0 mg Intravenous Stopped 3/3/23 2038)   multi-electrolyte (ISOLYTE-S PH 7 4) bolus 1,000 mL (0 mL Intravenous Stopped 3/4/23 0020)   iohexol (OMNIPAQUE) 350 MG/ML injection (SINGLE-DOSE) 80 mL (80 mL Intravenous Given 3/3/23 2110)       Diagnostic Studies  Results Reviewed     Procedure Component Value Units Date/Time    UA w Reflex to Microscopic w Reflex to Culture [[id number]]  (Abnormal) Collected: 03/05/23 1040    Lab Status: Final result Specimen: Urine, Clean Catch Updated: 03/05/23 1102     Color, UA Yellow     Clarity, UA Slightly Cloudy     Specific Gravity, UA 1 025     pH, UA 5 5     Leukocytes, UA Negative     Nitrite, UA Negative     Protein, UA Negative mg/dl      Glucose,  (1/10%) mg/dl      Ketones, UA Trace mg/dl      Urobilinogen, UA 0 2 E U /dl      Bilirubin, UA Negative     Occult Blood, UA Negative    Strep Pneumoniae, Urine [[id number]] Collected: 03/05/23 1041    Lab Status: In process Specimen: Urine, Clean Catch Updated: 03/05/23 1058    Legionella antigen, Urine [[id number]] Collected: 03/05/23 1041    Lab Status:  In process Specimen: Urine, Clean Catch Updated: 03/05/23 1058    Procalcitonin [[id number]]  (Abnormal) Collected: 03/05/23 0438    Lab Status: Final result Specimen: Blood from Arm, Right Updated: 03/05/23 0526     Procalcitonin 0 74 ng/ml     Comprehensive metabolic panel [[id number]]  (Abnormal) Collected: 03/05/23 0438    Lab Status: Final result Specimen: Blood from Arm, Right Updated: 03/05/23 0517     Sodium 138 mmol/L      Potassium 3 7 mmol/L      Chloride 94 mmol/L      CO2 34 mmol/L      ANION GAP 10 mmol/L      BUN 51 mg/dL      Creatinine 0 70 mg/dL      Glucose 276 mg/dL      Calcium 9 9 mg/dL      Corrected Calcium 10 6 mg/dL      AST 5 U/L      ALT 4 U/L      Alkaline Phosphatase 52 U/L      Total Protein 5 7 g/dL      Albumin 3 1 g/dL      Total Bilirubin 1 10 mg/dL      eGFR 82 ml/min/1 73sq m     Narrative:      [place] guidelines for Chronic Kidney Disease (CKD):   •  Stage 1 with normal or high GFR (GFR > 90 mL/min/1 73 square meters)  •  Stage 2 Mild CKD (GFR = 60-89 mL/min/1 73 square meters)  •  Stage 3A Moderate CKD (GFR = 45-59 mL/min/1 73 square meters)  •  Stage 3B Moderate CKD (GFR = 30-44 mL/min/1 73 square meters)  •  Stage 4 Severe CKD (GFR = 15-29 mL/min/1 73 square meters)  •  Stage 5 End Stage CKD (GFR <15 mL/min/1 73 square meters)  Note: GFR calculation is accurate only with a steady state creatinine    Vancomycin, random [[id number]]  (Normal) Collected: 03/05/23 0438    Lab Status: Final result Specimen: Blood from Arm, Right Updated: 03/05/23 0507     Vancomycin Rm 16 0 ug/mL     CBC [[id number]]  (Abnormal) Collected: 03/05/23 0438    Lab Status: Final result Specimen: Blood from Arm, Right Updated: 03/05/23 0503     WBC 7 02 Thousand/uL      RBC 4 49 Million/uL      Hemoglobin 13 2 g/dL      Hematocrit 40 7 %      MCV 91 fL      MCH 29 4 pg      MCHC 32 4 g/dL      RDW 15 3 %      Platelets 094 Thousands/uL      MPV 11 1 fL     Blood culture #1 [[id number]] Collected: 03/03/23 2043    Lab Status: Preliminary result Specimen: Blood from Hand, Left Updated: 03/04/23 1701     Blood Culture Received in Microbiology Lab  Culture in Progress  Blood culture #2 [[id number]] Collected: 03/03/23 1940    Lab Status: Preliminary result Specimen: Blood from Arm, Right Updated: 03/04/23 1701     Blood Culture Received in Microbiology Lab  Culture in Progress  Fingerstick Glucose (POCT) [[id number]]  (Abnormal) Collected: 03/04/23 1128    Lab Status: Final result Updated: 03/04/23 1129     POC Glucose 222 mg/dl     MRSA culture [[id number]] Collected: 03/04/23 0828    Lab Status:  In process Specimen: Nares from Nose Updated: 03/04/23 0830    Fingerstick Glucose (POCT) [[id number]]  (Abnormal) Collected: 03/04/23 0735    Lab Status: Final result Updated: 03/04/23 0736     POC Glucose 207 mg/dl     CBC and differential, AM Draw, Tomorrow [[id number]]  (Abnormal) Collected: 03/04/23 0526    Lab Status: Final result Specimen: Blood from Arm, Left Updated: 03/04/23 0710     WBC 16 94 Thousand/uL      RBC 4 96 Million/uL      Hemoglobin 14 3 g/dL      Hematocrit 44 8 %      MCV 90 fL      MCH 28 8 pg      MCHC 31 9 g/dL      RDW 15 4 %      MPV 11 2 fL      Platelets 744 Thousands/uL     Narrative: This is an appended report  These results have been appended to a previously verified report      Manual Differential(PHLEBS Do Not Order) [[id number]]  (Abnormal) Collected: 03/04/23 0526    Lab Status: Final result Specimen: Blood from Arm, Left Updated: 03/04/23 0710     Segmented % 92 %      Bands % 1 %      Lymphocytes % 1 %      Monocytes % 5 %      Eosinophils, % 0 %      Basophils % 1 %      Absolute Neutrophils 15 75 Thousand/uL      Lymphocytes Absolute 0 17 Thousand/uL      Monocytes Absolute 0 85 Thousand/uL      Eosinophils Absolute 0 00 Thousand/uL      Basophils Absolute 0 17 Thousand/uL      Total Counted --     Platelet Estimate Adequate    Comprehensive metabolic panel, AM Draw, Tomorrow [[id number]]  (Abnormal) Collected: 03/04/23 0526    Lab Status: Final result Specimen: Blood from Arm, Left Updated: 03/04/23 0651     Sodium 130 mmol/L      Potassium 3 9 mmol/L      Chloride 91 mmol/L      CO2 29 mmol/L      ANION GAP 10 mmol/L      BUN 65 mg/dL      Creatinine 1 08 mg/dL      Glucose 160 mg/dL      Calcium 9 0 mg/dL      Corrected Calcium 9 7 mg/dL      AST 7 U/L      ALT <3 U/L      Alkaline Phosphatase 62 U/L      Total Protein 5 8 g/dL      Albumin 3 1 g/dL      Total Bilirubin 1 29 mg/dL      eGFR 48 ml/min/1 73sq m     Narrative:      [facility] guidelines for Chronic Kidney Disease (CKD):   •  Stage 1 with normal or high GFR (GFR > 90 mL/min/1 73 square meters)  •  Stage 2 Mild CKD (GFR = 60-89 mL/min/1 73 square meters)  •  Stage 3A Moderate CKD (GFR = 45-59 mL/min/1 73 square meters)  •  Stage 3B Moderate CKD (GFR = 30-44 mL/min/1 73 square meters)  •  Stage 4 Severe CKD (GFR = 15-29 mL/min/1 73 square meters)  •  Stage 5 End Stage CKD (GFR <15 mL/min/1 73 square meters)  Note: GFR calculation is accurate only with a steady state creatinine    Magnesium, AM Draw, Tomorrow [[id number]]  (Normal) Collected: 03/04/23 0526    Lab Status: Final result Specimen: Blood from Arm, Left Updated: 03/04/23 0651     Magnesium 2 1 mg/dL     Fingerstick Glucose (POCT) [[id number]]  (Abnormal) Collected: 03/04/23 0009    Lab Status: Final result Updated: 03/04/23 0011     POC Glucose 188 mg/dl     Lactic acid [[id number]]  (Normal) Collected: 03/03/23 2045    Lab Status: Final result Specimen: Blood from Arm, Right Updated: 03/03/23 2129     LACTIC ACID 1 0 mmol/L     Narrative:      Result may be elevated if tourniquet was used during collection  Protime-INR [[id number]]  (Abnormal) Collected: 03/03/23 2043    Lab Status: Final result Specimen: Blood from Arm, Left Updated: 03/03/23 2118     Protime 14 6 seconds      INR 1 12    APTT [[id number]]  (Normal) Collected: 03/03/23 2043    Lab Status: Final result Specimen: Blood from Arm, Left Updated: 03/03/23 2118     PTT 29 seconds     FLU/RSV/COVID - if FLU/RSV clinically relevant [[id number]]  (Normal) Collected: 03/03/23 1940    Lab Status: Final result Specimen: Nasopharyngeal Swab Updated: 03/03/23 2034     SARS-CoV-2 Negative     INFLUENZA A PCR Negative     INFLUENZA B PCR Negative     RSV PCR Negative    Narrative:      FOR PEDIATRIC PATIENTS - copy/paste COVID Guidelines URL to browser: [email or web address]    SARS-CoV-2 assay is a Nucleic Acid Amplification assay intended for the  qualitative detection of nucleic acid from SARS-CoV-2 in nasopharyngeal  swabs  Results are for the presumptive identification of SARS-CoV-2 RNA  Positive results are indicative of infection with SARS-CoV-2, the virus  causing COVID-19, but do not rule out bacterial infection or co-infection  with other viruses   Laboratories within the [facility] and its  territories are required to report all positive results to the appropriate  public health authorities  Negative results do not preclude SARS-CoV-2  infection and should not be used as the sole basis for treatment or other  patient management decisions  Negative results must be combined with  clinical observations, patient history, and epidemiological information  This test has not been FDA cleared or approved  This test has been authorized by FDA under an Emergency Use Authorization  (EUA)  This test is only authorized for the duration of time the  declaration that circumstances exist justifying the authorization of the  emergency use of an in vitro diagnostic tests for detection of SARS-CoV-2  virus and/or diagnosis of COVID-19 infection under section 564(b)(1) of  the Act, 21 U  S C  499FYG-6(Z)(2), unless the authorization is terminated  or revoked sooner  The test has been validated but independent review by FDA  and CLIA is pending  Test performed using [email or web address] GeneXpert: This RT-PCR assay targets N2,  a region unique to SARS-CoV-2  A conserved region in the E-gene was chosen  for pan-Sarbecovirus detection which includes SARS-CoV-2  According to CMS-2020-01-R, this platform meets the definition of high-throughput technology      Procalcitonin [[id number]]  (Abnormal) Collected: 03/03/23 1940    Lab Status: Final result Specimen: Blood from Arm, Right Updated: 03/03/23 2023     Procalcitonin 2 11 ng/ml     CBC and differential [[id number]]  (Abnormal) Collected: 03/03/23 1940    Lab Status: Final result Specimen: Blood from Arm, Right Updated: 03/03/23 2021     WBC 19 49 Thousand/uL      RBC 5 05 Million/uL      Hemoglobin 15 0 g/dL      Hematocrit 46 7 %      MCV 93 fL      MCH 29 7 pg      MCHC 32 1 g/dL      RDW 15 4 %      MPV 11 6 fL      Platelets 765 Thousands/uL      nRBC 0 /100 WBCs      Neutrophils Relative 91 %      Immat GRANS % 1 %      Lymphocytes Relative 4 %      Monocytes Relative 4 %      Eosinophils Relative 0 %      Basophils Relative 0 %      Neutrophils Absolute 17 77 Thousands/µL      Immature Grans Absolute 0 11 Thousand/uL      Lymphocytes Absolute 0 78 Thousands/µL      Monocytes Absolute 0 79 Thousand/µL      Eosinophils Absolute 0 00 Thousand/µL      Basophils Absolute 0 04 Thousands/µL     Narrative: This is an appended report  These results have been appended to a previously verified report  B-Type Natriuretic Peptide(BNP) [[id number]]  (Abnormal) Collected: 03/03/23 1940    Lab Status: Final result Specimen: Blood from Arm, Right Updated: 03/03/23 2018      pg/mL     Comprehensive metabolic panel [[id number]]  (Abnormal) Collected: 03/03/23 1940    Lab Status: Final result Specimen: Blood from Arm, Right Updated: 03/03/23 2014     Sodium 130 mmol/L      Potassium 4 1 mmol/L      Chloride 89 mmol/L      CO2 30 mmol/L      ANION GAP 11 mmol/L      BUN 74 mg/dL      Creatinine 1 41 mg/dL      Glucose 201 mg/dL      Calcium 9 4 mg/dL      Corrected Calcium 10 0 mg/dL      AST 10 U/L      ALT 3 U/L      Alkaline Phosphatase 62 U/L      Total Protein 6 1 g/dL      Albumin 3 3 g/dL      Total Bilirubin 1 50 mg/dL      eGFR 35 ml/min/1 73sq m     Narrative:      [place] guidelines for Chronic Kidney Disease (CKD):   •  Stage 1 with normal or high GFR (GFR > 90 mL/min/1 73 square meters)  •  Stage 2 Mild CKD (GFR = 60-89 mL/min/1 73 square meters)  •  Stage 3A Moderate CKD (GFR = 45-59 mL/min/1 73 square meters)  •  Stage 3B Moderate CKD (GFR = 30-44 mL/min/1 73 square meters)  •  Stage 4 Severe CKD (GFR = 15-29 mL/min/1 73 square meters)  •  Stage 5 End Stage CKD (GFR <15 mL/min/1 73 square meters)  Note: GFR calculation is accurate only with a steady state creatinine    Sputum culture and Gram stain [[id number]]     Lab Status: No result Specimen: Sputum                  CTA ED chest PE study   Final Result by Hiral Johnson MD (03/03 2159)      1  No pulmonary embolism   2  Airspace opacities in both lower lobes consistent with pneumonia   3  Mediastinal lymphadenopathy                  Workstation performed: [id number]                    Procedures  Procedures         ED Course  ED Course as of 03/05/23 1503   Fri Mar 03, 2023   1959 Patient refusing further blood draws at this time  Did not obtain a blue top to we are proceeding with CTA PE protocol regardless this is not necessary at the moment  Other labs obtained   2113 Initial lactic acid rejected by the lab, no communication when realized pt refusing labs had to  her and obtain her verbal consent which she gave  Lactic was sent but still pending result  2131 LACTIC ACID: 1 0                            Initial Sepsis Screening     Row Name 03/03/23 2132 03/03/23 2028             Is the patient's history suggestive of a new or worsening infection? Yes (Proceed)  -DT Yes (Proceed)  -DT       Suspected source of infection pneumonia  -DT pneumonia  -DT       Indicate SIRS criteria Tachypnea > 20 resp per min;Leukocytosis (WBC > 78844 IJL) OR Leukopenia (WBC <4000 IJL) OR Bandemia (WBC >10% bands)  -DT Leukocytosis (WBC > 87903 IJL) OR Leukopenia (WBC <4000 IJL) OR Bandemia (WBC >10% bands); Tachypnea > 20 resp per min  -DT       Are two or more of the above signs & symptoms of infection both present and new to the patient?  Yes (Proceed)  -DT Yes (Proceed)  -DT       Assess for evidence of organ dysfunction: Are any of the below criteria present within 6 hours of suspected infection and SIRS criteria that are NOT considered to be chronic conditions? -- --             User Key  (r) = Recorded By, (t) = Taken By, (c) = Cosigned By    Initials Name Provider Type    DT Aldair Asencio DO Physician                              Medical Decision Making  76-year-old female diagnosed recently with pneumonia as an outpatient after earlier diagnosis of human metapneumovirus started on Levaquin x2 days presenting here for worsening oxygen requirements and worsening leukocytosis on labs repeated 2 days after diagnosis  Patient in no distress stable on 6 L nasal cannula will evaluate for sepsis, endorgan dysfunction, pneumonia, pulmonary embolism, CHF  Anticipate admission to the hospital we will start IV antibiotics will broaden coverage  Acute on chronic respiratory failure with hypoxia [facility]): acute illness or injury that poses a threat to life or bodily functions  JOHN (acute kidney injury) [facility]): acute illness or injury  Pneumonia: complicated acute illness or injury with systemic symptoms that poses a threat to life or bodily functions  Sepsis [facility]): acute illness or injury  Amount and/or Complexity of Data Reviewed  Labs: ordered  Decision-making details documented in ED Course  Radiology: ordered  ECG/medicine tests: ordered and independent interpretation performed  Decision-making details documented in ED Course  Risk  Prescription drug management  Decision regarding hospitalization  Disposition  Final diagnoses:   Acute on chronic respiratory failure with hypoxia (HCC)   JOHN (acute kidney injury) ([facility] )   [facility]   Pneumonia     Time reflects when diagnosis was documented in both MDM as applicable and the Disposition within this note     Time User Action Codes Description Comment    3/3/2023  9:23 PM Kapil Rosalind Add [J96 21] Acute on chronic respiratory failure with hypoxia ([facility] )     3/3/2023  9:23 PM Kapil Boyer Add [N17 9] JOHN (acute kidney injury) ([facility] )     3/3/2023  9:24 PM Kapil Als Add [A41 9] Sepsis ([facility] )     3/3/2023  9:24 PM Kapil Rosalind Add [J18 9] Pneumonia       ED Disposition     ED Disposition   Admit    Condition   Stable    Date/Time   Fri Mar 3, 2023 10:19 PM    Comment   Case was discussed with NEGRITA and the patient's admission status was agreed to be Admission Status: inpatient status to the service of Dr Teddy Cueva              Follow-up Information None         Current Discharge Medication List      CONTINUE these medications which have NOT CHANGED    Details   Accu-Chek FastClix Lancets MISC Use 1 each daily to test blood sugars  Qty: 102 each, Refills: 0    Comments: Accu-Chek lancets  Associated Diagnoses: Type 2 diabetes mellitus without complication, without long-term current use of insulin ([facility])      albuterol (2 5 mg/3 mL) 0 083 % nebulizer solution Take 3 mL (2 5 mg total) by nebulization every 6 (six) hours as needed for shortness of breath  Qty: 10 mL, Refills: 0    Associated Diagnoses: COPD exacerbation ([facility])      aspirin (ECOTRIN LOW STRENGTH) 81 mg EC tablet Take 1 tablet (81 mg total) by mouth daily Do not start before February 17, 2023  Qty: 30 tablet, Refills: 0    Associated Diagnoses: Essential hypertension      aspirin 81 MG tablet Take 1 tablet by mouth daily      Blood Glucose Monitoring Suppl (Accu-Chek Flores Plus) w/Device KIT Use 1 kit daily to test blood sugars  Qty: 1 kit, Refills: 0    Associated Diagnoses: Type 2 diabetes mellitus without complication, without long-term current use of insulin ([facility])      budesonide-formoterol (SYMBICORT) 80-4 5 MCG/ACT inhaler Inhale 2 puffs 2 (two) times a day Rinse mouth after use    Qty: 10 2 g, Refills: 0    Associated Diagnoses: Tobacco use; Chronic obstructive pulmonary disease, unspecified COPD type ([facility])      cholecalciferol (VITAMIN D3) 1,000 units tablet Take 2 tablets (2,000 Units total) by mouth daily Take in place of ergocalciferol to treat vitamin D deficiency  Qty: 60 tablet, Refills: 0    Associated Diagnoses: Vitamin D deficiency      Continuous Blood Gluc  (Dexcom G6 ) LIZA Use 1 Device continuous  Qty: 1 each, Refills: 0    Associated Diagnoses: Type 2 diabetes mellitus with hypoglycemia without coma, without long-term current use of insulin ([facility])      Continuous Blood Gluc Transmit (Dexcom G6 Transmitter) MISC Use 1 Device continuous  Qty: 1 each, Refills: 0 Associated Diagnoses: Type 2 diabetes mellitus with hypoglycemia without coma, without long-term current use of insulin ([facility])      dexamethasone (DECADRON) 6 mg tablet Take 1 tablet (6 mg total) by mouth daily with breakfast For COVID treatment requiring supplemental oxygen  Qty: 8 tablet, Refills: 0    Associated Diagnoses: COVID-19 virus infection      ergocalciferol (VITAMIN D2) 50,000 units Take 1 capsule (50,000 Units total) by mouth once a week  Qty: 12 capsule, Refills: 0    Associated Diagnoses: Vitamin D deficiency      furosemide (LASIX) 20 mg tablet Take 1 tablet (20 mg total) by mouth daily  Qty: 7 tablet, Refills: 0    Associated Diagnoses: Localized edema      glucose blood test strip Use 1 each 4 (four) times a day Use as instructed      hydrochlorothiazide (HYDRODIURIL) 12 5 mg tablet Take 1 tablet (12 5 mg total) by mouth daily Do not start before February 17, 2023    Qty: 30 tablet, Refills: 0    Associated Diagnoses: Essential hypertension      lisinopril (ZESTRIL) 10 mg tablet Take 1 tablet (10 mg total) by mouth daily Take this in place of the combination medication with lisinopril and hydrochlorothiazide to treat high blood pressure  Qty: 30 tablet, Refills: 0    Associated Diagnoses: Primary hypertension      pravastatin (PRAVACHOL) 80 mg tablet Take 1 tablet (80 mg total) by mouth daily with dinner  Qty: 30 tablet, Refills: 0    Associated Diagnoses: Essential hypertension      sertraline (Zoloft) 50 mg tablet Take 1 tablet (50 mg total) by mouth daily  Qty: 30 tablet, Refills: 5    Associated Diagnoses: Depression, unspecified depression type      sitaGLIPtin-metFORMIN (Janumet)  MG per tablet Take 1 tablet by mouth daily with breakfast  Qty: 180 tablet, Refills: 0    Associated Diagnoses: Type 2 diabetes mellitus without complication, without long-term current use of insulin ([facility])      umeclidinium bromide (INCRUSE ELLIPTA) 62 5 mcg/inh AEPB inhaler Inhale 1 puff daily  Qty: 30 blister, Refills: 3    Associated Diagnoses: COPD exacerbation ([facility] )             No discharge procedures on file      PDMP Review     None          ED Provider  Electronically Signed by           John Cartagena DO  03/05/23 0673

## 2023-03-04 NOTE — CASE MANAGEMENT
Case Management Assessment & Discharge Planning Note    Patient name Eulogio Valdovinos  Location /[id number] MRN [id number]  : 1945 Date 3/4/2023       Current Admission Date: 3/3/2023  Current Admission Diagnosis:Acute on chronic respiratory failure with hypoxia and hypercapnia [facility])   Patient Active Problem List    Diagnosis Date Noted   • Sepsis due to pneumonia ([facility] ) 2023   • JOHN (acute kidney injury) ([place] [address] ) 2023   • Pneumonia 2023   • Diarrhea 2023   • Fall 2023   • Swelling of right hand 2022   • Hypercalcemia 2022   • Pyuria 2022   • Microscopic hematuria 2022   • Permanent atrial fibrillation ([facility] ) 2022   • Hypoxia 2022   • Hypomagnesemia 2022   • COPD (chronic obstructive pulmonary disease) ([place] [address] ) 2022   • Leukocytosis 2022   • Multiple pulmonary nodules 2022   • Acute metabolic encephalopathy    • History of intracranial hemorrhage 2022   • Hyponatremia 2022   • Hypokalemia 2022   • Hyperbilirubinemia 2022   • Type 2 diabetes mellitus, without long-term current use of insulin ([place] [address] ) 2022   • Vitamin D deficiency 2022   • Chronic atrial fibrillation ([place] [address] ) 2022   • Dermatitis 2022   • Acute on chronic respiratory failure with hypoxia and [facility]capnia [facility] 10/18/2021   • Ambulatory dysfunction 10/18/2021   • Medicare annual wellness visit, subsequent 2018   • Tobacco use 2018   • Hyperlipidemia 10/02/2014   • Essential hypertension 10/02/2014      LOS (days): 1  Geometric Mean LOS (GMLOS) (days): 4 00  Days to GMLOS:3 3     OBJECTIVE:  PATIENT READMITTED TO HOSPITAL  Risk of Unplanned Readmission Score: 20 27         Current admission status: Inpatient       Preferred Pharmacy:   [address], [address] [place], [place] [id number]  Phone: [phone] Fax: [phone]    [address] [place] - [place] [address] [facility]  Phone: [phone] Fax: [phone]    Primary Care Provider: David Huitron DO    Primary Insurance: MEDICARE  Secondary Insurance: COMMERCIAL MISCELLANEOUS    ASSESSMENT:  [facility], [address] - Daughter   Primary Phone: [phone] (Mobile)                         Readmission Root Cause  30 Day Readmission: Yes  Who directed you to return to the hospital?: Other (comment) (Nurses from [facility])  Did you understand whom to contact if you had questions or problems?: Yes  Did you get your prescriptions before you left the hospital?: Yes  Were you able to get your prescriptions filled when you left the hospital?: Yes  Did you take your medications as prescribed?: Yes  Were you able to get to your follow-up appointments?: Yes  During previous admission, was a post-acute recommendation made?: Yes  What post-acute resources were offered?: STR (Pt went to [place] )  Patient was readmitted due to: Resp failure, COPD , human meta pneumovirus  Action Plan: Pt will likely need STR on discharge  Patient Information  Admitted from[de-identified] Facility ([address])  Mental Status: Alert  During Assessment patient was accompanied by: Not accompanied during assessment  Assessment information provided by[de-identified] Patient  Primary Caregiver: Other (Comment) (Aides at [place])  [facility]: Daughter  [place] of Residence: [facility] do you live in?: [address] entry access options   Select all that apply : No steps to enter home, Ramp  Type of Current Residence: Facility [place])  Upon entering residence, is there a bedroom on the main floor (no further steps)?: Yes  Upon entering residence, is there a bathroom on the main floor (no further steps)?: Yes  In the last 12 months, was there a time when you were not able to pay the mortgage or rent on time?: No  In the last 12 months, how many places have you lived?: 2  In the last 12 months, was there a time when you did not have a steady place to sleep or slept in a shelter (including now)?: No  Homeless/housing insecurity resource given?: N/A  Living Arrangements: Other (Comment) (Currently residing at [place] for [employer] )  Is patient a ?: No    Activities of Daily Living Prior to Admission  Functional Status: Assistance  Completes ADLs independently?: No  Level of ADL dependence: Assistance  Ambulates independently?: Yes  Does patient use assisted devices?: Yes  Assisted Devices (DME) used: Walker, Home Oxygen concentrator, Portable Oxygen tanks  O2 Rate(s): 2  Does patient currently own DME?: Yes  What DME does the patient currently own?: Home Oxygen concentrator, Portable Oxygen tanks, Walker  Does patient have a history of Outpatient Therapy (PT/OT)?: No  Does patient have a history of HHC?: Yes         Patient Information Continued  Income Source: Pension/alf  Does patient have prescription coverage?: Yes  Within the past 12 months, you worried that your food would run out before you got the money to buy more : Never true  Within the past 12 months, the food you bought just didn't last and you didn't have money to get more : Never true  Food insecurity resource given?: N/A  Does patient receive dialysis treatments?: No  Does patient have a history of substance abuse?: No  Does patient have a history of Mental Health Diagnosis?: No         Means of Transportation  Means of Transport to Appts[de-identified] Family transport  In the past 12 months, has lack of transportation kept you from medical appointments or from getting medications?: No  In the past 12 months, has lack of transportation kept you from meetings, work, or from getting things needed for daily living?: No  Was application for public transport provided?: N/A        DISCHARGE DETAILS:    Discharge planning discussed with[de-identified] Pt   I called patient's daughter Tamela Clement and she did not answer  I am unable to leave a message  Freedom of Choice: Yes     CM contacted family/caregiver?: Yes (I attempted to call patient's daughter several times )         Pt is a readmission  Pt was discharged from [facility] on 2/16/23 after being treated for DKA, hypoglycemia and acute encephalopathy  Pt went to [facility] for [facility]  I will check with [facility] to see if she is a bed hold  As per physician patient's daughter does not want patient to go back to [facility] on Discharge , she would prefer for her to go home  I attempted to call her daughter several times , she did not answer and I am unable to leave a message  PT is currently recommending STR  I will continue to follow for any Case Management needs

## 2023-03-04 NOTE — ASSESSMENT & PLAN NOTE
· Currently lisinopril/HCT and furosemide on hold due to kidney injury  · We will restart lisinopril and furosemide but hold HCTZ

## 2023-03-04 NOTE — PLAN OF CARE
Problem: PHYSICAL THERAPY ADULT  Goal: Performs mobility at highest level of function for planned discharge setting  See evaluation for individualized goals  Description: Treatment/Interventions: ADL retraining, Functional transfer training, LE strengthening/ROM, Endurance training, Elevations, Therapeutic exercise, Bed mobility, Gait training          See flowsheet documentation for full assessment, interventions and recommendations  Note: Prognosis: Fair  Problem List: Decreased strength, Decreased range of motion, Impaired balance, Decreased mobility, Decreased endurance  Assessment: Pt is 77 y o  female seen for PT evaluation s/p admit to [address] on 3/3/2023 w/ Acute on chronic respiratory failure with hypoxia and hypercapnia ([facility] )  PT consulted to assess pt's functional mobility and d/c needs  Order placed for PT eval and tx, w/ up and OOB as tolerated order  Comorbidities affecting pt's physical performance at time of assessment include: acute on chronic respiratory failure with hypoxia, and hypercapnia  PTA, pt was independent w/ all functional mobility w/ RW  Personal factors affecting pt at time of IE include: stairs to enter home, inability to ambulate household distances, inability to navigate community distances, inability to navigate level surfaces w/o external assistance, unable to perform dynamic tasks in community, unable to perform physical activity, limited insight into impairments, inability to perform IADLs, inability to perform ADLs and inability to live alone   Please find objective findings from PT assessment regarding body systems outlined above with impairments and limitations including weakness, decreased ROM, impaired balance, decreased endurance, impaired coordination, gait deviations, pain, decreased activity tolerance, decreased functional mobility tolerance, decreased safety awareness, fall risk and SOB upon exertion From PT/mobility standpoint, recommendation at time of d/c would be post acute rehabilitation services pending progress in order to facilitate return to PLOF  PT Discharge Recommendation: Post acute rehabilitation services    See flowsheet documentation for full assessment

## 2023-03-04 NOTE — ASSESSMENT & PLAN NOTE
· JOHN likely secondary to poor intake/infection  · Slowly improving  Recheck labs in a m      Results from last 7 days   Lab Units 03/04/23  0526 03/03/23  1940   BUN mg/dL 65* 74*   CREATININE mg/dL 1 08 1 41*   EGFR ml/min/1 73sq m 48 35

## 2023-03-04 NOTE — RESPIRATORY THERAPY NOTE
03/04/23 0727   Inhalation Therapy Tx   $ Inhalation Therapy Performed Yes   $ Pulse Oximetry Spot Check Charge Completed   SpO2 92 %   Pre-Treatment Pulse 74   Pre-Treatment Respirations 20   Duration 15   Breath Sounds Pre-Treatment Bilateral Diminished;Rhonchi;Expiratory wheeze  (posteriorly)   Breath Sounds Post-Treatment Bilateral Diminished;Rhonchi;Expiratory wheezes   Post-Treatment Pulse 75   Post-Treatment Respirations 20   Delivery Source Air;UDN   Position Semi Helm's   Treatment Tolerance Tolerated well   Resp Comments p ton 4 5 l/m, pt's baseline is RA  will start airway clearance protocol with flutter valve for secretion clearance

## 2023-03-04 NOTE — H&P
[address]  1945, 77 y o  female MRN: [id number]  Unit/Bed#: RM13 Encounter: [id number]  Primary Care Provider: Cruzito Sanchez DO   Date and time admitted to hospital: 3/3/2023  7:10 PM    * Acute on chronic respiratory failure with hypoxia and hypercapnia (HCC)  Assessment & Plan  · Shortness of breath as well as increasing oxygen requirements at her skilled nursing facility; requiring 6 L O2 to maintain sats greater than 90% and appears patient is on 2-3 L at baseline; recently diagnosed with metapneumovirus on February 27 and then subsequently developed a right lower lobe pneumonia on March 1st for which she was started on Levaquin at her nursing facility  · Leukocytosis of 19 K, elevated procalcitonin in the 2's, lactic acid within normal limits  · CTA chest in the ER with airspace opacities in both lower lobes consistent with pneumonia  Mediastinal lymphadenopathy  No pulmonary embolism  · History of COPD and mild wheezing with decreased airflow on pulmonary exam; noted barrel chest  · Admit to medicine  Broad-spectrum antibiotics overnight with vancomycin as well as cefepime  Additionally, given patient's history of COPD and mild wheezing, will treat with scheduled nebulizers as well as Solu-Medrol 3 times daily    Consult pulmonary team   Follow-up on blood cultures as well as sputum culture and urine Legionella/urine strep antigen ordered  · Holding PTA antihypertensive agents and diuretics in the setting of acute infection    COPD (chronic obstructive pulmonary disease) ([facility] )  Assessment & Plan  · As above in respiratory failure section    Pneumonia  Assessment & Plan  · As above in respiratory failure section    JOHN (acute kidney injury) ([facility] )  Assessment & Plan  · Creatinine 1 4, baseline appears normal 2 weeks ago; in setting of acute infection  · Noted mild hyponatremia although noted patient is mildly hyperglycemic  · Etiology of JOHN and hyponatremia currently thought to be due to hypovolemia; patient was given 1 L IV fluids in the ER  · Given patient's respiratory status, we will hold off further aggressive IV fluids and repeat creatinine in the morning  If renal function not improved in the morning with the fluids that were given as well as treatment for pneumonia, will need full JOHN work-up with urine electrolytes, renal imaging and nephrology consultation  Additionally, if hyponatremia not improved in the morning, will need hyponatremia work-up    Type 2 diabetes mellitus with hypoglycemia without coma, without long-term current use of insulin (HCC)  Assessment & Plan  Lab Results   Component Value Date    HGBA1C 6 6 (H) 02/21/2023       No results for input(s): POCGLU in the last 72 hours  Blood Sugar Average: Last 72 hrs:     · Sliding scale insulin 3 times daily with meals          VTE Prophylaxis:sequential compression device   Code Status: Level 3 - DNAR and DNI       Anticipated Length of Stay:  Patient will be admitted on an Inpatient basis with an anticipated length of stay of  > 2 midnights  Justification for Hospital Stay: Please see detailed plans noted above  Chief Complaint:     Acute on chronic respiratory failure, pneumonia  History of Present Illness:  Kevin Alcantar is a 77 y o  female who has past medical history significant for chronic hypoxic respiratory failure on baseline 2 to 3 L at rest, COPD, intracranial hemorrhage, diabetes who presented to [address] [place] [address] ER on the evening of 3/3 from her nursing home for shortness of breath as well as hypoxic respiratory failure  Patient reportedly was diagnosed with metapneumovirus on 2/27  She then subsequently developed a right lower lobe pneumonia thought to be bacterial on March 1 and was started on Levaquin  At her nursing facility her oxygen requirements have been increasing due to these infections and she has been noticed to appear to have trouble breathing  Patient's history is somewhat limited due to her condition as well as underlying comorbidities but she is able to answer basic questions and give me a thumbs up when asked if she is feeling better  Review of Systems:    Constitutional:  Denies fever or chills   Eyes:  Denies change in visual acuity   HENT:  Denies nasal congestion or sore throat   Respiratory: Positive for cough and shortness of breath  Cardiovascular:  Denies chest pain or edema   GI:  Denies abdominal pain or bloody stools  :  Denies dysuria   Musculoskeletal:  Denies back pain or joint pain   Integument:  Denies rash   Neurologic:  Denies headache or sensory changes   Endocrine:  Denies polyuria or polydipsia   Lymphatic:  Denies swollen glands   Psychiatric:  Denies depression or anxiety     Past Medical and Surgical History:   Past Medical History:   Diagnosis Date   • Hyperlipidemia     last assessed  8/24/17   • Hypertension     last assessed 10/2/14     Past Surgical History:   Procedure Laterality Date   • CRANIOTOMY         Meds/Allergies:  No current facility-administered medications on file prior to encounter  Current Outpatient Medications on File Prior to Encounter   Medication Sig Dispense Refill   • Accu-Chek FastClix Lancets MISC Use 1 each daily to test blood sugars  102 each 0   • albuterol (2 5 mg/3 mL) 0 083 % nebulizer solution Take 3 mL (2 5 mg total) by nebulization every 6 (six) hours as needed for shortness of breath 10 mL 0   • aspirin (ECOTRIN LOW STRENGTH) 81 mg EC tablet Take 1 tablet (81 mg total) by mouth daily Do not start before February 17, 2023  30 tablet 0   • aspirin 81 MG tablet Take 1 tablet by mouth daily     • Blood Glucose Monitoring Suppl (Accu-Chek Flroes Plus) w/Device KIT Use 1 kit daily to test blood sugars  1 kit 0   • budesonide-formoterol (SYMBICORT) 80-4 5 MCG/ACT inhaler Inhale 2 puffs 2 (two) times a day Rinse mouth after use   10 2 g 0   • cholecalciferol (VITAMIN D3) 1,000 units tablet Take 2 tablets (2,000 Units total) by mouth daily Take in place of ergocalciferol to treat vitamin D deficiency 60 tablet 0   • Continuous Blood Gluc  (Dexcom G6 ) LIZA Use 1 Device continuous 1 each 0   • Continuous Blood Gluc Transmit (Dexcom G6 Transmitter) MISC Use 1 Device continuous 1 each 0   • dexamethasone (DECADRON) 6 mg tablet Take 1 tablet (6 mg total) by mouth daily with breakfast For COVID treatment requiring supplemental oxygen 8 tablet 0   • ergocalciferol (VITAMIN D2) 50,000 units Take 1 capsule (50,000 Units total) by mouth once a week 12 capsule 0   • furosemide (LASIX) 20 mg tablet Take 1 tablet (20 mg total) by mouth daily 7 tablet 0   • glucose blood test strip Use 1 each 4 (four) times a day Use as instructed     • hydrochlorothiazide (HYDRODIURIL) 12 5 mg tablet Take 1 tablet (12 5 mg total) by mouth daily Do not start before February 17, 2023  30 tablet 0   • lisinopril (ZESTRIL) 10 mg tablet Take 1 tablet (10 mg total) by mouth daily Take this in place of the combination medication with lisinopril and hydrochlorothiazide to treat high blood pressure 30 tablet 0   • pravastatin (PRAVACHOL) 80 mg tablet Take 1 tablet (80 mg total) by mouth daily with dinner 30 tablet 0   • sertraline (Zoloft) 50 mg tablet Take 1 tablet (50 mg total) by mouth daily 30 tablet 5   • sitaGLIPtin-metFORMIN (Janumet)  MG per tablet Take 1 tablet by mouth daily with breakfast 180 tablet 0   • umeclidinium bromide (INCRUSE ELLIPTA) 62 5 mcg/inh AEPB inhaler Inhale 1 puff daily 30 blister 3     Allergies: No Known Allergies    History:  Marital Status:       Substance Use History:   Social History     Substance and Sexual Activity   Alcohol Use Never     Social History     Tobacco Use   Smoking Status Every Day   • Packs/day: 1 00   • Types: Cigarettes   Smokeless Tobacco Never     Social History     Substance and Sexual Activity   Drug Use No       Family History:  Family History   Problem Relation Age of Onset   • Breast cancer Mother    • Ovarian cancer Mother    • Diabetes Father        Physical Exam:     Vitals:   Blood Pressure: 124/58 (03/03/23 2200)  Pulse: 73 (03/03/23 2200)  Temperature: 98 °F (36 7 °C) (03/03/23 1913)  Temp Source: Temporal (03/03/23 1913)  Respirations: 14 (03/03/23 2200)  Weight - Scale: 56 1 kg (123 lb 10 9 oz) (03/03/23 1913)  SpO2: 91 % (03/03/23 2200)    Constitutional: Alert, answers questions when prompted with one-word answers, appears ill  Eyes:  EOMI, No scleral icterus   HENT:   oropharynx moist, external ears normal, external nose normal   Respiratory: Barrel chest, mild wheezing, decreased air movement, rales  Cardiovascular:  Normal rate, no murmurs   GI:  Soft, nondistended, no guarding   :  No costovertebral angle tenderness   Musculoskeletal:  no tenderness, no deformities  Back- no tenderness  Integument:  no jaundice, no rash   Neurologic:  Alert &awake,  no obvious focal deficits noted         Lab Results: I have personally reviewed pertinent reports  Results from last 7 days   Lab Units 03/03/23  1940   WBC Thousand/uL 19 49*   HEMOGLOBIN g/dL 15 0   HEMATOCRIT % 46 7*   PLATELETS Thousands/uL 158   NEUTROS PCT % 91*   LYMPHS PCT % 4*   MONOS PCT % 4   EOS PCT % 0     Results from last 7 days   Lab Units 03/03/23  1940   POTASSIUM mmol/L 4 1   CHLORIDE mmol/L 89*   CO2 mmol/L 30   BUN mg/dL 74*   CREATININE mg/dL 1 41*   CALCIUM mg/dL 9 4   ALK PHOS U/L 62   ALT U/L 3*   AST U/L 10*     Results from last 7 days   Lab Units 03/03/23  2043   INR  1 12         Imaging: I have personally reviewed pertinent reports  X-ray chest 1 view portable    Result Date: 2/13/2023  Narrative: CHEST INDICATION:   diarrhea, hypoglycemia  COMPARISON:  CXR 9/21/2022, CTA neck 2/12/2023, chest CT 09/22/2022  EXAM PERFORMED/VIEWS:  XR CHEST PORTABLE  FINDINGS: Cardiomediastinal silhouette normal  Chronic left lower lobe opacity  No definite effusion    No pneumothorax  Bones normal for age  Upper abdomen normal      Impression: Chronic left lower lobe opacity which could be due to atelectasis or recurrent pneumonia  The study was marked in [facility] for immediate notification  Workstation performed: [id number]     XR sacrum and coccyx    Result Date: 2/15/2023  Narrative: SACRUM AND COCCYX INDICATION:   fall  COMPARISON:  None VIEWS:  XR SACRUM AND COCCYX  FINDINGS: Lateral view is severely limited by body habitus and motion  There is no gross evidence of an acute fracture  Sacral arcuate lines are maintained  The sacroiliac joints appear symmetric  Pubic symphysis is maintained  Degenerative changes visualized lower lumbar spine  There are degenerative changes of the hips  Impression: No gross fracture though severely limited  If there is continued clinical concern, repeat radiograph or CT is recommended  The study was marked in EPIC for significant notification  Workstation performed: [id number]     CT head wo contrast    Result Date: 2/13/2023  Narrative: CT BRAIN - WITHOUT CONTRAST INDICATION:   fall trauma  COMPARISON:  2/12/2023  TECHNIQUE:  CT examination of the brain was performed  In addition to axial images, sagittal and coronal 2D reformatted images were created and submitted for interpretation  Radiation dose length product (DLP) for this visit:  884 58 mGy-cm   This examination, like all CT scans performed in the [facility], was performed utilizing techniques to minimize radiation dose exposure, including the use of iterative  reconstruction and automated exposure control  IMAGE QUALITY:  Diagnostic  FINDINGS: PARENCHYMA: Decreased attenuation is noted in periventricular and subcortical white matter demonstrating an appearance that is statistically most likely to represent mild microangiopathic change  No CT signs of acute infarction  No intracranial mass, mass effect or midline shift  No acute parenchymal hemorrhage   VENTRICLES AND EXTRA-AXIAL SPACES:  Normal for the patient's age  VISUALIZED ORBITS: Normal visualized orbits  PARANASAL SINUSES: Normal visualized paranasal sinuses  CALVARIUM AND EXTRACRANIAL SOFT TISSUES:  No fracture  Status post left frontal and temporal craniotomy  Impression: No acute intracranial abnormality  Chronic microangiopathic changes  Workstation performed: [id number]     CT spine cervical wo contrast    Result Date: 2/13/2023  Narrative: CT CERVICAL SPINE - WITHOUT CONTRAST INDICATION:   Neck trauma (Age >= 65y) fall  COMPARISON:  None  TECHNIQUE:  CT examination of the cervical spine was performed without intravenous contrast   Contiguous axial images were obtained  Sagittal and coronal reconstructions were performed  Radiation dose length product (DLP) for this visit:  466 34 mGy-cm   This examination, like all CT scans performed in the [facility], was performed utilizing techniques to minimize radiation dose exposure, including the use of iterative  reconstruction and automated exposure control  IMAGE QUALITY:  Diagnostic  FINDINGS: ALIGNMENT:  Normal alignment of the cervical spine  No subluxation  VERTEBRAE:  No fracture  DEGENERATIVE CHANGES:  Moderate multilevel cervical degenerative changes are noted  No critical central canal stenosis  PREVERTEBRAL AND PARASPINAL SOFT TISSUES: Unremarkable THORACIC INLET:  There is mild septal thickening and groundglass opacity at the lung apices  Impression: 1  No cervical spine fracture or traumatic malalignment  2   Mild septal thickening and groundglass opacity at the lung apices  This is nonspecific though clinical correlation for pulmonary edema recommended  Workstation performed: [id number]     CT stroke alert brain    Result Date: 2/12/2023  Narrative: CT BRAIN - STROKE ALERT PROTOCOL INDICATION:   Stroke Alert  COMPARISON:  August 13, 2022 TECHNIQUE:  CT examination of the brain was performed    In addition to axial images, coronal reformatted images were created and submitted for interpretation  Radiation dose length product (DLP) for this visit:  890 25 mGy-cm   This examination, like all CT scans performed in the [facility], was performed utilizing techniques to minimize radiation dose exposure, including the use of iterative  reconstruction and automated exposure control  IMAGE QUALITY:  Diagnostic  FINDINGS:  PARENCHYMA:  No intracranial mass, mass effect or midline shift  No CT signs of acute infarction  No acute parenchymal hemorrhage  Normal intracranial vasculature  VENTRICLES AND EXTRA-AXIAL SPACES:  Normal for the patient's age  VISUALIZED ORBITS: Normal visualized orbits  PARANASAL SINUSES: Mild mucosal thickening of the visualized paranasal sinuses  CALVARIUM AND EXTRACRANIAL SOFT TISSUES:  Postsurgical changes in the left temporal parietal lobe from craniotomy are seen  Impression: No acute intracranial abnormality  I personally discussed this study with Neel Padilla on 2/12/2023 at 11:39 PM   Workstation performed: [id number]     CTA ED chest PE study    Result Date: 3/3/2023  Narrative: CTA - CHEST WITH IV CONTRAST - PULMONARY ANGIOGRAM INDICATION:   History of pneumonia on Levaquin x2 days, worsening oxygen requirements worsening leukocytosis evaluate for pulmonary embolism, progression of pneumonia, complications such as effusion empyema etc  COMPARISON: CT 9/22/22, x-ray 12/12/23 TECHNIQUE: CTA examination of the chest was performed using angiographic technique according to a protocol specifically tailored to evaluate for pulmonary embolism  Axial, sagittal, and coronal 2D reformatted images were created from the source data and  submitted for interpretation  In addition, coronal 3D MIP postprocessing was performed on the acquisition scanner  Radiation dose length product (DLP) for this visit:  188 93 mGy-cm     This examination, like all CT scans performed in the [facility], was performed utilizing techniques to minimize radiation dose exposure, including the use of iterative  reconstruction and automated exposure control  IV Contrast:  80 mL of iohexol (OMNIPAQUE)  FINDINGS: PULMONARY ARTERIAL TREE:  No pulmonary embolus is seen  LUNGS:  Confluent right lower lobe airspace opacities, with adjacent scattered tree-in-bud opacities  Smaller, patchy left lower lobe airspace opacities PLEURA:  Unremarkable  HEART/GREAT VESSELS:  Unremarkable for patient's age  No thoracic aortic aneurysm  MEDIASTINUM AND MICHAEL:  1 2 cm precarinal lymph node CHEST WALL AND LOWER NECK:   pectus excavatum VISUALIZED STRUCTURES IN THE UPPER ABDOMEN:  Unremarkable  OSSEOUS STRUCTURES:  No acute fracture or destructive osseous lesion  Impression: 1  No pulmonary embolism 2  Airspace opacities in both lower lobes consistent with pneumonia 3  Mediastinal lymphadenopathy Workstation performed: [id number]     CTA stroke alert (head/neck)    Result Date: 2/13/2023  Narrative: CTA NECK AND BRAIN WITH CONTRAST INDICATION: Stroke Alert COMPARISON:   None  TECHNIQUE:   Post contrast imaging was performed after administration of iodinated contrast through the neck and brain  Post contrast axial 0 625 mm images timed to opacify the arterial system  3D rendering was performed on an independent workstation  MIP reconstructions performed  Coronal reconstructions were performed of the noncontrast portion of the brain  Radiation dose length product (DLP) for this visit:  392 67 mGy-cm   This examination, like all CT scans performed in the [facility], was performed utilizing techniques to minimize radiation dose exposure, including the use of iterative  reconstruction and automated exposure control     IV Contrast:  85 mL of iohexol (OMNIPAQUE)  IMAGE QUALITY:   Diagnostic FINDINGS: CERVICAL VASCULATURE AORTIC ARCH AND GREAT VESSELS:  Moderate atherosclerotic disease of the arch and great vessels  Atherosclerotic changes with narrowing of the left subclavian artery RIGHT VERTEBRAL ARTERY CERVICAL SEGMENT:  Normal origin  The vessel is normal in caliber throughout the neck  LEFT VERTEBRAL ARTERY CERVICAL SEGMENT:  Normal origin  The vessel is normal in caliber throughout the neck  RIGHT EXTRACRANIAL CAROTID SEGMENT:  Moderate atherosclerotic disease of the bifurcation  LEFT EXTRACRANIAL CAROTID SEGMENT:  Moderate atherosclerotic disease of the bifurcation  NASCET criteria was used to determine the degree of internal carotid artery diameter stenosis  INTRACRANIAL VASCULATURE INTERNAL CAROTID ARTERIES:  Moderate atherosclerosis of the intracranial portions of the internal carotid arteries bilaterally  ANTERIOR CIRCULATION:  Symmetric A1 segments and anterior cerebral arteries with normal enhancement  Normal anterior communicating artery  MIDDLE CEREBRAL ARTERY CIRCULATION:  M1 segment and middle cerebral artery branches demonstrate normal enhancement bilaterally  DISTAL VERTEBRAL ARTERIES:  Normal distal vertebral arteries  Posterior inferior cerebellar artery origins are normal  Normal vertebral basilar junction  BASILAR ARTERY:  Basilar artery is normal in caliber  Normal superior cerebellar arteries  POSTERIOR CEREBRAL ARTERIES: Both posterior cerebral arteries arises from the basilar tip  Both arteries demonstrate normal enhancement  Normal posterior communicating arteries  VENOUS STRUCTURES:  Normal  NON VASCULAR ANATOMY BONY STRUCTURES:  No acute osseous abnormality  SOFT TISSUES OF THE NECK:  Normal  THORACIC INLET:  Scattered airspace opacity in the left upper lobe  Impression: No evidence of large vessel occlusion  If symptoms persist MRI should be obtained to evaluate for occult infarct    I personally discussed this study with Derryl Scheuermann on 2/12/2023 at 11:53 PM  Workstation performed: [id number]       Total time for visit, including counseling/coordination of care: 45 minutes  Greater than 50% of this total time spent on direct patient counseling and coorination of care  Epic Records Reviewed as well as Records in Care Everywhere    ** Please Note: Dragon 360 Dictation voice to text software was used in the creation of this document   **

## 2023-03-04 NOTE — PLAN OF CARE
Problem: OCCUPATIONAL THERAPY ADULT  Goal: Performs self-care activities at highest level of function for planned discharge setting  See evaluation for individualized goals  Description: Treatment Interventions: ADL retraining, Functional transfer training, UE strengthening/ROM, Endurance training, Cognitive reorientation, Neuromuscular reeducation, Fine motor coordination activities, Energy conservation, Activityengagement          See flowsheet documentation for full assessment, interventions and recommendations  Note: Limitation: Decreased ADL status, Decreased UE ROM, Decreased UE strength, Decreased Safe judgement during ADL, Decreased cognition, Decreased endurance     Assessment: Pt is a 77 y o  female seen for OT evaluation s/p admit to [facility] on 3/3/2023 w/ Acute on chronic respiratory failure with hypoxia and hypercapnia ([facility] [address] )  Comorbidities affecting pt's functional performance at time of assessment include: DM Type 2, JOHN, pneumonia, COPD, confusion,   Personal factors affecting pt at time of IE include:behavioral pattern, difficulty performing ADLS, difficulty performing IADLS , limited insight into deficits, flat affect and health management   Prior to admission, pt was receiving STR at [facility]  Reports previously being (I) w/ ADLs and receiving (A) from daughter w/ IADLs, however, patient is poor historian  Upon evaluation: Pt requires Mod-Max A for LB ADLs, Min A for bed mobility, Min A for functional transfers, and is unable to safely perform functional mobility with RW 2* the following deficits impacting occupational performance: weakness, decreased ROM, decreased strength, decreased balance, decreased tolerance, impaired [address], impaired arousal, impaired attention, impaired memory, impaired sequencing, impaired problem solving and decreased safety awareness   Pt to benefit from continued skilled OT tx while in the hospital to address deficits as defined above and maximize level of functional independence w ADL's and functional mobility  Occupational Performance areas to address include: grooming, bathing/shower, toilet hygiene, dressing, medication management, health maintenance, functional mobility and clothing management  From OT standpoint, recommendation at time of d/c would be resumption of STR  The patient's raw score on the AM-PAC Daily Activity Inpatient Short Form is 15  A raw score of less than 19 suggests the patient may benefit from discharge to post-acute rehabilitation services  Please refer to the recommendation of the Occupational Therapist for safe discharge planning  Pt benefited from co-evaluation of skilled OT and PT therapists in order to most appropriately address functional deficits d/t extensive assistance required for safe functional mobility, decreased activity tolerance, and regression from functioning level prior to admission and/or onset of present illness  OT/PT objectives were addressed separately; please see PT note for specific goal areas targeted       OT Discharge Recommendation: Return to facility with rehabilitation services

## 2023-03-04 NOTE — PLAN OF CARE
Problem: PAIN - ADULT  Goal: Verbalizes/displays adequate comfort level or baseline comfort level  Description: Interventions:  - Encourage patient to monitor pain and request assistance  - Assess pain using appropriate pain scale  - Administer analgesics based on type and severity of pain and evaluate response  - Implement non-pharmacological measures as appropriate and evaluate response  - Consider cultural and social influences on pain and pain management  - Notify physician/advanced practitioner if interventions unsuccessful or patient reports new pain  Outcome: Progressing     Problem: INFECTION - ADULT  Goal: Absence or prevention of progression during hospitalization  Description: INTERVENTIONS:  - Assess and monitor for signs and symptoms of infection  - Monitor lab/diagnostic results  - Monitor all insertion sites, i e  indwelling lines, tubes, and drains  - Monitor endotracheal if appropriate and nasal secretions for changes in amount and color  - [place] appropriate cooling/warming therapies per order  - Administer medications as ordered  - Instruct and encourage patient and family to use good hand hygiene technique  - Identify and instruct in appropriate isolation precautions for identified infection/condition  Outcome: Progressing  Goal: Absence of fever/infection during neutropenic period  Description: INTERVENTIONS:  - Monitor WBC    Outcome: Progressing     Problem: SAFETY ADULT  Goal: Patient will remain free of falls  Description: INTERVENTIONS:  - Educate patient/family on patient safety including physical limitations  - Instruct patient to call for assistance with activity   - Consult OT/PT to assist with strengthening/mobility   - Keep Call bell within reach  - Keep bed low and locked with side rails adjusted as appropriate  - Keep care items and personal belongings within reach  - Initiate and maintain comfort rounds  - Make Fall Risk Sign visible to staff  - Offer Toileting every 2 Hours, in advance of need  - Initiate/Maintain fall alarm  - Obtain necessary fall risk management equipment: non-skid footwear  - Apply yellow socks and bracelet for high fall risk patients  - Consider moving patient to room near nurses station  Outcome: Progressing  Goal: Maintain or return to baseline ADL function  Description: INTERVENTIONS:  -  Assess patient's ability to carry out ADLs; assess patient's baseline for ADL function and identify physical deficits which impact ability to perform ADLs (bathing, care of mouth/teeth, toileting, grooming, dressing, etc )  - Assess/evaluate cause of self-care deficits   - Assess range of motion  - Assess patient's mobility; develop plan if impaired  - Assess patient's need for assistive devices and provide as appropriate  - Encourage maximum independence but intervene and supervise when necessary  - Involve family in performance of ADLs  - Assess for home care needs following discharge   - Consider OT consult to assist with ADL evaluation and planning for discharge  - Provide patient education as appropriate  Outcome: Progressing  Goal: Maintains/Returns to pre admission functional level  Description: INTERVENTIONS:  - Perform BMAT or MOVE assessment daily    - Set and communicate daily mobility goal to care team and patient/family/caregiver  - Collaborate with rehabilitation services on mobility goals if consulted  - Perform Range of Motion 3 times a day  - Reposition patient every 2 hours    - Dangle patient 3 times a day  - Stand patient 3 times a day  - Ambulate patient 3 times a day  - Out of bed to chair 3 times a day   - Out of bed for meals 3 times a day  - Out of bed for toileting  - Record patient progress and toleration of activity level   Outcome: Progressing     Problem: DISCHARGE PLANNING  Goal: Discharge to home or other facility with appropriate resources  Description: INTERVENTIONS:  - Identify barriers to discharge w/patient and caregiver  - Arrange for needed discharge resources and transportation as appropriate  - Identify discharge learning needs (meds, wound care, etc )  - Arrange for interpretive services to assist at discharge as needed  - Refer to Case Management Department for coordinating discharge planning if the patient needs post-hospital services based on physician/advanced practitioner order or complex needs related to functional status, cognitive ability, or social support system  Outcome: Progressing     Problem: Knowledge Deficit  Goal: Patient/family/caregiver demonstrates understanding of disease process, treatment plan, medications, and discharge instructions  Description: Complete learning assessment and assess knowledge base    Interventions:  - Provide teaching at level of understanding  - Provide teaching via preferred learning methods  Outcome: Progressing     Problem: CARDIOVASCULAR - ADULT  Goal: Maintains optimal cardiac output and hemodynamic stability  Description: INTERVENTIONS:  - Monitor I/O, vital signs and rhythm  - Monitor for S/S and trends of decreased cardiac output  - Administer and titrate ordered vasoactive medications to optimize hemodynamic stability  - Assess quality of pulses, skin color and temperature  - Assess for signs of decreased coronary artery perfusion  - Instruct patient to report change in severity of symptoms  Outcome: Progressing  Goal: Absence of cardiac dysrhythmias or at baseline rhythm  Description: INTERVENTIONS:  - Continuous cardiac monitoring, vital signs, obtain 12 lead EKG if ordered  - Administer antiarrhythmic and heart rate control medications as ordered  - Monitor electrolytes and administer replacement therapy as ordered  Outcome: Progressing     Problem: RESPIRATORY - ADULT  Goal: Achieves optimal ventilation and oxygenation  Description: INTERVENTIONS:  - Assess for changes in respiratory status  - Assess for changes in mentation and behavior  - Position to facilitate oxygenation and minimize respiratory effort  - Oxygen administered by appropriate delivery if ordered  - Initiate smoking cessation education as indicated  - Encourage broncho-pulmonary hygiene including cough, deep breathe, Incentive Spirometry  - Assess the need for suctioning and aspirate as needed  - Assess and instruct to report SOB or any respiratory difficulty  - Respiratory Therapy support as indicated  Outcome: Progressing     Problem: METABOLIC, FLUID AND ELECTROLYTES - ADULT  Goal: Electrolytes maintained within normal limits  Description: INTERVENTIONS:  - Monitor labs and assess patient for signs and symptoms of electrolyte imbalances  - Administer electrolyte replacement as ordered  - Monitor response to electrolyte replacements, including repeat lab results as appropriate  - Instruct patient on fluid and nutrition as appropriate  Outcome: Progressing  Goal: Fluid balance maintained  Description: INTERVENTIONS:  - Monitor labs   - Monitor I/O and WT  - Instruct patient on fluid and nutrition as appropriate  - Assess for signs & symptoms of volume excess or deficit  Outcome: Progressing  Goal: Glucose maintained within target range  Description: INTERVENTIONS:  - Monitor Blood Glucose as ordered  - Assess for signs and symptoms of hyperglycemia and hypoglycemia  - Administer ordered medications to maintain glucose within target range  - Assess nutritional intake and initiate nutrition service referral as needed  Outcome: Progressing     Problem: MUSCULOSKELETAL - ADULT  Goal: Maintain or return mobility to safest level of function  Description: INTERVENTIONS:  - Assess patient's ability to carry out ADLs; assess patient's baseline for ADL function and identify physical deficits which impact ability to perform ADLs (bathing, care of mouth/teeth, toileting, grooming, dressing, etc )  - Assess/evaluate cause of self-care deficits   - Assess range of motion  - Assess patient's mobility  - Assess patient's need for assistive devices and provide as appropriate  - Encourage maximum independence but intervene and supervise when necessary  - Involve family in performance of ADLs  - Assess for home care needs following discharge   - Consider OT consult to assist with ADL evaluation and planning for discharge  - Provide patient education as appropriate  Outcome: Progressing  Goal: Maintain proper alignment of affected body part  Description: INTERVENTIONS:  - Support, maintain and protect limb and body alignment  - Provide patient/ family with appropriate education  Outcome: Progressing

## 2023-03-04 NOTE — OCCUPATIONAL THERAPY NOTE
Occupational Therapy Evaluation     Patient Name: Eulogio CLIFFORD Date: 3/4/2023  Problem List  Principal Problem:    Acute on chronic respiratory failure with hypoxia and hypercapnia ([facility] [place] [address] )  Active Problems:    Type 2 diabetes mellitus with hypoglycemia without coma, without long-term current use of insulin (HCC)    COPD (chronic obstructive pulmonary disease) (HCC)    JOHN (acute kidney injury) ([facility] [place] [address] )    Pneumonia    Past Medical History  Past Medical History:   Diagnosis Date    Hyperlipidemia     last assessed  8/24/17    Hypertension     last assessed 10/2/14     Past Surgical History  Past Surgical History:   Procedure Laterality Date    CRANIOTOMY             03/04/23 0930   OT Last Visit   OT Visit Date 03/04/23   Note Type   Note type Evaluation   Pain Assessment   Pain Assessment Tool 0-10   Pain Score No Pain   Home Living   Type of [address] One level;Performs ADLs on one level; Able to live on main level with bedroom/bathroom;Stairs to enter with rails  (3 [address] c HR)   Bathroom Shower/Tub Tub/shower unit   Bathroom Toilet Standard   Bathroom Equipment Grab bars in shower; Shower chair   [address] [place]   Additional Comments Patient poor historian  Confirms information from chart review  Currently at [facility] for rehab  Prior Function   Level of [place] Independent with ADLs; Independent with functional mobility; Needs assistance with IADLS   Lives With Daughter   Receives Help From Family;Friend(s)   Comments Previously documented as living alone; reports living w/ daughter  Confirms arriving to hospital from [facility] where she is for STR  Subjective   Subjective "What do you want me to do babe?"   ADL   Where Assessed Edge of bed   UB Dressing Assistance 3  Moderate Assistance   UB Dressing Deficit Increased time to complete; Thread RUE; Thread LUE;Verbal cueing   LB Dressing Assistance 2  Maximal Assistance   LB Dressing Deficit Setup;Steadying; Requires assistive device for steadying;Verbal cueing;Supervision/safety; Increased time to complete; Thread RLE into underwear; Thread LLE into underwear;Don/doff R sock   Toileting Assistance  2  Maximal Assistance   Toileting Deficit Supervison/safety;Verbal cueing;Setup;Steadying;Perineal hygiene  (Patient incontinent of urine in bed)   Additional Comments Patient attempts to initiate donning of brief but states "I don't know what to do with these"  Reports head rush when attempting to bend forward to thread LE into brief  Requires increased (A) w/ LB self cares  LOB in standing stance when attempting to pull brief up over hips with (A) from therapist to prevent falling  Bed Mobility   Supine to Sit 4  Minimal assistance   Additional items Assist x 2; Increased time required;Verbal cues;LE management   Sit to Supine 4  Minimal assistance   Additional items Assist x 2; Increased time required;Verbal cues;LE management   Additional Comments Requires max cues for initiation for completion of sup <> sit   Able to bridge to complete repositioning with cues for technique   Transfers   Sit to Stand 4  Minimal assistance   Additional items Increased time required;Verbal cues   Stand to Sit 4  Minimal assistance   Additional items Increased time required;Verbal cues   Additional Comments Requires cues for hand placement to safely complete sit <> stand tranfsers at EOB   Functional Mobility   Additional Comments Unable to safely advance forward, however, performs sidestepping to [facility] w/ RW and Min A    Balance   Static Sitting Fair +   Dynamic Sitting Fair +   Static Standing Fair -   Dynamic Standing Poor +   Activity Tolerance   Activity Tolerance Patient limited by fatigue   Medical Staff Made Aware PT   Nurse Made Aware PCA   RUE Assessment   RUE Assessment WFL   LUE Assessment   LUE Assessment WFL   Hand Function   Gross Motor Coordination Functional   Fine Motor Coordination Impaired   Sensation   Light Touch No apparent deficits   Sharp/Dull No apparent deficits   Vision-Basic Assessment   Current Vision Wears glasses all the time   Cognition   Overall Cognitive Status Impaired   Arousal/Participation Cooperative;Lethargic   Attention Attends with cues to redirect   Orientation Level Oriented to person   Memory Decreased short term memory;Decreased recall of recent events   Following Commands Follows one step commands with increased time or repetition   Comments Correctly states being in hospital but unable to identify which  States year as 2022 and month as being Fall   Assessment   Limitation Decreased ADL status; Decreased UE ROM; Decreased UE strength;Decreased Safe judgement during ADL;Decreased cognition;Decreased endurance   Assessment Pt is a 77 y o  female seen for OT evaluation s/p admit to [facility] on 3/3/2023 w/ Acute on chronic respiratory failure with hypoxia and hypercapnia ([facility] [address] )  Comorbidities affecting pt's functional performance at time of assessment include: DM Type 2, JOHN, pneumonia, COPD, confusion,   Personal factors affecting pt at time of IE include:behavioral pattern, difficulty performing ADLS, difficulty performing IADLS , limited insight into deficits, flat affect and health management   Prior to admission, pt was receiving STR at [place]  Reports previously being (I) w/ ADLs and receiving (A) from daughter w/ IADLs, however, patient is poor historian  Upon evaluation: Pt requires Mod-Max A for LB ADLs, Min A for bed mobility, Min A for functional transfers, and is unable to safely perform functional mobility with RW 2* the following deficits impacting occupational performance: weakness, decreased ROM, decreased strength, decreased balance, decreased tolerance, impaired [address], impaired arousal, impaired attention, impaired memory, impaired sequencing, impaired problem solving and decreased safety awareness   Pt to benefit from continued skilled OT tx while in the hospital to address deficits as defined above and maximize level of functional independence w ADL's and functional mobility  Occupational Performance areas to address include: grooming, bathing/shower, toilet hygiene, dressing, medication management, health maintenance, functional mobility and clothing management  From OT standpoint, recommendation at time of d/c would be resumption of STR  The patient's raw score on the AM-PAC Daily Activity Inpatient Short Form is 15  A raw score of less than 19 suggests the patient may benefit from discharge to post-acute rehabilitation services  Please refer to the recommendation of the Occupational Therapist for safe discharge planning  Pt benefited from co-evaluation of skilled OT and PT therapists in order to most appropriately address functional deficits d/t extensive assistance required for safe functional mobility, decreased activity tolerance, and regression from functioning level prior to admission and/or onset of present illness  OT/PT objectives were addressed separately; please see PT note for specific goal areas targeted  Goals   Patient Goals Return to prior level of function   Plan   Treatment Interventions ADL retraining;Functional transfer training;UE strengthening/ROM; Endurance training;Cognitive reorientation; Neuromuscular reeducation; Fine motor coordination activities; Energy conservation; Activityengagement   Goal Expiration Date 03/18/23   OT Frequency 3-5x/wk   Recommendation   OT Discharge Recommendation Return to facility with rehabilitation services   AM-PAC Daily Activity Inpatient   Lower Body Dressing 2   Bathing 2   Toileting 2   Upper Body Dressing 3   Grooming 3   Eating 3   Daily Activity Raw Score 15   Daily Activity Standardized Score (Calc for Raw Score >=11) 34 69   AM-PAC Applied Cognition Inpatient   Following a Speech/Presentation 2   Understanding Ordinary Conversation 3   Taking Medications 2   Remembering Where Things Are Placed or Put Away 2   Remembering List of 4-5 Errands 2   Taking Care of Complicated Tasks 2   Applied Cognition Raw Score 13   Applied Cognition Standardized Score 30 46     Pt will complete UB/LB bathing at Min A Level w/ G balance/safety using AE and AD as needed  Pt will complete UB/LB dressing at [address] A  level w/ G balance/safety using AE and AD as needed  Pt will complete toileting tasks at [address] A level w/ good hygiene/thoroughness using DME as needed  Pt will complete grooming/oral hygiene tasks at Sup level in standing stance at sink w/ G balance/safety using AD as needed  Pt will complete bed mobility at Mod I level w/ G balance/safety  Pt will perform functional transfers and mobility w/ Sup to/from all surfaces using least restrictive AD  Pt will demonstrate good carryover of use of energy conservation/compensatory strategies during ADLs and functional activity in order to increase safety and decrease risk for falls  Pt will improve dynamic sitting/standing balance to good to increase safety and independence during functional transfers and ADL and decrease risk for falls  Patient will increase BUE strength by 1/2MM grade via AROM/AAROM/PROM exercises to increase independence in ADLs and transfers  Pt will demonstrate increased tolerance for therapeutic activities >15 min with use of AD as needed in order to increase tolerance for functional activities  Patient supine in bed at end of session with PCA present; all needs within [facility]

## 2023-03-04 NOTE — PROGRESS NOTES
[address] [facility] [address]  Progress Note - Read Cooper 1945, 77 y o  female MRN: [id number]  Unit/Bed#: RM13 Encounter: [id number]  Primary Care Provider: Veronique Tolliver DO   Date and time admitted to hospital: 3/3/2023  7:10 PM    * Acute on chronic respiratory failure with hypoxia and hypercapnia [facility])  Assessment & Plan  Background: Recent hospitalization for hypoglycemia sent to rehab at [facility] presents from facility for worsening hypoxia  · Recently diagnosed with metapneumovirus started on levofloxacin on 3/1/2023  · At baseline on 2 to 3 L  Continue cefepime and vancomycin  Trend procalcitonin  · Continue IV methylprednisolone for COPD exacerbation    JOHN (acute kidney injury) ([facility] [address] )  Assessment & Plan  · JOHN likely secondary to poor intake/infection  · Slowly improving  Recheck labs in a m  Results from last 7 days   Lab Units 03/04/23  0526 03/03/23  1940   BUN mg/dL 65* 74*   CREATININE mg/dL 1 08 1 41*   EGFR ml/min/1 73sq m 48 35       COPD (chronic obstructive pulmonary disease) ([facility])  Assessment & Plan  · COPD with exacerbation  · Continue Atrovent/Xopenex  · Continue IV methylprednisolone    Type 2 diabetes mellitus, without long-term current use of insulin [facility])  Assessment & Plan  Lab Results   Component Value Date    HGBA1C 6 6 (H) 02/21/2023     Recent Labs     03/04/23  0009 03/04/23  0735 03/04/23  1128   POCGLU 188* 207* 222*     · Recently hospitalized for hypoglycemia and glipizide discontinued  · Holding home janumet for now  Continue sliding scale insulin      · Monitor for steroid induced hypoglycemia    History of intracranial hemorrhage  Assessment & Plan  · History of intracranial hemorrhage with cognitive decline    Chronic atrial fibrillation (HCC)  Assessment & Plan  · Atrial fibrillation not on anticoagulation given history of intracranial hemorrhage      VTE Pharmacologic Prophylaxis: VTE Score: 6 High Risk (Score >/= 5) - Pharmacological DVT Prophylaxis Contraindicated  Sequential Compression Devices Ordered  History of intracranial hemorrhage    Patient Centered Rounds: I have performed bedside rounds with nursing staff today  Discussions with Specialists or Other Care Team Provider: Case management    Education and Discussions with Family / Patient: Updated  (daughter) via phone  Time Spent for Care: This time was spent on one or more of the following: performing physical exam; counseling and coordination of care; obtaining or reviewing history; documenting in the medical record; reviewing/ordering tests, medications or procedures; communicating with other healthcare professionals and discussing with patient's family/caregivers  Current Length of Stay: 1 day(s)  Current Patient Status: Inpatient   Certification Statement: The patient will continue to require additional inpatient hospital stay due to Pneumonia and COPD exacerbation  Discharge Plan: Anticipate discharge in 48-72 hrs to home with home services  PT/OT recommending rehab but daughter hoping for home with services instead  Code Status: Level 3 - DNAR and DNI      Subjective:   Patient seen and examined  No new complaints  Breathing a little bit better  Objective:   Vitals: Blood pressure 141/66, pulse 75, temperature 98 °F (36 7 °C), temperature source Temporal, resp  rate 22, weight 56 1 kg (123 lb 10 9 oz), SpO2 95 %  Intake/Output Summary (Last 24 hours) at 3/4/2023 1429  Last data filed at 3/4/2023 0020  Gross per 24 hour   Intake 1258 33 ml   Output --   Net 1258 33 ml       Physical Exam  Vitals reviewed  Constitutional:       General: She is not in acute distress  Appearance: Normal appearance  HENT:      Head: Atraumatic  Eyes:      General: No scleral icterus  Cardiovascular:      Rate and Rhythm: Regular rhythm  Pulmonary:      Breath sounds: Decreased breath sounds and rhonchi present  No wheezing     Abdominal:      General: Bowel sounds are normal       Palpations: Abdomen is soft  Tenderness: There is no guarding or rebound  Musculoskeletal:         General: No swelling  Skin:     General: Skin is warm  Neurological:      Mental Status: She is alert  Psychiatric:         Mood and Affect: Mood normal        Additional Data:   Labs:  Results from last 7 days   Lab Units 03/04/23  0526 03/03/23 2043 03/03/23 1940   WBC Thousand/uL 16 94*  --  19 49*   HEMOGLOBIN g/dL 14 3  --  15 0   PLATELETS Thousands/uL 208  --  158   MCV fL 90  --  93   TOTAL NEUT ABS Thousand/uL 15 75*  --   --    BANDS PCT % 1  --   --    INR   --  1 12  --      Results from last 7 days   Lab Units 03/04/23  0526 03/03/23  1940   SODIUM mmol/L 130* 130*   POTASSIUM mmol/L 3 9 4 1   CHLORIDE mmol/L 91* 89*   CO2 mmol/L 29 30   ANION GAP mmol/L 10 11   BUN mg/dL 65* 74*   CREATININE mg/dL 1 08 1 41*   CALCIUM mg/dL 9 0 9 4   ALBUMIN g/dL 3 1* 3 3*   TOTAL BILIRUBIN mg/dL 1 29* 1 50*   ALK PHOS U/L 62 62   ALT U/L <3* 3*   AST U/L 7* 10*   EGFR ml/min/1 73sq m 48 35   GLUCOSE RANDOM mg/dL 160* 201*     Results from last 7 days   Lab Units 03/04/23  0526   MAGNESIUM mg/dL 2 1                  Results from last 7 days   Lab Units 03/03/23 2045 03/03/23 1940   LACTIC ACID mmol/L 1 0  --    PROCALCITONIN ng/ml  --  2 11*     Results from last 7 days   Lab Units 03/04/23  1128 03/04/23  0735 03/04/23  0009   POC GLUCOSE mg/dl 222* 207* 188*             * I Have Reviewed All Lab Data Listed Above  Cultures:   Results from last 7 days   Lab Units 03/03/23 1940   INFLUENZA A PCR  Negative       Results from last 7 days   Lab Units 03/03/23 1940   SARS-COV-2  Negative   INFLUENZA A PCR  Negative   INFLUENZA B PCR  Negative   RSV PCR  Negative           Lines/Drains:  Invasive Devices     Peripheral Intravenous Line  Duration           Peripheral IV 02/13/23 Left Antecubital 19 days    Peripheral IV 03/04/23 Dorsal (posterior); Left Wrist <1 day Telemetry:      Imaging:  Imaging Reports Reviewed Today Include:   CTA ED chest PE study    Result Date: 3/3/2023  Impression: 1  No pulmonary embolism 2  Airspace opacities in both lower lobes consistent with pneumonia 3  Mediastinal lymphadenopathy Workstation performed: [id number]       Scheduled Meds:  Current Facility-Administered Medications   Medication Dose Route Frequency Provider Last Rate   • acetaminophen  650 mg Oral Q6H PRN Barraza Potters, DO     • aspirin  81 mg Oral Daily Barraza Potters, DO     • budesonide-formoterol  2 puff Inhalation BID Barraza Potters, DO     • cefepime  1,000 mg Intravenous Q12H Barraza Potters, DO      And   • vancomycin  20 mg/kg Intravenous Q24H Barraza Potters, DO     • cholecalciferol  2,000 Units Oral Daily Barraza Potters, DO     • guaiFENesin  600 mg Oral BID Barraza Potters, DO     • insulin lispro  1-5 Units Subcutaneous TID AC Barraza Potters, DO     • insulin lispro  1-5 Units Subcutaneous HS Barraza Potters, DO     • ipratropium  0 5 mg Nebulization TID Ellsworth Potters, DO     • levalbuterol  1 25 mg Nebulization TID Ellsworth Potters, DO     • methylPREDNISolone sodium succinate  40 mg Intravenous Q8H [address] Barraza Potters, DO     • pravastatin  80 mg Oral Daily With Dinner Barraza Potters, DO     • sertraline  50 mg Oral Daily Barraza Potters, DO     • sodium chloride (PF)  3 mL Intravenous Once PRN Darcey Essex, DO     • umeclidinium  1 puff Inhalation Daily Barraza Potters, DO         Today, Patient Was Seen By: Natty Damon DO    ** Please Note: Dictation voice to text software may have been used in the creation of this document   **

## 2023-03-04 NOTE — ASSESSMENT & PLAN NOTE
Lab Results   Component Value Date    HGBA1C 6 6 (H) 02/21/2023     Recent Labs     03/04/23  0009 03/04/23  0735 03/04/23  1128   POCGLU 188* 207* 222*     · Recently hospitalized for hypoglycemia and glipizide discontinued  · Holding home janumet for now  Continue sliding scale insulin      · Monitor for steroid induced hypoglycemia

## 2023-03-04 NOTE — RESPIRATORY THERAPY NOTE
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

## 2023-03-04 NOTE — ASSESSMENT & PLAN NOTE
· Shortness of breath as well as increasing oxygen requirements at her skilled nursing facility; requiring 6 L O2 to maintain sats greater than 90% and appears patient is on 2-3 L at baseline; recently diagnosed with metapneumovirus on February 27 and then subsequently developed a right lower lobe pneumonia on March 1st for which she was started on Levaquin at her nursing facility  · Leukocytosis of 19 K, elevated procalcitonin in the 2's, lactic acid within normal limits  · CTA chest in the ER with airspace opacities in both lower lobes consistent with pneumonia  Mediastinal lymphadenopathy  No pulmonary embolism  · History of COPD and mild wheezing with decreased airflow on pulmonary exam; noted barrel chest  · Admit to medicine  Broad-spectrum antibiotics overnight with vancomycin as well as cefepime  Additionally, given patient's history of COPD and mild wheezing, will treat with scheduled nebulizers as well as Solu-Medrol 3 times daily    Consult pulmonary team   Follow-up on blood cultures as well as sputum culture and urine Legionella/urine strep antigen ordered  · Holding PTA antihypertensive agents and diuretics in the setting of acute infection

## 2023-03-04 NOTE — RESPIRATORY THERAPY NOTE
RT Protocol Note  Phani Almaraz 77 y o  female MRN: [id number]  Unit/Bed#: RM13 Encounter: [id number]    Assessment    Principal Problem:    Acute on chronic respiratory failure with hypoxia and hypercapnia (HCC)  Active Problems:    Type 2 diabetes mellitus with hypoglycemia without coma, without long-term current use of insulin (HCC)    COPD (chronic obstructive pulmonary disease) ([facility] )    JOHN (acute kidney injury) ([facility] )    Pneumonia      Home Pulmonary Medications:  Symbicort  Albuterol prn  Home Devices/Therapy: Home O2    Past Medical History:   Diagnosis Date   • Hyperlipidemia     last assessed  8/24/17   • Hypertension     last assessed 10/2/14     Social History     Socioeconomic History   • Marital status:      Spouse name: None   • Number of children: None   • Years of education: None   • Highest education level: None   Occupational History   • None   Tobacco Use   • Smoking status: Every Day     Packs/day: 1 00     Types: Cigarettes   • Smokeless tobacco: Never   Vaping Use   • Vaping Use: Never used   Substance and Sexual Activity   • Alcohol use: Never   • Drug use: No   • Sexual activity: Not Currently   Other Topics Concern   • None   Social History Narrative    Always uses seat belt    Always uses sunscreen    Dental care occasionally    No living will     Social Determinants of Health     Financial Resource Strain: Not on file   Food Insecurity: No Food Insecurity   • Worried About Running Out of Food in the Last Year: Never true   • Ran Out of Food in the Last Year: Never true   Transportation Needs: No Transportation Needs   • Lack of Transportation (Medical): No   • Lack of Transportation (Non-Medical):  No   Physical Activity: Not on file   Stress: Not on file   Social Connections: Not on file   Intimate Partner Violence: Not on file   Housing Stability: Low Risk    • Unable to Pay for Housing in the Last Year: No   • Number of Places Lived in the Last Year: 1   • Unstable Housing in the Last Year: No       Subjective         Objective    Physical Exam:   Assessment Type: Assess only  General Appearance: Awake  Respiratory Pattern: Dyspnea with exertion, Dyspnea at rest  Chest Assessment: Chest expansion symmetrical  Bilateral Breath Sounds: Diminished, Coarse, Expiratory wheezes  O2 Device: NC    Vitals:  Blood pressure 124/58, pulse 73, temperature 98 °F (36 7 °C), temperature source Temporal, resp  rate 14, weight 56 1 kg (123 lb 10 9 oz), SpO2 91 %  Imaging and other studies: I have personally reviewed pertinent reports  O2 Device: NC     Plan    Respiratory Plan: Mild Distress pathway        Resp Comments: patient with history of COPD admitted with PNA  Due to wheezes hear on exam patient will be ordered TID x/a patient takes symbicort and prn albuterol at home

## 2023-03-04 NOTE — PHYSICAL THERAPY NOTE
Physical Therapy Evaluation     Patient Name: Joi Locke    FFMQD'H Date: 3/4/2023     Problem List  Principal Problem:    Acute on chronic respiratory failure with hypoxia and hypercapnia ([facility] )  Active Problems:    Type 2 diabetes mellitus with hypoglycemia without coma, without long-term current use of insulin (HCC)    COPD (chronic obstructive pulmonary disease) ([facility])    JOHN (acute kidney injury) ([facility] [address] )    Pneumonia       Past Medical History  Past Medical History:   Diagnosis Date    Hyperlipidemia     last assessed  8/24/17    Hypertension     last assessed 10/2/14        Past Surgical History  Past Surgical History:   Procedure Laterality Date    CRANIOTOMY          03/04/23 0931   Note Type   Note type Evaluation   Pain Assessment   Pain Assessment Tool 0-10   Pain Score No Pain   Home Living   Type of [address] [place] [address] One level   Bathroom Shower/Tub Tub/shower unit   Bathroom Toilet Standard   Bathroom Equipment Grab bars in [facility]   Prior Function   Level of [place] Independent with ADLs; Independent with functional mobility; Needs assistance with IADLS   Lives With Daughter   Receives Help From Family;Friend(s)   Cognition   Overall Cognitive Status Impaired   Arousal/Participation Alert   Attention Attends with cues to redirect   Orientation Level Oriented to person   Memory Decreased short term memory;Decreased recall of recent events   Following Commands Follows one step commands with increased time or repetition   Subjective   Subjective pt is agreeable to PT   RLE Assessment   RLE Assessment WFL   LLE Assessment   LLE Assessment WFL   Bed Mobility   Supine to Sit 4  Minimal assistance   Additional items Assist x 2; Increased time required;Verbal cues;LE management   Sit to Supine 4  Minimal assistance   Additional items Assist x 2   LE management Verbal cues   Transfers   Sit to Stand 4  Minimal assistance   Additional items Increased time required;Verbal cues; Assist x 2   Stand to Sit 4  Minimal assistance   Additional items Assist x 2; Increased time required;Verbal cues   Additional Comments Did not attempt ambulation, this date, not appropriate    RW    Balance   Static Sitting Fair +   Dynamic Sitting Fair +   Static Standing Fair   Dynamic Standing Poor +   Activity Tolerance   Activity Tolerance Patient limited by fatigue   Assessment   Prognosis Fair   Problem List Decreased strength;Decreased range of motion; Impaired balance;Decreased mobility; Decreased endurance   Assessment Pt is 77 y o  female seen for PT evaluation s/p admit to [address] on 3/3/2023 w/ Acute on chronic respiratory failure with hypoxia and hypercapnia ([facility] [address] )  PT consulted to assess pt's functional mobility and d/c needs  Order placed for PT eval and tx, w/ up and OOB as tolerated order  Comorbidities affecting pt's physical performance at time of assessment include: acute on chronic respiratory failure with hypoxia, and hypercapnia  PTA, pt was independent w/ all functional mobility w/ RW  Personal factors affecting pt at time of IE include: stairs to enter home, inability to ambulate household distances, inability to navigate community distances, inability to navigate level surfaces w/o external assistance, unable to perform dynamic tasks in community, unable to perform physical activity, limited insight into impairments, inability to perform IADLs, inability to perform ADLs and inability to live alone   Please find objective findings from PT assessment regarding body systems outlined above with impairments and limitations including weakness, decreased ROM, impaired balance, decreased endurance, impaired coordination, gait deviations, pain, decreased activity tolerance, decreased functional mobility tolerance, decreased safety awareness, fall risk and SOB upon exertion From PT/mobility standpoint, recommendation at time of d/c would be post acute rehabilitation services pending progress in order to facilitate return to PLOF  Goals   LTG Expiration Date 03/18/23   Long Term Goal #1 Patient will be (I) with all transfers and bed mobility   Long Term Goal #2 Patient will safely ambulate 100 feet with (S) using RW   Plan   Treatment/Interventions ADL retraining;Functional transfer training;LE strengthening/ROM; Endurance training;Elevations; Therapeutic exercise; Bed mobility;Gait training   PT Frequency 3-5x/wk   Recommendation   PT Discharge Recommendation Post acute rehabilitation services   AM-[facility] Basic Mobility Inpatient   Turning in Flat Bed Without Bedrails 2   Lying on Back to Sitting on Edge of Flat Bed Without Bedrails 2   Moving Bed to Chair 2   Standing Up From Chair Using Arms 2   Walk in Room 2   Climb 3-5 Stairs With Railing 2   Basic Mobility Inpatient Raw Score 12   Basic Mobility Standardized Score 32 23   Highest Level Of Mobility   -[facility] Goal 4: Move to chair/commode   JH-HLM Achieved 3: Sit at edge of bed       Patient in bed at the end of the session, all lines intact, all needs within reach  Patients raw score on the [facility] Basic Mobility inpatient short form is 12, standardized score is 32 23, (less than) 42  9  patient's at this level are likely to benefit from post acute rehab services, however, please refer to therapist recommendation for safe D/C Plan  Pt benefited from co-evaluation of skilled OT and PT therapists in order to most appropriately address functional deficits d/t extensive assistance required for safe functional mobility, decreased activity tolerance, and regression from functioning level prior to admission and/or onset of present illness  OT/PT objectives were addressed separately; please see OT note for specific goal areas targeted

## 2023-03-04 NOTE — PROGRESS NOTES
Luciana Norton is a 77 y o  female who is currently ordered Vancomycin IV with management by the Pharmacy Consult service  Relevant clinical data and objective / subjective history reviewed  Vancomycin Assessment:  Indication and Goal AUC/Trough: Pneumonia (goal -600, trough >10)  Clinical Status:  New  Micro:     Renal Function:  SCr: 1 08 mg/dL  CrCl: 37 mL/min  Renal replacement: Not on dialysis  Days of Therapy: 2  Current Dose: 750mg IV q12h  Vancomycin Plan:  New Dosinmg IV q12h  Estimated AUC: 548 mcg*hr/mL  Estimated Trough: 19 2 mcg/mL  Next Level: 3/5/23 with AM labs  Renal Function Monitoring: Daily BMP and [place] will continue to follow closely for s/sx of nephrotoxicity, infusion reactions and appropriateness of therapy  BMP and CBC will be ordered per protocol  We will continue to follow the patient’s culture results and clinical progress daily      Jose Chacon, Pharmacist

## 2023-03-04 NOTE — ASSESSMENT & PLAN NOTE
· Sepsis present on admission secondary to pneumonia  · Continue vancomycin and cefepime    Discontinue vancomycin if MRSA screen is negative    Results from last 7 days   Lab Units 03/05/23  0438 03/03/23  1940   PROCALCITONIN ng/ml 0 74* 2 11*

## 2023-03-04 NOTE — ASSESSMENT & PLAN NOTE
· Creatinine 1 4, baseline appears normal 2 weeks ago; in setting of acute infection  · Noted mild hyponatremia although noted patient is mildly hyperglycemic  · Etiology of JOHN and hyponatremia currently thought to be due to hypovolemia; patient was given 1 L IV fluids in the ER  · Given patient's respiratory status, we will hold off further aggressive IV fluids and repeat creatinine in the morning  If renal function not improved in the morning with the fluids that were given as well as treatment for pneumonia, will need full JOHN work-up with urine electrolytes, renal imaging and nephrology consultation    Additionally, if hyponatremia not improved in the morning, will need hyponatremia work-up

## 2023-03-05 PROBLEM — B37.0 ORAL CANDIDIASIS: Status: ACTIVE | Noted: 2023-03-05

## 2023-03-05 LAB
ALBUMIN SERPL BCP-MCNC: 3.1 G/DL (ref 3.5–5)
ALP SERPL-CCNC: 52 U/L (ref 34–104)
ALT SERPL W P-5'-P-CCNC: 4 U/L (ref 7–52)
ANION GAP SERPL CALCULATED.3IONS-SCNC: 10 MMOL/L (ref 4–13)
AST SERPL W P-5'-P-CCNC: 5 U/L (ref 13–39)
BILIRUB SERPL-MCNC: 1.1 MG/DL (ref 0.2–1)
BILIRUB UR QL STRIP: NEGATIVE
BUN SERPL-MCNC: 51 MG/DL (ref 5–25)
CALCIUM ALBUM COR SERPL-MCNC: 10.6 MG/DL (ref 8.3–10.1)
CALCIUM SERPL-MCNC: 9.9 MG/DL (ref 8.4–10.2)
CHLORIDE SERPL-SCNC: 94 MMOL/L (ref 96–108)
CLARITY UR: ABNORMAL
CO2 SERPL-SCNC: 34 MMOL/L (ref 21–32)
COLOR UR: YELLOW
CREAT SERPL-MCNC: 0.7 MG/DL (ref 0.6–1.3)
ERYTHROCYTE [DISTWIDTH] IN BLOOD BY AUTOMATED COUNT: 15.3 % (ref 11.6–15.1)
GFR SERPL CREATININE-BSD FRML MDRD: 82 ML/MIN/1.73SQ M
GLUCOSE SERPL-MCNC: 266 MG/DL (ref 65–140)
GLUCOSE SERPL-MCNC: 276 MG/DL (ref 65–140)
GLUCOSE SERPL-MCNC: 357 MG/DL (ref 65–140)
GLUCOSE SERPL-MCNC: 365 MG/DL (ref 65–140)
GLUCOSE SERPL-MCNC: 381 MG/DL (ref 65–140)
GLUCOSE UR STRIP-MCNC: ABNORMAL MG/DL
HCT VFR BLD AUTO: 40.7 % (ref 34.8–46.1)
HGB BLD-MCNC: 13.2 G/DL (ref 11.5–15.4)
HGB UR QL STRIP.AUTO: NEGATIVE
KETONES UR STRIP-MCNC: ABNORMAL MG/DL
L PNEUMO1 AG UR QL IA.RAPID: NEGATIVE
LEUKOCYTE ESTERASE UR QL STRIP: NEGATIVE
MCH RBC QN AUTO: 29.4 PG (ref 26.8–34.3)
MCHC RBC AUTO-ENTMCNC: 32.4 G/DL (ref 31.4–37.4)
MCV RBC AUTO: 91 FL (ref 82–98)
MRSA NOSE QL CULT: NORMAL
NITRITE UR QL STRIP: NEGATIVE
PH UR STRIP.AUTO: 5.5 [PH]
PLATELET # BLD AUTO: 200 THOUSANDS/UL (ref 149–390)
PMV BLD AUTO: 11.1 FL (ref 8.9–12.7)
POTASSIUM SERPL-SCNC: 3.7 MMOL/L (ref 3.5–5.3)
PROCALCITONIN SERPL-MCNC: 0.74 NG/ML
PROT SERPL-MCNC: 5.7 G/DL (ref 6.4–8.4)
PROT UR STRIP-MCNC: NEGATIVE MG/DL
RBC # BLD AUTO: 4.49 MILLION/UL (ref 3.81–5.12)
S PNEUM AG UR QL: NEGATIVE
SODIUM SERPL-SCNC: 138 MMOL/L (ref 135–147)
SP GR UR STRIP.AUTO: 1.02 (ref 1–1.03)
UROBILINOGEN UR QL STRIP.AUTO: 0.2 E.U./DL
VANCOMYCIN SERPL-MCNC: 16 UG/ML (ref 10–20)
WBC # BLD AUTO: 7.02 THOUSAND/UL (ref 4.31–10.16)

## 2023-03-05 RX ORDER — FUROSEMIDE 20 MG/1
20 TABLET ORAL DAILY
Status: DISCONTINUED | OUTPATIENT
Start: 2023-03-05 | End: 2023-03-08 | Stop reason: HOSPADM

## 2023-03-05 RX ORDER — LISINOPRIL 10 MG/1
10 TABLET ORAL DAILY
Status: DISCONTINUED | OUTPATIENT
Start: 2023-03-05 | End: 2023-03-08 | Stop reason: HOSPADM

## 2023-03-05 RX ORDER — METHYLPREDNISOLONE SODIUM SUCCINATE 40 MG/ML
20 INJECTION, POWDER, LYOPHILIZED, FOR SOLUTION INTRAMUSCULAR; INTRAVENOUS EVERY 8 HOURS SCHEDULED
Status: DISCONTINUED | OUTPATIENT
Start: 2023-03-05 | End: 2023-03-06

## 2023-03-05 RX ORDER — CEFEPIME HYDROCHLORIDE 1 G/50ML
1000 INJECTION, SOLUTION INTRAVENOUS EVERY 12 HOURS
Status: DISCONTINUED | OUTPATIENT
Start: 2023-03-05 | End: 2023-03-08 | Stop reason: HOSPADM

## 2023-03-05 RX ADMIN — GUAIFENESIN 600 MG: 600 TABLET ORAL at 09:27

## 2023-03-05 RX ADMIN — BUDESONIDE AND FORMOTEROL FUMARATE DIHYDRATE 2 PUFF: 80; 4.5 AEROSOL RESPIRATORY (INHALATION) at 09:27

## 2023-03-05 RX ADMIN — GUAIFENESIN 600 MG: 600 TABLET ORAL at 17:44

## 2023-03-05 RX ADMIN — INSULIN LISPRO 4 UNITS: 100 INJECTION, SOLUTION INTRAVENOUS; SUBCUTANEOUS at 21:13

## 2023-03-05 RX ADMIN — LEVALBUTEROL HYDROCHLORIDE 1.25 MG: 1.25 SOLUTION RESPIRATORY (INHALATION) at 09:13

## 2023-03-05 RX ADMIN — LEVALBUTEROL HYDROCHLORIDE 1.25 MG: 1.25 SOLUTION RESPIRATORY (INHALATION) at 19:47

## 2023-03-05 RX ADMIN — INSULIN LISPRO 2 UNITS: 100 INJECTION, SOLUTION INTRAVENOUS; SUBCUTANEOUS at 09:26

## 2023-03-05 RX ADMIN — METHYLPREDNISOLONE SODIUM SUCCINATE 20 MG: 40 INJECTION, POWDER, FOR SOLUTION INTRAMUSCULAR; INTRAVENOUS at 15:20

## 2023-03-05 RX ADMIN — METHYLPREDNISOLONE SODIUM SUCCINATE 20 MG: 40 INJECTION, POWDER, FOR SOLUTION INTRAMUSCULAR; INTRAVENOUS at 21:12

## 2023-03-05 RX ADMIN — LEVALBUTEROL HYDROCHLORIDE 1.25 MG: 1.25 SOLUTION RESPIRATORY (INHALATION) at 14:20

## 2023-03-05 RX ADMIN — NYSTATIN 500000 UNITS: 100000 SUSPENSION ORAL at 21:12

## 2023-03-05 RX ADMIN — VANCOMYCIN HYDROCHLORIDE 1250 MG: 5 INJECTION, POWDER, LYOPHILIZED, FOR SOLUTION INTRAVENOUS at 19:26

## 2023-03-05 RX ADMIN — METHYLPREDNISOLONE SODIUM SUCCINATE 40 MG: 40 INJECTION, POWDER, FOR SOLUTION INTRAMUSCULAR; INTRAVENOUS at 05:05

## 2023-03-05 RX ADMIN — NYSTATIN 500000 UNITS: 100000 SUSPENSION ORAL at 17:44

## 2023-03-05 RX ADMIN — IPRATROPIUM BROMIDE 0.5 MG: 0.5 SOLUTION RESPIRATORY (INHALATION) at 19:47

## 2023-03-05 RX ADMIN — LISINOPRIL 10 MG: 10 TABLET ORAL at 12:53

## 2023-03-05 RX ADMIN — SERTRALINE HYDROCHLORIDE 50 MG: 50 TABLET ORAL at 09:27

## 2023-03-05 RX ADMIN — IPRATROPIUM BROMIDE 0.5 MG: 0.5 SOLUTION RESPIRATORY (INHALATION) at 14:20

## 2023-03-05 RX ADMIN — INSULIN LISPRO 3 UNITS: 100 INJECTION, SOLUTION INTRAVENOUS; SUBCUTANEOUS at 17:44

## 2023-03-05 RX ADMIN — IPRATROPIUM BROMIDE 0.5 MG: 0.5 SOLUTION RESPIRATORY (INHALATION) at 09:13

## 2023-03-05 RX ADMIN — CEFEPIME HYDROCHLORIDE 1000 MG: 1 INJECTION, SOLUTION INTRAVENOUS at 21:12

## 2023-03-05 RX ADMIN — ASPIRIN 81 MG: 81 TABLET, COATED ORAL at 09:27

## 2023-03-05 RX ADMIN — INSULIN LISPRO 3 UNITS: 100 INJECTION, SOLUTION INTRAVENOUS; SUBCUTANEOUS at 12:04

## 2023-03-05 RX ADMIN — CEFEPIME HYDROCHLORIDE 1000 MG: 1 INJECTION, SOLUTION INTRAVENOUS at 09:27

## 2023-03-05 RX ADMIN — UMECLIDINIUM 1 PUFF: 62.5 AEROSOL, POWDER ORAL at 09:27

## 2023-03-05 RX ADMIN — BUDESONIDE AND FORMOTEROL FUMARATE DIHYDRATE 2 PUFF: 80; 4.5 AEROSOL RESPIRATORY (INHALATION) at 17:44

## 2023-03-05 RX ADMIN — FUROSEMIDE 20 MG: 20 TABLET ORAL at 12:53

## 2023-03-05 RX ADMIN — CHOLECALCIFEROL TAB 25 MCG (1000 UNIT) 2000 UNITS: 25 TAB at 09:27

## 2023-03-05 RX ADMIN — NYSTATIN 500000 UNITS: 100000 SUSPENSION ORAL at 12:04

## 2023-03-05 RX ADMIN — PRAVASTATIN SODIUM 80 MG: 40 TABLET ORAL at 17:46

## 2023-03-05 NOTE — PROGRESS NOTES
[facility] [address]  Progress Note - Kathe Coughlin 1945, 77 y o  female MRN: [id number]  Unit/Bed#: 139-59 Encounter: [id number]  Primary Care Provider: Negin Betancourt DO   Date and time admitted to hospital: 3/3/2023  7:10 PM    * Acute on chronic respiratory failure with hypoxia and hypercapnia [facility])  Assessment & Plan  Background: Recent hospitalization for hypoglycemia sent to rehab at [place] presents from facility for worsening hypoxia  · Recently diagnosed with metapneumovirus started on levofloxacin on 3/1/2023  · At baseline on 2 to 3 L     · HCAP: Continue cefepime and vancomycin  Trend procalcitonin  · COPD exacerbation: Continue but start tapering IV methylprednisolone given resolution of wheezing    [facility]  Assessment & Plan  · JOHN likely secondary to poor intake/infection  · Resolved    Results from last 7 days   Lab Units 03/05/23  0438 03/04/23  0526 03/03/23  1940   BUN mg/dL 51* 65* 74*   CREATININE mg/dL 0 70 1 08 1 41*   EGFR ml/min/1 73sq m 82 48 35       Sepsis due to pneumonia [facility])  Assessment & Plan  · Sepsis present on admission secondary to pneumonia  · Continue vancomycin and cefepime  Discontinue vancomycin if MRSA screen is negative    Results from last 7 days   Lab Units 03/05/23  0438 03/03/23 1940   PROCALCITONIN ng/ml 0 74* 2 11*       COPD (chronic obstructive pulmonary disease) ([facility])  Assessment & Plan  · COPD with exacerbation  · Continue Atrovent/Xopenex  · Improvement in wheezing, will start tapering IV methylprednisolone    Type 2 diabetes mellitus, without long-term current use of insulin [facility])  Assessment & Plan  Lab Results   Component Value Date    HGBA1C 6 6 (H) 02/21/2023     Recent Labs     03/04/23  1531 03/04/23  2031 03/05/23  0742 03/05/23  1101   POCGLU 228* 242* 266* 365*     · Recently hospitalized for hypoglycemia and glipizide discontinued  · Holding home janumet for now    Continue sliding scale insulin for steroid induced hypoglycemia    History of intracranial hemorrhage  Assessment & Plan  · History of intracranial hemorrhage with cognitive decline    Chronic atrial fibrillation (HCC)  Assessment & Plan  · Atrial fibrillation not on anticoagulation given history of intracranial hemorrhage    Essential hypertension  Assessment & Plan  · Currently lisinopril/HCT and furosemide on hold due to kidney injury  · We will restart lisinopril and furosemide but hold HCTZ      VTE Pharmacologic Prophylaxis: VTE Score: 6 High Risk (Score >/= 5) - Pharmacological DVT Prophylaxis Contraindicated  Sequential Compression Devices Ordered  Patient Centered Rounds: I have performed bedside rounds with nursing staff today  Discussions with Specialists or Other Care Team Provider:     Education and Discussions with Family / Patient: Attempted to update  (daughter) via phone  Unable to contact  Voicemail was full and cannot leave message    Time Spent for Care: This time was spent on one or more of the following: performing physical exam; counseling and coordination of care; obtaining or reviewing history; documenting in the medical record; reviewing/ordering tests, medications or procedures; communicating with other healthcare professionals and discussing with patient's family/caregivers  Current Length of Stay: 2 day(s)  Current Patient Status: Inpatient   Certification Statement: The patient will continue to require additional inpatient hospital stay due to COPD exacerbation and IV antibiotics  Discharge Plan: Anticipate discharge in 48-72 hrs to home with home services  (PT/OT recommending rehab, family and patient hoping for home with services instead)    Code Status: Level 3 - DNAR and DNI      Subjective:   Patient seen and examined  Feeling much better today  Less short of breath  Objective:   Vitals: Blood pressure 143/55, pulse 67, temperature 97 5 °F (36 4 °C), temperature source Oral, resp  rate 18, height 5' 5" (1 651 m), weight 55 1 kg (121 lb 7 6 oz), SpO2 96 %  Intake/Output Summary (Last 24 hours) at 3/5/2023 1226  Last data filed at 3/5/2023 1216  Gross per 24 hour   Intake 0 ml   Output 300 ml   Net -300 ml       Physical Exam  Vitals reviewed  Constitutional:       General: She is not in acute distress  Appearance: Normal appearance  HENT:      Head: Atraumatic  Cardiovascular:      Rate and Rhythm: Regular rhythm  Heart sounds: Normal heart sounds  Pulmonary:      Breath sounds: Decreased breath sounds and rhonchi present  No wheezing  Abdominal:      General: Bowel sounds are normal       Palpations: Abdomen is soft  Tenderness: There is no guarding or rebound  Musculoskeletal:         General: No swelling  Skin:     General: Skin is warm  Neurological:      Mental Status: She is alert  Mental status is at baseline     Psychiatric:         Mood and Affect: Mood normal        Additional Data:   Labs:  Results from last 7 days   Lab Units 03/05/23 0438 03/04/23 0526 03/03/23 2043 03/03/23  1940   WBC Thousand/uL 7 02 16 94*  --  19 49*   HEMOGLOBIN g/dL 13 2 14 3  --  15 0   PLATELETS Thousands/uL 200 208  --  158   MCV fL 91 90  --  93   TOTAL NEUT ABS Thousand/uL  --  15 75*  --   --    BANDS PCT %  --  1  --   --    INR   --   --  1 12  --      Results from last 7 days   Lab Units 03/05/23 0438 03/04/23  0526 03/03/23  1940   SODIUM mmol/L 138 130* 130*   POTASSIUM mmol/L 3 7 3 9 4 1   CHLORIDE mmol/L 94* 91* 89*   CO2 mmol/L 34* 29 30   ANION GAP mmol/L 10 10 11   BUN mg/dL 51* 65* 74*   CREATININE mg/dL 0 70 1 08 1 41*   CALCIUM mg/dL 9 9 9 0 9 4   ALBUMIN g/dL 3 1* 3 1* 3 3*   TOTAL BILIRUBIN mg/dL 1 10* 1 29* 1 50*   ALK PHOS U/L 52 62 62   ALT U/L 4* <3* 3*   AST U/L 5* 7* 10*   EGFR ml/min/1 73sq m 82 48 35   GLUCOSE RANDOM mg/dL 276* 160* 201*     Results from last 7 days   Lab Units 03/04/23  0526   MAGNESIUM mg/dL 2 1                  Results from last 7 days   Lab Units 03/05/23  0438 03/03/23 2045   LACTIC ACID mmol/L  --  1 0   PROCALCITONIN ng/ml 0 74*  --      Results from last 7 days   Lab Units 03/05/23  1101 03/05/23  0742 03/04/23  2031 03/04/23  1531 03/04/23  1128 03/04/23  0735 03/04/23  0009   POC GLUCOSE mg/dl 365* 266* 242* 228* 222* 207* 188*             * I Have Reviewed All Lab Data Listed Above  Cultures:   Results from last 7 days   Lab Units 03/03/23 2043 03/03/23  1940   BLOOD CULTURE  Received in Microbiology Lab  Culture in Progress  Received in Microbiology Lab  Culture in Progress  INFLUENZA A PCR   --  Negative       Results from last 7 days   Lab Units 03/03/23 1940   SARS-COV-2  Negative   INFLUENZA A PCR  Negative   INFLUENZA B PCR  Negative   RSV PCR  Negative           Lines/Drains:  Invasive Devices     Peripheral Intravenous Line  Duration           Peripheral IV 03/04/23 Dorsal (posterior); Left Wrist 1 day          Drain  Duration           External Urinary Catheter <1 day              Telemetry:      Imaging:  Imaging Reports Reviewed Today Include:   CTA ED chest PE study    Result Date: 3/3/2023  Impression: 1  No pulmonary embolism 2  Airspace opacities in both lower lobes consistent with pneumonia 3    Mediastinal lymphadenopathy Workstation performed: [id number]       Scheduled Meds:  Current Facility-Administered Medications   Medication Dose Route Frequency Provider Last Rate   • acetaminophen  650 mg Oral Q6H PRN Verneita Dayhoff, DO     • aspirin  81 mg Oral Daily Verneita Dayhoff, DO     • budesonide-formoterol  2 puff Inhalation BID Verneita Dayhoff, DO     • cefepime  1,000 mg Intravenous Q12H Daron Giles DO 1,000 mg (03/05/23 4062)    And   • vancomycin  1,250 mg Intravenous Q24H Duglas Barth, DO     • cholecalciferol  2,000 Units Oral Daily Verneita Dayhoff, DO     • guaiFENesin  600 mg Oral BID Verneita Dayhoff, DO     • insulin lispro  1-5 Units Subcutaneous TID AC Verneita Dayhoff, DO     • insulin lispro  1-5 Units Subcutaneous HS Aaron Suazo, DO     • ipratropium  0 5 mg Nebulization TID Aaron Suazo, DO     • levalbuterol  1 25 mg Nebulization TID Aaron Suazo DO     • methylPREDNISolone sodium succinate  40 mg Intravenous Q8H [address] Aaron Suazo, DO     • nystatin  500,000 Units Swish & Swallow 4x Daily Daron Giles, DO     • pravastatin  80 mg Oral Daily With Jen Love, DO     • sertraline  50 mg Oral Daily Aaron Suazo DO     • sodium chloride (PF)  3 mL Intravenous Once PRN Ruthy Vasquez, DO     • umeclidinium  1 puff Inhalation Daily Aaron Suazo DO         Today, Patient Was Seen By: Alyssia Wagner DO    ** Please Note: Dictation voice to text software may have been used in the creation of this document   **

## 2023-03-05 NOTE — PLAN OF CARE
Problem: PAIN - ADULT  Goal: Verbalizes/displays adequate comfort level or baseline comfort level  Description: Interventions:  - Encourage patient to monitor pain and request assistance  - Assess pain using appropriate pain scale  - Administer analgesics based on type and severity of pain and evaluate response  - Implement non-pharmacological measures as appropriate and evaluate response  - Consider cultural and social influences on pain and pain management  - Notify physician/advanced practitioner if interventions unsuccessful or patient reports new pain  Outcome: Progressing     Problem: INFECTION - ADULT  Goal: Absence or prevention of progression during hospitalization  Description: INTERVENTIONS:  - Assess and monitor for signs and symptoms of infection  - Monitor lab/diagnostic results  - Monitor all insertion sites, i e  indwelling lines, tubes, and drains  - Monitor endotracheal if appropriate and nasal secretions for changes in amount and color  - Tropic appropriate cooling/warming therapies per order  - Administer medications as ordered  - Instruct and encourage patient and family to use good hand hygiene technique  - Identify and instruct in appropriate isolation precautions for identified infection/condition  Outcome: Progressing  Goal: Absence of fever/infection during neutropenic period  Description: INTERVENTIONS:  - Monitor WBC    Outcome: Progressing     Problem: SAFETY ADULT  Goal: Patient will remain free of falls  Description: INTERVENTIONS:  - Educate patient/family on patient safety including physical limitations  - Instruct patient to call for assistance with activity   - Consult OT/PT to assist with strengthening/mobility   - Keep Call bell within reach  - Keep bed low and locked with side rails adjusted as appropriate  - Keep care items and personal belongings within reach  - Initiate and maintain comfort rounds  - Make Fall Risk Sign visible to staff  - Offer Toileting every 2 Hours, in advance of need  - Initiate/Maintain fall alarm  - Obtain necessary fall risk management equipment: non-skid footwear  - Apply yellow socks and bracelet for high fall risk patients  - Consider moving patient to room near nurses station  Outcome: Progressing  Goal: Maintain or return to baseline ADL function  Description: INTERVENTIONS:  -  Assess patient's ability to carry out ADLs; assess patient's baseline for ADL function and identify physical deficits which impact ability to perform ADLs (bathing, care of mouth/teeth, toileting, grooming, dressing, etc )  - Assess/evaluate cause of self-care deficits   - Assess range of motion  - Assess patient's mobility; develop plan if impaired  - Assess patient's need for assistive devices and provide as appropriate  - Encourage maximum independence but intervene and supervise when necessary  - Involve family in performance of ADLs  - Assess for home care needs following discharge   - Consider OT consult to assist with ADL evaluation and planning for discharge  - Provide patient education as appropriate  Outcome: Progressing  Goal: Maintains/Returns to pre admission functional level  Description: INTERVENTIONS:  - Perform BMAT or MOVE assessment daily    - Set and communicate daily mobility goal to care team and patient/family/caregiver  - Collaborate with rehabilitation services on mobility goals if consulted  - Perform Range of Motion 3 times a day  - Reposition patient every 2 hours    - Dangle patient 3 times a day  - Stand patient 3 times a day  - Ambulate patient 3 times a day  - Out of bed to chair 3 times a day   - Out of bed for meals 3 times a day  - Out of bed for toileting  - Record patient progress and toleration of activity level   Outcome: Progressing     Problem: DISCHARGE PLANNING  Goal: Discharge to home or other facility with appropriate resources  Description: INTERVENTIONS:  - Identify barriers to discharge w/patient and caregiver  - Arrange for needed discharge resources and transportation as appropriate  - Identify discharge learning needs (meds, wound care, etc )  - Arrange for interpretive services to assist at discharge as needed  - Refer to Case Management Department for coordinating discharge planning if the patient needs post-hospital services based on physician/advanced practitioner order or complex needs related to functional status, cognitive ability, or social support system  Outcome: Progressing     Problem: Knowledge Deficit  Goal: Patient/family/caregiver demonstrates understanding of disease process, treatment plan, medications, and discharge instructions  Description: Complete learning assessment and assess knowledge base    Interventions:  - Provide teaching at level of understanding  - Provide teaching via preferred learning methods  Outcome: Progressing     Problem: CARDIOVASCULAR - ADULT  Goal: Maintains optimal cardiac output and hemodynamic stability  Description: INTERVENTIONS:  - Monitor I/O, vital signs and rhythm  - Monitor for S/S and trends of decreased cardiac output  - Administer and titrate ordered vasoactive medications to optimize hemodynamic stability  - Assess quality of pulses, skin color and temperature  - Assess for signs of decreased coronary artery perfusion  - Instruct patient to report change in severity of symptoms  Outcome: Progressing  Goal: Absence of cardiac dysrhythmias or at baseline rhythm  Description: INTERVENTIONS:  - Continuous cardiac monitoring, vital signs, obtain 12 lead EKG if ordered  - Administer antiarrhythmic and heart rate control medications as ordered  - Monitor electrolytes and administer replacement therapy as ordered  Outcome: Progressing     Problem: RESPIRATORY - ADULT  Goal: Achieves optimal ventilation and oxygenation  Description: INTERVENTIONS:  - Assess for changes in respiratory status  - Assess for changes in mentation and behavior  - Position to facilitate oxygenation and minimize respiratory effort  - Oxygen administered by appropriate delivery if ordered  - Initiate smoking cessation education as indicated  - Encourage broncho-pulmonary hygiene including cough, deep breathe, Incentive Spirometry  - Assess the need for suctioning and aspirate as needed  - Assess and instruct to report SOB or any respiratory difficulty  - Respiratory Therapy support as indicated  Outcome: Progressing     Problem: METABOLIC, FLUID AND ELECTROLYTES - ADULT  Goal: Electrolytes maintained within normal limits  Description: INTERVENTIONS:  - Monitor labs and assess patient for signs and symptoms of electrolyte imbalances  - Administer electrolyte replacement as ordered  - Monitor response to electrolyte replacements, including repeat lab results as appropriate  - Instruct patient on fluid and nutrition as appropriate  Outcome: Progressing  Goal: Fluid balance maintained  Description: INTERVENTIONS:  - Monitor labs   - Monitor I/O and WT  - Instruct patient on fluid and nutrition as appropriate  - Assess for signs & symptoms of volume excess or deficit  Outcome: Progressing  Goal: Glucose maintained within target range  Description: INTERVENTIONS:  - Monitor Blood Glucose as ordered  - Assess for signs and symptoms of hyperglycemia and hypoglycemia  - Administer ordered medications to maintain glucose within target range  - Assess nutritional intake and initiate nutrition service referral as needed  Outcome: Progressing     Problem: MUSCULOSKELETAL - ADULT  Goal: Maintain or return mobility to safest level of function  Description: INTERVENTIONS:  - Assess patient's ability to carry out ADLs; assess patient's baseline for ADL function and identify physical deficits which impact ability to perform ADLs (bathing, care of mouth/teeth, toileting, grooming, dressing, etc )  - Assess/evaluate cause of self-care deficits   - Assess range of motion  - Assess patient's mobility  - Assess patient's need for assistive devices and provide as appropriate  - Encourage maximum independence but intervene and supervise when necessary  - Involve family in performance of ADLs  - Assess for home care needs following discharge   - Consider OT consult to assist with ADL evaluation and planning for discharge  - Provide patient education as appropriate  Outcome: Progressing  Goal: Maintain proper alignment of affected body part  Description: INTERVENTIONS:  - Support, maintain and protect limb and body alignment  - Provide patient/ family with appropriate education  Outcome: Progressing     Problem: MOBILITY - ADULT  Goal: Maintain or return to baseline ADL function  Description: INTERVENTIONS:  -  Assess patient's ability to carry out ADLs; assess patient's baseline for ADL function and identify physical deficits which impact ability to perform ADLs (bathing, care of mouth/teeth, toileting, grooming, dressing, etc )  - Assess/evaluate cause of self-care deficits   - Assess range of motion  - Assess patient's mobility; develop plan if impaired  - Assess patient's need for assistive devices and provide as appropriate  - Encourage maximum independence but intervene and supervise when necessary  - Involve family in performance of ADLs  - Assess for home care needs following discharge   - Consider OT consult to assist with ADL evaluation and planning for discharge  - Provide patient education as appropriate  Outcome: Progressing  Goal: Maintains/Returns to pre admission functional level  Description: INTERVENTIONS:  - Perform BMAT or MOVE assessment daily    - Set and communicate daily mobility goal to care team and patient/family/caregiver  - Collaborate with rehabilitation services on mobility goals if consulted  - Perform Range of Motion 3 times a day  - Reposition patient every 2 hours    - Dangle patient 3 times a day  - Stand patient 3 times a day  - Ambulate patient 3 times a day  - Out of bed to chair 3 times a day   - Out of bed for meals 3 times a day  - Out of bed for toileting  - Record patient progress and toleration of activity level   Outcome: Progressing

## 2023-03-05 NOTE — ASSESSMENT & PLAN NOTE
· COPD with exacerbation  · Continue Atrovent/Xopenex    · Improvement in wheezing, will start tapering IV methylprednisolone

## 2023-03-05 NOTE — ASSESSMENT & PLAN NOTE
Lab Results   Component Value Date    HGBA1C 6 6 (H) 02/21/2023     Recent Labs     03/04/23  1531 03/04/23  2031 03/05/23  0742 03/05/23  1101   POCGLU 228* 242* 266* 365*     · Recently hospitalized for hypoglycemia and glipizide discontinued  · Holding home janumet for now    Continue sliding scale insulin for steroid induced hypoglycemia

## 2023-03-05 NOTE — PROGRESS NOTES
Martha Fields is a 77 y o  female who is currently ordered Vancomycin IV with management by the Pharmacy Consult service  Relevant clinical data and objective / subjective history reviewed  Vancomycin Assessment:  Indication and Goal AUC/Trough: Pneumonia (goal -600, trough >10), -600, trough >10  Clinical Status: stable  Micro:   Blood cultures and MRSA screen pending  Renal Function:  SCr: 0 7 mg/dL  CrCl: 56 7 mL/min  Renal replacement: Not on dialysis  Days of Therapy: 3  Current Dose: 1000mg q24h  Vancomycin Plan:  New Dosinmg q24h  Estimated AUC: 465 mcg*hr/mL  Estimated Trough: 13 2 mcg/mL  Next Level: 0600 on 3/10/23  Renal Function Monitoring: Daily BMP and [place] will continue to follow closely for s/sx of nephrotoxicity, infusion reactions and appropriateness of therapy  BMP and CBC will be ordered per protocol  We will continue to follow the patient’s culture results and clinical progress daily      Theone Blocker, Pharmacist

## 2023-03-05 NOTE — TREATMENT PLAN
Case discussed with patient's daughter on telephone on new phone number  [phone]  She would like patient to return home with home services instead of rehab when medically stable

## 2023-03-05 NOTE — ASSESSMENT & PLAN NOTE
Background: Recent hospitalization for hypoglycemia sent to rehab at [place] presents from facility for worsening hypoxia  · Recently diagnosed with metapneumovirus started on levofloxacin on 3/1/2023  · At baseline on 2 to 3 L     · HCAP: Continue cefepime and vancomycin  Trend procalcitonin     · COPD exacerbation: Continue but start tapering IV methylprednisolone given resolution of wheezing

## 2023-03-05 NOTE — ASSESSMENT & PLAN NOTE
· JOHN likely secondary to poor intake/infection  · Resolved    Results from last 7 days   Lab Units 03/05/23  0438 03/04/23  0526 03/03/23  1940   BUN mg/dL 51* 65* 74*   CREATININE mg/dL 0 70 1 08 1 41*   EGFR ml/min/1 73sq m 82 48 35

## 2023-03-05 NOTE — PROGRESS NOTES
Addendum    Oral candidiasis  Assessment & Plan  · Secondary to systemic steroids and antibiotic use      · Start nystatin swish and swallow

## 2023-03-06 PROBLEM — J44.1 COPD WITH ACUTE EXACERBATION (HCC): Status: ACTIVE | Noted: 2022-09-22

## 2023-03-06 LAB
GLUCOSE SERPL-MCNC: 108 MG/DL (ref 65–140)
GLUCOSE SERPL-MCNC: 258 MG/DL (ref 65–140)
GLUCOSE SERPL-MCNC: 361 MG/DL (ref 65–140)
GLUCOSE SERPL-MCNC: 408 MG/DL (ref 65–140)

## 2023-03-06 RX ORDER — ENOXAPARIN SODIUM 100 MG/ML
40 INJECTION SUBCUTANEOUS EVERY 24 HOURS
Status: DISCONTINUED | OUTPATIENT
Start: 2023-03-06 | End: 2023-03-08 | Stop reason: HOSPADM

## 2023-03-06 RX ORDER — METHYLPREDNISOLONE SODIUM SUCCINATE 40 MG/ML
20 INJECTION, POWDER, LYOPHILIZED, FOR SOLUTION INTRAMUSCULAR; INTRAVENOUS EVERY 12 HOURS SCHEDULED
Status: DISPENSED | OUTPATIENT
Start: 2023-03-06 | End: 2023-03-08

## 2023-03-06 RX ORDER — INSULIN GLARGINE 100 [IU]/ML
10 INJECTION, SOLUTION SUBCUTANEOUS EVERY MORNING
Status: DISCONTINUED | OUTPATIENT
Start: 2023-03-06 | End: 2023-03-08 | Stop reason: HOSPADM

## 2023-03-06 RX ORDER — INSULIN LISPRO 100 [IU]/ML
5 INJECTION, SOLUTION INTRAVENOUS; SUBCUTANEOUS
Status: DISCONTINUED | OUTPATIENT
Start: 2023-03-06 | End: 2023-03-08 | Stop reason: HOSPADM

## 2023-03-06 RX ORDER — METHYLPREDNISOLONE SODIUM SUCCINATE 40 MG/ML
INJECTION, POWDER, LYOPHILIZED, FOR SOLUTION INTRAMUSCULAR; INTRAVENOUS
Status: DISPENSED
Start: 2023-03-06 | End: 2023-03-07

## 2023-03-06 RX ADMIN — NYSTATIN 500000 UNITS: 100000 SUSPENSION ORAL at 09:15

## 2023-03-06 RX ADMIN — INSULIN LISPRO 4 UNITS: 100 INJECTION, SOLUTION INTRAVENOUS; SUBCUTANEOUS at 11:27

## 2023-03-06 RX ADMIN — IPRATROPIUM BROMIDE 0.5 MG: 0.5 SOLUTION RESPIRATORY (INHALATION) at 08:41

## 2023-03-06 RX ADMIN — GUAIFENESIN 600 MG: 600 TABLET ORAL at 17:36

## 2023-03-06 RX ADMIN — INSULIN LISPRO 2 UNITS: 100 INJECTION, SOLUTION INTRAVENOUS; SUBCUTANEOUS at 16:04

## 2023-03-06 RX ADMIN — LEVALBUTEROL HYDROCHLORIDE 1.25 MG: 1.25 SOLUTION RESPIRATORY (INHALATION) at 20:15

## 2023-03-06 RX ADMIN — BUDESONIDE AND FORMOTEROL FUMARATE DIHYDRATE 2 PUFF: 80; 4.5 AEROSOL RESPIRATORY (INHALATION) at 17:36

## 2023-03-06 RX ADMIN — LEVALBUTEROL HYDROCHLORIDE 1.25 MG: 1.25 SOLUTION RESPIRATORY (INHALATION) at 08:41

## 2023-03-06 RX ADMIN — NYSTATIN 500000 UNITS: 100000 SUSPENSION ORAL at 11:28

## 2023-03-06 RX ADMIN — CEFEPIME HYDROCHLORIDE 1000 MG: 1 INJECTION, SOLUTION INTRAVENOUS at 19:45

## 2023-03-06 RX ADMIN — ENOXAPARIN SODIUM 40 MG: 40 INJECTION SUBCUTANEOUS at 16:03

## 2023-03-06 RX ADMIN — INSULIN LISPRO 5 UNITS: 100 INJECTION, SOLUTION INTRAVENOUS; SUBCUTANEOUS at 16:04

## 2023-03-06 RX ADMIN — CEFEPIME HYDROCHLORIDE 1000 MG: 1 INJECTION, SOLUTION INTRAVENOUS at 09:16

## 2023-03-06 RX ADMIN — GUAIFENESIN 600 MG: 600 TABLET ORAL at 09:15

## 2023-03-06 RX ADMIN — BUDESONIDE AND FORMOTEROL FUMARATE DIHYDRATE 2 PUFF: 80; 4.5 AEROSOL RESPIRATORY (INHALATION) at 09:16

## 2023-03-06 RX ADMIN — ASPIRIN 81 MG: 81 TABLET, COATED ORAL at 09:15

## 2023-03-06 RX ADMIN — LISINOPRIL 10 MG: 10 TABLET ORAL at 09:15

## 2023-03-06 RX ADMIN — CHOLECALCIFEROL TAB 25 MCG (1000 UNIT) 2000 UNITS: 25 TAB at 09:15

## 2023-03-06 RX ADMIN — NYSTATIN 500000 UNITS: 100000 SUSPENSION ORAL at 17:36

## 2023-03-06 RX ADMIN — INSULIN GLARGINE 10 UNITS: 100 INJECTION, SOLUTION SUBCUTANEOUS at 09:15

## 2023-03-06 RX ADMIN — UMECLIDINIUM 1 PUFF: 62.5 AEROSOL, POWDER ORAL at 09:16

## 2023-03-06 RX ADMIN — PRAVASTATIN SODIUM 80 MG: 40 TABLET ORAL at 16:03

## 2023-03-06 RX ADMIN — NYSTATIN 500000 UNITS: 100000 SUSPENSION ORAL at 21:42

## 2023-03-06 RX ADMIN — INSULIN LISPRO 3 UNITS: 100 INJECTION, SOLUTION INTRAVENOUS; SUBCUTANEOUS at 09:16

## 2023-03-06 RX ADMIN — METHYLPREDNISOLONE SODIUM SUCCINATE 20 MG: 40 INJECTION, POWDER, FOR SOLUTION INTRAMUSCULAR; INTRAVENOUS at 15:58

## 2023-03-06 RX ADMIN — METHYLPREDNISOLONE SODIUM SUCCINATE 20 MG: 40 INJECTION, POWDER, FOR SOLUTION INTRAMUSCULAR; INTRAVENOUS at 05:17

## 2023-03-06 RX ADMIN — FUROSEMIDE 20 MG: 20 TABLET ORAL at 09:15

## 2023-03-06 RX ADMIN — SERTRALINE HYDROCHLORIDE 50 MG: 50 TABLET ORAL at 09:15

## 2023-03-06 NOTE — PLAN OF CARE
Problem: PAIN - ADULT  Goal: Verbalizes/displays adequate comfort level or baseline comfort level  Description: Interventions:  - Encourage patient to monitor pain and request assistance  - Assess pain using appropriate pain scale  - Administer analgesics based on type and severity of pain and evaluate response  - Implement non-pharmacological measures as appropriate and evaluate response  - Consider cultural and social influences on pain and pain management  - Notify physician/advanced practitioner if interventions unsuccessful or patient reports new pain  Outcome: Progressing     Problem: INFECTION - ADULT  Goal: Absence or prevention of progression during hospitalization  Description: INTERVENTIONS:  - Assess and monitor for signs and symptoms of infection  - Monitor lab/diagnostic results  - Monitor all insertion sites, i e  indwelling lines, tubes, and drains  - Monitor endotracheal if appropriate and nasal secretions for changes in amount and color  - [place] appropriate cooling/warming therapies per order  - Administer medications as ordered  - Instruct and encourage patient and family to use good hand hygiene technique  - Identify and instruct in appropriate isolation precautions for identified infection/condition  Outcome: Progressing  Goal: Absence of fever/infection during neutropenic period  Description: INTERVENTIONS:  - Monitor WBC    Outcome: Progressing     Problem: SAFETY ADULT  Goal: Patient will remain free of falls  Description: INTERVENTIONS:  - Educate patient/family on patient safety including physical limitations  - Instruct patient to call for assistance with activity   - Consult OT/PT to assist with strengthening/mobility   - Keep Call bell within reach  - Keep bed low and locked with side rails adjusted as appropriate  - Keep care items and personal belongings within reach  - Initiate and maintain comfort rounds  - Make Fall Risk Sign visible to staff  - Offer Toileting every 2 Hours, in advance of need  - Initiate/Maintain fall alarm  - Obtain necessary fall risk management equipment: non-skid footwear  - Apply yellow socks and bracelet for high fall risk patients  - Consider moving patient to room near nurses station  Outcome: Progressing  Goal: Maintain or return to baseline ADL function  Description: INTERVENTIONS:  -  Assess patient's ability to carry out ADLs; assess patient's baseline for ADL function and identify physical deficits which impact ability to perform ADLs (bathing, care of mouth/teeth, toileting, grooming, dressing, etc )  - Assess/evaluate cause of self-care deficits   - Assess range of motion  - Assess patient's mobility; develop plan if impaired  - Assess patient's need for assistive devices and provide as appropriate  - Encourage maximum independence but intervene and supervise when necessary  - Involve family in performance of ADLs  - Assess for home care needs following discharge   - Consider OT consult to assist with ADL evaluation and planning for discharge  - Provide patient education as appropriate  Outcome: Progressing  Goal: Maintains/Returns to pre admission functional level  Description: INTERVENTIONS:  - Perform BMAT or MOVE assessment daily    - Set and communicate daily mobility goal to care team and patient/family/caregiver  - Collaborate with rehabilitation services on mobility goals if consulted  - Perform Range of Motion 3 times a day  - Reposition patient every 2 hours    - Dangle patient 3 times a day  - Stand patient 3 times a day  - Ambulate patient 3 times a day  - Out of bed to chair 3 times a day   - Out of bed for meals 3 times a day  - Out of bed for toileting  - Record patient progress and toleration of activity level   Outcome: Progressing     Problem: DISCHARGE PLANNING  Goal: Discharge to home or other facility with appropriate resources  Description: INTERVENTIONS:  - Identify barriers to discharge w/patient and caregiver  - Arrange for needed discharge resources and transportation as appropriate  - Identify discharge learning needs (meds, wound care, etc )  - Arrange for interpretive services to assist at discharge as needed  - Refer to Case Management Department for coordinating discharge planning if the patient needs post-hospital services based on physician/advanced practitioner order or complex needs related to functional status, cognitive ability, or social support system  Outcome: Progressing     Problem: Knowledge Deficit  Goal: Patient/family/caregiver demonstrates understanding of disease process, treatment plan, medications, and discharge instructions  Description: Complete learning assessment and assess knowledge base    Interventions:  - Provide teaching at level of understanding  - Provide teaching via preferred learning methods  Outcome: Progressing     Problem: CARDIOVASCULAR - ADULT  Goal: Maintains optimal cardiac output and hemodynamic stability  Description: INTERVENTIONS:  - Monitor I/O, vital signs and rhythm  - Monitor for S/S and trends of decreased cardiac output  - Administer and titrate ordered vasoactive medications to optimize hemodynamic stability  - Assess quality of pulses, skin color and temperature  - Assess for signs of decreased coronary artery perfusion  - Instruct patient to report change in severity of symptoms  Outcome: Progressing  Goal: Absence of cardiac dysrhythmias or at baseline rhythm  Description: INTERVENTIONS:  - Continuous cardiac monitoring, vital signs, obtain 12 lead EKG if ordered  - Administer antiarrhythmic and heart rate control medications as ordered  - Monitor electrolytes and administer replacement therapy as ordered  Outcome: Progressing     Problem: RESPIRATORY - ADULT  Goal: Achieves optimal ventilation and oxygenation  Description: INTERVENTIONS:  - Assess for changes in respiratory status  - Assess for changes in mentation and behavior  - Position to facilitate oxygenation and minimize respiratory effort  - Oxygen administered by appropriate delivery if ordered  - Initiate smoking cessation education as indicated  - Encourage broncho-pulmonary hygiene including cough, deep breathe, Incentive Spirometry  - Assess the need for suctioning and aspirate as needed  - Assess and instruct to report SOB or any respiratory difficulty  - Respiratory Therapy support as indicated  Outcome: Progressing     Problem: METABOLIC, FLUID AND ELECTROLYTES - ADULT  Goal: Electrolytes maintained within normal limits  Description: INTERVENTIONS:  - Monitor labs and assess patient for signs and symptoms of electrolyte imbalances  - Administer electrolyte replacement as ordered  - Monitor response to electrolyte replacements, including repeat lab results as appropriate  - Instruct patient on fluid and nutrition as appropriate  Outcome: Progressing  Goal: Fluid balance maintained  Description: INTERVENTIONS:  - Monitor labs   - Monitor I/O and WT  - Instruct patient on fluid and nutrition as appropriate  - Assess for signs & symptoms of volume excess or deficit  Outcome: Progressing  Goal: Glucose maintained within target range  Description: INTERVENTIONS:  - Monitor Blood Glucose as ordered  - Assess for signs and symptoms of hyperglycemia and hypoglycemia  - Administer ordered medications to maintain glucose within target range  - Assess nutritional intake and initiate nutrition service referral as needed  Outcome: Progressing     Problem: MUSCULOSKELETAL - ADULT  Goal: Maintain or return mobility to safest level of function  Description: INTERVENTIONS:  - Assess patient's ability to carry out ADLs; assess patient's baseline for ADL function and identify physical deficits which impact ability to perform ADLs (bathing, care of mouth/teeth, toileting, grooming, dressing, etc )  - Assess/evaluate cause of self-care deficits   - Assess range of motion  - Assess patient's mobility  - Assess patient's need for assistive devices and provide as appropriate  - Encourage maximum independence but intervene and supervise when necessary  - Involve family in performance of ADLs  - Assess for home care needs following discharge   - Consider OT consult to assist with ADL evaluation and planning for discharge  - Provide patient education as appropriate  Outcome: Progressing  Goal: Maintain proper alignment of affected body part  Description: INTERVENTIONS:  - Support, maintain and protect limb and body alignment  - Provide patient/ family with appropriate education  Outcome: Progressing     Problem: MOBILITY - ADULT  Goal: Maintain or return to baseline ADL function  Description: INTERVENTIONS:  -  Assess patient's ability to carry out ADLs; assess patient's baseline for ADL function and identify physical deficits which impact ability to perform ADLs (bathing, care of mouth/teeth, toileting, grooming, dressing, etc )  - Assess/evaluate cause of self-care deficits   - Assess range of motion  - Assess patient's mobility; develop plan if impaired  - Assess patient's need for assistive devices and provide as appropriate  - Encourage maximum independence but intervene and supervise when necessary  - Involve family in performance of ADLs  - Assess for home care needs following discharge   - Consider OT consult to assist with ADL evaluation and planning for discharge  - Provide patient education as appropriate  Outcome: Progressing  Goal: Maintains/Returns to pre admission functional level  Description: INTERVENTIONS:  - Perform BMAT or MOVE assessment daily    - Set and communicate daily mobility goal to care team and patient/family/caregiver  - Collaborate with rehabilitation services on mobility goals if consulted  - Perform Range of Motion 3 times a day  - Reposition patient every 2 hours    - Dangle patient 3 times a day  - Stand patient 3 times a day  - Ambulate patient 3 times a day  - Out of bed to chair 3 times a day   - Out of bed for meals 3 times a day  - Out of bed for toileting  - Record patient progress and toleration of activity level   Outcome: Progressing

## 2023-03-06 NOTE — ASSESSMENT & PLAN NOTE
· Not on anticoagulation given history of intracranial hemorrhage  · The heart rate is currently well-controlled without any AV-mariano blocking agents  Previously Declined (within the last year)

## 2023-03-06 NOTE — PROGRESS NOTES
[facility] [address]  Progress Note - [place] 1945, 77 y o  female MRN: [id number]  Unit/Bed#: 565-68 Encounter: [id number]  Primary Care Provider: Emiliano Ziegler DO   Date and time admitted to hospital: 3/3/2023  7:10 PM    * Acute on chronic respiratory failure with hypoxia and hypercapnia ([facility] [address] )  Assessment & Plan  Background: Recent hospitalization for hypoglycemia sent to rehab at [facility] presented from facility for worsening hypoxia  · Recently diagnosed with metapneumovirus  · Started on levofloxacin on 3/1/2023  · At baseline on 2 to 3 L     · HCAP: Continue IV cefepime   · COPD exacerbation: Continue but start tapering IV methylprednisolone given resolution of wheezing    Sepsis due to pneumonia [facility])  Assessment & Plan  · Sepsis was present on admission and secondary to pneumonia  · Continue IV cefepime  · The IV vancomycin was discontinued with a negative MRSA nasal culture  · Follow culture results    Results from last 7 days   Lab Units 03/05/23  0438 03/03/23 1940   PROCALCITONIN ng/ml 0 74* 2 11*       Oral candidiasis  Assessment & Plan  · Secondary to systemic steroids and antibiotic use     · Treat with nystatin swish and swallow    JOHN (acute kidney injury) ([facility] )  Assessment & Plan  · JOHN likely secondary to poor intake/infection  · Now resolved  · Avoid all nephrotoxic agents  · Serial laboratory testing to monitor the patient's renal function and electrolyte levels    Results from last 7 days   Lab Units 03/05/23  0438 03/04/23  0526 03/03/23 1940   BUN mg/dL 51* 65* 74*   CREATININE mg/dL 0 70 1 08 1 41*   EGFR ml/min/1 73sq m 82 48 35       COPD with acute exacerbation (HCC)  Assessment & Plan  · Continue methylprednisolone 20 mg IV every 8 hours  · Continue nebulizer treatments via the respiratory protocol  · Continue the symbicort and umeclidinium inhalers    Type 2 diabetes mellitus with hyperglycemia, without long-term current use of insulin [facility])  Assessment & Plan  Lab Results   Component Value Date    HGBA1C 6 6 (H) 02/21/2023     Recent Labs     03/05/23  1603 03/05/23  2033 03/06/23  0733 03/06/23  1053   POCGLU 357* 381* 361* 408*     · Recently hospitalized for hypoglycemia and glipizide discontinued  · Holding home janumet for now  · Now with hyperglycemia due to IV methylprednisolone  · Utilize lantus 10 units SQ Qdaily and humalog 5 units TID with meals  · ISS with blood glucose monitoring ACHS  · Hypoglycemia protocol  · Outpatient Endocrinology evaluation    History of intracranial hemorrhage  Assessment & Plan  · History of intracranial hemorrhage with cognitive decline    Chronic atrial fibrillation (HCC)  Assessment & Plan  · Not on anticoagulation given history of intracranial hemorrhage  · The heart rate is currently well-controlled without any AV-mariano blocking agents  Essential hypertension  Assessment & Plan  · The lisinopril/HCTZ and furosemide were on hold due to kidney injury  · Restarted lisinopril and furosemide on 03/05/2023  · Continue to hold hydrochlorothiazide        VTE Pharmacologic Prophylaxis: VTE Score: 6 High Risk (Score >/= 5) - Pharmacological DVT Prophylaxis Ordered: enoxaparin (Lovenox)  Sequential Compression Devices Ordered  Patient Centered Rounds: I performed bedside rounds with nursing staff today  Total Time Spent on Date of Encounter in care of patient: 35 minutes This time was spent on one or more of the following: performing physical exam; counseling and coordination of care; obtaining or reviewing history; documenting in the medical record; reviewing/ordering tests, medications or procedures; communicating with other healthcare professionals and discussing with patient's family/caregivers      Current Length of Stay: 3 day(s)  Current Patient Status: Inpatient   Certification Statement: The patient will continue to require additional inpatient hospital stay due to the need for IV antibiotic treatment and for IV methylprednisolone treatment  Discharge Plan: Anticipate discharge in 48-72 hrs to rehab facility  Code Status: Level 3 - DNAR and DNI    Subjective: The patient was seen and examined  The patient is doing better  The shortness of breath is improving  No chest pain  No abdominal pain  No nausea or vomiting  Objective:     Vitals:   Temp (24hrs), Av 2 °F (36 8 °C), Min:98 1 °F (36 7 °C), Max:98 4 °F (36 9 °C)    Temp:  [98 1 °F (36 7 °C)-98 4 °F (36 9 °C)] 98 1 °F (36 7 °C)  HR:  [65-83] 72  Resp:  [17-20] 20  BP: (125-146)/(53-65) 146/65  SpO2:  [92 %-98 %] 94 %  Body mass index is 20 21 kg/m²  Input and Output Summary (last 24 hours):      Intake/Output Summary (Last 24 hours) at 3/6/2023 1450  Last data filed at 3/6/2023 1223  Gross per 24 hour   Intake 180 ml   Output 250 ml   Net -70 ml       Physical Exam:   Physical Exam   General:  NAD, follows commands  HEENT:  NC/AT, mucous membranes moist  Neck:  Supple, No JVP elevation  CV:  + S1, + S2, Irregularly irregular rhythm, Regular rate  Pulm:  Bibasilar crackles  Abd:  Soft, Non-tender, Non-distended  Ext:  No clubbing/cyanosis/edema  Skin:  No rashes  Neuro:  Awake, confused, not oriented  Psych:  Normal mood and affect      Additional Data:    Labs:  Results from last 7 days   Lab Units 23  0438 23  0526 23  1940   WBC Thousand/uL 7 02 16 94* 19 49*   HEMOGLOBIN g/dL 13 2 14 3 15 0   HEMATOCRIT % 40 7 44 8 46 7*   PLATELETS Thousands/uL 200 208 158   BANDS PCT %  --  1  --    NEUTROS PCT %  --   --  91*   LYMPHS PCT %  --   --  4*   LYMPHO PCT %  --  1*  --    MONOS PCT %  --   --  4   MONO PCT %  --  5  --    EOS PCT %  --  0 0     Results from last 7 days   Lab Units 23  0438   SODIUM mmol/L 138   POTASSIUM mmol/L 3 7   CHLORIDE mmol/L 94*   CO2 mmol/L 34*   BUN mg/dL 51*   CREATININE mg/dL 0 70   ANION GAP mmol/L 10   CALCIUM mg/dL 9 9   ALBUMIN g/dL 3 1*   TOTAL BILIRUBIN mg/dL 1 10* ALK PHOS U/L 52   ALT U/L 4*   AST U/L 5*   GLUCOSE RANDOM mg/dL 276*     Results from last 7 days   Lab Units 03/03/23  2043   INR  1 12     Results from last 7 days   Lab Units 03/06/23  1053 03/06/23  0733 03/05/23 2033 03/05/23  1603 03/05/23  1101 03/05/23  0742 03/04/23  2031 03/04/23  1531 03/04/23  1128 03/04/23  0735 03/04/23  0009   POC GLUCOSE mg/dl 408* 361* 381* 357* 365* 266* 242* 228* 222* 207* 188*         Results from last 7 days   Lab Units 03/05/23  0438 03/03/23 2045 03/03/23  1940   LACTIC ACID mmol/L  --  1 0  --    PROCALCITONIN ng/ml 0 74*  --  2 11*       Lines/Drains:  Invasive Devices     Peripheral Intravenous Line  Duration           Peripheral IV 03/05/23 Right;Ventral (anterior) Forearm <1 day          Drain  Duration           External Urinary Catheter 1 day                      Imaging: No pertinent imaging reviewed  Recent Cultures (last 7 days):   Results from last 7 days   Lab Units 03/05/23  1041 03/03/23 2043 03/03/23  1940   BLOOD CULTURE   --  No Growth at 24 hrs  No Growth at 24 hrs     LEGIONELLA URINARY ANTIGEN  Negative  --   --        Last 24 Hours Medication List:   Current Facility-Administered Medications   Medication Dose Route Frequency Provider Last Rate   • acetaminophen  650 mg Oral Q6H PRN Chris Hay DO     • aspirin  81 mg Oral Daily Chris Hay DO     • budesonide-formoterol  2 puff Inhalation BID Chris Hay DO     • cefepime  1,000 mg Intravenous Q12H Daron Giles DO 1,000 mg (03/06/23 2919)   • cholecalciferol  2,000 Units Oral Daily Chris Hay DO     • enoxaparin  40 mg Subcutaneous Q24H Madyson Sandy DO     • furosemide  20 mg Oral Daily Bentley Dye DO     • guaiFENesin  600 mg Oral BID Chris Hay DO     • insulin glargine  10 Units Subcutaneous QAM Madyson Sandy DO     • insulin lispro  1-5 Units Subcutaneous TID AC Chris Hay DO     • insulin lispro  1-5 Units Subcutaneous HS Chris Hay,      • insulin lispro  5 Units Subcutaneous TID With Meals Hue Celis, DO     • ipratropium  0 5 mg Nebulization TID Danish Kimball, DO     • levalbuterol  1 25 mg Nebulization TID Danishmireille Kimball, DO     • lisinopril  10 mg Oral Daily Montserrat Spring, DO     • methylPREDNISolone sodium succinate  20 mg Intravenous [facility] Daron Giles, DO     • nystatin  500,000 Units Swish & Swallow 4x Daily Daron Giles, DO     • pravastatin  80 mg Oral Daily With Dinner Danishmireille Kimball, DO     • sertraline  50 mg Oral Daily Danish Jigar, DO     • sodium chloride (PF)  3 mL Intravenous Once PRN Ewelina Robin DO     • umeclidinium  1 puff Inhalation Daily Danish Kimball DO          Today, Patient Was Seen By: Hue Celis DO    **Please Note: This note may have been constructed using a voice recognition system  **

## 2023-03-06 NOTE — ASSESSMENT & PLAN NOTE
· The lisinopril/HCTZ and furosemide were on hold due to kidney injury    · Restarted lisinopril and furosemide on 03/05/2023  · Continue to hold hydrochlorothiazide

## 2023-03-06 NOTE — CONSULTS
Consultation - Pulmonary Medicine   Eulogio Valdovinos 77 y o  female MRN: [id number]  Unit/Bed#: 015-08 Encounter: [id number]      Physician Requesting Consult:   Reason for Consult:     Assessment/Plan:    79-year-old woman with history of chronic hypoxic respiratory failure (on 2 to 3 L at rest,), COPD who presented from her nursing home due to shortness of breath and found to have bilateral lower lobe consolidations consistent with pneumonia and acute hypoxic respiratory failure  Problems     Acute on chronic hypoxic respiratory failure-initially required 6 L nasal cannula but this has been weaned down to her baseline supplemental oxygen  She is exhibiting no respiratory distress at this time  Acute exacerbation of COPD    Bilateral lower lobe pneumonia-cultures and viral PCR negative to date    Recommendations     1  Continue supplemental oxygen to maintain saturations greater than 88%  2  Recommend pulmonary hygiene: Out of bed to chair if possible  Incentive spirometry, Mucinex  3  Antibiotics have been de-escalated to cefepime  Continue for 7-day total course (d2/7)  4  Have changed Solu-Medrol 20 mg to every 12 hours for now but can likely transition to prednisone tomorrow  5  Continue bronchodilators: Xopenex inhalation, umeclidinium  We will stop scheduled Atrovent this time to avoid increased LAMA usage but changed to as needed 3 times daily  Continue Symbicort      ____________________________________________________________    HPI:    Eulogio Valdovinos is a 77 y o  female with a history of chronic hypoxic respiratory failure (on 2 to 3 L at rest), COPD, history of intracranial hemorrhage, diabetes who presented to [address] [facility] emergency room on March 3 from her nursing home due to shortness of breath  Patient with previous diagnosis of human metapneumovirus on February 27  In developed secondary bacterial pneumonia involving the right lower lobe diagnosed on March 1    She was prescribed Levaquin  During this admission CT angiogram was performed showed consolidations in bilateral lower lobes consistent with pneumonia with associated mediastinal adenopathy  There is no evidence of acute pulmonary embolism  She initially required 6 L supplemental O2  Since being admitted patient's been treated for acute on chronic hypoxic respiratory failure  She is on her baseline supplemental O2 to 2 to 3 L  She has been prescribed cefepime and vancomycin  She has received IV steroids and bronchodilators for acute exacerbation of COPD  Strep pneumo and Legionella antigen were negative  MRSA swab was negative  Blood cultures are negative  Influenza/COVID 19 and RSV PCR were also negative  Procalcitonin has decreased from 2 11 to 0 74  Patient seen and examined this afternoon  She is a fairly limited historian and did not know why she was in the hospital   Stated her breathing was okay  Denied any other respiratory symptoms  When I asked her if she had other concerns or symptoms she replied "no "      PFT results:  None on file      Review of Systems:    Full 12 point review of systems was performed  Aside from what was mentioned in the HPI, it is otherwise negative  Historical Information   Past Medical History:   Diagnosis Date   • Hyperlipidemia     last assessed  8/24/17   • Hypertension     last assessed 10/2/14     Past Surgical History:   Procedure Laterality Date   • CRANIOTOMY       Social History   Social History     Substance and Sexual Activity   Alcohol Use Never     Social History     Tobacco Use   Smoking Status Every Day   • Packs/day: 1 00   • Types: Cigarettes   Smokeless Tobacco Never     Family History:   Family History   Problem Relation Age of Onset   • Breast cancer Mother    • Ovarian cancer Mother    • Diabetes Father        Medications: The patient's active and prehospital medications were reviewed    Current Facility-Administered Medications   Medication Dose Route Frequency Provider Last Rate   • acetaminophen  650 mg Oral Q6H PRN Jasmyn Hipps, DO     • aspirin  81 mg Oral Daily Jasmyn Hipps, DO     • budesonide-formoterol  2 puff Inhalation BID Jasmyn Hipps, DO     • cefepime  1,000 mg Intravenous Q12H Daron Giles DO 1,000 mg (03/06/23 1616)   • cholecalciferol  2,000 Units Oral Daily Jasmyn Hipps, DO     • furosemide  20 mg Oral Daily Gabriela Woody, DO     • guaiFENesin  600 mg Oral BID Jasmyn Hipps, DO     • insulin glargine  10 Units Subcutaneous QAM Luciana Sandy, DO     • insulin lispro  1-5 Units Subcutaneous TID AC Jasmyn Myers, DO     • insulin lispro  1-5 Units Subcutaneous HS Jasmyn Myers, DO     • ipratropium  0 5 mg Nebulization TID Jasmyn Rojos, DO     • levalbuterol  1 25 mg Nebulization TID Jasmyn Rojos, DO     • lisinopril  10 mg Oral Daily Gabrielaalthea Mckay, DO     • methylPREDNISolone sodium succinate  20 mg Intravenous [facility] Daron Giles, DO     • nystatin  500,000 Units Swish & Swallow 4x Daily Daron Giles, DO     • pravastatin  80 mg Oral Daily With Dinner Jasmyn Myers, DO     • sertraline  50 mg Oral Daily Jasmyn Myers, DO     • sodium chloride (PF)  3 mL Intravenous Once PRN Annis Kanner, DO     • umeclidinium  1 puff Inhalation Daily Jasmyn Myers, DO           PhysicalExamination:  Vitals:   Vitals:    03/05/23 2111 03/05/23 2342 03/06/23 0736 03/06/23 0845   BP: 134/59  146/65    BP Location: Left arm      Pulse: 72 65 72    Resp: 18 17 20    Temp: 98 1 °F (36 7 °C)  98 1 °F (36 7 °C)    TempSrc: Oral      SpO2: 95% 96% 98% 94%   Weight:       Height:         Body mass index is 20 21 kg/m²    Temp  Min: 97 5 °F (36 4 °C)  Max: 98 8 °F (37 1 °C)  IBW (Ideal Body Weight): 57 kg    SpO2: 94 %,   SpO2 Activity: At Rest,   O2 Device: Nasal cannula    /65   Pulse 72   Temp 98 1 °F (36 7 °C)   Resp 20   Ht 5' 5" (1 651 m)   Wt 55 1 kg (121 lb 7 6 oz)   SpO2 94%   BMI 20 21 kg/m²     General Appearance: Alert, cooperative, no distress, appears stated age   Head:    Normocephalic, without obvious abnormality, atraumatic   Eyes:    PERRL, conjunctiva/corneas clear, both eyes        Ears:    Normal  external ear canals, both ears   Nose:   Nares normal, septum midline, mucosa normal, no drainage    or sinus tenderness   Throat:   Lips, mucosa, and tongue normal; teeth and gums normal   Neck:   Supple, symmetrical, trachea midline, no adenopathy;        thyroid:  No enlargement/tenderness/nodules; no JVD       Lungs:    Diminished bilaterally with some wheezing significant rhonchi       Heart:    Regular rate and rhythm, S1 and S2 normal, no murmur, rub    or gallop   Abdomen:     Soft, non-tender, bowel sounds active all four quadrants,     no masses,            Extremities:   Extremities normal, atraumatic, no cyanosis or edema   Pulses:   2+ and symmetric all extremities   Skin:   Skin color, no rashes or lesions   Lymph nodes:   Cervical, submental,  supraclavicular, nodes normal   Neurologic:   CNII-XII  grossly intact  Diagnostic Data:  CBC:   Results from last 7 days   Lab Units 03/05/23 0438 03/04/23 0526 03/03/23 1940   WBC Thousand/uL 7 02 16 94* 19 49*   HEMOGLOBIN g/dL 13 2 14 3 15 0   HEMATOCRIT % 40 7 44 8 46 7*   PLATELETS Thousands/uL 200 208 158       CMP:   Results from last 7 days   Lab Units 03/05/23 0438 03/04/23 0526 03/03/23 1940   SODIUM mmol/L 138 130* 130*   POTASSIUM mmol/L 3 7 3 9 4 1   CHLORIDE mmol/L 94* 91* 89*   CO2 mmol/L 34* 29 30   BUN mg/dL 51* 65* 74*   CREATININE mg/dL 0 70 1 08 1 41*   CALCIUM mg/dL 9 9 9 0 9 4   ALK PHOS U/L 52 62 62   ALT U/L 4* <3* 3*   AST U/L 5* 7* 10*         Microbiology:  Results from last 7 days   Lab Units 03/05/23  1041 03/04/23  0828 03/03/23 2043 03/03/23 1940   BLOOD CULTURE   --   --  No Growth at 24 hrs  No Growth at 24 hrs     MRSA CULTURE ONLY   --  No Methicillin Resistant Staphlyococcus aureus (MRSA) isolated  --   -- INFLUENZA A PCR   --   --   --  Negative   STREP PNEUMONIAE ANTIGEN, URINE  Negative  --   --   --    LEGIONELLA URINARY ANTIGEN  Negative  --   --   --        ABG: No results found for: PHART, WSV1OYK, PO2ART, QZQ9OSA, L1EOYXVZ, BEART, SOURCE    Imaging:  CTA chest March 3, 2023 images and report personally reviewed  Impression:       1   No pulmonary embolism   2   Airspace opacities in both lower lobes consistent with pneumonia   3   Mediastinal lymphadenopathy        Cardiac lab/EKG/telemetry/ECHO: August 15, 2022 reports reviewed    Left Ventricle: Left ventricular cavity size is normal  Wall thickness is normal  The left ventricular ejection fraction is 60%  Systolic function is normal  Although no diagnostic regional wall motion abnormality was identified, this possibility cannot be completely excluded on the basis of this study  Diastolic function is normal   •  Left Atrium: The atrium is mildly dilated  •  Mitral Valve: There is mild to moderate regurgitation  •  Tricuspid Valve: There is mild regurgitation      Kaia Roblero MD

## 2023-03-06 NOTE — ASSESSMENT & PLAN NOTE
Lab Results   Component Value Date    HGBA1C 6 6 (H) 02/21/2023     Recent Labs     03/05/23  1603 03/05/23  2033 03/06/23  0733 03/06/23  1053   POCGLU 357* 381* 361* 408*     · Recently hospitalized for hypoglycemia and glipizide discontinued    · Holding home janumet for now  · Now with hyperglycemia due to IV methylprednisolone  · Utilize lantus 10 units SQ Qdaily and humalog 5 units TID with meals  · ISS with blood glucose monitoring ACHS  · Hypoglycemia protocol  · Outpatient Endocrinology evaluation

## 2023-03-06 NOTE — ASSESSMENT & PLAN NOTE
· Sepsis was present on admission and secondary to pneumonia  · Continue IV cefepime  · The IV vancomycin was discontinued with a negative MRSA nasal culture    · Follow culture results    Results from last 7 days   Lab Units 03/05/23  0438 03/03/23  1940   PROCALCITONIN ng/ml 0 74* 2 11*

## 2023-03-06 NOTE — PHYSICAL THERAPY NOTE
PHYSICAL THERAPY NOTE          Patient Name: Steph Iraheta  VREJL'F Date: 3/6/2023   03/06/23 0930   PT Last Visit   PT Visit Date 03/06/23   Note Type   Note Type Treatment   Pain Assessment   Pain Assessment Tool 0-10   Pain Score No Pain   Restrictions/Precautions   Weight Bearing Precautions Per Order No   Other Precautions Cognitive; Chair Alarm; Bed Alarm; Fall Risk   General   Response to Previous Treatment Patient unable to report, no changes reported from family or staff   Family/Caregiver Present No   Cognition   Overall Cognitive Status Impaired   Following Commands Follows one step commands with increased time or repetition   Subjective   Subjective Needs to go to the bathroom   Bed Mobility   Additional Comments OOB in chair start/end PT session   Transfers   Sit to Stand 4  Minimal assistance   Additional items Assist x 1; Armrests; Increased time required;Verbal cues   Stand to Sit 4  Minimal assistance   Additional items Assist x 1; Armrests; Increased time required;Verbal cues   Additional Comments stood with fair balance for hygiene and brief change   Ambulation/Elevation   Gait pattern Short stride; Excessively slow;Decreased foot clearance; Improper Weight shift   Gait Assistance 4  Minimal assist   Additional items Assist x 1;Verbal cues; Assist x 2   Assistive Device Rolling walker   Distance 25' x 2   Ambulation/Elevation Additional Comments RW used   Balance   Static Sitting Fair +   Dynamic Sitting Fair +   Static Standing Fair -   Dynamic Standing Fair -   Ambulatory Fair -  (RW)   Endurance Deficit   Endurance Deficit Yes   Activity Tolerance   Activity Tolerance Patient limited by fatigue   Assessment   Prognosis Good   Problem List   (Decreased strength; Decreased range of motion; Impaired balance; Decreased mobility;  Decreased endurance)   Assessment Pt  seen for PT treatment session this date with interventions consisting of  toilet transfers, transfers and  gait training w/ emphasis on improving pt's ability to ambulate  Pt  Requiring max cues for sequence and safety  In comparison to previous session, Pt  With improvements in activity tolerance  Pt is in need of continued activity in PT to improve strength balance endurance mobility transfers and ambulation with return to maximize LOF  From PT/mobility standpoint, recommendation at time of d/c would be post acute rehab  in order to promote return to PLOF and independence  The patient's AM-PAC Basic Mobility Inpatient Short Form Raw Score is 15  A Raw score of less than or equal to 16 suggests the patient may benefit from discharge to post-acute rehabilitation services  Please also refer to physical therapy recommendation for safe DC planning  Goals   LTG Expiration Date 03/18/23   Plan   Treatment/Interventions   (Functional transfer training; LE strengthening/ROM; Endurance training; Elevations; Therapeutic exercise; Bed mobility; Gait training)   Progress Slow progress, decreased activity tolerance   PT Frequency 3-5x/wk   Recommendation   PT Discharge Recommendation Post acute rehabilitation services   AM-PAC Basic Mobility Inpatient   Turning in Flat Bed Without Bedrails 3   Lying on Back to Sitting on Edge of Flat Bed Without Bedrails 2   Moving Bed to Chair 3   Standing Up From Chair Using Arms 3   Walk in Room 3   Climb 3-5 Stairs With Railing 1   Basic Mobility Inpatient Raw Score 15   Basic Mobility Standardized Score 36 97   Highest Level Of Mobility   JH-HLM Goal 4: Move to chair/commode   JH-HLM Achieved 7: Walk 25 feet or more   Education   Education Provided Mobility training   Patient Reinforcement needed   End of Consult   Patient Position at End of Consult Bedside chair;Bed/Chair alarm activated; All needs within reach   End of Consult Comments discussed POC with PT

## 2023-03-06 NOTE — ASSESSMENT & PLAN NOTE
Background: Recent hospitalization for hypoglycemia sent to rehab at [facility] presented from facility for worsening hypoxia  · Recently diagnosed with metapneumovirus  · Started on levofloxacin on 3/1/2023  · At baseline on 2 to 3 L     · HCAP: Continue IV cefepime   · COPD exacerbation: Continue but start tapering IV methylprednisolone given resolution of wheezing

## 2023-03-06 NOTE — PLAN OF CARE
Problem: PAIN - ADULT  Goal: Verbalizes/displays adequate comfort level or baseline comfort level  Description: Interventions:  - Encourage patient to monitor pain and request assistance  - Assess pain using appropriate pain scale  - Administer analgesics based on type and severity of pain and evaluate response  - Implement non-pharmacological measures as appropriate and evaluate response  - Consider cultural and social influences on pain and pain management  - Notify physician/advanced practitioner if interventions unsuccessful or patient reports new pain  Outcome: Progressing     Problem: INFECTION - ADULT  Goal: Absence or prevention of progression during hospitalization  Description: INTERVENTIONS:  - Assess and monitor for signs and symptoms of infection  - Monitor lab/diagnostic results  - Monitor all insertion sites, i e  indwelling lines, tubes, and drains  - Monitor endotracheal if appropriate and nasal secretions for changes in amount and color  - Savage appropriate cooling/warming therapies per order  - Administer medications as ordered  - Instruct and encourage patient and family to use good hand hygiene technique  - Identify and instruct in appropriate isolation precautions for identified infection/condition  Outcome: Progressing  Goal: Absence of fever/infection during neutropenic period  Description: INTERVENTIONS:  - Monitor WBC    Outcome: Progressing     Problem: SAFETY ADULT  Goal: Patient will remain free of falls  Description: INTERVENTIONS:  - Educate patient/family on patient safety including physical limitations  - Instruct patient to call for assistance with activity   - Consult OT/PT to assist with strengthening/mobility   - Keep Call bell within reach  - Keep bed low and locked with side rails adjusted as appropriate  - Keep care items and personal belongings within reach  - Initiate and maintain comfort rounds  - Make Fall Risk Sign visible to staff  - Offer Toileting every 2 Hours, in advance of need  - Initiate/Maintain fall alarm  - Obtain necessary fall risk management equipment: non-skid footwear  - Apply yellow socks and bracelet for high fall risk patients  - Consider moving patient to room near nurses station  Outcome: Progressing  Goal: Maintain or return to baseline ADL function  Description: INTERVENTIONS:  -  Assess patient's ability to carry out ADLs; assess patient's baseline for ADL function and identify physical deficits which impact ability to perform ADLs (bathing, care of mouth/teeth, toileting, grooming, dressing, etc )  - Assess/evaluate cause of self-care deficits   - Assess range of motion  - Assess patient's mobility; develop plan if impaired  - Assess patient's need for assistive devices and provide as appropriate  - Encourage maximum independence but intervene and supervise when necessary  - Involve family in performance of ADLs  - Assess for home care needs following discharge   - Consider OT consult to assist with ADL evaluation and planning for discharge  - Provide patient education as appropriate  Outcome: Progressing  Goal: Maintains/Returns to pre admission functional level  Description: INTERVENTIONS:  - Perform BMAT or MOVE assessment daily    - Set and communicate daily mobility goal to care team and patient/family/caregiver  - Collaborate with rehabilitation services on mobility goals if consulted  - Perform Range of Motion 3 times a day  - Reposition patient every 2 hours    - Dangle patient 3 times a day  - Stand patient 3 times a day  - Ambulate patient 3 times a day  - Out of bed to chair 3 times a day   - Out of bed for meals 3 times a day  - Out of bed for toileting  - Record patient progress and toleration of activity level   Outcome: Progressing     Problem: DISCHARGE PLANNING  Goal: Discharge to home or other facility with appropriate resources  Description: INTERVENTIONS:  - Identify barriers to discharge w/patient and caregiver  - Arrange for needed discharge resources and transportation as appropriate  - Identify discharge learning needs (meds, wound care, etc )  - Arrange for interpretive services to assist at discharge as needed  - Refer to Case Management Department for coordinating discharge planning if the patient needs post-hospital services based on physician/advanced practitioner order or complex needs related to functional status, cognitive ability, or social support system  Outcome: Progressing     Problem: Knowledge Deficit  Goal: Patient/family/caregiver demonstrates understanding of disease process, treatment plan, medications, and discharge instructions  Description: Complete learning assessment and assess knowledge base    Interventions:  - Provide teaching at level of understanding  - Provide teaching via preferred learning methods  Outcome: Progressing     Problem: CARDIOVASCULAR - ADULT  Goal: Maintains optimal cardiac output and hemodynamic stability  Description: INTERVENTIONS:  - Monitor I/O, vital signs and rhythm  - Monitor for S/S and trends of decreased cardiac output  - Administer and titrate ordered vasoactive medications to optimize hemodynamic stability  - Assess quality of pulses, skin color and temperature  - Assess for signs of decreased coronary artery perfusion  - Instruct patient to report change in severity of symptoms  Outcome: Progressing  Goal: Absence of cardiac dysrhythmias or at baseline rhythm  Description: INTERVENTIONS:  - Continuous cardiac monitoring, vital signs, obtain 12 lead EKG if ordered  - Administer antiarrhythmic and heart rate control medications as ordered  - Monitor electrolytes and administer replacement therapy as ordered  Outcome: Progressing     Problem: RESPIRATORY - ADULT  Goal: Achieves optimal ventilation and oxygenation  Description: INTERVENTIONS:  - Assess for changes in respiratory status  - Assess for changes in mentation and behavior  - Position to facilitate oxygenation and minimize respiratory effort  - Oxygen administered by appropriate delivery if ordered  - Initiate smoking cessation education as indicated  - Encourage broncho-pulmonary hygiene including cough, deep breathe, Incentive Spirometry  - Assess the need for suctioning and aspirate as needed  - Assess and instruct to report SOB or any respiratory difficulty  - Respiratory Therapy support as indicated  Outcome: Progressing     Problem: METABOLIC, FLUID AND ELECTROLYTES - ADULT  Goal: Electrolytes maintained within normal limits  Description: INTERVENTIONS:  - Monitor labs and assess patient for signs and symptoms of electrolyte imbalances  - Administer electrolyte replacement as ordered  - Monitor response to electrolyte replacements, including repeat lab results as appropriate  - Instruct patient on fluid and nutrition as appropriate  Outcome: Progressing  Goal: Fluid balance maintained  Description: INTERVENTIONS:  - Monitor labs   - Monitor I/O and WT  - Instruct patient on fluid and nutrition as appropriate  - Assess for signs & symptoms of volume excess or deficit  Outcome: Progressing  Goal: Glucose maintained within target range  Description: INTERVENTIONS:  - Monitor Blood Glucose as ordered  - Assess for signs and symptoms of hyperglycemia and hypoglycemia  - Administer ordered medications to maintain glucose within target range  - Assess nutritional intake and initiate nutrition service referral as needed  Outcome: Progressing     Problem: MUSCULOSKELETAL - ADULT  Goal: Maintain or return mobility to safest level of function  Description: INTERVENTIONS:  - Assess patient's ability to carry out ADLs; assess patient's baseline for ADL function and identify physical deficits which impact ability to perform ADLs (bathing, care of mouth/teeth, toileting, grooming, dressing, etc )  - Assess/evaluate cause of self-care deficits   - Assess range of motion  - Assess patient's mobility  - Assess patient's need for assistive devices and provide as appropriate  - Encourage maximum independence but intervene and supervise when necessary  - Involve family in performance of ADLs  - Assess for home care needs following discharge   - Consider OT consult to assist with ADL evaluation and planning for discharge  - Provide patient education as appropriate  Outcome: Progressing  Goal: Maintain proper alignment of affected body part  Description: INTERVENTIONS:  - Support, maintain and protect limb and body alignment  - Provide patient/ family with appropriate education  Outcome: Progressing     Problem: MOBILITY - ADULT  Goal: Maintain or return to baseline ADL function  Description: INTERVENTIONS:  -  Assess patient's ability to carry out ADLs; assess patient's baseline for ADL function and identify physical deficits which impact ability to perform ADLs (bathing, care of mouth/teeth, toileting, grooming, dressing, etc )  - Assess/evaluate cause of self-care deficits   - Assess range of motion  - Assess patient's mobility; develop plan if impaired  - Assess patient's need for assistive devices and provide as appropriate  - Encourage maximum independence but intervene and supervise when necessary  - Involve family in performance of ADLs  - Assess for home care needs following discharge   - Consider OT consult to assist with ADL evaluation and planning for discharge  - Provide patient education as appropriate  Outcome: Progressing  Goal: Maintains/Returns to pre admission functional level  Description: INTERVENTIONS:  - Perform BMAT or MOVE assessment daily    - Set and communicate daily mobility goal to care team and patient/family/caregiver  - Collaborate with rehabilitation services on mobility goals if consulted  - Perform Range of Motion 3 times a day  - Reposition patient every 2 hours    - Dangle patient 3 times a day  - Stand patient 3 times a day  - Ambulate patient 3 times a day  - Out of bed to chair 3 times a day   - Out of bed for meals 3 times a day  - Out of bed for toileting  - Record patient progress and toleration of activity level   Outcome: Progressing

## 2023-03-06 NOTE — ASSESSMENT & PLAN NOTE
· Continue methylprednisolone 20 mg IV every 8 hours  · Continue nebulizer treatments via the respiratory protocol  · Continue the symbicort and umeclidinium inhalers

## 2023-03-06 NOTE — RESPIRATORY THERAPY NOTE
03/06/23 0845   Inhalation Therapy Tx   $ Inhalation Therapy Performed Yes   $ Pulse Oximetry Spot Check Charge Completed   SpO2 94 %   Pre-Treatment Pulse 72   Pre-Treatment Respirations 20   Duration 20   Breath Sounds Pre-Treatment Bilateral Diminished;Coarse   Delivery Source Air;UDN   Position Up in chair   Treatment Tolerance Tolerated well   Resp Comments loose congested, npc   Patient is on 3 lpm     Attempted the flutter valve with patient, she does not follow commands well at this time, she did cough, it was congested and npc

## 2023-03-06 NOTE — ASSESSMENT & PLAN NOTE
· JOHN likely secondary to poor intake/infection  · Now resolved  · Avoid all nephrotoxic agents  · Serial laboratory testing to monitor the patient's renal function and electrolyte levels    Results from last 7 days   Lab Units 03/05/23  0438 03/04/23  0526 03/03/23  1940   BUN mg/dL 51* 65* 74*   CREATININE mg/dL 0 70 1 08 1 41*   EGFR ml/min/1 73sq m 82 48 35

## 2023-03-06 NOTE — PLAN OF CARE
Problem: PHYSICAL THERAPY ADULT  Goal: Performs mobility at highest level of function for planned discharge setting  See evaluation for individualized goals  Description:  Outcome: Progressing  Note: Prognosis: Good  Problem List:  (Decreased strength; Decreased range of motion; Impaired balance; Decreased mobility; Decreased endurance)  Assessment: Pt  seen for PT treatment session this date with interventions consisting of  toilet transfers, transfers and  gait training w/ emphasis on improving pt's ability to ambulate  Pt  Requiring max cues for sequence and safety  In comparison to previous session, Pt  With improvements in activity tolerance  Pt is in need of continued activity in PT to improve strength balance endurance mobility transfers and ambulation with return to maximize LOF  From PT/mobility standpoint, recommendation at time of d/c would be post acute rehab  in order to promote return to PLOF and independence  The patient's AM-PAC Basic Mobility Inpatient Short Form Raw Score is 15  A Raw score of less than or equal to 16 suggests the patient may benefit from discharge to post-acute rehabilitation services  Please also refer to physical therapy recommendation for safe DC planning  PT Discharge Recommendation: Post acute rehabilitation services    See flowsheet documentation for full assessment

## 2023-03-07 LAB
25(OH)D3 SERPL-MCNC: 79.4 NG/ML (ref 30–100)
ALBUMIN SERPL BCP-MCNC: 2.9 G/DL (ref 3.5–5)
ALP SERPL-CCNC: 54 U/L (ref 34–104)
ALT SERPL W P-5'-P-CCNC: 3 U/L (ref 7–52)
ANION GAP SERPL CALCULATED.3IONS-SCNC: 3 MMOL/L (ref 4–13)
AST SERPL W P-5'-P-CCNC: 7 U/L (ref 13–39)
BASOPHILS # BLD AUTO: 0 THOUSANDS/ÂΜL (ref 0–0.1)
BASOPHILS NFR BLD AUTO: 0 % (ref 0–1)
BILIRUB SERPL-MCNC: 1.21 MG/DL (ref 0.2–1)
BUN SERPL-MCNC: 46 MG/DL (ref 5–25)
CALCIUM ALBUM COR SERPL-MCNC: 10.2 MG/DL (ref 8.3–10.1)
CALCIUM SERPL-MCNC: 9.3 MG/DL (ref 8.4–10.2)
CHLORIDE SERPL-SCNC: 95 MMOL/L (ref 96–108)
CO2 SERPL-SCNC: 40 MMOL/L (ref 21–32)
CREAT SERPL-MCNC: 0.59 MG/DL (ref 0.6–1.3)
EOSINOPHIL # BLD AUTO: 0 THOUSAND/ÂΜL (ref 0–0.61)
EOSINOPHIL NFR BLD AUTO: 0 % (ref 0–6)
ERYTHROCYTE [DISTWIDTH] IN BLOOD BY AUTOMATED COUNT: 15 % (ref 11.6–15.1)
GFR SERPL CREATININE-BSD FRML MDRD: 87 ML/MIN/1.73SQ M
GLUCOSE SERPL-MCNC: 119 MG/DL (ref 65–140)
GLUCOSE SERPL-MCNC: 157 MG/DL (ref 65–140)
GLUCOSE SERPL-MCNC: 235 MG/DL (ref 65–140)
GLUCOSE SERPL-MCNC: 242 MG/DL (ref 65–140)
GLUCOSE SERPL-MCNC: 48 MG/DL (ref 65–140)
GLUCOSE SERPL-MCNC: 60 MG/DL (ref 65–140)
GLUCOSE SERPL-MCNC: 63 MG/DL (ref 65–140)
HCT VFR BLD AUTO: 42.5 % (ref 34.8–46.1)
HGB BLD-MCNC: 13.7 G/DL (ref 11.5–15.4)
IMM GRANULOCYTES # BLD AUTO: 0.05 THOUSAND/UL (ref 0–0.2)
IMM GRANULOCYTES NFR BLD AUTO: 1 % (ref 0–2)
LDH SERPL-CCNC: 122 U/L (ref 140–271)
LYMPHOCYTES # BLD AUTO: 0.55 THOUSANDS/ÂΜL (ref 0.6–4.47)
LYMPHOCYTES NFR BLD AUTO: 6 % (ref 14–44)
MAGNESIUM SERPL-MCNC: 1.8 MG/DL (ref 1.9–2.7)
MCH RBC QN AUTO: 29.2 PG (ref 26.8–34.3)
MCHC RBC AUTO-ENTMCNC: 32.2 G/DL (ref 31.4–37.4)
MCV RBC AUTO: 91 FL (ref 82–98)
MONOCYTES # BLD AUTO: 0.51 THOUSAND/ÂΜL (ref 0.17–1.22)
MONOCYTES NFR BLD AUTO: 6 % (ref 4–12)
NEUTROPHILS # BLD AUTO: 7.63 THOUSANDS/ÂΜL (ref 1.85–7.62)
NEUTS SEG NFR BLD AUTO: 87 % (ref 43–75)
NRBC BLD AUTO-RTO: 0 /100 WBCS
PHOSPHATE SERPL-MCNC: 2.4 MG/DL (ref 2.3–4.1)
PLATELET # BLD AUTO: 189 THOUSANDS/UL (ref 149–390)
PMV BLD AUTO: 10.4 FL (ref 8.9–12.7)
POTASSIUM SERPL-SCNC: 4.4 MMOL/L (ref 3.5–5.3)
PROCALCITONIN SERPL-MCNC: 0.17 NG/ML
PROT SERPL-MCNC: 5.2 G/DL (ref 6.4–8.4)
RBC # BLD AUTO: 4.69 MILLION/UL (ref 3.81–5.12)
SODIUM SERPL-SCNC: 138 MMOL/L (ref 135–147)
WBC # BLD AUTO: 8.74 THOUSAND/UL (ref 4.31–10.16)

## 2023-03-07 RX ORDER — PREDNISONE 20 MG/1
60 TABLET ORAL DAILY
Status: DISCONTINUED | OUTPATIENT
Start: 2023-03-08 | End: 2023-03-08 | Stop reason: HOSPADM

## 2023-03-07 RX ORDER — PREDNISONE 20 MG/1
TABLET ORAL
Qty: 21 TABLET | Refills: 0 | Status: SHIPPED | OUTPATIENT
Start: 2023-03-09 | End: 2023-03-21

## 2023-03-07 RX ORDER — CEFDINIR 300 MG/1
300 CAPSULE ORAL EVERY 12 HOURS SCHEDULED
Qty: 6 CAPSULE | Refills: 0 | Status: SHIPPED | OUTPATIENT
Start: 2023-03-08 | End: 2023-03-11

## 2023-03-07 RX ADMIN — LISINOPRIL 10 MG: 10 TABLET ORAL at 08:05

## 2023-03-07 RX ADMIN — BUDESONIDE AND FORMOTEROL FUMARATE DIHYDRATE 2 PUFF: 80; 4.5 AEROSOL RESPIRATORY (INHALATION) at 18:52

## 2023-03-07 RX ADMIN — CEFEPIME HYDROCHLORIDE 1000 MG: 1 INJECTION, SOLUTION INTRAVENOUS at 08:05

## 2023-03-07 RX ADMIN — NYSTATIN 500000 UNITS: 100000 SUSPENSION ORAL at 17:59

## 2023-03-07 RX ADMIN — UMECLIDINIUM 1 PUFF: 62.5 AEROSOL, POWDER ORAL at 08:07

## 2023-03-07 RX ADMIN — CEFEPIME HYDROCHLORIDE 1000 MG: 1 INJECTION, SOLUTION INTRAVENOUS at 21:46

## 2023-03-07 RX ADMIN — INSULIN LISPRO 2 UNITS: 100 INJECTION, SOLUTION INTRAVENOUS; SUBCUTANEOUS at 08:06

## 2023-03-07 RX ADMIN — CHOLECALCIFEROL TAB 25 MCG (1000 UNIT) 2000 UNITS: 25 TAB at 08:05

## 2023-03-07 RX ADMIN — INSULIN GLARGINE 10 UNITS: 100 INJECTION, SOLUTION SUBCUTANEOUS at 08:04

## 2023-03-07 RX ADMIN — BUDESONIDE AND FORMOTEROL FUMARATE DIHYDRATE 2 PUFF: 80; 4.5 AEROSOL RESPIRATORY (INHALATION) at 08:07

## 2023-03-07 RX ADMIN — ASPIRIN 81 MG: 81 TABLET, COATED ORAL at 08:05

## 2023-03-07 RX ADMIN — ENOXAPARIN SODIUM 40 MG: 40 INJECTION SUBCUTANEOUS at 17:59

## 2023-03-07 RX ADMIN — NYSTATIN 500000 UNITS: 100000 SUSPENSION ORAL at 21:46

## 2023-03-07 RX ADMIN — GUAIFENESIN 600 MG: 600 TABLET ORAL at 17:59

## 2023-03-07 RX ADMIN — INSULIN LISPRO 1 UNITS: 100 INJECTION, SOLUTION INTRAVENOUS; SUBCUTANEOUS at 17:59

## 2023-03-07 RX ADMIN — INSULIN LISPRO 5 UNITS: 100 INJECTION, SOLUTION INTRAVENOUS; SUBCUTANEOUS at 17:59

## 2023-03-07 RX ADMIN — NYSTATIN 500000 UNITS: 100000 SUSPENSION ORAL at 12:30

## 2023-03-07 RX ADMIN — GUAIFENESIN 600 MG: 600 TABLET ORAL at 08:05

## 2023-03-07 RX ADMIN — INSULIN LISPRO 5 UNITS: 100 INJECTION, SOLUTION INTRAVENOUS; SUBCUTANEOUS at 08:05

## 2023-03-07 RX ADMIN — SERTRALINE HYDROCHLORIDE 50 MG: 50 TABLET ORAL at 08:05

## 2023-03-07 RX ADMIN — LEVALBUTEROL HYDROCHLORIDE 1.25 MG: 1.25 SOLUTION RESPIRATORY (INHALATION) at 13:36

## 2023-03-07 RX ADMIN — METHYLPREDNISOLONE SODIUM SUCCINATE 20 MG: 40 INJECTION, POWDER, FOR SOLUTION INTRAMUSCULAR; INTRAVENOUS at 21:46

## 2023-03-07 RX ADMIN — NYSTATIN 500000 UNITS: 100000 SUSPENSION ORAL at 08:06

## 2023-03-07 RX ADMIN — PRAVASTATIN SODIUM 80 MG: 40 TABLET ORAL at 17:59

## 2023-03-07 RX ADMIN — LEVALBUTEROL HYDROCHLORIDE 1.25 MG: 1.25 SOLUTION RESPIRATORY (INHALATION) at 19:29

## 2023-03-07 RX ADMIN — METHYLPREDNISOLONE SODIUM SUCCINATE 20 MG: 40 INJECTION, POWDER, FOR SOLUTION INTRAMUSCULAR; INTRAVENOUS at 08:05

## 2023-03-07 RX ADMIN — LEVALBUTEROL HYDROCHLORIDE 1.25 MG: 1.25 SOLUTION RESPIRATORY (INHALATION) at 07:38

## 2023-03-07 RX ADMIN — FUROSEMIDE 20 MG: 20 TABLET ORAL at 08:05

## 2023-03-07 NOTE — PLAN OF CARE
Problem: PAIN - ADULT  Goal: Verbalizes/displays adequate comfort level or baseline comfort level  Description: Interventions:  - Encourage patient to monitor pain and request assistance  - Assess pain using appropriate pain scale  - Administer analgesics based on type and severity of pain and evaluate response  - Implement non-pharmacological measures as appropriate and evaluate response  - Consider cultural and social influences on pain and pain management  - Notify physician/advanced practitioner if interventions unsuccessful or patient reports new pain  Outcome: Progressing     Problem: INFECTION - ADULT  Goal: Absence or prevention of progression during hospitalization  Description: INTERVENTIONS:  - Assess and monitor for signs and symptoms of infection  - Monitor lab/diagnostic results  - Monitor all insertion sites, i e  indwelling lines, tubes, and drains  - Monitor endotracheal if appropriate and nasal secretions for changes in amount and color  - [place] appropriate cooling/warming therapies per order  - Administer medications as ordered  - Instruct and encourage patient and family to use good hand hygiene technique  - Identify and instruct in appropriate isolation precautions for identified infection/condition  Outcome: Progressing  Goal: Absence of fever/infection during neutropenic period  Description: INTERVENTIONS:  - Monitor WBC    Outcome: Progressing     Problem: SAFETY ADULT  Goal: Patient will remain free of falls  Description: INTERVENTIONS:  - Educate patient/family on patient safety including physical limitations  - Instruct patient to call for assistance with activity   - Consult OT/PT to assist with strengthening/mobility   - Keep Call bell within reach  - Keep bed low and locked with side rails adjusted as appropriate  - Keep care items and personal belongings within reach  - Initiate and maintain comfort rounds  - Make Fall Risk Sign visible to staff  - Offer Toileting every 2 Hours, in advance of need  - Initiate/Maintain fall alarm  - Obtain necessary fall risk management equipment: non-skid footwear  - Apply yellow socks and bracelet for high fall risk patients  - Consider moving patient to room near nurses station  Outcome: Progressing  Goal: Maintain or return to baseline ADL function  Description: INTERVENTIONS:  -  Assess patient's ability to carry out ADLs; assess patient's baseline for ADL function and identify physical deficits which impact ability to perform ADLs (bathing, care of mouth/teeth, toileting, grooming, dressing, etc )  - Assess/evaluate cause of self-care deficits   - Assess range of motion  - Assess patient's mobility; develop plan if impaired  - Assess patient's need for assistive devices and provide as appropriate  - Encourage maximum independence but intervene and supervise when necessary  - Involve family in performance of ADLs  - Assess for home care needs following discharge   - Consider OT consult to assist with ADL evaluation and planning for discharge  - Provide patient education as appropriate  Outcome: Progressing  Goal: Maintains/Returns to pre admission functional level  Description: INTERVENTIONS:  - Perform BMAT or MOVE assessment daily    - Set and communicate daily mobility goal to care team and patient/family/caregiver  - Collaborate with rehabilitation services on mobility goals if consulted  - Perform Range of Motion 3 times a day  - Reposition patient every 2 hours    - Dangle patient 3 times a day  - Stand patient 3 times a day  - Ambulate patient 3 times a day  - Out of bed to chair 3 times a day   - Out of bed for meals 3 times a day  - Out of bed for toileting  - Record patient progress and toleration of activity level   Outcome: Progressing     Problem: DISCHARGE PLANNING  Goal: Discharge to home or other facility with appropriate resources  Description: INTERVENTIONS:  - Identify barriers to discharge w/patient and caregiver  - Arrange for needed discharge resources and transportation as appropriate  - Identify discharge learning needs (meds, wound care, etc )  - Arrange for interpretive services to assist at discharge as needed  - Refer to Case Management Department for coordinating discharge planning if the patient needs post-hospital services based on physician/advanced practitioner order or complex needs related to functional status, cognitive ability, or social support system  Outcome: Progressing     Problem: Knowledge Deficit  Goal: Patient/family/caregiver demonstrates understanding of disease process, treatment plan, medications, and discharge instructions  Description: Complete learning assessment and assess knowledge base    Interventions:  - Provide teaching at level of understanding  - Provide teaching via preferred learning methods  Outcome: Progressing     Problem: CARDIOVASCULAR - ADULT  Goal: Maintains optimal cardiac output and hemodynamic stability  Description: INTERVENTIONS:  - Monitor I/O, vital signs and rhythm  - Monitor for S/S and trends of decreased cardiac output  - Administer and titrate ordered vasoactive medications to optimize hemodynamic stability  - Assess quality of pulses, skin color and temperature  - Assess for signs of decreased coronary artery perfusion  - Instruct patient to report change in severity of symptoms  Outcome: Progressing  Goal: Absence of cardiac dysrhythmias or at baseline rhythm  Description: INTERVENTIONS:  - Continuous cardiac monitoring, vital signs, obtain 12 lead EKG if ordered  - Administer antiarrhythmic and heart rate control medications as ordered  - Monitor electrolytes and administer replacement therapy as ordered  Outcome: Progressing     Problem: RESPIRATORY - ADULT  Goal: Achieves optimal ventilation and oxygenation  Description: INTERVENTIONS:  - Assess for changes in respiratory status  - Assess for changes in mentation and behavior  - Position to facilitate oxygenation and minimize respiratory effort  - Oxygen administered by appropriate delivery if ordered  - Initiate smoking cessation education as indicated  - Encourage broncho-pulmonary hygiene including cough, deep breathe, Incentive Spirometry  - Assess the need for suctioning and aspirate as needed  - Assess and instruct to report SOB or any respiratory difficulty  - Respiratory Therapy support as indicated  Outcome: Progressing     Problem: METABOLIC, FLUID AND ELECTROLYTES - ADULT  Goal: Electrolytes maintained within normal limits  Description: INTERVENTIONS:  - Monitor labs and assess patient for signs and symptoms of electrolyte imbalances  - Administer electrolyte replacement as ordered  - Monitor response to electrolyte replacements, including repeat lab results as appropriate  - Instruct patient on fluid and nutrition as appropriate  Outcome: Progressing  Goal: Fluid balance maintained  Description: INTERVENTIONS:  - Monitor labs   - Monitor I/O and WT  - Instruct patient on fluid and nutrition as appropriate  - Assess for signs & symptoms of volume excess or deficit  Outcome: Progressing  Goal: Glucose maintained within target range  Description: INTERVENTIONS:  - Monitor Blood Glucose as ordered  - Assess for signs and symptoms of hyperglycemia and hypoglycemia  - Administer ordered medications to maintain glucose within target range  - Assess nutritional intake and initiate nutrition service referral as needed  Outcome: Progressing     Problem: MUSCULOSKELETAL - ADULT  Goal: Maintain or return mobility to safest level of function  Description: INTERVENTIONS:  - Assess patient's ability to carry out ADLs; assess patient's baseline for ADL function and identify physical deficits which impact ability to perform ADLs (bathing, care of mouth/teeth, toileting, grooming, dressing, etc )  - Assess/evaluate cause of self-care deficits   - Assess range of motion  - Assess patient's mobility  - Assess patient's need for assistive devices and provide as appropriate  - Encourage maximum independence but intervene and supervise when necessary  - Involve family in performance of ADLs  - Assess for home care needs following discharge   - Consider OT consult to assist with ADL evaluation and planning for discharge  - Provide patient education as appropriate  Outcome: Progressing  Goal: Maintain proper alignment of affected body part  Description: INTERVENTIONS:  - Support, maintain and protect limb and body alignment  - Provide patient/ family with appropriate education  Outcome: Progressing     Problem: MOBILITY - ADULT  Goal: Maintain or return to baseline ADL function  Description: INTERVENTIONS:  -  Assess patient's ability to carry out ADLs; assess patient's baseline for ADL function and identify physical deficits which impact ability to perform ADLs (bathing, care of mouth/teeth, toileting, grooming, dressing, etc )  - Assess/evaluate cause of self-care deficits   - Assess range of motion  - Assess patient's mobility; develop plan if impaired  - Assess patient's need for assistive devices and provide as appropriate  - Encourage maximum independence but intervene and supervise when necessary  - Involve family in performance of ADLs  - Assess for home care needs following discharge   - Consider OT consult to assist with ADL evaluation and planning for discharge  - Provide patient education as appropriate  Outcome: Progressing  Goal: Maintains/Returns to pre admission functional level  Description: INTERVENTIONS:  - Perform BMAT or MOVE assessment daily    - Set and communicate daily mobility goal to care team and patient/family/caregiver  - Collaborate with rehabilitation services on mobility goals if consulted  - Perform Range of Motion 3 times a day  - Reposition patient every 2 hours    - Dangle patient 3 times a day  - Stand patient 3 times a day  - Ambulate patient 3 times a day  - Out of bed to chair 3 times a day   - Out of bed for meals 3 times a day  - Out of bed for toileting  - Record patient progress and toleration of activity level   Outcome: Progressing

## 2023-03-07 NOTE — ASSESSMENT & PLAN NOTE
· Continue methylprednisolone 20 mg IV every 12 hours likely transition today to oral taper per pulmonology  · Continue nebulizer treatments via the respiratory protocol  · Continue the symbicort and umeclidinium inhalers

## 2023-03-07 NOTE — RESPIRATORY THERAPY NOTE
03/07/23 0739   Inhalation Therapy Tx   $ Inhalation Therapy Performed Yes   $ Pulse Oximetry Spot Check Charge Completed   SpO2 (!) 87 %   Pre-Treatment Pulse 64   Pre-Treatment Respirations 20   Duration 20   Breath Sounds Pre-Treatment Bilateral Diminished   Position Up in chair   Treatment Tolerance Tolerated well   Resp Comments patient had her oxygen off, it is back on at 2 lpm

## 2023-03-07 NOTE — PLAN OF CARE
Problem: PAIN - ADULT  Goal: Verbalizes/displays adequate comfort level or baseline comfort level  Description: Interventions:  - Encourage patient to monitor pain and request assistance  - Assess pain using appropriate pain scale  - Administer analgesics based on type and severity of pain and evaluate response  - Implement non-pharmacological measures as appropriate and evaluate response  - Consider cultural and social influences on pain and pain management  - Notify physician/advanced practitioner if interventions unsuccessful or patient reports new pain  Outcome: Progressing     Problem: INFECTION - ADULT  Goal: Absence or prevention of progression during hospitalization  Description: INTERVENTIONS:  - Assess and monitor for signs and symptoms of infection  - Monitor lab/diagnostic results  - Monitor all insertion sites, i e  indwelling lines, tubes, and drains  - Monitor endotracheal if appropriate and nasal secretions for changes in amount and color  - [place] appropriate cooling/warming therapies per order  - Administer medications as ordered  - Instruct and encourage patient and family to use good hand hygiene technique  - Identify and instruct in appropriate isolation precautions for identified infection/condition  Outcome: Progressing  Goal: Absence of fever/infection during neutropenic period  Description: INTERVENTIONS:  - Monitor WBC    Outcome: Progressing     Problem: SAFETY ADULT  Goal: Patient will remain free of falls  Description: INTERVENTIONS:  - Educate patient/family on patient safety including physical limitations  - Instruct patient to call for assistance with activity   - Consult OT/PT to assist with strengthening/mobility   - Keep Call bell within reach  - Keep bed low and locked with side rails adjusted as appropriate  - Keep care items and personal belongings within reach  - Initiate and maintain comfort rounds  - Make Fall Risk Sign visible to staff  - Offer Toileting every 2 Hours, in advance of need  - Initiate/Maintain fall alarm  - Obtain necessary fall risk management equipment: non-skid footwear  - Apply yellow socks and bracelet for high fall risk patients  - Consider moving patient to room near nurses station  Outcome: Progressing  Goal: Maintain or return to baseline ADL function  Description: INTERVENTIONS:  -  Assess patient's ability to carry out ADLs; assess patient's baseline for ADL function and identify physical deficits which impact ability to perform ADLs (bathing, care of mouth/teeth, toileting, grooming, dressing, etc )  - Assess/evaluate cause of self-care deficits   - Assess range of motion  - Assess patient's mobility; develop plan if impaired  - Assess patient's need for assistive devices and provide as appropriate  - Encourage maximum independence but intervene and supervise when necessary  - Involve family in performance of ADLs  - Assess for home care needs following discharge   - Consider OT consult to assist with ADL evaluation and planning for discharge  - Provide patient education as appropriate  Outcome: Progressing  Goal: Maintains/Returns to pre admission functional level  Description: INTERVENTIONS:  - Perform BMAT or MOVE assessment daily    - Set and communicate daily mobility goal to care team and patient/family/caregiver  - Collaborate with rehabilitation services on mobility goals if consulted  - Perform Range of Motion 3 times a day  - Reposition patient every 2 hours    - Dangle patient 3 times a day  - Stand patient 3 times a day  - Ambulate patient 3 times a day  - Out of bed to chair 3 times a day   - Out of bed for meals 3 times a day  - Out of bed for toileting  - Record patient progress and toleration of activity level   Outcome: Progressing     Problem: DISCHARGE PLANNING  Goal: Discharge to home or other facility with appropriate resources  Description: INTERVENTIONS:  - Identify barriers to discharge w/patient and caregiver  - Arrange for needed discharge resources and transportation as appropriate  - Identify discharge learning needs (meds, wound care, etc )  - Arrange for interpretive services to assist at discharge as needed  - Refer to Case Management Department for coordinating discharge planning if the patient needs post-hospital services based on physician/advanced practitioner order or complex needs related to functional status, cognitive ability, or social support system  Outcome: Progressing     Problem: Knowledge Deficit  Goal: Patient/family/caregiver demonstrates understanding of disease process, treatment plan, medications, and discharge instructions  Description: Complete learning assessment and assess knowledge base    Interventions:  - Provide teaching at level of understanding  - Provide teaching via preferred learning methods  Outcome: Progressing     Problem: CARDIOVASCULAR - ADULT  Goal: Maintains optimal cardiac output and hemodynamic stability  Description: INTERVENTIONS:  - Monitor I/O, vital signs and rhythm  - Monitor for S/S and trends of decreased cardiac output  - Administer and titrate ordered vasoactive medications to optimize hemodynamic stability  - Assess quality of pulses, skin color and temperature  - Assess for signs of decreased coronary artery perfusion  - Instruct patient to report change in severity of symptoms  Outcome: Progressing  Goal: Absence of cardiac dysrhythmias or at baseline rhythm  Description: INTERVENTIONS:  - Continuous cardiac monitoring, vital signs, obtain 12 lead EKG if ordered  - Administer antiarrhythmic and heart rate control medications as ordered  - Monitor electrolytes and administer replacement therapy as ordered  Outcome: Progressing     Problem: RESPIRATORY - ADULT  Goal: Achieves optimal ventilation and oxygenation  Description: INTERVENTIONS:  - Assess for changes in respiratory status  - Assess for changes in mentation and behavior  - Position to facilitate oxygenation and minimize respiratory effort  - Oxygen administered by appropriate delivery if ordered  - Initiate smoking cessation education as indicated  - Encourage broncho-pulmonary hygiene including cough, deep breathe, Incentive Spirometry  - Assess the need for suctioning and aspirate as needed  - Assess and instruct to report SOB or any respiratory difficulty  - Respiratory Therapy support as indicated  Outcome: Progressing     Problem: METABOLIC, FLUID AND ELECTROLYTES - ADULT  Goal: Electrolytes maintained within normal limits  Description: INTERVENTIONS:  - Monitor labs and assess patient for signs and symptoms of electrolyte imbalances  - Administer electrolyte replacement as ordered  - Monitor response to electrolyte replacements, including repeat lab results as appropriate  - Instruct patient on fluid and nutrition as appropriate  Outcome: Progressing  Goal: Fluid balance maintained  Description: INTERVENTIONS:  - Monitor labs   - Monitor I/O and WT  - Instruct patient on fluid and nutrition as appropriate  - Assess for signs & symptoms of volume excess or deficit  Outcome: Progressing  Goal: Glucose maintained within target range  Description: INTERVENTIONS:  - Monitor Blood Glucose as ordered  - Assess for signs and symptoms of hyperglycemia and hypoglycemia  - Administer ordered medications to maintain glucose within target range  - Assess nutritional intake and initiate nutrition service referral as needed  Outcome: Progressing     Problem: MUSCULOSKELETAL - ADULT  Goal: Maintain or return mobility to safest level of function  Description: INTERVENTIONS:  - Assess patient's ability to carry out ADLs; assess patient's baseline for ADL function and identify physical deficits which impact ability to perform ADLs (bathing, care of mouth/teeth, toileting, grooming, dressing, etc )  - Assess/evaluate cause of self-care deficits   - Assess range of motion  - Assess patient's mobility  - Assess patient's need for assistive devices and provide as appropriate  - Encourage maximum independence but intervene and supervise when necessary  - Involve family in performance of ADLs  - Assess for home care needs following discharge   - Consider OT consult to assist with ADL evaluation and planning for discharge  - Provide patient education as appropriate  Outcome: Progressing  Goal: Maintain proper alignment of affected body part  Description: INTERVENTIONS:  - Support, maintain and protect limb and body alignment  - Provide patient/ family with appropriate education  Outcome: Progressing     Problem: MOBILITY - ADULT  Goal: Maintain or return to baseline ADL function  Description: INTERVENTIONS:  -  Assess patient's ability to carry out ADLs; assess patient's baseline for ADL function and identify physical deficits which impact ability to perform ADLs (bathing, care of mouth/teeth, toileting, grooming, dressing, etc )  - Assess/evaluate cause of self-care deficits   - Assess range of motion  - Assess patient's mobility; develop plan if impaired  - Assess patient's need for assistive devices and provide as appropriate  - Encourage maximum independence but intervene and supervise when necessary  - Involve family in performance of ADLs  - Assess for home care needs following discharge   - Consider OT consult to assist with ADL evaluation and planning for discharge  - Provide patient education as appropriate  Outcome: Progressing  Goal: Maintains/Returns to pre admission functional level  Description: INTERVENTIONS:  - Perform BMAT or MOVE assessment daily    - Set and communicate daily mobility goal to care team and patient/family/caregiver  - Collaborate with rehabilitation services on mobility goals if consulted  - Perform Range of Motion 3 times a day  - Reposition patient every 2 hours    - Dangle patient 3 times a day  - Stand patient 3 times a day  - Ambulate patient 3 times a day  - Out of bed to chair 3 times a day   - Out of bed for meals 3 times a day  - Out of bed for toileting  - Record patient progress and toleration of activity level   Outcome: Progressing

## 2023-03-07 NOTE — ASSESSMENT & PLAN NOTE
· JOHN likely secondary to poor intake/infection  · Now resolved  · Avoid all nephrotoxic agents  · Serial laboratory testing to monitor the patient's renal function and electrolyte levels    Results from last 7 days   Lab Units 03/07/23  0433 03/05/23  0438 03/04/23  0526 03/03/23  1940   BUN mg/dL 46* 51* 65* 74*   CREATININE mg/dL 0 59* 0 70 1 08 1 41*   EGFR ml/min/1 73sq m 87 82 48 35

## 2023-03-07 NOTE — ASSESSMENT & PLAN NOTE
· Sepsis was present on admission and secondary to pneumonia  · Continue IV cefepime  For 7 days total of tx  · IV vancomycin was discontinued with a negative MRSA nasal culture    · Blood cultures x 2 negative for growth 48 hours     Results from last 7 days   Lab Units 03/07/23  0433 03/05/23  0438 03/03/23  1940   PROCALCITONIN ng/ml 0 17 0 74* 2 11*

## 2023-03-07 NOTE — PROGRESS NOTES
-- Patient:  -- MRN: [id number]  -- Aidin Request ID: [id number]  -- Level of care reserved: [address] [facility]  -- Partner Reserved: [address] [facility] , [address] [place] [address] [phone]  -- Clinical needs requested:  -- Geography searched: 10 miles around [address] [place] [address]  -- Start of Service:  -- Request sent: 2:03pm EST on 3/7/2023 by Trevin Fields  -- Partner reserved: 3:31pm EST on 3/7/2023 by Trevin Fields  -- Choice list shared: 3:31pm EST on 3/7/2023 by Trevin Fields

## 2023-03-07 NOTE — PROGRESS NOTES
[address] [address]  Progress Note - Nedra Tereso 1945, 77 y o  female MRN: [id number]  Unit/Bed#: 095-28 Encounter: [id number]  Primary Care Provider: Chuy Palencia DO   Date and time admitted to hospital: 3/3/2023  7:10 PM    * Acute on chronic respiratory failure with hypoxia and hypercapnia [facility])  Assessment & Plan  Background: Recent hospitalization for hypoglycemia sent to rehab at [facility] presented from facility for worsening hypoxia  · Recently diagnosed with metapneumovirus 2/27 contact can be discontinued 3/8   · Started on levofloxacin on 3/1/2023 switched to IV cefepime  · At baseline on 2 to 3 L  Back at baseline maintain sats >88%  · HCAP: Continue IV cefepime treat for 7 days can transition to oral if ready for discharge  · COPD exacerbation: Continue tapering IV methylprednisolone given resolution of wheezing likely transition today to oral taper   · Pulmonology following likely transition to oral prednisone today   · If remains stable on oral steroids can discharge to rehab facility tomorrow  Sepsis due to pneumonia [facility])  Assessment & Plan  · Sepsis was present on admission and secondary to pneumonia  · Continue IV cefepime  For 7 days total of tx  · IV vancomycin was discontinued with a negative MRSA nasal culture  · Blood cultures x 2 negative for growth 48 hours     Results from last 7 days   Lab Units 03/07/23  0433 03/05/23 0438 03/03/23 1940   PROCALCITONIN ng/ml 0 17 0 74* 2 11*       Oral candidiasis  Assessment & Plan  · Secondary to systemic steroids and antibiotic use     · Treat with nystatin swish and swallow    JOHN (acute kidney injury) ([facility] [address] )  Assessment & Plan  · JOHN likely secondary to poor intake/infection  · Now resolved  · Avoid all nephrotoxic agents  · Serial laboratory testing to monitor the patient's renal function and electrolyte levels    Results from last 7 days   Lab Units 03/07/23  0433 03/05/23  0438 03/04/23  0526 03/03/23 1940 BUN mg/dL 46* 51* 65* 74*   CREATININE mg/dL 0 59* 0 70 1 08 1 41*   EGFR ml/min/1 73sq m 87 82 48 35       COPD with acute exacerbation (HCC)  Assessment & Plan  · Continue methylprednisolone 20 mg IV every 12 hours likely transition today to oral taper per pulmonology  · Continue nebulizer treatments via the respiratory protocol  · Continue the symbicort and umeclidinium inhalers    Type 2 diabetes mellitus with hyperglycemia, without long-term current use of insulin [facility])  Assessment & Plan  Lab Results   Component Value Date    HGBA1C 6 6 (H) 02/21/2023     Recent Labs     03/06/23  1053 03/06/23  1559 03/06/23  2134 03/07/23  0636   POCGLU 408* 258* 108 242*     · Recently hospitalized for hypoglycemia and glipizide discontinued  · Holding home janumet for now  · Now with hyperglycemia due to IV methylprednisolone improved on the following regimen   · Continue lantus 10 units SQ Qdaily and humalog 5 units TID with meals  · ISS with blood glucose monitoring ACHS  · Hypoglycemia protocol  · Outpatient Endocrinology evaluation    History of intracranial hemorrhage  Assessment & Plan  · History of intracranial hemorrhage with cognitive decline    Chronic atrial fibrillation (HCC)  Assessment & Plan  · Not on anticoagulation given history of intracranial hemorrhage  · Heart rate controlled without any AV-mariano blocking agents  Essential hypertension  Assessment & Plan  · The lisinopril/HCTZ and furosemide were on hold due to kidney injury  · Resumed lisinopril and furosemide on 03/05/2023  · Continue to hold hydrochlorothiazide          VTE Pharmacologic Prophylaxis: VTE Score: 6 High Risk (Score >/= 5) - Pharmacological DVT Prophylaxis Ordered: enoxaparin (Lovenox)  Sequential Compression Devices Ordered  Patient Centered Rounds: I performed bedside rounds with nursing staff today    Discussions with Specialists or Other Care Team Provider: pulmonology notes reviewed     Education and Discussions with Family / Patient: Updated  (daughter) via phone  Total Time Spent on Date of Encounter in care of patient: 35 minutes This time was spent on one or more of the following: performing physical exam; counseling and coordination of care; obtaining or reviewing history; documenting in the medical record; reviewing/ordering tests, medications or procedures; communicating with other healthcare professionals and discussing with patient's family/caregivers  Current Length of Stay: 4 day(s)  Current Patient Status: Inpatient   Certification Statement: The patient will continue to require additional inpatient hospital stay due to need for IV steroids with transtion to oral taper  Discharge Plan: Anticipate discharge tomorrow to home with home services  per daughter wants her home and not at a facility     Code Status: Level 3 - DNAR and DNI    Subjective:   Patient sleeping arouses easily can state name, , that she is in the hospital  Pt does not know current president  Patient endorses feeling better than when she came in  Patient has other complaints at this time  Objective:     Vitals:   Temp (24hrs), Av °F (36 7 °C), Min:97 8 °F (36 6 °C), Max:98 2 °F (36 8 °C)    Temp:  [97 8 °F (36 6 °C)-98 2 °F (36 8 °C)] 97 8 °F (36 6 °C)  HR:  [58-67] 61  Resp:  [14-20] 18  BP: (140-155)/(69-71) 154/69  SpO2:  [87 %-97 %] 87 %  Body mass index is 20 21 kg/m²  Input and Output Summary (last 24 hours): Intake/Output Summary (Last 24 hours) at 3/7/2023 1208  Last data filed at 3/7/2023 0830  Gross per 24 hour   Intake 110 ml   Output 150 ml   Net -40 ml       Physical Exam:   Physical Exam  Vitals and nursing note reviewed  Constitutional:       General: She is not in acute distress  Appearance: She is underweight  HENT:      Head: Normocephalic and atraumatic  Eyes:      Conjunctiva/sclera: Conjunctivae normal    Cardiovascular:      Rate and Rhythm: Normal rate and regular rhythm  Heart sounds: No murmur heard  Pulmonary:      Effort: Pulmonary effort is normal  No respiratory distress  Breath sounds: Normal breath sounds  Abdominal:      Palpations: Abdomen is soft  Tenderness: There is no abdominal tenderness  Musculoskeletal:         General: No swelling  Cervical back: Neck supple  Skin:     General: Skin is warm and dry  Capillary Refill: Capillary refill takes less than 2 seconds  Neurological:      Mental Status: She is alert  Mental status is at baseline  Comments: Alert and oriented x2   Psychiatric:         Mood and Affect: Mood normal           Additional Data:     Labs:  Results from last 7 days   Lab Units 03/07/23  0433 03/05/23  0438 03/04/23  0526   WBC Thousand/uL 8 74   < > 16 94*   HEMOGLOBIN g/dL 13 7   < > 14 3   HEMATOCRIT % 42 5   < > 44 8   PLATELETS Thousands/uL 189   < > 208   BANDS PCT %  --   --  1   NEUTROS PCT % 87*  --   --    LYMPHS PCT % 6*  --   --    LYMPHO PCT %  --   --  1*   MONOS PCT % 6  --   --    MONO PCT %  --   --  5   EOS PCT % 0  --  0    < > = values in this interval not displayed       Results from last 7 days   Lab Units 03/07/23  0433   SODIUM mmol/L 138   POTASSIUM mmol/L 4 4   CHLORIDE mmol/L 95*   CO2 mmol/L 40*   BUN mg/dL 46*   CREATININE mg/dL 0 59*   ANION GAP mmol/L 3*   CALCIUM mg/dL 9 3   ALBUMIN g/dL 2 9*   TOTAL BILIRUBIN mg/dL 1 21*   ALK PHOS U/L 54   ALT U/L 3*   AST U/L 7*   GLUCOSE RANDOM mg/dL 235*     Results from last 7 days   Lab Units 03/03/23  2043   INR  1 12     Results from last 7 days   Lab Units 03/07/23  1048 03/07/23  0636 03/06/23  2134 03/06/23  1559 03/06/23  1053 03/06/23  0733 03/05/23  2033 03/05/23  1603 03/05/23  1101 03/05/23  0742 03/04/23  2031 03/04/23  1531   POC GLUCOSE mg/dl 119 242* 108 258* 408* 361* 381* 357* 365* 266* 242* 228*         Results from last 7 days   Lab Units 03/07/23  0433 03/05/23  0438 03/03/23 2045 03/03/23 1940   LACTIC ACID mmol/L  --   -- 1 0  --    PROCALCITONIN ng/ml 0 17 0 74*  --  2 11*       Lines/Drains:  Invasive Devices     Peripheral Intravenous Line  Duration           Peripheral IV 03/06/23 Left;Proximal;Ventral (anterior) Forearm <1 day          Drain  Duration           External Urinary Catheter 2 days                      Imaging: Reviewed radiology reports from this admission including: chest CT scan    Recent Cultures (last 7 days):   Results from last 7 days   Lab Units 03/05/23  1041 03/03/23  2043 03/03/23  1940   BLOOD CULTURE   --  No Growth at 48 hrs  No Growth at 48 hrs     LEGIONELLA URINARY ANTIGEN  Negative  --   --        Last 24 Hours Medication List:   Current Facility-Administered Medications   Medication Dose Route Frequency Provider Last Rate   • acetaminophen  650 mg Oral Q6H PRN Prosper Maurizio, DO     • aspirin  81 mg Oral Daily Prosper Maurizio, DO     • budesonide-formoterol  2 puff Inhalation BID Prosper Rhodhiss, DO     • cefepime  1,000 mg Intravenous Q12H Daron Giles DO 1,000 mg (03/07/23 0805)   • cholecalciferol  2,000 Units Oral Daily Prosper Maurizio, DO     • enoxaparin  40 mg Subcutaneous Q24H Renetta Sandy, DO     • furosemide  20 mg Oral Daily Arnetha How, DO     • guaiFENesin  600 mg Oral BID Prosper Rhodhiss, DO     • insulin glargine  10 Units Subcutaneous QAM Renetta Sandy, DO     • insulin lispro  1-5 Units Subcutaneous TID AC Prosper Rhodhiss, DO     • insulin lispro  1-5 Units Subcutaneous HS Prosper Rhodhiss, DO     • insulin lispro  5 Units Subcutaneous TID With Meals Renetta Sandy, DO     • ipratropium  0 5 mg Nebulization TID PRN Colten Deluna MD     • levalbuterol  1 25 mg Nebulization TID Prosper Maurizio, DO     • lisinopril  10 mg Oral Daily Arnetha How, DO     • methylPREDNISolone sodium succinate  20 mg Intravenous Q12H [facility] Joanie Ariza MD     • nystatin  500,000 Units Swish & Swallow 4x Daily Arnetha How, DO     • pravastatin  80 mg Oral Daily With BodBot Luis Watts, DO     • sertraline  50 mg Oral Daily Claudy Vivek, DO     • sodium chloride (PF)  3 mL Intravenous Once PRN Cassie Yao, DO     • umeclidinium  1 puff [facility]halation Daily [facility], DO          Today, Patient Was Seen By: BONIFACIO Parham    **Please Note: This note may have been constructed using a voice recognition system  **

## 2023-03-07 NOTE — ASSESSMENT & PLAN NOTE
Lab Results   Component Value Date    HGBA1C 6 6 (H) 02/21/2023     Recent Labs     03/06/23  1053 03/06/23  1559 03/06/23  2134 03/07/23  0636   POCGLU 408* 258* 108 242*     · Recently hospitalized for hypoglycemia and glipizide discontinued    · Holding home janumet for now  · Now with hyperglycemia due to IV methylprednisolone improved on the following regimen   · Continue lantus 10 units SQ Qdaily and humalog 5 units TID with meals  · ISS with blood glucose monitoring ACHS  · Hypoglycemia protocol  · Outpatient Endocrinology evaluation

## 2023-03-07 NOTE — ASSESSMENT & PLAN NOTE
Background: Recent hospitalization for hypoglycemia sent to rehab at [facility] presented from facility for worsening hypoxia  · Recently diagnosed with metapneumovirus 2/27 contact can be discontinued 3/8   · Started on levofloxacin on 3/1/2023 switched to IV cefepime  · At baseline on 2 to 3 L  Back at baseline maintain sats >88%  · HCAP: Continue IV cefepime treat for 7 days can transition to oral if ready for discharge  · COPD exacerbation: Continue tapering IV methylprednisolone given resolution of wheezing likely transition today to oral taper   · Pulmonology following likely transition to oral prednisone today   · If remains stable on oral steroids can discharge to rehab facility tomorrow

## 2023-03-07 NOTE — ASSESSMENT & PLAN NOTE
· The lisinopril/HCTZ and furosemide were on hold due to kidney injury    · Resumed lisinopril and furosemide on 03/05/2023  · Continue to hold hydrochlorothiazide

## 2023-03-07 NOTE — PROGRESS NOTES
Progress Note - Pulmonary   Josh Oms 77 y o  female MRN: [id number]  Unit/Bed#: 866-47 Encounter: [id number]          Assessment/Plan:     79-year-old woman with history of chronic hypoxic respiratory failure (on 2 to 3 L at rest,), COPD who presented from her nursing home due to shortness of breath and found to have bilateral lower lobe consolidations consistent with pneumonia and acute hypoxic respiratory failure      Clinically improved  Back to her baseline supplemental oxygen requirement of 2 L  Wheezing has resolved  No new complaints      Problems      Acute on chronic hypoxic respiratory failure-initially required 6 L nasal cannula but this has been weaned down to her baseline supplemental oxygen  She is exhibiting no respiratory distress at this time      Acute exacerbation of COPD     Bilateral lower lobe pneumonia-cultures and viral PCR negative to date     Recommendations      1  Continue supplemental oxygen to maintain saturations greater than 88%  2  Recommend pulmonary hygiene: Out of bed to chair if possible  Incentive spirometry, Mucinex  3  Antibiotics have been de-escalated to cefepime  Continue Abx for 7-day total course (d3/7)  Can change to oral antibiotic (clinically improved, procal has downtrended)  4  Will change Solu-Medrol to prednisone to start tomorrow  60 mg and taper by 10 mg every 2 days at time of discharge  5  Continue bronchodilators: Xopenex inhalation, umeclidinium  Continue Symbicort  Would dc with umeclidinnium, symbicort, and prn xopenex  Subjective:     Review of Systems -no new complaints  States her shortness of breath is improved  Has a mild cough  No other symptoms  Objective:     Vitals: Blood pressure 154/69, pulse 61, temperature 97 8 °F (36 6 °C), temperature source Tympanic, resp  rate 18, height 5' 5" (1 651 m), weight 55 1 kg (121 lb 7 6 oz), SpO2 (!) 87 %  , 2L NC , Body mass index is 20 21 kg/m²        Intake/Output Summary (Last 24 hours) at 3/7/2023 1411  Last data filed at 3/7/2023 0830  Gross per 24 hour   Intake 110 ml   Output 150 ml   Net -40 ml         Physical Exam  Gen: Awake, alert, oriented x 3, no acute distress  Head: no masses, lesions   HEENT: Mucous membranes moist, no oral lesions, no thrush  NECK: No accessory muscle use,  Cardiac: Regular, single S1, single S2, no murmurs, no rubs, no gallops  Lungs: Clear, no wheezes   Extremities: no cyanosis, no clubbing, no edema  Skin: no lesions, rashes  Neuro: CN grossly intact, no gross neurological deficits     Labs: I have personally reviewed pertinent lab results  Results from last 7 days   Lab Units 03/07/23 0433 03/05/23 0438 03/04/23  0526 03/03/23  1940   WBC Thousand/uL 8 74 7 02 16 94* 19 49*   HEMOGLOBIN g/dL 13 7 13 2 14 3 15 0   HEMATOCRIT % 42 5 40 7 44 8 46 7*   PLATELETS Thousands/uL 189 200 208 158   NEUTROS PCT % 87*  --   --  91*   MONOS PCT % 6  --   --  4   MONO PCT %  --   --  5  --       Results from last 7 days   Lab Units 03/07/23 0433 03/05/23  0438 03/04/23  0526   POTASSIUM mmol/L 4 4 3 7 3 9   CHLORIDE mmol/L 95* 94* 91*   CO2 mmol/L 40* 34* 29   BUN mg/dL 46* 51* 65*   CREATININE mg/dL 0 59* 0 70 1 08   CALCIUM mg/dL 9 3 9 9 9 0   ALK PHOS U/L 54 52 62   ALT U/L 3* 4* <3*   AST U/L 7* 5* 7*     Results from last 7 days   Lab Units 03/07/23  0433 03/04/23  0526   MAGNESIUM mg/dL 1 8* 2 1     Results from last 7 days   Lab Units 03/07/23 0433   PHOSPHORUS mg/dL 2 4      Results from last 7 days   Lab Units 03/03/23  2043   INR  1 12   PTT seconds 29     Results from last 7 days   Lab Units 03/03/23  2045   LACTIC ACID mmol/L 1 0     No results found for: TROPONINI    Microbiology:  Cultures without growth    Imaging and other studies:   No new imaging since March 3      Geoffrey Michele MD  [facility]

## 2023-03-07 NOTE — PHYSICAL THERAPY NOTE
PHYSICAL THERAPY NOTE          Patient Name: Phani Almaraz  WYHKC'N Date: 3/7/2023   03/07/23 1029   PT Last Visit   PT Visit Date 03/07/23   Note Type   Note Type Treatment   Pain Assessment   Pain Assessment Tool 0-10   Pain Score No Pain   Restrictions/Precautions   Weight Bearing Precautions Per Order No   Other Precautions   (Cognitive; Chair Alarm; Bed Alarm; Fall Risk)   General   Response to Previous Treatment Patient unable to report, no changes reported from family or staff   Cognition   Overall Cognitive Status Impaired   Arousal/Participation Alert; Cooperative   Following Commands Follows one step commands with increased time or repetition   Subjective   Subjective offered no c/o   Bed Mobility   Additional Comments OOB in chair start and end of PT session   Transfers   Sit to Stand 4  Minimal assistance   Additional items Assist x 1; Armrests; Increased time required;Verbal cues   Stand to Sit 4  Minimal assistance   Additional items Assist x 1; Armrests; Increased time required;Verbal cues   Stand pivot 4  Minimal assistance   Additional items Verbal cues; Increased time required   Additional Comments Stood with fair balance for brief change and hygiene  Ambulation/Elevation   Gait pattern   (Short stride; Excessively slow; Decreased foot clearance; Improper Weight shift)   Gait Assistance 4  Minimal assist   Additional items Assist x 1;Verbal cues   Assistive Device Rolling walker   Distance 35' x 2   Ambulation/Elevation Additional Comments RW used   Balance   Static Sitting Fair +   Dynamic Sitting Fair +   Static Standing Fair -   Dynamic Standing Fair -   Ambulatory Fair -  (RW)   Endurance Deficit   Endurance Deficit Yes   Activity Tolerance   Activity Tolerance Patient limited by fatigue   Exercises   Hip Flexion Sitting;20 reps;AROM; Bilateral   Hip Abduction Sitting;20 reps;AROM; Bilateral   Hip Adduction Sitting;20 reps;AROM; Bilateral   Knee AROM Long Arc Quad Sitting;20 reps;AROM; Bilateral   Ankle Pumps Sitting;20 reps;AROM; Bilateral   Assessment   Prognosis Good   Problem List   (Decreased strength; Decreased range of motion; Impaired balance; Decreased mobility; Decreased endurance)   Assessment Pt  seen for PT treatment session this date with interventions consisting of  therapeutic exercises, transfers and  gait training w/ emphasis on improving pt's ability to ambulate  Pt  Requiring frequent cues for sequence and safety  In comparison to previous session, Pt  With no change  in activity tolerance  Pt is in need of continued activity in PT to improve strength balance endurance mobility transfers and ambulation with return to maximize LOF  From PT/mobility standpoint, recommendation at time of d/c would be post acute rehab in order to promote return to PLOF and independence  The patient's AM-[facility] Basic Mobility Inpatient Short Form Raw Score is 16  A Raw score of less than or equal to 16 suggests the patient may benefit from discharge to post-acute rehabilitation services  Please also refer to physical therapy recommendation for safe DC planning  Goals   LTG Expiration Date 03/18/23   Plan   Treatment/Interventions   (Functional transfer training; LE strengthening/ROM; Endurance training; Elevations; Therapeutic exercise;  Bed mobility; Gait training)   Progress Slow progress, decreased activity tolerance   PT Frequency 3-5x/wk   Recommendation   PT Discharge Recommendation Post acute rehabilitation services   AM-PAC Basic Mobility Inpatient   Turning in Flat Bed Without Bedrails 3   Lying on Back to Sitting on Edge of Flat Bed Without Bedrails 3   Moving Bed to Chair 3   Standing Up From Chair Using Arms 3   Walk in Room 3   Climb 3-5 Stairs With Railing 1   Basic Mobility Inpatient Raw Score 16   Basic Mobility Standardized Score 38 32   Highest Level Of Mobility   -[facility] Goal 5: Stand one or more mins   -[facility] Achieved 7: Walk 25 feet or more   Education   Education Provided Mobility training   Patient Reinforcement needed   End of Consult   Patient Position at End of Consult Bedside chair;Bed/Chair alarm activated; All needs within reach   End of Consult Comments discussed POC with PT

## 2023-03-07 NOTE — ASSESSMENT & PLAN NOTE
· Not on anticoagulation given history of intracranial hemorrhage  · Heart rate controlled without any AV-mariano blocking agents

## 2023-03-07 NOTE — PHYSICAL THERAPY NOTE
PHYSICAL THERAPY NOTE          Patient Name: Nedra Rider  [id number] Date: 3/7/2023   03/07/23 1029   PT Last Visit   PT Visit Date 03/07/23   Note Type   Note Type Treatment   Pain Assessment   Pain Assessment Tool 0-10   Pain Score No Pain   Restrictions/Precautions   Weight Bearing Precautions Per Order No   Other Precautions   (Cognitive; Chair Alarm; Bed Alarm; Fall Risk)   General   Response to Previous Treatment Patient unable to report, no changes reported from family or staff   Cognition   Overall Cognitive Status Impaired   Arousal/Participation Alert; Cooperative   Following Commands Follows one step commands with increased time or repetition   Subjective   Subjective offered no c/o   Bed Mobility   Additional Comments OOB in chair start and end of PT session   Transfers   Sit to Stand 4  Minimal assistance   Additional items Assist x 1; Armrests; Increased time required;Verbal cues   Stand to Sit 4  Minimal assistance   Additional items Assist x 1; Armrests; Increased time required;Verbal cues   Stand pivot 4  Minimal assistance   Additional items Verbal cues; Increased time required   Additional Comments Stood with fair balance for brief change and hygiene  Ambulation/Elevation   Gait pattern   (Short stride; Excessively slow; Decreased foot clearance; Improper Weight shift)   Gait Assistance 4  Minimal assist   Additional items Assist x 1;Verbal cues   Assistive Device Rolling walker   Distance 35' x 2   Ambulation/Elevation Additional Comments RW used   Balance   Static Sitting Fair +   Dynamic Sitting Fair +   Static Standing Fair -   Dynamic Standing Fair -   Ambulatory Fair -  (RW)   Endurance Deficit   Endurance Deficit Yes   Activity Tolerance   Activity Tolerance Patient limited by fatigue   Exercises   Hip Flexion Sitting;20 reps;AROM; Bilateral   Hip Abduction Sitting;20 reps;AROM; Bilateral   Hip Adduction Sitting;20 reps;AROM; Bilateral   Knee AROM Long Arc Quad Sitting;20 reps;AROM; Bilateral   Ankle Pumps Sitting;20 reps;AROM; Bilateral   Assessment   Prognosis Good   Problem List   (Decreased strength; Decreased range of motion; Impaired balance; Decreased mobility; Decreased endurance)   Assessment Pt  seen for PT treatment session this date with interventions consisting of  therapeutic exercises, transfers and  gait training w/ emphasis on improving pt's ability to ambulate  Pt  Requiring frequent cues for sequence and safety  In comparison to previous session, Pt  With no change  in activity tolerance  Pt is in need of continued activity in PT to improve strength balance endurance mobility transfers and ambulation with return to maximize LOF  From PT/mobility standpoint, recommendation at time of d/c would be post acute rehab in order to promote return to PLOF and independence  The patient's [facility] Basic Mobility Inpatient Short Form Raw Score is 16  A Raw score of less than or equal to 16 suggests the patient may benefit from discharge to post-acute rehabilitation services  Please also refer to physical therapy recommendation for safe DC planning  Goals   LTG Expiration Date 03/18/23   Plan   Treatment/Interventions   (Functional transfer training; LE strengthening/ROM; Endurance training; Elevations; Therapeutic exercise;  Bed mobility; Gait training)   Progress Slow progress, decreased activity tolerance   PT Frequency 3-5x/wk   Recommendation   PT Discharge Recommendation Post acute rehabilitation services   AM-[facility] Basic Mobility Inpatient   Turning in Flat Bed Without Bedrails 3   Lying on Back to Sitting on Edge of Flat Bed Without Bedrails 3   Moving Bed to Chair 3   Standing Up From Chair Using Arms 3   Walk in Room 3   Climb 3-5 Stairs With Railing 1   Basic Mobility Inpatient Raw Score 16   Basic Mobility Standardized Score 38 32   Highest Level Of Mobility   -[facility] Goal 5: Stand one or more mins   -[facility] Achieved 7: Walk 25 feet or more   Education   Education Provided Mobility training   Patient Reinforcement needed   End of Consult   Patient Position at End of Consult Bedside chair;Bed/Chair alarm activated; All needs within reach   End of Consult Comments discussed POC with PT

## 2023-03-08 ENCOUNTER — TRANSITIONAL CARE MANAGEMENT (OUTPATIENT)
Dept: FAMILY MEDICINE CLINIC | Facility: CLINIC | Age: 78
End: 2023-03-08

## 2023-03-08 ENCOUNTER — TELEPHONE (OUTPATIENT)
Dept: FAMILY MEDICINE CLINIC | Facility: CLINIC | Age: 78
End: 2023-03-08

## 2023-03-08 VITALS
SYSTOLIC BLOOD PRESSURE: 134 MMHG | HEART RATE: 58 BPM | BODY MASS INDEX: 20.24 KG/M2 | WEIGHT: 121.47 LBS | DIASTOLIC BLOOD PRESSURE: 71 MMHG | RESPIRATION RATE: 19 BRPM | OXYGEN SATURATION: 95 % | HEIGHT: 65 IN | TEMPERATURE: 97.5 F

## 2023-03-08 PROBLEM — R59.0 MEDIASTINAL LYMPHADENOPATHY: Status: ACTIVE | Noted: 2023-03-08

## 2023-03-08 LAB
GLUCOSE SERPL-MCNC: 155 MG/DL (ref 65–140)
GLUCOSE SERPL-MCNC: 158 MG/DL (ref 65–140)
GLUCOSE SERPL-MCNC: 179 MG/DL (ref 65–140)

## 2023-03-08 RX ORDER — ACETAMINOPHEN 325 MG/1
325 TABLET ORAL EVERY 6 HOURS PRN
Refills: 0
Start: 2023-03-08

## 2023-03-08 RX ADMIN — LEVALBUTEROL HYDROCHLORIDE 1.25 MG: 1.25 SOLUTION RESPIRATORY (INHALATION) at 07:29

## 2023-03-08 RX ADMIN — INSULIN LISPRO 5 UNITS: 100 INJECTION, SOLUTION INTRAVENOUS; SUBCUTANEOUS at 08:53

## 2023-03-08 RX ADMIN — LISINOPRIL 10 MG: 10 TABLET ORAL at 08:52

## 2023-03-08 RX ADMIN — INSULIN LISPRO 1 UNITS: 100 INJECTION, SOLUTION INTRAVENOUS; SUBCUTANEOUS at 08:52

## 2023-03-08 RX ADMIN — BUDESONIDE AND FORMOTEROL FUMARATE DIHYDRATE 2 PUFF: 80; 4.5 AEROSOL RESPIRATORY (INHALATION) at 08:52

## 2023-03-08 RX ADMIN — GUAIFENESIN 600 MG: 600 TABLET ORAL at 08:52

## 2023-03-08 RX ADMIN — ASPIRIN 81 MG: 81 TABLET, COATED ORAL at 08:51

## 2023-03-08 RX ADMIN — INSULIN LISPRO 5 UNITS: 100 INJECTION, SOLUTION INTRAVENOUS; SUBCUTANEOUS at 12:01

## 2023-03-08 RX ADMIN — NYSTATIN 500000 UNITS: 100000 SUSPENSION ORAL at 12:00

## 2023-03-08 RX ADMIN — UMECLIDINIUM 1 PUFF: 62.5 AEROSOL, POWDER ORAL at 08:53

## 2023-03-08 RX ADMIN — NYSTATIN 500000 UNITS: 100000 SUSPENSION ORAL at 08:51

## 2023-03-08 RX ADMIN — FUROSEMIDE 20 MG: 20 TABLET ORAL at 08:51

## 2023-03-08 RX ADMIN — PREDNISONE 60 MG: 20 TABLET ORAL at 08:51

## 2023-03-08 RX ADMIN — INSULIN GLARGINE 10 UNITS: 100 INJECTION, SOLUTION SUBCUTANEOUS at 08:52

## 2023-03-08 RX ADMIN — SERTRALINE HYDROCHLORIDE 50 MG: 50 TABLET ORAL at 08:51

## 2023-03-08 RX ADMIN — INSULIN LISPRO 1 UNITS: 100 INJECTION, SOLUTION INTRAVENOUS; SUBCUTANEOUS at 12:00

## 2023-03-08 RX ADMIN — CEFEPIME HYDROCHLORIDE 1000 MG: 1 INJECTION, SOLUTION INTRAVENOUS at 08:57

## 2023-03-08 RX ADMIN — CHOLECALCIFEROL TAB 25 MCG (1000 UNIT) 2000 UNITS: 25 TAB at 08:52

## 2023-03-08 NOTE — PLAN OF CARE
Problem: PAIN - ADULT  Goal: Verbalizes/displays adequate comfort level or baseline comfort level  Description: Interventions:  - Encourage patient to monitor pain and request assistance  - Assess pain using appropriate pain scale  - Administer analgesics based on type and severity of pain and evaluate response  - Implement non-pharmacological measures as appropriate and evaluate response  - Consider cultural and social influences on pain and pain management  - Notify physician/advanced practitioner if interventions unsuccessful or patient reports new pain  Outcome: Progressing     Problem: INFECTION - ADULT  Goal: Absence or prevention of progression during hospitalization  Description: INTERVENTIONS:  - Assess and monitor for signs and symptoms of infection  - Monitor lab/diagnostic results  - Monitor all insertion sites, i e  indwelling lines, tubes, and drains  - Monitor endotracheal if appropriate and nasal secretions for changes in amount and color  - [place] appropriate cooling/warming therapies per order  - Administer medications as ordered  - Instruct and encourage patient and family to use good hand hygiene technique  - Identify and instruct in appropriate isolation precautions for identified infection/condition  Outcome: Progressing  Goal: Absence of fever/infection during neutropenic period  Description: INTERVENTIONS:  - Monitor WBC    Outcome: Progressing     Problem: SAFETY ADULT  Goal: Patient will remain free of falls  Description: INTERVENTIONS:  - Educate patient/family on patient safety including physical limitations  - Instruct patient to call for assistance with activity   - Consult OT/PT to assist with strengthening/mobility   - Keep Call bell within reach  - Keep bed low and locked with side rails adjusted as appropriate  - Keep care items and personal belongings within reach  - Initiate and maintain comfort rounds  - Make Fall Risk Sign visible to staff  - Offer Toileting every 2 Hours, in advance of need  - Initiate/Maintain fall alarm  - Obtain necessary fall risk management equipment: non-skid footwear  - Apply yellow socks and bracelet for high fall risk patients  - Consider moving patient to room near nurses station  Outcome: Progressing  Goal: Maintain or return to baseline ADL function  Description: INTERVENTIONS:  -  Assess patient's ability to carry out ADLs; assess patient's baseline for ADL function and identify physical deficits which impact ability to perform ADLs (bathing, care of mouth/teeth, toileting, grooming, dressing, etc )  - Assess/evaluate cause of self-care deficits   - Assess range of motion  - Assess patient's mobility; develop plan if impaired  - Assess patient's need for assistive devices and provide as appropriate  - Encourage maximum independence but intervene and supervise when necessary  - Involve family in performance of ADLs  - Assess for home care needs following discharge   - Consider OT consult to assist with ADL evaluation and planning for discharge  - Provide patient education as appropriate  Outcome: Progressing  Goal: Maintains/Returns to pre admission functional level  Description: INTERVENTIONS:  - Perform BMAT or MOVE assessment daily    - Set and communicate daily mobility goal to care team and patient/family/caregiver  - Collaborate with rehabilitation services on mobility goals if consulted  - Perform Range of Motion 3 times a day  - Reposition patient every 2 hours    - Dangle patient 3 times a day  - Stand patient 3 times a day  - Ambulate patient 3 times a day  - Out of bed to chair 3 times a day   - Out of bed for meals 3 times a day  - Out of bed for toileting  - Record patient progress and toleration of activity level   Outcome: Progressing     Problem: DISCHARGE PLANNING  Goal: Discharge to home or other facility with appropriate resources  Description: INTERVENTIONS:  - Identify barriers to discharge w/patient and caregiver  - Arrange for needed discharge resources and transportation as appropriate  - Identify discharge learning needs (meds, wound care, etc )  - Arrange for interpretive services to assist at discharge as needed  - Refer to Case Management Department for coordinating discharge planning if the patient needs post-hospital services based on physician/advanced practitioner order or complex needs related to functional status, cognitive ability, or social support system  Outcome: Progressing     Problem: Knowledge Deficit  Goal: Patient/family/caregiver demonstrates understanding of disease process, treatment plan, medications, and discharge instructions  Description: Complete learning assessment and assess knowledge base    Interventions:  - Provide teaching at level of understanding  - Provide teaching via preferred learning methods  Outcome: Progressing     Problem: CARDIOVASCULAR - ADULT  Goal: Maintains optimal cardiac output and hemodynamic stability  Description: INTERVENTIONS:  - Monitor I/O, vital signs and rhythm  - Monitor for S/S and trends of decreased cardiac output  - Administer and titrate ordered vasoactive medications to optimize hemodynamic stability  - Assess quality of pulses, skin color and temperature  - Assess for signs of decreased coronary artery perfusion  - Instruct patient to report change in severity of symptoms  Outcome: Progressing  Goal: Absence of cardiac dysrhythmias or at baseline rhythm  Description: INTERVENTIONS:  - Continuous cardiac monitoring, vital signs, obtain 12 lead EKG if ordered  - Administer antiarrhythmic and heart rate control medications as ordered  - Monitor electrolytes and administer replacement therapy as ordered  Outcome: Progressing     Problem: RESPIRATORY - ADULT  Goal: Achieves optimal ventilation and oxygenation  Description: INTERVENTIONS:  - Assess for changes in respiratory status  - Assess for changes in mentation and behavior  - Position to facilitate oxygenation and minimize respiratory effort  - Oxygen administered by appropriate delivery if ordered  - Initiate smoking cessation education as indicated  - Encourage broncho-pulmonary hygiene including cough, deep breathe, Incentive Spirometry  - Assess the need for suctioning and aspirate as needed  - Assess and instruct to report SOB or any respiratory difficulty  - Respiratory Therapy support as indicated  Outcome: Progressing     Problem: METABOLIC, FLUID AND ELECTROLYTES - ADULT  Goal: Electrolytes maintained within normal limits  Description: INTERVENTIONS:  - Monitor labs and assess patient for signs and symptoms of electrolyte imbalances  - Administer electrolyte replacement as ordered  - Monitor response to electrolyte replacements, including repeat lab results as appropriate  - Instruct patient on fluid and nutrition as appropriate  Outcome: Progressing  Goal: Fluid balance maintained  Description: INTERVENTIONS:  - Monitor labs   - Monitor I/O and WT  - Instruct patient on fluid and nutrition as appropriate  - Assess for signs & symptoms of volume excess or deficit  Outcome: Progressing  Goal: Glucose maintained within target range  Description: INTERVENTIONS:  - Monitor Blood Glucose as ordered  - Assess for signs and symptoms of hyperglycemia and hypoglycemia  - Administer ordered medications to maintain glucose within target range  - Assess nutritional intake and initiate nutrition service referral as needed  Outcome: Progressing     Problem: MUSCULOSKELETAL - ADULT  Goal: Maintain or return mobility to safest level of function  Description: INTERVENTIONS:  - Assess patient's ability to carry out ADLs; assess patient's baseline for ADL function and identify physical deficits which impact ability to perform ADLs (bathing, care of mouth/teeth, toileting, grooming, dressing, etc )  - Assess/evaluate cause of self-care deficits   - Assess range of motion  - Assess patient's mobility  - Assess patient's need for assistive devices and provide as appropriate  - Encourage maximum independence but intervene and supervise when necessary  - Involve family in performance of ADLs  - Assess for home care needs following discharge   - Consider OT consult to assist with ADL evaluation and planning for discharge  - Provide patient education as appropriate  Outcome: Progressing  Goal: Maintain proper alignment of affected body part  Description: INTERVENTIONS:  - Support, maintain and protect limb and body alignment  - Provide patient/ family with appropriate education  Outcome: Progressing     Problem: MOBILITY - ADULT  Goal: Maintain or return to baseline ADL function  Description: INTERVENTIONS:  -  Assess patient's ability to carry out ADLs; assess patient's baseline for ADL function and identify physical deficits which impact ability to perform ADLs (bathing, care of mouth/teeth, toileting, grooming, dressing, etc )  - Assess/evaluate cause of self-care deficits   - Assess range of motion  - Assess patient's mobility; develop plan if impaired  - Assess patient's need for assistive devices and provide as appropriate  - Encourage maximum independence but intervene and supervise when necessary  - Involve family in performance of ADLs  - Assess for home care needs following discharge   - Consider OT consult to assist with ADL evaluation and planning for discharge  - Provide patient education as appropriate  Outcome: Progressing  Goal: Maintains/Returns to pre admission functional level  Description: INTERVENTIONS:  - Perform BMAT or MOVE assessment daily    - Set and communicate daily mobility goal to care team and patient/family/caregiver  - Collaborate with rehabilitation services on mobility goals if consulted  - Perform Range of Motion 3 times a day  - Reposition patient every 2 hours    - Dangle patient 3 times a day  - Stand patient 3 times a day  - Ambulate patient 3 times a day  - Out of bed to chair 3 times a day   - Out of bed for meals 3 times a day  - Out of bed for toileting  - Record patient progress and toleration of activity level   Outcome: Progressing     Problem: Prexisting or High Potential for Compromised Skin Integrity  Goal: Skin integrity is maintained or improved  Description: INTERVENTIONS:  - Identify patients at risk for skin breakdown  - Assess and monitor skin integrity  - Assess and monitor nutrition and hydration status  - Monitor labs   - Assess for incontinence   - Turn and reposition patient  - Assist with mobility/ambulation  - Relieve pressure over bony prominences  - Avoid friction and shearing  - Provide appropriate hygiene as needed including keeping skin clean and dry  - Evaluate need for skin moisturizer/barrier cream  - Collaborate with interdisciplinary team   - Patient/family teaching  - Consider wound care consult   Outcome: Progressing

## 2023-03-08 NOTE — ASSESSMENT & PLAN NOTE
· The patient was seen in consultation by Pulmonology    · Treated with methylprednisolone 20 mg IV every 12 hours during the hospitalization  · Discharge on prednisone 60 mg PO Qdaily decreasing by 10 mg every 2 days until completed  · Treated with nebulizer treatments via the respiratory protocol during the hospitalization  · Continue the symbicort and umeclidinium inhalers  · Outpatient follow-up with Pulmonology

## 2023-03-08 NOTE — DISCHARGE SUMMARY
[address] [facility] [address] [place]  Discharge- Read Christopherk 1945, 77 y o  female MRN: [id number]  Unit/Bed#: 665-10 Encounter: [id number]  Primary Care Provider: Veronique Tolliver DO   Date and time admitted to hospital: 3/3/2023  7:10 PM    * Acute on chronic respiratory failure with hypoxia and hypercapnia [facility])  Assessment & Plan  Background: Recent hospitalization for hypoglycemia sent to rehab at [facility] presented from facility for worsening hypoxia  · Recently diagnosed with metapneumovirus 2/27 contact can be discontinued 3/8   · Started on levofloxacin on 3/1/2023 switched to IV cefepime and IV vancomycin during the hospitalization  · The IV vancomycin was discontinued with a negative MRSA nasal culture  · Discharge home on cefdinir 300 mg PO every 12 hours for 3 days to complete a total of a 7-day antibiotic course  · Outpatient follow-up with Pulmonology  · Required up to 6 L of supplemental oxygen via the nasal cannula  · At baseline on 2-3 Lpm   Back at baseline  · Maintain sats >90%    Sepsis due to pneumonia [facility])  Assessment & Plan  · Sepsis was present on admission and secondary to pneumonia  · Treated with IV cefepime and IV vancomycin during the hospitalization  · The IV vancomycin was discontinued with a negative MRSA nasal culture  · Blood cultures x 2 sets are no growth x 4 days at the time of discharge  · Discharge home on cefdinir 300 mg PO every 12 hours for 3 days to complete a total of a 7-day antibiotic course  · Outpatient follow-up with Pulmonology    Results from last 7 days   Lab Units 03/07/23  0433 03/05/23  0438 03/03/23  1940   PROCALCITONIN ng/ml 0 17 0 74* 2 11*       COPD with acute exacerbation ([address] )  Assessment & Plan  · The patient was seen in consultation by Pulmonology    · Treated with methylprednisolone 20 mg IV every 12 hours during the hospitalization  · Discharge on prednisone 60 mg PO Qdaily decreasing by 10 mg every 2 days until completed  · Treated with nebulizer treatments via the respiratory protocol during the hospitalization  · Continue the symbicort and umeclidinium inhalers  · Outpatient follow-up with Pulmonology    Mediastinal lymphadenopathy  Assessment & Plan  · Needs outpatient surveillance imaging with PCP to ensure resolution of the mediastinal adenopathy and pneumonia  · Outpatient follow-up with Pulmonology    CTA of the chest/PE protocol (03/03/2023): IMPRESSION:     1  No pulmonary embolism  2  Airspace opacities in both lower lobes consistent with pneumonia  3  Mediastinal lymphadenopathy    Oral candidiasis  Assessment & Plan  · Secondary to systemic steroids and antibiotic use  · Treated with nystatin swish and swallow during the hospitalization    JOHN (acute kidney injury) ([facility] [address] )  Assessment & Plan  · JOHN likely secondary to poor intake/infection  · Now resolved  · Avoid all nephrotoxic agents  · Monitor the patient's renal function and electrolyte levels after discharge with her PCP    Results from last 7 days   Lab Units 03/07/23  0433 03/05/23  0438 03/04/23  0526 03/03/23  1940   BUN mg/dL 46* 51* 65* 74*   CREATININE mg/dL 0 59* 0 70 1 08 1 41*   EGFR ml/min/1 73sq m 87 82 48 35       Hypercalcemia  Assessment & Plan  · Check an ionized calcium and intact PTH level after discharge with her PCP  • Outpatient follow-up with PCP in regards to this matter    Hypomagnesemia  Assessment & Plan  · Follow the magnesium level after discharge with her PCP    Results from last 7 days   Lab Units 03/07/23  0433 03/04/23  0526   MAGNESIUM mg/dL 1 8* 2 1         Type 2 diabetes mellitus with hyperglycemia, without long-term current use of insulin [facility])  Assessment & Plan  Lab Results   Component Value Date    HGBA1C 6 6 (H) 02/21/2023     Recent Labs     03/07/23  2334 03/08/23  0703 03/08/23  0747 03/08/23  1110   POCGLU 63* 158* 155* 179*     · Recently hospitalized for hypoglycemia and glipizide discontinued    · Resume Janumet upon discharge  · Outpatient Endocrinology evaluation    History of intracranial hemorrhage  Assessment & Plan  · History of intracranial hemorrhage with cognitive decline    Chronic atrial fibrillation (HCC)  Assessment & Plan  · Not on anticoagulation given history of intracranial hemorrhage  · Heart rate controlled without any AV-mariano blocking agents  • Outpatient follow-up with Cardiology    Essential hypertension  Assessment & Plan  · The lisinopril/HCTZ were on hold due to kidney injury  · Resumed lisinopril on 03/05/2023  · Continue to hold hydrochlorothiazide  • Outpatient follow-up with PCP in regards to this matter    Discharging Physician / Practitioner: Cookie Beltrán DO  PCP: Chuy Palencia DO  Admission Date:   Admission Orders (From admission, onward)     Ordered        03/03/23 2219  INPATIENT ADMISSION  Once                      Discharge Date: 03/08/23    Consultations During Hospital Stay:  · Pulmonology    Procedures Performed:   · None    Significant Findings / Test Results:   CTA ED chest PE study    Result Date: 3/3/2023  Impression: 1  No pulmonary embolism 2  Airspace opacities in both lower lobes consistent with pneumonia 3    Mediastinal lymphadenopathy Workstation performed: [id number]     Results from last 7 days   Lab Units 03/07/23 0433 03/05/23 0438 03/04/23  0526   WBC Thousand/uL 8 74 7 02 16 94*   HEMOGLOBIN g/dL 13 7 13 2 14 3   HEMATOCRIT % 42 5 40 7 44 8   PLATELETS Thousands/uL 189 200 208     Results from last 7 days   Lab Units 03/07/23 0433 03/05/23 0438 03/04/23  0526   SODIUM mmol/L 138 138 130*   POTASSIUM mmol/L 4 4 3 7 3 9   CHLORIDE mmol/L 95* 94* 91*   CO2 mmol/L 40* 34* 29   BUN mg/dL 46* 51* 65*   CREATININE mg/dL 0 59* 0 70 1 08   CALCIUM mg/dL 9 3 9 9 9 0     Results from last 7 days   Lab Units 03/07/23 0433 03/04/23  0526   MAGNESIUM mg/dL 1 8* 2 1     Results from last 7 days   Lab Units 03/07/23 0433 03/05/23 0438 03/04/23  0526   ALK PHOS U/L 54 52 62   ALT U/L 3* 4* <3*   AST U/L 7* 5* 7*     Microbiology Results (last 21 days)    Collected Updated Procedure Result Status Patient Facility Result Comment    03/05/2023 1041 03/05/2023 1829 Strep Pneumoniae, Urine [[id number]]   Urine, Clean Catch    Final result [address]  Component Value   Strep pneumoniae antigen, urine Negative          03/05/2023 1041 03/05/2023 1835 Legionella antigen, Urine [[id number]]   Urine, Clean Catch    Final result [address]  Component Value   Legionella Urinary Antigen Negative          03/04/2023 0828 03/05/2023 2209 MRSA culture [[id number]]   Nares from Nose    Final result [address]  Component Value   MRSA Culture Only No Methicillin Resistant Staphlyococcus aureus (MRSA) isolated             03/03/2023 2043 03/08/2023 1601 Blood culture #1 [[id number]]   Blood from Hand, Left    Preliminary result [address]  Component Value   Blood Culture No Growth After 4 Days  P             03/03/2023 1940 03/08/2023 1601 Blood culture #2 [[id number]]   Blood from Arm, Right    Preliminary result [address]  Component Value   Blood Culture No Growth After 4 Days  P             03/03/2023 1940 03/03/2023 2034 FLU/RSV/COVID - if FLU/RSV clinically relevant [[id number]]    Nasopharyngeal Swab    Final result [address] Guidelines URL to browser: [email or web address]  [employer]   SARS-CoV-2 assay is a Nucleic Acid Amplification assay intended for the   qualitative detection of nucleic acid from SARS-CoV-2 in nasopharyngeal   swabs  Results are for the presumptive identification of SARS-CoV-2 RNA     Positive results are indicative of infection with SARS-CoV-2, the virus   causing COVID-19, but do not rule out bacterial infection or co-infection   with other viruses  Laboratories within the [place] and its   territories are required to report all positive results to the appropriate   public health authorities  Negative results do not preclude SARS-CoV-2   infection and should not be used as the sole basis for treatment or other   patient management decisions  Negative results must be combined with   clinical observations, patient history, and epidemiological information  This test has not been FDA cleared or approved  This test has been authorized by FDA under an Emergency Use Authorization   (EUA)  This test is only authorized for the duration of time the   declaration that circumstances exist justifying the authorization of the   emergency use of an in vitro diagnostic tests for detection of SARS-CoV-2   virus and/or diagnosis of COVID-19 infection under section 564(b)(1) of   the Act, 21 U  [place] C  076VRB-9(Y)(5), unless the authorization is terminated   or revoked sooner  The test has been validated but independent review by FDA   and CLIA is pending  Test performed using [facility] GeneXpert: This RT-PCR assay targets N2,   a region unique to SARS-CoV-2  A conserved region in the E-gene was chosen   for pan-Sarbecovirus detection which includes SARS-CoV-2  According to CMS-2020-01-R, this platform meets the definition of high-[employer] technology  Component Value   SARS-CoV-2 Negative   INFLUENZA A PCR Negative   INFLUENZA B PCR Negative   RSV PCR Negative          02/16/2023 1228 02/16/2023 1307 COVID only [[id number]]    Nares from Nose    Final result [address] Guidelines URL to browser: [email or web address]  [employer]   SARS-CoV-2 assay is a Nucleic Acid Amplification assay intended for the   qualitative detection of nucleic acid from SARS-CoV-2 in nasopharyngeal   swabs  Results are for the presumptive identification of SARS-CoV-2 RNA  Positive results are indicative of infection with SARS-CoV-2, the virus   causing COVID-19, but do not rule out bacterial infection or co-infection   with other viruses  Laboratories within the [place] and its   territories are required to report all positive results to the appropriate   public health authorities  Negative results do not preclude SARS-CoV-2   infection and should not be used as the sole basis for treatment or other   patient management decisions  Negative results must be combined with   clinical observations, patient history, and epidemiological information  This test has not been FDA cleared or approved  This test has been authorized by FDA under an Emergency Use Authorization   (EUA)  This test is only authorized for the duration of time the   declaration that circumstances exist justifying the authorization of the   emergency use of an in vitro diagnostic tests for detection of SARS-CoV-2   virus and/or diagnosis of COVID-19 infection under section 564(b)(1) of   the Act, 21 U  S C  723CNQ-7(P)(1), unless the authorization is terminated   or revoked sooner  The test has been validated but independent review by FDA   and CLIA is pending  Test performed using [employer] GeneXpert: This RT-PCR assay targets N2,   a region unique to SARS-CoV-2  A conserved region in the E-gene was chosen   for pan-Sarbecovirus detection which includes SARS-CoV-2  According to CMS-2020-01-R, this platform meets the definition of high-throughput technology  Component Value   SARS-CoV-2 Negative              Incidental Findings:   · As above  · I reviewed the above mentioned incidental findings with the patient and/or family and they expressed understanding  Test Results Pending at Discharge (will require follow-up):   · Blood cultures x 2 sets from 03/03/2023     Outpatient Tests Requested:  · CBC with diff , CMP, Magnesium level, Phosphorus level, Ionized Calcium level, and Intact-PTH level in 5-7 days with PCP  · CT scan of the chest in 1 month with PCP    Complications:  None    Reason for Admission:  Acute on chronic respiratory failure with hypoxia and hypercapnia    Hospital Course:   Layla Robertson is a 77 y o  female patient who originally presented to the hospital on 3/3/2023 due to hypoxia and shortness of breath  Please see above list of diagnoses and related plan for additional information  Condition at Discharge: good    Discharge Day Visit / Exam:   Subjective: The patient was seen and examined  The patient is doing better  No chest pain  No shortness of breath  No abdominal pain  No nausea or vomiting  Vitals: Blood Pressure: 134/71 (03/08/23 0749)  Pulse: 58 (03/08/23 0749)  Temperature: 97 5 °F (36 4 °C) (03/07/23 1607)  Temp Source: Tympanic (03/07/23 4721)  Respirations: 19 (03/08/23 0749)  Height: 5' 5" (165 1 cm) (03/04/23 1508)  Weight - Scale: 55 1 kg (121 lb 7 6 oz) (03/04/23 1508)  SpO2: 95 % (03/08/23 0749)  Exam:   Physical Exam   General:  NAD, follows commands  HEENT:  NC/AT, mucous membranes moist  Neck:  Supple, No JVP elevation  CV:  + S1, + S2, Bradycardic, Irregularly irregular rhythm  Pulm:  Lung fields are CTA bilaterally  Abd:  Soft, Non-tender, Non-distended  Ext:  No clubbing/cyanosis/edema  Skin:  No rashes  Neuro:  Awake, alert, oriented  Psych:  Normal mood and affect      Discharge instructions/Information to patient and family:  See after visit summary for information provided to patient and family  Provisions for Follow-Up Care:  See after visit summary for information related to follow-up care and any pertinent home health orders  Disposition:   Home with [facility]NA Services (Reminder: Complete face to face encounter)    Planned Readmission:  No     Discharge Statement:  I spent 45 minutes discharging the patient  This time was spent on the day of discharge  I had direct contact with the patient on the day of discharge   Greater than 50% of the total time was spent examining patient, answering all patient questions, arranging and discussing plan of care with patient as well as directly providing post-discharge instructions  Additional time then spent on discharge activities  Discharge Medications:  See after visit summary for reconciled discharge medications provided to patient and/or family        **Please Note: This note may have been constructed using a voice recognition system**

## 2023-03-08 NOTE — PLAN OF CARE
Problem: PAIN - ADULT  Goal: Verbalizes/displays adequate comfort level or baseline comfort level  Description: Interventions:  - Encourage patient to monitor pain and request assistance  - Assess pain using appropriate pain scale  - Administer analgesics based on type and severity of pain and evaluate response  - Implement non-pharmacological measures as appropriate and evaluate response  - Consider cultural and social influences on pain and pain management  - Notify physician/advanced practitioner if interventions unsuccessful or patient reports new pain  Outcome: Progressing     Problem: INFECTION - ADULT  Goal: Absence or prevention of progression during hospitalization  Description: INTERVENTIONS:  - Assess and monitor for signs and symptoms of infection  - Monitor lab/diagnostic results  - Monitor all insertion sites, i e  indwelling lines, tubes, and drains  - Monitor endotracheal if appropriate and nasal secretions for changes in amount and color  - [place] appropriate cooling/warming therapies per order  - Administer medications as ordered  - Instruct and encourage patient and family to use good hand hygiene technique  - Identify and instruct in appropriate isolation precautions for identified infection/condition  Outcome: Progressing  Goal: Absence of fever/infection during neutropenic period  Description: INTERVENTIONS:  - Monitor WBC    Outcome: Progressing     Problem: SAFETY ADULT  Goal: Patient will remain free of falls  Description: INTERVENTIONS:  - Educate patient/family on patient safety including physical limitations  - Instruct patient to call for assistance with activity   - Consult OT/PT to assist with strengthening/mobility   - Keep Call bell within reach  - Keep bed low and locked with side rails adjusted as appropriate  - Keep care items and personal belongings within reach  - Initiate and maintain comfort rounds  - Make Fall Risk Sign visible to staff  - Offer Toileting every 2 Hours, in advance of need  - Initiate/Maintain fall alarm  - Obtain necessary fall risk management equipment: non-skid footwear  - Apply yellow socks and bracelet for high fall risk patients  - Consider moving patient to room near nurses station  Outcome: Progressing  Goal: Maintain or return to baseline ADL function  Description: INTERVENTIONS:  -  Assess patient's ability to carry out ADLs; assess patient's baseline for ADL function and identify physical deficits which impact ability to perform ADLs (bathing, care of mouth/teeth, toileting, grooming, dressing, etc )  - Assess/evaluate cause of self-care deficits   - Assess range of motion  - Assess patient's mobility; develop plan if impaired  - Assess patient's need for assistive devices and provide as appropriate  - Encourage maximum independence but intervene and supervise when necessary  - Involve family in performance of ADLs  - Assess for home care needs following discharge   - Consider OT consult to assist with ADL evaluation and planning for discharge  - Provide patient education as appropriate  Outcome: Progressing  Goal: Maintains/Returns to pre admission functional level  Description: INTERVENTIONS:  - Perform BMAT or MOVE assessment daily    - Set and communicate daily mobility goal to care team and patient/family/caregiver  - Collaborate with rehabilitation services on mobility goals if consulted  - Perform Range of Motion 3 times a day  - Reposition patient every 2 hours    - Dangle patient 3 times a day  - Stand patient 3 times a day  - Ambulate patient 3 times a day  - Out of bed to chair 3 times a day   - Out of bed for meals 3 times a day  - Out of bed for toileting  - Record patient progress and toleration of activity level   Outcome: Progressing     Problem: DISCHARGE PLANNING  Goal: Discharge to home or other facility with appropriate resources  Description: INTERVENTIONS:  - Identify barriers to discharge w/patient and caregiver  - Arrange for needed discharge resources and transportation as appropriate  - Identify discharge learning needs (meds, wound care, etc )  - Arrange for interpretive services to assist at discharge as needed  - Refer to Case Management Department for coordinating discharge planning if the patient needs post-hospital services based on physician/advanced practitioner order or complex needs related to functional status, cognitive ability, or social support system  Outcome: Progressing     Problem: Knowledge Deficit  Goal: Patient/family/caregiver demonstrates understanding of disease process, treatment plan, medications, and discharge instructions  Description: Complete learning assessment and assess knowledge base    Interventions:  - Provide teaching at level of understanding  - Provide teaching via preferred learning methods  Outcome: Progressing     Problem: CARDIOVASCULAR - ADULT  Goal: Maintains optimal cardiac output and hemodynamic stability  Description: INTERVENTIONS:  - Monitor I/O, vital signs and rhythm  - Monitor for S/S and trends of decreased cardiac output  - Administer and titrate ordered vasoactive medications to optimize hemodynamic stability  - Assess quality of pulses, skin color and temperature  - Assess for signs of decreased coronary artery perfusion  - Instruct patient to report change in severity of symptoms  Outcome: Progressing  Goal: Absence of cardiac dysrhythmias or at baseline rhythm  Description: INTERVENTIONS:  - Continuous cardiac monitoring, vital signs, obtain 12 lead EKG if ordered  - Administer antiarrhythmic and heart rate control medications as ordered  - Monitor electrolytes and administer replacement therapy as ordered  Outcome: Progressing     Problem: RESPIRATORY - ADULT  Goal: Achieves optimal ventilation and oxygenation  Description: INTERVENTIONS:  - Assess for changes in respiratory status  - Assess for changes in mentation and behavior  - Position to facilitate oxygenation and minimize respiratory effort  - Oxygen administered by appropriate delivery if ordered  - Initiate smoking cessation education as indicated  - Encourage broncho-pulmonary hygiene including cough, deep breathe, Incentive Spirometry  - Assess the need for suctioning and aspirate as needed  - Assess and instruct to report SOB or any respiratory difficulty  - Respiratory Therapy support as indicated  Outcome: Progressing     Problem: METABOLIC, FLUID AND ELECTROLYTES - ADULT  Goal: Electrolytes maintained within normal limits  Description: INTERVENTIONS:  - Monitor labs and assess patient for signs and symptoms of electrolyte imbalances  - Administer electrolyte replacement as ordered  - Monitor response to electrolyte replacements, including repeat lab results as appropriate  - Instruct patient on fluid and nutrition as appropriate  Outcome: Progressing  Goal: Fluid balance maintained  Description: INTERVENTIONS:  - Monitor labs   - Monitor I/O and WT  - Instruct patient on fluid and nutrition as appropriate  - Assess for signs & symptoms of volume excess or deficit  Outcome: Progressing  Goal: Glucose maintained within target range  Description: INTERVENTIONS:  - Monitor Blood Glucose as ordered  - Assess for signs and symptoms of hyperglycemia and hypoglycemia  - Administer ordered medications to maintain glucose within target range  - Assess nutritional intake and initiate nutrition service referral as needed  Outcome: Progressing     Problem: MUSCULOSKELETAL - ADULT  Goal: Maintain or return mobility to safest level of function  Description: INTERVENTIONS:  - Assess patient's ability to carry out ADLs; assess patient's baseline for ADL function and identify physical deficits which impact ability to perform ADLs (bathing, care of mouth/teeth, toileting, grooming, dressing, etc )  - Assess/evaluate cause of self-care deficits   - Assess range of motion  - Assess patient's mobility  - Assess patient's need for assistive devices and provide as appropriate  - Encourage maximum independence but intervene and supervise when necessary  - Involve family in performance of ADLs  - Assess for home care needs following discharge   - Consider OT consult to assist with ADL evaluation and planning for discharge  - Provide patient education as appropriate  Outcome: Progressing  Goal: Maintain proper alignment of affected body part  Description: INTERVENTIONS:  - Support, maintain and protect limb and body alignment  - Provide patient/ family with appropriate education  Outcome: Progressing     Problem: MOBILITY - ADULT  Goal: Maintain or return to baseline ADL function  Description: INTERVENTIONS:  -  Assess patient's ability to carry out ADLs; assess patient's baseline for ADL function and identify physical deficits which impact ability to perform ADLs (bathing, care of mouth/teeth, toileting, grooming, dressing, etc )  - Assess/evaluate cause of self-care deficits   - Assess range of motion  - Assess patient's mobility; develop plan if impaired  - Assess patient's need for assistive devices and provide as appropriate  - Encourage maximum independence but intervene and supervise when necessary  - Involve family in performance of ADLs  - Assess for home care needs following discharge   - Consider OT consult to assist with ADL evaluation and planning for discharge  - Provide patient education as appropriate  Outcome: Progressing  Goal: Maintains/Returns to pre admission functional level  Description: INTERVENTIONS:  - Perform BMAT or MOVE assessment daily    - Set and communicate daily mobility goal to care team and patient/family/caregiver  - Collaborate with rehabilitation services on mobility goals if consulted  - Perform Range of Motion 3 times a day  - Reposition patient every 2 hours    - Dangle patient 3 times a day  - Stand patient 3 times a day  - Ambulate patient 3 times a day  - Out of bed to chair 3 times a day   - Out of bed for meals 3 times a day  - Out of bed for toileting  - Record patient progress and toleration of activity level   Outcome: Progressing     Problem: Prexisting or High Potential for Compromised Skin Integrity  Goal: Skin integrity is maintained or improved  Description: INTERVENTIONS:  - Identify patients at risk for skin breakdown  - Assess and monitor skin integrity  - Assess and monitor nutrition and hydration status  - Monitor labs   - Assess for incontinence   - Turn and reposition patient  - Assist with mobility/ambulation  - Relieve pressure over bony prominences  - Avoid friction and shearing  - Provide appropriate hygiene as needed including keeping skin clean and dry  - Evaluate need for skin moisturizer/barrier cream  - Collaborate with interdisciplinary team   - Patient/family teaching  - Consider wound care consult   Outcome: Progressing

## 2023-03-08 NOTE — ASSESSMENT & PLAN NOTE
· Check an ionized calcium and intact PTH level after discharge with her PCP  • Outpatient follow-up with PCP in regards to this matter

## 2023-03-08 NOTE — ASSESSMENT & PLAN NOTE
· Secondary to systemic steroids and antibiotic use     · Treated with nystatin swish and swallow during the hospitalization

## 2023-03-08 NOTE — ASSESSMENT & PLAN NOTE
· Needs outpatient surveillance imaging with PCP to ensure resolution of the mediastinal adenopathy and pneumonia  · Outpatient follow-up with Pulmonology    CTA of the chest/PE protocol (03/03/2023): IMPRESSION:     1  No pulmonary embolism  2  Airspace opacities in both lower lobes consistent with pneumonia  3    Mediastinal lymphadenopathy

## 2023-03-08 NOTE — PHYSICAL THERAPY NOTE
PHYSICAL THERAPY NOTE          Patient Name: Eulogio Valdovinos  [facility] Date: 3/8/2023   03/08/23 1101   PT Last Visit   PT Visit Date 03/08/23   Note Type   Note Type Treatment   Pain Assessment   Pain Assessment Tool 0-10   Pain Score No Pain   Restrictions/Precautions   Weight Bearing Precautions Per Order No   Other Precautions   (Cognitive; Chair Alarm; Bed Alarm; Fall Risk)   General   Response to Previous Treatment Patient unable to report, no changes reported from family or staff   Family/Caregiver Present No   Cognition   Overall Cognitive Status Impaired   Arousal/Participation Alert   Following Commands Follows one step commands with increased time or repetition   Subjective   Subjective "Can I get ice tea?"  Agreeable to therapy  Bed Mobility   Supine to Sit 4  Minimal assistance   Additional items Assist x 1;HOB elevated; Bedrails; Increased time required;Verbal cues;LE management   Additional Comments Increased time to transition to EOB  Sat EOB with fair+ sitting balance   Transfers   Sit to Stand 4  Minimal assistance   Additional items Assist x 1; Increased time required;Verbal cues   Stand to Sit 4  Minimal assistance   Additional items Assist x 1; Armrests; Increased time required;Verbal cues   Stand pivot 4  Minimal assistance   Additional items Verbal cues   Additional Comments RW used   Ambulation/Elevation   Gait pattern   (Short stride; Excessively slow; Decreased foot clearance;  Improper Weight shift)   Gait Assistance 4  Minimal assist   Additional items Assist x 1;Verbal cues   Assistive Device Rolling walker   Distance 3' x 1, 45' x 1   Balance   Static Sitting Fair +   Dynamic Sitting Fair +   Static Standing Fair   Dynamic Standing Fair -   Ambulatory Fair -  (RW)   Endurance Deficit   Endurance Deficit Yes   Activity Tolerance   Activity Tolerance Patient limited by fatigue   Assessment   Prognosis Good Problem List   (Decreased strength; Decreased range of motion; Impaired balance; Decreased mobility; Decreased endurance)   Assessment Pt  seen for PT treatment session this date with interventions consisting of   bed mobility, transfers and  gait training w/ emphasis on improving pt's ability to ambulate  Pt  Requiring frequent  cues for sequence and safety  In comparison to previous session, Pt  With improvements in activity tolerance  Pt is in need of continued activity in PT to improve strength balance endurance mobility transfers and ambulation with return to maximize LOF  From PT/mobility standpoint, recommendation at time of d/c would be post acute rehab  in order to promote return to PLOF and independence  The patient's -[facility] Basic Mobility Inpatient Short Form Raw Score is 16  A Raw score of less than or equal to 16 suggests the patient may benefit from discharge to post-acute rehabilitation services  Please also refer to physical therapy recommendation for safe DC planning  Goals   LTG Expiration Date 03/18/23   Plan   Treatment/Interventions   (Functional transfer training; LE strengthening/ROM; Endurance training; Elevations; Therapeutic exercise;  Bed mobility; Gait training)   Progress Slow progress, decreased activity tolerance   PT Frequency 3-5x/wk   Recommendation   PT Discharge Recommendation Post acute rehabilitation services   [facility] Basic Mobility Inpatient   Turning in Flat Bed Without Bedrails 3   Lying on Back to Sitting on Edge of Flat Bed Without Bedrails 3   Moving Bed to Chair 3   Standing Up From Chair Using Arms 3   Walk in Room 3   Climb 3-5 Stairs With Railing 1   Basic Mobility Inpatient Raw Score 16   Basic Mobility Standardized Score 38 32   Highest Level Of Mobility   JH-HLM Goal 5: Stand one or more mins   JH-HLM Achieved 7: Walk 25 feet or more   Education   Education Provided Mobility training   Patient Reinforcement needed   End of Consult   Patient Position at End of Consult Bedside chair;Bed/Chair alarm activated; All needs within reach   End of Consult Comments discussed POC with PT

## 2023-03-08 NOTE — ASSESSMENT & PLAN NOTE
· Not on anticoagulation given history of intracranial hemorrhage  · Heart rate controlled without any AV-mariano blocking agents    • Outpatient follow-up with Cardiology

## 2023-03-08 NOTE — ASSESSMENT & PLAN NOTE
· Sepsis was present on admission and secondary to pneumonia  · Treated with IV cefepime and IV vancomycin during the hospitalization  · The IV vancomycin was discontinued with a negative MRSA nasal culture  · Blood cultures x 2 sets are no growth x 4 days at the time of discharge    · Discharge home on cefdinir 300 mg PO every 12 hours for 3 days to complete a total of a 7-day antibiotic course  · Outpatient follow-up with Pulmonology    Results from last 7 days   Lab Units 03/07/23  0433 03/05/23  0438 03/03/23  1940   PROCALCITONIN ng/ml 0 17 0 74* 2 11*

## 2023-03-08 NOTE — ASSESSMENT & PLAN NOTE
· Follow the magnesium level after discharge with her PCP    Results from last 7 days   Lab Units 03/07/23  0433 03/04/23  0526   MAGNESIUM mg/dL 1 8* 2 1

## 2023-03-08 NOTE — PLAN OF CARE
Problem: PHYSICAL THERAPY ADULT  Goal: Performs mobility at highest level of function for planned discharge setting  See evaluation for individualized goals  Description:   Outcome: Progressing  Note: Prognosis: Good  Problem List:  (Decreased strength; Decreased range of motion; Impaired balance; Decreased mobility; Decreased endurance)  Assessment: Pt  seen for PT treatment session this date with interventions consisting of   bed mobility, transfers and  gait training w/ emphasis on improving pt's ability to ambulate  Pt  Requiring frequent  cues for sequence and safety  In comparison to previous session, Pt  With improvements in activity tolerance  Pt is in need of continued activity in PT to improve strength balance endurance mobility transfers and ambulation with return to maximize LOF  From PT/mobility standpoint, recommendation at time of d/c would be post acute rehab  in order to promote return to PLOF and independence  The patient's [facility]-PAC Basic Mobility Inpatient Short Form Raw Score is 16  A Raw score of less than or equal to 16 suggests the patient may benefit from discharge to post-acute rehabilitation services  Please also refer to physical therapy recommendation for safe DC planning  PT Discharge Recommendation: Post acute rehabilitation services    See flowsheet documentation for full assessment

## 2023-03-08 NOTE — ASSESSMENT & PLAN NOTE
Lab Results   Component Value Date    HGBA1C 6 6 (H) 02/21/2023     Recent Labs     03/07/23  2334 03/08/23  0703 03/08/23  0747 03/08/23  1110   POCGLU 63* 158* 155* 179*     · Recently hospitalized for hypoglycemia and glipizide discontinued    · Resume Janumet upon discharge  · Outpatient Endocrinology evaluation

## 2023-03-08 NOTE — ASSESSMENT & PLAN NOTE
· JOHN likely secondary to poor intake/infection  · Now resolved  · Avoid all nephrotoxic agents  · Monitor the patient's renal function and electrolyte levels after discharge with her PCP    Results from last 7 days   Lab Units 03/07/23  0433 03/05/23  0438 03/04/23  0526 03/03/23  1940   BUN mg/dL 46* 51* 65* 74*   CREATININE mg/dL 0 59* 0 70 1 08 1 41*   EGFR ml/min/1 73sq m 87 82 48 35

## 2023-03-08 NOTE — PLAN OF CARE
Problem: PAIN - ADULT  Goal: Verbalizes/displays adequate comfort level or baseline comfort level  Description: Interventions:  - Encourage patient to monitor pain and request assistance  - Assess pain using appropriate pain scale  - Administer analgesics based on type and severity of pain and evaluate response  - Implement non-pharmacological measures as appropriate and evaluate response  - Consider cultural and social influences on pain and pain management  - Notify physician/advanced practitioner if interventions unsuccessful or patient reports new pain  3/8/2023 1329 by Yesi Nash RN  Outcome: Completed  3/8/2023 0753 by Yesi Nash RN  Outcome: Progressing     Problem: INFECTION - ADULT  Goal: Absence or prevention of progression during hospitalization  Description: INTERVENTIONS:  - Assess and monitor for signs and symptoms of infection  - Monitor lab/diagnostic results  - Monitor all insertion sites, i e  indwelling lines, tubes, and drains  - Monitor endotracheal if appropriate and nasal secretions for changes in amount and color  - [place] appropriate cooling/warming therapies per order  - Administer medications as ordered  - Instruct and encourage patient and family to use good hand hygiene technique  - Identify and instruct in appropriate isolation precautions for identified infection/condition  3/8/2023 1329 by Yesi Nash RN  Outcome: Completed  3/8/2023 0753 by Yesi Nash RN  Outcome: Progressing  Goal: Absence of fever/infection during neutropenic period  Description: INTERVENTIONS:  - Monitor WBC    3/8/2023 1329 by Yesi Nash RN  Outcome: Completed  3/8/2023 0753 by Yesi Nash RN  Outcome: Progressing     Problem: SAFETY ADULT  Goal: Patient will remain free of falls  Description: INTERVENTIONS:  - Educate patient/family on patient safety including physical limitations  - Instruct patient to call for assistance with activity   - Consult OT/PT to assist with strengthening/mobility   - Keep Call bell within reach  - Keep bed low and locked with side rails adjusted as appropriate  - Keep care items and personal belongings within reach  - Initiate and maintain comfort rounds  - Make Fall Risk Sign visible to staff  - Offer Toileting every 2 Hours, in advance of need  - Initiate/Maintain fall alarm  - Obtain necessary fall risk management equipment: non-skid footwear  - Apply yellow socks and bracelet for high fall risk patients  - Consider moving patient to room near nurses station  3/8/2023 1329 by Jessica Landaverde RN  Outcome: Completed  3/8/2023 0753 by Jessica Landaverde RN  Outcome: Progressing  Goal: Maintain or return to baseline ADL function  Description: INTERVENTIONS:  -  Assess patient's ability to carry out ADLs; assess patient's baseline for ADL function and identify physical deficits which impact ability to perform ADLs (bathing, care of mouth/teeth, toileting, grooming, dressing, etc )  - Assess/evaluate cause of self-care deficits   - Assess range of motion  - Assess patient's mobility; develop plan if impaired  - Assess patient's need for assistive devices and provide as appropriate  - Encourage maximum independence but intervene and supervise when necessary  - Involve family in performance of ADLs  - Assess for home care needs following discharge   - Consider OT consult to assist with ADL evaluation and planning for discharge  - Provide patient education as appropriate  3/8/2023 1329 by Jessica Landaverde RN  Outcome: Completed  3/8/2023 0753 by Jessica Landaverde RN  Outcome: Progressing  Goal: Maintains/Returns to pre admission functional level  Description: INTERVENTIONS:  - Perform BMAT or MOVE assessment daily    - Set and communicate daily mobility goal to care team and patient/family/caregiver  - Collaborate with rehabilitation services on mobility goals if consulted  - Perform Range of Motion 3 times a day  - Reposition patient every 2 hours    - Dangle patient 3 times a day  - Stand patient 3 times a day  - Ambulate patient 3 times a day  - Out of bed to chair 3 times a day   - Out of bed for meals 3 times a day  - Out of bed for toileting  - Record patient progress and toleration of activity level   3/8/2023 1329 by Julianne Mansfield RN  Outcome: Completed  3/8/2023 0753 by Julianne Mansfield RN  Outcome: Progressing     Problem: DISCHARGE PLANNING  Goal: Discharge to home or other facility with appropriate resources  Description: INTERVENTIONS:  - Identify barriers to discharge w/patient and caregiver  - Arrange for needed discharge resources and transportation as appropriate  - Identify discharge learning needs (meds, wound care, etc )  - Arrange for interpretive services to assist at discharge as needed  - Refer to Case Management Department for coordinating discharge planning if the patient needs post-hospital services based on physician/advanced practitioner order or complex needs related to functional status, cognitive ability, or social support system  3/8/2023 1329 by Julianne Mansfield RN  Outcome: Completed  3/8/2023 0753 by Julianne Mansfield RN  Outcome: Progressing     Problem: Knowledge Deficit  Goal: Patient/family/caregiver demonstrates understanding of disease process, treatment plan, medications, and discharge instructions  Description: Complete learning assessment and assess knowledge base    Interventions:  - Provide teaching at level of understanding  - Provide teaching via preferred learning methods  3/8/2023 1329 by Julianne Mansfield RN  Outcome: Completed  3/8/2023 0753 by Julianne Mansfield RN  Outcome: Progressing     Problem: CARDIOVASCULAR - ADULT  Goal: Maintains optimal cardiac output and hemodynamic stability  Description: INTERVENTIONS:  - Monitor I/O, vital signs and rhythm  - Monitor for S/S and trends of decreased cardiac output  - Administer and titrate ordered vasoactive medications to optimize hemodynamic stability  - Assess quality of pulses, skin color and temperature  - Assess for signs of decreased coronary artery perfusion  - Instruct patient to report change in severity of symptoms  3/8/2023 1329 by Tressa Campbell RN  Outcome: Completed  3/8/2023 0753 by Tressa Campbell RN  Outcome: Progressing  Goal: Absence of cardiac dysrhythmias or at baseline rhythm  Description: INTERVENTIONS:  - Continuous cardiac monitoring, vital signs, obtain 12 lead EKG if ordered  - Administer antiarrhythmic and heart rate control medications as ordered  - Monitor electrolytes and administer replacement therapy as ordered  3/8/2023 1329 by Tressa Campbell RN  Outcome: Completed  3/8/2023 0753 by Tressa Campbell RN  Outcome: Progressing     Problem: RESPIRATORY - ADULT  Goal: Achieves optimal ventilation and oxygenation  Description: INTERVENTIONS:  - Assess for changes in respiratory status  - Assess for changes in mentation and behavior  - Position to facilitate oxygenation and minimize respiratory effort  - Oxygen administered by appropriate delivery if ordered  - Initiate smoking cessation education as indicated  - Encourage broncho-pulmonary hygiene including cough, deep breathe, Incentive Spirometry  - Assess the need for suctioning and aspirate as needed  - Assess and instruct to report SOB or any respiratory difficulty  - Respiratory Therapy support as indicated  3/8/2023 1329 by Tressa Campbell RN  Outcome: Completed  3/8/2023 0753 by Tressa Campbell RN  Outcome: Progressing     Problem: METABOLIC, FLUID AND ELECTROLYTES - ADULT  Goal: Electrolytes maintained within normal limits  Description: INTERVENTIONS:  - Monitor labs and assess patient for signs and symptoms of electrolyte imbalances  - Administer electrolyte replacement as ordered  - Monitor response to electrolyte replacements, including repeat lab results as appropriate  - Instruct patient on fluid and nutrition as appropriate  3/8/2023 1329 by Tressa Campbell RN  Outcome: Completed  3/8/2023 0753 by Hebert Galeazzi, RN  Outcome: Progressing  Goal: Fluid balance maintained  Description: INTERVENTIONS:  - Monitor labs   - Monitor I/O and WT  - Instruct patient on fluid and nutrition as appropriate  - Assess for signs & symptoms of volume excess or deficit  3/8/2023 1329 by Hebert Galeazzi, RN  Outcome: Completed  3/8/2023 0753 by Hebert Galeazzi, RN  Outcome: Progressing  Goal: Glucose maintained within target range  Description: INTERVENTIONS:  - Monitor Blood Glucose as ordered  - Assess for signs and symptoms of hyperglycemia and hypoglycemia  - Administer ordered medications to maintain glucose within target range  - Assess nutritional intake and initiate nutrition service referral as needed  3/8/2023 1329 by Hebert Galeazzi, RN  Outcome: Completed  3/8/2023 0753 by Hebert Galeazzi, RN  Outcome: Progressing     Problem: MUSCULOSKELETAL - ADULT  Goal: Maintain or return mobility to safest level of function  Description: INTERVENTIONS:  - Assess patient's ability to carry out ADLs; assess patient's baseline for ADL function and identify physical deficits which impact ability to perform ADLs (bathing, care of mouth/teeth, toileting, grooming, dressing, etc )  - Assess/evaluate cause of self-care deficits   - Assess range of motion  - Assess patient's mobility  - Assess patient's need for assistive devices and provide as appropriate  - Encourage maximum independence but intervene and supervise when necessary  - Involve family in performance of ADLs  - Assess for home care needs following discharge   - Consider OT consult to assist with ADL evaluation and planning for discharge  - Provide patient education as appropriate  3/8/2023 1329 by Hebert Galeazzi, RN  Outcome: Completed  3/8/2023 0753 by Hebert Galeazzi, RN  Outcome: Progressing  Goal: Maintain proper alignment of affected body part  Description: INTERVENTIONS:  - Support, maintain and protect limb and body alignment  - Provide patient/ family with appropriate education  3/8/2023 1329 by Nickey Lesches, RN  Outcome: Completed  3/8/2023 0753 by Nickey Lesches, RN  Outcome: Progressing     Problem: MOBILITY - ADULT  Goal: Maintain or return to baseline ADL function  Description: INTERVENTIONS:  -  Assess patient's ability to carry out ADLs; assess patient's baseline for ADL function and identify physical deficits which impact ability to perform ADLs (bathing, care of mouth/teeth, toileting, grooming, dressing, etc )  - Assess/evaluate cause of self-care deficits   - Assess range of motion  - Assess patient's mobility; develop plan if impaired  - Assess patient's need for assistive devices and provide as appropriate  - Encourage maximum independence but intervene and supervise when necessary  - Involve family in performance of ADLs  - Assess for home care needs following discharge   - Consider OT consult to assist with ADL evaluation and planning for discharge  - Provide patient education as appropriate  3/8/2023 1329 by Nickey Lesches, RN  Outcome: Completed  3/8/2023 0753 by Nickey Lesches, RN  Outcome: Progressing  Goal: Maintains/Returns to pre admission functional level  Description: INTERVENTIONS:  - Perform BMAT or MOVE assessment daily    - Set and communicate daily mobility goal to care team and patient/family/caregiver  - Collaborate with rehabilitation services on mobility goals if consulted  - Perform Range of Motion 3 times a day  - Reposition patient every 2 hours    - Dangle patient 3 times a day  - Stand patient 3 times a day  - Ambulate patient 3 times a day  - Out of bed to chair 3 times a day   - Out of bed for meals 3 times a day  - Out of bed for toileting  - Record patient progress and toleration of activity level   3/8/2023 1329 by Nickey Lesches, RN  Outcome: Completed  3/8/2023 0753 by Nickey Lesches, RN  Outcome: Progressing     Problem: Prexisting or High Potential for Compromised Skin Integrity  Goal: Skin integrity is maintained or improved  Description: INTERVENTIONS:  - Identify patients at risk for skin breakdown  - Assess and monitor skin integrity  - Assess and monitor nutrition and hydration status  - Monitor labs   - Assess for incontinence   - Turn and reposition patient  - Assist with mobility/ambulation  - Relieve pressure over bony prominences  - Avoid friction and shearing  - Provide appropriate hygiene as needed including keeping skin clean and dry  - Evaluate need for skin moisturizer/barrier cream  - Collaborate with interdisciplinary team   - Patient/family teaching  - Consider wound care consult   3/8/2023 1329 by Yesi Nash RN  Outcome: Completed  3/8/2023 0753 by Yesi Nash RN  Outcome: Progressing

## 2023-03-08 NOTE — ASSESSMENT & PLAN NOTE
Background: Recent hospitalization for hypoglycemia sent to rehab at [facility] presented from facility for worsening hypoxia  · Recently diagnosed with metapneumovirus 2/27 contact can be discontinued 3/8   · Started on levofloxacin on 3/1/2023 switched to IV cefepime and IV vancomycin during the hospitalization  · The IV vancomycin was discontinued with a negative MRSA nasal culture  · Discharge home on cefdinir 300 mg PO every 12 hours for 3 days to complete a total of a 7-day antibiotic course  · Outpatient follow-up with Pulmonology  · Required up to 6 L of supplemental oxygen via the nasal cannula  · At baseline on 2-3 Lpm   Back at baseline    · Maintain sats >90%

## 2023-03-08 NOTE — ASSESSMENT & PLAN NOTE
· The lisinopril/HCTZ were on hold due to kidney injury    · Resumed lisinopril on 03/05/2023  · Continue to hold hydrochlorothiazide  • Outpatient follow-up with PCP in regards to this matter

## 2023-03-09 ENCOUNTER — PATIENT OUTREACH (OUTPATIENT)
Dept: FAMILY MEDICINE CLINIC | Facility: CLINIC | Age: 78
End: 2023-03-09

## 2023-03-09 ENCOUNTER — TELEPHONE (OUTPATIENT)
Dept: FAMILY MEDICINE CLINIC | Facility: CLINIC | Age: 78
End: 2023-03-09

## 2023-03-09 ENCOUNTER — TELEPHONE (OUTPATIENT)
Dept: ENDOCRINOLOGY | Facility: CLINIC | Age: 78
End: 2023-03-09

## 2023-03-09 DIAGNOSIS — E11.649 TYPE 2 DIABETES MELLITUS WITH HYPOGLYCEMIA WITHOUT COMA, WITHOUT LONG-TERM CURRENT USE OF INSULIN (HCC): ICD-10-CM

## 2023-03-09 DIAGNOSIS — Z71.89 COMPLEX CARE COORDINATION: Primary | ICD-10-CM

## 2023-03-09 DIAGNOSIS — N39.3 STRESS INCONTINENCE: Primary | ICD-10-CM

## 2023-03-09 LAB
BACTERIA BLD CULT: NORMAL
BACTERIA BLD CULT: NORMAL

## 2023-03-09 RX ORDER — BLOOD-GLUCOSE,RECEIVER,CONT
1 EACH MISCELLANEOUS CONTINUOUS
Qty: 1 EACH | Refills: 0 | Status: SHIPPED | OUTPATIENT
Start: 2023-03-09

## 2023-03-09 RX ORDER — BLOOD-GLUCOSE SENSOR
1 EACH MISCELLANEOUS 4 TIMES DAILY
Qty: 1 EACH | Refills: 0 | Status: SHIPPED | OUTPATIENT
Start: 2023-03-09

## 2023-03-09 RX ORDER — BLOOD-GLUCOSE SENSOR
EACH MISCELLANEOUS
COMMUNITY
End: 2023-03-09 | Stop reason: SDUPTHER

## 2023-03-09 RX ORDER — BLOOD-GLUCOSE,RECEIVER,CONT
EACH MISCELLANEOUS
COMMUNITY
End: 2023-03-09 | Stop reason: SDUPTHER

## 2023-03-09 NOTE — TELEPHONE ENCOUNTER
Nurse called stated "gave pt sugary food" and pt seems to be ok now, so not going to call ambulance

## 2023-03-09 NOTE — PROGRESS NOTES
Outpatient Care Management Note: Outreach call placed to Ms Rosario Lamb with her daughter, Chantal  Introduced myself and my role  They are not interested in outreach at this time  I made Chantal aware that should they change their minds I can be reached through her PCP office

## 2023-03-09 NOTE — TELEPHONE ENCOUNTER
Was in to see today, she is confused, nothing there to check glucose  She is going to call ambulance  Wanted to let you know

## 2023-03-09 NOTE — TELEPHONE ENCOUNTER
Faxed DME order to [facility]  I spoke to someone with [facility] and I was told incontinence supplies cannot be placed through [place]

## 2023-03-09 NOTE — CASE MANAGEMENT
Case Management Discharge Planning Note    Patient name Akash Santo  Location [address] [id number]/[id number] MRN [id number]  : 1945 Date 3/9/2023       Current Admission Date: 3/3/2023  Current Admission Diagnosis:Acute on chronic respiratory failure with hypoxia and hypercapnia [facility])   Patient Active Problem List    Diagnosis Date Noted   • Mediastinal lymphadenopathy 2023   • Oral candidiasis 2023   • Sepsis due to pneumonia ([facility] ) 2023   • JOHN (acute kidney injury) ([facility] ) 2023   • Pneumonia 2023   • Diarrhea 2023   • Fall 2023   • Swelling of right hand 2022   • Hypercalcemia 2022   • Pyuria 2022   • Microscopic hematuria 2022   • Permanent atrial fibrillation ([facility] ) 2022   • Hypoxia 2022   • Hypomagnesemia 2022   • COPD with acute exacerbation ([facility] ) 2022   • Leukocytosis 2022   • Multiple pulmonary nodules 2022   • Acute metabolic encephalopathy    • History of intracranial hemorrhage 2022   • Hyponatremia 2022   • Hypokalemia 2022   • Hyperbilirubinemia 2022   • Type 2 diabetes mellitus with hyperglycemia, without long-term current use of insulin [facility]  ) 2022   • Vitamin D deficiency 2022   • Chronic atrial fibrillation ([facility] ) 2022   • Dermatitis 2022   • Acute on chronic respiratory failure with hypoxia and hypercapnia ([facility] ) 10/18/2021   • Ambulatory dysfunction 10/18/2021   • Medicare annual wellness visit, subsequent 2018   • Tobacco use 2018   • Hyperlipidemia 10/02/2014   • Essential hypertension 10/02/2014      LOS (days): 5  Geometric Mean LOS (GMLOS) (days): 5 00  Days to GMLOS:0 3     OBJECTIVE:  Risk of Unplanned Readmission Score: 26 67         Current admission status: Inpatient   Preferred Pharmacy:   [address] [address]  Phone: [phone] [facility] [address] [address]  Phone: [phone] Fax: [phone]    Primary Care Provider: Emiilano Ziegler DO    Primary Insurance: MEDICARE  Secondary Insurance: COMMERCIAL MISCELLANEOUS    DISCHARGE DETAILS:    Discharge planning discussed with[de-identified] Pt        CM contacted family/caregiver?: Yes (I talked with daughter )  Were Treatment Team discharge recommendations reviewed with patient/caregiver?: Yes  Did patient/caregiver verbalize understanding of patient care needs?: Yes  Were patient/caregiver advised of the risks associated with not following Treatment Team discharge recommendations?: Yes         [address]         Is the patient interested in [facility] at discharge?: Yes  [facility] [facility] requested[de-identified] Nursing, Occupational Therapy, Physical [address] Name[de-identified] Other ([address])  [address] [facility] Provider[de-identified] PCP  Home Health Services Needed[de-identified] Evaluate Functional Status and Safety, Strengthening/Theraputic Exercises to Improve Function  Homebound Criteria Met[de-identified] Uses an Assist Device (i e  cane, walker, etc)  Supporting Clincal Findings[de-identified] Limited Endurance, Bed Bound or Wheelchair Bound    DME Referral Provided  Referral made for DME?: No         Would you like to participate in our [address] service program?  : No - Declined    [facility]  Discharge Destination Plan[de-identified] Home with Rosy at Discharge : [facility] Ambulance        Transported by [facility] and Unit #): Arielle  ETA of Transport (Date): 03/08/23  ETA of Transport (Time): 6580           I notified [facility] home [facility] that patient is being discharged   I faxed discharge instructions to them   Pt was given all discharge meds through the meds to beds program  Discussed with patient preferences on discharge : Understanding how to manage health at home , purpose of taking meds, importance of follow up appointments and symptoms to watch out for  Nurse to review AVS with patient  All follow up providers listed   [facility] To transport the patient home

## 2023-03-09 NOTE — TELEPHONE ENCOUNTER
Those items are not listed online under [employer]  I am not sure where patients get those supplies thru although some get them thru mail order company  Homecare may be able to assist

## 2023-03-09 NOTE — TELEPHONE ENCOUNTER
Kentrell Reid from [facility] home care will be seeing patient for PT once a week x 4 weeks

## 2023-03-10 ENCOUNTER — TELEPHONE (OUTPATIENT)
Dept: FAMILY MEDICINE CLINIC | Facility: CLINIC | Age: 78
End: 2023-03-10

## 2023-03-10 ENCOUNTER — TELEMEDICINE (OUTPATIENT)
Dept: FAMILY MEDICINE CLINIC | Facility: CLINIC | Age: 78
End: 2023-03-10

## 2023-03-10 VITALS — WEIGHT: 121.7 LBS | BODY MASS INDEX: 20.28 KG/M2 | HEIGHT: 65 IN

## 2023-03-10 DIAGNOSIS — A41.9 SEPSIS DUE TO PNEUMONIA (HCC): ICD-10-CM

## 2023-03-10 DIAGNOSIS — R59.0 MEDIASTINAL LYMPHADENOPATHY: ICD-10-CM

## 2023-03-10 DIAGNOSIS — E11.65 TYPE 2 DIABETES MELLITUS WITH HYPERGLYCEMIA, WITHOUT LONG-TERM CURRENT USE OF INSULIN (HCC): ICD-10-CM

## 2023-03-10 DIAGNOSIS — B37.0 ORAL CANDIDIASIS: ICD-10-CM

## 2023-03-10 DIAGNOSIS — J96.21 ACUTE ON CHRONIC RESPIRATORY FAILURE WITH HYPOXIA AND HYPERCAPNIA (HCC): Primary | ICD-10-CM

## 2023-03-10 DIAGNOSIS — J96.22 ACUTE ON CHRONIC RESPIRATORY FAILURE WITH HYPOXIA AND HYPERCAPNIA (HCC): Primary | ICD-10-CM

## 2023-03-10 DIAGNOSIS — J18.9 SEPSIS DUE TO PNEUMONIA (HCC): ICD-10-CM

## 2023-03-10 PROBLEM — Z86.79 HISTORY OF INTRACRANIAL HEMORRHAGE: Status: RESOLVED | Noted: 2022-08-14 | Resolved: 2023-03-10

## 2023-03-10 PROBLEM — D72.829 LEUKOCYTOSIS: Status: RESOLVED | Noted: 2022-08-17 | Resolved: 2023-03-10

## 2023-03-10 PROBLEM — M79.89 SWELLING OF RIGHT HAND: Status: RESOLVED | Noted: 2022-09-23 | Resolved: 2023-03-10

## 2023-03-10 PROBLEM — R82.81 PYURIA: Status: RESOLVED | Noted: 2022-09-23 | Resolved: 2023-03-10
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
## 2023-03-10 LAB
DME PARACHUTE DELIVERY DATE REQUESTED: NORMAL
DME PARACHUTE ITEM DESCRIPTION: NORMAL
DME PARACHUTE ORDER STATUS: NORMAL
DME PARACHUTE SUPPLIER NAME: NORMAL
DME PARACHUTE SUPPLIER PHONE: NORMAL

## 2023-03-10 NOTE — PROGRESS NOTES
Virtual TCM Visit:    Verification of patient location:    Patient is located in the following state in which I hold an active license [place]    Assessment/Plan:        Problem List Items Addressed This Visit        Digestive    Oral candidiasis       Endocrine    Type 2 diabetes mellitus with hyperglycemia, without long-term current use of insulin (HCC)       Respiratory    Acute on chronic respiratory failure with hypoxia and hypercapnia ([facility] [address] ) - Primary    Sepsis due to pneumonia [facility])       Cardiovascular and Mediastinum    Mediastinal lymphadenopathy    Pt daughter states she has POA document I requested copy or asked her to drop at office or email to use so we can scan to chart  Pt has not done Living will but pt daughter states wishes from the hospital remain Goal is to keep pt at home as long as her daughter is able to care for her  Monitor BS  Coordinate Cardio and Pulmonary appts - pt daughter voices she is aware needs to schedule those  Homecare following pt   Hospital bed request send and pt would benefit to alleviate pain, make transfers easy, for safety as well as limit skin breakdown and make breathing comfortable   Rto 1month      Reason for visit is tcm  Tobacco Cessation Counseling: Tobacco cessation counseling was provided   The patient is sincerely urged to quit consumption of tobacco  She is ready to quit tobacco          Encounter provider Nicolas Yusuf DO     Provider located at [address] [address]    Recent Visits  Date Type Provider Dept   03/09/23 Telephone [facility]   03/09/23 Telephone [facility]   03/08/23 [facility]phone [facility]   Showing recent visits within past 7 days and meeting all other requirements  Today's Visits  Date Type Provider Dept   03/10/23 Telephone [facility]   03/10/23 [address] Melina Chatterjee, DO [facility]   Showing today's visits and meeting all other requirements  Future Appointments  No visits were found meeting these conditions  Showing future appointments within next 150 days and meeting all other requirements       After connecting through [employer], the patient was identified by name and date of birth  Jessica Pemberton was informed that this is a telemedicine visit and that the visit is being conducted through the [employer]  She agrees to proceed     My office door was closed  No one else was in the room  She acknowledged consent and understanding of privacy and security of the video platform  The patient has agreed to participate and understands they can discontinue the visit at any time  Patient is aware this is a billable service  Transitional Care Management Review:  Jessica Pemberton is a 77 y o  female here for TCM follow up  During the TCM phone call patient stated:    TCM Call     Date and time call was made  3/8/2023  2:11 PM    Hospital care reviewed  Records reviewed    Patient was hospitialized at  [address] [place] [address]    Date of Admission  03/03/23    Date of discharge  03/08/23    Diagnosis  acute on chronic respiratory failurew hypoxia, hypercapnia    Disposition  Home; Home health services    Were the patients medications reviewed and updated  No    Current Symptoms  None      TCM Call     Post hospital issues  Reduced activity    Scheduled for follow up? Yes    Patients specialists  Pul[facility]gist    Did you obtain your prescribed medications  Yes    Do you need help managing your prescriptions or medications  No    I have advised the patient to call PCP with any new or worsening symptoms  Omer Rojas  II    Counseling  Family        Subjective:     Patient ID: eJssica Pemberton is a 77 y o  female      HPI   Pt recently admitted for acute respiratory failure /sepsis due to pneumonia She had been in NH following prior admission and now is home where she and her daughter would prefer her to be She has homecare coming in and her daughter is primary caregiver Pt is more tired and settling mentally - her daughter said today she is clearer Pt in bed at times of visit She has oxygen via [place] [employer] No n/v/d Pt has PT coming in but has limited mobility Daughter aware needs to setup followup appts but pt currently not mobile enough She does have access to [facility]   Review of Systems   Constitutional: Positive for activity change and fatigue  Negative for chills and fever  Respiratory: Positive for shortness of breath  Negative for cough  Cardiovascular: Negative for chest pain, palpitations and leg swelling  Gastrointestinal: Negative for abdominal distention and abdominal pain  Genitourinary: Positive for frequency  Musculoskeletal: Positive for arthralgias and gait problem  Skin: Negative for rash  Neurological: Positive for weakness  Negative for dizziness, light-headedness and headaches  Psychiatric/Behavioral: Positive for confusion  Confused at times since home         Objective:    Vitals:    03/10/23 1103   Weight: 55 2 kg (121 lb 11 2 oz)   Height: 5' 5" (1 651 m)       Physical Exam  Constitutional:       General: She is not in acute distress  Appearance: She is ill-appearing  She is not toxic-appearing or diaphoretic  HENT:      Head: Normocephalic and atraumatic  Right Ear: External ear normal       Left Ear: External ear normal       Nose: Nose normal       Mouth/Throat:      Mouth: Mucous membranes are dry  Eyes:      General: No scleral icterus  Pulmonary:      Effort: Pulmonary effort is normal  No respiratory distress  Abdominal:      General: There is no distension  Skin:     General: Skin is dry  Coloration: Skin is pale  Skin is not jaundiced  Neurological:      General: No focal deficit present  Mental Status: She is alert and oriented to person, place, and time  Mental status is at baseline  Cranial Nerves: No cranial nerve deficit  Psychiatric:         Mood and Affect: Mood normal          Behavior: Behavior normal          Thought Content: Thought content normal          Judgment: Judgment normal          Medications have been reviewed by provider in current encounter    I spent 20 minutes with the patient during this visit  Wil Colunga, DO      VIRTUAL VISIT DISCLAIMER    Joi Cornelia verbally agrees to participate in [facility]  Pt is aware that [facility] could be limited without vital signs or the ability to perform a full hands-on physical exam  Tiffani A Ozzie Farrell understands she or the provider may request at any time to terminate the video visit and request the patient to seek care or treatment in person

## 2023-03-13 ENCOUNTER — APPOINTMENT (OUTPATIENT)
Dept: LAB | Facility: MEDICAL CENTER | Age: 78
End: 2023-03-13

## 2023-03-13 DIAGNOSIS — J44.1 COPD WITH ACUTE EXACERBATION (HCC): ICD-10-CM

## 2023-03-13 DIAGNOSIS — J96.21 ACUTE ON CHRONIC RESPIRATORY FAILURE WITH HYPOXIA (HCC): ICD-10-CM

## 2023-03-13 DIAGNOSIS — A41.9 SEPSIS (HCC): ICD-10-CM

## 2023-03-13 DIAGNOSIS — E83.52 HYPERCALCEMIA: ICD-10-CM

## 2023-03-13 DIAGNOSIS — U07.1 COVID-19 VIRUS INFECTION: ICD-10-CM

## 2023-03-13 DIAGNOSIS — J18.9 LLL PNEUMONIA: ICD-10-CM

## 2023-03-13 DIAGNOSIS — E83.42 HYPOMAGNESEMIA: ICD-10-CM

## 2023-03-13 DIAGNOSIS — N17.9 AKI (ACUTE KIDNEY INJURY) (HCC): ICD-10-CM

## 2023-03-13 DIAGNOSIS — R09.02 HYPOXIA: ICD-10-CM

## 2023-03-13 DIAGNOSIS — J18.9 PNEUMONIA: ICD-10-CM

## 2023-03-13 DIAGNOSIS — R60.0 LOCALIZED EDEMA: ICD-10-CM

## 2023-03-13 LAB
ALBUMIN SERPL BCP-MCNC: 3.8 G/DL (ref 3.5–5)
ALP SERPL-CCNC: 75 U/L (ref 46–116)
ALT SERPL W P-5'-P-CCNC: 12 U/L (ref 12–78)
ANION GAP SERPL CALCULATED.3IONS-SCNC: 4 MMOL/L (ref 4–13)
AST SERPL W P-5'-P-CCNC: 14 U/L (ref 5–45)
BASOPHILS # BLD AUTO: 0.03 THOUSANDS/ÂΜL (ref 0–0.1)
BASOPHILS NFR BLD AUTO: 0 % (ref 0–1)
BILIRUB SERPL-MCNC: 1.72 MG/DL (ref 0.2–1)
BUN SERPL-MCNC: 19 MG/DL (ref 5–25)
CA-I BLD-SCNC: 1.14 MMOL/L (ref 1.12–1.32)
CALCIUM SERPL-MCNC: 9.7 MG/DL (ref 8.3–10.1)
CHLORIDE SERPL-SCNC: 97 MMOL/L (ref 96–108)
CO2 SERPL-SCNC: 34 MMOL/L (ref 21–32)
CREAT SERPL-MCNC: 0.71 MG/DL (ref 0.6–1.3)
EOSINOPHIL # BLD AUTO: 0.1 THOUSAND/ÂΜL (ref 0–0.61)
EOSINOPHIL NFR BLD AUTO: 1 % (ref 0–6)
ERYTHROCYTE [DISTWIDTH] IN BLOOD BY AUTOMATED COUNT: 15 % (ref 11.6–15.1)
GFR SERPL CREATININE-BSD FRML MDRD: 81 ML/MIN/1.73SQ M
GLUCOSE SERPL-MCNC: 168 MG/DL (ref 65–140)
HCT VFR BLD AUTO: 52.2 % (ref 34.8–46.1)
HGB BLD-MCNC: 16.6 G/DL (ref 11.5–15.4)
IMM GRANULOCYTES # BLD AUTO: 0.06 THOUSAND/UL (ref 0–0.2)
IMM GRANULOCYTES NFR BLD AUTO: 1 % (ref 0–2)
LYMPHOCYTES # BLD AUTO: 1.58 THOUSANDS/ÂΜL (ref 0.6–4.47)
LYMPHOCYTES NFR BLD AUTO: 12 % (ref 14–44)
MAGNESIUM SERPL-MCNC: 2.1 MG/DL (ref 1.6–2.6)
MCH RBC QN AUTO: 28.8 PG (ref 26.8–34.3)
MCHC RBC AUTO-ENTMCNC: 31.8 G/DL (ref 31.4–37.4)
MCV RBC AUTO: 91 FL (ref 82–98)
MONOCYTES # BLD AUTO: 0.73 THOUSAND/ÂΜL (ref 0.17–1.22)
MONOCYTES NFR BLD AUTO: 6 % (ref 4–12)
NEUTROPHILS # BLD AUTO: 10.58 THOUSANDS/ÂΜL (ref 1.85–7.62)
NEUTS SEG NFR BLD AUTO: 80 % (ref 43–75)
NRBC BLD AUTO-RTO: 0 /100 WBCS
PHOSPHATE SERPL-MCNC: 2.6 MG/DL (ref 2.3–4.1)
PLATELET # BLD AUTO: 221 THOUSANDS/UL (ref 149–390)
PMV BLD AUTO: 12.3 FL (ref 8.9–12.7)
POTASSIUM SERPL-SCNC: 3.9 MMOL/L (ref 3.5–5.3)
PROT SERPL-MCNC: 6.6 G/DL (ref 6.4–8.4)
RBC # BLD AUTO: 5.76 MILLION/UL (ref 3.81–5.12)
SODIUM SERPL-SCNC: 135 MMOL/L (ref 135–147)
WBC # BLD AUTO: 13.08 THOUSAND/UL (ref 4.31–10.16)

## 2023-03-15 ENCOUNTER — TELEPHONE (OUTPATIENT)
Dept: FAMILY MEDICINE CLINIC | Facility: CLINIC | Age: 78
End: 2023-03-15

## 2023-03-15 DIAGNOSIS — I10 PRIMARY HYPERTENSION: ICD-10-CM

## 2023-03-15 RX ORDER — LISINOPRIL 10 MG/1
10 TABLET ORAL DAILY
Qty: 30 TABLET | Refills: 5 | Status: SHIPPED | OUTPATIENT
Start: 2023-03-15

## 2023-03-15 NOTE — TELEPHONE ENCOUNTER
She wanted to know about her meds, she was taking lisinopril/hctz  Chart has lisinopril 10 mg to replace the other  If that is the way she is to take it she will need a refill  Wanted to know if she should be taking the lasix 20 mg? If so she will need a refill on that also

## 2023-03-16 ENCOUNTER — TELEPHONE (OUTPATIENT)
Dept: FAMILY MEDICINE CLINIC | Facility: CLINIC | Age: 78
End: 2023-03-16

## 2023-03-16 DIAGNOSIS — E78.2 MIXED HYPERLIPIDEMIA: Primary | ICD-10-CM

## 2023-03-16 RX ORDER — SIMVASTATIN 40 MG
TABLET ORAL
Qty: 90 TABLET | Refills: 0 | Status: SHIPPED | OUTPATIENT
Start: 2023-03-16

## 2023-03-16 NOTE — TELEPHONE ENCOUNTER
Plain Lisinopril is correct Rx sent   Lasix not on hospital discharge so does not appear she should be on that - she does need to setup Cardiology followup

## 2023-03-16 NOTE — TELEPHONE ENCOUNTER
Yes I mentioned this to [facility] yesterday Unfortunately since not on multiple insulin injections not sure that type of meter will get covered at this time since there is certain criteria I did mention that at the visit with them as well

## 2023-03-23 ENCOUNTER — TELEPHONE (OUTPATIENT)
Dept: FAMILY MEDICINE CLINIC | Facility: CLINIC | Age: 78
End: 2023-03-23

## 2023-03-23 DIAGNOSIS — R30.0 DYSURIA: Primary | ICD-10-CM

## 2023-03-23 NOTE — TELEPHONE ENCOUNTER
Patient is C/O burning with urination  Asking if you can send over an order for a urine test to [facility]?  Please advise

## 2023-03-24 ENCOUNTER — TELEPHONE (OUTPATIENT)
Dept: FAMILY MEDICINE CLINIC | Facility: CLINIC | Age: 78
End: 2023-03-24

## 2023-03-27 ENCOUNTER — APPOINTMENT (OUTPATIENT)
Dept: LAB | Facility: MEDICAL CENTER | Age: 78
End: 2023-03-27

## 2023-03-27 DIAGNOSIS — R30.0 DYSURIA: Primary | ICD-10-CM

## 2023-03-27 LAB
BACTERIA UR QL AUTO: ABNORMAL /HPF
BILIRUB UR QL STRIP: NEGATIVE
BUDDING YEAST: PRESENT
CAOX CRY URNS QL MICRO: ABNORMAL /HPF
CLARITY UR: ABNORMAL
COLOR UR: YELLOW
GLUCOSE UR STRIP-MCNC: ABNORMAL MG/DL
HGB UR QL STRIP.AUTO: ABNORMAL
HYPHAE YEAST: PRESENT
KETONES UR STRIP-MCNC: NEGATIVE MG/DL
LEUKOCYTE ESTERASE UR QL STRIP: ABNORMAL
MUCOUS THREADS UR QL AUTO: ABNORMAL
NITRITE UR QL STRIP: NEGATIVE
NON-SQ EPI CELLS URNS QL MICRO: ABNORMAL /HPF
PH UR STRIP.AUTO: 6 [PH]
PROT UR STRIP-MCNC: ABNORMAL MG/DL
RBC #/AREA URNS AUTO: ABNORMAL /HPF
SP GR UR STRIP.AUTO: 1.02 (ref 1–1.03)
UROBILINOGEN UR STRIP-ACNC: 2 MG/DL
WBC #/AREA URNS AUTO: ABNORMAL /HPF
WBC CLUMPS # UR AUTO: PRESENT /UL

## 2023-03-29 ENCOUNTER — TELEPHONE (OUTPATIENT)
Dept: FAMILY MEDICINE CLINIC | Facility: CLINIC | Age: 78
End: 2023-03-29

## 2023-03-29 LAB — BACTERIA UR CULT: NORMAL

## 2023-03-29 NOTE — TELEPHONE ENCOUNTER
Lalo call requires specific documentation and actual visit So would need to setup an appt for evaluation  If she can get patient in for appt (last appts have been virtual) please look after Lidia as next week schedule for me is limited due to my time off

## 2023-03-30 ENCOUNTER — TELEPHONE (OUTPATIENT)
Dept: FAMILY MEDICINE CLINIC | Facility: CLINIC | Age: 78
End: 2023-03-30

## 2023-03-30 NOTE — TELEPHONE ENCOUNTER
Felecia from [facility] called stating pt can not come to appointments alone and she needs to be accompanied by daughter  Pt uses [facility], so the  is asking for you to write a letter stating pt requires a  to come along with her  We are then to fax letter to [facility]  The fax number for [facility] is [phone]

## 2023-04-04 DIAGNOSIS — N76.0 ACUTE VAGINITIS: Primary | ICD-10-CM

## 2023-04-04 RX ORDER — FLUCONAZOLE 150 MG/1
150 TABLET ORAL ONCE
Qty: 1 TABLET | Refills: 1 | Status: SHIPPED | OUTPATIENT
Start: 2023-04-04 | End: 2023-04-04

## 2023-04-07 ENCOUNTER — TELEPHONE (OUTPATIENT)
Dept: FAMILY MEDICINE CLINIC | Facility: CLINIC | Age: 78
End: 2023-04-07

## 2023-04-07 NOTE — TELEPHONE ENCOUNTER
DEXCOM G6 IS SENT THROUGH [employer]-[phone]  PER [facility] THEY WILL BE CALLING THE PATIENT TO ARRANGE DELIVERY  THEY WILL MAKE CONTACT WITHIN 24 HRS

## 2023-04-17 ENCOUNTER — APPOINTMENT (EMERGENCY)
Dept: CT IMAGING | Facility: HOSPITAL | Age: 78
End: 2023-04-17

## 2023-04-17 ENCOUNTER — HOSPITAL ENCOUNTER (EMERGENCY)
Facility: HOSPITAL | Age: 78
Discharge: HOME | End: 2023-04-17
Attending: EMERGENCY MEDICINE

## 2023-04-17 VITALS
OXYGEN SATURATION: 90 % | HEART RATE: 82 BPM | RESPIRATION RATE: 18 BRPM | TEMPERATURE: 98.4 F | DIASTOLIC BLOOD PRESSURE: 72 MMHG | SYSTOLIC BLOOD PRESSURE: 164 MMHG

## 2023-04-17 DIAGNOSIS — M54.50 ACUTE LOW BACK PAIN: ICD-10-CM

## 2023-04-17 DIAGNOSIS — S01.01XA LACERATION OF OCCIPITAL SCALP, INITIAL ENCOUNTER: ICD-10-CM

## 2023-04-17 DIAGNOSIS — S09.90XA MINOR TRAUMATIC INJURY OF HEAD WITH NORMAL MENTAL STATUS: Primary | ICD-10-CM

## 2023-04-17 LAB
ANION GAP SERPL CALCULATED.3IONS-SCNC: 6 MMOL/L (ref 4–13)
BUN SERPL-MCNC: 13 MG/DL (ref 5–25)
CALCIUM SERPL-MCNC: 9.1 MG/DL (ref 8.4–10.2)
CHLORIDE SERPL-SCNC: 101 MMOL/L (ref 96–108)
CO2 SERPL-SCNC: 36 MMOL/L (ref 21–32)
CREAT SERPL-MCNC: 0.75 MG/DL (ref 0.6–1.3)
ERYTHROCYTE [DISTWIDTH] IN BLOOD BY AUTOMATED COUNT: 16.5 % (ref 11.6–15.1)
GFR SERPL CREATININE-BSD FRML MDRD: 76 ML/MIN/1.73SQ M
GLUCOSE SERPL-MCNC: 156 MG/DL (ref 65–140)
HCT VFR BLD AUTO: 40.1 % (ref 34.8–46.1)
HGB BLD-MCNC: 12.9 G/DL (ref 11.5–15.4)
MCH RBC QN AUTO: 31 PG (ref 26.8–34.3)
MCHC RBC AUTO-ENTMCNC: 32.2 G/DL (ref 31.4–37.4)
MCV RBC AUTO: 96 FL (ref 82–98)
PLATELET # BLD AUTO: 222 THOUSANDS/UL (ref 149–390)
PMV BLD AUTO: 9.7 FL (ref 8.9–12.7)
POTASSIUM SERPL-SCNC: 3.8 MMOL/L (ref 3.5–5.3)
RBC # BLD AUTO: 4.16 MILLION/UL (ref 3.81–5.12)
SODIUM SERPL-SCNC: 143 MMOL/L (ref 135–147)
WBC # BLD AUTO: 9.47 THOUSAND/UL (ref 4.31–10.16)

## 2023-04-17 NOTE — DISCHARGE INSTRUCTIONS
You have been seen for evaluation after a fall  Please take tylenol as needed for discomfort  Please arrange for staple removal in 5-7 days  Return to the emergency department if you develop headaches, weakness, vomiting or any other symptoms of concern  Please follow up with your PCP by calling the number provided

## 2023-04-17 NOTE — ED PROVIDER NOTES
Emergency Department Trauma Note  Robert Alanis 77 y o  female MRN: [id number]  Unit/Bed#: [facility] Encounter: [id number]      Trauma Alert: Trauma Acuity: Trauma Evaluation  Model of Arrival: Mode of Arrival: BLS via Trauma Squad Name and Number: [id number]  Trauma Team: Current Providers  Attending Provider: Radha Suazo DO  Attending Provider: Milka Lemon MD  Registered Nurse: Graham Colon RN  Registered Nurse: Blake Holly RN  Consultants:     None      History of Present Illness     Chief Complaint:   Chief Complaint   Patient presents with   • Fall     Pt brought in by EMS, trauma eval called     HPI:   Mechanism:Details of Incident: Pt was in bathroom when she fell, striking her head, + thinners Injury Date: 04/17/23 Injury Time: [address]t 85 Injury Occurence Location - [address] [place] [address] Way: Reagan Blackwell is a 77y o  year old female with PMH of Afib on ASA, COPD on 3L NC, T2DM, intracranial hemorrhage presenting to the [facility] ED after a fall  Patient was standing up from the toilet when she lost her balance, causing her to fall  Patient uncertain when fall occurred  Patient did strike the back of her head  No reported LOC  Patient does take 81mg ASA daily though no other AC/AP medications on med list  EMS states patient was on the ground with large pool of blood on floor and laceration noted to back of head  Patient at this time states they have pain in the back of her head at site of laceration  No reported visual changes, neck pain or weakness/numbness/tingling extremities  Denies chest pain or dyspnea  No reported abdominal pain, nausea/vomiting  No arthralgias or back pain        History provided by:  Patient, EMS personnel and medical records   used: No    Fall  Associated symptoms: headaches    Associated symptoms: no abdominal pain, no back pain, no chest pain, no nausea, no neck pain and no vomiting      Review of Systems   Constitutional: Negative for chills and fever  HENT: Negative for facial swelling  Eyes: Negative for visual disturbance  Respiratory: Negative for shortness of breath  Cardiovascular: Negative for chest pain  Gastrointestinal: Negative for abdominal pain, nausea and vomiting  Genitourinary: Negative for flank pain  Musculoskeletal: Negative for arthralgias, back pain and neck pain  Skin: Positive for wound  Neurological: Positive for headaches  Negative for syncope, weakness and numbness  All other systems reviewed and are negative  Historical Information     Immunizations:   Immunization History   Administered Date(s) Administered   • Tdap 08/13/2022       Past Medical History:   Diagnosis Date   • History of intracranial hemorrhage 8/14/2022   • Hyperlipidemia     last assessed  8/24/17   • Hypertension     last assessed 10/2/14       Family History   Problem Relation Age of Onset   • Breast cancer Mother    • Ovarian cancer Mother    • Diabetes Father      Past Surgical History:   Procedure Laterality Date   • CRANIOTOMY       Social History     Tobacco Use   • Smoking status: Every Day     Packs/day: 1 00     Types: Cigarettes   • Smokeless tobacco: Never   Vaping Use   • Vaping Use: Never used   Substance Use Topics   • Alcohol use: Never   • Drug use: No     E-Cigarette/Vaping   • E-Cigarette Use Never User      E-Cigarette/Vaping Substances   • Nicotine No    • THC No    • CBD No    • Flavoring No    • Other No    • Unknown No        Family History: non-contributory    Meds/Allergies   Prior to Admission Medications   Prescriptions Last Dose Informant Patient Reported? Taking? Accu-Chek FastClix Lancets MISC   No No   Sig: Use 1 each daily to test blood sugars  Blood Glucose Monitoring Suppl (Accu-Chek Flores Plus) w/Device KIT   No No   Sig: Use 1 kit daily to test blood sugars     Continuous Blood Gluc  (Dexcom G6 ) LIZA   No No   Sig: Use 1 Device continuous   Continuous Blood Gluc Sensor (Dexcom G6 Sensor) MISC   No No   Sig: Use 1 Device 4 (four) times a day as directed  Continuous Blood Gluc Transmit (Dexcom G6 Transmitter) MISC   No No   Sig: Use 1 Device continuous   acetaminophen (TYLENOL) 325 mg tablet   No No   Sig: Take 1 tablet (325 mg total) by mouth every 6 (six) hours as needed for fever, mild pain, moderate pain, severe pain or headaches Do not exceed a total of 3 grams of tylenol/acetaminophen in a 24-hour period  albuterol (2 5 mg/3 mL) 0 083 % nebulizer solution   No No   Sig: Take 3 mL (2 5 mg total) by nebulization every 6 (six) hours as needed for shortness of breath   aspirin (ECOTRIN LOW STRENGTH) 81 mg EC tablet   No No   Sig: Take 1 tablet (81 mg total) by mouth daily Do not start before February 17, 2023  budesonide-formoterol (SYMBICORT) 80-4 5 MCG/ACT inhaler   No No   Sig: Inhale 2 puffs 2 (two) times a day Rinse mouth after use     glucose blood test strip   Yes No   Sig: Use 1 each 4 (four) times a day Use as instructed   lisinopril (ZESTRIL) 10 mg tablet   No No   Sig: Take 1 tablet (10 mg total) by mouth daily Take this in place of the combination medication with lisinopril and hydrochlorothiazide to treat high blood pressure   pravastatin (PRAVACHOL) 80 mg tablet   No No   Sig: Take 1 tablet (80 mg total) by mouth daily with dinner   sertraline (Zoloft) 50 mg tablet   No No   Sig: Take 1 tablet (50 mg total) by mouth daily   simvastatin (ZOCOR) 40 mg tablet   No No   Sig: TAKE 1 TABLET BY MOUTH DAILY   sitaGLIPtin-metFORMIN (Janumet)  MG per tablet   No No   Sig: Take 1 tablet by mouth daily with breakfast   umeclidinium bromide (INCRUSE ELLIPTA) 62 5 mcg/inh AEPB inhaler   No No   Sig: Inhale 1 puff daily      Facility-Administered Medications: None       No Known Allergies    PHYSICAL EXAM    PE limited by: N/A    Objective   Vitals:   First set: Temperature: 98 3 °F (36 8 °C) (04/17/23 0530)  Pulse: 79 (04/17/23 0530)  Respirations: 18 (04/17/23 0530)  Blood Pressure: (!) 204/88 (04/17/23 0530)  SpO2: 91 % (04/17/23 0530)    Primary Survey:   (A) Airway: Intact  (B) Breathing: b/l breath sounds  (C) Circulation: Pulses:   pedal  2/4 and femoral  2/4  (D) Disabliity:  GCS Total:  15  (E) Expose:  Completed    Secondary Survey:   Physical Exam  Vitals and nursing note reviewed  Constitutional:       General: She is not in acute distress  Appearance: Normal appearance  She is not ill-appearing, toxic-appearing or diaphoretic  Interventions: Cervical collar and nasal cannula in place  HENT:      Head: Normocephalic  Laceration (0 5cm vertical scalp laceration midline occiput) present  No Valdes's sign, abrasion, contusion, right periorbital erythema or left periorbital erythema  Right Ear: External ear normal       Left Ear: External ear normal       Nose:      Right Nostril: No epistaxis  Left Nostril: No epistaxis  Mouth/Throat:      Mouth: No lacerations  Eyes:      General:         Right eye: No discharge  Left eye: No discharge  Extraocular Movements: Extraocular movements intact  Pupils: Pupils are equal, round, and reactive to light  Cardiovascular:      Rate and Rhythm: Normal rate and regular rhythm  Pulmonary:      Effort: Pulmonary effort is normal  No respiratory distress  Breath sounds: Normal breath sounds  No wheezing, rhonchi or rales  Abdominal:      General: Abdomen is flat  There is no distension  Tenderness: There is no abdominal tenderness  There is no right CVA tenderness, left CVA tenderness, guarding or rebound  Musculoskeletal:      Right shoulder: No tenderness  Normal range of motion  Left shoulder: No tenderness  Normal range of motion  Right upper arm: No tenderness  Left upper arm: No tenderness  Right elbow: No swelling  No tenderness  Left elbow: No swelling  No tenderness  Right forearm: No swelling, tenderness or bony tenderness  Left forearm: No swelling, tenderness or bony tenderness  Right wrist: No swelling, deformity, tenderness, bony tenderness or snuff box tenderness  Left wrist: No swelling, deformity, tenderness, bony tenderness or snuff box tenderness  Right hand: No tenderness or bony tenderness  Normal strength  Normal sensation  Left hand: No tenderness or bony tenderness  Normal strength  Normal sensation  Cervical back: Normal range of motion  No tenderness or bony tenderness  Thoracic back: No bony tenderness  Lumbar back: Tenderness present  No bony tenderness  Right hip: No deformity or bony tenderness  Left hip: No deformity or bony tenderness  Right upper leg: No tenderness  Left upper leg: No tenderness  Right knee: No swelling  No tenderness  Left knee: No swelling  No tenderness  Right lower leg: No bony tenderness  No edema  Left lower leg: No bony tenderness  No edema  Right ankle: No tenderness  Left ankle: No tenderness  Right foot: No tenderness  Left foot: No tenderness  Neurological:      General: No focal deficit present  Mental Status: She is alert and oriented to person, place, and time  Mental status is at baseline  GCS: GCS eye subscore is 4  GCS verbal subscore is 5  GCS motor subscore is 6  Cranial Nerves: No cranial nerve deficit, dysarthria or facial asymmetry  Sensory: Sensation is intact  Motor: Motor function is intact  Comments: 5/5 strength b/l UE/LE  Sensation grossly intact throughout  Psychiatric:         Mood and Affect: Mood normal          Behavior: Behavior normal          Cervical spine cleared by clinical criteria?  No (imaging required)      Invasive Devices     Drain  Duration           External Urinary Catheter 44 days                Lab Results:   Results Reviewed     Procedure Component Value Units Date/Time    Basic metabolic panel [[id number]] (Abnormal) Collected: 04/17/23 0533    Lab Status: Final result Specimen: Blood from Arm, Right Updated: 04/17/23 0552     Sodium 143 mmol/L      Potassium 3 8 mmol/L      Chloride 101 mmol/L      CO2 36 mmol/L      ANION GAP 6 mmol/L      BUN 13 mg/dL      Creatinine 0 75 mg/dL      Glucose 156 mg/dL      Calcium 9 1 mg/dL      eGFR 76 ml/min/1 73sq m     Narrative:      [place] guidelines for Chronic Kidney Disease (CKD):   •  Stage 1 with normal or high GFR (GFR > 90 mL/min/1 73 square meters)  •  Stage 2 Mild CKD (GFR = 60-89 mL/min/1 73 square meters)  •  Stage 3A Moderate CKD (GFR = 45-59 mL/min/1 73 square meters)  •  Stage 3B Moderate CKD (GFR = 30-44 mL/min/1 73 square meters)  •  Stage 4 Severe CKD (GFR = 15-29 mL/min/1 73 square meters)  •  Stage 5 End Stage CKD (GFR <15 mL/min/1 73 square meters)  Note: GFR calculation is accurate only with a steady state creatinine    CBC and Platelet [[id number]]  (Abnormal) Collected: 04/17/23 0533    Lab Status: Final result Specimen: Blood from Arm, Right Updated: 04/17/23 0539     WBC 9 47 Thousand/uL      RBC 4 16 Million/uL      Hemoglobin 12 9 g/dL      Hematocrit 40 1 %      MCV 96 fL      MCH 31 0 pg      MCHC 32 2 g/dL      RDW 16 5 %      Platelets 303 Thousands/uL      MPV 9 7 fL                  Imaging Studies:   Direct to CT: Yes  CT head without contrast   Final Result by Neal Grace MD (04/17 8390)      No acute intracranial abnormality  Workstation performed: [id number]         CT spine cervical without contrast   Final Result by Neal Grace MD (04/17 7309)      No cervical spine fracture or traumatic malalignment  Multilevel degenerative change of the cervical spine  Workstation performed: [id number]         CT spine lumbar wo contrast   Final Result by Porfirio Glass MD (04/17 2606)      No acute lumbar spine fracture or subluxation        Mild to moderate multilevel "spondylotic degenerative changes of the lumbar spine  Workstation performed: [id number]               Procedures  Laceration repair    Date/Time: 4/17/2023 5:30 AM  Performed by: Pernell Joseph DO  Authorized by: Pernell Joseph DO   Risks and benefits: risks, benefits and alternatives were discussed  Consent given by: patient  Required items: required blood products, implants, devices, and special equipment available  Patient identity confirmed: verbally with patient  Time out: Immediately prior to procedure a \"time out\" was called to verify the correct patient, procedure, equipment, support staff and site/side marked as required  Body area: head/neck  Location details: scalp  Laceration length: 0 5 cm  Tendon involvement: none  Nerve involvement: none  Vascular damage: no    Wound Dehiscence:  Superficial Wound Dehiscence: simple closure      Procedure Details:  Preparation: Patient was prepped and draped in the usual sterile fashion  Skin closure: staples  Number of sutures: 3  Approximation: close  Approximation difficulty: simple  Dressing: 4x4 sterile gauze  Patient tolerance: patient tolerated the procedure well with no immediate complications               ED Course  ED Course as of 04/18/23 0134   Mon Apr 17, 2023   0547 Hemoglobin: 12 9   0715 Reviewed labs and CT results with patient  She reports mild discomfort in back of head and low back though denies any other complaints  Specifically denies chest pain or dyspnea  Will trial ambulation and dispo accordingly  Medical Decision Making    77 y o  female presenting for evaluation after a fall  GCS15 on arrival  Cervical collar applied on arrival   Will order labs and imaging to evaluate for acute intracranial hemorrhage or trauma  Will order CT of cervical and lumbar spine to evaluate for acute fracture or traumatic injury  Laceration repaired with staples as documented above    Tetanus immunization UTD per review of " records  Patient well appearing on repeat assessment  Ambulatory in ED  Reviewed lab and imaging results with patient  Disposition: I have discussed with the patient our plan to discharge them from the ED and the patient is in agreement with this plan  Discharge Plan: PRN tylenol for pain  Staple removal 5-7 days  RTED precautions emphasized  The patient was provided a written after visit summary with strict RTED precautions  Followup: I have discussed with the patient plan to follow up with their PCP  Contact information provided in AVS     Amount and/or Complexity of Data Reviewed  Labs: ordered  Decision-making details documented in ED Course  Radiology: ordered  Disposition  [facility] Transfer: No  Final diagnoses:   Minor traumatic injury of head with normal mental status   Laceration of occipital scalp, initial encounter   Acute low back pain     Time reflects when diagnosis was documented in both MDM as applicable and the Disposition within this note     Time User Action Codes Description Comment    4/17/2023  7:14 AM [place] Add [S09 90XA] Minor traumatic injury of head with normal mental status     4/17/2023  7:15 AM [place] Add [S01 01XA] Laceration of occipital scalp, initial encounter     4/18/2023  1:34 AM [place] Add [M54 50] Acute low back pain       ED Disposition     ED Disposition   Discharge    Condition   Stable    Date/Time   Mon Apr 17, 2023  7:22 AM    Comment   Muriel Brewer discharge to home/self care  Follow-up Information     Follow up With Specialties Details Why [address] [facility],  Internal Medicine, Hospice Services Schedule an appointment as soon as possible for a visit  To make appointment for reevaluation in 3-5 days   Tim 6802  [phone]          Discharge Medication List as of 4/17/2023  7:57 AM      CONTINUE these medications which have NOT CHANGED    Details Accu-Chek FastClix Lancets MISC Use 1 each daily to test blood sugars  , Starting Thu 2/16/2023, Normal      acetaminophen (TYLENOL) 325 mg tablet Take 1 tablet (325 mg total) by mouth every 6 (six) hours as needed for fever, mild pain, moderate pain, severe pain or headaches Do not exceed a total of 3 grams of tylenol/acetaminophen in a 24-hour period  , Starting Wed 3/8/2023, No Print      albuterol (2 5 mg/3 mL) 0 083 % nebulizer solution Take 3 mL (2 5 mg total) by nebulization every 6 (six) hours as needed for shortness of breath, Starting Tue 8/16/2022, Normal      aspirin (ECOTRIN LOW STRENGTH) 81 mg EC tablet Take 1 tablet (81 mg total) by mouth daily Do not start before February 17, 2023 , Starting Fri 2/17/2023, Until Sun 3/19/2023, Normal      Blood Glucose Monitoring Suppl (Accu-Chek Flores Plus) w/Device KIT Use 1 kit daily to test blood sugars  , Starting Fri 8/19/2022, Normal      budesonide-formoterol (SYMBICORT) 80-4 5 MCG/ACT inhaler Inhale 2 puffs 2 (two) times a day Rinse mouth after use , Starting Tue 8/16/2022, Normal      Continuous Blood Gluc  (Dexcom G6 ) LIZA Use 1 Device continuous, Starting Thu 3/9/2023, Normal      Continuous Blood Gluc Sensor (Dexcom G6 Sensor) MISC Use 1 Device 4 (four) times a day as directed , Starting Thu 3/9/2023, Normal      Continuous Blood Gluc Transmit (Dexcom G6 Transmitter) MISC Use 1 Device continuous, Starting Tue 2/14/2023, Until Mon 5/15/2023 at 2359, Normal      glucose blood test strip Use 1 each 4 (four) times a day Use as instructed, Historical Med      lisinopril (ZESTRIL) 10 mg tablet Take 1 tablet (10 mg total) by mouth daily Take this in place of the combination medication with lisinopril and hydrochlorothiazide to treat high blood pressure, Starting Wed 3/15/2023, Normal      pravastatin (PRAVACHOL) 80 mg tablet Take 1 tablet (80 mg total) by mouth daily with dinner, Starting Thu 2/16/2023, Until Sat 3/18/2023, Normal sertraline (Zoloft) 50 mg tablet Take 1 tablet (50 mg total) by mouth daily, Starting Fri 12/2/2022, Normal      simvastatin (ZOCOR) 40 mg tablet TAKE 1 TABLET BY MOUTH DAILY, Normal      sitaGLIPtin-metFORMIN (Janumet)  MG per tablet Take 1 tablet by mouth daily with breakfast, Starting Fri 9/23/2022, No Print      umeclidinium bromide (INCRUSE ELLIPTA) 62 5 mcg/inh AEPB inhaler Inhale 1 puff daily, Starting Thu 9/29/2022, Normal           No discharge procedures on file      PDMP Review     None          ED Provider  Electronically Signed by         Raiza Eagle DO  04/18/23 0134

## 2023-04-18 ENCOUNTER — HOSPITAL ENCOUNTER (INPATIENT)
Facility: HOSPITAL | Age: 78
LOS: 3 days | Discharge: HOME HEALTH | End: 2023-04-21
Attending: EMERGENCY MEDICINE | Admitting: INTERNAL MEDICINE

## 2023-04-18 ENCOUNTER — APPOINTMENT (EMERGENCY)
Dept: RADIOLOGY | Facility: HOSPITAL | Age: 78
End: 2023-04-18

## 2023-04-18 DIAGNOSIS — J18.9 RIGHT LOWER LOBE PNEUMONIA: ICD-10-CM

## 2023-04-18 DIAGNOSIS — E11.65 TYPE 2 DIABETES MELLITUS WITH HYPERGLYCEMIA, WITHOUT LONG-TERM CURRENT USE OF INSULIN (HCC): ICD-10-CM

## 2023-04-18 DIAGNOSIS — I48.21 PERMANENT ATRIAL FIBRILLATION (HCC): ICD-10-CM

## 2023-04-18 DIAGNOSIS — J44.1 COPD WITH ACUTE EXACERBATION (HCC): Primary | ICD-10-CM

## 2023-04-18 DIAGNOSIS — R26.2 AMBULATORY DYSFUNCTION: ICD-10-CM

## 2023-04-18 PROBLEM — I50.33 ACUTE ON CHRONIC DIASTOLIC CHF (CONGESTIVE HEART FAILURE) (HCC): Status: ACTIVE | Noted: 2023-04-18

## 2023-04-18 LAB
2HR DELTA HS TROPONIN: 11 NG/L
ALBUMIN SERPL BCP-MCNC: 3.2 G/DL (ref 3.5–5)
ALP SERPL-CCNC: 60 U/L (ref 34–104)
ALT SERPL W P-5'-P-CCNC: 6 U/L (ref 7–52)
ANION GAP SERPL CALCULATED.3IONS-SCNC: 8 MMOL/L (ref 4–13)
AST SERPL W P-5'-P-CCNC: 15 U/L (ref 13–39)
ATRIAL RATE: 340 BPM
BASE EX.OXY STD BLDV CALC-SCNC: 49.1 % (ref 60–80)
BASE EXCESS BLDV CALC-SCNC: 1.3 MMOL/L
BASOPHILS # BLD AUTO: 0.1 THOUSANDS/ΜL (ref 0–0.1)
BASOPHILS NFR BLD AUTO: 1 % (ref 0–1)
BILIRUB SERPL-MCNC: 1.3 MG/DL (ref 0.2–1)
BNP SERPL-MCNC: 660 PG/ML (ref 0–100)
BUN SERPL-MCNC: 12 MG/DL (ref 5–25)
CALCIUM ALBUM COR SERPL-MCNC: 9.2 MG/DL (ref 8.3–10.1)
CALCIUM SERPL-MCNC: 8.6 MG/DL (ref 8.4–10.2)
CARDIAC TROPONIN I PNL SERPL HS: 16 NG/L
CARDIAC TROPONIN I PNL SERPL HS: 27 NG/L
CHLORIDE SERPL-SCNC: 99 MMOL/L (ref 96–108)
CO2 SERPL-SCNC: 33 MMOL/L (ref 21–32)
CREAT SERPL-MCNC: 0.64 MG/DL (ref 0.6–1.3)
EOSINOPHIL # BLD AUTO: 0.26 THOUSAND/ΜL (ref 0–0.61)
EOSINOPHIL NFR BLD AUTO: 2 % (ref 0–6)
ERYTHROCYTE [DISTWIDTH] IN BLOOD BY AUTOMATED COUNT: 16.6 % (ref 11.6–15.1)
GFR SERPL CREATININE-BSD FRML MDRD: 85 ML/MIN/1.73SQ M
GLUCOSE SERPL-MCNC: 175 MG/DL (ref 65–140)
GLUCOSE SERPL-MCNC: 232 MG/DL (ref 65–140)
GLUCOSE SERPL-MCNC: 273 MG/DL (ref 65–140)
GLUCOSE SERPL-MCNC: 299 MG/DL (ref 65–140)
GLUCOSE SERPL-MCNC: 332 MG/DL (ref 65–140)
HCO3 BLDV-SCNC: 28.9 MMOL/L (ref 24–30)
HCT VFR BLD AUTO: 38.7 % (ref 34.8–46.1)
HGB BLD-MCNC: 12.2 G/DL (ref 11.5–15.4)
IMM GRANULOCYTES # BLD AUTO: 0.06 THOUSAND/UL (ref 0–0.2)
IMM GRANULOCYTES NFR BLD AUTO: 1 % (ref 0–2)
LACTATE SERPL-SCNC: 2 MMOL/L (ref 0.5–2)
LYMPHOCYTES # BLD AUTO: 1.68 THOUSANDS/ΜL (ref 0.6–4.47)
LYMPHOCYTES NFR BLD AUTO: 13 % (ref 14–44)
MCH RBC QN AUTO: 30.7 PG (ref 26.8–34.3)
MCHC RBC AUTO-ENTMCNC: 31.5 G/DL (ref 31.4–37.4)
MCV RBC AUTO: 98 FL (ref 82–98)
MONOCYTES # BLD AUTO: 0.71 THOUSAND/ΜL (ref 0.17–1.22)
MONOCYTES NFR BLD AUTO: 6 % (ref 4–12)
NEUTROPHILS # BLD AUTO: 9.94 THOUSANDS/ΜL (ref 1.85–7.62)
NEUTS SEG NFR BLD AUTO: 77 % (ref 43–75)
NRBC BLD AUTO-RTO: 0 /100 WBCS
O2 CT BLDV-SCNC: 8.8 ML/DL
PCO2 BLDV: 59.3 MM HG (ref 42–50)
PH BLDV: 7.3 [PH] (ref 7.3–7.4)
PLATELET # BLD AUTO: 195 THOUSANDS/UL (ref 149–390)
PMV BLD AUTO: 10.5 FL (ref 8.9–12.7)
PO2 BLDV: 29.7 MM HG (ref 35–45)
POTASSIUM SERPL-SCNC: 4 MMOL/L (ref 3.5–5.3)
PROCALCITONIN SERPL-MCNC: 0.09 NG/ML
PROT SERPL-MCNC: 5.6 G/DL (ref 6.4–8.4)
QRS AXIS: 247 DEGREES
QRSD INTERVAL: 94 MS
QT INTERVAL: 396 MS
QTC INTERVAL: 456 MS
RBC # BLD AUTO: 3.97 MILLION/UL (ref 3.81–5.12)
SODIUM SERPL-SCNC: 140 MMOL/L (ref 135–147)
T WAVE AXIS: 15 DEGREES
VENTRICULAR RATE: 80 BPM
WBC # BLD AUTO: 12.75 THOUSAND/UL (ref 4.31–10.16)

## 2023-04-18 RX ORDER — INSULIN LISPRO 100 [IU]/ML
1-6 INJECTION, SOLUTION INTRAVENOUS; SUBCUTANEOUS
Status: DISCONTINUED | OUTPATIENT
Start: 2023-04-18 | End: 2023-04-21 | Stop reason: HOSPADM

## 2023-04-18 RX ORDER — METRONIDAZOLE 500 MG/100ML
500 INJECTION, SOLUTION INTRAVENOUS ONCE
Status: COMPLETED | OUTPATIENT
Start: 2023-04-18 | End: 2023-04-18

## 2023-04-18 RX ORDER — CEFTRIAXONE 2 G/50ML
2000 INJECTION, SOLUTION INTRAVENOUS ONCE
Status: COMPLETED | OUTPATIENT
Start: 2023-04-18 | End: 2023-04-18

## 2023-04-18 RX ORDER — ASPIRIN 81 MG/1
81 TABLET ORAL DAILY
Status: DISCONTINUED | OUTPATIENT
Start: 2023-04-18 | End: 2023-04-21 | Stop reason: HOSPADM

## 2023-04-18 RX ORDER — FUROSEMIDE 10 MG/ML
20 INJECTION INTRAMUSCULAR; INTRAVENOUS
Status: DISCONTINUED | OUTPATIENT
Start: 2023-04-18 | End: 2023-04-18

## 2023-04-18 RX ORDER — AZITHROMYCIN 250 MG/1
500 TABLET, FILM COATED ORAL DAILY
Status: DISCONTINUED | OUTPATIENT
Start: 2023-04-18 | End: 2023-04-21 | Stop reason: HOSPADM

## 2023-04-18 RX ORDER — ENOXAPARIN SODIUM 100 MG/ML
40 INJECTION SUBCUTANEOUS DAILY
Status: DISCONTINUED | OUTPATIENT
Start: 2023-04-18 | End: 2023-04-21 | Stop reason: HOSPADM

## 2023-04-18 RX ORDER — LEVALBUTEROL INHALATION SOLUTION 1.25 MG/3ML
1.25 SOLUTION RESPIRATORY (INHALATION)
Status: DISPENSED | OUTPATIENT
Start: 2023-04-18 | End: 2023-04-20

## 2023-04-18 RX ORDER — PREDNISONE 20 MG/1
40 TABLET ORAL DAILY
Status: DISCONTINUED | OUTPATIENT
Start: 2023-04-18 | End: 2023-04-21 | Stop reason: HOSPADM

## 2023-04-18 RX ORDER — ONDANSETRON 2 MG/ML
4 INJECTION INTRAMUSCULAR; INTRAVENOUS EVERY 6 HOURS PRN
Status: DISCONTINUED | OUTPATIENT
Start: 2023-04-18 | End: 2023-04-21 | Stop reason: HOSPADM

## 2023-04-18 RX ORDER — METHYLPREDNISOLONE SOD SUCC 125 MG
1 VIAL (EA) INJECTION ONCE
Status: COMPLETED | OUTPATIENT
Start: 2023-04-18 | End: 2023-04-18

## 2023-04-18 RX ORDER — SODIUM CHLORIDE FOR INHALATION 0.9 %
3 VIAL, NEBULIZER (ML) INHALATION ONCE
Status: COMPLETED | OUTPATIENT
Start: 2023-04-18 | End: 2023-04-18

## 2023-04-18 RX ORDER — BUDESONIDE AND FORMOTEROL FUMARATE DIHYDRATE 80; 4.5 UG/1; UG/1
2 AEROSOL RESPIRATORY (INHALATION) 2 TIMES DAILY
Status: DISCONTINUED | OUTPATIENT
Start: 2023-04-18 | End: 2023-04-21 | Stop reason: HOSPADM

## 2023-04-18 RX ORDER — CALCIUM CARBONATE 200(500)MG
1000 TABLET,CHEWABLE ORAL 2 TIMES DAILY PRN
Status: DISCONTINUED | OUTPATIENT
Start: 2023-04-18 | End: 2023-04-21 | Stop reason: HOSPADM

## 2023-04-18 RX ORDER — NICOTINE 21 MG/24HR
1 PATCH, TRANSDERMAL 24 HOURS TRANSDERMAL DAILY
Status: DISCONTINUED | OUTPATIENT
Start: 2023-04-18 | End: 2023-04-21 | Stop reason: HOSPADM

## 2023-04-18 RX ORDER — POLYETHYLENE GLYCOL 3350 17 G/17G
17 POWDER, FOR SOLUTION ORAL DAILY PRN
Status: DISCONTINUED | OUTPATIENT
Start: 2023-04-18 | End: 2023-04-21 | Stop reason: HOSPADM

## 2023-04-18 RX ORDER — TRIAMCINOLONE ACETONIDE 0.25 MG/G
1 CREAM TOPICAL 2 TIMES DAILY
COMMUNITY

## 2023-04-18 RX ORDER — LISINOPRIL 10 MG/1
10 TABLET ORAL DAILY
Status: DISCONTINUED | OUTPATIENT
Start: 2023-04-18 | End: 2023-04-21 | Stop reason: HOSPADM

## 2023-04-18 RX ORDER — IPRATROPIUM BROMIDE AND ALBUTEROL SULFATE .5; 3 MG/3ML; MG/3ML
1 SOLUTION RESPIRATORY (INHALATION) ONCE
Status: COMPLETED | OUTPATIENT
Start: 2023-04-18 | End: 2023-04-18

## 2023-04-18 RX ORDER — PRAVASTATIN SODIUM 40 MG
80 TABLET ORAL
Status: DISCONTINUED | OUTPATIENT
Start: 2023-04-18 | End: 2023-04-21 | Stop reason: HOSPADM

## 2023-04-18 RX ORDER — CEFTRIAXONE 1 G/50ML
1000 INJECTION, SOLUTION INTRAVENOUS
Status: DISCONTINUED | OUTPATIENT
Start: 2023-04-19 | End: 2023-04-21 | Stop reason: HOSPADM

## 2023-04-18 RX ORDER — ACETAMINOPHEN 325 MG/1
650 TABLET ORAL EVERY 6 HOURS PRN
Status: DISCONTINUED | OUTPATIENT
Start: 2023-04-18 | End: 2023-04-21 | Stop reason: HOSPADM

## 2023-04-18 RX ADMIN — INSULIN LISPRO 4 UNITS: 100 INJECTION, SOLUTION INTRAVENOUS; SUBCUTANEOUS at 16:45

## 2023-04-18 RX ADMIN — METRONIDAZOLE 500 MG: 500 INJECTION, SOLUTION INTRAVENOUS at 05:15

## 2023-04-18 RX ADMIN — IPRATROPIUM BROMIDE 1 MG: 0.5 SOLUTION RESPIRATORY (INHALATION) at 03:47

## 2023-04-18 RX ADMIN — PRAVASTATIN SODIUM 80 MG: 40 TABLET ORAL at 16:45

## 2023-04-18 RX ADMIN — IPRATROPIUM BROMIDE 0.5 MG: 0.5 SOLUTION RESPIRATORY (INHALATION) at 13:48

## 2023-04-18 RX ADMIN — POLYETHYLENE GLYCOL 3350 17 G: 17 POWDER, FOR SOLUTION ORAL at 09:02

## 2023-04-18 RX ADMIN — IPRATROPIUM BROMIDE 0.5 MG: 0.5 SOLUTION RESPIRATORY (INHALATION) at 19:20

## 2023-04-18 RX ADMIN — INSULIN LISPRO 5 UNITS: 100 INJECTION, SOLUTION INTRAVENOUS; SUBCUTANEOUS at 12:09

## 2023-04-18 RX ADMIN — BUDESONIDE AND FORMOTEROL FUMARATE DIHYDRATE 2 PUFF: 80; 4.5 AEROSOL RESPIRATORY (INHALATION) at 17:11

## 2023-04-18 RX ADMIN — ALBUTEROL SULFATE 10 MG: 2.5 SOLUTION RESPIRATORY (INHALATION) at 03:46

## 2023-04-18 RX ADMIN — AZITHROMYCIN MONOHYDRATE 500 MG: 250 TABLET ORAL at 09:01

## 2023-04-18 RX ADMIN — NICOTINE 1 PATCH: 14 PATCH, EXTENDED RELEASE TRANSDERMAL at 09:03

## 2023-04-18 RX ADMIN — INSULIN LISPRO 4 UNITS: 100 INJECTION, SOLUTION INTRAVENOUS; SUBCUTANEOUS at 22:37

## 2023-04-18 RX ADMIN — INSULIN LISPRO 3 UNITS: 100 INJECTION, SOLUTION INTRAVENOUS; SUBCUTANEOUS at 09:02

## 2023-04-18 RX ADMIN — ASPIRIN 81 MG: 81 TABLET, COATED ORAL at 09:02

## 2023-04-18 RX ADMIN — ENOXAPARIN SODIUM 40 MG: 40 INJECTION SUBCUTANEOUS at 09:02

## 2023-04-18 RX ADMIN — PREDNISONE 40 MG: 20 TABLET ORAL at 09:02

## 2023-04-18 RX ADMIN — FUROSEMIDE 20 MG: 10 INJECTION, SOLUTION INTRAVENOUS at 09:02

## 2023-04-18 RX ADMIN — Medication 3 ML: at 03:47

## 2023-04-18 RX ADMIN — LEVALBUTEROL HYDROCHLORIDE 1.25 MG: 1.25 SOLUTION RESPIRATORY (INHALATION) at 19:20

## 2023-04-18 RX ADMIN — SERTRALINE HYDROCHLORIDE 50 MG: 50 TABLET ORAL at 09:02

## 2023-04-18 RX ADMIN — CEFTRIAXONE 2000 MG: 2 INJECTION, SOLUTION INTRAVENOUS at 04:48

## 2023-04-18 RX ADMIN — LEVALBUTEROL HYDROCHLORIDE 1.25 MG: 1.25 SOLUTION RESPIRATORY (INHALATION) at 13:48

## 2023-04-18 RX ADMIN — LISINOPRIL 10 MG: 10 TABLET ORAL at 09:02

## 2023-04-18 NOTE — CASE MANAGEMENT
Case Management Assessment & Discharge Planning Note    Patient name Muriel Brewer  Location [facility] 450/130-25 MRN [id number]  : 1945 Date 2023       Current Admission Date: 2023  Current Admission Diagnosis:Acute on chronic respiratory failure with hypoxia and hypercapnia [facility])   Patient Active Problem List    Diagnosis Date Noted   • Acute on chronic diastolic CHF (congestive heart failure) ([facility] ) 2023   • Stress incontinence 2023   • Mediastinal lymphadenopathy 2023   • Oral candidiasis 2023   • Sepsis due to pneumonia ([facility] ) 2023   • JOHN (acute kidney injury) ([facility] ) 2023   • Diarrhea 2023   • Hypercalcemia 2022   • Microscopic hematuria 2022   • Permanent atrial fibrillation ([facility] ) 2022   • Hypoxia 2022   • Hypomagnesemia 2022   • COPD with acute exacerbation ([facility] ) 2022   • Multiple pulmonary nodules 2022   • Acute metabolic encephalopathy    • Hyponatremia 2022   • Hypokalemia 2022   • Hyperbilirubinemia 2022   • Type 2 diabetes mellitus with hyperglycemia, without long-term current use of insulin ([facility]  ) 2022   • Vitamin D deficiency 2022   • Chronic atrial fibrillation ([facility]  ) 2022   • Dermatitis 2022   • Acute on chronic respiratory failure with hypoxia and hypercapnia ([facility] ) 10/18/2021   • Ambulatory dysfunction 10/18/2021   • Medicare annual wellness visit, subsequent 2018   • Tobacco use 2018   • Hyperlipidemia 10/02/2014   • Essential hypertension 10/02/2014      LOS (days): 0  Geometric Mean LOS (GMLOS) (days): 3 50  Days to GMLOS:3 3     OBJECTIVE:    Risk of Unplanned Readmission Score: 22 14         Current admission status: Inpatient       Preferred Pharmacy:   [address] [place] [address], [address] [place] [id number]  Phone: [phone] Fax: [phone] [address] [place], Tony Wharton [facility]  Phone: [phone] Fax: [phone]    Primary Care Provider: Rui Baldwin DO    Primary Insurance: MEDICARE  Secondary Insurance: COMMERCIAL MISCELLANEOUS    ASSESSMENT:  Active Health Care Proxies     [facility] Representative - Daughter   Primary Phone: [phone] (Home)               Advance Directives  Does patient have a [address]?: No  Was patient offered paperwork?: Yes  Does patient currently have a Health Care decision maker?: Yes, please see Health Care Proxy section  Does patient have Advance Directives?: No  Was patient offered paperwork?: Yes  Primary Contact: Wil Jerome          Readmission Root Cause  30 Day Readmission: No    Patient Information  Admitted from[de-identified] Home  Mental Status: Alert  During Assessment patient was accompanied by: Not accompanied during assessment  Assessment information provided by[de-identified] Patient, Daughter  Primary Caregiver: Self  Support Systems: Daughter  [place] of Residence: [place]  What city do you live in?: [address] [place] [address] Street entry access options   Select all that apply : Stairs  Number of steps to enter home : 2  Type of Current Residence: [address] [place] [address]  Upon entering residence, is there a bedroom on the main floor (no further steps)?: Yes  Upon entering residence, is there a bathroom on the main floor (no further steps)?: Yes  In the last 12 months, was there a time when you were not able to pay the mortgage or rent on time?: No  In the last 12 months, how many places have you lived?: 1  In the last 12 months, was there a time when you did not have a steady place to sleep or slept in a shelter (including now)?: No  Homeless/housing insecurity resource given?: N/A  Living Arrangements: Lives w/ Daughter  Is patient a ?: No    Activities of Daily Living Prior to Admission  Functional Status: Assistance  Completes ADLs independently?: No  Level of ADL dependence: Assistance  Ambulates independently?: No  Level of ambulatory dependence: Assistance  Does patient use assisted devices?: Yes  Assisted Devices (DME) used: Chris Neno, Wheelchair, Home Oxygen concentrator, Portable Oxygen tanks, [address]  DME Company Name (respiratory supplies):  [employer]  O2 Rate(s): 3l  Does patient currently own DME?: Yes  What DME does the patient currently own?: Home Oxygen concentrator, Walker, Wheelchair, Nebulizer, Portable Oxygen tanks, Straight Cane  Does patient have a history of Outpatient Therapy (PT/OT)?: No  Does the patient have a history of Short-Term Rehab?: Yes ([address] [facility])  Does patient have a history of HHC?: Yes  Does patient currently have [address]?: Yes ([facility])    Current Home Health Care  Type of Current Home Care Services: Home PT, Home OT, Home health aide, Nurse visit  [address][de-identified] Other (please enter name in comment) ([facility])  [facility] Provider[de-identified] PCP    Patient Information Continued  Income Source: Pension/[employer]  Does patient have prescription coverage?: Yes  Within the past 12 months, you worried that your food would run out before you got the money to buy more : Never true  Within the past 12 months, the food you bought just didn't last and you didn't have money to get more : Never true  Food insecurity resource given?: N/A  Does patient receive dialysis treatments?: No  Does patient have a history of substance abuse?: No  Does patient have a history of Mental Health Diagnosis?: No         Means of Transportation  Means of Transport to [facility] [place][de-identified] Other (Comment) (medical transport)  In the past 12 months, has lack of transportation kept you from medical appointments or from getting medications?: No  In the past 12 months, has lack of transportation kept you from meetings, work, or from getting things needed for daily living?: No  Was application for public transport provided?: N/A        DISCHARGE DETAILS:    Discharge planning discussed with[de-identified] patient and Martin Saleh (Daughter)   [phone] (H)  Freedom of Choice: Yes  Comments - Freedom of Choice: resumption of [facility] on dc  CM contacted family/caregiver?: Yes  Were Treatment Team discharge recommendations reviewed with patient/caregiver?: Yes  Did patient/caregiver verbalize understanding of patient care needs?: Yes  Were patient/caregiver advised of the risks associated with not following Treatment Team discharge recommendations?: Yes    Contacts  Patient Contacts: Martin Saleh (Daughter)  Relationship to Patient[de-identified] Family  Contact Method: Phone  Phone Number: [phone] (H)  Reason/Outcome: Discharge [address]         Is the patient interested in [address] at discharge?: Yes  Via Kimber Hernandez requested[de-identified] Occupational Therapy, Physical Therapy, Nursing, [address] Name[de-identified] Other ([facility])  [address] Provider[de-identified] PCP  Home Health Services Needed[de-identified] COPD Management, Evaluate Functional Status and Safety, Gait/ADL Training, Heart Failure Management, Strengthening/Theraputic Exercises to Improve Function, Oxygen Via Nasal Cannula  Oxygen LPM Ordered (if applicable based on home health services needed):: 3 LPM  Homebound Criteria Met[de-identified] Requires Medical Transportation, Requires the Assistance of Another Person for Safe Ambulation or to Leave the Home, Uses an Assist Device (i e  cane, walker, etc)  Supporting Clincal Findings[de-identified] Fatigues Easliy in [employer], Limited Endurance, Dyspnea with Exertion, Requires Oxygen    DME Referral Provided  Referral made for DME?: No    Discharge Destination Plan[de-identified] Home with [place] at Discharge : Other (Comment) (ambulance transport home)   Plans at this time are home on dc with family support; resumption of [address] services ([facility]) and continue virtual visits after dc  CM will follow and assist in dc planning

## 2023-04-18 NOTE — ASSESSMENT & PLAN NOTE
· Presented to the hospital with shortness of breath, wheezing, an requiring 6 L O2  · Typically on oxygen as needed at home but only ambulates short distances and is not very active in general     · Continue home Symbicort  · Add levalbuterol/ipratropium nebulizer 3 times daily  · Prednisone 40 mg daily  · Home O2 evaluation  · Would benefit from outpatient follow-up with pulmonology

## 2023-04-18 NOTE — PLAN OF CARE
4/18/2023 1147 by Estrellita Morillo OT  Note: Limitation: Decreased ADL status, Decreased UE strength, Decreased Safe judgement during ADL, Decreased cognition, Decreased endurance, Decreased self-care trans, Decreased high-level ADLs     Assessment: Pt is a 77 y o  female seen for OT evaluation s/p admit to [facility] on 4/18/2023 w/ Acute on chronic respiratory failure with hypoxia and hypercapnia ([address] )  Comorbidities affecting pt's functional performance at time of assessment include: DM, HTN, underlying neurological disorder, limited cognition, COPD, CHF, dementia and a-fib, hypoxia  Personal factors affecting pt at time of IE include:steps to enter environment, limited home support, behavioral pattern, difficulty performing ADLS, difficulty performing IADLS , limited insight into deficits, compliance, decreased initiation and engagement  and health management   Prior to admission, pt was (A) with ADLs and IADLs with use of RW during mobility  Upon evaluation: Pt requires min-max (A) with use of RW during mobility 2* the following deficits impacting occupational performance: weakness, decreased strength, decreased balance, decreased tolerance, impaired initiation, impaired memory, impaired sequencing, impaired problem solving, impulsivity, decreased safety awareness and impaired interpersonal skills  Pt to benefit from continued skilled OT tx while in the hospital to address deficits as defined above and maximize level of functional independence w ADL's and functional mobility  Occupational Performance areas to address include: grooming, bathing/shower, toilet hygiene, dressing, functional mobility, community mobility and clothing management  The patient's raw score on the AM-PAC Daily Activity Inpatient Short Form is 15  A raw score of less than 19 suggests the patient may benefit from discharge to post-acute rehabilitation services   Please refer to the recommendation of the Occupational Therapist for safe discharge planning  Pt benefited from co-evaluation of skilled OT and PT therapists in order to most appropriately address functional deficits d/t extensive assistance required for safe functional mobility, decreased activity tolerance, and regression from functioning level prior to admission and/or onset of present illness  OT/PT objectives were addressed separately; please see PT note for specific goal areas targeted       OT Discharge Recommendation: Post acute rehabilitation services

## 2023-04-18 NOTE — ED PROVIDER NOTES
History  Chief Complaint   Patient presents with   • Shortness of Breath     Started to get SOB yesterday  Minerva Shaikh is a 77y o  year old female with PMH of Afib on ASA, COPD on 3L NC, T2DM, intracranial hemorrhage [facility]  Patient seen in the emergency room yesterday for evaluation after a fall  Patient states she had no chest pain or dyspnea at time of evaluation in the emergency department  She states she returned home and was in normal state of health throughout the day yesterday  No reported chest pain, dyspnea cough or fevers  Patient denies leg pain/swelling  Patient states at some point this evening she developed shortness of breath  EMS found the patient to be tachypneic, tripoding and reported to have generalized wheezing  Patient has received a duoneb and solumedrol from EMS PTA  History provided by:  Patient, medical records and EMS personnel   used: No    Shortness of Breath  Associated symptoms: wheezing    Associated symptoms: no abdominal pain, no chest pain, no cough, no fever, no rash and no vomiting        Prior to Admission Medications   Prescriptions Last Dose Informant Patient Reported? Taking? Accu-Chek FastClix Lancets MISC   No No   Sig: Use 1 each daily to test blood sugars  Blood Glucose Monitoring Suppl (Accu-Chek Flores Plus) w/Device KIT   No No   Sig: Use 1 kit daily to test blood sugars  Continuous Blood Gluc  (Dexcom G6 ) LIZA   No No   Sig: Use 1 Device continuous   Continuous Blood Gluc Sensor (Dexcom G6 Sensor) MISC   No No   Sig: Use 1 Device 4 (four) times a day as directed     Continuous Blood Gluc Transmit (Dexcom G6 Transmitter) MISC   No No   Sig: Use 1 Device continuous   acetaminophen (TYLENOL) 325 mg tablet   No No   Sig: Take 1 tablet (325 mg total) by mouth every 6 (six) hours as needed for fever, mild pain, moderate pain, severe pain or headaches Do not exceed a total of 3 grams of tylenol/acetaminophen in a 24-hour period  albuterol (2 5 mg/3 mL) 0 083 % nebulizer solution   No No   Sig: Take 3 mL (2 5 mg total) by nebulization every 6 (six) hours as needed for shortness of breath   aspirin (ECOTRIN LOW STRENGTH) 81 mg EC tablet   No No   Sig: Take 1 tablet (81 mg total) by mouth daily Do not start before February 17, 2023  budesonide-formoterol (SYMBICORT) 80-4 5 MCG/ACT inhaler   No No   Sig: Inhale 2 puffs 2 (two) times a day Rinse mouth after use  glucose blood test strip   Yes No   Sig: Use 1 each 4 (four) times a day Use as instructed   lisinopril (ZESTRIL) 10 mg tablet   No No   Sig: Take 1 tablet (10 mg total) by mouth daily Take this in place of the combination medication with lisinopril and hydrochlorothiazide to treat high blood pressure   pravastatin (PRAVACHOL) 80 mg tablet   No No   Sig: Take 1 tablet (80 mg total) by mouth daily with dinner   sertraline (Zoloft) 50 mg tablet   No No   Sig: Take 1 tablet (50 mg total) by mouth daily   simvastatin (ZOCOR) 40 mg tablet   No No   Sig: TAKE 1 TABLET BY MOUTH DAILY   sitaGLIPtin-metFORMIN (Janumet)  MG per tablet   No No   Sig: Take 1 tablet by mouth daily with breakfast   umeclidinium bromide (INCRUSE ELLIPTA) 62 5 mcg/inh AEPB inhaler   No No   Sig: Inhale 1 puff daily      Facility-Administered Medications: None       Past Medical History:   Diagnosis Date   • History of intracranial hemorrhage 8/14/2022   • Hyperlipidemia     last assessed  8/24/17   • Hypertension     last assessed 10/2/14       Past Surgical History:   Procedure Laterality Date   • CRANIOTOMY         Family History   Problem Relation Age of Onset   • Breast cancer Mother    • Ovarian cancer Mother    • Diabetes Father      I have reviewed and agree with the history as documented      E-Cigarette/Vaping   • E-Cigarette Use Never User      E-Cigarette/Vaping Substances   • Nicotine No    • THC No    • CBD No    • Flavoring No    • Other No    • Unknown No      Social History     Tobacco Use   • Smoking status: Every Day     Packs/day: 1 00     Types: Cigarettes   • Smokeless tobacco: Never   Vaping Use   • Vaping Use: Never used   Substance Use Topics   • Alcohol use: Never   • Drug use: No       Review of Systems   Constitutional: Negative for chills and fever  HENT: Negative for congestion  Respiratory: Positive for shortness of breath and wheezing  Negative for cough  Cardiovascular: Negative for chest pain and leg swelling  Gastrointestinal: Negative for abdominal pain, diarrhea, nausea and vomiting  Genitourinary: Negative for dysuria and flank pain  Musculoskeletal: Positive for back pain (Chronic)  Skin: Negative for rash  Neurological: Negative for syncope, weakness and light-headedness  All other systems reviewed and are negative  Physical Exam  Physical Exam  Vitals and nursing note reviewed  Constitutional:       General: She is in acute distress  Appearance: She is well-developed  She is ill-appearing  She is not toxic-appearing or diaphoretic  Interventions: Nasal cannula in place  HENT:      Head: Normocephalic  Comments: Occipital scalp laceration, staples in place  Nose: No congestion or rhinorrhea  Eyes:      General:         Right eye: No discharge  Left eye: No discharge  Cardiovascular:      Rate and Rhythm: Normal rate  Rhythm irregular  Pulmonary:      Effort: Tachypnea present  No accessory muscle usage  Breath sounds: No stridor  Examination of the right-lower field reveals rales  Wheezing (Diffuse end expiratory) and rales present  No decreased breath sounds or rhonchi  Comments: Speaking in short sentences  Abdominal:      General: There is no distension  Palpations: Abdomen is soft  Tenderness: There is no abdominal tenderness  There is no right CVA tenderness, left CVA tenderness, guarding or rebound     Musculoskeletal:      Cervical back: Normal range of motion and neck supple  No rigidity  Right lower leg: No tenderness  No edema  Left lower leg: No tenderness  No edema  Skin:     Capillary Refill: Capillary refill takes less than 2 seconds  Findings: No rash  Neurological:      Mental Status: She is alert and oriented to person, place, and time  GCS: GCS eye subscore is 4  GCS verbal subscore is 5  GCS motor subscore is 6     Psychiatric:         Mood and Affect: Mood normal          Behavior: Behavior normal          Vital Signs  ED Triage Vitals   Temperature Pulse Respirations Blood Pressure SpO2   04/18/23 0354 04/18/23 0340 04/18/23 0340 04/18/23 0340 04/18/23 0340   98 °F (36 7 °C) 71 20 145/74 93 %      Temp Source Heart Rate Source Patient Position - Orthostatic VS BP Location FiO2 (%)   04/18/23 0354 04/18/23 0340 04/18/23 0340 04/18/23 0340 --   Temporal Monitor Lying Left arm       Pain Score       --                  Vitals:    04/18/23 0340   BP: 145/74   Pulse: 71   Patient Position - Orthostatic VS: Lying         Visual Acuity      ED Medications  Medications   metroNIDAZOLE (FLAGYL) IVPB (premix) 500 mg 100 mL (500 mg Intravenous New Bag 4/18/23 0515)   albuterol inhalation solution 10 mg (10 mg Nebulization Given 4/18/23 0346)     And   ipratropium (ATROVENT) 0 02 % inhalation solution 1 mg (1 mg Nebulization Given 4/18/23 0347)     And   sodium chloride 0 9 % inhalation solution 3 mL (3 mL Nebulization Given 4/18/23 0347)   ipratropium-albuterol (FOR EMS ONLY) (DUO-NEB) 0 5-2 5 mg/3 mL inhalation solution 3 mL (0 mL Does not apply Given to EMS 4/18/23 0346)   methylPREDNISolone sodium succinate (FOR EMS ONLY) (Solu-MEDROL) 125 MG injection 125 mg (0 mg Does not apply Given to EMS 4/18/23 0346)   cefTRIAXone (ROCEPHIN) IVPB (premix in dextrose) 2,000 mg 50 mL (0 mg Intravenous Stopped 4/18/23 0515)       Diagnostic Studies  Results Reviewed     Procedure Component Value Units Date/Time    Lactic acid, plasma (w/reflex if result > 2 0) [[id number]]  (Normal) Collected: 04/18/23 0444    Lab Status: Final result Specimen: Blood from Arm, Left Updated: 04/18/23 0517     LACTIC ACID 2 0 mmol/L     Narrative:      Result may be elevated if tourniquet was used during collection  Procalcitonin [[id number]]  (Normal) Collected: 04/18/23 0353    Lab Status: Final result Specimen: Blood Updated: 04/18/23 0514     Procalcitonin 0 09 ng/ml     Blood culture #2 [[id number]] Collected: 04/18/23 0444    Lab Status: In process Specimen: Blood from Arm, Left Updated: 04/18/23 0503    Blood culture #1 [[id number]] Collected: 04/18/23 0444    Lab Status:  In process Specimen: Blood from Arm, Right Updated: 04/18/23 0503    HS Troponin 0hr (reflex protocol) [[id number]]  (Normal) Collected: 04/18/23 0353    Lab Status: Final result Specimen: Blood from Arm, Left Updated: 04/18/23 0424     hs TnI 0hr 16 ng/L     HS Troponin I 2hr [[id number]]     Lab Status: No result Specimen: Blood     HS Troponin I 4hr [[id number]]     Lab Status: No result Specimen: Blood     B-Type Natriuretic Peptide(BNP) [[id number]]  (Abnormal) Collected: 04/18/23 0353    Lab Status: Final result Specimen: Blood from Arm, Left Updated: 04/18/23 0423      pg/mL     Comprehensive metabolic panel [[id number]]  (Abnormal) Collected: 04/18/23 0353    Lab Status: Final result Specimen: Blood from Arm, Left Updated: 04/18/23 0419     Sodium 140 mmol/L      Potassium 4 0 mmol/L      Chloride 99 mmol/L      CO2 33 mmol/L      ANION GAP 8 mmol/L      BUN 12 mg/dL      Creatinine 0 64 mg/dL      Glucose 175 mg/dL      Calcium 8 6 mg/dL      Corrected Calcium 9 2 mg/dL      AST 15 U/L      ALT 6 U/L      Alkaline Phosphatase 60 U/L      Total Protein 5 6 g/dL      Albumin 3 2 g/dL      Total Bilirubin 1 30 mg/dL      eGFR 85 ml/min/1 73sq m     Narrative:      [place] guidelines for Chronic Kidney Disease (CKD):   •  Stage 1 with normal or high GFR (GFR > 90 mL/min/1 73 square meters)  •  Stage 2 Mild CKD (GFR = 60-89 mL/min/1 73 square meters)  •  Stage 3A Moderate CKD (GFR = 45-59 mL/min/1 73 square meters)  •  Stage 3B Moderate CKD (GFR = 30-44 mL/min/1 73 square meters)  •  Stage 4 Severe CKD (GFR = 15-29 mL/min/1 73 square meters)  •  Stage 5 End Stage CKD (GFR <15 mL/min/1 73 square meters)  Note: GFR calculation is accurate only with a steady state creatinine    Blood gas, venous [[id number]]  (Abnormal) Collected: 04/18/23 0353    Lab Status: Final result Specimen: Blood from Arm, Left Updated: 04/18/23 0403     pH, [place] 7 305     pCO2, [place] 59 3 mm Hg      pO2, [place] 29 7 mm Hg      HCO3, [place] 28 9 mmol/L      Base Excess, Merritt 1 3 mmol/L      O2 Content, Merritt 8 8 ml/dL      O2 HGB, VENOUS 49 1 %     CBC and differential [[id number]]  (Abnormal) Collected: 04/18/23 0353    Lab Status: Final result Specimen: Blood from Arm, Left Updated: 04/18/23 0402     WBC 12 75 Thousand/uL      RBC 3 97 Million/uL      Hemoglobin 12 2 g/dL      Hematocrit 38 7 %      MCV 98 fL      MCH 30 7 pg      MCHC 31 5 g/dL      RDW 16 6 %      MPV 10 5 fL      Platelets 500 Thousands/uL      nRBC 0 /100 WBCs      Neutrophils Relative 77 %      Immat GRANS % 1 %      Lymphocytes Relative 13 %      Monocytes Relative 6 %      Eosinophils Relative 2 %      Basophils Relative 1 %      Neutrophils Absolute 9 94 Thousands/µL      Immature Grans Absolute 0 06 Thousand/uL      Lymphocytes Absolute 1 68 Thousands/µL      Monocytes Absolute 0 71 Thousand/µL      Eosinophils Absolute 0 26 Thousand/µL      Basophils Absolute 0 10 Thousands/µL                  XR chest portable   ED Interpretation by Medhat Bassett DO (04/18 [id number])   Patchy opacification right lower lobe, query RLL PNA                   Procedures  Procedures         ED Course  ED Course as of 04/18/23 0518 Tue Apr 18, 2023   0345 Procedure Note: EKG  Date/Time: 04/18/23 3:45 AM   Interpreted by: Medhat Bassett DO  Indications / Diagnosis: Dyspnea  ECG reviewed by me, the ED Provider: yes   The EKG demonstrates:  Rhythm: atrial fibrillation 80 BPM  Intervals: Normal QT intervals  Axis: Right axis deviation  QRS/Blocks: Normal QRS  ST Changes: No acute ST/T waves changes  No CLARK  No TWI    0435 WOB much improved and wheezing diminished on repeat auscultation  Reviewed labs and preliminary CXR results with patient  She is agreeable to admission  SBIRT 20yo+    Flowsheet Row Most Recent Value   Initial Alcohol Screen: US AUDIT-C     1  How often do you have a drink containing alcohol? 0 Filed at: 04/18/2023 0351   2  How many drinks containing alcohol do you have on a typical day you are drinking? 0 Filed at: 04/18/2023 0351   3a  Male UNDER 65: How often do you have five or more drinks on one occasion? 0 Filed at: 04/18/2023 0351   3b  FEMALE Any Age, or MALE 65+: How often do you have 4 or more drinks on one occassion? 0 Filed at: 04/18/2023 0351   Audit-C Score 0 Filed at: 04/18/2023 1754   NICOLASA: How many times in the past year have you    Used an illegal drug or used a prescription medication for non-medical reasons? Never Filed at: 04/18/2023 0351                    Medical Decision Making    77 y o  female presenting for shortness of breath  Tachypneic and diffuse and expiratory wheezing noted on exam   Arrives on 6 L nasal cannula, transitioned to baseline 3 L nasal cannula with O2 sat 90 - 92%  Received Solu-Medrol prior to arrival, will give additional DuoNeb and reassess  Will order CBC, CMP, troponin, VBG, BNP, EKG, CXR to evaluate for arrhythmia, ACS, PTX, pneumonia, CHF or COPD exacerbation  No tachycardia  No DVT symptoms  Patient recently underwent CTA which was negative for PE  I do not suspect pulmonary embolism as etiology of symptoms  Possible RLL PNA on CXR per my interpretation  Formal interpretation by radiology to follow  Symptoms also component of COPD exacerbation   WOB much improved and wheezing diminished on repeat auscultation  Patient appears euvolemic and 2D ECHO 8/15/22 demonstrating EF 60% and normal diastolic function  Lower suspicion for CHF exacerbation at this time  Impression: COPD exacerbation, possible RLL PNA  Will admit for further evaluation and management  Patient care discussed with SLIM who will assume care and admit patient to the hospital  I have discussed with the patient our recommendation of inpatient admission for further medical care  I have answered all of the patient's questions and concerns  The patient is in agreement with the plan to proceed with admission to the hospital      Amount and/or Complexity of Data Reviewed  Labs: ordered  Radiology: ordered and independent interpretation performed  Risk  Prescription drug management  Decision regarding hospitalization  Disposition  Final diagnoses:   COPD with acute exacerbation ([facility] [address] )   Right lower lobe pneumonia   Permanent atrial fibrillation ([facility] )     Time reflects when diagnosis was documented in both MDM as applicable and the Disposition within this note     Time User Action Codes Description Comment    4/18/2023  4:41 AM Thurlow Solo Add [J44 1] COPD with acute exacerbation ([facility] [address] )     4/18/2023  4:41 AM Thurlow Solo Add [J18 9] Right lower lobe pneumonia     4/18/2023  4:43 AM Thurlow Solo Add [I48 21] Permanent atrial fibrillation [facility])       ED Disposition     ED Disposition   Admit    Condition   Stable    Date/Time   Tue Apr 18, 2023  4:43 AM    Comment   Case was discussed with NEGRITA and the patient's admission status was agreed to be Admission Status: inpatient status to the service of Dr Daren Zelaya  Follow-up Information    None         Patient's Medications   Discharge Prescriptions    No medications on file       No discharge procedures on file      PDMP Review     None          ED Provider  Electronically Signed by           Kandy Hill DO  04/18/23 Rosalinda Bronson

## 2023-04-18 NOTE — H&P
History and Physical - [facility] Internal Medicine    Patient Information: Aida Ceballos 77 y o  female MRN: [id number]  Unit/Bed#: 406-01 Encounter: [id number]  Admitting Physician: Cleve Rosa MD  PCP: Samra Boucher DO  Date of Admission:  04/18/23    Assessment/Plan:    Hospital Problem List:     Principal Problem:    Acute on chronic respiratory failure with hypoxia and hypercapnia ([facility] )  Active Problems:    COPD with acute exacerbation ([place] [id number] )    Acute on chronic diastolic CHF (congestive heart failure) ([place] [address] )    Tobacco use    Essential hypertension    Hyperlipidemia    Type 2 diabetes mellitus with hyperglycemia, without long-term current use of insulin ([place] [address] )    Permanent atrial fibrillation ([place] [address] )    Ambulatory dysfunction      Plan for the Primary Problem(s):  · Admit to telemetry & continue supplemental O2  · Continue IV antibiotics, scheduled bronchodilator treatments & steroid  · Commence cautious diuresis with IV Lasix and monitor renal function/serum electrolytes    Plan for Additional Problems:   · I strongly advised the patient to quit cigarette smoking  · Resume her usual cardiac medications  · Sliding scale coverage with Humalog  · PT/OT eval  · I called this morning for an update patient's daughter Es Vallejo at [id number] but got no response    VTE Prophylaxis: Enoxaparin (Lovenox)  / sequential compression device   Code Status: DNR/DNI  POLST: POLST form is on file already (pre-hospital)    Anticipated Length of Stay:  Patient will be admitted on an Inpatient basis with an anticipated length of stay of  > 2 midnights  Justification for Hospital Stay: IV antibiotic    Total Time for Visit, including Counseling / Coordination of Care: 45 minutes  Greater than 50% of this total time spent on direct patient counseling and coordination of care      Chief Complaint:   Shortness of breath for the past 24 hours    History of Present Illness:    Aida Ceballos is a 77 y o  female whose past medical history is remarkable for hypertension, DM 2, hyperlipidemia, COPD, home O2 on 3 L/min by nasal cannula, chronic A-fib on aspirin, previous intracranial hemorrhage  She unfortunately continues to smoke cigarettes  She lives with her daughter and is admittedly a poor historian  She was seen at this facility's ED 24 hours ago after she fell at home and hit the back of her head where she sustained a laceration that was sutured at the ED  Patient returned tonight via EMS due to shortness of breath with audible wheezing and chest tightness  This was despite using her rescue inhalers at home  She required up to 6 L/min nasal cannula O2 upon the ED arrival   She received IV Solu-Medrol as well as rounds of nebulized bronchodilator treatments and IV Rocephin + Flagyl  Portable chest x-ray per my interpretation showed hyperinflated lung fields, pulm vascular congestion and possible right lower lobe infiltrate  Pertinent ED labs include venous pH 7 305, , negative troponin, creatinine 0 64, glucose 175, WBC 12 75, procalcitonin 0 09, lactic acid 2 0  Hospitalization for further management was consequently sought  Patient denies any headache, neck stiffness, acute confusion, nausea, vomiting, diarrhea, abdominal pain, bleeding, chest pain, palpitations, lightheadedness or near syncope  Review of Systems:    A 10+ point review of systems was obtained & is as above, otherwise negative  Review of Systems    Past Medical and Surgical History:     Past Medical History:   Diagnosis Date   • History of intracranial hemorrhage 8/14/2022   • Hyperlipidemia     last assessed  8/24/17   • Hypertension     last assessed 10/2/14       Past Surgical History:   Procedure Laterality Date   • CRANIOTOMY         Meds/Allergies:    Prior to Admission medications    Medication Sig Start Date End Date Taking? Authorizing Provider   Accu-Chek FastClix Lancets MISC Use 1 each daily to test blood sugars   2/16/23   Roxie DO Michell   acetaminophen (TYLENOL) 325 mg tablet Take 1 tablet (325 mg total) by mouth every 6 (six) hours as needed for fever, mild pain, moderate pain, severe pain or headaches Do not exceed a total of 3 grams of tylenol/acetaminophen in a 24-hour period  3/8/23   Elen Sandy DO   albuterol (2 5 mg/3 mL) 0 083 % nebulizer solution Take 3 mL (2 5 mg total) by nebulization every 6 (six) hours as needed for shortness of breath 8/16/22   Regina Fletcher MD   aspirin (ECOTRIN LOW STRENGTH) 81 mg EC tablet Take 1 tablet (81 mg total) by mouth daily Do not start before February 17, 2023  2/17/23 3/19/23  Roxie Galarza DO   Blood Glucose Monitoring Suppl (Accu-Chek Flores Plus) w/Device KIT Use 1 kit daily to test blood sugars  8/19/22   Jonah Bowman DO   budesonide-formoterol (SYMBICORT) 80-4 5 MCG/ACT inhaler Inhale 2 puffs 2 (two) times a day Rinse mouth after use  8/16/22   Regina Evans MD   Continuous Blood Gluc  (Dexcom G6 ) LIZA Use 1 Device continuous 3/9/23   Jonah Bowman DO   Continuous Blood Gluc Sensor (Dexcom G6 Sensor) MISC Use 1 Device 4 (four) times a day as directed   3/9/23   Jonah Bowman DO   Continuous Blood Gluc Transmit (Dexcom G6 Transmitter) MISC Use 1 Device continuous 2/14/23 5/15/23  Kori Kim DO   glucose blood test strip Use 1 each 4 (four) times a day Use as instructed    Historical Provider, MD   lisinopril (ZESTRIL) 10 mg tablet Take 1 tablet (10 mg total) by mouth daily Take this in place of the combination medication with lisinopril and hydrochlorothiazide to treat high blood pressure 3/15/23   Jonah Bowman DO   pravastatin (PRAVACHOL) 80 mg tablet Take 1 tablet (80 mg total) by mouth daily with dinner 2/16/23 3/18/23  Kori Kim DO   sertraline (Zoloft) 50 mg tablet Take 1 tablet (50 mg total) by mouth daily 12/2/22   Jonah Bowman DO   simvastatin (ZOCOR) 40 mg tablet TAKE 1 TABLET BY MOUTH DAILY 3/16/23 "Johnny Hernandez DO   sitaGLIPtin-metFORMIN (Janumet)  MG per tablet Take 1 tablet by mouth daily with breakfast 9/23/22   Shara Sandy DO   umeclidinium bromide (INCRUSE ELLIPTA) 62 5 mcg/inh AEPB inhaler Inhale 1 puff daily 9/29/22   Johnny Hernandez DO     I have reviewed home medications with patient personally  Allergies: No Known Allergies    Social History:     Marital Status:    Patient Pre-hospital Living Situation: home  Patient Pre-hospital Level of Mobility: dependent  Substance Use History:   Social History     Substance and Sexual Activity   Alcohol Use Never     Social History     Tobacco Use   Smoking Status Every Day   • Packs/day: 1 00   • Types: Cigarettes   Smokeless Tobacco Never     Social History     Substance and Sexual Activity   Drug Use No       Family History:    non-contributory    Physical Exam:   General: Elderly female, in no overt respiratory distress  HEENT: not pale or jaundiced  Neck: supple, no JVD  Resp: Rales at both bases, faint expiratory wheeze upper lung zones  Cardiovascular: S1 S2 audible, regular  GI: soft abdomen, nontender, bowel sounds present  Musculoskeletal: Pitting edema to both ankles, no cyanosis  Skin: no petechiae or suspicious rash  Psych: Oriented to person and place but not to time  Neuro: Awake, alert, verbally responsive and follows commands, moves all extremities equally, essentially nonfocal      Vitals:   Blood Pressure: 142/58 (04/18/23 0646)  Pulse: 97 (04/18/23 0646)  Temperature: 99 5 °F (37 5 °C) (04/18/23 0646)  Temp Source: Temporal (04/18/23 0646)  Respirations: 18 (04/18/23 0646)  Height: 5' 5\" (165 1 cm) (04/18/23 0646)  Weight - Scale: 58 kg (127 lb 13 9 oz) (04/18/23 0646)  SpO2: (!) 85 % (04/18/23 0631)      Additional Data:     Lab Results: I have personally reviewed pertinent reports        Results from last 7 days   Lab Units 04/18/23  0353   WBC Thousand/uL 12 75*   HEMOGLOBIN g/dL 12 2   HEMATOCRIT % 38 7 " PLATELETS Thousands/uL 195   NEUTROS PCT % 77*   LYMPHS PCT % 13*   MONOS PCT % 6   EOS PCT % 2     Results from last 7 days   Lab Units 04/18/23  0353   POTASSIUM mmol/L 4 0   CHLORIDE mmol/L 99   CO2 mmol/L 33*   BUN mg/dL 12   CREATININE mg/dL 0 64   CALCIUM mg/dL 8 6   ALK PHOS U/L 60   ALT U/L 6*   AST U/L 15         Invalid input(s): TROIP    Imaging: I have personally reviewed pertinent reports  CT head without contrast    Result Date: 4/17/2023  Narrative: CT BRAIN - WITHOUT CONTRAST INDICATION:   Head trauma, minor (Age >= 65y) fall, headstrike on ASA  COMPARISON:  Head CT February 13, 2023 TECHNIQUE:  CT examination of the brain was performed  Multiplanar 2D reformatted images were created from the source data  Radiation dose length product (DLP) for this visit:  970 46 mGy-cm   This examination, like all CT scans performed in the [facility], was performed utilizing techniques to minimize radiation dose exposure, including the use of iterative  reconstruction and automated exposure control  IMAGE QUALITY:  Diagnostic  FINDINGS: PARENCHYMA: Decreased attenuation is noted in periventricular and subcortical white matter demonstrating an appearance that is statistically most likely to represent mild microangiopathic change; this appearance is similar when compared to most recent prior examination  No CT signs of acute infarction  No intracranial mass, mass effect or midline shift  No acute parenchymal hemorrhage  VENTRICLES AND EXTRA-AXIAL SPACES:  Normal for the patient's age  VISUALIZED ORBITS: Normal visualized orbits  PARANASAL SINUSES: Normal visualized paranasal sinuses  CALVARIUM AND EXTRACRANIAL SOFT TISSUES:  Posterior lower midline scalp hematoma with staples in place  Post left frontal and parietotemporal craniotomies  No acute osseous abnormality  Impression: No acute intracranial abnormality   Workstation performed: [id number]     CT spine cervical without contrast    Result Date: 4/17/2023  Narrative: CT CERVICAL SPINE - WITHOUT CONTRAST INDICATION:   Neck trauma (Age >= 65y) fall, headstrike  COMPARISON:  CT cervical spine February 13, 2023 TECHNIQUE:  CT examination of the cervical spine was performed without intravenous contrast   Contiguous axial images were obtained  Multiplanar 2D reformatted images were created from the source data  Radiation dose length product (DLP) for this visit:  421 36 mGy-cm   This examination, like all CT scans performed in the [facility], was performed utilizing techniques to minimize radiation dose exposure, including the use of iterative  reconstruction and automated exposure control  IMAGE QUALITY:  Diagnostic  FINDINGS: ALIGNMENT:  Normal alignment of the cervical spine  No subluxation  VERTEBRAE:  No fracture  DEGENERATIVE CHANGES:  Multilevel degenerative changes with intervertebral disc space narrowing, endplate remodeling, uncovertebral hypertrophy and facet arthrosis  Degenerative changes are most pronounced at C4-C5 where there is moderate bilateral neural foraminal stenosis and mild central spinal canal stenosis, and at C5-C6 where there is moderate to severe right neural foraminal stenosis  PREVERTEBRAL AND PARASPINAL SOFT TISSUES: Unremarkable THORACIC INLET:  Normal      Impression: No cervical spine fracture or traumatic malalignment  Multilevel degenerative change of the cervical spine  Workstation performed: [id number]     CT spine lumbar wo contrast    Result Date: 4/17/2023  Narrative: CT LUMBAR SPINE WITHOUT CONTRAST INDICATION:   Low back pain, trauma fall, low back pain  COMPARISON: None  TECHNIQUE:  Contiguous axial images through the lumbar spine were obtained  Sagittal and coronal reconstructions were performed  IV Contrast: Radiation dose length product (DLP) for this visit:  690 27 mGy-cm     This examination, like all CT scans performed in the [facility], was performed utilizing techniques to minimize radiation dose exposure, including the use of iterative  reconstruction and automated exposure control  IMAGE QUALITY:  Diagnostic  FINDINGS: ALIGNMENT:  There are 5 lumbar type vertebral bodies  There is minimal grade 1 retrolisthesis of L2 on L3  No spondylolysis or spondylolisthesis  VERTEBRAE:  No fracture  No lytic or blastic lesion  DEGENERATIVE CHANGES: There is an incidental disc space narrowing with vacuum phenomenon degenerative changes at L1/L2, L2/L3 and L4/L5  At L4/L5, there is a disc osteophyte complex with mild lateral facet arthropathy causing moderate spinal canal stenosis and moderate bilateral neural foraminal narrowing  PARASPINAL SOFT TISSUES:  There are partially visualized small bilateral pleural effusions  Impression: No acute lumbar spine fracture or subluxation  Mild to moderate multilevel spondylotic degenerative changes of the lumbar spine  Workstation performed: [id number]         ** Please Note: Dragon 360 Dictation voice to text software may have been used in the creation of this document

## 2023-04-18 NOTE — PROGRESS NOTES
[address] [facility] [address] [place]  Progress Note  Name: Jessica Burton  MRN: [id number]  Unit/Bed#: 894-51 I Date of Admission: 4/18/2023   Date of Service: 4/18/2023 I Hospital Day: 0    Assessment/Plan   * Acute on chronic respiratory failure with hypoxia and hypercapnia (HCC)  Assessment & Plan  · Patient states she is chronically on oxygen due to COPD, but only on an as needed basis  · She was admitted to the hospital with COPD exacerbation and hypoxia  · Currently still requiring 5 L and only maintaining oxygen saturations around 90 to 91%    · Suspect that patient probably has more chronic oxygen needs than stated  · We will get home O2 evaluation and PT/OT evaluation  · Start levalbuterol/ipratropium nebulizers 3 times daily  · Continue home Symbicort  · Prednisone 40 mg daily x5 days  · Currently on IV ceftriaxone however initial procalcitonin negative, she has been afebrile    We will recheck procalcitonin tomorrow a m , if negative plan to discontinue antibiotic  · Would benefit from outpatient follow-up with pulmonology    COPD with acute exacerbation [facility])  Assessment & Plan  · Presented to the hospital with shortness of breath, wheezing, an requiring 6 L O2  · Typically on oxygen as needed at home but only ambulates short distances and is not very active in general     · Continue home Symbicort  · Add levalbuterol/ipratropium nebulizer 3 times daily  · Prednisone 40 mg daily  · Home O2 evaluation  · Would benefit from outpatient follow-up with pulmonology    Acute on chronic diastolic CHF (congestive heart failure) ([facility] )  Assessment & Plan  · NP mildly elevated on admission at 616  · Last echocardiogram 8/15/2022: Preserved EF with moderate MR  · Questionable vascular congestion on chest x-ray; will follow-up final read  · Started on IV Lasix on admission    · Patient euvolemic on my exam today  · Will monitor off Lasix        Permanent atrial fibrillation ([facility] )  Assessment & Plan  · Rate controlled off of any medications  · Not on anticoagulation    Type 2 diabetes mellitus with hyperglycemia, without long-term current use of insulin (HCC)  Assessment & Plan  · Last hemoglobin A1c 6 6%  · Hold home oral meds  · Monitor on ISS  · Hypoglycemia protocol    Tobacco use  Assessment & Plan  · Tobacco cessation strongly advised    Essential hypertension  Assessment & Plan  · Monitor on home lisinopril    Hyperlipidemia  Assessment & Plan  · Continue home statin           VTE Pharmacologic Prophylaxis: VTE Score: 6 High Risk (Score >/= 5) - Pharmacological DVT Prophylaxis Ordered: enoxaparin (Lovenox)  Sequential Compression Devices Ordered  Patient Centered Rounds: I performed bedside rounds with nursing staff today  Discussions with Specialists or Other Care Team Provider: TONIE     Education and Discussions with Family / Patient: Patient declined call to   Total Time Spent on Date of Encounter in care of patient: 35 minutes This time was spent on one or more of the following: performing physical exam; counseling and coordination of care; obtaining or reviewing history; documenting in the medical record; reviewing/ordering tests, medications or procedures; communicating with other healthcare professionals and discussing with patient's family/caregivers  Current Length of Stay: 0 day(s)  Current Patient Status: Inpatient   Certification Statement: The patient will continue to require additional inpatient hospital stay due to COPD exacerbation, hypoxia, steroids, nebulizers, discharge planning  Discharge Plan: Anticipate discharge in 48-72 hrs to rehab facility  Code Status: Level 3 - DNAR and DNI    Subjective:   Patient seen and examined  Following up for COPD exacerbation and hypoxia  Patient still requiring 5 L with O2 sats in the low 90s  No obvious dyspnea  Patient has not attempted ambulation yet  Has intermittent nonproductive cough    No other new complaints  Objective:     Vitals:   Temp (24hrs), Av °F (37 2 °C), Min:98 °F (36 7 °C), Max:99 5 °F (37 5 °C)    Temp:  [98 °F (36 7 °C)-99 5 °F (37 5 °C)] 99 1 °F (37 3 °C)  HR:  [71-97] 90  Resp:  [18-26] 19  BP: (138-145)/(58-76) 139/59  SpO2:  [85 %-93 %] 91 %  Body mass index is 21 28 kg/m²  Input and Output Summary (last 24 hours): Intake/Output Summary (Last 24 hours) at 2023 0857  Last data filed at 2023 0545  Gross per 24 hour   Intake 145 ml   Output --   Net 145 ml       PHYSICAL EXAM:    Vitals signs reviewed  Constitutional   Awake and cooperative  NAD  Head/Neck   Normocephalic  Atraumatic  Staples on the posterior scalp, incision intact  HEENT   No scleral icterus  EOMI  Heart   Regular rate and rhythm  No murmers  Lungs  prolonged expiration  Decreased air movement globally  Very faint end expiratory wheezing  Abdomen   Soft  Nontender  Nondistended  Skin   Skin color normal  No rashes  Extremities   No deformities  No peripheral edema  Neuro   Alert and oriented  No new deficits  Psych   Mood stable   Affect normal          Additional Data:     Labs:  Results from last 7 days   Lab Units 23  0353   WBC Thousand/uL 12 75*   HEMOGLOBIN g/dL 12 2   HEMATOCRIT % 38 7   PLATELETS Thousands/uL 195   NEUTROS PCT % 77*   LYMPHS PCT % 13*   MONOS PCT % 6   EOS PCT % 2     Results from last 7 days   Lab Units 23  0353   SODIUM mmol/L 140   POTASSIUM mmol/L 4 0   CHLORIDE mmol/L 99   CO2 mmol/L 33*   BUN mg/dL 12   CREATININE mg/dL 0 64   ANION GAP mmol/L 8   CALCIUM mg/dL 8 6   ALBUMIN g/dL 3 2*   TOTAL BILIRUBIN mg/dL 1 30*   ALK PHOS U/L 60   ALT U/L 6*   AST U/L 15   GLUCOSE RANDOM mg/dL 175*         Results from last 7 days   Lab Units 23  0743   POC GLUCOSE mg/dl 232*         Results from last 7 days   Lab Units 23  0444 23  0353   LACTIC ACID mmol/L 2 0  --    PROCALCITONIN ng/ml  --  0 09       Lines/Drains:  Invasive Devices Peripheral Intravenous Line  Duration           Peripheral IV 04/18/23 Left Antecubital <1 day    Peripheral IV 04/18/23 Right Antecubital <1 day          Drain  Duration           External Urinary Catheter 44 days              Imaging: Personally reviewed the following imaging: chest xray    Recent Cultures (last 7 days):         Last 24 Hours Medication List:   Current Facility-Administered Medications   Medication Dose Route Frequency Provider Last Rate   • acetaminophen  650 mg Oral Q6H PRN Karina Rojo MD     • aspirin  81 mg Oral Daily Karina Rojo MD     • azithromycin  500 mg Oral Daily Karina Rojo MD     • budesonide-formoterol  2 puff Inhalation BID Karina Rojo MD     • calcium carbonate  1,000 mg Oral BID PRN Karina Rojo MD     • [START ON 4/19/2023] cefTRIAXone  1,000 mg Intravenous Early Morning Karina Rjoo MD     • enoxaparin  40 mg Subcutaneous Daily Karina Rojo MD     • insulin lispro  1-6 Units Subcutaneous TID AC Karina Rojo MD     • insulin lispro  1-6 Units Subcutaneous HS Karina Rojo MD     • ipratropium  0 5 mg Nebulization TID Michael Calderon DO     • levalbuterol  1 25 mg Nebulization TID Michael Calderon DO     • lisinopril  10 mg Oral Daily Karina Rojo MD     • nicotine  1 patch Transdermal Daily Karina Rojo MD     • ondansetron  4 mg Intravenous Q6H PRN Karina Rojo MD     • polyethylene glycol  17 g Oral Daily PRN Karina Rojo MD     • pravastatin  80 mg Oral Daily With Dinner Karina Rojo MD     • predniSONE  40 mg Oral Daily Karina Rojo MD     • sertraline  50 mg Oral Daily Karina Rojo MD          Today, Patient Was Seen By: Michael Calderon DO    **Please Note: This note may have been constructed using a voice recognition system  **

## 2023-04-18 NOTE — OCCUPATIONAL THERAPY NOTE
"    Occupational Therapy Evaluation     Patient Name: Aida FREDERICK Date: 4/18/2023  Problem List  Principal Problem:    Acute on chronic respiratory failure with hypoxia and hypercapnia (HCC)  Active Problems:    Hyperlipidemia    Essential hypertension    Tobacco use    Ambulatory dysfunction    Type 2 diabetes mellitus with hyperglycemia, without long-term current use of insulin (HCC)    COPD with acute exacerbation (HCC)    Permanent atrial fibrillation (HCC)    Acute on chronic diastolic CHF (congestive heart failure) ([facility] [address] )    Past Medical History  Past Medical History:   Diagnosis Date    History of intracranial hemorrhage 8/14/2022    Hyperlipidemia     last assessed  8/24/17    Hypertension     last assessed 10/2/14     Past Surgical History  Past Surgical History:   Procedure Laterality Date    CRANIOTOMY          04/18/23 1039   OT Last Visit   OT Visit Date 04/18/23   Note Type   Note type Evaluation   Pain Assessment   Pain Score No Pain   Restrictions/Precautions   Weight Bearing Precautions Per Order No   Other Precautions Chair Alarm; Bed Alarm;Multiple lines; Fall Risk;O2   Home Living   Type of [address] [place] [address] One level; Able to live on main level with bedroom/bathroom; Performs ADLs on one level;Stairs to enter with rails; Other (Comment)  (3 CLARK c HR)   Bathroom Shower/Tub Tub/shower unit   Bathroom Toilet Standard   Bathroom Equipment Grab bars in shower; Shower chair   Bathroom Accessibility Accessible   Home Equipment Walker;Grab bars   Additional Comments pt reports use of RW during session described as \"ya know the machine with the handles\"; pt is poor historian and information obtained from previous admisisons and therapy notes   Prior Function   Level of [place] Needs assistance with functional mobility; Needs assistance with ADLs; Needs assistance with IADLS   Lives With [facility] Help From Family   IADLs Family/Friend/Other provides " "transportation; Family/Friend/Other provides meals; Family/Friend/Other provides medication management   Falls in the last 6 months (S)  5 to 10   Vocational Retired   Comments (S)  pt does not appear as though she is caring for herself; pt presents discheveled and unkempt; pt with recent ER visit form a fall and received x3 stapes to the back of her head   Subjective   Subjective \"this is so embarassing\"   ADL   Where Assessed Edge of bed   LB Bathing Assistance 3  Moderate Assistance   LB Bathing Deficit Right lower leg including foot; Left lower leg including foot; Buttocks; Perineal area   LB Dressing Assistance 4  Minimal Assistance   LB Dressing Deficit Don/doff R sock; Don/doff L sock; Thread RLE into underwear; Thread LLE into underwear; Thread RLE into pants; Thread LLE into pants;Pull up over hips   Toileting Assistance  3  Moderate Assistance   Toileting Deficit Clothing management up;Clothing management down;Perineal hygiene; Increased time to complete   Additional Comments pt incontinent of urine on floor upon standing and of bowel in pullup; pt assisted to doff pullup, don new pullup, complete lulu hygiene and don pants and socks; pt requires step by step instruction and problem solving assistance during ADL tasks   Bed Mobility   Supine to Sit 3  Moderate assistance   Additional items Assist x 1;Bedrails; Increased time required;Verbal cues;LE management   Sit to Supine   (seated in chair at end of session)   Additional Comments pt on 5L O2 throughout session; SpO2 >90%, however upon entering room, SpO2 75% and pt appears to have removed O2   Transfers   Sit to Stand 4  Minimal assistance   Additional items Assist x 1; Increased time required;Verbal cues  (RW)   Stand to Sit 4  Minimal assistance   Additional items Assist x 1; Increased time required;Verbal cues  (RW)   Additional Comments pt performs functional transfers with min (A) level and use of RW; pt with no significant LOB, however with cues for safety and " stability with RW   Functional Mobility   Functional Mobility 4  Minimal assistance   Additional Comments x1 with use of RW; pt performs functional mobility in room; pt with cues for safety and stability as well as increased time for problem solving and task initiation; performs ~30ft in room   Additional items Rolling walker   Balance   Static Sitting Fair +   Dynamic Sitting Fair +   Static Standing Fair -   Dynamic Standing Fair -   Ambulatory Fair -   Activity Tolerance   Activity Tolerance Patient limited by fatigue; Other (Comment)  (cognitive status)   RUE Assessment   RUE Assessment WFL   LUE Assessment   LUE Assessment WFL   Hand Function   Gross Motor Coordination Functional   Fine Motor Coordination Functional   Sensation   Light Touch No apparent deficits   Sharp/Dull No apparent deficits   Proprioception   Proprioception No apparent deficits   Psychosocial   Psychosocial (WDL) WDL   Cognition   Overall Cognitive Status Impaired   Arousal/Participation Alert; Cooperative   Attention Attends with cues to redirect   Orientation Level Oriented to person;Disoriented to place; Disoriented to time;Disoriented to situation   Memory Decreased long term memory;Decreased short term memory;Decreased recall of recent events;Decreased recall of biographical information   Following Commands Follows one step commands without difficulty   Assessment   Limitation Decreased ADL status; Decreased UE strength;Decreased Safe judgement during ADL;Decreased cognition;Decreased endurance;Decreased self-care trans;Decreased high-level ADLs   Assessment Pt is a 77 y o  female seen for OT evaluation s/p admit to [facility] on 4/18/2023 w/ Acute on chronic respiratory failure with hypoxia and hypercapnia ([facility] [address] )  Comorbidities affecting pt's functional performance at time of assessment include: DM, HTN, underlying neurological disorder, limited cognition, COPD, CHF, dementia and a-fib, hypoxia   Personal factors affecting pt at time of IE include:steps to enter environment, limited home support, behavioral pattern, difficulty performing ADLS, difficulty performing IADLS , limited insight into deficits, compliance, decreased initiation and engagement  and health management   Prior to admission, pt was (A) with ADLs and IADLs with use of RW during mobility  Upon evaluation: Pt requires min-max (A) with use of RW during mobility 2* the following deficits impacting occupational performance: weakness, decreased strength, decreased balance, decreased tolerance, impaired initiation, impaired memory, impaired sequencing, impaired problem solving, impulsivity, decreased safety awareness and impaired interpersonal skills  Pt to benefit from continued skilled OT tx while in the hospital to address deficits as defined above and maximize level of functional independence w ADL's and functional mobility  Occupational Performance areas to address include: grooming, bathing/shower, toilet hygiene, dressing, functional mobility, community mobility and clothing management  The patient's raw score on the AM-PAC Daily Activity Inpatient Short Form is 15  A raw score of less than 19 suggests the patient may benefit from discharge to post-acute rehabilitation services  Please refer to the recommendation of the Occupational Therapist for safe discharge planning  Pt benefited from co-evaluation of skilled OT and PT therapists in order to most appropriately address functional deficits d/t extensive assistance required for safe functional mobility, decreased activity tolerance, and regression from functioning level prior to admission and/or onset of present illness  OT/PT objectives were addressed separately; please see PT note for specific goal areas targeted     Goals   Patient Goals to go home   Short Term Goal  pt will perform UE strengthening exercises   Long Term Goal #1 pt will demonstrate toilet transfers and hygiene at min (A) level   Long Term Goal #2 pt will demonstrate functional mobility with RW at (S) level   Long Term Goal pt will demonstrate UB/LB bathing and grooming tasks at min (A) level   Plan   Treatment Interventions ADL retraining;Functional transfer training;UE strengthening/ROM; Endurance training;Patient/family training;Equipment evaluation/education;Cognitive reorientation; Activityengagement   Goal Expiration Date 05/02/23   OT Frequency 3-5x/wk   Recommendation   OT Discharge Recommendation Post acute rehabilitation services   AM-PAC Daily Activity Inpatient   Lower Body Dressing 2   Bathing 2   Toileting 2   Upper Body Dressing 3   Grooming 3   Eating 3   Daily Activity Raw Score 15   Daily Activity Standardized Score (Calc for Raw Score >=11) 34 69   AM-PAC Applied Cognition Inpatient   Following a Speech/Presentation 3   Understanding Ordinary Conversation 2   Taking Medications 1   Remembering Where Things Are Placed or Put Away 1   Remembering List of 4-5 Errands 1   Taking Care of Complicated Tasks 1   Applied Cognition Raw Score 9   Applied Cognition Standardized Score 22 48

## 2023-04-18 NOTE — PLAN OF CARE
Problem: PHYSICAL THERAPY ADULT  Goal: Performs mobility at highest level of function for planned discharge setting  See evaluation for individualized goals  Description: Treatment/Interventions: Functional transfer training, LE strengthening/ROM, Elevations, Therapeutic exercise, Endurance training, Bed mobility, Gait training          See flowsheet documentation for full assessment, interventions and recommendations  Note: Prognosis: Good  Problem List: Decreased strength, Decreased endurance, Impaired balance, Decreased mobility, Decreased cognition, Impaired judgement, Decreased safety awareness  Assessment: Patient is a 77 y o  female evaluated by Physical Therapy s/p admit to [address] [place] [address],[address] on 4/18/2023 with admitting diagnosis of: Shortness of breath, Permanent atrial fibrillation, Right lower lobe pneumonia, COPD with acute exacerbation, and principal problem of: Acute on chronic respiratory failure with hypoxia and hypercapnia  PT was consulted to assess patient's functional mobility and discharge needs  Ordered are PT Evaluation and treatment with activity level of: up and OOB as tolerated  Comorbidities affecting patient's physical performance at time of assessment include: HLD, HTN, DM, COPD, a-fib, CHF  Personal factors affecting the patient at time of IE include: ambulating with assistive device, step(s) to enter home, inability to navigate community distances, impaired cognition, history of fall(s), impaired safety awareness, limited insight into impairments, inability/difficulty performing IADLs and inability/difficulty performing ADLs  Please locate objective findings from PT assessment regarding body systems outlined above  Upon evaluation, pt able to perform all functional mobility with min-modA, RW, and increased time  Pt requiring frequent verbal cuing for safety awareness, sequencing, and direction   Pt incontinent of bowel during session and required maxA to assist with pericare and doffing of soiled clothes  Ambulation then limited to short functional distance within room d/t fatigue and safety concerns  SpO2 and HR remained WFL on 5L O2 throughout  Of note, when pt approached for session, SpO2 was 75% and pt was sleeping without nasal canula  DO made aware  The patient's AM-PAC Basic Mobility Inpatient Short Form Raw Score is 15  A Raw score of less than or equal to 16 suggests the patient may benefit from discharge to post-acute rehabilitation services  Please also refer to the recommendation of the Physical Therapist for safe discharge planning  Co treatment with OT secondary to complex medical condition of pt, possible A of 2 required to achieve and maintain transitional movements, requiring the need of skilled therapeutic intervention of 2 therapists to achieve delivery of services  Pt will benefit from continued PT intervention during LOS to address current deficits, increase LOF, and facilitate safe d/c to next level of care when medically appropriate  D/c recommendation at this time is post-acute rehabilitation services  PT Discharge Recommendation: Post acute rehabilitation services    See flowsheet documentation for full assessment

## 2023-04-18 NOTE — ASSESSMENT & PLAN NOTE
· Patient states she is chronically on oxygen due to COPD, but only on an as needed basis  · She was admitted to the hospital with COPD exacerbation and hypoxia  · Currently still requiring 5 L and only maintaining oxygen saturations around 90 to 91%    · Suspect that patient probably has more chronic oxygen needs than stated  · We will get home O2 evaluation and PT/OT evaluation  · Start levalbuterol/ipratropium nebulizers 3 times daily  · Continue home Symbicort  · Prednisone 40 mg daily x5 days  · Currently on IV ceftriaxone however initial procalcitonin negative, she has been afebrile    We will recheck procalcitonin tomorrow a m , if negative plan to discontinue antibiotic  · Would benefit from outpatient follow-up with pulmonology

## 2023-04-18 NOTE — PHYSICAL THERAPY NOTE
Physical Therapy Evaluation    Patient Name: Michael MCALLISTER Date: 4/18/2023     Problem List  Principal Problem:    Acute on chronic respiratory failure with hypoxia and hypercapnia (HCC)  Active Problems:    Hyperlipidemia    Essential hypertension    Tobacco use    Ambulatory dysfunction    Type 2 diabetes mellitus with hyperglycemia, without long-term current use of insulin (HCC)    COPD with acute exacerbation (HCC)    Permanent atrial fibrillation (HCC)    Acute on chronic diastolic CHF (congestive heart failure) ([facility] [address] )       Past Medical History  Past Medical History:   Diagnosis Date    History of intracranial hemorrhage 8/14/2022    Hyperlipidemia     last assessed  8/24/17    Hypertension     last assessed 10/2/14        Past Surgical History  Past Surgical History:   Procedure Laterality Date    CRANIOTOMY             04/18/23 1040   PT Last Visit   PT Visit Date 04/18/23   Note Type   Note type Evaluation   Pain Assessment   Pain Assessment Tool 0-10   Pain Score No Pain   Restrictions/Precautions   Weight Bearing Precautions Per Order No   Other Precautions Fall Risk;O2;Multiple lines; Chair Alarm; Bed Alarm;Cognitive   Home Living   Type of [address] One level;Stairs to enter with rails  (3 CLARK c HR)   Bathroom Shower/Tub Tub/shower unit   Bathroom Toilet Standard   Bathroom Equipment Grab bars in shower; Shower chair   Bathroom Accessibility Accessible   Home Equipment Walker;Grab bars   Prior Function   Level of [place] Independent with functional mobility; Needs assistance with ADLs; Needs assistance with IADLS   Lives With [facility] Help From Family   IADLs Family/Friend/Other provides transportation   Falls in the last 6 months 5 to 10   Vocational Retired   [employer]   Additional Pertinent History Pt with recent ED admission yesterday s/p fall and required 3 staples to the back of her head   Pt lives at home with "her daughter but appears very disheveled/unkept   Family/Caregiver Present No   Cognition   Overall Cognitive Status Impaired   Arousal/Participation Cooperative   Orientation Level Oriented to person;Disoriented to time;Disoriented to place; Disoriented to situation   Following Commands Follows one step commands without difficulty   Subjective   Subjective \"This is so embarassing\"   RLE Assessment   RLE Assessment X  (3-/5 grossly)   LLE Assessment   LLE Assessment X  (3-/5 grossly)   Bed Mobility   Supine to Sit 3  Moderate assistance   Additional items Assist x 1; Increased time required;Verbal cues;HOB elevated; Bedrails   Sit to Supine   (pt OOB at end of session)   Transfers   Sit to Stand 4  Minimal assistance   Additional items Assist x 1; Increased time required;Verbal cues   Stand to Sit 4  Minimal assistance   Additional items Assist x 1; Increased time required;Verbal cues   Additional Comments RW used   Ambulation/Elevation   Gait pattern Excessively slow; Short stride; Foward flexed;Decreased foot clearance;Narrow SHANA; Improper Weight shift   Gait Assistance 4  Minimal assist   Additional items Assist x 1;Verbal cues   Assistive Device Rolling walker   Distance 20'   Balance   Static Sitting Fair +   Dynamic Sitting Fair +   Static Standing Fair -   Dynamic Standing [address] -  (with RW)   Endurance Deficit   Endurance Deficit Yes   Endurance Deficit Description pt easily fatigued with ambulation   Activity Tolerance   Activity Tolerance Patient limited by fatigue   Assessment   Prognosis Good   Problem List Decreased strength;Decreased endurance; Impaired balance;Decreased mobility; Decreased cognition; Impaired judgement;Decreased safety awareness   Assessment Patient is a 77 y o  female evaluated by Physical Therapy [place] [facility] [place] with admitting diagnosis of: Shortness of breath, Permanent atrial fibrillation, Right lower lobe pneumonia, COPD with acute " exacerbation, and principal problem of: Acute on chronic respiratory failure with hypoxia and hypercapnia  PT was consulted to assess patient's functional mobility and discharge needs  Ordered are PT Evaluation and treatment with activity level of: up and OOB as tolerated  Comorbidities affecting patient's physical performance at time of assessment include: HLD, HTN, DM, COPD, a-fib, CHF  Personal factors affecting the patient at time of IE include: ambulating with assistive device, step(s) to enter home, inability to navigate community distances, impaired cognition, history of fall(s), impaired safety awareness, limited insight into impairments, inability/difficulty performing IADLs and inability/difficulty performing ADLs  Please locate objective findings from PT assessment regarding body systems outlined above  Upon evaluation, pt able to perform all functional mobility with min-modA, RW, and increased time  Pt requiring frequent verbal cuing for safety awareness, sequencing, and direction  Pt incontinent of bowel during session and required maxA to assist with pericare and doffing of soiled clothes  Ambulation then limited to short functional distance within room d/t fatigue and safety concerns  SpO2 and HR remained WFL on 5L O2 throughout  Of note, when pt approached for session, SpO2 was 75% and pt was sleeping without nasal canula  DO made aware  The patient's AM-PAC Basic Mobility Inpatient Short Form Raw Score is 15  A Raw score of less than or equal to 16 suggests the patient may benefit from discharge to post-acute rehabilitation services  Please also refer to the recommendation of the Physical Therapist for safe discharge planning  Co treatment with OT secondary to complex medical condition of pt, possible A of 2 required to achieve and maintain transitional movements, requiring the need of skilled therapeutic intervention of 2 therapists to achieve delivery of services   Pt will benefit from continued PT intervention during LOS to address current deficits, increase LOF, and facilitate safe d/c to next level of care when medically appropriate  D/c recommendation at this time is post-acute rehabilitation services  Goals   Patient Goals to go home   LTG Expiration Date 05/02/23   Long Term Goal #1 Pt will participate in B LE strengthening exercises to facilitate improved functional activity tolerance  Pt will perform all functional transfers and bed mobility mod(I) with good safety awareness  Pt will ambulate 250' mod(I) with LRAD while maintaining good functional dynamic balance  Pt will ascend/descend 3 stairs with HR and SUP to reflect the ability to safely navigate the home  Plan   Treatment/Interventions Functional transfer training;LE strengthening/ROM; Elevations; Therapeutic exercise; Endurance training;Bed mobility;Gait training   PT Frequency 3-5x/wk   Recommendation   PT Discharge Recommendation Post acute rehabilitation services   AM-PAC Basic Mobility Inpatient   Turning in Flat Bed Without Bedrails 2   Lying on Back to Sitting on Edge of Flat Bed Without Bedrails 2   Moving Bed to Chair 3   Standing Up From Chair Using Arms 3   Walk in Room 3   Climb 3-5 Stairs With Railing 2   Basic Mobility Inpatient Raw Score 15   Basic Mobility Standardized Score 36 97   Highest Level Of Mobility   JH-HLM Goal 4: Move to chair/commode   JH-HLM Achieved 6: Walk 10 steps or more   End of Consult   Patient Position at End of Consult Bedside chair;Bed/Chair alarm activated; All needs within reach

## 2023-04-18 NOTE — ASSESSMENT & PLAN NOTE
· NP mildly elevated on admission at 616  · Last echocardiogram 8/15/2022: Preserved EF with moderate MR  · Questionable vascular congestion on chest x-ray; will follow-up final read  · Started on IV Lasix on admission    · Patient euvolemic on my exam today  · Will monitor off Lasix

## 2023-04-18 NOTE — RESPIRATORY THERAPY NOTE
RT Protocol Note  Polina Monsivais 77 y o  female MRN: [id number]  Unit/Bed#: 406-01 Encounter: [id number]    Assessment    Principal Problem:    Acute on chronic respiratory failure with hypoxia and hypercapnia (HCC)  Active Problems:    Hyperlipidemia    Essential hypertension    Tobacco use    Ambulatory dysfunction    Type 2 diabetes mellitus with hyperglycemia, without long-term current use of insulin (HCC)    COPD with acute exacerbation (HCC)    Permanent atrial fibrillation (HCC)    Acute on chronic diastolic CHF (congestive heart failure) ([facility] )      Home Pulmonary Medications:    Home Devices/Therapy: Home O2, Other (Comment) (Patient does have bronchodilator therapy at home as needed)    Past Medical History:   Diagnosis Date   • History of intracranial hemorrhage 8/14/2022   • Hyperlipidemia     last assessed  8/24/17   • Hypertension     last assessed 10/2/14     Social History     Socioeconomic History   • Marital status:      Spouse name: None   • Number of children: None   • Years of education: None   • Highest education level: None   Occupational History   • None   Tobacco Use   • Smoking status: Every Day     Packs/day: 1 00     Types: Cigarettes   • Smokeless tobacco: Never   Vaping Use   • Vaping Use: Never used   Substance and Sexual Activity   • Alcohol use: Never   • Drug use: No   • Sexual activity: Not Currently   Other Topics Concern   • None   Social History Narrative    Always uses seat belt    Always uses sunscreen    Dental care occasionally    No living will     Social Determinants of Health     Financial Resource Strain: Not on file   Food Insecurity: No Food Insecurity   • Worried About Running Out of Food in the Last Year: Never true   • Ran Out of Food in the Last Year: Never true   Transportation Needs: No Transportation Needs   • Lack of Transportation (Medical): No   • Lack of Transportation (Non-Medical):  No   Physical Activity: Not on file   Stress: Not on file   Social Connections: Not on file   Intimate Partner Violence: Not on file   Housing Stability: Low Risk    • Unable to Pay for Housing in the Last Year: No   • Number of Places Lived in the Last Year: 2   • Unstable Housing in the Last Year: No       Subjective     Patient presented to the ED with shortness of breath  EMS stated to me that her respiratory rate was in the high 30's, and that her SPO2 was very low  Patient stated that she felt good relief from breathing treatments en route to the hospital   She was also given an hour long neb in the ER which she also said provided some relief  She was initially requiring 6 LPM NC, and she has since been weaned to 3 LPM, and her work of breathing has seemed to improve  Her lungs sounds have faint wheezing and diminished  Will go forward with TID Xopenex/Atrovent for wheezing and shortness of breath  Objective    Physical Exam:   Assessment Type: Assess only  General Appearance: Alert, Awake  Respiratory Pattern: Normal  Chest Assessment: Chest expansion symmetrical  Bilateral Breath Sounds: Diminished, Expiratory wheezes  Cough: Non-productive  O2 Device: Patient assessed per RT protocol    Vitals:  Blood pressure 138/76, pulse 86, temperature 98 °F (36 7 °C), temperature source Temporal, resp  rate (!) 26, weight 54 9 kg (121 lb), SpO2 93 %  Imaging and other studies: I have personally reviewed pertinent reports  O2 Device: Patient assessed per RT protocol     Plan    Respiratory Plan: Mild Distress pathway      TID Xopenex/Atrovent for wheezing and shortness of breath

## 2023-04-18 NOTE — PLAN OF CARE
Problem: PAIN - ADULT  Goal: Verbalizes/displays adequate comfort level or baseline comfort level  Description: Interventions:  - Encourage patient to monitor pain and request assistance  - Assess pain using appropriate pain scale  - Administer analgesics based on type and severity of pain and evaluate response  - Implement non-pharmacological measures as appropriate and evaluate response  - Consider cultural and social influences on pain and pain management  - Notify physician/advanced practitioner if interventions unsuccessful or patient reports new pain  Outcome: Progressing     Problem: INFECTION - ADULT  Goal: Absence or prevention of progression during hospitalization  Description: INTERVENTIONS:  - Assess and monitor for signs and symptoms of infection  - Monitor lab/diagnostic results  - Monitor all insertion sites, i e  indwelling lines, tubes, and drains  - Monitor endotracheal if appropriate and nasal secretions for changes in amount and color  - [place] appropriate cooling/warming therapies per order  - Administer medications as ordered  - Instruct and encourage patient and family to use good hand hygiene technique  - Identify and instruct in appropriate isolation precautions for identified infection/condition  Outcome: Progressing  Goal: Absence of fever/infection during neutropenic period  Description: INTERVENTIONS:  - Monitor WBC    Outcome: Progressing     Problem: SAFETY ADULT  Goal: Patient will remain free of falls  Description: INTERVENTIONS:  - Educate patient/family on patient safety including physical limitations  - Instruct patient to call for assistance with activity   - Consult OT/PT to assist with strengthening/mobility   - Keep Call bell within reach  - Keep bed low and locked with side rails adjusted as appropriate  - Keep care items and personal belongings within reach  - Initiate and maintain comfort rounds  - Make Fall Risk Sign visible to staff  - Offer Toileting every 2 Hours, in advance of need  - Initiate/Maintain bed alarm  - Obtain necessary fall risk management equipment: nonskid socks  - Apply yellow socks and bracelet for high fall risk patients  - Consider moving patient to room near nurses station  Outcome: Progressing  Goal: Maintain or return to baseline ADL function  Description: INTERVENTIONS:  -  Assess patient's ability to carry out ADLs; assess patient's baseline for ADL function and identify physical deficits which impact ability to perform ADLs (bathing, care of mouth/teeth, toileting, grooming, dressing, etc )  - Assess/evaluate cause of self-care deficits   - Assess range of motion  - Assess patient's mobility; develop plan if impaired  - Assess patient's need for assistive devices and provide as appropriate  - Encourage maximum independence but intervene and supervise when necessary  - Involve family in performance of ADLs  - Assess for home care needs following discharge   - Consider OT consult to assist with ADL evaluation and planning for discharge  - Provide patient education as appropriate  Outcome: Progressing  Goal: Maintains/Returns to pre admission functional level  Description: INTERVENTIONS:  - Perform BMAT or MOVE assessment daily    - Set and communicate daily mobility goal to care team and patient/family/caregiver  - Collaborate with rehabilitation services on mobility goals if consulted  - Perform Range of Motion 2 times a day  - Reposition patient every 2 hours    - Dangle patient 2 times a day  - Stand patient 2 times a day  - Ambulate patient 2 times a day  - Out of bed to chair 2 times a day   - Out of bed for meals 2 times a day  - Out of bed for toileting  - Record patient progress and toleration of activity level   Outcome: Progressing     Problem: DISCHARGE PLANNING  Goal: Discharge to home or other facility with appropriate resources  Description: INTERVENTIONS:  - Identify barriers to discharge w/patient and caregiver  - Arrange for needed discharge resources and transportation as appropriate  - Identify discharge learning needs (meds, wound care, etc )  - Arrange for interpretive services to assist at discharge as needed  - Refer to Case Management Department for coordinating discharge planning if the patient needs post-hospital services based on physician/advanced practitioner order or complex needs related to functional status, cognitive ability, or social support system  Outcome: Progressing     Problem: Knowledge Deficit  Goal: Patient/family/caregiver demonstrates understanding of disease process, treatment plan, medications, and discharge instructions  Description: Complete learning assessment and assess knowledge base    Interventions:  - Provide teaching at level of understanding  - Provide teaching via preferred learning methods  Outcome: Progressing     Problem: RESPIRATORY - ADULT  Goal: Achieves optimal ventilation and oxygenation  Description: INTERVENTIONS:  - Assess for changes in respiratory status  - Assess for changes in mentation and behavior  - Position to facilitate oxygenation and minimize respiratory effort  - Oxygen administered by appropriate delivery if ordered  - Initiate smoking cessation education as indicated  - Encourage broncho-pulmonary hygiene including cough, deep breathe, Incentive Spirometry  - Assess the need for suctioning and aspirate as needed  - Assess and instruct to report SOB or any respiratory difficulty  - Respiratory Therapy support as indicated  Outcome: Progressing     Problem: MUSCULOSKELETAL - ADULT  Goal: Maintain or return mobility to safest level of function  Description: INTERVENTIONS:  - Assess patient's ability to carry out ADLs; assess patient's baseline for ADL function and identify physical deficits which impact ability to perform ADLs (bathing, care of mouth/teeth, toileting, grooming, dressing, etc )  - Assess/evaluate cause of self-care deficits   - Assess range of motion  - Assess patient's mobility  - Assess patient's need for assistive devices and provide as appropriate  - Encourage maximum independence but intervene and supervise when necessary  - Involve family in performance of ADLs  - Assess for home care needs following discharge   - Consider OT consult to assist with ADL evaluation and planning for discharge  - Provide patient education as appropriate  Outcome: Progressing  Goal: Maintain proper alignment of affected body part  Description: INTERVENTIONS:  - Support, maintain and protect limb and body alignment  - Provide patient/ family with appropriate education  Outcome: Progressing     Problem: MOBILITY - ADULT  Goal: Maintain or return to baseline ADL function  Description: INTERVENTIONS:  -  Assess patient's ability to carry out ADLs; assess patient's baseline for ADL function and identify physical deficits which impact ability to perform ADLs (bathing, care of mouth/teeth, toileting, grooming, dressing, etc )  - Assess/evaluate cause of self-care deficits   - Assess range of motion  - Assess patient's mobility; develop plan if impaired  - Assess patient's need for assistive devices and provide as appropriate  - Encourage maximum independence but intervene and supervise when necessary  - Involve family in performance of ADLs  - Assess for home care needs following discharge   - Consider OT consult to assist with ADL evaluation and planning for discharge  - Provide patient education as appropriate  Outcome: Progressing  Goal: Maintains/Returns to pre admission functional level  Description: INTERVENTIONS:  - Perform BMAT or MOVE assessment daily    - Set and communicate daily mobility goal to care team and patient/family/caregiver  - Collaborate with rehabilitation services on mobility goals if consulted  - Perform Range of Motion 2 times a day  - Reposition patient every 2 hours    - Dangle patient 2 times a day  - Stand patient 2 times a day  - Ambulate patient 2 times a day  - Out of bed to chair 2 times a day   - Out of bed for meals 2 times a day  - Out of bed for toileting  - Record patient progress and toleration of activity level   Outcome: Progressing

## 2023-04-19 LAB
GLUCOSE SERPL-MCNC: 130 MG/DL (ref 65–140)
GLUCOSE SERPL-MCNC: 215 MG/DL (ref 65–140)
GLUCOSE SERPL-MCNC: 267 MG/DL (ref 65–140)
GLUCOSE SERPL-MCNC: 271 MG/DL (ref 65–140)

## 2023-04-19 RX ADMIN — AZITHROMYCIN MONOHYDRATE 500 MG: 250 TABLET ORAL at 09:19

## 2023-04-19 RX ADMIN — PRAVASTATIN SODIUM 80 MG: 40 TABLET ORAL at 18:51

## 2023-04-19 RX ADMIN — ENOXAPARIN SODIUM 40 MG: 40 INJECTION SUBCUTANEOUS at 09:18

## 2023-04-19 RX ADMIN — BUDESONIDE AND FORMOTEROL FUMARATE DIHYDRATE 2 PUFF: 80; 4.5 AEROSOL RESPIRATORY (INHALATION) at 18:52

## 2023-04-19 RX ADMIN — BUDESONIDE AND FORMOTEROL FUMARATE DIHYDRATE 2 PUFF: 80; 4.5 AEROSOL RESPIRATORY (INHALATION) at 09:17

## 2023-04-19 RX ADMIN — IPRATROPIUM BROMIDE 0.5 MG: 0.5 SOLUTION RESPIRATORY (INHALATION) at 14:35

## 2023-04-19 RX ADMIN — CALCIUM CARBONATE 1000 MG: 500 TABLET, CHEWABLE ORAL at 18:51

## 2023-04-19 RX ADMIN — LEVALBUTEROL HYDROCHLORIDE 1.25 MG: 1.25 SOLUTION RESPIRATORY (INHALATION) at 14:35

## 2023-04-19 RX ADMIN — LEVALBUTEROL HYDROCHLORIDE 1.25 MG: 1.25 SOLUTION RESPIRATORY (INHALATION) at 19:39

## 2023-04-19 RX ADMIN — LISINOPRIL 10 MG: 10 TABLET ORAL at 09:19

## 2023-04-19 RX ADMIN — IPRATROPIUM BROMIDE 0.5 MG: 0.5 SOLUTION RESPIRATORY (INHALATION) at 19:39

## 2023-04-19 RX ADMIN — SERTRALINE HYDROCHLORIDE 50 MG: 50 TABLET ORAL at 09:19

## 2023-04-19 RX ADMIN — CEFTRIAXONE 1000 MG: 1 INJECTION, SOLUTION INTRAVENOUS at 09:35

## 2023-04-19 RX ADMIN — INSULIN LISPRO 2 UNITS: 100 INJECTION, SOLUTION INTRAVENOUS; SUBCUTANEOUS at 09:34

## 2023-04-19 RX ADMIN — PREDNISONE 40 MG: 20 TABLET ORAL at 09:19

## 2023-04-19 RX ADMIN — INSULIN LISPRO 3 UNITS: 100 INJECTION, SOLUTION INTRAVENOUS; SUBCUTANEOUS at 18:53

## 2023-04-19 RX ADMIN — ASPIRIN 81 MG: 81 TABLET, COATED ORAL at 09:19

## 2023-04-19 RX ADMIN — INSULIN LISPRO 4 UNITS: 100 INJECTION, SOLUTION INTRAVENOUS; SUBCUTANEOUS at 22:24

## 2023-04-19 RX ADMIN — NICOTINE 1 PATCH: 14 PATCH, EXTENDED RELEASE TRANSDERMAL at 09:20

## 2023-04-19 NOTE — PLAN OF CARE
Problem: PAIN - ADULT  Goal: Verbalizes/displays adequate comfort level or baseline comfort level  Description: Interventions:  - Encourage patient to monitor pain and request assistance  - Assess pain using appropriate pain scale  - Administer analgesics based on type and severity of pain and evaluate response  - Implement non-pharmacological measures as appropriate and evaluate response  - Consider cultural and social influences on pain and pain management  - Notify physician/advanced practitioner if interventions unsuccessful or patient reports new pain  Outcome: Progressing     Problem: INFECTION - ADULT  Goal: Absence or prevention of progression during hospitalization  Description: INTERVENTIONS:  - Assess and monitor for signs and symptoms of infection  - Monitor lab/diagnostic results  - Monitor all insertion sites, i e  indwelling lines, tubes, and drains  - Monitor endotracheal if appropriate and nasal secretions for changes in amount and color  - [place] appropriate cooling/warming therapies per order  - Administer medications as ordered  - Instruct and encourage patient and family to use good hand hygiene technique  - Identify and instruct in appropriate isolation precautions for identified infection/condition  Outcome: Progressing  Goal: Absence of fever/infection during neutropenic period  Description: INTERVENTIONS:  - Monitor WBC    Outcome: Progressing     Problem: SAFETY ADULT  Goal: Patient will remain free of falls  Description: INTERVENTIONS:  - Educate patient/family on patient safety including physical limitations  - Instruct patient to call for assistance with activity   - Consult OT/PT to assist with strengthening/mobility   - Keep Call bell within reach  - Keep bed low and locked with side rails adjusted as appropriate  - Keep care items and personal belongings within reach  - Initiate and maintain comfort rounds  - Make Fall Risk Sign visible to staff  - Offer Toileting every 2 Hours, in advance of need  - Initiate/Maintain bed alarm  - Obtain necessary fall risk management equipment: nonskid socks  - Apply yellow socks and bracelet for high fall risk patients  - Consider moving patient to room near nurses station  Outcome: Progressing  Goal: Maintain or return to baseline ADL function  Description: INTERVENTIONS:  -  Assess patient's ability to carry out ADLs; assess patient's baseline for ADL function and identify physical deficits which impact ability to perform ADLs (bathing, care of mouth/teeth, toileting, grooming, dressing, etc )  - Assess/evaluate cause of self-care deficits   - Assess range of motion  - Assess patient's mobility; develop plan if impaired  - Assess patient's need for assistive devices and provide as appropriate  - Encourage maximum independence but intervene and supervise when necessary  - Involve family in performance of ADLs  - Assess for home care needs following discharge   - Consider OT consult to assist with ADL evaluation and planning for discharge  - Provide patient education as appropriate  Outcome: Progressing  Goal: Maintains/Returns to pre admission functional level  Description: INTERVENTIONS:  - Perform BMAT or MOVE assessment daily    - Set and communicate daily mobility goal to care team and patient/family/caregiver  - Collaborate with rehabilitation services on mobility goals if consulted  - Perform Range of Motion 2 times a day  - Reposition patient every 2 hours    - Dangle patient 2 times a day  - Stand patient 2 times a day  - Ambulate patient 2 times a day  - Out of bed to chair 2 times a day   - Out of bed for meals 2 times a day  - Out of bed for toileting  - Record patient progress and toleration of activity level   Outcome: Progressing     Problem: DISCHARGE PLANNING  Goal: Discharge to home or other facility with appropriate resources  Description: INTERVENTIONS:  - Identify barriers to discharge w/patient and caregiver  - Arrange for needed discharge resources and transportation as appropriate  - Identify discharge learning needs (meds, wound care, etc )  - Arrange for interpretive services to assist at discharge as needed  - Refer to Case Management Department for coordinating discharge planning if the patient needs post-hospital services based on physician/advanced practitioner order or complex needs related to functional status, cognitive ability, or social support system  Outcome: Progressing     Problem: Knowledge Deficit  Goal: Patient/family/caregiver demonstrates understanding of disease process, treatment plan, medications, and discharge instructions  Description: Complete learning assessment and assess knowledge base    Interventions:  - Provide teaching at level of understanding  - Provide teaching via preferred learning methods  Outcome: Progressing     Problem: RESPIRATORY - ADULT  Goal: Achieves optimal ventilation and oxygenation  Description: INTERVENTIONS:  - Assess for changes in respiratory status  - Assess for changes in mentation and behavior  - Position to facilitate oxygenation and minimize respiratory effort  - Oxygen administered by appropriate delivery if ordered  - Initiate smoking cessation education as indicated  - Encourage broncho-pulmonary hygiene including cough, deep breathe, Incentive Spirometry  - Assess the need for suctioning and aspirate as needed  - Assess and instruct to report SOB or any respiratory difficulty  - Respiratory Therapy support as indicated  Outcome: Progressing     Problem: MUSCULOSKELETAL - ADULT  Goal: Maintain or return mobility to safest level of function  Description: INTERVENTIONS:  - Assess patient's ability to carry out ADLs; assess patient's baseline for ADL function and identify physical deficits which impact ability to perform ADLs (bathing, care of mouth/teeth, toileting, grooming, dressing, etc )  - Assess/evaluate cause of self-care deficits   - Assess range of motion  - Assess patient's mobility  - Assess patient's need for assistive devices and provide as appropriate  - Encourage maximum independence but intervene and supervise when necessary  - Involve family in performance of ADLs  - Assess for home care needs following discharge   - Consider OT consult to assist with ADL evaluation and planning for discharge  - Provide patient education as appropriate  Outcome: Progressing  Goal: Maintain proper alignment of affected body part  Description: INTERVENTIONS:  - Support, maintain and protect limb and body alignment  - Provide patient/ family with appropriate education  Outcome: Progressing     Problem: MOBILITY - ADULT  Goal: Maintain or return to baseline ADL function  Description: INTERVENTIONS:  -  Assess patient's ability to carry out ADLs; assess patient's baseline for ADL function and identify physical deficits which impact ability to perform ADLs (bathing, care of mouth/teeth, toileting, grooming, dressing, etc )  - Assess/evaluate cause of self-care deficits   - Assess range of motion  - Assess patient's mobility; develop plan if impaired  - Assess patient's need for assistive devices and provide as appropriate  - Encourage maximum independence but intervene and supervise when necessary  - Involve family in performance of ADLs  - Assess for home care needs following discharge   - Consider OT consult to assist with ADL evaluation and planning for discharge  - Provide patient education as appropriate  Outcome: Progressing  Goal: Maintains/Returns to pre admission functional level  Description: INTERVENTIONS:  - Perform BMAT or MOVE assessment daily    - Set and communicate daily mobility goal to care team and patient/family/caregiver  - Collaborate with rehabilitation services on mobility goals if consulted  - Perform Range of Motion 2 times a day  - Reposition patient every 2 hours    - Dangle patient 2 times a day  - Stand patient 2 times a day  - Ambulate patient 2 times a day  - Out of bed to chair 2 times a day   - Out of bed for meals 2 times a day  - Out of bed for toileting  - Record patient progress and toleration of activity level   Outcome: Progressing

## 2023-04-19 NOTE — PHYSICAL THERAPY NOTE
"                                                      Physical Therapy Progress Note    Patient Name: Sara Sánchez    YVEJB'J Date: 4/19/2023     Problem List  Principal Problem:    Acute on chronic respiratory failure with hypoxia and hypercapnia (HCC)  Active Problems:    Hyperlipidemia    Essential hypertension    Tobacco use    Ambulatory dysfunction    Type 2 diabetes mellitus with hyperglycemia, without long-term current use of insulin (HCC)    COPD with acute exacerbation (HCC)    Permanent atrial fibrillation (HCC)    Acute on chronic diastolic CHF (congestive heart failure) ([facility] [address] )       Past Medical History  Past Medical History:   Diagnosis Date    History of intracranial hemorrhage 8/14/2022    Hyperlipidemia     last assessed  8/24/17    Hypertension     last assessed 10/2/14        Past Surgical History  Past Surgical History:   Procedure Laterality Date    CRANIOTOMY             04/19/23 1015   PT Last Visit   PT Visit Date 04/19/23   Note Type   Note Type Treatment   Pain Assessment   Pain Assessment Tool 0-10   Pain Score No Pain   Restrictions/Precautions   Weight Bearing Precautions Per Order No   Other Precautions Fall Risk;Multiple lines; Chair Alarm;Cognitive   General   Chart Reviewed Yes   Family/Caregiver Present No   Cognition   Overall Cognitive Status Impaired   Arousal/Participation Alert; Cooperative   Attention Attends with cues to redirect   Orientation Level Oriented to person;Disoriented to place; Disoriented to time;Disoriented to situation   Following Commands Follows one step commands inconsistently   Subjective   Subjective \"July\"   Bed Mobility   Additional Comments pt OOB at start/end of session   Transfers   Sit to Stand 5  Supervision   Additional items Increased time required;Verbal cues   Stand to Sit 5  Supervision   Additional items Increased time required;Verbal cues   Toilet transfer 4  Minimal assistance   Additional items Assist x 1; Increased time required;Verbal " cues;Standard toilet   Additional Comments RW used   Ambulation/Elevation   Gait pattern Excessively slow; Short stride; Foward flexed;Decreased foot clearance; Inconsistent chad   Gait Assistance 4  Minimal assist   Additional items Assist x 1;Verbal cues   Assistive Device Rolling walker   Distance 200'   Balance   Static Sitting Fair +   Dynamic Sitting Fair +   Static Standing Fair   Dynamic Standing [address], & 5 -  (with RW)   Endurance Deficit   Endurance Deficit Yes   Activity Tolerance   Activity Tolerance Patient limited by fatigue   Assessment   Prognosis Good   Problem List Decreased strength;Decreased endurance; Impaired balance;Decreased mobility; Decreased cognition;Decreased safety awareness; Impaired judgement   Assessment Patient seen this date for PT treatment session to increase level of mobility and functional activity tolerance  This date, pt able to perform all functional mobility with [address] , RW, and increased time  Frequent verbal cuing provided for safety awareness and sequencing  Seated rest break taken following 200' of ambulation d/t fatigue and SOB  HR and SpO2 remained WFL on RA throughout  No true LOB experienced despite impulsive mobility and moderate postural sway  Pt demonstrating significantly impaired cognition, problem solving skills, and carryover of information  The patient's AM-[facility] Basic Mobility Inpatient Short Form Raw Score is 16  A Raw score of less than or equal to 16 suggests the patient may benefit from discharge to post-acute rehabilitation services  Please also refer to the recommendation of the Physical Therapist for safe discharge planning  Co treatment with OT secondary to complex medical condition of pt, possible A of 2 required to achieve and maintain transitional movements, requiring the need of skilled therapeutic intervention of 2 therapists to achieve delivery of services   D/c recommendation continues to be post-acute rehabilitation services  Goals   LTG Expiration Date 05/02/23   PT Treatment Day 1   Plan   Treatment/Interventions Functional transfer training;LE strengthening/ROM; Elevations; Therapeutic exercise; Endurance training;Bed mobility;Gait training   Progress Slow progress, cognitive deficits   PT Frequency 3-5x/wk   Recommendation   PT Discharge Recommendation Post acute rehabilitation services   AM-PAC Basic Mobility Inpatient   Turning in Flat Bed Without Bedrails 2   Lying on Back to Sitting on Edge of Flat Bed Without Bedrails 2   Moving Bed to Chair 3   Standing Up From Chair Using Arms 3   Walk in Room 3   Climb 3-5 Stairs With Railing 3   Basic Mobility Inpatient Raw Score 16   Basic Mobility Standardized Score 38 32   Highest Level Of Mobility   -HLM Goal 5: Stand one or more mins   JH-HLM Achieved 7: Walk 25 feet or more   Education   Education Provided Mobility training;Assistive device   Patient Demonstrates acceptance/verbal understanding;Reinforcement needed   End of Consult   Patient Position at End of Consult Bedside chair;Bed/Chair alarm activated; All needs within reach

## 2023-04-19 NOTE — ASSESSMENT & PLAN NOTE
· Presented to the hospital with shortness of breath, wheezing, an requiring 6 L O2  · Typically on oxygen as needed at home but only ambulates short distances and is not very active in general   · Continue home Symbicort  · Add levalbuterol/ipratropium nebulizer 3 times daily  · Prednisone 40 mg daily  · No need for home O2 eval, off all oxygen currently  · Would benefit from outpatient follow-up with pulmonology

## 2023-04-19 NOTE — PLAN OF CARE
Problem: OCCUPATIONAL THERAPY ADULT  Goal: Performs self-care activities at highest level of function for planned discharge setting  See evaluation for individualized goals  Description: Treatment Interventions: ADL retraining, Functional transfer training, UE strengthening/ROM, Endurance training, Patient/family training, Equipment evaluation/education, Cognitive reorientation, Activityengagement          See flowsheet documentation for full assessment, interventions and recommendations  Outcome: Progressing  Note: Limitation: Decreased ADL status, Decreased UE strength, Decreased Safe judgement during ADL, Decreased cognition, Decreased endurance, Decreased self-care trans, Decreased high-level ADLs     Assessment: Patient participated in Skilled OT session this date with interventions consisting of ADL re training with the use of correct body mechnaics, safety awareness and fall prevention techniques,  therapeutic activities to: increase activity tolerance, increase standing tolerance time with unilateral UE support to complete sink level ADLs, increase cardiovascular endurance , increase dynamic sit/ stand balance during functional activity , increase postural control, increase trunk control and increase OOB/ sitting tolerance   Co treatment with PT secondary to complex medical condition of pt, mod-max A of 2 required to achieve and maintain transitional movements, requiring the need of skilled therapeutic intervention of 2 therapists to achieve delivery of services  Patient agreeable to OT treatment session, upon arrival patient was found seated OOB to Chair  Patient requiring frequent re direction, verbal cues for safety, verbal cues for correct technique, verbal cues for pacing thru activity steps, cognitive assistance to anticipate next step, one step directives and frequent rest periods   Patient continues to be functioning below baseline level, occupational performance remains limited secondary to factors listed above and increased risk for falls and injury  The patient's raw score on the AM-PAC Daily Activity Inpatient Short Form is 15  A raw score of less than 19 suggests the patient may benefit from discharge to post-acute rehabilitation services  Please refer to the recommendation of the Occupational Therapist for safe discharge planning  From OT standpoint, recommendation at time of d/c would be Post acute rehabilitation services       OT Discharge Recommendation: Post acute rehabilitation services

## 2023-04-19 NOTE — PLAN OF CARE
Problem: PAIN - ADULT  Goal: Verbalizes/displays adequate comfort level or baseline comfort level  Description: Interventions:  - Encourage patient to monitor pain and request assistance  - Assess pain using appropriate pain scale  - Administer analgesics based on type and severity of pain and evaluate response  - Implement non-pharmacological measures as appropriate and evaluate response  - Consider cultural and social influences on pain and pain management  - Notify physician/advanced practitioner if interventions unsuccessful or patient reports new pain  Outcome: Progressing     Problem: INFECTION - ADULT  Goal: Absence or prevention of progression during hospitalization  Description: INTERVENTIONS:  - Assess and monitor for signs and symptoms of infection  - Monitor lab/diagnostic results  - Monitor all insertion sites, i e  indwelling lines, tubes, and drains  - Monitor endotracheal if appropriate and nasal secretions for changes in amount and color  - [place] appropriate cooling/warming therapies per order  - Administer medications as ordered  - Instruct and encourage patient and family to use good hand hygiene technique  - Identify and instruct in appropriate isolation precautions for identified infection/condition  Outcome: Progressing  Goal: Absence of fever/infection during neutropenic period  Description: INTERVENTIONS:  - Monitor WBC    Outcome: Progressing     Problem: SAFETY ADULT  Goal: Patient will remain free of falls  Description: INTERVENTIONS:  - Educate patient/family on patient safety including physical limitations  - Instruct patient to call for assistance with activity   - Consult OT/PT to assist with strengthening/mobility   - Keep Call bell within reach  - Keep bed low and locked with side rails adjusted as appropriate  - Keep care items and personal belongings within reach  - Initiate and maintain comfort rounds  - Make Fall Risk Sign visible to staff  - Offer Toileting every 2 Hours, in advance of need  - Initiate/Maintain bed alarm  - Obtain necessary fall risk management equipment: nonskid socks  - Apply yellow socks and bracelet for high fall risk patients  - Consider moving patient to room near nurses station  Outcome: Progressing  Goal: Maintain or return to baseline ADL function  Description: INTERVENTIONS:  -  Assess patient's ability to carry out ADLs; assess patient's baseline for ADL function and identify physical deficits which impact ability to perform ADLs (bathing, care of mouth/teeth, toileting, grooming, dressing, etc )  - Assess/evaluate cause of self-care deficits   - Assess range of motion  - Assess patient's mobility; develop plan if impaired  - Assess patient's need for assistive devices and provide as appropriate  - Encourage maximum independence but intervene and supervise when necessary  - Involve family in performance of ADLs  - Assess for home care needs following discharge   - Consider OT consult to assist with ADL evaluation and planning for discharge  - Provide patient education as appropriate  Outcome: Progressing  Goal: Maintains/Returns to pre admission functional level  Description: INTERVENTIONS:  - Perform BMAT or MOVE assessment daily    - Set and communicate daily mobility goal to care team and patient/family/caregiver  - Collaborate with rehabilitation services on mobility goals if consulted  - Perform Range of Motion 2 times a day  - Reposition patient every 2 hours    - Dangle patient 2 times a day  - Stand patient 2 times a day  - Ambulate patient 2 times a day  - Out of bed to chair 2 times a day   - Out of bed for meals 2 times a day  - Out of bed for toileting  - Record patient progress and toleration of activity level   Outcome: Progressing     Problem: DISCHARGE PLANNING  Goal: Discharge to home or other facility with appropriate resources  Description: INTERVENTIONS:  - Identify barriers to discharge w/patient and caregiver  - Arrange for needed discharge resources and transportation as appropriate  - Identify discharge learning needs (meds, wound care, etc )  - Arrange for interpretive services to assist at discharge as needed  - Refer to Case Management Department for coordinating discharge planning if the patient needs post-hospital services based on physician/advanced practitioner order or complex needs related to functional status, cognitive ability, or social support system  Outcome: Progressing     Problem: Knowledge Deficit  Goal: Patient/family/caregiver demonstrates understanding of disease process, treatment plan, medications, and discharge instructions  Description: Complete learning assessment and assess knowledge base    Interventions:  - Provide teaching at level of understanding  - Provide teaching via preferred learning methods  Outcome: Progressing     Problem: RESPIRATORY - ADULT  Goal: Achieves optimal ventilation and oxygenation  Description: INTERVENTIONS:  - Assess for changes in respiratory status  - Assess for changes in mentation and behavior  - Position to facilitate oxygenation and minimize respiratory effort  - Oxygen administered by appropriate delivery if ordered  - Initiate smoking cessation education as indicated  - Encourage broncho-pulmonary hygiene including cough, deep breathe, Incentive Spirometry  - Assess the need for suctioning and aspirate as needed  - Assess and instruct to report SOB or any respiratory difficulty  - Respiratory Therapy support as indicated  Outcome: Progressing     Problem: MUSCULOSKELETAL - ADULT  Goal: Maintain or return mobility to safest level of function  Description: INTERVENTIONS:  - Assess patient's ability to carry out ADLs; assess patient's baseline for ADL function and identify physical deficits which impact ability to perform ADLs (bathing, care of mouth/teeth, toileting, grooming, dressing, etc )  - Assess/evaluate cause of self-care deficits   - Assess range of motion  - Assess patient's mobility  - Assess patient's need for assistive devices and provide as appropriate  - Encourage maximum independence but intervene and supervise when necessary  - Involve family in performance of ADLs  - Assess for home care needs following discharge   - Consider OT consult to assist with ADL evaluation and planning for discharge  - Provide patient education as appropriate  Outcome: Progressing  Goal: Maintain proper alignment of affected body part  Description: INTERVENTIONS:  - Support, maintain and protect limb and body alignment  - Provide patient/ family with appropriate education  Outcome: Progressing     Problem: MOBILITY - ADULT  Goal: Maintain or return to baseline ADL function  Description: INTERVENTIONS:  -  Assess patient's ability to carry out ADLs; assess patient's baseline for ADL function and identify physical deficits which impact ability to perform ADLs (bathing, care of mouth/teeth, toileting, grooming, dressing, etc )  - Assess/evaluate cause of self-care deficits   - Assess range of motion  - Assess patient's mobility; develop plan if impaired  - Assess patient's need for assistive devices and provide as appropriate  - Encourage maximum independence but intervene and supervise when necessary  - Involve family in performance of ADLs  - Assess for home care needs following discharge   - Consider OT consult to assist with ADL evaluation and planning for discharge  - Provide patient education as appropriate  Outcome: Progressing  Goal: Maintains/Returns to pre admission functional level  Description: INTERVENTIONS:  - Perform BMAT or MOVE assessment daily    - Set and communicate daily mobility goal to care team and patient/family/caregiver  - Collaborate with rehabilitation services on mobility goals if consulted  - Perform Range of Motion 2 times a day  - Reposition patient every 2 hours    - Dangle patient 2 times a day  - Stand patient 2 times a day  - Ambulate patient 2 times a day  - Out of bed to chair 2 times a day   - Out of bed for meals 2 times a day  - Out of bed for toileting  - Record patient progress and toleration of activity level   Outcome: Progressing

## 2023-04-19 NOTE — RESPIRATORY THERAPY NOTE
04/19/23 0738   Oxygen Therapy/Pulse Ox   O2 Device None (Room air)   SpO2 90 %   SpO2 Activity At Rest   $ Pulse Oximetry Spot Check Charge Completed

## 2023-04-19 NOTE — NURSING NOTE
RN attempted twice to get blood work  After 2 unsuccessful attempts, PCA attempted to get blood work and patient refused blood work  Provider notified

## 2023-04-19 NOTE — PROGRESS NOTES
-- Patient:  -- MRN: [id number]  -- Aidin Request ID: [id number]  -- Level of care reserved: [address] [place] [address]  -- Partner Reserved: [facility] [place] [phone]  -- Clinical needs requested:  -- Geography searched: [phone]  -- Start of Service:  -- Request sent: 10:46am EDT on 4/18/2023 by Pricilla Horta  -- Partner reserved: 12:13pm EDT on 4/19/2023 by Pricilla Horta  -- Choice list shared: 12:13pm EDT on 4/19/2023 by Pricilla Horta

## 2023-04-19 NOTE — PLAN OF CARE
Problem: PHYSICAL THERAPY ADULT  Goal: Performs mobility at highest level of function for planned discharge setting  See evaluation for individualized goals  Description: Treatment/Interventions: Functional transfer training, LE strengthening/ROM, Elevations, Therapeutic exercise, Endurance training, Bed mobility, Gait training          See flowsheet documentation for full assessment, interventions and recommendations  Outcome: Progressing  Note: Prognosis: Good  Problem List: Decreased strength, Decreased endurance, Impaired balance, Decreased mobility, Decreased cognition, Decreased safety awareness, Impaired judgement  Assessment: Patient seen this date for PT treatment session to increase level of mobility and functional activity tolerance  This date, pt able to perform all functional mobility with [address] , RW, and increased time  Frequent verbal cuing provided for safety awareness and sequencing  Seated rest break taken following 200' of ambulation d/t fatigue and SOB  HR and SpO2 remained WFL on RA throughout  No true LOB experienced despite impulsive mobility and moderate postural sway  Pt demonstrating significantly impaired cognition, problem solving skills, and carryover of information  The patient's AM-PAC Basic Mobility Inpatient Short Form Raw Score is 16  A Raw score of less than or equal to 16 suggests the patient may benefit from discharge to post-acute rehabilitation services  Please also refer to the recommendation of the Physical Therapist for safe discharge planning  Co treatment with OT secondary to complex medical condition of pt, possible A of 2 required to achieve and maintain transitional movements, requiring the need of skilled therapeutic intervention of 2 therapists to achieve delivery of services  D/c recommendation continues to be post-acute rehabilitation services          PT Discharge Recommendation: Post acute rehabilitation services    See flowsheet documentation for full assessment

## 2023-04-19 NOTE — OCCUPATIONAL THERAPY NOTE
Occupational Therapy Progress Note     Patient Name: Jassi Burgos  WTCBB'S Date: 4/19/2023  Problem List  Principal Problem:    Acute on chronic respiratory failure with hypoxia and hypercapnia (HCC)  Active Problems:    Hyperlipidemia    Essential hypertension    Tobacco use    Ambulatory dysfunction    Type 2 diabetes mellitus with hyperglycemia, without long-term current use of insulin (HCC)    COPD with acute exacerbation (HCC)    Permanent atrial fibrillation (HCC)    Acute on chronic diastolic CHF (congestive heart failure) ([facility] [address] )    Past Medical History  Past Medical History:   Diagnosis Date   • History of intracranial hemorrhage 8/14/2022   • Hyperlipidemia     last assessed  8/24/17   • Hypertension     last assessed 10/2/14     Past Surgical History  Past Surgical History:   Procedure Laterality Date   • CRANIOTOMY             04/19/23 1014   OT Last Visit   OT Visit Date 04/19/23   Note Type   Note Type Treatment   Pain Assessment   Pain Score No Pain   Restrictions/Precautions   Weight Bearing Precautions Per Order No   Other Precautions Fall Risk;Multiple lines; Chair Alarm;Cognitive   ADL   Where Assessed Edge of bed   LB Dressing Assistance 4  Minimal Assistance   LB Dressing Deficit Don/doff R sock; Don/doff L sock; Thread RLE into underwear; Thread LLE into underwear;Pull up over hips   Toileting Assistance  4  Minimal Assistance   Toileting Deficit Increased time to complete;Clothing management up;Clothing management down;Perineal hygiene;Use of bedpan/urinal setup   Bed Mobility   Additional Comments pt seated in chair at start and end of session; pt on RA during session and SpO2 WFL   Transfers   Sit to Stand 4  Minimal assistance   Additional items Increased time required;Verbal cues; Assist x 1  (RW)   Stand to Sit 4  Minimal assistance   Additional items Increased time required;Verbal cues; Assist x 1  (RW)   Toilet transfer 4  Minimal assistance   Additional items Assist x 1; Increased time "required;Standard toilet  (RW)   Additional Comments pt performs functional transfers with use of RW; requies cues for safety and stability; pt with impulsivity and frequent redirection; pt appears confused with most commands   Functional Mobility   Functional Mobility 4  Minimal assistance   Additional Comments pt performs functional mobility with RW; pt performs ~200ft with x2 standing rest breaks for redirection and confusion   Additional items Rolling walker   Subjective   Subjective \"oh my god, that is such a good idea\"   Cognition   Overall Cognitive Status Impaired   Arousal/Participation Alert; Cooperative   Attention Difficulty attending to directions   Orientation Level Oriented to person;Disoriented to place; Disoriented to time;Disoriented to situation   Memory Decreased long term memory;Decreased short term memory;Decreased recall of recent events   Following Commands Follows one step commands without difficulty   Comments pt requires step by step instructions and sequencing cues for all tasks   Activity Tolerance   Activity Tolerance Patient limited by fatigue; Other (Comment)  (cognition)   Assessment   Assessment Patient participated in Skilled OT session this date with interventions consisting of ADL re training with the use of correct body mechnaics, safety awareness and fall prevention techniques,  therapeutic activities to: increase activity tolerance, increase standing tolerance time with unilateral UE support to complete sink level ADLs, increase cardiovascular endurance , increase dynamic sit/ stand balance during functional activity , increase postural control, increase trunk control and increase OOB/ sitting tolerance   Co treatment with PT secondary to complex medical condition of pt, mod-max A of 2 required to achieve and maintain transitional movements, requiring the need of skilled therapeutic intervention of 2 therapists to achieve delivery of services   Patient agreeable to OT treatment " session, upon arrival patient was found seated OOB to Chair  Patient requiring frequent re direction, verbal cues for safety, verbal cues for correct technique, verbal cues for pacing thru activity steps, cognitive assistance to anticipate next step, one step directives and frequent rest periods  Patient continues to be functioning below baseline level, occupational performance remains limited secondary to factors listed above and increased risk for falls and injury  The patient's raw score on the AM-PAC Daily Activity Inpatient Short Form is 15  A raw score of less than 19 suggests the patient may benefit from discharge to post-acute rehabilitation services  Please refer to the recommendation of the Occupational Therapist for safe discharge planning  From OT standpoint, recommendation at time of d/c would be Post acute rehabilitation services     Plan   Goal Expiration Date 05/02/23   OT Treatment Day 1   OT Frequency 3-5x/wk   Recommendation   OT Discharge Recommendation Post acute rehabilitation services   AM-[facility] Daily Activity Inpatient   Lower Body Dressing 2   Bathing 2   Toileting 2   Upper Body Dressing 3   Grooming 3   Eating 3   Daily Activity Raw Score 15   Daily Activity Standardized Score (Calc for Raw Score >=11) 34 69   AM-PAC Applied Cognition Inpatient   Following a Speech/Presentation 2   Understanding Ordinary Conversation 2   Taking Medications 1   Remembering Where Things Are Placed or Put Away 1   Remembering List of 4-5 Errands 1   Taking Care of Complicated Tasks 1   Applied Cognition Raw Score 8   Applied Cognition Standardized Score 19 32

## 2023-04-19 NOTE — ASSESSMENT & PLAN NOTE
· Patient states she is chronically on oxygen due to COPD, but only on an as needed basis  · She was admitted to the hospital with COPD exacerbation and hypoxia  · Currently still requiring 5 L and only maintaining oxygen saturations around 90 to 91  · Now on room air  · Start levalbuterol/ipratropium nebulizers 3 times daily  · Continue home Symbicort  · Prednisone 40 mg daily x5 days  · Currently on IV ceftriaxone however initial procalcitonin negative, she has been afebrile    We will recheck procalcitonin tomorrow a m , if negative plan to discontinue antibiotic  · Would benefit from outpatient follow-up with pulmonology

## 2023-04-19 NOTE — PROGRESS NOTES
[facility] [address]  Progress Note  Name: Neelam Parry  MRN: [id number]  Unit/Bed#: 838-44 I Date of Admission: 4/18/2023   Date of Service: 4/19/2023 I Hospital Day: 1    Assessment/Plan   * Acute on chronic respiratory failure with hypoxia and hypercapnia (HCC)  Assessment & Plan  · Patient states she is chronically on oxygen due to COPD, but only on an as needed basis  · She was admitted to the hospital with COPD exacerbation and hypoxia  · Currently still requiring 5 L and only maintaining oxygen saturations around 90 to 91  · Now on room air  · Start levalbuterol/ipratropium nebulizers 3 times daily  · Continue home Symbicort  · Prednisone 40 mg daily x5 days  · Currently on IV ceftriaxone however initial procalcitonin negative, she has been afebrile    We will recheck procalcitonin tomorrow a m , if negative plan to discontinue antibiotic  · Would benefit from outpatient follow-up with pulmonology    Acute on chronic diastolic CHF (congestive heart failure) ([facility] )  Assessment & Plan  · NP mildly elevated on admission at 616  · Last echocardiogram 8/15/2022: Preserved EF with moderate MR  · Questionable vascular congestion on chest x-ray; will follow-up final read  · Started on IV Lasix on admission  · Patient euvolemic on my exam today  · Continue to monitor off lasix      Permanent atrial fibrillation (HCC)  Assessment & Plan  · Rate controlled off of any medications  · Not on anticoagulation    [facility]D with [facility]ation [facility] )  Assessment & Plan  · Presented to the hospital with shortness of breath, wheezing, an requiring 6 L O2  · Typically on oxygen as needed at home but only ambulates short distances and is not very active in general   · Continue home Symbicort  · Add levalbuterol/ipratropium nebulizer 3 times daily  · Prednisone 40 mg daily  · No need for home O2 eval, off all oxygen currently  · Would benefit from outpatient follow-up with pulmonology    Type 2 diabetes mellitus with hyperglycemia, without long-term current use of insulin (HCC)  Assessment & Plan  · Last hemoglobin A1c 6 6%  · Hold home oral meds  · Monitor on ISS  · Hypoglycemia protocol    Ambulatory dysfunction  Assessment & Plan  · PT/OT recommending 24 hour supervision and rehab    Tobacco use  Assessment & Plan  · Tobacco cessation strongly advised    Essential hypertension  Assessment & Plan  · Monitor on home lisinopril             VTE Pharmacologic Prophylaxis: VTE Score: 6 High Risk (Score >/= 5) - Pharmacological DVT Prophylaxis Ordered: enoxaparin (Lovenox)  Sequential Compression Devices Ordered  Patient Centered Rounds: I performed bedside rounds with nursing staff today  Discussions with Specialists or Other Care Team Provider: case management, PT/OT    Education and Discussions with Family / Patient: Updated  (daughter) via phone  Total Time Spent on Date of Encounter in care of patient: 45 minutes This time was spent on one or more of the following: performing physical exam; counseling and coordination of care; obtaining or reviewing history; documenting in the medical record; reviewing/ordering tests, medications or procedures; communicating with other healthcare professionals and discussing with patient's family/caregivers  Current Length of Stay: 1 day(s)  Current Patient Status: Inpatient   Certification Statement: The patient will continue to require additional inpatient hospital stay due to ensuring safe discharge for patient  Discharge Plan: Anticipate discharge in 24-48 hrs to rehab facility  Code Status: Level 3 - DNAR and DNI    Subjective:   Patient states her staples are very uncomfortable and she wants them out   Very disoriented to time    Objective:     Vitals:   Temp (24hrs), Av 2 °F (36 8 °C), Min:98 2 °F (36 8 °C), Max:98 2 °F (36 8 °C)    Temp:  [98 2 °F (36 8 °C)] 98 2 °F (36 8 °C)  HR:  [59-68] 68  Resp:  [18-19] 18  BP: (126-137)/(57-59) 137/57  SpO2:  [90 %-100 %] 90 %  Body mass index is 20 76 kg/m²  Input and Output Summary (last 24 hours): Intake/Output Summary (Last 24 hours) at 4/19/2023 1145  Last data filed at 4/19/2023 0900  Gross per 24 hour   Intake 600 ml   Output --   Net 600 ml       Physical Exam:   Physical Exam  Vitals and nursing note reviewed  Constitutional:       General: She is not in acute distress  Appearance: She is ill-appearing  HENT:      Head: Normocephalic and atraumatic  Cardiovascular:      Rate and Rhythm: Normal rate and regular rhythm  Pulses: Normal pulses  Heart sounds: Normal heart sounds  Pulmonary:      Effort: Pulmonary effort is normal       Breath sounds: Normal breath sounds  No wheezing, rhonchi or rales  Abdominal:      General: Bowel sounds are normal       Palpations: Abdomen is soft  Tenderness: There is no abdominal tenderness  There is no guarding or rebound  Musculoskeletal:         General: Normal range of motion  Right lower leg: No edema  Left lower leg: No edema  Skin:     General: Skin is warm and dry  Neurological:      General: No focal deficit present  Mental Status: She is alert  Mental status is at baseline     Psychiatric:         Mood and Affect: Mood normal          Behavior: Behavior normal           Additional Data:     Labs:  Results from last 7 days   Lab Units 04/18/23  0353   WBC Thousand/uL 12 75*   HEMOGLOBIN g/dL 12 2   HEMATOCRIT % 38 7   PLATELETS Thousands/uL 195   NEUTROS PCT % 77*   LYMPHS PCT % 13*   MONOS PCT % 6   EOS PCT % 2     Results from last 7 days   Lab Units 04/18/23  0353   SODIUM mmol/L 140   POTASSIUM mmol/L 4 0   CHLORIDE mmol/L 99   CO2 mmol/L 33*   BUN mg/dL 12   CREATININE mg/dL 0 64   ANION GAP mmol/L 8   CALCIUM mg/dL 8 6   ALBUMIN g/dL 3 2*   TOTAL BILIRUBIN mg/dL 1 30*   ALK PHOS U/L 60   ALT U/L 6*   AST U/L 15   GLUCOSE RANDOM mg/dL 175*         Results from last 7 days   Lab Units 04/19/23  1056 04/19/23  0740 04/18/23  2034 04/18/23  1625 04/18/23  1110 04/18/23  0743   POC GLUCOSE mg/dl 130 215* 273* 299* 332* 232*         Results from last 7 days   Lab Units 04/18/23  0444 04/18/23  0353   LACTIC ACID mmol/L 2 0  --    PROCALCITONIN ng/ml  --  0 09       Lines/Drains:  Invasive Devices     Peripheral Intravenous Line  Duration           Peripheral IV 04/18/23 Left Antecubital 1 day          Drain  Duration           External Urinary Catheter 45 days                      Imaging: No pertinent imaging reviewed  Recent Cultures (last 7 days):   Results from last 7 days   Lab Units 04/18/23  0444   BLOOD CULTURE  Received in Microbiology Lab  Culture in Progress  Received in Microbiology Lab  Culture in Progress         Last 24 Hours Medication List:   Current Facility-Administered Medications   Medication Dose Route Frequency Provider Last Rate   • acetaminophen  650 mg Oral Q6H PRN Yunier Lin MD     • aspirin  81 mg Oral Daily Yunier Lin MD     • azithromycin  500 mg Oral Daily Yunier Lin MD     • budesonide-formoterol  2 puff Inhalation BID Yunier Lin MD     • calcium carbonate  1,000 mg Oral BID PRN Yunier Lin MD     • cefTRIAXone  1,000 mg Intravenous Early Morning Yunier Lin MD 1,000 mg (04/19/23 0935)   • enoxaparin  40 mg Subcutaneous Daily Yunier Lin MD     • insulin lispro  1-6 Units Subcutaneous TID AC Yunier Lin MD     • insulin lispro  1-6 Units Subcutaneous HS Yunier Lin MD     • ipratropium  0 5 mg Nebulization TID [facility], DO     • levalbuterol  1 25 mg Nebulization TID [facility], DO     • lisinopril  10 mg Oral Daily Yunier Lin MD     • nicotine  1 patch Transdermal Daily Yunier Lin MD     • ondansetron  4 mg Intravenous Q6H PRN Yunier Lin MD     • polyethylene glycol  17 g Oral Daily PRN Yunier Lin MD     • pravastatin  80 mg Oral Daily With Dinner Yunier Lin MD • predniSONE  40 mg Oral Daily Edwin Acevedo MD     • sertraline  50 mg Oral Daily Edwin Acevedo MD          Today, Patient Was Seen By: Karena Young PA-C    **Please Note: This note may have been constructed using a voice recognition system  **

## 2023-04-19 NOTE — ASSESSMENT & PLAN NOTE
· NP mildly elevated on admission at 616  · Last echocardiogram 8/15/2022: Preserved EF with moderate MR  · Questionable vascular congestion on chest x-ray; will follow-up final read  · Started on IV Lasix on admission  · Patient euvolemic on my exam today  · Continue to monitor off lasix

## 2023-04-20 LAB
GLUCOSE SERPL-MCNC: 173 MG/DL (ref 65–140)
GLUCOSE SERPL-MCNC: 190 MG/DL (ref 65–140)
GLUCOSE SERPL-MCNC: 270 MG/DL (ref 65–140)
GLUCOSE SERPL-MCNC: 325 MG/DL (ref 65–140)

## 2023-04-20 RX ORDER — LEVALBUTEROL INHALATION SOLUTION 1.25 MG/3ML
1.25 SOLUTION RESPIRATORY (INHALATION)
Status: DISCONTINUED | OUTPATIENT
Start: 2023-04-20 | End: 2023-04-21 | Stop reason: HOSPADM

## 2023-04-20 RX ORDER — LEVALBUTEROL INHALATION SOLUTION 1.25 MG/3ML
1.25 SOLUTION RESPIRATORY (INHALATION)
Status: DISCONTINUED | OUTPATIENT
Start: 2023-04-20 | End: 2023-04-20

## 2023-04-20 RX ORDER — LEVALBUTEROL INHALATION SOLUTION 1.25 MG/3ML
SOLUTION RESPIRATORY (INHALATION)
Status: COMPLETED
Start: 2023-04-20 | End: 2023-04-20

## 2023-04-20 RX ADMIN — ENOXAPARIN SODIUM 40 MG: 40 INJECTION SUBCUTANEOUS at 09:25

## 2023-04-20 RX ADMIN — INSULIN LISPRO 1 UNITS: 100 INJECTION, SOLUTION INTRAVENOUS; SUBCUTANEOUS at 09:23

## 2023-04-20 RX ADMIN — IPRATROPIUM BROMIDE 0.5 MG: 0.5 SOLUTION RESPIRATORY (INHALATION) at 08:22

## 2023-04-20 RX ADMIN — BUDESONIDE AND FORMOTEROL FUMARATE DIHYDRATE 2 PUFF: 80; 4.5 AEROSOL RESPIRATORY (INHALATION) at 18:23

## 2023-04-20 RX ADMIN — INSULIN LISPRO 2 UNITS: 100 INJECTION, SOLUTION INTRAVENOUS; SUBCUTANEOUS at 12:59

## 2023-04-20 RX ADMIN — INSULIN LISPRO 5 UNITS: 100 INJECTION, SOLUTION INTRAVENOUS; SUBCUTANEOUS at 22:44

## 2023-04-20 RX ADMIN — IPRATROPIUM BROMIDE 0.5 MG: 0.5 SOLUTION RESPIRATORY (INHALATION) at 15:26

## 2023-04-20 RX ADMIN — SERTRALINE HYDROCHLORIDE 50 MG: 50 TABLET ORAL at 09:24

## 2023-04-20 RX ADMIN — PREDNISONE 40 MG: 20 TABLET ORAL at 09:25

## 2023-04-20 RX ADMIN — LEVALBUTEROL HYDROCHLORIDE 1.25 MG: 1.25 SOLUTION RESPIRATORY (INHALATION) at 08:22

## 2023-04-20 RX ADMIN — AZITHROMYCIN MONOHYDRATE 500 MG: 250 TABLET ORAL at 09:24

## 2023-04-20 RX ADMIN — INSULIN LISPRO 4 UNITS: 100 INJECTION, SOLUTION INTRAVENOUS; SUBCUTANEOUS at 18:23

## 2023-04-20 RX ADMIN — LEVALBUTEROL HYDROCHLORIDE 1.25 MG: 1.25 SOLUTION RESPIRATORY (INHALATION) at 15:26

## 2023-04-20 RX ADMIN — BUDESONIDE AND FORMOTEROL FUMARATE DIHYDRATE 2 PUFF: 80; 4.5 AEROSOL RESPIRATORY (INHALATION) at 09:27

## 2023-04-20 RX ADMIN — IPRATROPIUM BROMIDE 0.5 MG: 0.5 SOLUTION RESPIRATORY (INHALATION) at 19:43

## 2023-04-20 RX ADMIN — ASPIRIN 81 MG: 81 TABLET, COATED ORAL at 09:24

## 2023-04-20 RX ADMIN — PRAVASTATIN SODIUM 80 MG: 40 TABLET ORAL at 18:22

## 2023-04-20 RX ADMIN — LISINOPRIL 10 MG: 10 TABLET ORAL at 09:24

## 2023-04-20 RX ADMIN — CEFTRIAXONE 1000 MG: 1 INJECTION, SOLUTION INTRAVENOUS at 05:47

## 2023-04-20 RX ADMIN — LEVALBUTEROL HYDROCHLORIDE 1.25 MG: 1.25 SOLUTION RESPIRATORY (INHALATION) at 19:43

## 2023-04-20 NOTE — ASSESSMENT & PLAN NOTE
· PT/OT recommending 24 hour supervision and rehab  · Family would like patient home, they do have 24 hour supervision for her there    Patient did fall on 4/17 and had staples to close a lac on her head  Plan is to remove 7-10 days out

## 2023-04-20 NOTE — PLAN OF CARE
Problem: PHYSICAL THERAPY ADULT  Goal: Performs mobility at highest level of function for planned discharge setting  See evaluation for individualized goals  Description: Treatment/Interventions: Functional transfer training, LE strengthening/ROM, Elevations, Therapeutic exercise, Endurance training, Bed mobility, Gait training          See flowsheet documentation for full assessment, interventions and recommendations  Outcome: Progressing  Note: Prognosis: Good  Problem List:  (Decreased strength; Decreased endurance; Impaired balance; Decreased mobility; Decreased cognition; Impaired judgement; Decreased safety awareness)  Assessment: Patient seen this date for PT treatment session to increase level of mobility and functional activity tolerance  This date, pt able to perform all functional mobility with [address] , RW, and increased time  Frequent verbal cuing provided for safety awareness and sequencing  Seated rest break taken following 200' of ambulation d/t fatigue and SOB  HR and SpO2 remained WFL on RA throughout  No true LOB experienced despite impulsive mobility and moderate postural sway  Pt demonstrating significantly impaired cognition, problem solving skills, and carryover of information  The patient's AM-PAC Basic Mobility Inpatient Short Form Raw Score is 16  A Raw score of less than or equal to 16 suggests the patient may benefit from discharge to post-acute rehabilitation services  Please also refer to the recommendation of the Physical Therapist for safe discharge planning  Co treatment with OT secondary to complex medical condition of pt, possible A of 2 required to achieve and maintain transitional movements, requiring the need of skilled therapeutic intervention of 2 therapists to achieve delivery of services  D/c recommendation continues to be post-acute rehabilitation services          PT Discharge Recommendation: Post acute rehabilitation services    See flowsheet documentation for full assessment

## 2023-04-20 NOTE — PROGRESS NOTES
[address] [facility] 160[facility]  Progress Note  Name: Fadi Barragan  MRN: [id number]  Unit/Bed#: 400-01 I Date of Admission: 4/18/2023   Date of Service: 4/20/2023 I Hospital Day: 2    Assessment/Plan   * Acute on chronic respiratory failure with hypoxia and hypercapnia (HCC)  Assessment & Plan  · Patient states she is chronically on oxygen due to COPD, but only on an as needed basis  · She was admitted to the hospital with COPD exacerbation and hypoxia  · Currently still requiring 5 L and only maintaining oxygen saturations around 90 to 91  · Now on room air  · Start levalbuterol/ipratropium nebulizers 3 times daily  · Continue home Symbicort  · Prednisone 40 mg daily x5 days  · Will complete empiric 5 days of abx, currently day 3  · Would benefit from outpatient follow-up with pulmonology    Permanent atrial fibrillation (HCC)  Assessment & Plan  · Rate controlled off of any medications  · Not on anticoagulation    COPD with acute exacerbation ([facility] [address] )  Assessment & Plan  · Presented to the hospital with shortness of breath, wheezing, an requiring 6 L O2  · Typically on oxygen as needed at home but only ambulates short distances and is not very active in general   · Continue home Symbicort  · Add levalbuterol/ipratropium nebulizer 3 times daily  · Prednisone 40 mg daily  · No need for home O2 eval, off all oxygen currently  · Would benefit from outpatient follow-up with pulmonology    Type 2 diabetes mellitus with hyperglycemia, without long-term current use of insulin ([facility])  Assessment & Plan  · Last hemoglobin A1c 6 6%  · Hold home oral meds  · Monitor on ISS  · Hypoglycemia protocol    Ambulatory dysfunction  Assessment & Plan  · PT/OT recommending 24 hour supervision and rehab  · Family would like patient home, they do have 24 hour supervision for her there    Patient did fall on 4/17 and had staples to close a lac on her head  Plan is to remove 7-10 days out       Tobacco use  Assessment & Plan  · Tobacco cessation strongly advised    Essential hypertension  Assessment & Plan  · stable  · Monitor on home lisinopril             VTE Pharmacologic Prophylaxis: VTE Score: 6 High Risk (Score >/= 5) - Pharmacological DVT Prophylaxis Ordered: heparin  Sequential Compression Devices Ordered  Patient Centered Rounds: I performed bedside rounds with nursing staff today  Discussions with Specialists or Other Care Team Provider: case management    Education and Discussions with Family / Patient: Updated  (daughter) via phone  Total Time Spent on Date of Encounter in care of patient: 45 minutes This time was spent on one or more of the following: performing physical exam; counseling and coordination of care; obtaining or reviewing history; documenting in the medical record; reviewing/ordering tests, medications or procedures; communicating with other healthcare professionals and discussing with patient's family/caregivers  Current Length of Stay: 2 day(s)  Current Patient Status: Inpatient   Certification Statement: The patient will continue to require additional inpatient hospital stay due to safe discharge tomorrow planne  Discharge Plan: Anticipate discharge tomorrow to home with home services  Code Status: Level 3 - DNAR and DNI    Subjective:   Patient reports feeling well overall    Objective:     Vitals:   Temp (24hrs), Av 1 °F (36 7 °C), Min:98 °F (36 7 °C), Max:98 1 °F (36 7 °C)    Temp:  [98 °F (36 7 °C)-98 1 °F (36 7 °C)] 98 1 °F (36 7 °C)  HR:  [73] 73  Resp:  [18-19] 18  BP: (125-128)/(91-96) 128/91  SpO2:  [92 %-96 %] 94 %  Body mass index is 20 84 kg/m²  Input and Output Summary (last 24 hours): Intake/Output Summary (Last 24 hours) at 2023 1039  Last data filed at 2023 1755  Gross per 24 hour   Intake 600 ml   Output --   Net 600 ml       Physical Exam:   Physical Exam  Vitals and nursing note reviewed  Constitutional:       Appearance: She is ill-appearing  Cardiovascular:      Rate and Rhythm: Normal rate  Rhythm irregular  Pulses: Normal pulses  Heart sounds: Normal heart sounds  Pulmonary:      Breath sounds: Wheezing and rhonchi present  Abdominal:      General: Bowel sounds are normal       Palpations: Abdomen is soft  Neurological:      Mental Status: She is alert  She is disoriented  Psychiatric:         Mood and Affect: Mood normal          Behavior: Behavior normal           Additional Data:     Labs:  Results from last 7 days   Lab Units 04/18/23  0353   WBC Thousand/uL 12 75*   HEMOGLOBIN g/dL 12 2   HEMATOCRIT % 38 7   PLATELETS Thousands/uL 195   NEUTROS PCT % 77*   LYMPHS PCT % 13*   MONOS PCT % 6   EOS PCT % 2     Results from last 7 days   Lab Units 04/18/23  0353   SODIUM mmol/L 140   POTASSIUM mmol/L 4 0   CHLORIDE mmol/L 99   CO2 mmol/L 33*   BUN mg/dL 12   CREATININE mg/dL 0 64   ANION GAP mmol/L 8   CALCIUM mg/dL 8 6   ALBUMIN g/dL 3 2*   TOTAL BILIRUBIN mg/dL 1 30*   ALK PHOS U/L 60   ALT U/L 6*   AST U/L 15   GLUCOSE RANDOM mg/dL 175*         Results from last 7 days   Lab Units 04/20/23  0714 04/19/23  2052 04/19/23  1557 04/19/23  1056 04/19/23  0740 04/18/23  2034 04/18/23  1625 04/18/23  1110 04/18/23  0743   POC GLUCOSE mg/dl 173* 271* 267* 130 215* 273* 299* 332* 232*         Results from last 7 days   Lab Units 04/18/23  0444 04/18/23  0353   LACTIC ACID mmol/L 2 0  --    PROCALCITONIN ng/ml  --  0 09       Lines/Drains:  Invasive Devices     Peripheral Intravenous Line  Duration           Peripheral IV 04/18/23 Left Antecubital 2 days    Peripheral IV 04/20/23 Right;Ventral (anterior) Hand <1 day          Drain  Duration           External Urinary Catheter 46 days                      Imaging: No pertinent imaging reviewed  Recent Cultures (last 7 days):   Results from last 7 days   Lab Units 04/18/23  0444   BLOOD CULTURE  No Growth at 24 hrs  No Growth at 24 hrs         Last 24 Hours Medication List:   Current Facility-Administered Medications   Medication Dose Route Frequency Provider Last Rate   • acetaminophen  650 mg Oral Q6H PRN Ava Hinders, PA-C     • aspirin  81 mg Oral Daily Ava Hinders, PA-C     • azithromycin  500 mg Oral Daily Ava Hinders, PA-C     • budesonide-formoterol  2 puff Inhalation BID Ava Hinders, PA-C     • calcium carbonate  1,000 mg Oral BID PRN Ava Hinders, PA-C     • cefTRIAXone  1,000 mg Intravenous Early Morning Ava Hinders, PA-C 1,000 mg (04/20/23 0536)   • enoxaparin  40 mg Subcutaneous Daily Ava Hinders, PA-C     • insulin lispro  1-6 Units Subcutaneous TID AC Ava Hinders, PA-C     • insulin lispro  1-6 Units Subcutaneous HS Ava Hinders, PA-C     • ipratropium  0 5 mg Nebulization TID Patricia Calderon, DO     • levalbuterol  1 25 mg Nebulization TID Patricia Calderon, DO     • lisinopril  10 mg Oral Daily Ava Hinders, PA-C     • nicotine  1 patch Transdermal Daily Ava Hinders, PA-C     • ondansetron  4 mg Intravenous Q6H PRN Ava Hinders, PA-C     • polyethylene glycol  17 g Oral Daily PRN Ava Hinders, PA-C     • pravastatin  80 mg Oral Daily With Dinner Ava Hinders, PA-C     • predniSONE  40 mg Oral Daily Ava Hinders, PA-C     • sertraline  50 mg Oral Daily Ava Hinders, PA-C          Today, Patient Was Seen By: Ava Vicente, AILEEN    **Please Note: This note may have been constructed using a voice recognition system  **

## 2023-04-20 NOTE — PLAN OF CARE
Problem: PAIN - ADULT  Goal: Verbalizes/displays adequate comfort level or baseline comfort level  Description: Interventions:  - Encourage patient to monitor pain and request assistance  - Assess pain using appropriate pain scale  - Administer analgesics based on type and severity of pain and evaluate response  - Implement non-pharmacological measures as appropriate and evaluate response  - Consider cultural and social influences on pain and pain management  - Notify physician/advanced practitioner if interventions unsuccessful or patient reports new pain  Outcome: Progressing     Problem: INFECTION - ADULT  Goal: Absence or prevention of progression during hospitalization  Description: INTERVENTIONS:  - Assess and monitor for signs and symptoms of infection  - Monitor lab/diagnostic results  - Monitor all insertion sites, i e  indwelling lines, tubes, and drains  - Monitor endotracheal if appropriate and nasal secretions for changes in amount and color  - [place] appropriate cooling/warming therapies per order  - Administer medications as ordered  - Instruct and encourage patient and family to use good hand hygiene technique  - Identify and instruct in appropriate isolation precautions for identified infection/condition  Outcome: Progressing  Goal: Absence of fever/infection during neutropenic period  Description: INTERVENTIONS:  - Monitor WBC    Outcome: Progressing     Problem: SAFETY ADULT  Goal: Patient will remain free of falls  Description: INTERVENTIONS:  - Educate patient/family on patient safety including physical limitations  - Instruct patient to call for assistance with activity   - Consult OT/PT to assist with strengthening/mobility   - Keep Call bell within reach  - Keep bed low and locked with side rails adjusted as appropriate  - Keep care items and personal belongings within reach  - Initiate and maintain comfort rounds  - Make Fall Risk Sign visible to staff  - Offer Toileting every 2 Hours, in advance of need  - Initiate/Maintain bed alarm  - Obtain necessary fall risk management equipment: nonskid socks  - Apply yellow socks and bracelet for high fall risk patients  - Consider moving patient to room near nurses station  Outcome: Progressing  Goal: Maintain or return to baseline ADL function  Description: INTERVENTIONS:  -  Assess patient's ability to carry out ADLs; assess patient's baseline for ADL function and identify physical deficits which impact ability to perform ADLs (bathing, care of mouth/teeth, toileting, grooming, dressing, etc )  - Assess/evaluate cause of self-care deficits   - Assess range of motion  - Assess patient's mobility; develop plan if impaired  - Assess patient's need for assistive devices and provide as appropriate  - Encourage maximum independence but intervene and supervise when necessary  - Involve family in performance of ADLs  - Assess for home care needs following discharge   - Consider OT consult to assist with ADL evaluation and planning for discharge  - Provide patient education as appropriate  Outcome: Progressing  Goal: Maintains/Returns to pre admission functional level  Description: INTERVENTIONS:  - Perform BMAT or MOVE assessment daily    - Set and communicate daily mobility goal to care team and patient/family/caregiver  - Collaborate with rehabilitation services on mobility goals if consulted  - Perform Range of Motion 2 times a day  - Reposition patient every 2 hours    - Dangle patient 2 times a day  - Stand patient 2 times a day  - Ambulate patient 2 times a day  - Out of bed to chair 2 times a day   - Out of bed for meals 2 times a day  - Out of bed for toileting  - Record patient progress and toleration of activity level   Outcome: Progressing     Problem: DISCHARGE PLANNING  Goal: Discharge to home or other facility with appropriate resources  Description: INTERVENTIONS:  - Identify barriers to discharge w/patient and caregiver  - Arrange for needed discharge resources and transportation as appropriate  - Identify discharge learning needs (meds, wound care, etc )  - Arrange for interpretive services to assist at discharge as needed  - Refer to Case Management Department for coordinating discharge planning if the patient needs post-hospital services based on physician/advanced practitioner order or complex needs related to functional status, cognitive ability, or social support system  Outcome: Progressing     Problem: Knowledge Deficit  Goal: Patient/family/caregiver demonstrates understanding of disease process, treatment plan, medications, and discharge instructions  Description: Complete learning assessment and assess knowledge base    Interventions:  - Provide teaching at level of understanding  - Provide teaching via preferred learning methods  Outcome: Progressing     Problem: RESPIRATORY - ADULT  Goal: Achieves optimal ventilation and oxygenation  Description: INTERVENTIONS:  - Assess for changes in respiratory status  - Assess for changes in mentation and behavior  - Position to facilitate oxygenation and minimize respiratory effort  - Oxygen administered by appropriate delivery if ordered  - Initiate smoking cessation education as indicated  - Encourage broncho-pulmonary hygiene including cough, deep breathe, Incentive Spirometry  - Assess the need for suctioning and aspirate as needed  - Assess and instruct to report SOB or any respiratory difficulty  - Respiratory Therapy support as indicated  Outcome: Progressing     Problem: MUSCULOSKELETAL - ADULT  Goal: Maintain or return mobility to safest level of function  Description: INTERVENTIONS:  - Assess patient's ability to carry out ADLs; assess patient's baseline for ADL function and identify physical deficits which impact ability to perform ADLs (bathing, care of mouth/teeth, toileting, grooming, dressing, etc )  - Assess/evaluate cause of self-care deficits   - Assess range of motion  - Assess patient's mobility  - Assess patient's need for assistive devices and provide as appropriate  - Encourage maximum independence but intervene and supervise when necessary  - Involve family in performance of ADLs  - Assess for home care needs following discharge   - Consider OT consult to assist with ADL evaluation and planning for discharge  - Provide patient education as appropriate  Outcome: Progressing  Goal: Maintain proper alignment of affected body part  Description: INTERVENTIONS:  - Support, maintain and protect limb and body alignment  - Provide patient/ family with appropriate education  Outcome: Progressing     Problem: MOBILITY - ADULT  Goal: Maintain or return to baseline ADL function  Description: INTERVENTIONS:  -  Assess patient's ability to carry out ADLs; assess patient's baseline for ADL function and identify physical deficits which impact ability to perform ADLs (bathing, care of mouth/teeth, toileting, grooming, dressing, etc )  - Assess/evaluate cause of self-care deficits   - Assess range of motion  - Assess patient's mobility; develop plan if impaired  - Assess patient's need for assistive devices and provide as appropriate  - Encourage maximum independence but intervene and supervise when necessary  - Involve family in performance of ADLs  - Assess for home care needs following discharge   - Consider OT consult to assist with ADL evaluation and planning for discharge  - Provide patient education as appropriate  Outcome: Progressing  Goal: Maintains/Returns to pre admission functional level  Description: INTERVENTIONS:  - Perform BMAT or MOVE assessment daily    - Set and communicate daily mobility goal to care team and patient/family/caregiver  - Collaborate with rehabilitation services on mobility goals if consulted  - Perform Range of Motion 2 times a day  - Reposition patient every 2 hours    - Dangle patient 2 times a day  - Stand patient 2 times a day  - Ambulate patient 2 times a day  - Out of bed to chair 2 times a day   - Out of bed for meals 2 times a day  - Out of bed for toileting  - Record patient progress and toleration of activity level   Outcome: Progressing

## 2023-04-20 NOTE — PHYSICAL THERAPY NOTE
PHYSICAL THERAPY NOTE          Patient Name: Jovana Bonilla  SRGSN'V Date: 4/20/2023 04/20/23 0830   PT Last Visit   PT Visit Date 04/20/23   Note Type   Note Type Treatment   Pain Assessment   Pain Assessment Tool 0-10   Pain Score No Pain   Restrictions/Precautions   Weight Bearing Precautions Per Order No   Other Precautions   (Chair Alarm; Bed Alarm; Multiple lines; Fall Risk)   General   Response to Previous Treatment Patient unable to report, no changes reported from family or staff   Family/Caregiver Present No   Cognition   Overall Cognitive Status Impaired   Arousal/Participation Alert   Following Commands Follows one step commands without difficulty   Subjective   Subjective c/o LE fatigue and weakness with ambulation   Bed Mobility   Additional Comments OOB in bathroom start session   Transfers   Sit to Stand 5  Supervision   Additional items Armrests; Increased time required;Verbal cues   Toilet transfer 4  Minimal assistance   Additional items Assist x 1; Increased time required;Verbal cues;Standard toilet   Additional Comments RW used   Ambulation/Elevation   Gait pattern   (Excessively slow; Short stride; Foward flexed; Decreased foot clearance; Narrow SHANA; Improper Weight shift)   Gait Assistance 4  Minimal assist   Additional items Assist x 1;Verbal cues   Assistive Device Rolling walker   Distance 25' x 1, 150' x 1   Balance   Static Sitting Fair +   Dynamic Sitting Fair +   Static Standing Fair   Dynamic Standing Fair -   Ambulatory Fair -  (RW)   Endurance Deficit   Endurance Deficit Yes   Activity Tolerance   Activity Tolerance Patient limited by fatigue   Assessment   Prognosis Good   Problem List   (Decreased strength; Decreased endurance; Impaired balance; Decreased mobility; Decreased cognition;  Impaired judgement; Decreased safety awareness)   Goals   Short Term Goal #2 Pt  seen for PT treatment session this date with interventions consisting of toilet transfers, transfers and  gait training w/ emphasis on improving pt's ability to ambulate  Pt  Requiring ( ) cues for sequence and safety  In comparison to previous session, Pt  With improvements in activity tolerance  Pt is in need of continued activity in PT to improve strength balance endurance mobility transfers and ambulation with return to maximize LOF  From PT/mobility standpoint, recommendation at time of d/c would be HHPT with 24 hr supervision* in order to promote return to PLOF and independence  The patient's AM-PAC Basic Mobility Inpatient Short Form Raw Score is 16  A Raw score of greater than 16 suggests the patient may benefit from discharge to home  Please also refer to physical therapy recommendation for safe DC planning  LTG Expiration Date 05/02/23   PT Treatment Day 2   Plan   Treatment/Interventions   (Functional transfer training; LE strengthening/ROM; Elevations; Therapeutic exercise; Endurance training; Bed mobility; Gait training)   Progress Slow progress, decreased activity tolerance   PT Frequency 3-5x/wk   Recommendation   PT Discharge Recommendation Post acute rehabilitation services   AM-PAC Basic Mobility Inpatient   Turning in Flat Bed Without Bedrails 3   Lying on Back to Sitting on Edge of Flat Bed Without Bedrails 2   Moving Bed to Chair 3   Standing Up From Chair Using Arms 3   Walk in Room 3   Climb 3-5 Stairs With Railing 3   Basic Mobility Inpatient Raw Score 17   Basic Mobility Standardized Score 39 67   Highest Level Of Mobility   JH-HLM Goal 5: Stand one or more mins   JH-HLM Achieved 7: Walk 25 feet or more   Education   Education Provided Mobility training   Patient Demonstrates verbal understanding;Reinforcement needed   End of Consult   Patient Position at End of Consult Bed/Chair alarm activated; All needs within reach; Bedside chair   End of Consult Comments discussed POC with PT

## 2023-04-20 NOTE — ASSESSMENT & PLAN NOTE
· Patient states she is chronically on oxygen due to COPD, but only on an as needed basis  · She was admitted to the hospital with COPD exacerbation and hypoxia    · Currently still requiring 5 L and only maintaining oxygen saturations around 90 to 91  · Now on room air  · Start levalbuterol/ipratropium nebulizers 3 times daily  · Continue home Symbicort  · Prednisone 40 mg daily x5 days  · Will complete empiric 5 days of abx, currently day 3  · Would benefit from outpatient follow-up with pulmonology

## 2023-04-20 NOTE — PLAN OF CARE
Problem: PAIN - ADULT  Goal: Verbalizes/displays adequate comfort level or baseline comfort level  Description: Interventions:  - Encourage patient to monitor pain and request assistance  - Assess pain using appropriate pain scale  - Administer analgesics based on type and severity of pain and evaluate response  - Implement non-pharmacological measures as appropriate and evaluate response  - Consider cultural and social influences on pain and pain management  - Notify physician/advanced practitioner if interventions unsuccessful or patient reports new pain  Outcome: Progressing     Problem: INFECTION - ADULT  Goal: Absence or prevention of progression during hospitalization  Description: INTERVENTIONS:  - Assess and monitor for signs and symptoms of infection  - Monitor lab/diagnostic results  - Monitor all insertion sites, i e  indwelling lines, tubes, and drains  - Monitor endotracheal if appropriate and nasal secretions for changes in amount and color  - [place] appropriate cooling/warming therapies per order  - Administer medications as ordered  - Instruct and encourage patient and family to use good hand hygiene technique  - Identify and instruct in appropriate isolation precautions for identified infection/condition  Outcome: Progressing  Goal: Absence of fever/infection during neutropenic period  Description: INTERVENTIONS:  - Monitor WBC    Outcome: Progressing     Problem: SAFETY ADULT  Goal: Patient will remain free of falls  Description: INTERVENTIONS:  - Educate patient/family on patient safety including physical limitations  - Instruct patient to call for assistance with activity   - Consult OT/PT to assist with strengthening/mobility   - Keep Call bell within reach  - Keep bed low and locked with side rails adjusted as appropriate  - Keep care items and personal belongings within reach  - Initiate and maintain comfort rounds  - Make Fall Risk Sign visible to staff  - Offer Toileting every 2 Hours, in advance of need  - Initiate/Maintain bed alarm  - Obtain necessary fall risk management equipment: nonskid socks  - Apply yellow socks and bracelet for high fall risk patients  - Consider moving patient to room near nurses station  Outcome: Progressing  Goal: Maintain or return to baseline ADL function  Description: INTERVENTIONS:  -  Assess patient's ability to carry out ADLs; assess patient's baseline for ADL function and identify physical deficits which impact ability to perform ADLs (bathing, care of mouth/teeth, toileting, grooming, dressing, etc )  - Assess/evaluate cause of self-care deficits   - Assess range of motion  - Assess patient's mobility; develop plan if impaired  - Assess patient's need for assistive devices and provide as appropriate  - Encourage maximum independence but intervene and supervise when necessary  - Involve family in performance of ADLs  - Assess for home care needs following discharge   - Consider OT consult to assist with ADL evaluation and planning for discharge  - Provide patient education as appropriate  Outcome: Progressing  Goal: Maintains/Returns to pre admission functional level  Description: INTERVENTIONS:  - Perform BMAT or MOVE assessment daily    - Set and communicate daily mobility goal to care team and patient/family/caregiver  - Collaborate with rehabilitation services on mobility goals if consulted  - Perform Range of Motion 2 times a day  - Reposition patient every 2 hours    - Dangle patient 2 times a day  - Stand patient 2 times a day  - Ambulate patient 2 times a day  - Out of bed to chair 2 times a day   - Out of bed for meals 2 times a day  - Out of bed for toileting  - Record patient progress and toleration of activity level   Outcome: Progressing     Problem: DISCHARGE PLANNING  Goal: Discharge to home or other facility with appropriate resources  Description: INTERVENTIONS:  - Identify barriers to discharge w/patient and caregiver  - Arrange for needed discharge resources and transportation as appropriate  - Identify discharge learning needs (meds, wound care, etc )  - Arrange for interpretive services to assist at discharge as needed  - Refer to Case Management Department for coordinating discharge planning if the patient needs post-hospital services based on physician/advanced practitioner order or complex needs related to functional status, cognitive ability, or social support system  Outcome: Progressing     Problem: Knowledge Deficit  Goal: Patient/family/caregiver demonstrates understanding of disease process, treatment plan, medications, and discharge instructions  Description: Complete learning assessment and assess knowledge base    Interventions:  - Provide teaching at level of understanding  - Provide teaching via preferred learning methods  Outcome: Progressing     Problem: RESPIRATORY - ADULT  Goal: Achieves optimal ventilation and oxygenation  Description: INTERVENTIONS:  - Assess for changes in respiratory status  - Assess for changes in mentation and behavior  - Position to facilitate oxygenation and minimize respiratory effort  - Oxygen administered by appropriate delivery if ordered  - Initiate smoking cessation education as indicated  - Encourage broncho-pulmonary hygiene including cough, deep breathe, Incentive Spirometry  - Assess the need for suctioning and aspirate as needed  - Assess and instruct to report SOB or any respiratory difficulty  - Respiratory Therapy support as indicated  Outcome: Progressing     Problem: MUSCULOSKELETAL - ADULT  Goal: Maintain or return mobility to safest level of function  Description: INTERVENTIONS:  - Assess patient's ability to carry out ADLs; assess patient's baseline for ADL function and identify physical deficits which impact ability to perform ADLs (bathing, care of mouth/teeth, toileting, grooming, dressing, etc )  - Assess/evaluate cause of self-care deficits   - Assess range of motion  - Assess patient's mobility  - Assess patient's need for assistive devices and provide as appropriate  - Encourage maximum independence but intervene and supervise when necessary  - Involve family in performance of ADLs  - Assess for home care needs following discharge   - Consider OT consult to assist with ADL evaluation and planning for discharge  - Provide patient education as appropriate  Outcome: Progressing  Goal: Maintain proper alignment of affected body part  Description: INTERVENTIONS:  - Support, maintain and protect limb and body alignment  - Provide patient/ family with appropriate education  Outcome: Progressing     Problem: MOBILITY - ADULT  Goal: Maintain or return to baseline ADL function  Description: INTERVENTIONS:  -  Assess patient's ability to carry out ADLs; assess patient's baseline for ADL function and identify physical deficits which impact ability to perform ADLs (bathing, care of mouth/teeth, toileting, grooming, dressing, etc )  - Assess/evaluate cause of self-care deficits   - Assess range of motion  - Assess patient's mobility; develop plan if impaired  - Assess patient's need for assistive devices and provide as appropriate  - Encourage maximum independence but intervene and supervise when necessary  - Involve family in performance of ADLs  - Assess for home care needs following discharge   - Consider OT consult to assist with ADL evaluation and planning for discharge  - Provide patient education as appropriate  Outcome: Progressing  Goal: Maintains/Returns to pre admission functional level  Description: INTERVENTIONS:  - Perform BMAT or MOVE assessment daily    - Set and communicate daily mobility goal to care team and patient/family/caregiver  - Collaborate with rehabilitation services on mobility goals if consulted  - Perform Range of Motion 2 times a day  - Reposition patient every 2 hours    - Dangle patient 2 times a day  - Stand patient 2 times a day  - Ambulate patient 2 times a day  - Out of bed to chair 2 times a day   - Out of bed for meals 2 times a day  - Out of bed for toileting  - Record patient progress and toleration of activity level   Outcome: Progressing

## 2023-04-20 NOTE — CASE MANAGEMENT
Case Management Discharge Planning Note    Patient name Errol Yang  Location /400-01 MRN [id number]  : 1945 Date 2023       Current Admission Date: 2023  Current Admission Diagnosis:Acute on chronic respiratory failure with hypoxia and hypercapnia [facility])   Patient Active Problem List    Diagnosis Date Noted   • Acute on chronic diastolic CHF (congestive heart failure) ([facility] ) 2023   • Stress incontinence 2023   • Mediastinal lymphadenopathy 2023   • Oral candidiasis 2023   • Sepsis due to pneumonia ([facility] ) 2023   • JOHN (acute kidney injury) ([facility] ) 2023   • Diarrhea 2023   • Hypercalcemia 2022   • Microscopic hematuria 2022   • Permanent atrial fibrillation ([facility] ) 2022   • Hypoxia 2022   • Hypomagnesemia 2022   • COPD with acute exacerbation ([facility] ) 2022   • Multiple pulmonary nodules 2022   • Acute metabolic encephalopathy    • Hyponatremia 2022   • Hypokalemia 2022   • Hyperbilirubinemia 2022   • Type 2 diabetes mellitus with hyperglycemia, without long-term current use of insulin ([facility] ) 2022   • Vitamin D deficiency 2022   • Chronic atrial fibrillation ([facility] ) 2022   • Dermatitis 2022   • Acute on chronic respiratory failure with hypoxia and hypercapnia ([facility] ) 10/18/2021   • Ambulatory dysfunction 10/18/2021   • Medicare annual wellness visit, subsequent 2018   • Tobacco use 2018   • Hyperlipidemia 10/02/2014   • Essential hypertension 10/02/2014      LOS (days): 2  Geometric Mean LOS (GMLOS) (days): 3 50  Days to GMLOS:1 3     OBJECTIVE:  Risk of Unplanned Readmission Score: 22 09         Current admission status: Inpatient   Preferred Pharmacy:   [address] [place] [address], [address] [place] [address] [id number] [facility] [address]  Phone: [phone] Fax: [phone] [address] [place], [facility] [address]  Phone: [phone] Fax: [phone]    Primary Care Provider: Sheyla Elias DO    Primary Insurance: MEDICARE  Secondary Insurance: COMMERCIAL MISCELLANEOUS    DISCHARGE DETAILS:  CM followed up with pt's daughter Olya La  re :dc plans  (?Rehab on dc vs home w 24 hr care/supervision)  Per [place] she wants her mom home on dc with resumption of [facility] and 24 care/supervision Priya Hemphill is primary caregiver and always there with pt)  Pt will also needs ambulance transport home on dc

## 2023-04-21 VITALS
OXYGEN SATURATION: 93 % | BODY MASS INDEX: 21.33 KG/M2 | DIASTOLIC BLOOD PRESSURE: 83 MMHG | SYSTOLIC BLOOD PRESSURE: 124 MMHG | HEART RATE: 61 BPM | TEMPERATURE: 97.9 F | WEIGHT: 128 LBS | HEIGHT: 65 IN | RESPIRATION RATE: 20 BRPM

## 2023-04-21 LAB
GLUCOSE SERPL-MCNC: 164 MG/DL (ref 65–140)
GLUCOSE SERPL-MCNC: 176 MG/DL (ref 65–140)

## 2023-04-21 RX ORDER — PREDNISONE 20 MG/1
TABLET ORAL
Qty: 9 TABLET | Refills: 0 | Status: SHIPPED | OUTPATIENT
Start: 2023-04-22 | End: 2023-05-01

## 2023-04-21 RX ORDER — GUAIFENESIN 600 MG/1
600 TABLET, EXTENDED RELEASE ORAL EVERY 12 HOURS SCHEDULED
Qty: 30 TABLET | Refills: 0 | Status: SHIPPED | OUTPATIENT
Start: 2023-04-21

## 2023-04-21 RX ORDER — CEFDINIR 300 MG/1
300 CAPSULE ORAL EVERY 12 HOURS SCHEDULED
Qty: 6 CAPSULE | Refills: 0 | Status: SHIPPED | OUTPATIENT
Start: 2023-04-21 | End: 2023-04-24

## 2023-04-21 RX ADMIN — INSULIN LISPRO 1 UNITS: 100 INJECTION, SOLUTION INTRAVENOUS; SUBCUTANEOUS at 08:21

## 2023-04-21 RX ADMIN — LISINOPRIL 10 MG: 10 TABLET ORAL at 08:21

## 2023-04-21 RX ADMIN — ENOXAPARIN SODIUM 40 MG: 40 INJECTION SUBCUTANEOUS at 08:20

## 2023-04-21 RX ADMIN — IPRATROPIUM BROMIDE 0.5 MG: 0.5 SOLUTION RESPIRATORY (INHALATION) at 08:51

## 2023-04-21 RX ADMIN — AZITHROMYCIN MONOHYDRATE 500 MG: 250 TABLET ORAL at 08:20

## 2023-04-21 RX ADMIN — SERTRALINE HYDROCHLORIDE 50 MG: 50 TABLET ORAL at 08:21

## 2023-04-21 RX ADMIN — CEFTRIAXONE 1000 MG: 1 INJECTION, SOLUTION INTRAVENOUS at 05:40

## 2023-04-21 RX ADMIN — BUDESONIDE AND FORMOTEROL FUMARATE DIHYDRATE 2 PUFF: 80; 4.5 AEROSOL RESPIRATORY (INHALATION) at 08:22

## 2023-04-21 RX ADMIN — NICOTINE 1 PATCH: 14 PATCH, EXTENDED RELEASE TRANSDERMAL at 08:22

## 2023-04-21 RX ADMIN — LEVALBUTEROL HYDROCHLORIDE 1.25 MG: 1.25 SOLUTION RESPIRATORY (INHALATION) at 08:51

## 2023-04-21 RX ADMIN — PREDNISONE 40 MG: 20 TABLET ORAL at 08:21

## 2023-04-21 RX ADMIN — ASPIRIN 81 MG: 81 TABLET, COATED ORAL at 08:20

## 2023-04-21 NOTE — PHYSICAL THERAPY NOTE
"                                                                                  PHYSICAL THERAPY NOTE          Patient Name: Robert Rios  [id number] Date: 4/21/2023 04/21/23 1006   PT Last Visit   PT Visit Date 04/21/23   Note Type   Note Type Treatment   Pain Assessment   Pain Assessment Tool FLACC   Pain Rating: FLACC (Rest) - Face 1   Pain Rating: FLACC (Rest) - Legs 1   Pain Rating: FLACC (Rest) - Activity 1   Pain Rating: FLACC (Rest) - Cry 0   Pain Rating: FLACC (Rest) - Consolability 0   Score: FLACC (Rest) 3   Pain Rating: FLACC (Activity) - Face 1   Pain Rating: FLACC (Activity) - Legs 0   Pain Rating: FLACC (Activity) - Activity 1   Pain Rating: FLACC (Activity) - Cry 0   Pain Rating: FLACC (Activity) - Consolability 0   Score: FLACC (Activity) 2   Restrictions/Precautions   Weight Bearing Precautions Per Order No   Other Precautions   (Chair Alarm; Bed Alarm; Fall Risk)   General   Response to Previous Treatment Patient unable to report, no changes reported from family or staff   Family/Caregiver Present No   Cognition   Overall Cognitive Status Impaired   Arousal/Participation Alert   Following Commands Follows one step commands without difficulty   Subjective   Subjective \"My butt hurts\"  Agreeble to therapy  Bed Mobility   Additional Comments OOB in chair start/end PT session   Transfers   Sit to Stand 5  Supervision   Additional items Armrests; Increased time required;Verbal cues   Stand to Sit 5  Supervision   Additional items Armrests; Increased time required;Verbal cues   Stand pivot 5  Supervision   Additional Comments RW used   Ambulation/Elevation   Gait pattern   (Excessively slow; Short stride; Foward flexed; Decreased foot clearance; Narrow SHANA;  Improper Weight shift)   Gait Assistance 4  Minimal assist   Additional items Verbal cues   Assistive Device Rolling walker   Distance 150'   Balance   Static Sitting Fair +   Dynamic Sitting Fair +   Static Standing Fair   Dynamic Standing Fair - " Ambulatory Fair -  (RW)   Endurance Deficit   Endurance Deficit Yes   Activity Tolerance   Activity Tolerance Patient limited by fatigue; Other (Comment)  (cognitive status)   Exercises   Heelslides Sitting;20 reps;AROM; Bilateral;AAROM   Hip Flexion Sitting;15 reps;AAROM;AROM   Hip Abduction Sitting;20 reps;AAROM;AROM   Hip Adduction Sitting;20 reps;AAROM;AROM   Knee AROM Long Arc Quad Sitting;15 reps;AROM   Ankle Pumps Sitting;20 reps;AROM   Assessment   Prognosis Good   Problem List   (Decreased strength; Decreased endurance; Impaired balance; Decreased mobility; Decreased cognition; Impaired judgement; Decreased safety awareness)   Assessment Pt  seen for PT treatment session this date with interventions consisting of  therapeutic exercises, transfers and  gait training w/ emphasis on improving pt's ability to ambulate  Pt  Requiring cues for sequence and safety  In comparison to previous session, Pt  With improvements in activity tolerance  Pt is in need of continued activity in PT to improve strength balance endurance mobility transfers and ambulation with return to maximize LOF  From PT/mobility standpoint, recommendation at time of d/c would be HHPT with 24 hr supervision  in order to promote return to PLOF and independence  The patient's [facility] Basic Mobility Inpatient Short Form Raw Score is 19  A Raw score of greater than 16 suggests the patient may benefit from discharge to home  Please also refer to physical therapy recommendation for safe DC planning  Goals   LTG Expiration Date 04/21/23   Plan   Treatment/Interventions   (Functional transfer training; LE strengthening/ROM; Elevations; Therapeutic exercise;  Endurance training; Bed mobility; Gait training)   Progress Slow progress, cognitive deficits   PT Frequency 3-5x/wk   Recommendation   PT Discharge Recommendation Home with home health rehabilitation   AM-PAC Basic Mobility Inpatient   Turning in Flat Bed Without Bedrails 4   Lying on Back to Sitting on Edge of Flat Bed Without Bedrails 3   Moving Bed to Chair 3   Standing Up From Chair Using Arms 3   Walk in Room 3   Climb 3-5 Stairs With Railing 3   Basic Mobility Inpatient Raw Score 19   Basic Mobility Standardized Score 42 48   Highest Level Of Mobility   JH-HLM Goal 6: Walk 10 steps or more   JH-HLM Achieved 7: Walk 25 feet or more   Education   Education Provided Mobility training   Patient Demonstrates verbal understanding;Reinforcement needed   End of Consult   Patient Position at End of Consult Bedside chair;Bed/Chair alarm activated; All needs within reach   End of Consult Comments discussed POC with PT

## 2023-04-21 NOTE — ASSESSMENT & PLAN NOTE
· Presented to the hospital with shortness of breath, wheezing, an requiring 6 L O2  · Typically on oxygen as needed at home but only ambulates short distances and is not very active in general   · Continue home Symbicort  · Add levalbuterol/ipratropium nebulizer 3 times daily  · Prednisone 40 mg daily   Can complete taper at home  · Would benefit from outpatient follow-up with pulmonology

## 2023-04-21 NOTE — ASSESSMENT & PLAN NOTE
· NP mildly elevated on admission at 616  · Last echocardiogram 8/15/2022: Preserved EF with moderate MR  · Questionable vascular congestion on chest x-ray; will follow-up final read  · Has been ruled out as an exacerbation this admission with CXR read as pneumonia not congestion  · Received IV lasix initially then no further given and she does not appear volume overloaded

## 2023-04-21 NOTE — ASSESSMENT & PLAN NOTE
· Patient states she is chronically on oxygen due to COPD, but only on an as needed basis  · She was admitted to the hospital with COPD exacerbation and hypoxia  · Currently still requiring 5 L and only maintaining oxygen saturations around 90 to 91  · Now on room air  · Start levalbuterol/ipratropium nebulizers 3 times daily  · Continue home Symbicort  · Prednisone 40 mg daily x5 days  · Will complete empiric 5 days of abx, received 4 days inpatient   Complete course with cefdinir outpatient  · Would benefit from outpatient follow-up with pulmonology

## 2023-04-21 NOTE — PLAN OF CARE
Problem: PAIN - ADULT  Goal: Verbalizes/displays adequate comfort level or baseline comfort level  Description: Interventions:  - Encourage patient to monitor pain and request assistance  - Assess pain using appropriate pain scale  - Administer analgesics based on type and severity of pain and evaluate response  - Implement non-pharmacological measures as appropriate and evaluate response  - Consider cultural and social influences on pain and pain management  - Notify physician/advanced practitioner if interventions unsuccessful or patient reports new pain  Outcome: Progressing     Problem: INFECTION - ADULT  Goal: Absence or prevention of progression during hospitalization  Description: INTERVENTIONS:  - Assess and monitor for signs and symptoms of infection  - Monitor lab/diagnostic results  - Monitor all insertion sites, i e  indwelling lines, tubes, and drains  - Monitor endotracheal if appropriate and nasal secretions for changes in amount and color  - Preston appropriate cooling/warming therapies per order  - Administer medications as ordered  - Instruct and encourage patient and family to use good hand hygiene technique  - Identify and instruct in appropriate isolation precautions for identified infection/condition  Outcome: Progressing  Goal: Absence of fever/infection during neutropenic period  Description: INTERVENTIONS:  - Monitor WBC    Outcome: Progressing     Problem: SAFETY ADULT  Goal: Patient will remain free of falls  Description: INTERVENTIONS:  - Educate patient/family on patient safety including physical limitations  - Instruct patient to call for assistance with activity   - Consult OT/PT to assist with strengthening/mobility   - Keep Call bell within reach  - Keep bed low and locked with side rails adjusted as appropriate  - Keep care items and personal belongings within reach  - Initiate and maintain comfort rounds  - Make Fall Risk Sign visible to staff  - Offer Toileting every 2 Hours, in advance of need  - Initiate/Maintain bed alarm  - Obtain necessary fall risk management equipment: nonskid socks  - Apply yellow socks and bracelet for high fall risk patients  - Consider moving patient to room near nurses station  Outcome: Progressing  Goal: Maintain or return to baseline ADL function  Description: INTERVENTIONS:  -  Assess patient's ability to carry out ADLs; assess patient's baseline for ADL function and identify physical deficits which impact ability to perform ADLs (bathing, care of mouth/teeth, toileting, grooming, dressing, etc )  - Assess/evaluate cause of self-care deficits   - Assess range of motion  - Assess patient's mobility; develop plan if impaired  - Assess patient's need for assistive devices and provide as appropriate  - Encourage maximum independence but intervene and supervise when necessary  - Involve family in performance of ADLs  - Assess for home care needs following discharge   - Consider OT consult to assist with ADL evaluation and planning for discharge  - Provide patient education as appropriate  Outcome: Progressing  Goal: Maintains/Returns to pre admission functional level  Description: INTERVENTIONS:  - Perform BMAT or MOVE assessment daily    - Set and communicate daily mobility goal to care team and patient/family/caregiver  - Collaborate with rehabilitation services on mobility goals if consulted  - Perform Range of Motion 2 times a day  - Reposition patient every 2 hours    - Dangle patient 2 times a day  - Stand patient 2 times a day  - Ambulate patient 2 times a day  - Out of bed to chair 2 times a day   - Out of bed for meals 2 times a day  - Out of bed for toileting  - Record patient progress and toleration of activity level   Outcome: Progressing     Problem: DISCHARGE PLANNING  Goal: Discharge to home or other facility with appropriate resources  Description: INTERVENTIONS:  - Identify barriers to discharge w/patient and caregiver  - Arrange for needed discharge resources and transportation as appropriate  - Identify discharge learning needs (meds, wound care, etc )  - Arrange for interpretive services to assist at discharge as needed  - Refer to Case Management Department for coordinating discharge planning if the patient needs post-hospital services based on physician/advanced practitioner order or complex needs related to functional status, cognitive ability, or social support system  Outcome: Progressing     Problem: Knowledge Deficit  Goal: Patient/family/caregiver demonstrates understanding of disease process, treatment plan, medications, and discharge instructions  Description: Complete learning assessment and assess knowledge base    Interventions:  - Provide teaching at level of understanding  - Provide teaching via preferred learning methods  Outcome: Progressing     Problem: RESPIRATORY - ADULT  Goal: Achieves optimal ventilation and oxygenation  Description: INTERVENTIONS:  - Assess for changes in respiratory status  - Assess for changes in mentation and behavior  - Position to facilitate oxygenation and minimize respiratory effort  - Oxygen administered by appropriate delivery if ordered  - Initiate smoking cessation education as indicated  - Encourage broncho-pulmonary hygiene including cough, deep breathe, Incentive Spirometry  - Assess the need for suctioning and aspirate as needed  - Assess and instruct to report SOB or any respiratory difficulty  - Respiratory Therapy support as indicated  Outcome: Progressing     Problem: MUSCULOSKELETAL - ADULT  Goal: Maintain or return mobility to safest level of function  Description: INTERVENTIONS:  - Assess patient's ability to carry out ADLs; assess patient's baseline for ADL function and identify physical deficits which impact ability to perform ADLs (bathing, care of mouth/teeth, toileting, grooming, dressing, etc )  - Assess/evaluate cause of self-care deficits   - Assess range of motion  - Assess patient's mobility  - Assess patient's need for assistive devices and provide as appropriate  - Encourage maximum independence but intervene and supervise when necessary  - Involve family in performance of ADLs  - Assess for home care needs following discharge   - Consider OT consult to assist with ADL evaluation and planning for discharge  - Provide patient education as appropriate  Outcome: Progressing  Goal: Maintain proper alignment of affected body part  Description: INTERVENTIONS:  - Support, maintain and protect limb and body alignment  - Provide patient/ family with appropriate education  Outcome: Progressing     Problem: MOBILITY - ADULT  Goal: Maintain or return to baseline ADL function  Description: INTERVENTIONS:  -  Assess patient's ability to carry out ADLs; assess patient's baseline for ADL function and identify physical deficits which impact ability to perform ADLs (bathing, care of mouth/teeth, toileting, grooming, dressing, etc )  - Assess/evaluate cause of self-care deficits   - Assess range of motion  - Assess patient's mobility; develop plan if impaired  - Assess patient's need for assistive devices and provide as appropriate  - Encourage maximum independence but intervene and supervise when necessary  - Involve family in performance of ADLs  - Assess for home care needs following discharge   - Consider OT consult to assist with ADL evaluation and planning for discharge  - Provide patient education as appropriate  Outcome: Progressing  Goal: Maintains/Returns to pre admission functional level  Description: INTERVENTIONS:  - Perform BMAT or MOVE assessment daily    - Set and communicate daily mobility goal to care team and patient/family/caregiver  - Collaborate with rehabilitation services on mobility goals if consulted  - Perform Range of Motion 2 times a day  - Reposition patient every 2 hours    - Dangle patient 2 times a day  - Stand patient 2 times a day  - Ambulate patient 2 times a day  - Out of bed to chair 2 times a day   - Out of bed for meals 2 times a day  - Out of bed for toileting  - Record patient progress and toleration of activity level   Outcome: Progressing

## 2023-04-21 NOTE — CASE MANAGEMENT
Case Management Discharge Planning Note    Patient name Tayo Faye  Location /400-01 MRN [id number]  : 1945 Date 2023       Current Admission Date: 2023  Current Admission Diagnosis:Acute on chronic respiratory failure with hypoxia and hypercapnia [facility])   Patient Active Problem List    Diagnosis Date Noted   • Acute on chronic diastolic CHF (congestive heart failure) ([facility] ) 2023   • Stress incontinence 2023   • Mediastinal lymphadenopathy 2023   • Oral candidiasis 2023   • Sepsis due to pneumonia ([facility] ) 2023   • JOHN (acute kidney injury) ([facility] ) 2023   • Diarrhea 2023   • Hypercalcemia 2022   • Microscopic hematuria 2022   • Permanent atrial fibrillation ([facility] ) 2022   • Hypoxia 2022   • Hypomagnesemia 2022   • COPD with acute exacerbation ([facility] ) 2022   • Multiple pulmonary nodules 2022   • Acute metabolic encephalopathy 8291   • Hyponatremia 2022   • Hypokalemia 2022   • Hyperbilirubinemia 2022   • Type 2 diabetes mellitus with hyperglycemia, without long-term current use of insulin ([facility]  ) 2022   • Vitamin D deficiency 2022   • Chronic atrial fibrillation ([facility] ) 2022   • Dermatitis 2022   • Acute on chronic respiratory failure with hypoxia and hypercapnia ([facility] ) 10/18/2021   • Ambulatory dysfunction 10/18/2021   • Medicare annual wellness visit, subsequent 2018   • Tobacco use 2018   • Hyperlipidemia 10/02/2014   • Essential hypertension 10/02/2014      LOS (days): 3  Geometric Mean LOS (GMLOS) (days): 3 50  Days to GMLOS:0 3     OBJECTIVE:  Risk of Unplanned Readmission Score: 22 83         Current admission status: Inpatient   Preferred Pharmacy:   [address] [place] [address], [address] [place] [id number]  Phone: [phone] Fax: [phone]    [address], [facility] [place] [facility] [address]  [address]  Phone: [phone] Fax: [phone]    Primary Care Provider: Eder Quiros DO    Primary Insurance: MEDICARE  Secondary Insurance: COMMERCIAL MISCELLANEOUS    DISCHARGE DETAILS:    Discharge planning discussed with[de-identified] [facility]         Is the patient interested in [facility] at discharge?: Yes  [address] requested[de-identified] Nursing, Occupational Therapy, Physical [address] Name[de-identified] Other ([address] [place])  [address] [place] [address] Provider[de-identified] PCP  Home Health Services Needed[de-identified] COPD Management, Diabetes Management, Evaluate Functional Status and Safety, Strengthening/Theraputic Exercises to Improve Function, Gait/ADL Training  Homebound Criteria Met[de-identified] Uses an Assist Device (i e  cane, walker, etc), Requires the Assistance of Another Person for Safe Ambulation or to Leave the Home  Supporting Clincal Findings[de-identified] Limited Endurance, Fatigues Easliy in Short Distances                   Treatment Team Recommendation: [facility]  Discharge Destination Plan[de-identified] Home with Rosy at Discharge : [address] and Unit #): Arielle  ETA of Transport (Date): 04/21/23  ETA of Transport (Time): [employer] [facility] moved up the  time to 12pm  Pt's daughter aware

## 2023-04-21 NOTE — DISCHARGE INSTR - AVS FIRST PAGE
To visiting nursing team: OK to remove staples at home by nursing team 7-10 days after placement  Can be removed between 4/24 - 4/26       Mayo Elaine PA-C 4/21/23

## 2023-04-21 NOTE — CASE MANAGEMENT
Case Management Discharge Planning Note    Patient name Lulu Backer  Location /400-01 MRN [id number]  : 1945 Date 2023       Current Admission Date: 2023  Current Admission Diagnosis:Acute on chronic respiratory failure with hypoxia and hypercapnia [facility])   Patient Active Problem List    Diagnosis Date Noted   • Acute on chronic diastolic CHF (congestive heart failure) ([facility] ) 2023   • Stress incontinence 2023   • Mediastinal lymphadenopathy 2023   • Oral candidiasis 2023   • Sepsis due to pneumonia ([facility] ) 2023   • JOHN (acute kidney injury) ([facility] ) 2023   • Diarrhea 2023   • Hypercalcemia 2022   • Microscopic hematuria 2022   • Permanent atrial fibrillation ([facility] ) 2022   • Hypoxia 2022   • Hypomagnesemia 2022   • COPD with acute exacerbation ([facility] ) 2022   • Multiple pulmonary nodules 2022   • Acute metabolic encephalopathy 15/40/2643   • Hyponatremia 2022   • Hypokalemia 2022   • Hyperbilirubinemia 2022   • Type 2 diabetes mellitus with hyperglycemia, without long-term current use of insulin ([place] [address] ) 2022   • Vitamin D deficiency 2022   • Chronic atrial fibrillation ([facility]  ) 2022   • Dermatitis 2022   • Acute on chronic respiratory failure with hypoxia and hypercapnia ([facility] ) 10/18/2021   • Ambulatory dysfunction 10/18/2021   • Medicare annual wellness visit, subsequent 2018   • Tobacco use 2018   • Hyperlipidemia 10/02/2014   • Essential hypertension 10/02/2014      LOS (days): 3  Geometric Mean LOS (GMLOS) (days): 3 50  Days to GMLOS:0 3     OBJECTIVE:  Risk of Unplanned Readmission Score: 22 83         Current admission status: Inpatient   Preferred Pharmacy:   [address] [place] [address], [address] [place] [id number]  Phone: [phone] Fax: [phone]    [address], [facility] [address]  [address]  Phone: [phone] Fax: [phone]    Primary Care Provider: Dorothy Prasad DO    Primary Insurance: MEDICARE  Secondary Insurance: COMMERCIAL MISCELLANEOUS    DISCHARGE DETAILS:      Requested [address]         Is the patient interested in [facility] at discharge?: Yes  [address] requested[de-identified] Nursing, Occupational Therapy, Physical [address] Name[de-identified] Other ([facility])  [address] [place] [address] Provider[de-identified] PCP  Home Health Services Needed[de-identified] COPD Management, Diabetes Management, Evaluate Functional Status and Safety, Strengthening/Theraputic Exercises to Improve Function, Gait/ADL Training  Homebound Criteria Met[de-identified] Uses an Assist Device (i e  cane, walker, etc), Requires the Assistance of Another Person for Safe Ambulation or to Leave the Home  Supporting Clincal Findings[de-identified] Limited Endurance, Fatigues Easliy in Short Distances    Discharge Destination Plan[de-identified] Home with Rosy at Discharge : [address] and Unit #):  Arielle  ETA of Transport (Date): 04/21/23  ETA of Transport (Time): 1400

## 2023-04-21 NOTE — DISCHARGE SUMMARY
[address] [facility] [address] [place]  Discharge- Aida Ceballos 1945, 77 y o  female MRN: [id number]  Unit/Bed#: 400-01 Encounter: [id number]  Primary Care Provider: Samra Boucher DO   Date and time admitted to hospital: 4/18/2023  3:35 AM    * Acute on chronic respiratory failure with hypoxia and hypercapnia ([facility] )  Assessment & Plan  · Patient states she is chronically on oxygen due to COPD, but only on an as needed basis  · She was admitted to the hospital with COPD exacerbation and hypoxia  · Currently still requiring 5 L and only maintaining oxygen saturations around 90 to 91  · Now on room air  · Start levalbuterol/ipratropium nebulizers 3 times daily  · Continue home Symbicort  · Prednisone 40 mg daily x5 days  · Will complete empiric 5 days of abx, received 4 days inpatient  Complete course with cefdinir outpatient  · Would benefit from outpatient follow-up with pulmonology    Acute on chronic diastolic CHF (congestive heart failure) ([facility] )  Assessment & Plan  · NP mildly elevated on admission at 616  · Last echocardiogram 8/15/2022: Preserved EF with moderate MR  · Questionable vascular congestion on chest x-ray; will follow-up final read  · Has been ruled out as an exacerbation this admission with CXR read as pneumonia not congestion  · Received IV lasix initially then no further given and she does not appear volume overloaded      Permanent atrial fibrillation (HCC)  Assessment & Plan  · Rate controlled off of any medications  · Not on anticoagulation    COPD with acute exacerbation ([facility] )  Assessment & Plan  · Presented to the hospital with shortness of breath, wheezing, an requiring 6 L O2  · Typically on oxygen as needed at home but only ambulates short distances and is not very active in general   · Continue home Symbicort  · Add levalbuterol/ipratropium nebulizer 3 times daily  · Prednisone 40 mg daily   Can complete taper at home  · Would benefit from outpatient follow-up with pulmonology    Type 2 diabetes mellitus with hyperglycemia, without long-term current use of insulin (HCC)  Assessment & Plan  · Last hemoglobin A1c 6 6%  · Resume home meds on discharge    Ambulatory dysfunction  Assessment & Plan  · PT/OT recommending 24 hour supervision and rehab  · Family would like patient home, they do have 24 hour supervision for her there    Patient did fall on 4/17 and had staples to close a lac on her head  Plan is to remove 7-10 days out  Tobacco use  Assessment & Plan  · Tobacco cessation strongly advised    Essential hypertension  Assessment & Plan  · stable  · continue lisinopril    Hyperlipidemia  Assessment & Plan  · Continue home statin      Medical Problems     Resolved Problems  Date Reviewed: 4/18/2023   None       Discharging Physician / Practitioner: Teodoro Waggoner PA-C  PCP: Kristopher Mccollum DO  Admission Date:   Admission Orders (From admission, onward)     Ordered        04/18/23 0444  INPATIENT ADMISSION  Once                      Discharge Date: 04/21/23    Consultations During Hospital Stay:  · none    Procedures Performed:   · none    Significant Findings / Test Results:     XR chest portable   ED Interpretation   Patchy opacification right lower lobe, query RLL PNA  Final Result      Right lower lobe infiltrate is new  Pleural parenchymal density in the left appears chronic  Pneumonia is possible  Pneumonia was noted on the ER preliminary result  Workstation performed: [id number]         XR chest pa & lateral    (Results Pending)         Incidental Findings:   · none    Test Results Pending at Discharge (will require follow up):   · none     Outpatient Tests Requested:  · none    Complications:  none    Reason for Admission: hypoxia    Hospital Course:   Hue John is a 77 y o  female patient who originally presented to the hospital on 4/18/2023 due to SOB  Patient had been seen in the ED on 4/17 after a fall   She sustained a "laceration to the posterior head  Staples placed by ED and patient wanted to go home  She returned on 4/19  She was found to be hypoxic, short of breath, and wheezing  CXR showed likely pneumonia  She received 4 days of IV ceftriaxone  Oxygen was weaned to room air on day 2  Prednisone was initiated at 40 mg PO daily  She did receive lasix on day 1 but appeared overall euvolemic and diuretics were stopped thereafter  Patient felt well on day of discharge  PT/OT recommended 24 hours supervision and rehab  Family wishes patient to go home with home health, she does have 24 hour supervision at home  Please see above list of diagnoses and related plan for additional information  Condition at Discharge: stable    Discharge Day Visit / Exam:   Subjective:  Patient reports feeling well and wants to go home today  Vitals: Blood Pressure: 124/83 (04/21/23 0624)  Pulse: 61 (04/21/23 0624)  Temperature: 97 9 °F (36 6 °C) (04/21/23 0624)  Temp Source: Oral (04/19/23 0625)  Respirations: 20 (04/20/23 1523)  Height: 5' 5\" (165 1 cm) (04/18/23 6638)  Weight - Scale: 58 1 kg (128 lb) (04/21/23 0535)  SpO2: 93 % (ra) (04/21/23 3231)  Exam:   Physical Exam  Vitals and nursing note reviewed  Constitutional:       General: She is not in acute distress  Appearance: She is ill-appearing  Comments: Cachectic appearing   Cardiovascular:      Rate and Rhythm: Normal rate  Rhythm irregular  Pulses: Normal pulses  Heart sounds: Normal heart sounds  Pulmonary:      Effort: Pulmonary effort is normal       Breath sounds: No wheezing or rales  Comments: diminished breath sounds  Abdominal:      General: Bowel sounds are normal       Palpations: Abdomen is soft  Tenderness: There is no abdominal tenderness  There is no guarding or rebound  Musculoskeletal:         General: Normal range of motion  Right lower leg: No edema  Left lower leg: No edema     Skin:     General: Skin is warm and " dry    Neurological:      General: No focal deficit present  Mental Status: She is alert  Mental status is at baseline  Psychiatric:         Mood and Affect: Mood normal          Behavior: Behavior normal           Discussion with Family: daughter  Discharge instructions/Information to patient and family:   See after visit summary for information provided to patient and family  Provisions for Follow-Up Care:  See after visit summary for information related to follow-up care and any pertinent home health orders  Disposition:   Home with [facility] (Reminder: Complete face to face encounter)    Planned Readmission: none     Discharge Statement:  I spent 60 minutes discharging the patient  This time was spent on the day of discharge  I had direct contact with the patient on the day of discharge  Greater than 50% of the total time was spent examining patient, answering all patient questions, arranging and discussing plan of care with patient as well as directly providing post-discharge instructions  Additional time then spent on discharge activities  Discharge Medications:  See after visit summary for reconciled discharge medications provided to patient and/or family        **Please Note: This note may have been constructed using a voice recognition system**

## 2023-04-21 NOTE — PLAN OF CARE
Problem: PAIN - ADULT  Goal: Verbalizes/displays adequate comfort level or baseline comfort level  Description: Interventions:  - Encourage patient to monitor pain and request assistance  - Assess pain using appropriate pain scale  - Administer analgesics based on type and severity of pain and evaluate response  - Implement non-pharmacological measures as appropriate and evaluate response  - Consider cultural and social influences on pain and pain management  - Notify physician/advanced practitioner if interventions unsuccessful or patient reports new pain  Outcome: Progressing     Problem: INFECTION - ADULT  Goal: Absence or prevention of progression during hospitalization  Description: INTERVENTIONS:  - Assess and monitor for signs and symptoms of infection  - Monitor lab/diagnostic results  - Monitor all insertion sites, i e  indwelling lines, tubes, and drains  - Monitor endotracheal if appropriate and nasal secretions for changes in amount and color  - Bladen appropriate cooling/warming therapies per order  - Administer medications as ordered  - Instruct and encourage patient and family to use good hand hygiene technique  - Identify and instruct in appropriate isolation precautions for identified infection/condition  Outcome: Progressing  Goal: Absence of fever/infection during neutropenic period  Description: INTERVENTIONS:  - Monitor WBC    Outcome: Progressing     Problem: SAFETY ADULT  Goal: Patient will remain free of falls  Description: INTERVENTIONS:  - Educate patient/family on patient safety including physical limitations  - Instruct patient to call for assistance with activity   - Consult OT/PT to assist with strengthening/mobility   - Keep Call bell within reach  - Keep bed low and locked with side rails adjusted as appropriate  - Keep care items and personal belongings within reach  - Initiate and maintain comfort rounds  - Make Fall Risk Sign visible to staff  - Offer Toileting every 2 Hours, in advance of need  - Initiate/Maintain bed alarm  - Obtain necessary fall risk management equipment: nonskid socks  - Apply yellow socks and bracelet for high fall risk patients  - Consider moving patient to room near nurses station  Outcome: Progressing  Goal: Maintain or return to baseline ADL function  Description: INTERVENTIONS:  -  Assess patient's ability to carry out ADLs; assess patient's baseline for ADL function and identify physical deficits which impact ability to perform ADLs (bathing, care of mouth/teeth, toileting, grooming, dressing, etc )  - Assess/evaluate cause of self-care deficits   - Assess range of motion  - Assess patient's mobility; develop plan if impaired  - Assess patient's need for assistive devices and provide as appropriate  - Encourage maximum independence but intervene and supervise when necessary  - Involve family in performance of ADLs  - Assess for home care needs following discharge   - Consider OT consult to assist with ADL evaluation and planning for discharge  - Provide patient education as appropriate  Outcome: Progressing  Goal: Maintains/Returns to pre admission functional level  Description: INTERVENTIONS:  - Perform BMAT or MOVE assessment daily    - Set and communicate daily mobility goal to care team and patient/family/caregiver  - Collaborate with rehabilitation services on mobility goals if consulted  - Perform Range of Motion 2 times a day  - Reposition patient every 2 hours    - Dangle patient 2 times a day  - Stand patient 2 times a day  - Ambulate patient 2 times a day  - Out of bed to chair 2 times a day   - Out of bed for meals 2 times a day  - Out of bed for toileting  - Record patient progress and toleration of activity level   Outcome: Progressing     Problem: DISCHARGE PLANNING  Goal: Discharge to home or other facility with appropriate resources  Description: INTERVENTIONS:  - Identify barriers to discharge w/patient and caregiver  - Arrange for needed discharge resources and transportation as appropriate  - Identify discharge learning needs (meds, wound care, etc )  - Arrange for interpretive services to assist at discharge as needed  - Refer to Case Management Department for coordinating discharge planning if the patient needs post-hospital services based on physician/advanced practitioner order or complex needs related to functional status, cognitive ability, or social support system  Outcome: Progressing     Problem: Knowledge Deficit  Goal: Patient/family/caregiver demonstrates understanding of disease process, treatment plan, medications, and discharge instructions  Description: Complete learning assessment and assess knowledge base    Interventions:  - Provide teaching at level of understanding  - Provide teaching via preferred learning methods  Outcome: Progressing     Problem: RESPIRATORY - ADULT  Goal: Achieves optimal ventilation and oxygenation  Description: INTERVENTIONS:  - Assess for changes in respiratory status  - Assess for changes in mentation and behavior  - Position to facilitate oxygenation and minimize respiratory effort  - Oxygen administered by appropriate delivery if ordered  - Initiate smoking cessation education as indicated  - Encourage broncho-pulmonary hygiene including cough, deep breathe, Incentive Spirometry  - Assess the need for suctioning and aspirate as needed  - Assess and instruct to report SOB or any respiratory difficulty  - Respiratory Therapy support as indicated  Outcome: Progressing     Problem: MUSCULOSKELETAL - ADULT  Goal: Maintain or return mobility to safest level of function  Description: INTERVENTIONS:  - Assess patient's ability to carry out ADLs; assess patient's baseline for ADL function and identify physical deficits which impact ability to perform ADLs (bathing, care of mouth/teeth, toileting, grooming, dressing, etc )  - Assess/evaluate cause of self-care deficits   - Assess range of motion  - Assess patient's mobility  - Assess patient's need for assistive devices and provide as appropriate  - Encourage maximum independence but intervene and supervise when necessary  - Involve family in performance of ADLs  - Assess for home care needs following discharge   - Consider OT consult to assist with ADL evaluation and planning for discharge  - Provide patient education as appropriate  Outcome: Progressing  Goal: Maintain proper alignment of affected body part  Description: INTERVENTIONS:  - Support, maintain and protect limb and body alignment  - Provide patient/ family with appropriate education  Outcome: Progressing     Problem: MOBILITY - ADULT  Goal: Maintain or return to baseline ADL function  Description: INTERVENTIONS:  -  Assess patient's ability to carry out ADLs; assess patient's baseline for ADL function and identify physical deficits which impact ability to perform ADLs (bathing, care of mouth/teeth, toileting, grooming, dressing, etc )  - Assess/evaluate cause of self-care deficits   - Assess range of motion  - Assess patient's mobility; develop plan if impaired  - Assess patient's need for assistive devices and provide as appropriate  - Encourage maximum independence but intervene and supervise when necessary  - Involve family in performance of ADLs  - Assess for home care needs following discharge   - Consider OT consult to assist with ADL evaluation and planning for discharge  - Provide patient education as appropriate  Outcome: Progressing  Goal: Maintains/Returns to pre admission functional level  Description: INTERVENTIONS:  - Perform BMAT or MOVE assessment daily    - Set and communicate daily mobility goal to care team and patient/family/caregiver  - Collaborate with rehabilitation services on mobility goals if consulted  - Perform Range of Motion 2 times a day  - Reposition patient every 2 hours    - Dangle patient 2 times a day  - Stand patient 2 times a day  - Ambulate patient 2 times a day  - Out of bed to chair 2 times a day   - Out of bed for meals 2 times a day  - Out of bed for toileting  - Record patient progress and toleration of activity level   Outcome: Progressing

## 2023-04-23 LAB
BACTERIA BLD CULT: NORMAL
BACTERIA BLD CULT: NORMAL

## 2023-04-24 ENCOUNTER — TELEPHONE (OUTPATIENT)
Dept: FAMILY MEDICINE CLINIC | Facility: CLINIC | Age: 78
End: 2023-04-24

## 2023-04-24 DIAGNOSIS — M54.9 ACUTE BILATERAL BACK PAIN, UNSPECIFIED BACK LOCATION: Primary | ICD-10-CM

## 2023-04-24 RX ORDER — GABAPENTIN 100 MG/1
100 CAPSULE ORAL 2 TIMES DAILY
Qty: 30 CAPSULE | Refills: 0 | Status: SHIPPED | OUTPATIENT
Start: 2023-04-24

## 2023-04-24 NOTE — TELEPHONE ENCOUNTER
Hi, my name is Sachin Sue  I'm a nurse [facility] [address]  I saw her two days ago after her hospital stay and she was complaining of a like a rating of 6/7 pain in her lower back and the daughter stated that it wasn't getting any better with regular Tylenol or anything  I was wondering if you'd be able to prescribe something else for pain  If you can call me back, my number is [phone]  Thank you

## 2023-05-01 ENCOUNTER — TELEMEDICINE (OUTPATIENT)
Dept: FAMILY MEDICINE CLINIC | Facility: CLINIC | Age: 78
End: 2023-05-01

## 2023-05-01 VITALS
HEIGHT: 65 IN | WEIGHT: 128 LBS | BODY MASS INDEX: 21.33 KG/M2 | RESPIRATION RATE: 18 BRPM | SYSTOLIC BLOOD PRESSURE: 130 MMHG | DIASTOLIC BLOOD PRESSURE: 76 MMHG | HEART RATE: 76 BPM

## 2023-05-01 DIAGNOSIS — E11.65 TYPE 2 DIABETES MELLITUS WITH HYPERGLYCEMIA, WITHOUT LONG-TERM CURRENT USE OF INSULIN (HCC): ICD-10-CM

## 2023-05-01 DIAGNOSIS — E80.6 HYPERBILIRUBINEMIA: ICD-10-CM

## 2023-05-01 DIAGNOSIS — F32.A DEPRESSION, UNSPECIFIED DEPRESSION TYPE: ICD-10-CM

## 2023-05-01 DIAGNOSIS — M54.9 BACK PAIN, UNSPECIFIED BACK LOCATION, UNSPECIFIED BACK PAIN LATERALITY, UNSPECIFIED CHRONICITY: ICD-10-CM

## 2023-05-01 DIAGNOSIS — J96.22 ACUTE ON CHRONIC RESPIRATORY FAILURE WITH HYPOXIA AND HYPERCAPNIA (HCC): ICD-10-CM

## 2023-05-01 DIAGNOSIS — I50.33 ACUTE ON CHRONIC DIASTOLIC CHF (CONGESTIVE HEART FAILURE) (HCC): Primary | ICD-10-CM

## 2023-05-01 DIAGNOSIS — J96.21 ACUTE ON CHRONIC RESPIRATORY FAILURE WITH HYPOXIA AND HYPERCAPNIA (HCC): ICD-10-CM

## 2023-05-01 DIAGNOSIS — R26.2 AMBULATORY DYSFUNCTION: ICD-10-CM

## 2023-05-01 PROBLEM — J18.9 SEPSIS DUE TO PNEUMONIA (HCC): Status: RESOLVED | Noted: 2023-03-03 | Resolved: 2023-05-01

## 2023-05-01 PROBLEM — E83.42 HYPOMAGNESEMIA: Status: RESOLVED | Noted: 2022-09-22 | Resolved: 2023-05-01

## 2023-05-01 PROBLEM — R09.02 HYPOXIA: Status: RESOLVED | Noted: 2022-09-22 | Resolved: 2023-05-01

## 2023-05-01 PROBLEM — A41.9 SEPSIS DUE TO PNEUMONIA (HCC): Status: RESOLVED | Noted: 2023-03-03 | Resolved: 2023-05-01

## 2023-05-01 PROBLEM — E87.6 HYPOKALEMIA: Status: RESOLVED | Noted: 2022-08-14 | Resolved: 2023-05-01

## 2023-05-01 PROBLEM — G93.41 ACUTE METABOLIC ENCEPHALOPATHY: Status: RESOLVED | Noted: 2022-08-14 | Resolved: 2023-05-01

## 2023-05-01 RX ORDER — ONDANSETRON 4 MG/1
1 TABLET, ORALLY DISINTEGRATING ORAL DAILY
Qty: 30 PATCH | Refills: 1 | Status: SHIPPED | OUTPATIENT
Start: 2023-05-01

## 2023-05-01 NOTE — PROGRESS NOTES
Virtual TCM Visit:    Verification of patient location:    Patient is located at Home in the following state in which I hold an active license [place]    Assessment/Plan:        Problem List Items Addressed This Visit        Endocrine    Type 2 diabetes mellitus with hyperglycemia, without long-term current use of insulin (HCC)       Respiratory    Acute on chronic respiratory failure with hypoxia and hypercapnia (HCC)       Cardiovascular and Mediastinum    Acute on chronic diastolic CHF (congestive heart failure) ([facility] [address] ) - Primary       Other    Ambulatory dysfunction    Hyperbilirubinemia   Other Visit Diagnoses     Depression, unspecified depression type        Relevant Medications    sertraline (Zoloft) 50 mg tablet    Back pain, unspecified back location, unspecified back pain laterality, unspecified chronicity        Relevant Medications    Diclofenac Epolamine 1 3 % PT[facility]         Pt daughter states they can get to upcoming Pulmonary appt in person  Her daughter states pt will be getting Dexcom meter this week from pharmacy  Monitor BS  Trial flector patch to low back area - pt/ot evaluation and eventual outpt PT   Use moist heat to the low back area   Stay hydrated Increase protein in diet   Schedule in person visit - pt needs scooter eval appt   Rto 1 month   Reason for visit is tcm    Encounter provider Elana Keen DO     Provider located at [address] [address]    Recent Visits  Date Type Provider Dept   04/24/23 Telephone Nolvia Oglesby LPN [id number] [facility]   Showing recent visits within past 7 days and meeting all other requirements  Today's Visits  Date Type Provider Dept   05/01/23 Telemedicine Elana Keen DO [address] [facility]   Showing today's visits and meeting all other requirements  Future Appointments  No visits were found meeting these conditions    Showing future appointments within next 150 days and meeting all other requirements       After connecting through [employer], the patient was identified by name and date of birth  Crissy Rodriges was informed that this is a telemedicine visit and that the visit is being conducted through the [employer]  She agrees to proceed     My office door was closed  No one else was in the room  She acknowledged consent and understanding of privacy and security of the video platform  The patient has agreed to participate and understands they can discontinue the visit at any time  Patient is aware this is a billable service  Transitional Care Management Review:  Crissy Rodriges is a 77 y o  female here for TCM follow up  During the TCM phone call patient stated:    TCM Call     Date and time call was made  4/21/2023  [facility] care reviewed  Records reviewed    Patient was hospitialized at  [facility]    Date of Admission  04/18/23    Date of discharge  04/21/23    Diagnosis  acute on chronic respiratory disease, hypercapnia, hypoxia    Disposition  Home; Home health services  [facility]    Were the patients medications reviewed and updated  No    Current Symptoms  None      TCM Call     Post hospital issues  Reduced activity    Scheduled for follow up? Yes    Patients specialists  Pulmonlolgist    Did you obtain your prescribed medications  Yes    Do you need help managing your prescriptions or medications  No    Is transportation to your appointment needed  --  NO TRANS< VIRTUAL    I have advised the patient to call PCP with any new or worsening symptoms  Morrie Barthel,     Counseling  Family             Patient ID: Crissy Rodriges is a 77 y o  female      HPI   Pt at home after acute hypoxia admission Sats now are 88% usually during transfers and mainly in mid 90 range Pt wears oxygen almost all the time but occasionally takes off and sats go down to hi 80 range Appetite is better Tapering prednisone from the hospital She has complaint "of low back pain No falls since home but had fall in recent past with head laceration No chest pain Breathing better per pt and her daughter She is supposed to get dexcom meter for BS this week No diarrhea reported She is drinking fluids more often now   Review of Systems   Constitutional: Negative for chills and fever  HENT: Negative  Eyes: Negative for visual disturbance  Respiratory: Positive for shortness of breath  Negative for cough  Cardiovascular: Negative for chest pain, palpitations and leg swelling  Gastrointestinal: Negative for abdominal distention and abdominal pain  Genitourinary: Negative  Musculoskeletal: Positive for arthralgias and back pain  Neurological: Negative for dizziness, light-headedness and headaches  Psychiatric/Behavioral: Negative for sleep disturbance  The patient is not nervous/anxious  Vitals:    05/01/23 1203   BP: 130/76   Pulse: 76   Resp: 18   Weight: 58 1 kg (128 lb)   Height: 5' 5\" (1 651 m)       Physical Exam  Vitals reviewed  Constitutional:       General: She is not in acute distress  Appearance: Normal appearance  She is not ill-appearing, toxic-appearing or diaphoretic  HENT:      Head: Normocephalic and atraumatic  Right Ear: External ear normal       Left Ear: External ear normal       Nose: Nose normal       Mouth/Throat:      Mouth: Mucous membranes are moist    Eyes:      General: No scleral icterus  Extraocular Movements: Extraocular movements intact  Conjunctiva/sclera: Conjunctivae normal       Pupils: Pupils are equal, round, and reactive to light  Cardiovascular:      Rate and Rhythm: Normal rate and regular rhythm  Pulses: Normal pulses  Heart sounds: Normal heart sounds  Pulmonary:      Effort: Pulmonary effort is normal  No respiratory distress  Breath sounds: No wheezing  Abdominal:      General: Bowel sounds are normal  There is no distension  Palpations: Abdomen is soft   " Tenderness: There is no abdominal tenderness  Musculoskeletal:      Cervical back: Normal range of motion and neck supple  No rigidity  Right lower leg: No edema  Left lower leg: No edema  Lymphadenopathy:      Cervical: No cervical adenopathy  Skin:     General: Skin is warm and dry  Neurological:      General: No focal deficit present  Mental Status: She is alert and oriented to person, place, and time  Mental status is at baseline  Cranial Nerves: No cranial nerve deficit  Sensory: No sensory deficit  Psychiatric:         Mood and Affect: Mood normal          Behavior: Behavior normal          Thought Content: Thought content normal          Judgment: Judgment normal          Medications have been reviewed by provider in current encounter    I spent 20 minutes with the patient during this visit  Charlotte Granados DO      VIRTUAL VISIT DISCLAIMER    Eyad Hidalgo verbally agrees to participate in [facility]  Pt is aware that [facility] could be limited without vital signs or the ability to perform a full hands-on physical exam  Tiffani A Tayler Low understands she or the provider may request at any time to terminate the video visit and request the patient to seek care or treatment in person

## 2023-05-05 ENCOUNTER — TELEPHONE (OUTPATIENT)
Dept: FAMILY MEDICINE CLINIC | Facility: CLINIC | Age: 78
End: 2023-05-05

## 2023-05-05 NOTE — TELEPHONE ENCOUNTER
Was in to see the patient for a PT oneyda, will be in to see her twice a week for two weeks and the once a week for three weeks  She said she had a fall Thursday with no injury  She was walking without her walker

## 2023-05-12 ENCOUNTER — RA CDI HCC (OUTPATIENT)
Dept: OTHER | Facility: HOSPITAL | Age: 78
End: 2023-05-12

## 2023-05-12 NOTE — PROGRESS NOTES
[address]  [facility]  coding opportunities          Chart Reviewed number of suggestions sent to Provider: 1     Patients Insurance     Medicare Insurance: [employer]

## 2023-05-16 NOTE — PATIENT INSTRUCTIONS
Medicare Preventive Visit Patient Instructions  Thank you for completing your Welcome to Medicare Visit or Medicare Annual Wellness Visit today  Your next wellness visit will be due in one year (1/15/2022)  The screening/preventive services that you may require over the next 5-10 years are detailed below  Some tests may not apply to you based off risk factors and/or age  Screening tests ordered at today's visit but not completed yet may show as past due  Also, please note that scanned in results may not display below  Preventive Screenings:  Service Recommendations Previous Testing/Comments   Colorectal Cancer Screening  * Colonoscopy    * Fecal Occult Blood Test (FOBT)/Fecal Immunochemical Test (FIT)  * Fecal DNA/Cologuard Test  * Flexible Sigmoidoscopy Age: 54-65 years old   Colonoscopy: every 10 years (may be performed more frequently if at higher risk)  OR  FOBT/FIT: every 1 year  OR  Cologuard: every 3 years  OR  Sigmoidoscopy: every 5 years  Screening may be recommended earlier than age 48 if at higher risk for colorectal cancer  Also, an individualized decision between you and your healthcare provider will decide whether screening between the ages of 74-80 would be appropriate  Colonoscopy: Not on file  FOBT/FIT: Not on file  Cologuard: Not on file  Sigmoidoscopy: Not on file         Breast Cancer Screening Age: 36 years old  Frequency: every 1-2 years  Not required if history of left and right mastectomy Mammogram: Not on file       Cervical Cancer Screening Between the ages of 21-29, pap smear recommended once every 3 years  Between the ages of 33-67, can perform pap smear with HPV co-testing every 5 years     Recommendations may differ for women with a history of total hysterectomy, cervical cancer, or abnormal pap smears in past  Pap Smear: Not on file    Screening Not Indicated   Hepatitis C Screening Once for adults born between [facility]  More frequently in patients at high risk for Hepatitis C Hep C Antibody: 07/17/2018    Screening Current   Diabetes Screening 1-2 times per year if you're at risk for diabetes or have pre-diabetes Fasting glucose: 208 mg/dL   A1C: 9 2    Screening Not Indicated  History Diabetes   Cholesterol Screening Once every 5 years if you don't have a lipid disorder  May order more often based on risk factors  Lipid panel: 11/05/2019    Screening Not Indicated  History Lipid Disorder     Other Preventive Screenings Covered by Medicare:  1  Abdominal Aortic Aneurysm (AAA) Screening: covered once if your at risk  You're considered to be at risk if you have a family history of AAA  2  Lung Cancer Screening: covers low dose CT scan once per year if you meet all of the following conditions: (1) Age 50-69; (2) No signs or symptoms of lung cancer; (3) Current smoker or have quit smoking within the last 15 years; (4) You have a tobacco smoking history of at least 30 pack years (packs per day multiplied by number of years you smoked); (5) You get a written order from a healthcare provider  3  Glaucoma Screening: covered annually if you're considered high risk: (1) You have diabetes OR (2) Family history of glaucoma OR (3)  aged 48 and older OR (3)  American aged 72 and older  3  Osteoporosis Screening: covered every 2 years if you meet one of the following conditions: (1) You're estrogen deficient and at risk for osteoporosis based off medical history and other findings; (2) Have a vertebral abnormality; (3) On glucocorticoid therapy for more than 3 months; (4) Have primary hyperparathyroidism; (5) On osteoporosis medications and need to assess response to drug therapy  · Last bone density test (DXA Scan): Not on file  5  HIV Screening: covered annually if you're between the age of 12-76  Also covered annually if you are younger than 13 and older than 72 with risk factors for HIV infection   For pregnant patients, it is covered up to 3 times per pregnancy  Immunizations:  Immunization Recommendations   Influenza Vaccine Annual influenza vaccination during flu season is recommended for all persons aged >= 6 months who do not have contraindications   Pneumococcal Vaccine (Prevnar and Pneumovax)  * Prevnar = PCV13  * Pneumovax = PPSV23   Adults 25-60 years old: 1-3 doses may be recommended based on certain risk factors  Adults 72 years old: Prevnar (PCV13) vaccine recommended followed by Pneumovax (PPSV23) vaccine  If already received PPSV23 since turning 65, then PCV13 recommended at least one year after PPSV23 dose  Hepatitis B Vaccine 3 dose series if at intermediate or high risk (ex: diabetes, end stage renal disease, liver disease)   Tetanus (Td) Vaccine - COST NOT COVERED BY MEDICARE PART B Following completion of primary series, a booster dose should be given every 10 years to maintain immunity against tetanus  Td may also be given as tetanus wound prophylaxis  Tdap Vaccine - COST NOT COVERED BY MEDICARE PART B Recommended at least once for all adults  For pregnant patients, recommended with each pregnancy  Shingles Vaccine (Shingrix) - COST NOT COVERED BY MEDICARE PART B  2 shot series recommended in those aged 48 and above     Health Maintenance Due:      Topic Date Due    DXA SCAN  1945    Hepatitis C Screening  Completed     Immunizations Due:      Topic Date Due    DTaP,Tdap,and Td Vaccines (1 - Tdap) 02/[birth year]/1965    Pneumococcal Vaccine: 65+ Years (1 of 1 - PPSV23) 02/23/2009     Advance Directives   What are advance directives? Advance directives are legal documents that state your wishes and plans for medical care  These plans are made ahead of time in case you lose your ability to make decisions for yourself  Advance directives can apply to any medical decision, such as the treatments you want, and if you want to donate organs  What are the types of advance directives?   There are many types of advance directives, and each state has rules about how to use them  You may choose a combination of any of the following:  · Living will: This is a written record of the treatment you want  You can also choose which treatments you do not want, which to limit, and which to stop at a certain time  This includes surgery, medicine, IV fluid, and tube feedings  · Durable power of  for healthcare [facility]): This is a written record that states who you want to make healthcare choices for you when you are unable to make them for yourself  This person, called a proxy, is usually a family member or a friend  You may choose more than 1 proxy  · Do not resuscitate (DNR) order:  A DNR order is used in case your heart stops beating or you stop breathing  It is a request not to have certain forms of treatment, such as CPR  A DNR order may be included in other types of advance directives  · Medical directive: This covers the care that you want if you are in a coma, near death, or unable to make decisions for yourself  You can list the treatments you want for each condition  Treatment may include pain medicine, surgery, blood transfusions, dialysis, IV or tube feedings, and a ventilator (breathing machine)  · Values history: This document has questions about your views, beliefs, and how you feel and think about life  This information can help others choose the care that you would choose  Why are advance directives important? An advance directive helps you control your care  Although spoken wishes may be used, it is better to have your wishes written down  Spoken wishes can be misunderstood, or not followed  Treatments may be given even if you do not want them  An advance directive may make it easier for your family to make difficult choices about your care  Cigarette Smoking and Your Health   Risks to your health if you smoke:  Nicotine and other chemicals found in tobacco damage every cell in your body   Even if you are a light smoker, you have an increased risk for cancer, heart disease, and lung disease  If you are pregnant or have diabetes, smoking increases your risk for complications  Benefits to your health if you stop smoking:   · You decrease respiratory symptoms such as coughing, wheezing, and shortness of breath  · You reduce your risk for cancers of the lung, mouth, throat, kidney, bladder, pancreas, stomach, and cervix  If you already have cancer, you increase the benefits of chemotherapy  You also reduce your risk for cancer returning or a second cancer from developing  · You reduce your risk for heart disease, blood clots, heart attack, and stroke  · You reduce your risk for lung infections, and diseases such as pneumonia, asthma, chronic bronchitis, and emphysema  · Your circulation improves  More oxygen can be delivered to your body  If you have diabetes, you lower your risk for complications, such as kidney, artery, and eye diseases  You also lower your risk for nerve damage  Nerve damage can lead to amputations, poor vision, and blindness  · You improve your body's ability to heal and to fight infections  For more information and support to stop smoking:   · [employer]  Phone: 4- 699 - 649-3235  Web Address: www [employer]    [address] 2018 Information is for End User's use only and may not be sold, redistributed or otherwise used for commercial purposes   All illustrations and images included in CareNotes® are the copyrighted property of A D A M , Inc  or [address] [place] [address] 128

## 2023-05-20 ENCOUNTER — OFFICE VISIT (OUTPATIENT)
Dept: FAMILY MEDICINE CLINIC | Facility: CLINIC | Age: 78
End: 2023-05-20

## 2023-05-20 VITALS
HEART RATE: 68 BPM | TEMPERATURE: 97.3 F | SYSTOLIC BLOOD PRESSURE: 124 MMHG | WEIGHT: 122.6 LBS | BODY MASS INDEX: 20.43 KG/M2 | DIASTOLIC BLOOD PRESSURE: 70 MMHG | HEIGHT: 65 IN | OXYGEN SATURATION: 94 % | RESPIRATION RATE: 18 BRPM

## 2023-05-20 DIAGNOSIS — E11.65 TYPE 2 DIABETES MELLITUS WITH HYPERGLYCEMIA, WITHOUT LONG-TERM CURRENT USE OF INSULIN (HCC): ICD-10-CM

## 2023-05-20 DIAGNOSIS — Z00.00 MEDICARE ANNUAL WELLNESS VISIT, SUBSEQUENT: Primary | ICD-10-CM

## 2023-05-20 NOTE — PATIENT INSTRUCTIONS
Medicare Preventive Visit Patient Instructions  Thank you for completing your Welcome to Medicare Visit or Medicare Annual Wellness Visit today  Your next wellness visit will be due in one year (5/20/2024)  The screening/preventive services that you may require over the next 5-10 years are detailed below  Some tests may not apply to you based off risk factors and/or age  Screening tests ordered at today's visit but not completed yet may show as past due  Also, please note that scanned in results may not display below  Preventive Screenings:  Service Recommendations Previous Testing/Comments   Colorectal Cancer Screening  * Colonoscopy    * Fecal Occult Blood Test (FOBT)/Fecal Immunochemical Test (FIT)  * Fecal DNA/Cologuard Test  * Flexible Sigmoidoscopy Age: 39-70 years old   Colonoscopy: every 10 years (may be performed more frequently if at higher risk)  OR  FOBT/FIT: every 1 year  OR  Cologuard: every 3 years  OR  Sigmoidoscopy: every 5 years  Screening may be recommended earlier than age 39 if at higher risk for colorectal cancer  Also, an individualized decision between you and your healthcare provider will decide whether screening between the ages of 74-80 would be appropriate  Colonoscopy: Not on file  FOBT/FIT: Not on file  Cologuard: Not on file  Sigmoidoscopy: Not on file          Breast Cancer Screening Age: 36 years old  Frequency: every 1-2 years  Not required if history of left and right mastectomy Mammogram: Not on file        Cervical Cancer Screening Between the ages of 21-29, pap smear recommended once every 3 years  Between the ages of 33-67, can perform pap smear with HPV co-testing every 5 years     Recommendations may differ for women with a history of total hysterectomy, cervical cancer, or abnormal pap smears in past  Pap Smear: Not on file    Screening Not Indicated   Hepatitis C Screening Once for adults born between [facility]  More frequently in patients at high risk for Hepatitis C Hep C Antibody: 07/17/2018    Screening Current   Diabetes Screening 1-2 times per year if you're at risk for diabetes or have pre-diabetes Fasting glucose: 13 mg/dL (2/13/2023)  A1C: 6 6 % (2/21/2023)  Screening Not Indicated  History Diabetes   Cholesterol Screening Once every 5 years if you don't have a lipid disorder  May order more often based on risk factors  Lipid panel: 08/03/2021    Screening Not Indicated  History Lipid Disorder     Other Preventive Screenings Covered by Medicare:  1  Abdominal Aortic Aneurysm (AAA) Screening: covered once if your at risk  You're considered to be at risk if you have a family history of AAA  2  Lung Cancer Screening: covers low dose CT scan once per year if you meet all of the following conditions: (1) Age 50-69; (2) No signs or symptoms of lung cancer; (3) Current smoker or have quit smoking within the last 15 years; (4) You have a tobacco smoking history of at least 20 pack years (packs per day multiplied by number of years you smoked); (5) You get a written order from a healthcare provider  3  Glaucoma Screening: covered annually if you're considered high risk: (1) You have diabetes OR (2) Family history of glaucoma OR (3)  aged 48 and older OR (3)  American aged 72 and older  3  Osteoporosis Screening: covered every 2 years if you meet one of the following conditions: (1) You're estrogen deficient and at risk for osteoporosis based off medical history and other findings; (2) Have a vertebral abnormality; (3) On glucocorticoid therapy for more than 3 months; (4) Have primary hyperparathyroidism; (5) On osteoporosis medications and need to assess response to drug therapy  · Last bone density test (DXA Scan): Not on file  5  HIV Screening: covered annually if you're between the age of 12-76  Also covered annually if you are younger than 13 and older than 72 with risk factors for HIV infection   For pregnant patients, it is covered up to 3 times per pregnancy  Immunizations:  Immunization Recommendations   Influenza Vaccine Annual influenza vaccination during flu season is recommended for all persons aged >= 6 months who do not have contraindications   Pneumococcal Vaccine   * Pneumococcal conjugate vaccine = PCV13 (Prevnar 13), PCV15 (Vaxneuvance), PCV20 (Prevnar 20)  * Pneumococcal polysaccharide vaccine = PPSV23 (Pneumovax) Adults 25-60 years old: 1-3 doses may be recommended based on certain risk factors  Adults 72 years old: 1-2 doses may be recommended based off what pneumonia vaccine you previously received   Hepatitis B Vaccine 3 dose series if at intermediate or high risk (ex: diabetes, end stage renal disease, liver disease)   Tetanus (Td) Vaccine - COST NOT COVERED BY MEDICARE PART B Following completion of primary series, a booster dose should be given every 10 years to maintain immunity against tetanus  Td may also be given as tetanus wound prophylaxis  Tdap Vaccine - COST NOT COVERED BY MEDICARE PART B Recommended at least once for all adults  For pregnant patients, recommended with each pregnancy  Shingles Vaccine (Shingrix) - COST NOT COVERED BY MEDICARE PART B  2 shot series recommended in those aged 48 and above     Health Maintenance Due:      Topic Date Due   • DXA SCAN  Never done   • Breast Cancer Screening: Mammogram  03/13/2019   • Hepatitis C Screening  Completed     Immunizations Due:      Topic Date Due   • COVID-19 Vaccine (1) Never done   • Pneumococcal Vaccine: 65+ Years (1 - PCV) Never done     Advance Directives   What are advance directives? Advance directives are legal documents that state your wishes and plans for medical care  These plans are made ahead of time in case you lose your ability to make decisions for yourself  Advance directives can apply to any medical decision, such as the treatments you want, and if you want to donate organs  What are the types of advance directives?   There are many types of advance directives, and each state has rules about how to use them  You may choose a combination of any of the following:  · Living will: This is a written record of the treatment you want  You can also choose which treatments you do not want, which to limit, and which to stop at a certain time  This includes surgery, medicine, IV fluid, and tube feedings  · Durable power of  for healthcare [place] [facility]): This is a written record that states who you want to make healthcare choices for you when you are unable to make them for yourself  This person, called a proxy, is usually a family member or a friend  You may choose more than 1 proxy  · Do not resuscitate (DNR) order:  A DNR order is used in case your heart stops beating or you stop breathing  It is a request not to have certain forms of treatment, such as CPR  A DNR order may be included in other types of advance directives  · Medical directive: This covers the care that you want if you are in a coma, near death, or unable to make decisions for yourself  You can list the treatments you want for each condition  Treatment may include pain medicine, surgery, blood transfusions, dialysis, IV or tube feedings, and a ventilator (breathing machine)  · Values history: This document has questions about your views, beliefs, and how you feel and think about life  This information can help others choose the care that you would choose  Why are advance directives important? An advance directive helps you control your care  Although spoken wishes may be used, it is better to have your wishes written down  Spoken wishes can be misunderstood, or not followed  Treatments may be given even if you do not want them  An advance directive may make it easier for your family to make difficult choices about your care  Fall Prevention    Fall prevention  includes ways to make your home and other areas safer   It also includes ways you can move more carefully to prevent a fall  Health conditions that cause changes in your blood pressure, vision, or muscle strength and coordination may increase your risk for falls  Medicines may also increase your risk for falls if they make you dizzy, weak, or sleepy  Fall prevention tips:   · Stand or sit up slowly  · Use assistive devices as directed  · Wear shoes that fit well and have soles that   · Wear a personal alarm  · Stay active  · Manage your medical conditions  Home Safety Tips:  · Add items to prevent falls in the bathroom  · Keep paths clear  · Install bright lights in your home  · Keep items you use often on shelves within reach  · Paint or place reflective tape on the edges of your stairs  Urinary Incontinence   Urinary incontinence (UI)  is when you lose control of your bladder  UI develops because your bladder cannot store or empty urine properly  The 3 most common types of UI are stress incontinence, urge incontinence, or both  Medicines:   · May be given to help strengthen your bladder control  Report any side effects of medication to your healthcare provider  Do pelvic muscle exercises often:  Your pelvic muscles help you stop urinating  Squeeze these muscles tight for 5 seconds, then relax for 5 seconds  Gradually work up to squeezing for 10 seconds  Do 3 sets of 15 repetitions a day, or as directed  This will help strengthen your pelvic muscles and improve bladder control  Train your bladder:  Go to the bathroom at set times, such as every 2 hours, even if you do not feel the urge to go  You can also try to hold your urine when you feel the urge to go  For example, hold your urine for 5 minutes when you feel the urge to go  As that becomes easier, hold your urine for 10 minutes  Self-care:   · Keep a UI record  Write down how often you leak urine and how much you leak  Make a note of what you were doing when you leaked urine  · Drink liquids as directed   You may need to limit the amount of liquid you drink to help control your urine leakage  Do not drink any liquid right before you go to bed  Limit or do not have drinks that contain caffeine or alcohol  · Prevent constipation  Eat a variety of high-fiber foods  Good examples are high-fiber cereals, beans, vegetables, and whole-grain breads  Walking is the best way to trigger your intestines to have a bowel movement  · Exercise regularly and maintain a healthy weight  Weight loss and exercise will decrease pressure on your bladder and help you control your leakage  · Use a catheter as directed  to help empty your bladder  A catheter is a tiny, plastic tube that is put into your bladder to drain your urine  · Go to behavior therapy as directed  Behavior therapy may be used to help you learn to control your urge to urinate  © Copyright [employer] 2018 Information is for End User's use only and may not be sold, redistributed or otherwise used for commercial purposes   All illustrations and images included in CareNotes® are the copyrighted property of A D A M , Inc  or [address] [place] [address] [employer]

## 2023-05-20 NOTE — PROGRESS NOTES
Assessment and Plan:     Problem List Items Addressed This Visit        Endocrine    Type 2 diabetes mellitus with hyperglycemia, without long-term current use of insulin (HCC)    Relevant Orders    IRIS Diabetic eye exam    HEMOGLOBIN A1C W/ EAG ESTIMATION    Comprehensive metabolic panel    Lipid panel    Albumin / creatinine urine ratio       Other    Medicare annual wellness visit, subsequent - Primary   Mobile lab draw  They are planning to get to Pulmonary appt  Encouraged POA/AD and gave folder file Completed POLST in office today   Pt daughter in process to be paid caregiver   Oxygen at HS and prn and pt much improved She has stopped smoking! Rto 4months       Depression Screening and Follow-up Plan: Patient was screened for depression during today's encounter  They screened negative with a PHQ-2 score of 0  Falls Plan of Care: balance, strength, and gait training instructions were provided  Urinary Incontinence Plan of Care: counseling topics discussed: limiting fluid intake 3-4 hours before bed  Preventive health issues were discussed with patient, and age appropriate screening tests were ordered as noted in patient's After Visit Summary  Personalized health advice and appropriate referrals for health education or preventive services given if needed, as noted in patient's After Visit Summary  History of Present Illness:     Patient presents for a Medicare Wellness Visit    HPI   Pt generally improved Her breathing is better and uses oxygen mainly at HS Appetite better and BS more stable She can transfer with minimal assist now No falls Memory still poses an issue but pt not very active - watches same movie during the day She is more alert and stays up more at home No back pain   Patient Care Team:  Niurka Mccallum DO as PCP - DO David     Review of Systems:     Review of Systems  Diabetic Foot Exam    Patient's shoes and socks removed      Right Foot/Ankle   Right Foot Inspection  Skin Exam: skin normal, skin intact and erythema  No dry skin, no warmth, no callus, no maceration, no abnormal color, no pre-ulcer, no ulcer and no callus  Toe Exam: ROM and strength within normal limits and right toe deformity  Sensory   Vibration: diminished  Monofilament testing: diminished    Vascular  The right DP pulse is 1+  The right PT pulse is 1+  Left Foot/Ankle  Left Foot Inspection  Skin Exam: skin normal, skin intact and erythema  No dry skin, no warmth, no maceration, normal color, no pre-ulcer, no ulcer and no callus  Toe Exam: ROM and strength within normal limits and left toe deformity  Sensory   Vibration: diminished  Monofilament testing: diminished    Vascular  The left DP pulse is 1+  The left PT pulse is 1+       Assign Risk Category  Deformity present  Loss of protective sensation  No weak pulses  Risk: 2     Problem List:     Patient Active Problem List   Diagnosis   • Hyperlipidemia   • Essential hypertension   • Medicare annual wellness visit, subsequent   • Tobacco use   • Acute on chronic respiratory failure with hypoxia and hypercapnia ([facility])   • Ambulatory dysfunction   • Chronic atrial fibrillation ([facility])   • Dermatitis   • Multiple pulmonary nodules   • Hyponatremia   • Hyperbilirubinemia   • Type 2 diabetes mellitus with hyperglycemia, without long-term current use of insulin ([facility])   • Vitamin D deficiency   • COPD with acute exacerbation ([facility])   • Hypercalcemia   • Microscopic hematuria   • Permanent atrial fibrillation ([facility])   • Diarrhea   • JOHN (acute kidney injury) ([facility])   • Oral candidiasis   • Mediastinal lymphadenopathy   • Stress incontinence   • Acute on chronic diastolic CHF (congestive heart failure) ([facility])      Past Medical and Surgical History:     Past Medical History:   Diagnosis Date   • History of intracranial hemorrhage 8/14/2022   • Hyperlipidemia     last assessed  8/24/17   • Hypertension     last assessed 10/2/14   • Hypokalemia 8/14/2022   • Hypomagnesemia 9/22/2022   • Sepsis due to pneumonia [facility]) 3/3/2023     Past Surgical History:   Procedure Laterality Date   • CRANIOTOMY        Family History:     Family History   Problem Relation Age of Onset   • Breast cancer Mother    • Ovarian cancer Mother    • Diabetes Father       Social History:     Social History     Socioeconomic History   • Marital status:      Spouse name: None   • Number of children: None   • Years of education: None   • Highest education level: None   Occupational History   • None   Tobacco Use   • Smoking status: Former     Packs/day: 1 00     Types: Cigarettes   • Smokeless tobacco: Never   Vaping Use   • Vaping Use: Never used   Substance and Sexual Activity   • Alcohol use: Never   • Drug use: No   • Sexual activity: Not Currently   Other Topics Concern   • None   Social History Narrative    Always uses seat belt    Always uses sunscreen    Dental care occasionally    No living will     Social Determinants of Health     Financial Resource Strain: Not on file   Food Insecurity: No Food Insecurity   • Worried About Running Out of Food in the Last Year: Never true   • Ran Out of Food in the Last Year: Never true   Transportation Needs: No Transportation Needs   • Lack of Transportation (Medical): No   • Lack of Transportation (Non-Medical): No   Physical Activity: Not on file   Stress: Not on file   Social Connections: Not on file   Intimate Partner Violence: Not on file   Housing Stability: Low Risk    • Unable to Pay for Housing in the Last Year: No   • Number of Places Lived in the Last Year: 1   • Unstable Housing in the Last Year: No      Medications and Allergies:     Current Outpatient Medications   Medication Sig Dispense Refill   • Accu-Chek FastClix Lancets MISC Use 1 each daily to test blood sugars   102 each 0   • acetaminophen (TYLENOL) 325 mg tablet Take 1 tablet (325 mg total) by mouth every 6 (six) hours as needed for fever, mild pain, moderate pain, severe pain or headaches Do not exceed a total of 3 grams of tylenol/acetaminophen in a 24-hour period  0   • albuterol (2 5 mg/3 mL) 0 083 % nebulizer solution Take 3 mL (2 5 mg total) by nebulization every 6 (six) hours as needed for shortness of breath 10 mL 0   • aspirin (ECOTRIN LOW STRENGTH) 81 mg EC tablet Take 1 tablet (81 mg total) by mouth daily Do not start before February 17, 2023  (Patient taking differently: Take 81 mg by mouth daily Delayed release) 30 tablet 0   • Blood Glucose Monitoring Suppl (Accu-Chek Flores Plus) w/Device KIT Use 1 kit daily to test blood sugars  1 kit 0   • budesonide-formoterol (SYMBICORT) 80-4 5 MCG/ACT inhaler Inhale 2 puffs 2 (two) times a day Rinse mouth after use  10 2 g 0   • Continuous Blood Gluc  (Dexcom G6 ) [facility] ([place], [place]) Use 1 Device continuous 1 each 0   • Continuous Blood Gluc Sensor (Dexcom G6 Sensor) MISC Use 1 Device 4 (four) times a day as directed   1 each 0   • Continuous Blood Gluc Transmit (Dexcom G6 Transmitter) MISC Use 1 Device continuous 1 each 0   • Diclofenac Epolamine 1 3 % PTCH Apply 1 patch topically in the morning Apply for up to 12 hours to back area 30 patch 1   • gabapentin (Neurontin) 100 mg capsule Take 1 capsule (100 mg total) by mouth 2 (two) times a day 30 capsule 0   • glucose blood test strip Use 1 each 4 (four) times a day Use as instructed     • guaiFENesin (MUCINEX) 600 mg 12 hr tablet Take 1 tablet (600 mg total) by mouth every 12 (twelve) hours 30 tablet 0   • lisinopril (ZESTRIL) 10 mg tablet Take 1 tablet (10 mg total) by mouth daily Take this in place of the combination medication with lisinopril and hydrochlorothiazide to treat high blood pressure 30 tablet 5   • pravastatin (PRAVACHOL) 80 mg tablet Take 1 tablet (80 mg total) by mouth daily with dinner 30 tablet 0   • sertraline (Zoloft) 50 mg tablet Take 1 tablet (50 mg total) by mouth daily 30 tablet 5   • sitaGLIPtin-metFORMIN (Janumet)  MG per tablet Take 1 tablet by mouth daily with breakfast 180 tablet 0   • triamcinolone (KENALOG) 0 025 % cream Apply 1 application  topically 2 (two) times a day     • umeclidinium bromide (INCRUSE ELLIPTA) 62 5 mcg/inh AEPB inhaler Inhale 1 puff daily 30 blister 3     No current facility-administered medications for this visit  No Known Allergies   Immunizations:     Immunization History   Administered Date(s) Administered   • Tdap 08/13/2022      Health Maintenance:         Topic Date Due   • DXA SCAN  Never done   • Breast Cancer Screening: Mammogram  03/13/2019   • Hepatitis C Screening  Completed         Topic Date Due   • COVID-19 Vaccine (1) Never done   • Pneumococcal Vaccine: 65+ Years (1 - PCV) Never done      Medicare Screening Tests and Risk Assessments:     Jackie Ortiz is here for her Subsequent Wellness visit  Last Medicare Wellness visit information reviewed, patient interviewed, no change since last AWV  Health Risk Assessment:   Patient rates overall health as fair  Patient feels that their physical health rating is same  Patient is dissatisfied with their life  Eyesight was rated as same  Hearing was rated as slightly worse  Patient feels that their emotional and mental health rating is same  Patients states they are never, rarely angry  Patient states they are never, rarely unusually tired/fatigued  Pain experienced in the last 7 days has been none  Patient states that she has experienced no weight loss or gain in last 6 months  Depression Screening:   PHQ-2 Score: 0      Fall Risk Screening: In the past year, patient has experienced: history of falling in past year    Number of falls: 2 or more  Injured during fall?: Yes    Feels unsteady when standing or walking?: Yes    Worried about falling?: Yes      Urinary Incontinence Screening:   Patient has leaked urine accidently in the last six months  Home Safety:  Patient has trouble with stairs inside or outside of their home   Patient has working smoke alarms and has working carbon monoxide detector  Home safety hazards include: none  Nutrition:   Current diet is Regular, No Added Salt and Limited junk food  Medications:   Patient is currently taking over-the-counter supplements  OTC medications include: see medication list  Patient is not able to manage medications  Activities of Daily Living (ADLs)/Instrumental Activities of Daily Living (IADLs):   Walk and transfer into and out of bed and chair?: No  Dress and groom yourself?: No    Bathe or shower yourself?: No    Feed yourself?  Yes  Do your laundry/housekeeping?: No  Manage your money, pay your bills and track your expenses?: No  Make your own meals?: No    Do your own shopping?: No    Previous Hospitalizations:   Any hospitalizations or ED visits within the last 12 months?: Yes    How many hospitalizations have you had in the last year?: more than 4    Advance Care Planning:   Living will: No    Durable POA for healthcare: No    Advanced directive: No    Advanced directive counseling given: Yes    Five wishes given: Yes    End of Life Decisions reviewed with patient: Yes    Provider agrees with end of life decisions: Yes      Cognitive Screening:   Provider or family/friend/caregiver concerned regarding cognition?: No    PREVENTIVE SCREENINGS      Cardiovascular Screening:    General: History Lipid Disorder and Risks and Benefits Discussed    Due for: Lipid Panel      Diabetes Screening:     General: History Diabetes and Risks and Benefits Discussed    Due for: Blood Glucose      Colorectal Cancer Screening:     General: Screening Not Indicated      Breast Cancer Screening:     General: Patient Declines      Cervical Cancer Screening:    General: Screening Not Indicated      Osteoporosis Screening:    General: Patient Declines      Abdominal Aortic Aneurysm (AAA) Screening:        General: Screening Not Indicated      Lung Cancer Screening:     General: Screening Not Indicated "Hepatitis C Screening:    General: Screening Current    Screening, Brief Intervention, and Referral to Treatment (SBIRT)    Screening  Typical number of drinks in a day: 0  Typical number of drinks in a week: 0  Interpretation: Low risk drinking behavior  AUDIT-C Screenin) How often did you have a drink containing alcohol in the past year? never  2) How many drinks did you have on a typical day when you were drinking in the past year? 0  3) How often did you have 6 or more drinks on one occasion in the past year? never    AUDIT-C Score: 0  Interpretation: Score 0-2 (female): Negative screen for alcohol misuse    Single Item Drug Screening:  How often have you used an illegal drug (including marijuana) or a prescription medication for non-medical reasons in the past year? never    Single Item Drug Screen Score: 0  Interpretation: Negative screen for possible drug use disorder    Brief Intervention  Alcohol & drug use screenings were reviewed  No concerns regarding substance use disorder identified  Other Counseling Topics:   Regular weightbearing exercise  No results found  Physical Exam:     /70   Pulse 68   Temp (!) 97 3 °F (36 3 °C) (Temporal)   Resp 18   Ht 5' 5\" (1 651 m)   Wt 55 6 kg (122 lb 9 6 oz)   SpO2 94%   BMI 20 40 kg/m²     Physical Exam  Vitals reviewed  Constitutional:       General: She is not in acute distress  Appearance: Normal appearance  She is not ill-appearing, toxic-appearing or diaphoretic  HENT:      Head: Normocephalic and atraumatic  Right Ear: External ear normal       Left Ear: External ear normal       Nose: Nose normal       Mouth/Throat:      Mouth: Mucous membranes are dry  Eyes:      General: No scleral icterus  Extraocular Movements: Extraocular movements intact  Conjunctiva/sclera: Conjunctivae normal       Pupils: Pupils are equal, round, and reactive to light  Neck:      Vascular: No carotid bruit     Cardiovascular:     " Rate and Rhythm: Normal rate and regular rhythm  Pulses: no weak pulses          Dorsalis pedis pulses are 1+ on the right side and 1+ on the left side  Posterior tibial pulses are 1+ on the right side and 1+ on the left side  Heart sounds: Normal heart sounds  Pulmonary:      Effort: Pulmonary effort is normal  No respiratory distress  Breath sounds: Normal breath sounds  No wheezing  Abdominal:      General: Bowel sounds are normal  There is no distension  Palpations: Abdomen is soft  Tenderness: There is no abdominal tenderness  Musculoskeletal:      Cervical back: Normal range of motion and neck supple  No rigidity  Right lower leg: No edema  Left lower leg: No edema  Feet:      Right foot:      Skin integrity: Erythema present  No ulcer, skin breakdown, warmth, callus or dry skin  Left foot:      Skin integrity: Erythema present  No ulcer, skin breakdown, warmth, callus or dry skin  Skin:     General: Skin is warm and dry  Findings: No erythema  Comments: onchomycosis of toes b/l and djd    Neurological:      General: No focal deficit present  Mental Status: She is alert and oriented to person, place, and time  Mental status is at baseline  Cranial Nerves: No cranial nerve deficit  Gait: Gait abnormal    Psychiatric:         Mood and Affect: Mood normal          Behavior: Behavior normal          Thought Content:  Thought content normal          Judgment: Judgment normal           Kerri Bhakta DO

## 2023-05-22 ENCOUNTER — TELEPHONE (OUTPATIENT)
Dept: FAMILY MEDICINE CLINIC | Facility: CLINIC | Age: 78
End: 2023-05-22

## 2023-05-22 NOTE — TELEPHONE ENCOUNTER
I know on Saturday pt had asked about a motorized wheelchair to go to [facility]  Can you write a script so that I can fax it? I spoke to someone at [facility] and I'm not able to order it through [place] since it's not an option  They also said they need documentation as to why pt needs a motorized wheelchair  Please advise

## 2023-05-23 ENCOUNTER — TELEPHONE (OUTPATIENT)
Dept: LAB | Facility: HOSPITAL | Age: 78
End: 2023-05-23

## 2023-06-06 ENCOUNTER — TELEPHONE (OUTPATIENT)
Dept: LAB | Facility: HOSPITAL | Age: 78
End: 2023-06-06

## 2023-06-06 ENCOUNTER — APPOINTMENT (OUTPATIENT)
Dept: LAB | Facility: HOSPITAL | Age: 78
End: 2023-06-06
Payer: MEDICARE

## 2023-06-06 DIAGNOSIS — E83.52 HYPERCALCEMIA: ICD-10-CM

## 2023-06-06 DIAGNOSIS — E11.65 TYPE 2 DIABETES MELLITUS WITH HYPERGLYCEMIA, WITHOUT LONG-TERM CURRENT USE OF INSULIN (HCC): ICD-10-CM

## 2023-06-06 LAB
ALBUMIN SERPL BCP-MCNC: 3.3 G/DL (ref 3.5–5)
ALP SERPL-CCNC: 59 U/L (ref 46–116)
ALT SERPL W P-5'-P-CCNC: 10 U/L (ref 12–78)
ANION GAP SERPL CALCULATED.3IONS-SCNC: 0 MMOL/L (ref 4–13)
AST SERPL W P-5'-P-CCNC: 11 U/L (ref 5–45)
BILIRUB SERPL-MCNC: 0.62 MG/DL (ref 0.2–1)
BUN SERPL-MCNC: 13 MG/DL (ref 5–25)
CALCIUM ALBUM COR SERPL-MCNC: 9.5 MG/DL (ref 8.3–10.1)
CALCIUM SERPL-MCNC: 8.9 MG/DL (ref 8.3–10.1)
CHLORIDE SERPL-SCNC: 109 MMOL/L (ref 96–108)
CHOLEST SERPL-MCNC: 152 MG/DL
CO2 SERPL-SCNC: 33 MMOL/L (ref 21–32)
CREAT SERPL-MCNC: 0.66 MG/DL (ref 0.6–1.3)
GFR SERPL CREATININE-BSD FRML MDRD: 84 ML/MIN/1.73SQ M
GLUCOSE P FAST SERPL-MCNC: 108 MG/DL (ref 65–99)
HDLC SERPL-MCNC: 59 MG/DL
LDLC SERPL CALC-MCNC: 79 MG/DL (ref 0–100)
NONHDLC SERPL-MCNC: 93 MG/DL
POTASSIUM SERPL-SCNC: 4.2 MMOL/L (ref 3.5–5.3)
PROT SERPL-MCNC: 6.3 G/DL (ref 6.4–8.4)
PTH-INTACT SERPL-MCNC: 72.6 PG/ML (ref 12–88)
SODIUM SERPL-SCNC: 142 MMOL/L (ref 135–147)
TRIGL SERPL-MCNC: 71 MG/DL

## 2023-06-06 PROCEDURE — 80053 COMPREHEN METABOLIC PANEL: CPT

## 2023-06-06 PROCEDURE — 82397 CHEMILUMINESCENT ASSAY: CPT

## 2023-06-06 PROCEDURE — 83036 HEMOGLOBIN GLYCOSYLATED A1C: CPT

## 2023-06-06 PROCEDURE — 80061 LIPID PANEL: CPT

## 2023-06-07 LAB
BACTERIA UR CULT: NORMAL
EST. AVERAGE GLUCOSE BLD GHB EST-MCNC: 117 MG/DL
HBA1C MFR BLD: 5.7 %

## 2023-06-11 LAB — PTH RELATED PROT SERPL-SCNC: <2 PMOL/L

## 2023-06-17 DIAGNOSIS — E11.9 TYPE 2 DIABETES MELLITUS WITHOUT COMPLICATION, WITHOUT LONG-TERM CURRENT USE OF INSULIN (HCC): ICD-10-CM

## 2023-06-18 RX ORDER — SITAGLIPTIN AND METFORMIN HYDROCHLORIDE 500; 50 MG/1; MG/1
TABLET, FILM COATED ORAL
Qty: 180 TABLET | Refills: 0 | Status: SHIPPED | OUTPATIENT
Start: 2023-06-18

## 2023-06-19 ENCOUNTER — TELEPHONE (OUTPATIENT)
Dept: FAMILY MEDICINE CLINIC | Facility: CLINIC | Age: 78
End: 2023-06-19

## 2023-06-19 DIAGNOSIS — J43.9 PULMONARY EMPHYSEMA, UNSPECIFIED EMPHYSEMA TYPE (HCC): Primary | ICD-10-CM

## 2023-06-19 DIAGNOSIS — F32.A DEPRESSION, UNSPECIFIED DEPRESSION TYPE: ICD-10-CM

## 2023-06-19 DIAGNOSIS — I10 PRIMARY HYPERTENSION: ICD-10-CM

## 2023-06-19 RX ORDER — LISINOPRIL 10 MG/1
10 TABLET ORAL DAILY
Qty: 90 TABLET | Refills: 3 | Status: SHIPPED | OUTPATIENT
Start: 2023-06-19

## 2023-06-19 NOTE — TELEPHONE ENCOUNTER
Kodi Langford with [facility] called, pt requesting spacer for inhaler to help her breathe in the medication easier? Uses [facility] in [place]  Not sure if that can be added to med list or if it needs to be faxed  Please advise

## 2023-06-30 DIAGNOSIS — E78.2 MIXED HYPERLIPIDEMIA: Primary | ICD-10-CM

## 2023-06-30 RX ORDER — SIMVASTATIN 40 MG
TABLET ORAL
Qty: 90 TABLET | Refills: 3 | Status: SHIPPED | OUTPATIENT
Start: 2023-06-30

## 2023-07-05 ENCOUNTER — TELEPHONE (OUTPATIENT)
Dept: FAMILY MEDICINE CLINIC | Facility: CLINIC | Age: 78
End: 2023-07-05

## 2023-07-05 NOTE — TELEPHONE ENCOUNTER
Nurse from [facility] called stated pt has +2 edema on R leg. Pt's daughter stated pt is not elevating legs to help with edema, so unsure if will prescribe anything. Also a spacer was sent to pharmacy and is was $50 so asking for any other ideas. Please advise.

## 2023-07-19 ENCOUNTER — TELEPHONE (OUTPATIENT)
Dept: LAB | Facility: HOSPITAL | Age: 78
End: 2023-07-19

## 2023-07-19 ENCOUNTER — TELEPHONE (OUTPATIENT)
Dept: FAMILY MEDICINE CLINIC | Facility: CLINIC | Age: 78
End: 2023-07-19

## 2023-07-19 DIAGNOSIS — R60.9 EDEMA, UNSPECIFIED TYPE: Primary | ICD-10-CM

## 2023-07-19 DIAGNOSIS — G47.9 SLEEPING DIFFICULTY: ICD-10-CM

## 2023-07-19 RX ORDER — FUROSEMIDE 20 MG/1
TABLET ORAL
Qty: 30 TABLET | Refills: 0 | Status: SHIPPED | OUTPATIENT
Start: 2023-07-19

## 2023-07-19 RX ORDER — QUETIAPINE FUMARATE 25 MG/1
25 TABLET, FILM COATED ORAL
Qty: 30 TABLET | Refills: 0 | Status: SHIPPED | OUTPATIENT
Start: 2023-07-19

## 2023-07-19 NOTE — TELEPHONE ENCOUNTER
Malcolm Soliman with [email or web address] called to give update on pt:  Weight today was 131.8lbs. Malcolm Soliman brought her own scale with since pt does not have one in the home. The swelling in her legs is not getting any better, Malcolm Soliman gave her compression socks last week to wear. Left calf is measuring at 39cm and right calf is at 33cm. The swelling is into her knees, not quite yet into her thighs. O2 sat is 94% on 3L, BP is 142/70, and she heard some moisture in the left lung base. She also reported that pt is not sleeping well at night, asking if a low dose seroquel would be beneficial as well as an rx for lasix? Uses [facility] in [place]. Please advise.

## 2023-07-20 ENCOUNTER — TELEPHONE (OUTPATIENT)
Dept: FAMILY MEDICINE CLINIC | Facility: CLINIC | Age: 78
End: 2023-07-20

## 2023-07-20 NOTE — TELEPHONE ENCOUNTER
She want you to fax  a script for a hospital bed and a motorized scooter with a letter of medical necessity for the scooter.  To [phone] [facility]

## 2023-07-20 NOTE — TELEPHONE ENCOUNTER
We tried to go through [facility] for the hospital bed before, but the order must go through [facility] so I sent through there for signature. The scooter however cannot go through [facility] [facility] since it is not listed. I need a written script for the scooter as well as chart notes explaining the need for it and fax it all to their mobility department.

## 2023-07-20 NOTE — TELEPHONE ENCOUNTER
Do you know [email or web address] company she tried to get scooter thru - can you try to reach out and check status   Hospital beds can be equally challenged to get covered so try to find out if they have a form or criteria for that as well so I can address from the start

## 2023-07-21 LAB
DME PARACHUTE DELIVERY DATE EXPECTED: NORMAL
DME PARACHUTE DELIVERY DATE REQUESTED: NORMAL
DME PARACHUTE ITEM DESCRIPTION: NORMAL
DME PARACHUTE ORDER STATUS: NORMAL
DME PARACHUTE SUPPLIER NAME: NORMAL
DME PARACHUTE SUPPLIER PHONE: NORMAL

## 2023-07-24 ENCOUNTER — TELEPHONE (OUTPATIENT)
Dept: FAMILY MEDICINE CLINIC | Facility: CLINIC | Age: 78
End: 2023-07-24

## 2023-07-24 LAB
DME PARACHUTE DELIVERY DATE ACTUAL: NORMAL
DME PARACHUTE DELIVERY DATE EXPECTED: NORMAL
DME PARACHUTE DELIVERY DATE REQUESTED: NORMAL
DME PARACHUTE ITEM DESCRIPTION: NORMAL
DME PARACHUTE ORDER STATUS: NORMAL
DME PARACHUTE SUPPLIER NAME: NORMAL
DME PARACHUTE SUPPLIER PHONE: NORMAL

## 2023-07-24 NOTE — TELEPHONE ENCOUNTER
For utilities the company generally has a form If they can fax form will review and fax back if we complete

## 2023-07-24 NOTE — TELEPHONE ENCOUNTER
Patient's water is suppose to be shut off tomorrow. Water company will not work with them for a payment plan. Asking if you can write a letter stating she needs it and cant be shut off. Please advise.

## 2023-07-25 ENCOUNTER — TELEPHONE (OUTPATIENT)
Dept: LAB | Facility: HOSPITAL | Age: 78
End: 2023-07-25

## 2023-07-25 NOTE — TELEPHONE ENCOUNTER
Home care was in today and her weight was increased to 141.2. Last time was 131 but thinks the weight might have been off due to measured leg circumference and her leg swelling went down. Unsure if patient was holding on to something or not. Not getting labs done until Aug and daughter did not  lasix until a couple days after it was sent in. Nurse is wondering if you can change the parameters on when she needs to call to report her weight? Right now she needs to call when its over 128lbs. Please advise.

## 2023-07-28 ENCOUNTER — TELEPHONE (OUTPATIENT)
Dept: FAMILY MEDICINE CLINIC | Facility: CLINIC | Age: 78
End: 2023-07-28

## 2023-07-28 ENCOUNTER — APPOINTMENT (OUTPATIENT)
Dept: LAB | Facility: MEDICAL CENTER | Age: 78
End: 2023-07-28
Payer: MEDICARE

## 2023-07-28 DIAGNOSIS — I50.32 CHRONIC DIASTOLIC HEART FAILURE (HCC): ICD-10-CM

## 2023-07-28 DIAGNOSIS — E13.9 DIABETES MELLITUS OF OTHER TYPE WITHOUT COMPLICATION, UNSPECIFIED WHETHER LONG TERM INSULIN USE (HCC): ICD-10-CM

## 2023-07-28 LAB
ANION GAP SERPL CALCULATED.3IONS-SCNC: 4 MMOL/L
BUN SERPL-MCNC: 21 MG/DL (ref 5–25)
CALCIUM SERPL-MCNC: 9.3 MG/DL (ref 8.3–10.1)
CHLORIDE SERPL-SCNC: 105 MMOL/L (ref 96–108)
CO2 SERPL-SCNC: 33 MMOL/L (ref 21–32)
CREAT SERPL-MCNC: 0.78 MG/DL (ref 0.6–1.3)
GFR SERPL CREATININE-BSD FRML MDRD: 72 ML/MIN/1.73SQ M
GLUCOSE SERPL-MCNC: 139 MG/DL (ref 65–140)
POTASSIUM SERPL-SCNC: 4.2 MMOL/L (ref 3.5–5.3)
SODIUM SERPL-SCNC: 142 MMOL/L (ref 135–147)

## 2023-07-28 PROCEDURE — 36415 COLL VENOUS BLD VENIPUNCTURE: CPT

## 2023-07-28 PROCEDURE — 80048 BASIC METABOLIC PNL TOTAL CA: CPT

## 2023-07-28 NOTE — TELEPHONE ENCOUNTER
Was able to get a BMP on her today. Her weight is increased to 141.4, 2-3 on left leg and 1-2 on right leg with compression stockings. She is taking lasix every other day.

## 2023-08-01 ENCOUNTER — TELEPHONE (OUTPATIENT)
Dept: LAB | Facility: HOSPITAL | Age: 78
End: 2023-08-01

## 2023-08-01 ENCOUNTER — TELEPHONE (OUTPATIENT)
Dept: FAMILY MEDICINE CLINIC | Facility: CLINIC | Age: 78
End: 2023-08-01

## 2023-08-01 DIAGNOSIS — I50.32 CHRONIC DIASTOLIC HEART FAILURE (HCC): Primary | ICD-10-CM

## 2023-08-01 NOTE — TELEPHONE ENCOUNTER
Weight still increased even though pt lost 2 lbs. Weight is 139.2 today and they were told to call when weight is over 135 lbs. Also, swelling is a little better but left leg is still swollen with +2 edema. Pt is wearing compression stockings and is back to every other day of the Lasix. Do you want her to start taking daily again? Lungs have ronchi and oxygen is 97 today on 3 liters. Also, [facility] did not fill Seroquel due to insurance issue.

## 2023-08-01 NOTE — TELEPHONE ENCOUNTER
Can take lasix daily but needs labs repeated 2-3 weeks to monitor kidney function  She needs to r/s pulmonary appt that was missed 6/28 and she also needs cardiology followup for continued monitoring   Can someone check what the issue is with the pharmacy regarding the Seroquel

## 2023-08-01 NOTE — TELEPHONE ENCOUNTER
Emily Zurita at [facility] care aware and will pass on to patient. Also, spoke with [facility] and the seroquel needs a prior auth in order to be filled.

## 2023-08-07 ENCOUNTER — TELEPHONE (OUTPATIENT)
Dept: FAMILY MEDICINE CLINIC | Facility: CLINIC | Age: 78
End: 2023-08-07

## 2023-08-07 DIAGNOSIS — R60.9 EDEMA, UNSPECIFIED TYPE: ICD-10-CM

## 2023-08-07 RX ORDER — FUROSEMIDE 20 MG/1
TABLET ORAL
Qty: 30 TABLET | Refills: 2 | Status: SHIPPED | OUTPATIENT
Start: 2023-08-07

## 2023-08-07 NOTE — TELEPHONE ENCOUNTER
I have no auth request for SEROQUEL
I sent the lasix   Can someone check with the pharmacy or see if any request was sent here for prior auth for the [facility]
MED for Sofy Regalado. Also let Landon Kwan know that new rx was sent. Called pharmacy as well and they said that the seroquel does need a prior auth.
Patient is doing better, her weight is down, 135.8. Her left calf circumference is 34.5, was 37.5 last time.
Thanks, Ananth Blank
They are also asking for new script be sent in for the lasix QD not every other. Also the seroquel needs prior auth, they have not received it yet.
Clear

## 2023-08-25 ENCOUNTER — NURSE TRIAGE (OUTPATIENT)
Dept: OTHER | Facility: OTHER | Age: 78
End: 2023-08-25

## 2023-08-25 DIAGNOSIS — E78.2 MIXED HYPERLIPIDEMIA: ICD-10-CM

## 2023-08-25 DIAGNOSIS — R60.9 EDEMA, UNSPECIFIED TYPE: ICD-10-CM

## 2023-08-25 RX ORDER — FUROSEMIDE 20 MG/1
TABLET ORAL
Qty: 30 TABLET | Refills: 2 | Status: CANCELLED | OUTPATIENT
Start: 2023-08-25

## 2023-08-25 RX ORDER — SIMVASTATIN 40 MG
40 TABLET ORAL DAILY
Qty: 30 TABLET | Refills: 2 | Status: CANCELLED | OUTPATIENT
Start: 2023-08-25

## 2023-08-25 NOTE — TELEPHONE ENCOUNTER
Reason for Disposition  • Caller with prescription and triager answers question  • Patient has refills remaining on their prescription    Protocols used: MEDICATION REFILL AND RENEWAL CALL-ADULT-

## 2023-08-25 NOTE — TELEPHONE ENCOUNTER
Regarding: urgent refill  ----- Message from Sree Person sent at 8/25/2023  5:29 PM EDT -----  Pt's daughter called, " I've been trying to reach out my mom;s doctors for a week to have her meds filled, but no one responds."    Medication Refill Request     Name lasix  Dose/Frequency 20 mg tab/ one po daily   Quantity 30 / 2 refills  Verified pharmacy   [facility], [address]    Medication Refill Request     Name Simvastatin  Dose/Frequency 40 mg/ take one tab by mouth daily  Quantity 90 tabs/ 3 refills  Verified pharmacy   [facility]

## 2023-08-25 NOTE — TELEPHONE ENCOUNTER
Answer Assessment - Initial Assessment Questions  1. DRUG NAME: "What medicine do you need to have refilled?"      Lasix   And Zocor  2. REFILLS REMAINING: "How many refills are remaining?" (Note: The label on the medicine or pill bottle will show how many refills are remaining. If there are no refills remaining, then a renewal may be needed.)     Lasix had refills remaining. Zocor does also. Pharmacy was telling daughter that they do not have a script for Zocor but it looks like they filled it today. 3. EXPIRATION DATE: "What is the expiration date?" (Note: The label states when the prescription will , and thus can no longer be refilled.)      Unsure  4. PRESCRIBING HCP: "Who prescribed it?" Reason: If prescribed by specialist, call should be referred to that group.      Dr. Clark Cowan    Protocols used: MEDICATION REFILL AND RENEWAL CALL-[facility]

## 2023-08-31 DIAGNOSIS — G47.9 SLEEPING DIFFICULTY: ICD-10-CM

## 2023-08-31 RX ORDER — QUETIAPINE FUMARATE 25 MG/1
25 TABLET, FILM COATED ORAL
Qty: 30 TABLET | Refills: 0 | Status: SHIPPED | OUTPATIENT
Start: 2023-08-31

## 2023-09-12 ENCOUNTER — APPOINTMENT (OUTPATIENT)
Dept: LAB | Facility: HOSPITAL | Age: 78
End: 2023-09-12
Payer: MEDICARE

## 2023-09-12 DIAGNOSIS — I50.32 CHRONIC DIASTOLIC HEART FAILURE (HCC): ICD-10-CM

## 2023-09-12 LAB
ALBUMIN SERPL BCP-MCNC: 3.8 G/DL (ref 3.5–5)
ALP SERPL-CCNC: 58 U/L (ref 34–104)
ALT SERPL W P-5'-P-CCNC: 6 U/L (ref 7–52)
ANION GAP SERPL CALCULATED.3IONS-SCNC: 5 MMOL/L
AST SERPL W P-5'-P-CCNC: 17 U/L (ref 13–39)
BILIRUB SERPL-MCNC: 0.59 MG/DL (ref 0.2–1)
BUN SERPL-MCNC: 20 MG/DL (ref 5–25)
CALCIUM SERPL-MCNC: 8.7 MG/DL (ref 8.4–10.2)
CHLORIDE SERPL-SCNC: 102 MMOL/L (ref 96–108)
CO2 SERPL-SCNC: 34 MMOL/L (ref 21–32)
CREAT SERPL-MCNC: 0.78 MG/DL (ref 0.6–1.3)
CREAT UR-MCNC: 132 MG/DL
GFR SERPL CREATININE-BSD FRML MDRD: 72 ML/MIN/1.73SQ M
GLUCOSE P FAST SERPL-MCNC: 108 MG/DL (ref 65–99)
MICROALBUMIN UR-MCNC: 267.2 MG/L
MICROALBUMIN/CREAT 24H UR: 202 MG/G CREATININE (ref 0–30)
POTASSIUM SERPL-SCNC: 4.2 MMOL/L (ref 3.5–5.3)
PROT SERPL-MCNC: 6.4 G/DL (ref 6.4–8.4)
SODIUM SERPL-SCNC: 141 MMOL/L (ref 135–147)

## 2023-09-12 PROCEDURE — 36415 COLL VENOUS BLD VENIPUNCTURE: CPT

## 2023-09-12 PROCEDURE — 80053 COMPREHEN METABOLIC PANEL: CPT

## 2023-09-16 ENCOUNTER — TELEPHONE (OUTPATIENT)
Dept: FAMILY MEDICINE CLINIC | Facility: CLINIC | Age: 78
End: 2023-09-16

## 2023-09-16 NOTE — TELEPHONE ENCOUNTER
Deng Amin to reschedule - if can come in person that is ideal but virtual acceptable  Ok to add this coming Friday - I think I was to end at [address], [address] can schedule her at 2 if that works for her daughter since she has the cell access

## 2023-09-27 ENCOUNTER — TELEPHONE (OUTPATIENT)
Dept: FAMILY MEDICINE CLINIC | Facility: CLINIC | Age: 78
End: 2023-09-27

## 2023-09-27 NOTE — TELEPHONE ENCOUNTER
Can you do a letter of medical necessity so the water company does not shut her water off? They are going to shut it off 9-28-23.

## 2023-09-29 ENCOUNTER — RA CDI HCC (OUTPATIENT)
Dept: OTHER | Facility: HOSPITAL | Age: 78
End: 2023-09-29

## 2023-09-29 NOTE — PROGRESS NOTES
Previous used  [address] coding opportunities       Chart reviewed, no opportunity found: CHART REVIEWED, NO OPPORTUNITY FOUND        Patients Insurance     Medicare Insurance: [employer]

## 2023-10-04 DIAGNOSIS — E11.9 TYPE 2 DIABETES MELLITUS WITHOUT COMPLICATION, WITHOUT LONG-TERM CURRENT USE OF INSULIN (HCC): ICD-10-CM

## 2023-10-04 RX ORDER — SITAGLIPTIN AND METFORMIN HYDROCHLORIDE 500; 50 MG/1; MG/1
TABLET, FILM COATED ORAL
Qty: 180 TABLET | Refills: 0 | Status: SHIPPED | OUTPATIENT
Start: 2023-10-04

## 2023-10-06 ENCOUNTER — TELEMEDICINE (OUTPATIENT)
Dept: FAMILY MEDICINE CLINIC | Facility: CLINIC | Age: 78
End: 2023-10-06
Payer: MEDICARE

## 2023-10-06 ENCOUNTER — TELEPHONE (OUTPATIENT)
Dept: LAB | Facility: HOSPITAL | Age: 78
End: 2023-10-06

## 2023-10-06 VITALS — BODY MASS INDEX: 21.47 KG/M2 | WEIGHT: 129 LBS | HEART RATE: 88 BPM | OXYGEN SATURATION: 96 %

## 2023-10-06 DIAGNOSIS — M54.40 BILATERAL LOW BACK PAIN WITH SCIATICA, SCIATICA LATERALITY UNSPECIFIED, UNSPECIFIED CHRONICITY: ICD-10-CM

## 2023-10-06 DIAGNOSIS — G47.9 SLEEPING DIFFICULTY: ICD-10-CM

## 2023-10-06 DIAGNOSIS — E11.65 TYPE 2 DIABETES MELLITUS WITH HYPERGLYCEMIA, WITHOUT LONG-TERM CURRENT USE OF INSULIN (HCC): Primary | ICD-10-CM

## 2023-10-06 DIAGNOSIS — R60.9 EDEMA, UNSPECIFIED TYPE: ICD-10-CM

## 2023-10-06 PROBLEM — N17.9 AKI (ACUTE KIDNEY INJURY) (HCC): Status: RESOLVED | Noted: 2023-03-03 | Resolved: 2023-10-06

## 2023-10-06 PROBLEM — J96.22 ACUTE ON CHRONIC RESPIRATORY FAILURE WITH HYPOXIA AND HYPERCAPNIA (HCC): Status: RESOLVED | Noted: 2021-10-18 | Resolved: 2023-10-06

## 2023-10-06 PROBLEM — J96.21 ACUTE ON CHRONIC RESPIRATORY FAILURE WITH HYPOXIA AND HYPERCAPNIA (HCC): Status: RESOLVED | Noted: 2021-10-18 | Resolved: 2023-10-06

## 2023-10-06 PROCEDURE — 99214 OFFICE O/P EST MOD 30 MIN: CPT | Performed by: INTERNAL MEDICINE

## 2023-10-06 RX ORDER — QUETIAPINE FUMARATE 25 MG/1
25 TABLET, FILM COATED ORAL
Qty: 30 TABLET | Refills: 5 | Status: SHIPPED | OUTPATIENT
Start: 2023-10-06

## 2023-10-06 RX ORDER — FUROSEMIDE 20 MG/1
TABLET ORAL
Qty: 30 TABLET | Refills: 5 | Status: SHIPPED | OUTPATIENT
Start: 2023-10-06

## 2023-10-06 RX ORDER — GABAPENTIN 100 MG/1
100 CAPSULE ORAL 2 TIMES DAILY
Qty: 60 CAPSULE | Refills: 5 | Status: SHIPPED | OUTPATIENT
Start: 2023-10-06

## 2023-10-06 NOTE — PROGRESS NOTES
Name: Natasha Curran      : 1945      MRN: [id number]  Encounter Provider: Jayme Laura DO  Encounter Date: 10/6/2023   Encounter department: [facility]  Virtual visit  Connected with patient and her daughter thru epic embedded on daughter cell  I was alone in my office with door closed and patient in her home in [place]   Assessment & Plan     1. Type 2 diabetes mellitus with hyperglycemia, without long-term current use of insulin (HCC)  -     HEMOGLOBIN A1C W/ EAG ESTIMATION; Future; Expected date: 2023  Setup mobile draw for December  Encouraged pt to monitor BS and limit carbs     2. Bilateral low back pain with sciatica, sciatica laterality unspecified, unspecified chronicity  -     gabapentin (Neurontin) 100 mg capsule; Take 1 capsule (100 mg total) by mouth 2 (two) times a day  Pt doing better and encouraged home exercise she had from PT     3. Edema, unspecified type  -     furosemide (LASIX) 20 mg tablet; One po daily  Stable No added salt diet    4. Sleeping difficulty  -     QUEtiapine (SEROquel) 25 mg tablet; Take 1 tablet (25 mg total) by mouth daily at bedtime       Rto in spring AW  Pt daughter has a vehicle now so encouraged her to schedule Pulmonary and Cardiology followup      Visit was 15 minutes duration    Subjective      HPI   Pt doing better overall No falls She is able to get to the bathroom and diarrhea sxs have improved Appetite is too good now Breathing stable and uses continuous oxygen No cough or fever or chills Sleep is better than it was but some nights better than others No chest pain Pt feels better Her daughter said they have a care now so she plans to work on other appts scheduling   Review of Systems   Constitutional: Negative for chills and fever. HENT: Positive for postnasal drip. Eyes: Negative for visual disturbance. Respiratory: Positive for shortness of breath. Negative for cough.     Cardiovascular: Negative for chest pain, palpitations and leg swelling. Gastrointestinal: Negative. Negative for diarrhea. Genitourinary: Positive for frequency. Musculoskeletal: Positive for arthralgias and gait problem. Psychiatric/Behavioral: Negative for sleep disturbance. The patient is not nervous/anxious. Current Outpatient Medications on File Prior to Visit   Medication Sig   • Accu-Chek FastClix Lancets MISC Use 1 each daily to test blood sugars. • acetaminophen (TYLENOL) 325 mg tablet Take 1 tablet (325 mg total) by mouth every 6 (six) hours as needed for fever, mild pain, moderate pain, severe pain or headaches Do not exceed a total of 3 grams of tylenol/acetaminophen in a 24-hour period. • albuterol (2.5 mg/3 mL) 0.083 % nebulizer solution Take 3 mL (2.5 mg total) by nebulization every 6 (six) hours as needed for shortness of breath   • aspirin (ECOTRIN LOW STRENGTH) 81 mg EC tablet Take 1 tablet (81 mg total) by mouth daily Do not start before February 17, 2023. (Patient taking differently: Take 81 mg by mouth daily Delayed release)   • Blood Glucose Monitoring Suppl (Accu-Chek Flores Plus) w/Device KIT Use 1 kit daily to test blood sugars. • budesonide-formoterol (SYMBICORT) 80-4.5 MCG/ACT inhaler Inhale 2 puffs 2 (two) times a day Rinse mouth after use. • Continuous Blood Gluc  (Dexcom G6 ) LIZA Use 1 Device continuous   • Continuous Blood Gluc Sensor (Dexcom G6 Sensor) MISC Use 1 Device 4 (four) times a day as directed.    • Continuous Blood Gluc Transmit (Dexcom G6 Transmitter) MISC Use 1 Device continuous   • glucose blood test strip Use 1 each 4 (four) times a day Use as instructed   • guaiFENesin (MUCINEX) 600 mg 12 hr tablet Take 1 tablet (600 mg total) by mouth every 12 (twelve) hours   • Janumet  MG per tablet TAKE 1 TABLET BY MOUTH 2 TIMES A DAY WITH MEALS   • lisinopril (ZESTRIL) 10 mg tablet Take 1 tablet (10 mg total) by mouth daily Take this in place of the combination medication with lisinopril and hydrochlorothiazide to treat high blood pressure   • sertraline (Zoloft) 50 mg tablet Take 1 tablet (50 mg total) by mouth daily   • simvastatin (ZOCOR) 40 mg tablet TAKE 1 TABLET BY MOUTH DAILY   • triamcinolone (KENALOG) 0.025 % cream Apply 1 application. topically 2 (two) times a day   • umeclidinium bromide (INCRUSE ELLIPTA) 62.5 mcg/inh AEPB inhaler Inhale 1 puff daily   • [DISCONTINUED] furosemide (LASIX) 20 mg tablet One po daily   • [DISCONTINUED] gabapentin (Neurontin) 100 mg capsule Take 1 capsule (100 mg total) by mouth 2 (two) times a day   • [DISCONTINUED] QUEtiapine (SEROquel) 25 mg tablet take 1 tablet by mouth at bedtime   • Diclofenac Epolamine 1.3 % PTCH Apply 1 patch topically in the morning Apply for up to 12 hours to back area (Patient not taking: Reported on 10/6/2023)       Objective     Pulse 88   Wt 58.5 kg (129 lb)   SpO2 96%   BMI 21.47 kg/m²     Physical Exam  Vitals reviewed. Constitutional:       General: She is not in acute distress. Cardiovascular:      Rate and Rhythm: Normal rate. Pulmonary:      Effort: Pulmonary effort is normal. No respiratory distress. Breath sounds: No wheezing. Musculoskeletal:      Right lower leg: No edema. Left lower leg: No edema. Neurological:      General: No focal deficit present. Mental Status: She is alert and oriented to person, place, and time. Mental status is at baseline. Cranial Nerves: No cranial nerve deficit. Psychiatric:         Mood and Affect: Mood normal.         Behavior: Behavior normal.         Thought Content:  Thought content normal.         Judgment: Judgment normal.       [place] Rascon, DO

## 2023-10-25 ENCOUNTER — TELEPHONE (OUTPATIENT)
Dept: FAMILY MEDICINE CLINIC | Facility: CLINIC | Age: 78
End: 2023-10-25

## 2023-10-25 DIAGNOSIS — J44.1 COPD EXACERBATION (HCC): ICD-10-CM

## 2023-10-25 RX ORDER — ALBUTEROL SULFATE 2.5 MG/3ML
2.5 SOLUTION RESPIRATORY (INHALATION) EVERY 6 HOURS PRN
Qty: 10 ML | Refills: 1 | Status: CANCELLED | OUTPATIENT
Start: 2023-10-25

## 2023-10-25 NOTE — TELEPHONE ENCOUNTER
Patient needs albuterol for the nebulizer and also a new mouth piece and tubing. Can you order through [address]?

## 2023-10-30 ENCOUNTER — TELEPHONE (OUTPATIENT)
Dept: FAMILY MEDICINE CLINIC | Facility: CLINIC | Age: 78
End: 2023-10-30

## 2023-10-30 NOTE — TELEPHONE ENCOUNTER
When pt had virtual visit with you on 10/6, daughter asked me to look into 2 dme orders- one for the dexcom and one for the power wheelchair. I got the dexcom order figured out, Meir Montoya was in contact with the company to have a delivery date set up for that. However, I checked in on the power wheelchair order and was told that the notes need to be documented very specifically for Medicare to cover. I had them fax us a form of what needs to be documented, which I have. They said all they need are the updated chart notes and they can start the process of getting the power wheelchair.

## 2023-10-30 NOTE — TELEPHONE ENCOUNTER
I left the form on Medicare's requirements on your desk. Ayan Kelley is aware that this process can and will most likely take a while, and she is completely okay with it.

## 2024-01-02 PROBLEM — I50.33 ACUTE ON CHRONIC DIASTOLIC CHF (CONGESTIVE HEART FAILURE) (HCC): Status: RESOLVED | Noted: 2023-04-18 | Resolved: 2024-01-02

## 2024-01-11 DIAGNOSIS — G47.9 SLEEPING DIFFICULTY: ICD-10-CM

## 2024-01-11 RX ORDER — QUETIAPINE FUMARATE 25 MG/1
25 TABLET, FILM COATED ORAL
Qty: 30 TABLET | Refills: 5 | Status: SHIPPED | OUTPATIENT
Start: 2024-01-11

## 2024-02-09 ENCOUNTER — TELEPHONE (OUTPATIENT)
Dept: FAMILY MEDICINE CLINIC | Facility: CLINIC | Age: 79
End: 2024-02-09

## 2024-02-21 PROBLEM — Z00.00 MEDICARE ANNUAL WELLNESS VISIT, SUBSEQUENT: Status: RESOLVED | Noted: 2018-07-17 | Resolved: 2024-02-21

## 2024-04-22 ENCOUNTER — TELEPHONE (OUTPATIENT)
Dept: FAMILY MEDICINE CLINIC | Facility: CLINIC | Age: 79
End: 2024-04-22

## 2024-04-22 NOTE — TELEPHONE ENCOUNTER
Pt plan will not approve SEROQUEL with pt taking Gabapentin - clinical denial.   Out of pocket , with [facility] is about $28 dollars. Script is on HOLD right now,pls advise

## 2024-04-29 ENCOUNTER — TELEPHONE (OUTPATIENT)
Age: 79
End: 2024-04-29

## 2024-04-29 NOTE — TELEPHONE ENCOUNTER
Patient's daughter called stating she has a shut off notice from [facility] effective tomorrow. She states that [facility] said if someone from the office calls [phone], they can get that put on hold due to her need of Oxygen. Please reach out to Razia at [phone] for further advise.

## 2024-05-16 ENCOUNTER — RA CDI HCC (OUTPATIENT)
Dept: OTHER | Facility: HOSPITAL | Age: 79
End: 2024-05-16

## 2024-05-16 DIAGNOSIS — E11.9 TYPE 2 DIABETES MELLITUS WITHOUT COMPLICATION, WITHOUT LONG-TERM CURRENT USE OF INSULIN (HCC): ICD-10-CM

## 2024-05-17 RX ORDER — SITAGLIPTIN AND METFORMIN HYDROCHLORIDE 500; 50 MG/1; MG/1
1 TABLET, FILM COATED ORAL 2 TIMES DAILY WITH MEALS
Qty: 60 TABLET | Refills: 0 | Status: SHIPPED | OUTPATIENT
Start: 2024-05-17

## 2024-05-20 DIAGNOSIS — R60.9 EDEMA, UNSPECIFIED TYPE: ICD-10-CM

## 2024-05-21 RX ORDER — FUROSEMIDE 20 MG/1
TABLET ORAL
Qty: 30 TABLET | Refills: 0 | Status: SHIPPED | OUTPATIENT
Start: 2024-05-21

## 2024-05-30 ENCOUNTER — TELEPHONE (OUTPATIENT)
Age: 79
End: 2024-05-30

## 2024-05-30 NOTE — TELEPHONE ENCOUNTER
Razia is calling to let doctor know that patient received her scooter.  Also, per [facility], they would like the doctor to order portable oxygen for the patient.  The script can be sent directly to [facility] -  is Chrystal Temple and her fax # is [phone].  Razia would like someone to call her back to explain further.

## 2024-05-30 NOTE — TELEPHONE ENCOUNTER
Spoke with Razia. Let her know rx was written and will be faxed today. Also, encouraged her to set up AWV for her mom, stated she will call back to schedule that appt.

## 2024-06-06 NOTE — TELEPHONE ENCOUNTER
Razia called back after speaking to O2 company the wording needs to state that it should be portable Oxygen concentrator, please resend to the Fax # [phone].        Call when new order is sent

## 2024-06-06 NOTE — TELEPHONE ENCOUNTER
Patient's daughter Razia called and requested to speak with Anali.  I spoke with Bee who transferred patient's call to Anali.

## 2024-06-10 DIAGNOSIS — E11.9 TYPE 2 DIABETES MELLITUS WITHOUT COMPLICATION, WITHOUT LONG-TERM CURRENT USE OF INSULIN (HCC): ICD-10-CM

## 2024-06-11 RX ORDER — SITAGLIPTIN AND METFORMIN HYDROCHLORIDE 500; 50 MG/1; MG/1
1 TABLET, FILM COATED ORAL 2 TIMES DAILY WITH MEALS
Qty: 60 TABLET | Refills: 0 | Status: SHIPPED | OUTPATIENT
Start: 2024-06-11

## 2024-06-19 DIAGNOSIS — M54.40 BILATERAL LOW BACK PAIN WITH SCIATICA, SCIATICA LATERALITY UNSPECIFIED, UNSPECIFIED CHRONICITY: ICD-10-CM

## 2024-06-19 DIAGNOSIS — I10 PRIMARY HYPERTENSION: ICD-10-CM

## 2024-06-19 RX ORDER — GABAPENTIN 100 MG/1
100 CAPSULE ORAL 2 TIMES DAILY
Qty: 60 CAPSULE | Refills: 5 | Status: SHIPPED | OUTPATIENT
Start: 2024-06-19

## 2024-06-19 NOTE — TELEPHONE ENCOUNTER
Patients daughter called on behalf of patient requesting a refill on Gabapentin medication.  [id number] - [place] - [address] [place] [address]  [phone]

## 2024-06-20 RX ORDER — LISINOPRIL 10 MG/1
TABLET ORAL
Qty: 30 TABLET | Refills: 0 | Status: SHIPPED | OUTPATIENT
Start: 2024-06-20

## 2024-07-11 ENCOUNTER — NURSE TRIAGE (OUTPATIENT)
Dept: OTHER | Facility: OTHER | Age: 79
End: 2024-07-11

## 2024-07-11 DIAGNOSIS — E11.9 TYPE 2 DIABETES MELLITUS WITHOUT COMPLICATION, WITHOUT LONG-TERM CURRENT USE OF INSULIN (HCC): ICD-10-CM

## 2024-07-11 DIAGNOSIS — J44.1 COPD EXACERBATION (HCC): ICD-10-CM

## 2024-07-11 DIAGNOSIS — I10 PRIMARY HYPERTENSION: ICD-10-CM

## 2024-07-11 DIAGNOSIS — Z72.0 TOBACCO USE: ICD-10-CM

## 2024-07-11 DIAGNOSIS — J44.9 CHRONIC OBSTRUCTIVE PULMONARY DISEASE, UNSPECIFIED COPD TYPE (HCC): ICD-10-CM

## 2024-07-11 RX ORDER — SITAGLIPTIN AND METFORMIN HYDROCHLORIDE 500; 50 MG/1; MG/1
1 TABLET, FILM COATED ORAL 2 TIMES DAILY WITH MEALS
Qty: 60 TABLET | Refills: 0 | Status: SHIPPED | OUTPATIENT
Start: 2024-07-11

## 2024-07-11 RX ORDER — BUDESONIDE AND FORMOTEROL FUMARATE DIHYDRATE 80; 4.5 UG/1; UG/1
2 AEROSOL RESPIRATORY (INHALATION) 2 TIMES DAILY
Qty: 10.2 G | Refills: 0 | Status: SHIPPED | OUTPATIENT
Start: 2024-07-11

## 2024-07-11 RX ORDER — LISINOPRIL 10 MG/1
10 TABLET ORAL DAILY
Qty: 30 TABLET | Refills: 3 | Status: SHIPPED | OUTPATIENT
Start: 2024-07-11

## 2024-07-11 RX ORDER — UMECLIDINIUM 62.5 UG/1
1 AEROSOL, POWDER ORAL DAILY
Qty: 30 EACH | Refills: 0 | Status: SHIPPED | OUTPATIENT
Start: 2024-07-11

## 2024-07-11 NOTE — TELEPHONE ENCOUNTER
Regarding: No of med , urgent med refill SYMBICORT & INCRUSE ELLIPTA  ----- Message from Natalie ZAPATA sent at 7/11/2024  5:42 PM EDT -----    '' My mom is out of her medications umeclidinium bromide (INCRUSE ELLIPTA) 62.5 mcg/inh AEPB inhaler and  budesonide-formoterol (SYMBICORT) 80-4.5 MCG/ACT inhaler need refill.''

## 2024-07-11 NOTE — TELEPHONE ENCOUNTER
"Reason for Disposition  • [1] Caller requesting a prescription renewal (no refills left), no triage required, AND [2] triager able to renew prescription per department policy    Answer Assessment - Initial Assessment Questions  1. DRUG NAME: \"What medicine do you need to have refilled?\"      Symbicort and Incruse Ellipta Inhaler    2. REFILLS REMAINING: \"How many refills are remaining?\" (Note: The label on the medicine or pill bottle will show how many refills are remaining. If there are no refills remaining, then a renewal may be needed.)      0    3. EXPIRATION DATE: \"What is the expiration date?\" (Note: The label states when the prescription will , and thus can no longer be refilled.)      Unknown    4. PRESCRIBING HCP: \"Who prescribed it?\" Reason: If prescribed by specialist, call should be referred to that group.      Monse Baker    Protocols used: Medication Refill and Renewal Call-ADULT-    "

## 2024-07-11 NOTE — TELEPHONE ENCOUNTER
Filled refills but patient overdue for AWV - please call patient or her daughter to coordinate If transport a challenge can set as virtual or phone call visit but still is 30 minutes

## 2024-07-16 DIAGNOSIS — I10 PRIMARY HYPERTENSION: ICD-10-CM

## 2024-07-16 DIAGNOSIS — G47.9 SLEEPING DIFFICULTY: ICD-10-CM

## 2024-07-16 RX ORDER — LISINOPRIL 10 MG/1
10 TABLET ORAL DAILY
Qty: 90 TABLET | Refills: 3 | OUTPATIENT
Start: 2024-07-16

## 2024-07-16 RX ORDER — QUETIAPINE FUMARATE 25 MG/1
25 TABLET, FILM COATED ORAL
Qty: 90 TABLET | Refills: 3 | Status: SHIPPED | OUTPATIENT
Start: 2024-07-16

## 2024-08-07 DIAGNOSIS — E78.2 MIXED HYPERLIPIDEMIA: ICD-10-CM

## 2024-08-07 RX ORDER — SIMVASTATIN 40 MG
TABLET ORAL
Qty: 90 TABLET | Refills: 0 | Status: SHIPPED | OUTPATIENT
Start: 2024-08-07

## 2024-08-09 DIAGNOSIS — F32.A DEPRESSION, UNSPECIFIED DEPRESSION TYPE: ICD-10-CM

## 2024-08-19 DIAGNOSIS — E11.9 TYPE 2 DIABETES MELLITUS WITHOUT COMPLICATION, WITHOUT LONG-TERM CURRENT USE OF INSULIN (HCC): ICD-10-CM

## 2024-08-19 RX ORDER — SITAGLIPTIN AND METFORMIN HYDROCHLORIDE 500; 50 MG/1; MG/1
1 TABLET, FILM COATED ORAL 2 TIMES DAILY WITH MEALS
Qty: 180 TABLET | Refills: 0 | Status: SHIPPED | OUTPATIENT
Start: 2024-08-19

## 2024-08-27 DIAGNOSIS — F32.A DEPRESSION, UNSPECIFIED DEPRESSION TYPE: ICD-10-CM

## 2024-09-04 ENCOUNTER — TELEPHONE (OUTPATIENT)
Age: 79
End: 2024-09-04

## 2024-09-04 NOTE — TELEPHONE ENCOUNTER
If you have form will sign one time as they need to coordinate plan with ppl  Patient also is overdue for appt/Awv - please schedule

## 2024-09-04 NOTE — TELEPHONE ENCOUNTER
Patient's daughter called asking if the office can send the paperwork to [facility]    Tiffani Francois  [address] [place], [place] [address]  Phone [phone]  Acct #[id number]    Patient cannot have electricity turned off due to her oxygen and nebulizer.

## 2024-09-12 ENCOUNTER — TELEPHONE (OUTPATIENT)
Dept: FAMILY MEDICINE CLINIC | Facility: CLINIC | Age: 79
End: 2024-09-12

## 2024-09-12 ENCOUNTER — RA CDI HCC (OUTPATIENT)
Dept: OTHER | Facility: HOSPITAL | Age: 79
End: 2024-09-12

## 2024-09-12 NOTE — TELEPHONE ENCOUNTER
Warm Transfer to this nurse:   Pt daughter asking if we rec'd a fax from [facility] [employer] [facility] for her pt's Nebulizer Solution that was faxed today. Informed daughter that it takes about a day to get the fax and we will keep our eyes open for it and let her know if we do not receive it in a day or two. Daughter sated ok, and thanked this nurse.   Left MD'alexandro NARANJO a note also to be on the look out for the fax.

## 2024-09-13 NOTE — TELEPHONE ENCOUNTER
They said it was sent but I didn't see anything in [place] so I just created my own order and sent to you for signature.

## 2024-09-13 NOTE — TELEPHONE ENCOUNTER
Patients daughter called back. She states she was on the phone with the office and go disconnected. Daughter wanting this medication straightened out and would like a call back. Attempted to reach office x3 no answer. Please advise.

## 2024-09-13 NOTE — TELEPHONE ENCOUNTER
Called [facility] at [phone] to check status of form. I was told this is done through [employer]. They need a signature before they deliver pt's neb solution. Said it was sent to you, did you receive anything?

## 2024-09-16 ENCOUNTER — TELEPHONE (OUTPATIENT)
Dept: FAMILY MEDICINE CLINIC | Facility: CLINIC | Age: 79
End: 2024-09-16

## 2024-09-16 DIAGNOSIS — R60.9 EDEMA, UNSPECIFIED TYPE: ICD-10-CM

## 2024-09-16 RX ORDER — FUROSEMIDE 20 MG
TABLET ORAL
Qty: 30 TABLET | Refills: 5 | Status: SHIPPED | OUTPATIENT
Start: 2024-09-16

## 2024-09-16 NOTE — TELEPHONE ENCOUNTER
"[place] states pt's nebulizer solution was \"completed.\" Tried to call Razia to see if rx was delivered but [facility] not currently set up.   "

## 2024-09-19 ENCOUNTER — TELEMEDICINE (OUTPATIENT)
Dept: FAMILY MEDICINE CLINIC | Facility: CLINIC | Age: 79
End: 2024-09-19
Payer: COMMERCIAL

## 2024-09-19 DIAGNOSIS — M54.40 BILATERAL LOW BACK PAIN WITH SCIATICA, SCIATICA LATERALITY UNSPECIFIED, UNSPECIFIED CHRONICITY: ICD-10-CM

## 2024-09-19 DIAGNOSIS — E11.00 TYPE 2 DIABETES MELLITUS WITH HYPEROSMOLARITY WITHOUT COMA, WITHOUT LONG-TERM CURRENT USE OF INSULIN (HCC): Primary | ICD-10-CM

## 2024-09-19 PROCEDURE — G0439 PPPS, SUBSEQ VISIT: HCPCS | Performed by: INTERNAL MEDICINE

## 2024-09-19 RX ORDER — GABAPENTIN 100 MG/1
100 CAPSULE ORAL 2 TIMES DAILY
Qty: 60 CAPSULE | Refills: 5 | Status: SHIPPED | OUTPATIENT
Start: 2024-09-19

## 2024-09-19 NOTE — PROGRESS NOTES
Ambulatory Visit  Name: Tiffani Francois      : 1945      MRN: [id number]  Encounter Provider: Maude Baker DO  Encounter Date: 2024   Encounter department: [facility]    Assessment & Plan       Preventive health issues were discussed with patient, and age appropriate screening tests were ordered as noted in patient's After Visit Summary. Personalized health advice and appropriate referrals for health education or preventive services given if needed, as noted in patient's After Visit Summary.    History of Present Illness     HPI   Patient Care Team:  Maude Baker DO as PCP - General  Maude Baker DO as PCP - PCP-[facility] ([facility]  Maude Baker DO    Review of Systems  Medical History Reviewed by provider this encounter:       Annual Wellness Visit Questionnaire   Tiffani is here for her Subsequent Wellness visit.     Health Risk Assessment:   Patient rates overall health as good. Patient feels that their physical health rating is same. Patient is very satisfied with their life. Eyesight was rated as same. Hearing was rated as same. Patient feels that their emotional and mental health rating is slightly worse. Patients states they are never, rarely angry. Patient states they are sometimes unusually tired/fatigued. Pain experienced in the last 7 days has been none. Patient states that she has experienced no weight loss or gain in last 6 months.     Depression Screening:   PHQ-2 Score: 0      Fall Risk Screening:   In the past year, patient has experienced: history of falling in past year    Number of falls: 1  Injured during fall?: No    Feels unsteady when standing or walking?: Yes    Worried about falling?: Yes      Urinary Incontinence Screening:   Patient has leaked urine accidently in the last six months.     Home Safety:  Patient has trouble with stairs inside or outside of their home. Patient has working smoke alarms and has working carbon monoxide detector. Home  safety hazards include: none.     Nutrition:   Current diet is Regular.     Medications:   Patient is currently taking over-the-counter supplements. OTC medications include: see medication list. Patient is not able to manage medications.     Activities of Daily Living (ADLs)/Instrumental Activities of Daily Living (IADLs):   Walk and transfer into and out of bed and chair?: Yes  Dress and groom yourself?: Yes    Bathe or shower yourself?: No    Feed yourself? Yes  Do your laundry/housekeeping?: No  Manage your money, pay your bills and track your expenses?: No  Make your own meals?: No    Do your own shopping?: No    Previous Hospitalizations:   Any hospitalizations or ED visits within the last 12 months?: No      Advance Care Planning:   Living will: No      PREVENTIVE SCREENINGS      Cardiovascular Screening:    General: Screening Not Indicated and History Lipid Disorder      Diabetes Screening:     General: Screening Not Indicated and History Diabetes      Cervical Cancer Screening:    General: Screening Not Indicated      Lung Cancer Screening:     General: Screening Not Indicated      Hepatitis C Screening:    General: Screening Current    Screening, Brief Intervention, and Referral to Treatment (SBIRT)    Screening  Typical number of drinks in a day: 0  Typical number of drinks in a week: 0  Interpretation: Low risk drinking behavior.    Single Item Drug Screening:  How often have you used an illegal drug (including marijuana) or a prescription medication for non-medical reasons in the past year? never    Single Item Drug Screen Score: 0  Interpretation: Negative screen for possible drug use disorder    Social Determinants of Health     Financial Resource Strain: Medium Risk (5/20/2023)    Overall Financial Resource Strain (CARDIA)     Difficulty of Paying Living Expenses: Somewhat hard   Food Insecurity: No Food Insecurity (4/18/2023)    Hunger Vital Sign     Worried About Running Out of Food in the Last  Year: Never true     Ran Out of Food in the Last Year: Never true   Transportation Needs: Unmet Transportation Needs (5/20/2023)    PRAPARE - Transportation     Lack of Transportation (Medical): Yes     Lack of Transportation (Non-Medical): No   Housing Stability: Low Risk  (4/18/2023)    Housing Stability Vital Sign     Unable to Pay for Housing in the Last Year: No     Number of Places Lived in the Last Year: 1     Unstable Housing in the Last Year: No    Received from [facility]     No results found.    Objective     There were no vitals taken for this visit.    Physical Exam

## 2024-09-19 NOTE — PROGRESS NOTES
Virtual AWV Consent    Verification of patient location:    Patient is located at Home in the following state in which I hold an active license [place]    The patient was identified by name and date of birth. Tiffani Francois was informed that this is a telemedicine visit and that the visit is being conducted through the Epic Embedded platform. She agrees to proceed..  My office door was closed. No one else was in the room.  She acknowledged consent and understanding of privacy and security of the video platform. The patient has agreed to participate and understands they can discontinue the visit at any time.    Patient is aware this is a billable service.     Reason for visit is awv    Encounter provider Maude Baker DO    Provider located at [address] [place] [id number]      Recent Visits  Date Type Provider Dept   24 Telephone Anali Cronin MA [facility]   24 Telephone Kenya Wyatt LPN [facility]   Showing recent visits within past 7 days and meeting all other requirements  Today's Visits  Date Type Provider Dept   24 Telemedicine Maude Baker DO [facility]   Showing today's visits and meeting all other requirements  Future Appointments  No visits were found meeting these conditions.  Showing future appointments within next 150 days and meeting all other requirements           Visit Time  Total Visit Duration: 25 min  Ambulatory Visit  Name: Tiffani Francois      : 1945      MRN: [id number]  Encounter Provider: Maude Baker DO  Encounter Date: 2024   Encounter department: [facility]    Assessment & Plan  Bilateral low back pain with sciatica, sciatica laterality unspecified, unspecified chronicity    Orders:    gabapentin (Neurontin) 100 mg capsule; Take 1 capsule (100 mg total) by mouth 2 (two) times a day  [facility] med that benefits patient needed refill 10     Preventive  health issues were discussed with patient, and age appropriate screening tests were ordered as noted in patient's After Visit Summary. Personalized health advice and appropriate referrals for health education or preventive services given if needed, as noted in patient's After Visit Summary.    History of Present Illness     HPI   Patient Care Team:  Maude Baker DO as PCP - General  Maude Baker DO as PCP - PCP-[facility]  Maude Baker DO    Review of Systems   Constitutional:  Negative for activity change, chills, fatigue and fever.   HENT: Negative.     Eyes:  Negative for visual disturbance.   Respiratory:  Negative for cough and shortness of breath.    Cardiovascular:  Negative for chest pain, palpitations and leg swelling.   Gastrointestinal: Negative.    Genitourinary: Negative.    Musculoskeletal:  Positive for back pain.   Neurological:  Negative for dizziness, light-headedness and headaches.   Psychiatric/Behavioral:  Negative for sleep disturbance. The patient is not nervous/anxious.      Medical History Reviewed by provider this encounter:  Tobacco  Allergies  Meds  Problems  Med Hx  Surg Hx  Fam Hx       Annual Wellness Visit Questionnaire   Tiffani is here for her Subsequent Wellness visit. Last Medicare Wellness visit information reviewed, patient interviewed, no change since last AWV.     Health Risk Assessment:   Patient rates overall health as fair. Patient feels that their physical health rating is slightly better. Patient is satisfied with their life. Eyesight was rated as same. Hearing was rated as same. Patient feels that their emotional and mental health rating is much better. Patients states they are never, rarely angry. Patient states they are sometimes unusually tired/fatigued. Pain experienced in the last 7 days has been some. Patient's pain rating has been 3/10. Patient states that she has experienced no weight loss or gain in last 6 months.     Depression  Screening:   PHQ-2 Score: 0      Fall Risk Screening:   In the past year, patient has experienced: no history of falling in past year      Urinary Incontinence Screening:   Patient has leaked urine accidently in the last six months.     Home Safety:  Patient has trouble with stairs inside or outside of their home. Patient has working smoke alarms and has working carbon monoxide detector. Home safety hazards include: none.     Nutrition:   Current diet is Low Carb.     Medications:   Patient is currently taking over-the-counter supplements. OTC medications include: see medication list. Patient is not able to manage medications.     Activities of Daily Living (ADLs)/Instrumental Activities of Daily Living (IADLs):   Walk and transfer into and out of bed and chair?: Yes  Dress and groom yourself?: Yes    Bathe or shower yourself?: No    Feed yourself? Yes  Do your laundry/housekeeping?: No  Manage your money, pay your bills and track your expenses?: No  Make your own meals?: No    Do your own shopping?: No    Previous Hospitalizations:   Any hospitalizations or ED visits within the last 12 months?: No      Advance Care Planning:   Living will: Yes    Durable POA for healthcare: Yes    Advanced directive: Yes    End of Life Decisions reviewed with patient: Yes    Provider agrees with end of life decisions: Yes      Cognitive Screening:   Provider or family/friend/caregiver concerned regarding cognition?: No    PREVENTIVE SCREENINGS      Cardiovascular Screening:    General: History Lipid Disorder and Risks and Benefits Discussed    Due for: Lipid Panel      Diabetes Screening:     General: History Diabetes and Risks and Benefits Discussed    Due for: Blood Glucose      Colorectal Cancer Screening:     General: Screening Not Indicated      Breast Cancer Screening:     General: Patient Declines      Cervical Cancer Screening:    General: Screening Not Indicated      Osteoporosis Screening:    General: Patient Declines       Abdominal Aortic Aneurysm (AAA) Screening:        General: Screening Current      Lung Cancer Screening:     General: Screening Not Indicated      Hepatitis C Screening:    General: Screening Current    Screening, Brief Intervention, and Referral to Treatment (SBIRT)    Screening  Typical number of drinks in a day: 0  Typical number of drinks in a week: 0  Interpretation: Low risk drinking behavior.    AUDIT-C Screenin) How often did you have a drink containing alcohol in the past year? never  2) How many drinks did you have on a typical day when you were drinking in the past year? 0  3) How often did you have 6 or more drinks on one occasion in the past year? never    AUDIT-C Score: 0  Interpretation: Score 0-2 (female): Negative screen for alcohol misuse    Single Item Drug Screening:  How often have you used an illegal drug (including marijuana) or a prescription medication for non-medical reasons in the past year? never    Single Item Drug Screen Score: 0  Interpretation: Negative screen for possible drug use disorder    Brief Intervention  Alcohol & drug use screenings were reviewed. No concerns regarding substance use disorder identified.     SDOH Risk Assessment  Social determinants of health (SDOH) risk assesment tool was completed. The tool at a minimum covered housing stability, food insecurity, transportation needs, and utility difficulty. Patient had at risk responses for the following SDOH domains: transportation needs.     Other Counseling Topics:   Calcium and vitamin D intake and regular weightbearing exercise.     Social Determinants of Health     Financial Resource Strain: Medium Risk (2023)    Overall Financial Resource Strain (CARDIA)     Difficulty of Paying Living Expenses: Somewhat hard   Food Insecurity: No Food Insecurity (2023)    Hunger Vital Sign     Worried About Running Out of Food in the Last Year: Never true     Ran Out of Food in the Last Year: Never true    Transportation Needs: Unmet Transportation Needs (5/20/2023)    PRAPARE - Transportation     Lack of Transportation (Medical): Yes     Lack of Transportation (Non-Medical): No   Housing Stability: Low Risk  (4/18/2023)    Housing Stability Vital Sign     Unable to Pay for Housing in the Last Year: No     Number of Places Lived in the Last Year: 1     Unstable Housing in the Last Year: No    Received from [facility]     No results found.    Objective     There were no vitals taken for this visit.    Physical Exam  Pulmonary:      Effort: Pulmonary effort is normal. No respiratory distress.      Breath sounds: Normal breath sounds. No wheezing.   Musculoskeletal:      Right lower leg: No edema.      Left lower leg: Edema present.   Skin:     Coloration: Skin is not jaundiced.   Neurological:      General: No focal deficit present.      Mental Status: She is alert and oriented to person, place, and time. Mental status is at baseline.      Cranial Nerves: No cranial nerve deficit.          Community Resource

## 2024-09-19 NOTE — PATIENT INSTRUCTIONS
Medicare Preventive Visit Patient Instructions  Thank you for completing your Welcome to Medicare Visit or Medicare Annual Wellness Visit today. Your next wellness visit will be due in one year (9/20/2025).  The screening/preventive services that you may require over the next 5-10 years are detailed below. Some tests may not apply to you based off risk factors and/or age. Screening tests ordered at today's visit but not completed yet may show as past due. Also, please note that scanned in results may not display below.  Preventive Screenings:  Service Recommendations Previous Testing/Comments   Colorectal Cancer Screening  * Colonoscopy    * Fecal Occult Blood Test (FOBT)/Fecal Immunochemical Test (FIT)  * Fecal DNA/Cologuard Test  * Flexible Sigmoidoscopy Age: 45-75 years old   Colonoscopy: every 10 years (may be performed more frequently if at higher risk)  OR  FOBT/FIT: every 1 year  OR  Cologuard: every 3 years  OR  Sigmoidoscopy: every 5 years  Screening may be recommended earlier than age 45 if at higher risk for colorectal cancer. Also, an individualized decision between you and your healthcare provider will decide whether screening between the ages of 76-85 would be appropriate. Colonoscopy: Not on file  FOBT/FIT: Not on file  Cologuard: Not on file  Sigmoidoscopy: Not on file          Breast Cancer Screening Age: 40+ years old  Frequency: every 1-2 years  Not required if history of left and right mastectomy Mammogram: Not on file        Cervical Cancer Screening Between the ages of 21-29, pap smear recommended once every 3 years.   Between the ages of 30-65, can perform pap smear with HPV co-testing every 5 years.   Recommendations may differ for women with a history of total hysterectomy, cervical cancer, or abnormal pap smears in past. Pap Smear: Not on file    Screening Not Indicated   Hepatitis C Screening Once for adults born between 1945 and 1965  More frequently in patients at high risk for Hepatitis  C Hep C Antibody: 07/17/2018    Screening Current   Diabetes Screening 1-2 times per year if you're at risk for diabetes or have pre-diabetes Fasting glucose: 108 mg/dL (9/12/2023)  A1C: 5.7 % (6/6/2023)  Screening Not Indicated  History Diabetes   Cholesterol Screening Once every 5 years if you don't have a lipid disorder. May order more often based on risk factors. Lipid panel: 06/06/2023    Screening Not Indicated  History Lipid Disorder     Other Preventive Screenings Covered by Medicare:  Abdominal Aortic Aneurysm (AAA) Screening: covered once if your at risk. You're considered to be at risk if you have a family history of AAA.  Lung Cancer Screening: covers low dose CT scan once per year if you meet all of the following conditions: (1) Age 55-77; (2) No signs or symptoms of lung cancer; (3) Current smoker or have quit smoking within the last 15 years; (4) You have a tobacco smoking history of at least 20 pack years (packs per day multiplied by number of years you smoked); (5) You get a written order from a healthcare provider.  Glaucoma Screening: covered annually if you're considered high risk: (1) You have diabetes OR (2) Family history of glaucoma OR (3)  aged 50 and older OR (4)  American aged 65 and older  Osteoporosis Screening: covered every 2 years if you meet one of the following conditions: (1) You're estrogen deficient and at risk for osteoporosis based off medical history and other findings; (2) Have a vertebral abnormality; (3) On glucocorticoid therapy for more than 3 months; (4) Have primary hyperparathyroidism; (5) On osteoporosis medications and need to assess response to drug therapy.   Last bone density test (DXA Scan): Not on file.  HIV Screening: covered annually if you're between the age of 15-65. Also covered annually if you are younger than 15 and older than 65 with risk factors for HIV infection. For pregnant patients, it is covered up to 3 times per  pregnancy.    Immunizations:  Immunization Recommendations   Influenza Vaccine Annual influenza vaccination during flu season is recommended for all persons aged >= 6 months who do not have contraindications   Pneumococcal Vaccine   * Pneumococcal conjugate vaccine = PCV13 (Prevnar 13), PCV15 (Vaxneuvance), PCV20 (Prevnar 20)  * Pneumococcal polysaccharide vaccine = PPSV23 (Pneumovax) Adults 19-65 yo with certain risk factors or if 65+ yo  If never received any pneumonia vaccine: recommend Prevnar 20 (PCV20)  Give PCV20 if previously received 1 dose of PCV13 or PPSV23   Hepatitis B Vaccine 3 dose series if at intermediate or high risk (ex: diabetes, end stage renal disease, liver disease)   Respiratory syncytial virus (RSV) Vaccine - COVERED BY MEDICARE PART D  * RSVPreF3 (Arexvy) CDC recommends that adults 60 years of age and older may receive a single dose of RSV vaccine using shared clinical decision-making (SCDM)   Tetanus (Td) Vaccine - COST NOT COVERED BY MEDICARE PART B Following completion of primary series, a booster dose should be given every 10 years to maintain immunity against tetanus. Td may also be given as tetanus wound prophylaxis.   Tdap Vaccine - COST NOT COVERED BY MEDICARE PART B Recommended at least once for all adults. For pregnant patients, recommended with each pregnancy.   Shingles Vaccine (Shingrix) - COST NOT COVERED BY MEDICARE PART B  2 shot series recommended in those 19 years and older who have or will have weakened immune systems or those 50 years and older     Health Maintenance Due:      Topic Date Due   • DXA SCAN  Never done   • Breast Cancer Screening: Mammogram  03/13/2019   • Hepatitis C Screening  Completed     Immunizations Due:      Topic Date Due   • Pneumococcal Vaccine: 65+ Years (1 of 2 - PCV) Never done   • COVID-19 Vaccine (1 - 2023-24 season) Never done   • Influenza Vaccine (1) 09/01/2024     Advance Directives   What are advance directives?  Advance directives are  legal documents that state your wishes and plans for medical care. These plans are made ahead of time in case you lose your ability to make decisions for yourself. Advance directives can apply to any medical decision, such as the treatments you want, and if you want to donate organs.   What are the types of advance directives?  There are many types of advance directives, and each state has rules about how to use them. You may choose a combination of any of the following:  Living will:  This is a written record of the treatment you want. You can also choose which treatments you do not want, which to limit, and which to stop at a certain time. This includes surgery, medicine, IV fluid, and tube feedings.   Durable power of  for healthcare (DPAHC):  This is a written record that states who you want to make healthcare choices for you when you are unable to make them for yourself. This person, called a proxy, is usually a family member or a friend. You may choose more than 1 proxy.  Do not resuscitate (DNR) order:  A DNR order is used in case your heart stops beating or you stop breathing. It is a request not to have certain forms of treatment, such as CPR. A DNR order may be included in other types of advance directives.  Medical directive:  This covers the care that you want if you are in a coma, near death, or unable to make decisions for yourself. You can list the treatments you want for each condition. Treatment may include pain medicine, surgery, blood transfusions, dialysis, IV or tube feedings, and a ventilator (breathing machine).  Values history:  This document has questions about your views, beliefs, and how you feel and think about life. This information can help others choose the care that you would choose.  Why are advance directives important?  An advance directive helps you control your care. Although spoken wishes may be used, it is better to have your wishes written down. Spoken wishes can be  misunderstood, or not followed. Treatments may be given even if you do not want them. An advance directive may make it easier for your family to make difficult choices about your care.   Fall Prevention    Fall prevention  includes ways to make your home and other areas safer. It also includes ways you can move more carefully to prevent a fall. Health conditions that cause changes in your blood pressure, vision, or muscle strength and coordination may increase your risk for falls. Medicines may also increase your risk for falls if they make you dizzy, weak, or sleepy.   Fall prevention tips:   Stand or sit up slowly.    Use assistive devices as directed.    Wear shoes that fit well and have soles that .    Wear a personal alarm.    Stay active.    Manage your medical conditions.    Home Safety Tips:  Add items to prevent falls in the bathroom.    Keep paths clear.    Install bright lights in your home.    Keep items you use often on shelves within reach.    Paint or place reflective tape on the edges of your stairs.    Urinary Incontinence   Urinary incontinence (UI)  is when you lose control of your bladder. UI develops because your bladder cannot store or empty urine properly. The 3 most common types of UI are stress incontinence, urge incontinence, or both.  Medicines:   May be given to help strengthen your bladder control. Report any side effects of medication to your healthcare provider.  Do pelvic muscle exercises often:  Your pelvic muscles help you stop urinating. Squeeze these muscles tight for 5 seconds, then relax for 5 seconds. Gradually work up to squeezing for 10 seconds. Do 3 sets of 15 repetitions a day, or as directed. This will help strengthen your pelvic muscles and improve bladder control.  Train your bladder:  Go to the bathroom at set times, such as every 2 hours, even if you do not feel the urge to go. You can also try to hold your urine when you feel the urge to go. For example, hold your  urine for 5 minutes when you feel the urge to go. As that becomes easier, hold your urine for 10 minutes.   Self-care:   Keep a UI record.  Write down how often you leak urine and how much you leak. Make a note of what you were doing when you leaked urine.  Drink liquids as directed. You may need to limit the amount of liquid you drink to help control your urine leakage. Do not drink any liquid right before you go to bed. Limit or do not have drinks that contain caffeine or alcohol.   Prevent constipation.  Eat a variety of high-fiber foods. Good examples are high-fiber cereals, beans, vegetables, and whole-grain breads. Walking is the best way to trigger your intestines to have a bowel movement.  Exercise regularly and maintain a healthy weight.  Weight loss and exercise will decrease pressure on your bladder and help you control your leakage.   Use a catheter as directed  to help empty your bladder. A catheter is a tiny, plastic tube that is put into your bladder to drain your urine.   Go to behavior therapy as directed.  Behavior therapy may be used to help you learn to control your urge to urinate.     © Copyright [employer] 2018 Information is for End User's use only and may not be sold, redistributed or otherwise used for commercial purposes. All illustrations and images included in CareNotes® are the copyrighted property of [employer] or [employer]      Medicare Preventive Visit Patient Instructions  Thank you for completing your Welcome to Medicare Visit or Medicare Annual Wellness Visit today. Your next wellness visit will be due in one year (9/20/2025).  The screening/preventive services that you may require over the next 5-10 years are detailed below. Some tests may not apply to you based off risk factors and/or age. Screening tests ordered at today's visit but not completed yet may show as past due. Also, please note that scanned in results may not display below.  Preventive  Screenings:  Service Recommendations Previous Testing/Comments   Colorectal Cancer Screening  * Colonoscopy    * Fecal Occult Blood Test (FOBT)/Fecal Immunochemical Test (FIT)  * Fecal DNA/Cologuard Test  * Flexible Sigmoidoscopy Age: 45-75 years old   Colonoscopy: every 10 years (may be performed more frequently if at higher risk)  OR  FOBT/FIT: every 1 year  OR  Cologuard: every 3 years  OR  Sigmoidoscopy: every 5 years  Screening may be recommended earlier than age 45 if at higher risk for colorectal cancer. Also, an individualized decision between you and your healthcare provider will decide whether screening between the ages of 76-85 would be appropriate. Colonoscopy: Not on file  FOBT/FIT: Not on file  Cologuard: Not on file  Sigmoidoscopy: Not on file          Breast Cancer Screening Age: 40+ years old  Frequency: every 1-2 years  Not required if history of left and right mastectomy Mammogram: Not on file        Cervical Cancer Screening Between the ages of 21-29, pap smear recommended once every 3 years.   Between the ages of 30-65, can perform pap smear with HPV co-testing every 5 years.   Recommendations may differ for women with a history of total hysterectomy, cervical cancer, or abnormal pap smears in past. Pap Smear: Not on file    Screening Not Indicated   Hepatitis C Screening Once for adults born between 1945 and 1965  More frequently in patients at high risk for Hepatitis C Hep C Antibody: 07/17/2018    Screening Current   Diabetes Screening 1-2 times per year if you're at risk for diabetes or have pre-diabetes Fasting glucose: 108 mg/dL (9/12/2023)  A1C: 5.7 % (6/6/2023)  Screening Not Indicated  History Diabetes   Cholesterol Screening Once every 5 years if you don't have a lipid disorder. May order more often based on risk factors. Lipid panel: 06/06/2023    Screening Not Indicated  History Lipid Disorder     Other Preventive Screenings Covered by Medicare:  Abdominal Aortic Aneurysm (AAA)  Screening: covered once if your at risk. You're considered to be at risk if you have a family history of AAA.  Lung Cancer Screening: covers low dose CT scan once per year if you meet all of the following conditions: (1) Age 55-77; (2) No signs or symptoms of lung cancer; (3) Current smoker or have quit smoking within the last 15 years; (4) You have a tobacco smoking history of at least 20 pack years (packs per day multiplied by number of years you smoked); (5) You get a written order from a healthcare provider.  Glaucoma Screening: covered annually if you're considered high risk: (1) You have diabetes OR (2) Family history of glaucoma OR (3)  aged 50 and older OR (4)  American aged 65 and older  Osteoporosis Screening: covered every 2 years if you meet one of the following conditions: (1) You're estrogen deficient and at risk for osteoporosis based off medical history and other findings; (2) Have a vertebral abnormality; (3) On glucocorticoid therapy for more than 3 months; (4) Have primary hyperparathyroidism; (5) On osteoporosis medications and need to assess response to drug therapy.   Last bone density test (DXA Scan): Not on file.  HIV Screening: covered annually if you're between the age of 15-65. Also covered annually if you are younger than 15 and older than 65 with risk factors for HIV infection. For pregnant patients, it is covered up to 3 times per pregnancy.    Immunizations:  Immunization Recommendations   Influenza Vaccine Annual influenza vaccination during flu season is recommended for all persons aged >= 6 months who do not have contraindications   Pneumococcal Vaccine   * Pneumococcal conjugate vaccine = PCV13 (Prevnar 13), PCV15 (Vaxneuvance), PCV20 (Prevnar 20)  * Pneumococcal polysaccharide vaccine = PPSV23 (Pneumovax) Adults 19-63 yo with certain risk factors or if 65+ yo  If never received any pneumonia vaccine: recommend Prevnar 20 (PCV20)  Give PCV20 if previously  received 1 dose of PCV13 or PPSV23   Hepatitis B Vaccine 3 dose series if at intermediate or high risk (ex: diabetes, end stage renal disease, liver disease)   Respiratory syncytial virus (RSV) Vaccine - COVERED BY MEDICARE PART D  * RSVPreF3 (Arexvy) CDC recommends that adults 60 years of age and older may receive a single dose of RSV vaccine using shared clinical decision-making (SCDM)   Tetanus (Td) Vaccine - COST NOT COVERED BY MEDICARE PART B Following completion of primary series, a booster dose should be given every 10 years to maintain immunity against tetanus. Td may also be given as tetanus wound prophylaxis.   Tdap Vaccine - COST NOT COVERED BY MEDICARE PART B Recommended at least once for all adults. For pregnant patients, recommended with each pregnancy.   Shingles Vaccine (Shingrix) - COST NOT COVERED BY MEDICARE PART B  2 shot series recommended in those 19 years and older who have or will have weakened immune systems or those 50 years and older     Health Maintenance Due:      Topic Date Due   • DXA SCAN  Never done   • Breast Cancer Screening: Mammogram  03/13/2019   • Hepatitis C Screening  Completed     Immunizations Due:      Topic Date Due   • Pneumococcal Vaccine: 65+ Years (1 of 2 - PCV) Never done   • COVID-19 Vaccine (1 - 2023-24 season) Never done   • Influenza Vaccine (1) 09/01/2024     Advance Directives   What are advance directives?  Advance directives are legal documents that state your wishes and plans for medical care. These plans are made ahead of time in case you lose your ability to make decisions for yourself. Advance directives can apply to any medical decision, such as the treatments you want, and if you want to donate organs.   What are the types of advance directives?  There are many types of advance directives, and each state has rules about how to use them. You may choose a combination of any of the following:  Living will:  This is a written record of the treatment you  want. You can also choose which treatments you do not want, which to limit, and which to stop at a certain time. This includes surgery, medicine, IV fluid, and tube feedings.   Durable power of  for healthcare (DPAHC):  This is a written record that states who you want to make healthcare choices for you when you are unable to make them for yourself. This person, called a proxy, is usually a family member or a friend. You may choose more than 1 proxy.  Do not resuscitate (DNR) order:  A DNR order is used in case your heart stops beating or you stop breathing. It is a request not to have certain forms of treatment, such as CPR. A DNR order may be included in other types of advance directives.  Medical directive:  This covers the care that you want if you are in a coma, near death, or unable to make decisions for yourself. You can list the treatments you want for each condition. Treatment may include pain medicine, surgery, blood transfusions, dialysis, IV or tube feedings, and a ventilator (breathing machine).  Values history:  This document has questions about your views, beliefs, and how you feel and think about life. This information can help others choose the care that you would choose.  Why are advance directives important?  An advance directive helps you control your care. Although spoken wishes may be used, it is better to have your wishes written down. Spoken wishes can be misunderstood, or not followed. Treatments may be given even if you do not want them. An advance directive may make it easier for your family to make difficult choices about your care.   Fall Prevention    Fall prevention  includes ways to make your home and other areas safer. It also includes ways you can move more carefully to prevent a fall. Health conditions that cause changes in your blood pressure, vision, or muscle strength and coordination may increase your risk for falls. Medicines may also increase your risk for falls if  they make you dizzy, weak, or sleepy.   Fall prevention tips:   Stand or sit up slowly.    Use assistive devices as directed.    Wear shoes that fit well and have soles that .    Wear a personal alarm.    Stay active.    Manage your medical conditions.    Home Safety Tips:  Add items to prevent falls in the bathroom.    Keep paths clear.    Install bright lights in your home.    Keep items you use often on shelves within reach.    Paint or place reflective tape on the edges of your stairs.    Urinary Incontinence   Urinary incontinence (UI)  is when you lose control of your bladder. UI develops because your bladder cannot store or empty urine properly. The 3 most common types of UI are stress incontinence, urge incontinence, or both.  Medicines:   May be given to help strengthen your bladder control. Report any side effects of medication to your healthcare provider.  Do pelvic muscle exercises often:  Your pelvic muscles help you stop urinating. Squeeze these muscles tight for 5 seconds, then relax for 5 seconds. Gradually work up to squeezing for 10 seconds. Do 3 sets of 15 repetitions a day, or as directed. This will help strengthen your pelvic muscles and improve bladder control.  Train your bladder:  Go to the bathroom at set times, such as every 2 hours, even if you do not feel the urge to go. You can also try to hold your urine when you feel the urge to go. For example, hold your urine for 5 minutes when you feel the urge to go. As that becomes easier, hold your urine for 10 minutes.   Self-care:   Keep a UI record.  Write down how often you leak urine and how much you leak. Make a note of what you were doing when you leaked urine.  Drink liquids as directed. You may need to limit the amount of liquid you drink to help control your urine leakage. Do not drink any liquid right before you go to bed. Limit or do not have drinks that contain caffeine or alcohol.   Prevent constipation.  Eat a variety of  high-fiber foods. Good examples are high-fiber cereals, beans, vegetables, and whole-grain breads. Walking is the best way to trigger your intestines to have a bowel movement.  Exercise regularly and maintain a healthy weight.  Weight loss and exercise will decrease pressure on your bladder and help you control your leakage.   Use a catheter as directed  to help empty your bladder. A catheter is a tiny, plastic tube that is put into your bladder to drain your urine.   Go to behavior therapy as directed.  Behavior therapy may be used to help you learn to control your urge to urinate.     © Copyright [employer] 2018 Information is for End User's use only and may not be sold, redistributed or otherwise used for commercial purposes. All illustrations and images included in CareNotes® are the copyrighted property of [employer] Inc. or [employer]

## 2024-09-20 DIAGNOSIS — G47.9 SLEEPING DIFFICULTY: ICD-10-CM

## 2024-09-20 RX ORDER — QUETIAPINE FUMARATE 25 MG/1
25 TABLET, FILM COATED ORAL
Qty: 90 TABLET | Refills: 3 | Status: SHIPPED | OUTPATIENT
Start: 2024-09-20

## 2024-10-14 DIAGNOSIS — R60.9 EDEMA, UNSPECIFIED TYPE: ICD-10-CM

## 2024-10-14 RX ORDER — FUROSEMIDE 20 MG/1
TABLET ORAL
Qty: 90 TABLET | Refills: 3 | Status: SHIPPED | OUTPATIENT
Start: 2024-10-14

## 2024-10-23 ENCOUNTER — TELEPHONE (OUTPATIENT)
Age: 79
End: 2024-10-23

## 2024-10-23 NOTE — TELEPHONE ENCOUNTER
Patient's daughter, Razia call to speak with Anali.  She is asking for a letter for [employer] to stop shut off.  The shut off date is Wednesday, 10/30/24.  Please contact her tomorrow to discuss.  Thank you.

## 2024-10-24 NOTE — TELEPHONE ENCOUNTER
Have they spoken to [employer] about payment plan Generally this is a form good for one month and the patient works out a payment plan with [employer]

## 2024-10-24 NOTE — TELEPHONE ENCOUNTER
We did one beginning of September for that month. Razia said they raised the price of their electric bill and she can't pay it until 2 days after bill is due but they won't allow a late payment, just a form from us.

## 2024-10-24 NOTE — TELEPHONE ENCOUNTER
Razia aware form was completed and faxed. Also aware of recommendation to work something out with [employer].

## 2024-10-24 NOTE — TELEPHONE ENCOUNTER
The form I will do one more time but she needs to call and work out payment plan with them or way to pay bill as we cannot do form every month

## 2024-11-05 DIAGNOSIS — E78.2 MIXED HYPERLIPIDEMIA: ICD-10-CM

## 2024-11-05 RX ORDER — SIMVASTATIN 40 MG
TABLET ORAL
Qty: 90 TABLET | Refills: 0 | Status: SHIPPED | OUTPATIENT
Start: 2024-11-05

## 2024-11-18 DIAGNOSIS — E11.9 TYPE 2 DIABETES MELLITUS WITHOUT COMPLICATION, WITHOUT LONG-TERM CURRENT USE OF INSULIN (HCC): ICD-10-CM

## 2024-11-18 RX ORDER — SITAGLIPTIN AND METFORMIN HYDROCHLORIDE 500; 50 MG/1; MG/1
1 TABLET, FILM COATED ORAL 2 TIMES DAILY WITH MEALS
Qty: 180 TABLET | Refills: 0 | Status: SHIPPED | OUTPATIENT
Start: 2024-11-18

## 2024-11-20 ENCOUNTER — TELEPHONE (OUTPATIENT)
Dept: FAMILY MEDICINE CLINIC | Facility: CLINIC | Age: 79
End: 2024-11-20

## 2024-11-20 NOTE — TELEPHONE ENCOUNTER
Spoke with Razia, asking if you could write an rx for a new mattress for 's hospital bed with a gel overlay? Said [facility] am [phone].

## 2024-11-22 ENCOUNTER — TELEPHONE (OUTPATIENT)
Age: 79
End: 2024-11-22

## 2024-11-22 DIAGNOSIS — I10 PRIMARY HYPERTENSION: ICD-10-CM

## 2024-11-22 RX ORDER — LISINOPRIL 10 MG/1
10 TABLET ORAL DAILY
Qty: 90 TABLET | Refills: 3 | Status: SHIPPED | OUTPATIENT
Start: 2024-11-22

## 2024-11-22 NOTE — TELEPHONE ENCOUNTER
Mey-from [facility] reports she needs an updated demographic form faxed over to #[phone] attention Mey Louis reports she needs this updated, so she can place the orders.

## 2025-02-21 DIAGNOSIS — E78.2 MIXED HYPERLIPIDEMIA: ICD-10-CM

## 2025-02-21 DIAGNOSIS — E11.9 TYPE 2 DIABETES MELLITUS WITHOUT COMPLICATION, WITHOUT LONG-TERM CURRENT USE OF INSULIN (HCC): ICD-10-CM

## 2025-02-21 RX ORDER — SIMVASTATIN 40 MG
40 TABLET ORAL DAILY
Qty: 90 TABLET | Refills: 0 | Status: SHIPPED | OUTPATIENT
Start: 2025-02-21

## 2025-02-21 RX ORDER — SITAGLIPTIN AND METFORMIN HYDROCHLORIDE 500; 50 MG/1; MG/1
1 TABLET, FILM COATED ORAL 2 TIMES DAILY WITH MEALS
Qty: 180 TABLET | Refills: 3 | Status: SHIPPED | OUTPATIENT
Start: 2025-02-21

## 2025-03-31 ENCOUNTER — TELEPHONE (OUTPATIENT)
Age: 80
End: 2025-03-31

## 2025-03-31 DIAGNOSIS — R26.2 AMBULATORY DYSFUNCTION: Primary | ICD-10-CM

## 2025-03-31 NOTE — TELEPHONE ENCOUNTER
Check with pt I can place referral for [facility] to come to the home for therapy  I do not know of a podiatrist that does home visits since Dr Keller retired

## 2025-03-31 NOTE — TELEPHONE ENCOUNTER
Daughter is requesting a referral for PT/OT and Skilled Nursing. Pt is not wanting to do anything except stay in bed and not wanting to takes showers and other ADL's.   Gave Daughter Number for Podiatry to see if any come to homes. She asked if anyone here could do the toenails as pt won't let daughter do it, I explained nurses are not allowed to do that and family can and podiatrists. She will call Podiatry.

## 2025-03-31 NOTE — TELEPHONE ENCOUNTER
Chrystal from medicaid call in requesting in home PT and OT for patient she is not sure if patient needs skill nursing and evaluation will be needed. Chrystal also requesting a Travel Podiat[facility] if PCP know of one. Patient does not want to go out.

## 2025-03-31 NOTE — TELEPHONE ENCOUNTER
I can put in home therapy eval - does patient have a  thru the county as they may be able to help with additional in home help   If she does not have county  I will place social service eval

## 2025-04-01 NOTE — TELEPHONE ENCOUNTER
Pt had in home PT/ OT with [facility] in the past, would like to go through them again. Can you write rx that I can fax?

## 2025-04-02 ENCOUNTER — TELEPHONE (OUTPATIENT)
Dept: FAMILY MEDICINE CLINIC | Facility: CLINIC | Age: 80
End: 2025-04-02

## 2025-04-02 NOTE — TELEPHONE ENCOUNTER
Hello,    When I spoke with pt's daughter, Razia, yesterday she expressed concern about how she is supposed to get mother's toenails trimmed. Pt is diabetic and has a hard time getting around, especially getting out of the house. Daughter is nervous to trim nails herself because of pt being diabetic. Was asking if any of our podiatrists do house visits to do so in which I told her I do not believe so. I also do not believe in home health can do nail trimmings on diabetic pts. Daughter asking what her options are for pt?     Thank you

## 2025-04-03 ENCOUNTER — TELEPHONE (OUTPATIENT)
Age: 80
End: 2025-04-03

## 2025-04-03 NOTE — TELEPHONE ENCOUNTER
Sarah with [facility] called for follow up on referral for patient to receive physical therapy services.    In order for patient to be eligible to receive services, documentation of a visit with the provider would be needed which is no older than 90 days of start of service.    Patient was last seen 9/19/24 virtually.  Please schedule an appointment for patient to be seen.  This visit needs to be face-to-face, and video face-to-face would be acceptable.    Once patient is seen, please fax office visit note to [phone] so services can be started.

## 2025-04-07 ENCOUNTER — RA CDI HCC (OUTPATIENT)
Dept: OTHER | Facility: HOSPITAL | Age: 80
End: 2025-04-07

## 2025-04-08 DIAGNOSIS — Z72.0 TOBACCO USE: ICD-10-CM

## 2025-04-08 DIAGNOSIS — J44.1 COPD EXACERBATION (HCC): ICD-10-CM

## 2025-04-08 DIAGNOSIS — J44.9 CHRONIC OBSTRUCTIVE PULMONARY DISEASE, UNSPECIFIED COPD TYPE (HCC): ICD-10-CM

## 2025-04-08 RX ORDER — BUDESONIDE AND FORMOTEROL FUMARATE DIHYDRATE 80; 4.5 UG/1; UG/1
2 AEROSOL RESPIRATORY (INHALATION) 2 TIMES DAILY
Qty: 10.2 G | Refills: 0 | Status: SHIPPED | OUTPATIENT
Start: 2025-04-08

## 2025-04-08 RX ORDER — UMECLIDINIUM 62.5 UG/1
1 AEROSOL, POWDER ORAL DAILY
Qty: 30 BLISTER | Refills: 3 | Status: SHIPPED | OUTPATIENT
Start: 2025-04-08

## 2025-04-09 ENCOUNTER — TELEPHONE (OUTPATIENT)
Dept: OBGYN CLINIC | Facility: CLINIC | Age: 80
End: 2025-04-09

## 2025-04-09 NOTE — TELEPHONE ENCOUNTER
Called patient's daughter, patient has been scheduled for 4/14 in [facility] with Dr. Ratliff for nail care.

## 2025-04-15 ENCOUNTER — TELEMEDICINE (OUTPATIENT)
Dept: FAMILY MEDICINE CLINIC | Facility: CLINIC | Age: 80
End: 2025-04-15
Payer: MEDICARE

## 2025-04-15 ENCOUNTER — TELEPHONE (OUTPATIENT)
Dept: LAB | Facility: HOSPITAL | Age: 80
End: 2025-04-15

## 2025-04-15 DIAGNOSIS — R26.2 AMBULATORY DYSFUNCTION: ICD-10-CM

## 2025-04-15 DIAGNOSIS — J43.9 PULMONARY EMPHYSEMA, UNSPECIFIED EMPHYSEMA TYPE (HCC): Primary | ICD-10-CM

## 2025-04-15 DIAGNOSIS — E87.1 HYPONATREMIA: ICD-10-CM

## 2025-04-15 DIAGNOSIS — I10 ESSENTIAL HYPERTENSION: ICD-10-CM

## 2025-04-15 PROBLEM — R19.7 DIARRHEA: Status: RESOLVED | Noted: 2023-02-13 | Resolved: 2025-04-15

## 2025-04-15 PROBLEM — J44.1 COPD WITH ACUTE EXACERBATION (HCC): Status: RESOLVED | Noted: 2022-09-22 | Resolved: 2025-04-15

## 2025-04-15 PROCEDURE — G2211 COMPLEX E/M VISIT ADD ON: HCPCS | Performed by: INTERNAL MEDICINE

## 2025-04-15 PROCEDURE — 99214 OFFICE O/P EST MOD 30 MIN: CPT | Performed by: INTERNAL MEDICINE

## 2025-04-15 NOTE — PROGRESS NOTES
Virtual Regular VisitName: Tiffani Francois      : 1945      MRN: [id number]  Encounter Provider: Maude Baker DO  Encounter Date: 4/15/2025   Encounter department: [facility]  :  Assessment & Plan  Pulmonary emphysema, unspecified emphysema type (HCC)  Pt on continuous oxygen She would benefit from in home pt/ot eval for conditioning and balance/stability training        Ambulatory dysfunction  Pt is mobile with walker but would benefit from PT/OT for improved gait function and stability        Hyponatremia  Mobile lab to be coordinated as pt overdue for labs        Essential hypertension  Continue current rx Pt is compliant with medication        Rto Fall for AWV/flu vaccine     History of Present Illness     HPI  Pt daughter would like to have in home care/PT/OT as patient would benefit from increased strengthening Pt is on continuous oxygen and mobile with the walker but does have an unsteady gait at times She had homecare in fall with benefit No recent illness No chest pain or dizziness No falls She is limited in functional status due to her breathing Appetite is stable She is sleeping ok Denies pain Pt is sob with exertion and requires oxygen for ambulation   Review of Systems   Constitutional:  Negative for chills and fever.   HENT:  Positive for postnasal drip.    Eyes:  Negative for visual disturbance.   Respiratory:  Positive for shortness of breath. Negative for cough and wheezing.    Cardiovascular: Negative.    Gastrointestinal: Negative.    Genitourinary: Negative.    Neurological:  Negative for dizziness, light-headedness and headaches.       Objective   There were no vitals taken for this visit.    Physical Exam  Vitals and nursing note reviewed.   Constitutional:       General: She is not in acute distress.     Appearance: Normal appearance. She is not ill-appearing, toxic-appearing or diaphoretic.   HENT:      Head: Normocephalic and atraumatic.      Right Ear: External ear  normal.      Left Ear: External ear normal.      Nose: Nose normal.   Eyes:      General: No scleral icterus.  Pulmonary:      Effort: Pulmonary effort is normal. No respiratory distress.   Abdominal:      General: There is no distension.   Musculoskeletal:      Cervical back: Normal range of motion.   Skin:     Coloration: Skin is not pale.      Findings: No erythema.   Neurological:      General: No focal deficit present.      Mental Status: She is alert and oriented to person, place, and time. Mental status is at baseline.   Psychiatric:         Mood and Affect: Mood normal.         Behavior: Behavior normal.         Thought Content: Thought content normal.         Judgment: Judgment normal.       Administrative Statements   Encounter provider Maude Baker, DO    The Patient is located at Home and in the following state in which I hold an active license [place].    The patient was identified by name and date of birth. Tiffani TOOTIE Francois was informed that this is a telemedicine visit and that the visit is being conducted through the Epic Embedded platform. She agrees to proceed..  My office door was closed. No one else was in the room.  She acknowledged consent and understanding of privacy and security of the video platform. The patient has agreed to participate and understands they can discontinue the visit at any time.    I have spent a total time of 20 minutes in caring for this patient on the day of the visit/encounter including Risks and benefits of tx options and Counseling / Coordination of care, not including the time spent for establishing the audio/video connection.

## 2025-04-16 NOTE — ASSESSMENT & PLAN NOTE
Pt is mobile with walker but would benefit from PT/OT for improved gait function and stability

## 2025-04-16 NOTE — ASSESSMENT & PLAN NOTE
Pt on continuous oxygen She would benefit from in home pt/ot eval for conditioning and balance/stability training

## 2025-04-17 ENCOUNTER — TELEPHONE (OUTPATIENT)
Age: 80
End: 2025-04-17

## 2025-04-17 NOTE — TELEPHONE ENCOUNTER
Matthias from [facility] wanted to inform PCP that they were able to get in contact with pt's daughter and are confirming that Physical Therapy will start on Monday.

## 2025-04-17 NOTE — TELEPHONE ENCOUNTER
Janene from [facility] called stated that patient was approved for in-home physical therapy and will be starting on Monday.     She also wanted to let Dr. Baker and other providers know that they have a new contract now with [employer]/ [employer].    Janene stated that she was not able to get a hold of patient to let her know. Please contact patient to notify.      Please advise

## 2025-04-17 NOTE — TELEPHONE ENCOUNTER
[facility] called and stated they received the prescription for incontinent supplies but a diagnosis code is missing.     They stated they will refax order to have the diagnosis code added.    Please advise  Thank you

## 2025-04-22 ENCOUNTER — TELEPHONE (OUTPATIENT)
Age: 80
End: 2025-04-22

## 2025-04-22 NOTE — TELEPHONE ENCOUNTER
Matthias a physical therapist with [facility] called stating patient had her evaluation today and the plan is to see her once a week x 5 weeks.

## 2025-04-22 NOTE — TELEPHONE ENCOUNTER
[facility] called regarding OT. Said they will be be seeing the patient for the next 1 day a week for 5 weeks.

## 2025-04-22 NOTE — TELEPHONE ENCOUNTER
[facility] called and stated they faxed over the prescription order, they need Dr. Baker to ad the correct diagnosis code that best goes with patient's diagnosis. Once this is done they would like it to be faxed back.    Fax: [phone]    Please advise

## 2025-04-23 ENCOUNTER — TELEPHONE (OUTPATIENT)
Age: 80
End: 2025-04-23

## 2025-04-23 NOTE — TELEPHONE ENCOUNTER
A visiting nurse from [facility] called to let the provider know that she went went to visit the patient today for a general assessment and there were no other issues or concerns at this time

## 2025-04-24 NOTE — TELEPHONE ENCOUNTER
[facility] called again, along with the diagnosis code, pcp also needs to add the # of refills onto the order, said to [place] the amount of refills somewhere on the order sheet, per items. Fax back asap as the pt keeps contacting them for her items.

## 2025-04-25 NOTE — TELEPHONE ENCOUNTER
[facility] called again, they need the diagnosis code to support the need for incontinence of the patient.  Once this is done the form should be faxed to [phone].

## 2025-05-02 DIAGNOSIS — J44.9 CHRONIC OBSTRUCTIVE PULMONARY DISEASE, UNSPECIFIED COPD TYPE (HCC): ICD-10-CM

## 2025-05-02 DIAGNOSIS — Z72.0 TOBACCO USE: ICD-10-CM

## 2025-05-02 RX ORDER — BUDESONIDE AND FORMOTEROL FUMARATE DIHYDRATE 80; 4.5 UG/1; UG/1
2 AEROSOL RESPIRATORY (INHALATION) 2 TIMES DAILY
Qty: 10.2 G | Refills: 0 | Status: SHIPPED | OUTPATIENT
Start: 2025-05-02

## 2025-05-13 ENCOUNTER — HOSPITAL ENCOUNTER (INPATIENT)
Facility: HOSPITAL | Age: 80
LOS: 7 days | Discharge: HOME HEALTH | DRG: 291 | End: 2025-05-20
Attending: EMERGENCY MEDICINE | Admitting: INTERNAL MEDICINE
Payer: COMMERCIAL

## 2025-05-13 ENCOUNTER — APPOINTMENT (EMERGENCY)
Dept: RADIOLOGY | Facility: HOSPITAL | Age: 80
DRG: 291 | End: 2025-05-13
Payer: COMMERCIAL

## 2025-05-13 ENCOUNTER — APPOINTMENT (EMERGENCY)
Dept: CT IMAGING | Facility: HOSPITAL | Age: 80
DRG: 291 | End: 2025-05-13
Payer: COMMERCIAL

## 2025-05-13 ENCOUNTER — NURSE TRIAGE (OUTPATIENT)
Age: 80
End: 2025-05-13

## 2025-05-13 ENCOUNTER — APPOINTMENT (INPATIENT)
Dept: CT IMAGING | Facility: HOSPITAL | Age: 80
DRG: 291 | End: 2025-05-13
Payer: COMMERCIAL

## 2025-05-13 DIAGNOSIS — J44.1 COPD WITH ACUTE EXACERBATION (HCC): ICD-10-CM

## 2025-05-13 DIAGNOSIS — I50.9 ACUTE EXACERBATION OF CHF (CONGESTIVE HEART FAILURE) (HCC): Primary | ICD-10-CM

## 2025-05-13 DIAGNOSIS — Z22.322 MRSA COLONIZATION: ICD-10-CM

## 2025-05-13 DIAGNOSIS — E83.42 HYPOMAGNESEMIA: ICD-10-CM

## 2025-05-13 DIAGNOSIS — J90 PLEURAL EFFUSION: ICD-10-CM

## 2025-05-13 DIAGNOSIS — D50.9 IRON DEFICIENCY ANEMIA, UNSPECIFIED IRON DEFICIENCY ANEMIA TYPE: ICD-10-CM

## 2025-05-13 DIAGNOSIS — E53.8 VITAMIN B12 DEFICIENCY: ICD-10-CM

## 2025-05-13 DIAGNOSIS — R26.2 AMBULATORY DYSFUNCTION: ICD-10-CM

## 2025-05-13 DIAGNOSIS — E55.9 VITAMIN D INSUFFICIENCY: ICD-10-CM

## 2025-05-13 DIAGNOSIS — R06.89 HYPERCARBIA: ICD-10-CM

## 2025-05-13 DIAGNOSIS — I50.9 ACUTE CHF (CONGESTIVE HEART FAILURE) (HCC): ICD-10-CM

## 2025-05-13 PROBLEM — I31.39 PERICARDIAL EFFUSION: Status: ACTIVE | Noted: 2025-05-13

## 2025-05-13 PROBLEM — J96.22 ACUTE ON CHRONIC RESPIRATORY FAILURE WITH HYPOXIA AND HYPERCAPNIA (HCC): Status: ACTIVE | Noted: 2025-05-13

## 2025-05-13 PROBLEM — J96.21 ACUTE ON CHRONIC RESPIRATORY FAILURE WITH HYPOXIA AND HYPERCAPNIA (HCC): Status: ACTIVE | Noted: 2025-05-13

## 2025-05-13 LAB
2HR DELTA HS TROPONIN: -2 NG/L
4HR DELTA HS TROPONIN: 2 NG/L
ANION GAP SERPL CALCULATED.3IONS-SCNC: 5 MMOL/L (ref 4–13)
BACTERIA UR QL AUTO: ABNORMAL /HPF
BASE EX.OXY STD BLDV CALC-SCNC: 38 % (ref 60–80)
BASE EXCESS BLDV CALC-SCNC: 7 MMOL/L
BASOPHILS # BLD AUTO: 0.08 THOUSANDS/ÂΜL (ref 0–0.1)
BASOPHILS NFR BLD AUTO: 1 % (ref 0–1)
BILIRUB UR QL STRIP: NEGATIVE
BNP SERPL-MCNC: 648 PG/ML (ref 0–100)
BUN SERPL-MCNC: 21 MG/DL (ref 5–25)
CALCIUM SERPL-MCNC: 9.2 MG/DL (ref 8.4–10.2)
CARDIAC TROPONIN I PNL SERPL HS: 22 NG/L (ref ?–50)
CARDIAC TROPONIN I PNL SERPL HS: 24 NG/L (ref ?–50)
CARDIAC TROPONIN I PNL SERPL HS: 26 NG/L (ref ?–50)
CHLORIDE SERPL-SCNC: 94 MMOL/L (ref 96–108)
CLARITY UR: CLEAR
CO2 SERPL-SCNC: 41 MMOL/L (ref 21–32)
COLOR UR: COLORLESS
CREAT SERPL-MCNC: 0.85 MG/DL (ref 0.6–1.3)
CREAT UR-MCNC: 3.6 MG/DL
EOSINOPHIL # BLD AUTO: 0.11 THOUSAND/ÂΜL (ref 0–0.61)
EOSINOPHIL NFR BLD AUTO: 1 % (ref 0–6)
ERYTHROCYTE [DISTWIDTH] IN BLOOD BY AUTOMATED COUNT: 18.8 % (ref 11.6–15.1)
GFR SERPL CREATININE-BSD FRML MDRD: 64 ML/MIN/1.73SQ M
GLUCOSE SERPL-MCNC: 110 MG/DL (ref 65–140)
GLUCOSE UR STRIP-MCNC: NEGATIVE MG/DL
HCO3 BLDV-SCNC: 35 MMOL/L (ref 24–30)
HCT VFR BLD AUTO: 33.9 % (ref 34.8–46.1)
HGB BLD-MCNC: 9.1 G/DL (ref 11.5–15.4)
HGB UR QL STRIP.AUTO: NEGATIVE
IMM GRANULOCYTES # BLD AUTO: 0.02 THOUSAND/UL (ref 0–0.2)
IMM GRANULOCYTES NFR BLD AUTO: 0 % (ref 0–2)
KETONES UR STRIP-MCNC: NEGATIVE MG/DL
LACTATE SERPL-SCNC: 1.7 MMOL/L (ref 0.5–2)
LEUKOCYTE ESTERASE UR QL STRIP: ABNORMAL
LYMPHOCYTES # BLD AUTO: 0.8 THOUSANDS/ÂΜL (ref 0.6–4.47)
LYMPHOCYTES NFR BLD AUTO: 10 % (ref 14–44)
MAGNESIUM SERPL-MCNC: 1.8 MG/DL (ref 1.9–2.7)
MCH RBC QN AUTO: 22.5 PG (ref 26.8–34.3)
MCHC RBC AUTO-ENTMCNC: 26.8 G/DL (ref 31.4–37.4)
MCV RBC AUTO: 84 FL (ref 82–98)
MONOCYTES # BLD AUTO: 0.6 THOUSAND/ÂΜL (ref 0.17–1.22)
MONOCYTES NFR BLD AUTO: 7 % (ref 4–12)
NEUTROPHILS # BLD AUTO: 6.67 THOUSANDS/ÂΜL (ref 1.85–7.62)
NEUTS SEG NFR BLD AUTO: 81 % (ref 43–75)
NITRITE UR QL STRIP: NEGATIVE
NON-SQ EPI CELLS URNS QL MICRO: ABNORMAL /HPF
NRBC BLD AUTO-RTO: 0 /100 WBCS
O2 CT BLDV-SCNC: 5.5 ML/DL
PCO2 BLDV: 71.2 MM HG (ref 42–50)
PH BLDV: 7.31 [PH] (ref 7.3–7.4)
PH UR STRIP.AUTO: 7.5 [PH]
PLATELET # BLD AUTO: 143 THOUSANDS/UL (ref 149–390)
PMV BLD AUTO: 10.8 FL (ref 8.9–12.7)
PO2 BLDV: 25.2 MM HG (ref 35–45)
POTASSIUM SERPL-SCNC: 3.9 MMOL/L (ref 3.5–5.3)
PROT UR STRIP-MCNC: NEGATIVE MG/DL
PROT UR-MCNC: <4 MG/DL
RBC # BLD AUTO: 4.05 MILLION/UL (ref 3.81–5.12)
RBC #/AREA URNS AUTO: ABNORMAL /HPF
SODIUM SERPL-SCNC: 140 MMOL/L (ref 135–147)
SP GR UR STRIP.AUTO: 1.01 (ref 1–1.03)
UROBILINOGEN UR STRIP-ACNC: <2 MG/DL
WBC # BLD AUTO: 8.28 THOUSAND/UL (ref 4.31–10.16)
WBC #/AREA URNS AUTO: ABNORMAL /HPF

## 2025-05-13 PROCEDURE — 84156 ASSAY OF PROTEIN URINE: CPT | Performed by: INTERNAL MEDICINE

## 2025-05-13 PROCEDURE — 71046 X-RAY EXAM CHEST 2 VIEWS: CPT

## 2025-05-13 PROCEDURE — 83735 ASSAY OF MAGNESIUM: CPT | Performed by: EMERGENCY MEDICINE

## 2025-05-13 PROCEDURE — 87186 SC STD MICRODIL/AGAR DIL: CPT | Performed by: INTERNAL MEDICINE

## 2025-05-13 PROCEDURE — 36415 COLL VENOUS BLD VENIPUNCTURE: CPT | Performed by: EMERGENCY MEDICINE

## 2025-05-13 PROCEDURE — 84484 ASSAY OF TROPONIN QUANT: CPT | Performed by: EMERGENCY MEDICINE

## 2025-05-13 PROCEDURE — 71250 CT THORAX DX C-: CPT

## 2025-05-13 PROCEDURE — 83880 ASSAY OF NATRIURETIC PEPTIDE: CPT | Performed by: EMERGENCY MEDICINE

## 2025-05-13 PROCEDURE — 96365 THER/PROPH/DIAG IV INF INIT: CPT

## 2025-05-13 PROCEDURE — 87086 URINE CULTURE/COLONY COUNT: CPT | Performed by: INTERNAL MEDICINE

## 2025-05-13 PROCEDURE — 85025 COMPLETE CBC W/AUTO DIFF WBC: CPT | Performed by: EMERGENCY MEDICINE

## 2025-05-13 PROCEDURE — 87147 CULTURE TYPE IMMUNOLOGIC: CPT | Performed by: INTERNAL MEDICINE

## 2025-05-13 PROCEDURE — 94640 AIRWAY INHALATION TREATMENT: CPT

## 2025-05-13 PROCEDURE — 82570 ASSAY OF URINE CREATININE: CPT | Performed by: INTERNAL MEDICINE

## 2025-05-13 PROCEDURE — 83605 ASSAY OF LACTIC ACID: CPT | Performed by: EMERGENCY MEDICINE

## 2025-05-13 PROCEDURE — 71275 CT ANGIOGRAPHY CHEST: CPT

## 2025-05-13 PROCEDURE — 99285 EMERGENCY DEPT VISIT HI MDM: CPT

## 2025-05-13 PROCEDURE — 93005 ELECTROCARDIOGRAM TRACING: CPT

## 2025-05-13 PROCEDURE — 81001 URINALYSIS AUTO W/SCOPE: CPT | Performed by: INTERNAL MEDICINE

## 2025-05-13 PROCEDURE — 87077 CULTURE AEROBIC IDENTIFY: CPT | Performed by: INTERNAL MEDICINE

## 2025-05-13 PROCEDURE — 82043 UR ALBUMIN QUANTITATIVE: CPT | Performed by: INTERNAL MEDICINE

## 2025-05-13 PROCEDURE — 99223 1ST HOSP IP/OBS HIGH 75: CPT | Performed by: INTERNAL MEDICINE

## 2025-05-13 PROCEDURE — 87081 CULTURE SCREEN ONLY: CPT | Performed by: INTERNAL MEDICINE

## 2025-05-13 PROCEDURE — 0202U NFCT DS 22 TRGT SARS-COV-2: CPT | Performed by: INTERNAL MEDICINE

## 2025-05-13 PROCEDURE — 80048 BASIC METABOLIC PNL TOTAL CA: CPT | Performed by: EMERGENCY MEDICINE

## 2025-05-13 PROCEDURE — 99285 EMERGENCY DEPT VISIT HI MDM: CPT | Performed by: EMERGENCY MEDICINE

## 2025-05-13 PROCEDURE — 82805 BLOOD GASES W/O2 SATURATION: CPT | Performed by: EMERGENCY MEDICINE

## 2025-05-13 RX ORDER — MAGNESIUM SULFATE HEPTAHYDRATE 40 MG/ML
2 INJECTION, SOLUTION INTRAVENOUS ONCE
Status: COMPLETED | OUTPATIENT
Start: 2025-05-13 | End: 2025-05-13

## 2025-05-13 RX ORDER — IPRATROPIUM BROMIDE AND ALBUTEROL SULFATE 2.5; .5 MG/3ML; MG/3ML
3 SOLUTION RESPIRATORY (INHALATION) ONCE
Status: COMPLETED | OUTPATIENT
Start: 2025-05-13 | End: 2025-05-13

## 2025-05-13 RX ORDER — ATORVASTATIN CALCIUM 40 MG/1
40 TABLET, FILM COATED ORAL
Status: DISCONTINUED | OUTPATIENT
Start: 2025-05-13 | End: 2025-05-20 | Stop reason: HOSPADM

## 2025-05-13 RX ORDER — ENOXAPARIN SODIUM 100 MG/ML
40 INJECTION SUBCUTANEOUS EVERY 24 HOURS
Status: DISCONTINUED | OUTPATIENT
Start: 2025-05-13 | End: 2025-05-20 | Stop reason: HOSPADM

## 2025-05-13 RX ORDER — SODIUM CHLORIDE 9 MG/ML
3 INJECTION INTRAVENOUS
Status: DISCONTINUED | OUTPATIENT
Start: 2025-05-13 | End: 2025-05-20 | Stop reason: HOSPADM

## 2025-05-13 RX ORDER — ONDANSETRON 2 MG/ML
4 INJECTION INTRAMUSCULAR; INTRAVENOUS ONCE
Status: COMPLETED | OUTPATIENT
Start: 2025-05-13 | End: 2025-05-13

## 2025-05-13 RX ORDER — ACETAMINOPHEN 325 MG/1
325 TABLET ORAL EVERY 6 HOURS PRN
Status: DISCONTINUED | OUTPATIENT
Start: 2025-05-13 | End: 2025-05-20 | Stop reason: HOSPADM

## 2025-05-13 RX ORDER — ASPIRIN 81 MG/1
81 TABLET ORAL DAILY
Status: DISCONTINUED | OUTPATIENT
Start: 2025-05-14 | End: 2025-05-20 | Stop reason: HOSPADM

## 2025-05-13 RX ORDER — FUROSEMIDE 10 MG/ML
40 INJECTION INTRAMUSCULAR; INTRAVENOUS ONCE
Status: COMPLETED | OUTPATIENT
Start: 2025-05-13 | End: 2025-05-13

## 2025-05-13 RX ORDER — LISINOPRIL 10 MG/1
10 TABLET ORAL DAILY
Status: DISCONTINUED | OUTPATIENT
Start: 2025-05-14 | End: 2025-05-15

## 2025-05-13 RX ORDER — QUETIAPINE FUMARATE 25 MG/1
12.5 TABLET, FILM COATED ORAL ONCE
Status: DISCONTINUED | OUTPATIENT
Start: 2025-05-13 | End: 2025-05-13

## 2025-05-13 RX ORDER — FUROSEMIDE 10 MG/ML
40 INJECTION INTRAMUSCULAR; INTRAVENOUS 2 TIMES DAILY
Status: DISCONTINUED | OUTPATIENT
Start: 2025-05-14 | End: 2025-05-15

## 2025-05-13 RX ORDER — BUDESONIDE AND FORMOTEROL FUMARATE DIHYDRATE 80; 4.5 UG/1; UG/1
2 AEROSOL RESPIRATORY (INHALATION) 2 TIMES DAILY
Status: DISCONTINUED | OUTPATIENT
Start: 2025-05-14 | End: 2025-05-20 | Stop reason: HOSPADM

## 2025-05-13 RX ORDER — GABAPENTIN 100 MG/1
100 CAPSULE ORAL 2 TIMES DAILY
Status: DISCONTINUED | OUTPATIENT
Start: 2025-05-14 | End: 2025-05-20 | Stop reason: HOSPADM

## 2025-05-13 RX ORDER — QUETIAPINE FUMARATE 25 MG/1
25 TABLET, FILM COATED ORAL
Status: DISCONTINUED | OUTPATIENT
Start: 2025-05-13 | End: 2025-05-20 | Stop reason: HOSPADM

## 2025-05-13 RX ADMIN — ONDANSETRON 4 MG: 2 INJECTION INTRAMUSCULAR; INTRAVENOUS at 21:34

## 2025-05-13 RX ADMIN — IPRATROPIUM BROMIDE AND ALBUTEROL SULFATE 3 ML: 2.5; .5 SOLUTION RESPIRATORY (INHALATION) at 16:30

## 2025-05-13 RX ADMIN — QUETIAPINE FUMARATE 25 MG: 25 TABLET ORAL at 22:52

## 2025-05-13 RX ADMIN — ATORVASTATIN CALCIUM 40 MG: 40 TABLET, FILM COATED ORAL at 19:22

## 2025-05-13 RX ADMIN — ENOXAPARIN SODIUM 40 MG: 40 INJECTION SUBCUTANEOUS at 19:22

## 2025-05-13 RX ADMIN — MAGNESIUM SULFATE HEPTAHYDRATE 2 G: 40 INJECTION, SOLUTION INTRAVENOUS at 17:20

## 2025-05-13 RX ADMIN — IOHEXOL 85 ML: 350 INJECTION, SOLUTION INTRAVENOUS at 21:00

## 2025-05-13 RX ADMIN — FUROSEMIDE 40 MG: 10 INJECTION, SOLUTION INTRAMUSCULAR; INTRAVENOUS at 18:16

## 2025-05-13 NOTE — ED PROVIDER NOTES
Time reflects when diagnosis was documented in both MDM as applicable and the Disposition within this note       Time User Action Codes Description Comment    5/13/2025  6:12 PM Ritz, Omid Doni Add [I50.9] Acute exacerbation of CHF (congestive heart failure) (HCC)     5/13/2025  6:13 PM Ritz, Omid Doni Add [J90] Pleural effusion     5/13/2025  6:13 PM Ritz, Omid Doni Add [E83.42] Hypomagnesemia     5/13/2025  6:13 PM Ritz, Omid Doni Add [R06.89] Hypercarbia     5/13/2025  6:14 PM Per Sandy Add [I50.9] Acute CHF (congestive heart failure) (HCC)           ED Disposition       ED Disposition   Admit    Condition   Stable    Date/Time   Tue May 13, 2025  6:12 PM    Comment   Case was discussed with Dr Sandy and the patient's admission status was agreed to be Admission Status: inpatient status to the service of Dr. Sandy .               Assessment & Plan       Medical Decision Making  DDx includes but is not limited to: COPD exacerbation, pneumonia, pneumothorax, CHF exacerbation, fluid overload from other source.  Patient hemodynamically stable when I examined her and only in mild respiratory distress. Check CBC/BMP/magnesium/troponin/BNP.  Chest x-ray.  ECG.  There is probably some component of bronchospasm given the COPD history for which I will give a DuoNeb while remainder of workup is pending.  Disposition is pending results of workup and response to therapy.          Amount and/or Complexity of Data Reviewed  Labs: ordered. Decision-making details documented in ED Course.  Radiology: ordered and independent interpretation performed. Decision-making details documented in ED Course.    Risk  Prescription drug management.  Decision regarding hospitalization.        ED Course as of 05/13/25 1943   Tue May 13, 2025   1558 ECG Afib with slow VR 52 bpm   QTc 425  No ectopy  TWI V2-V3  RVH  Q wave II/AVF  No acute ST changes  No changes from 18 April 2025 ECG  Interpreted by  me     1619 Obvious volume overload present on chest x-ray.  Cardiomegaly which is similar to prior.  Possible left basilar infiltrate which is difficult to assess secondary to the cardiomegaly.  Will obtain noncontrast CT of the chest to better delineate: Patient certainly appears to have component of volume overload which could independently be accounting for dyspnea, but will want to exclude concurrent infiltrates.   1643 CBC and differential(!)  WBC normal.  Anemia, decreased by 3 g/deciliter for most recent prior although that result was from April 2023.  Mild thrombocytopenia   1649 Blood gas, venous(!!)  Compensated respiratory acidosis with hypoxemia   1700 Basic metabolic panel(!)  Elevated carbon dioxide consistent with respiratory compensation.  Renal function baseline.  Electrolytes otherwise normal   1701 Lactic acid, plasma (w/reflex if result > 2.0)  Normal   1701 Magnesium(!)  Hypomagnesemia; will replete intravenously   1701 Per my review of CT chest, small right effusion.  No consolidations present.  No pneumothorax.  Mild pulmonary edema.    On repeat examination after nebulizer therapy, she did not have appreciable change in respiratory effort/status.  She remained tachypneic at 24/min. Occasional scattered wheezes in various lung fields, similar to prior.   1704 B-Type Natriuretic Peptide(BNP)(!)  Elevated to similar degree as prior   1704 Echo 15 August 2022:    Left Ventricle: Left ventricular cavity size is normal. Wall thickness is normal. The left ventricular ejection fraction is 60%. Systolic function is normal. Although no diagnostic regional wall motion abnormality was identified, this possibility cannot be completely excluded on the basis of this study. Diastolic function is normal.  ·  Left Atrium: The atrium is mildly dilated.  ·  Mitral Valve: There is mild to moderate regurgitation.  ·  Tricuspid Valve: There is mild regurgitation.        1704 HS Troponin 0hr (reflex  protocol)  Nonischemic.  Has had values in a similar range previously.  Repeat at 2 hours to assess for any delta.   1722 CT is being interpreted now   1742 CT chest without contrast  FINDINGS:     LUNGS:  Left lung base atelectasis.  Mosaic groundglass opacity throughout the lungs with decreased caliber and number of the pulmonary vessels in the more hyperlucent areas. Findings may represent chronic small airway or chronic small vessel disease.     AIRWAYS: Patent. No endotracheal or endobronchial lesion.     PLEURA: Small right pleural effusion. Trace pericardial effusion.     HEART/GREAT VESSELS: Cardiomegaly. Aortic atherosclerotic disease without aneurysm. Enlarged central pulmonary artery may represent pulmonary arterial hypertension.     MEDIASTINUM AND MICHAEL: Mildly prominent pretracheal and precarinal lymphadenopathy, similar to prior.     CHEST WALL AND LOWER NECK: Cachexia.     VISUALIZED STRUCTURES IN THE UPPER ABDOMEN: Unremarkable.     OSSEOUS STRUCTURES: No acute fracture or destructive osseous lesion.     IMPRESSION:     1.  Cardiomegaly. Small right pleural effusion. Trace pericardial effusion.  2.  Mosaic groundglass opacity throughout the lungs may represent chronic small airway or chronic small vessel disease.  3.  Left lung base atelectasis.  4.  Pulmonary arterial hypertension.              1744 CT does not show findings that suggest lower respiratory tract infection, and I did not think that this diagnosis was most likely.  There are definite findings of volume overload which accords with the peripheral edema.  I think she would benefit from some period of increased diuresis in a monitored setting given the worsening hypoxemia and increased respiratory effort. Furosemide 40 mg IV; she takes only a low-dose presently (20 mg orally) which resulted in a fairly low dose as per the [facility] heart failure diuretic algorithm of 25 mg twice daily.  An initial somewhat higher dose is reasonable to  promote adequate diuresis with the subsequent decrease in dosing thereafter   1753 Updated patient regarding results of workup and need for admission. She was agreeable to this after further discussion  I subsequently d/w patient's daughter Razia via phone at 1753. She was also in agreement with plan for hospitalization as she has been working with patient's home health aide/PT over the past several days to mobilize patient and ensure that she is taking medications appropriately.  Despite this, the patient has been having increasing lower extremity edema and increasing dyspnea   1758 Secure message to Dr Sandy of [facility]   1812 Dr Sandy accepted the patient for inpatient admission       Medications   sodium chloride (PF) 0.9 % injection 3 mL (has no administration in time range)   atorvastatin (LIPITOR) tablet 40 mg (has no administration in time range)   ipratropium-albuterol (DUO-NEB) 0.5-2.5 mg/3 mL inhalation solution 3 mL (3 mL Nebulization Given 5/13/25 1630)   magnesium sulfate 2 g/50 mL IVPB (premix) 2 g (0 g Intravenous Stopped 5/13/25 1815)   furosemide (LASIX) injection 40 mg (40 mg Intravenous Given 5/13/25 1816)       ED Risk Strat Scores                    No data recorded        SBIRT 22yo+      Flowsheet Row Most Recent Value   Initial Alcohol Screen: US AUDIT-C     1. How often do you have a drink containing alcohol? 0 Filed at: 05/13/2025 1552   2. How many drinks containing alcohol do you have on a typical day you are drinking?  0 Filed at: 05/13/2025 1552   3a. Male UNDER 65: How often do you have five or more drinks on one occasion? 0 Filed at: 05/13/2025 1552   3b. FEMALE Any Age, or MALE 65+: How often do you have 4 or more drinks on one occassion? 0 Filed at: 05/13/2025 1552   Audit-C Score 0 Filed at: 05/13/2025 1552   NICOLASA: How many times in the past year have you...    Used an illegal drug or used a prescription medication for non-medical reasons? Never Filed at: 05/13/2025 1552                             History of Present Illness       Chief Complaint   Patient presents with    Shortness of Breath     Home care stated pt has been stating in the low 80's pt states it only happens when she is moving. Ems had her in 3L stating 95%. Pt has some pitting edema but takes lasix's daily        Past Medical History:   Diagnosis Date    Acute on chronic respiratory failure with hypoxia and hypercapnia ([facility]) 10/18/2021    JOHN (acute kidney injury) ([facility]) 03/03/2023    COPD with acute exacerbation ([facility]) 09/22/2022    History of intracranial hemorrhage 08/14/2022    Hyperlipidemia     last assessed  8/24/17    Hypertension     last assessed 10/2/14    Hypokalemia 08/14/2022    Hypomagnesemia 09/22/2022    Sepsis due to pneumonia ([facility]) 03/03/2023      Past Surgical History:   Procedure Laterality Date    CRANIOTOMY        Family History   Problem Relation Age of Onset    Breast cancer Mother     Ovarian cancer Mother     Diabetes Father       Social History     Tobacco Use    Smoking status: Former     Current packs/day: 1.00     Types: Cigarettes    Smokeless tobacco: Never   Vaping Use    Vaping status: Never Used   Substance Use Topics    Alcohol use: Never    Drug use: No      E-Cigarette/Vaping    E-Cigarette Use Never User       E-Cigarette/Vaping Substances    Nicotine No     THC No     CBD No     Flavoring No     Other No     Unknown No       I have reviewed and agree with the history as documented.     80F presents to emergency department from home via EMS.  Per EMS, today home health aide found patient to be hypoxemic at 72% sat while on room air: Patient normally supposed to use 3 L/min nasal cannula, with patient herself stating that she does not use the supplemental oxygen consistently and in fact primarily uses whenever she is moving around or going outside the house.  Placed on 3 L/min by EMS with O2 sats of approximate 90-92% and subsequently increased to 4 L/min producing oxygen saturations  "of 95%.  Patient herself denies any dyspnea when I examined her.  She denies any preceding fever/chills/lightheadedness/chest pain/presyncopal sensation.  No sick contacts with anyone of which she is aware in past 2 weeks.  She was herself uncertain as to whether she is taking medications consistently; EMS reported that she apparently had been quite consistent with meds although patient is still not able to confirm this.  Patient did note the lower extremity edema which is present on exam.  This does not appear to be acutely different than what she has had before.  She does take furosemide for treatment of the edema.    Excerpt from phone triage note earlier today:    \"Spoke to PT, left pitting edema x 2 weeks, getting worse. O2 level 82-83% on 3L. Suggested ER due to O2 levels. Daughter states she will call 911 but afraid she is going to refuse and patient was yelling she was not going. PT going to try to convince. Spoke to Provider and she agreed for patient to go to ER. \"      History provided by:  EMS personnel, patient and medical records  Shortness of Breath  Associated symptoms: no abdominal pain, no chest pain, no cough, no fever, no vomiting and no wheezing        Review of Systems   Constitutional:  Negative for chills, fatigue and fever.   Respiratory:  Positive for shortness of breath. Negative for cough, chest tightness and wheezing.    Cardiovascular:  Positive for leg swelling. Negative for chest pain and palpitations.   Gastrointestinal:  Negative for abdominal pain, nausea and vomiting.   Musculoskeletal:  Negative for arthralgias and myalgias.   Skin: Negative.    Neurological:  Negative for syncope, weakness and light-headedness.   All other systems reviewed and are negative.          Objective       ED Triage Vitals   Temperature Pulse Blood Pressure Respirations SpO2 Patient Position - Orthostatic VS   05/13/25 1541 05/13/25 1541 05/13/25 1541 05/13/25 1541 05/13/25 1620 05/13/25 1541   97.7 °F " (36.5 °C) 59 148/74 20 98 % Sitting      Temp Source Heart Rate Source BP Location FiO2 (%) Pain Score    05/13/25 1541 05/13/25 1541 05/13/25 1541 -- 05/13/25 1541    Temporal Monitor Left arm  No Pain      Vitals      Date and Time Temp Pulse SpO2 Resp BP Pain Score FACES Pain Rating User   05/13/25 1901 98.7 °F (37.1 °C) -- 95 % 18 -- -- -- AD   05/13/25 1901 -- -- -- -- 171/81 -- -- DII   05/13/25 1855 98.7 °F (37.1 °C) -- 99 % 20 204/87 -- -- DII   05/13/25 1845 -- -- -- -- -- No Pain -- LJS   05/13/25 1830 -- 60 98 % 36 183/77 No Pain -- BW   05/13/25 1620 -- 63 98 % 26 143/73 -- -- ZTR   05/13/25 1608 -- -- -- -- -- No Pain -- BW   05/13/25 1541 97.7 °F (36.5 °C) 59 -- 20 148/74 No Pain -- BW            Physical Exam  Vitals and nursing note reviewed.   Constitutional:       General: She is awake. She is in acute distress (mild distress).      Appearance: Normal appearance. She is well-developed.   HENT:      Head: Normocephalic and atraumatic.      Right Ear: Hearing and external ear normal.      Left Ear: Hearing and external ear normal.   Neck:      Thyroid: No thyroid mass, thyromegaly or thyroid tenderness.      Trachea: Trachea and phonation normal.   Cardiovascular:      Rate and Rhythm: Regular rhythm. Bradycardia present.      Pulses:           Radial pulses are 2+ on the right side and 2+ on the left side.        Dorsalis pedis pulses are 2+ on the right side and 2+ on the left side.        Posterior tibial pulses are 2+ on the right side and 2+ on the left side.      Heart sounds: Normal heart sounds, S1 normal and S2 normal. No murmur heard.     No friction rub. No gallop.   Pulmonary:      Effort: Tachypnea and respiratory distress present.      Breath sounds: No stridor. Wheezing present. No decreased breath sounds, rhonchi or rales.      Comments: RR 24 on my exam  Mild respiratory distress  Speaks in full sentences  Occasional scattered wheeze in various lung fields  Abdominal:      General:  There is no distension.      Palpations: There is no mass.      Tenderness: There is no abdominal tenderness. There is no guarding or rebound.   Musculoskeletal:      Right lower le+ Pitting Edema present.      Left lower le+ Pitting Edema present.   Skin:     General: Skin is warm and dry.   Neurological:      Mental Status: She is alert and oriented to person, place, and time.      GCS: GCS eye subscore is 4. GCS verbal subscore is 5. GCS motor subscore is 6.      Cranial Nerves: No cranial nerve deficit.      Sensory: No sensory deficit.      Motor: No abnormal muscle tone.      Comments: PERRLA; EOMI. Sensation intact to light touch over face in V1-V3 distribution bilaterally. Facial expressions symmetric. Tongue/uvula midline. Shoulder shrug equal bilaterally. Strength 5/5 in UE/LE bilaterally. Sensation intact to light touch in UE/LE bilaterally.         Results Reviewed       Procedure Component Value Units Date/Time    HS Troponin I 2hr [[id number]] Collected: 25 1903    Lab Status: In process Specimen: Blood from Arm, Right Updated: 25 1927    HS Troponin I 4hr [[id number]]     Lab Status: No result Specimen: Blood     HS Troponin 0hr (reflex protocol) [[id number]]  (Normal) Collected: 25    Lab Status: Final result Specimen: Blood from Arm, Right Updated: 25 1704     hs TnI 0hr 24 ng/L     B-Type Natriuretic Peptide(BNP) [[id number]]  (Abnormal) Collected: 25    Lab Status: Final result Specimen: Blood from Arm, Right Updated: 25 1702      pg/mL     Basic metabolic panel [[id number]]  (Abnormal) Collected: 25    Lab Status: Final result Specimen: Blood from Arm, Right Updated: 25 1656     Sodium 140 mmol/L      Potassium 3.9 mmol/L      Chloride 94 mmol/L      CO2 41 mmol/L      ANION GAP 5 mmol/L      BUN 21 mg/dL      Creatinine 0.85 mg/dL      Glucose 110 mg/dL      Calcium 9.2 mg/dL      eGFR 64 ml/min/1.73sq m     Narrative:       National Kid[employer] guidelines for Chronic Kidney Disease (CKD):     Stage 1 with normal or high GFR (GFR > 90 mL/min/1.73 square meters)    Stage 2 Mild CKD (GFR = 60-89 mL/min/1.73 square meters)    Stage 3A Moderate CKD (GFR = 45-59 mL/min/1.73 square meters)    Stage 3B Moderate CKD (GFR = 30-44 mL/min/1.73 square meters)    Stage 4 Severe CKD (GFR = 15-29 mL/min/1.73 square meters)    Stage 5 End Stage CKD (GFR <15 mL/min/1.73 square meters)  Note: GFR calculation is accurate only with a steady state creatinine    Magnesium [[id number]]  (Abnormal) Collected: 05/13/25 1628    Lab Status: Final result Specimen: Blood from Arm, Right Updated: 05/13/25 1656     Magnesium 1.8 mg/dL     Lactic acid, plasma (w/reflex if result > 2.0) [[id number]]  (Normal) Collected: 05/13/25 1628    Lab Status: Final result Specimen: Blood from Arm, Right Updated: 05/13/25 1655     LACTIC ACID 1.7 mmol/L     Narrative:      Result may be elevated if tourniquet was used during collection.    Blood gas, venous [[id number]]  (Abnormal) Collected: 05/13/25 1628    Lab Status: Final result Specimen: Blood from Arm, Right Updated: 05/13/25 1644     pH, Merritt 7.310     pCO2, Merritt 71.2 mm Hg      pO2, [place] 25.2 mm Hg      HCO3, [place] 35.0 mmol/L      Base Excess, Merritt 7.0 mmol/L      O2 Content, Merritt 5.5 ml/dL      O2 HGB, VENOUS 38.0 %     CBC and differential [[id number]]  (Abnormal) Collected: 05/13/25 1628    Lab Status: Final result Specimen: Blood from Arm, Right Updated: 05/13/25 1642     WBC 8.28 Thousand/uL      RBC 4.05 Million/uL      Hemoglobin 9.1 g/dL      Hematocrit 33.9 %      MCV 84 fL      MCH 22.5 pg      MCHC 26.8 g/dL      RDW 18.8 %      MPV 10.8 fL      Platelets 143 Thousands/uL      nRBC 0 /100 WBCs      Segmented % 81 %      Immature Grans % 0 %      Lymphocytes % 10 %      Monocytes % 7 %      Eosinophils Relative 1 %      Basophils Relative 1 %      Absolute Neutrophils 6.67 Thousands/µL      Absolute Immature  Grans 0.02 Thousand/uL      Absolute Lymphocytes 0.80 Thousands/µL      Absolute Monocytes 0.60 Thousand/µL      Eosinophils Absolute 0.11 Thousand/µL      Basophils Absolute 0.08 Thousands/µL             CT chest without contrast   Final Interpretation by Bennie Umanzor MD (05/13 2320)      1.  Cardiomegaly. Small right pleural effusion. Trace pericardial effusion.   2.  Mosaic groundglass opacity throughout the lungs may represent chronic small airway or chronic small vessel disease.   3.  Left lung base atelectasis.   4.  Pulmonary arterial hypertension.               Workstation performed: [id number]         X-ray chest 2 views   ED Interpretation by Omid Zamora DO (05/13 1619)   Airway midline.  Pulmonary vascular congestion present.  No effusions.  Possible left lower lobe/lingular infiltrate somewhat difficult to assess secondary to cardiomegaly.  The cardiomegaly appears similar to prior.  No pneumothorax.  Osseous structures appear normal.      Final Interpretation by Elmer Vee MD (05/13 9609)      Opacity in the left lower chest which appears to be combination of left pleural effusion and lung base atelectasis or pneumonia.      There is also pulmonary vascular congestion and some prominence of the heart      ER aware            Workstation performed: [id number]         CTA chest pe study    (Results Pending)       Procedures    ED Medication and Procedure Management   Prior to Admission Medications   Prescriptions Last Dose Informant Patient Reported? Taking?   Accu-Chek FastClix Lancets MISC 5/13/2025  No Yes   Sig: Use 1 each daily to test blood sugars.   Blood Glucose Monitoring Suppl (Accu-Chek Flores Plus) w/Device KIT 5/13/2025  No Yes   Sig: Use 1 kit daily to test blood sugars.   Diclofenac Epolamine 1.3 % PTCH Not Taking  No No   Sig: Apply 1 patch topically in the morning Apply for up to 12 hours to back area   Patient not taking: Reported on 10/6/2023   QUEtiapine  (SEROquel) 25 mg tablet 5/12/2025 Bedtime  No Yes   Sig: Take 1 tablet (25 mg total) by mouth daily at bedtime   acetaminophen (TYLENOL) 325 mg tablet Unknown  No No   Sig: Take 1 tablet (325 mg total) by mouth every 6 (six) hours as needed for fever, mild pain, moderate pain, severe pain or headaches Do not exceed a total of 3 grams of tylenol/acetaminophen in a 24-hour period.   albuterol (2.5 mg/3 mL) 0.083 % nebulizer solution Unknown  No No   Sig: Take 3 mL (2.5 mg total) by nebulization every 6 (six) hours as needed for shortness of breath   aspirin (ECOTRIN LOW STRENGTH) 81 mg EC tablet   No No   Sig: Take 1 tablet (81 mg total) by mouth daily Do not start before February 17, 2023.   budesonide-formoterol (SYMBICORT) 80-4.5 MCG/ACT inhaler 5/13/2025 Morning  No Yes   Sig: inhale 2 puffs by mouth twice a day Rinse mouth after use   furosemide (LASIX) 20 mg tablet 5/13/2025 Morning  No Yes   Sig: One po daily   gabapentin (Neurontin) 100 mg capsule 5/13/2025 Morning  No Yes   Sig: Take 1 capsule (100 mg total) by mouth 2 (two) times a day   glucose blood test strip 5/13/2025 Morning  Yes Yes   Sig: Use 1 each 4 (four) times a day Use as instructed   guaiFENesin (MUCINEX) 600 mg 12 hr tablet 5/13/2025 Morning  No Yes   Sig: Take 1 tablet (600 mg total) by mouth every 12 (twelve) hours   lisinopril (ZESTRIL) 10 mg tablet 5/13/2025 Morning  No Yes   Sig: Take 1 tablet (10 mg total) by mouth daily   sertraline (ZOLOFT) 50 mg tablet 5/13/2025 Morning  No Yes   Sig: Take 1 tablet (50 mg total) by mouth daily   simvastatin (ZOCOR) 40 mg tablet 5/13/2025  No Yes   Sig: take 1 tablet by mouth daily   sitaGLIPtin-metFORMIN (Janumet)  MG per tablet 5/13/2025 Morning  No Yes   Sig: Take 1 tablet by mouth 2 (two) times a day with meals   triamcinolone (KENALOG) 0.025 % cream Unknown Care Giver Yes No   Sig: Apply 1 application. topically 2 (two) times a day   umeclidinium (Incruse Ellipta) 62.5 mcg/actuation AEPB  inhaler 5/13/2025 Morning  No Yes   Sig: INHALE 1 PUFF BY MOUTH DAILY      Facility-Administered Medications: None     Current Discharge Medication List        CONTINUE these medications which have NOT CHANGED    Details   Accu-Chek FastClix Lancets MISC Use 1 each daily to test blood sugars.  Qty: 102 each, Refills: 0    Comments: Accu-Chek lancets.  Associated Diagnoses: Type 2 diabetes mellitus without complication, without long-term current use of insulin ([facility])      Blood Glucose Monitoring Suppl (Accu-Chek Flores Plus) w/Device KIT Use 1 kit daily to test blood sugars.  Qty: 1 kit, Refills: 0    Associated Diagnoses: Type 2 diabetes mellitus without complication, without long-term current use of insulin ([facility])      budesonide-formoterol (SYMBICORT) 80-4.5 MCG/ACT inhaler inhale 2 puffs by mouth twice a day Rinse mouth after use  Qty: 10.2 g, Refills: 0    Associated Diagnoses: Tobacco use; Chronic obstructive pulmonary disease, unspecified COPD type ([facility])      furosemide (LASIX) 20 mg tablet One po daily  Qty: 90 tablet, Refills: 3    Associated Diagnoses: Edema, unspecified type      gabapentin (Neurontin) 100 mg capsule Take 1 capsule (100 mg total) by mouth 2 (two) times a day  Qty: 60 capsule, Refills: 5    Associated Diagnoses: Bilateral low back pain with sciatica, sciatica laterality unspecified, unspecified chronicity      glucose blood test strip Use 1 each 4 (four) times a day Use as instructed      guaiFENesin (MUCINEX) 600 mg 12 hr tablet Take 1 tablet (600 mg total) by mouth every 12 (twelve) hours  Qty: 30 tablet, Refills: 0    Associated Diagnoses: COPD with acute exacerbation ([facility])      lisinopril (ZESTRIL) 10 mg tablet Take 1 tablet (10 mg total) by mouth daily  Qty: 90 tablet, Refills: 3    Associated Diagnoses: Primary hypertension      QUEtiapine (SEROquel) 25 mg tablet Take 1 tablet (25 mg total) by mouth daily at bedtime  Qty: 90 tablet, Refills: 3    Associated Diagnoses: Sleeping  difficulty      sertraline (ZOLOFT) 50 mg tablet Take 1 tablet (50 mg total) by mouth daily  Qty: 90 tablet, Refills: 1    Associated Diagnoses: Depression, unspecified depression type      simvastatin (ZOCOR) 40 mg tablet take 1 tablet by mouth daily  Qty: 90 tablet, Refills: 0    Associated Diagnoses: Mixed hyperlipidemia      sitaGLIPtin-metFORMIN (Janumet)  MG per tablet Take 1 tablet by mouth 2 (two) times a day with meals  Qty: 180 tablet, Refills: 3    Associated Diagnoses: Type 2 diabetes mellitus without complication, without long-term current use of insulin ([facility])      umeclidinium (Incruse Ellipta) 62.5 mcg/actuation AEPB inhaler INHALE 1 PUFF BY MOUTH DAILY  Qty: 30 blister, Refills: 3    Associated Diagnoses: COPD exacerbation ([facility])      acetaminophen (TYLENOL) 325 mg tablet Take 1 tablet (325 mg total) by mouth every 6 (six) hours as needed for fever, mild pain, moderate pain, severe pain or headaches Do not exceed a total of 3 grams of tylenol/acetaminophen in a 24-hour period.  Refills: 0    Associated Diagnoses: Acute on chronic respiratory failure with hypoxia ([facility]); JOHN (acute kidney injury) ([facility]); Sepsis ([facility]); Pneumonia; COPD with acute exacerbation ([facility])      albuterol (2.5 mg/3 mL) 0.083 % nebulizer solution Take 3 mL (2.5 mg total) by nebulization every 6 (six) hours as needed for shortness of breath  Qty: 10 mL, Refills: 0    Associated Diagnoses: COPD exacerbation ([facility])      aspirin (ECOTRIN LOW STRENGTH) 81 mg EC tablet Take 1 tablet (81 mg total) by mouth daily Do not start before February 17, 2023.  Qty: 30 tablet, Refills: 0    Associated Diagnoses: Essential hypertension      Diclofenac Epolamine 1.3 % PTCH Apply 1 patch topically in the morning Apply for up to 12 hours to back area  Qty: 30 patch, Refills: 1    Associated Diagnoses: Back pain, unspecified back location, unspecified back pain laterality, unspecified chronicity      triamcinolone (KENALOG) 0.025 % cream Apply 1  application. topically 2 (two) times a day           No discharge procedures on file.  ED SEPSIS DOCUMENTATION   Time reflects when diagnosis was documented in both MDM as applicable and the Disposition within this note       Time User Action Codes Description Comment    5/13/2025  6:12 PM Omid Zamora Add [I50.9] Acute exacerbation of CHF (congestive heart failure) (HCC)     5/13/2025  6:13 PM Omid Zamora Add [J90] Pleural effusion     5/13/2025  6:13 PM Omid Zamora Add [E83.42] Hypomagnesemia     5/13/2025  6:13 PM Omid Zamora Add [R06.89] Hypercarbia     5/13/2025  6:14 PM Per Sandy Add [I50.9] Acute CHF (congestive heart failure) ([facility])                  Omid Zamora DO  05/13/25 1946

## 2025-05-13 NOTE — ASSESSMENT & PLAN NOTE
Presented with hypoxia with an oxygen saturation in the low 80's%  Chronically on 3 Lpm of continuous supplemental oxygen  Currently requiring 4 Lpm of continuous supplemental oxygen with signs of air hunger including observed accessory muscle use  Incentive spirometry  Respiratory protocol

## 2025-05-13 NOTE — ASSESSMENT & PLAN NOTE
Wt Readings from Last 3 Encounters:   05/13/25 59.2 kg (130 lb 8.2 oz)   10/06/23 58.5 kg (129 lb)   05/20/23 55.6 kg (122 lb 9.6 oz)     Treat with Lasix 40 mg IV BID  Check a new transthoracic echocardiogram  Monitor daily weights and strict intake/output measurements  Consult Cardiology  Consult PT and OT

## 2025-05-13 NOTE — TELEPHONE ENCOUNTER
"FOLLOW UP: Physical therapist warm tx to office staff:andrea     REASON FOR CONVERSATION: Care Coordination and Leg Swelling    SYMPTOMS: chronic leg swelling, worsen in left leg, Oxygen decreased on O2    OTHER: Pep warm tx Physical therapist Debbie to CTS, in to report pt has chronic bilateral leg swelling,no weeping and mildly warm to touch. Family states pt is compliant with water pill, but the PCP was discussing possibly increasing the dose. Physical therapist states VSS:HR-61,BP-142/100, temp-97.9 and O2-83% 3 Liters. Pt denies sob, chest pain and no numbness/tingling at this time.     Per protocol, Triage nurse called office to see if PCP wanted to further advise on medication(Lasix) dosages or make pt an appt. Staff:Andrea took the call from here.    DISPOSITION: No disposition on file.    Reason for Disposition   MODERATE swelling of both ankles (e.g., swelling extends up to the knees) AND new-onset or getting worse    Answer Assessment - Initial Assessment Questions  1. ONSET: \"When did the swelling start?\" (e.g., minutes, hours, days)      On going but got worse today  2. LOCATION: \"What part of the leg is swollen?\"  \"Are both legs swollen or just one leg?\"      Bilateral legs  3. SEVERITY: \"How bad is the swelling?\" (e.g., localized; mild, moderate, severe)      Mod on left and mild on right  4. REDNESS: \"Does the swelling look red or infected?\"      denies  5. PAIN: \"Is the swelling painful to touch?\" If Yes, ask: \"How painful is it?\"   (Scale 1-10; mild, moderate or severe)      denies  6. FEVER: \"Do you have a fever?\" If Yes, ask: \"What is it, how was it measured, and when did it start?\"       denies  7. CAUSE: \"What do you think is causing the leg swelling?\"      unknwn  8. MEDICAL HISTORY: \"Do you have a history of blood clots (e.g., DVT), cancer, heart failure, kidney disease, or liver failure?\"      One heart medication  9. RECURRENT SYMPTOM: \"Have you had leg swelling before?\" If Yes, ask: \"When was " "the last time?\" \"What happened that time?\"      Yes on lasix  10. OTHER SYMPTOMS: \"Do you have any other symptoms?\" (e.g., chest pain, difficulty breathing)        denies  11. PREGNANCY: \"Is there any chance you are pregnant?\" \"When was your last menstrual period?\"        N/a    Protocols used: Leg Swelling and Edema-Adult-[place]    "

## 2025-05-13 NOTE — H&P
H&P - Hospitalist   Name: Tiffani Francois 80 y.o. female I MRN: [id number]  Unit/Bed#: RM07 I Date of Admission: 5/13/2025   Date of Service: 5/13/2025 I Hospital Day: 0     Assessment & Plan  Acute CHF (congestive heart failure) (HCC)  Wt Readings from Last 3 Encounters:   05/13/25 59.2 kg (130 lb 8.2 oz)   10/06/23 58.5 kg (129 lb)   05/20/23 55.6 kg (122 lb 9.6 oz)     Treat with Lasix 40 mg IV BID  Check a new transthoracic echocardiogram  Monitor daily weights and strict intake/output measurements  Consult Cardiology  Consult PT and OT  Acute on chronic respiratory failure with hypoxia and hypercapnia (HCC)  Presented with hypoxia with an oxygen saturation in the low 80's%  Chronically on 3 Lpm of continuous supplemental oxygen  Currently requiring 4 Lpm of continuous supplemental oxygen with signs of air hunger including observed accessory muscle use  Incentive spirometry  Respiratory protocol  Permanent atrial fibrillation (HCC)  Currently with a controlled ventricular rate without any AV-mariano blocking agents  Not on anticoagulation with a history of an intracranial hemorrhage  Continue aspirin 81 mg PO Qdaily  Pericardial effusion  Check a transthoracic echocardiogram  Pleural effusion on right  Treat with Lasix 40 mg IV BID  May require a thoracentesis      VTE Pharmacologic Prophylaxis: VTE Score: 9 High Risk (Score >/= 5) - Pharmacological DVT Prophylaxis Ordered: enoxaparin (Lovenox). Sequential Compression Devices Ordered.  Code Status: Level 1-Full Code    Anticipated Length of Stay: Patient will be admitted on an inpatient basis with an anticipated length of stay of greater than 2 midnights secondary to the need for IV Lasix treatment, for a transthoracic echocardiogram, for a Cardiology consultation, and with an increased supplemental oxygen requirement to maintain adequate oxygen saturation levels.    History of Present Illness   Chief Complaint:  Shortness of breath    Tiffani Francois is a 80 y.o.  female who presented to the ED with shortness of breath.  Over the last few days, the patient has been experiencing shortness of breath at rest, which is worsened with any type of physical activity or exertion.  The shortness of breath is moderate in intensity and constant in nature.  The patient was found to have hypoxia with an oxygen saturation in the low 80's% at home today, 05/13/2025.  Associated symptoms include bilateral lower extremity edema and fatigue.  Nothing seemed to improve her symptoms.    Review of Systems:  Per HPI, all other systems have been reviewed and were negative.    Historical Information   Past Medical History:   Diagnosis Date    Acute on chronic respiratory failure with hypoxia and hypercapnia ([facility]) 10/18/2021    JOHN (acute kidney injury) ([facility]) 03/03/2023    COPD with acute exacerbation ([facility]) 09/22/2022    History of intracranial hemorrhage 08/14/2022    Hyperlipidemia     last assessed  8/24/17    Hypertension     last assessed 10/2/14    Hypokalemia 08/14/2022    Hypomagnesemia 09/22/2022    Sepsis due to pneumonia ([facility]) 03/03/2023     Past Surgical History:   Procedure Laterality Date    CRANIOTOMY       Social History     Tobacco Use    Smoking status: Former     Current packs/day: 1.00     Types: Cigarettes    Smokeless tobacco: Never   Vaping Use    Vaping status: Never Used   Substance and Sexual Activity    Alcohol use: Never    Drug use: No    Sexual activity: Not Currently     E-Cigarette/Vaping    E-Cigarette Use Never User      E-Cigarette/Vaping Substances    Nicotine No     THC No     CBD No     Flavoring No     Other No     Unknown No      Family history non-contributory  Social History:  Marital Status:      Meds/Allergies   I have reviewed home medications using recent Epic encounter.  Prior to Admission medications    Medication Sig Start Date End Date Taking? Authorizing Provider   Accu-Chek FastClix Lancets MISC Use 1 each daily to test blood sugars. 2/16/23    Roxie Galarza DO   acetaminophen (TYLENOL) 325 mg tablet Take 1 tablet (325 mg total) by mouth every 6 (six) hours as needed for fever, mild pain, moderate pain, severe pain or headaches Do not exceed a total of 3 grams of tylenol/acetaminophen in a 24-hour period. 3/8/23   Per Sandy DO   albuterol (2.5 mg/3 mL) 0.083 % nebulizer solution Take 3 mL (2.5 mg total) by nebulization every 6 (six) hours as needed for shortness of breath 8/16/22   Regina Peacock MD   aspirin (ECOTRIN LOW STRENGTH) 81 mg EC tablet Take 1 tablet (81 mg total) by mouth daily Do not start before February 17, 2023. 2/17/23 4/15/25  Roxie Galarza DO   Blood Glucose Monitoring Suppl (Accu-Chek Flores Plus) w/Device KIT Use 1 kit daily to test blood sugars. 8/19/22   Maude Baker DO   budesonide-formoterol (SYMBICORT) 80-4.5 MCG/ACT inhaler inhale 2 puffs by mouth twice a day Rinse mouth after use 5/2/25   Maude Baker DO   Diclofenac Epolamine 1.3 % PTCH Apply 1 patch topically in the morning Apply for up to 12 hours to back area  Patient not taking: Reported on 4/15/2025 5/1/23   Maude Baker DO   furosemide (LASIX) 20 mg tablet One po daily 10/14/24   Maude Baker DO   gabapentin (Neurontin) 100 mg capsule Take 1 capsule (100 mg total) by mouth 2 (two) times a day 9/19/24   Maude Baker DO   glucose blood test strip Use 1 each 4 (four) times a day Use as instructed    Historical Provider, MD   guaiFENesin (MUCINEX) 600 mg 12 hr tablet Take 1 tablet (600 mg total) by mouth every 12 (twelve) hours 4/21/23   Kassi Whelan PA-C   lisinopril (ZESTRIL) 10 mg tablet Take 1 tablet (10 mg total) by mouth daily 11/22/24   Maude Baker DO   QUEtiapine (SEROquel) 25 mg tablet Take 1 tablet (25 mg total) by mouth daily at bedtime 9/20/24   Maude Baker DO   sertraline (ZOLOFT) 50 mg tablet Take 1 tablet (50 mg total) by mouth daily 8/27/24   Maude Baker DO   simvastatin (ZOCOR) 40 mg tablet take  1 tablet by mouth daily 2/21/25   Maude Baker DO   sitaGLIPtin-metFORMIN (Janumet)  MG per tablet Take 1 tablet by mouth 2 (two) times a day with meals 2/21/25   Maude Baker DO   triamcinolone (KENALOG) 0.025 % cream Apply 1 application. topically 2 (two) times a day    Historical Provider, MD   umeclidinium (Incruse Ellipta) 62.5 mcg/actuation AEPB inhaler INHALE 1 PUFF BY MOUTH DAILY 4/8/25   Maude Baker DO     No Known Allergies    Objective :  Temp:  [97.7 °F (36.5 °C)] 97.7 °F (36.5 °C)  HR:  [59-63] 63  BP: (143-148)/(73-74) 143/73  Resp:  [20-26] 26  SpO2:  [98 %] 98 %  O2 Device: Nasal cannula  Nasal Cannula O2 Flow Rate (L/min):  [3 L/min-4 L/min] 4 L/min    Physical Exam  General:  NAD, follows commands  HEENT:  NC/AT, mucous membranes moist  Neck:  Supple, No JVP elevation  CV:  + S1, + S2, Irregularly irregular rhythm, Regular rate  Pulm:  Crackles at the right base  Abd:  Soft, Non-tender, Non-distended  Ext:  No clubbing/cyanosis, Edema of the bilateral lower extremities  Skin:  No rashes  Neuro:  Awake, alert, oriented  Psych:  Normal mood and affect      Lines/Drains:            Lab Results: I have reviewed the following results:  Results from last 7 days   Lab Units 05/13/25  1628   WBC Thousand/uL 8.28   HEMOGLOBIN g/dL 9.1*   HEMATOCRIT % 33.9*   PLATELETS Thousands/uL 143*   SEGS PCT % 81*   LYMPHO PCT % 10*   MONO PCT % 7   EOS PCT % 1     Results from last 7 days   Lab Units 05/13/25  1628   SODIUM mmol/L 140   POTASSIUM mmol/L 3.9   CHLORIDE mmol/L 94*   CO2 mmol/L 41*   BUN mg/dL 21   CREATININE mg/dL 0.85   ANION GAP mmol/L 5   CALCIUM mg/dL 9.2   GLUCOSE RANDOM mg/dL 110             Lab Results   Component Value Date    HGBA1C 5.7 (H) 06/06/2023    HGBA1C 6.6 (H) 02/21/2023    HGBA1C 7.8 (H) 09/23/2022     Results from last 7 days   Lab Units 05/13/25  1628   LACTIC ACID mmol/L 1.7       Imaging Results Review: I reviewed radiology reports from this admission  including: chest xray and CT chest.  Other Study Results Review: EKG was reviewed.   EKG (05/13/2025):  Study Result    Narrative & Impression   -Suspect arm lead reversal, interpretation assumes no reversal  Atrial fibrillation with slow ventricular response  Right ventricular hypertrophy with repolarization abnormality  Inferior infarct (cited on or before 12-Feb-2023)  Anterolateral infarct (cited on or before 13-Aug-2022)  Abnormal ECG  When compared with ECG of 18-Apr-2023 03:45,  Vent. rate has decreased by  28 bpm       Administrative Statements   I have spent a total time of 75 minutes in caring for this patient on the day of the visit/encounter including Diagnostic results, Prognosis, Risks and benefits of tx options, Instructions for management, Counseling / Coordination of care, and Documenting in the medical record.    ** Please Note: This note has been constructed using a voice recognition system. **

## 2025-05-13 NOTE — TELEPHONE ENCOUNTER
Spoke to PT, left pitting edema x 2 weeks, getting worse. O2 level 82-83% on 3L. Suggested ER due to O2 levels. Daughter states she will call 911 but afraid she is going to refuse and patient was yelling she was not going. PT going to try to convince. Spoke to Provider and she agreed for patient to go to ER.

## 2025-05-13 NOTE — ASSESSMENT & PLAN NOTE
Currently with a controlled ventricular rate without any AV-mariano blocking agents  Not on anticoagulation with a history of an intracranial hemorrhage  Continue aspirin 81 mg PO Qdaily

## 2025-05-14 ENCOUNTER — APPOINTMENT (INPATIENT)
Dept: NON INVASIVE DIAGNOSTICS | Facility: HOSPITAL | Age: 80
DRG: 291 | End: 2025-05-14
Payer: COMMERCIAL

## 2025-05-14 PROBLEM — I50.31 ACUTE HEART FAILURE WITH PRESERVED EJECTION FRACTION (HCC): Status: ACTIVE | Noted: 2025-05-13

## 2025-05-14 PROBLEM — D69.6 THROMBOCYTOPENIA (HCC): Status: ACTIVE | Noted: 2025-05-14

## 2025-05-14 PROBLEM — D64.9 ANEMIA: Status: ACTIVE | Noted: 2025-05-14

## 2025-05-14 LAB
ALBUMIN SERPL BCG-MCNC: 3.9 G/DL (ref 3.5–5)
ALP SERPL-CCNC: 49 U/L (ref 34–104)
ALT SERPL W P-5'-P-CCNC: 5 U/L (ref 7–52)
ANION GAP SERPL CALCULATED.3IONS-SCNC: 5 MMOL/L (ref 4–13)
AORTIC ROOT: 3.2 CM
ASCENDING AORTA: 2.9 CM
AST SERPL W P-5'-P-CCNC: 13 U/L (ref 13–39)
ATRIAL RATE: 50 BPM
AV PEAK GRADIENT: 12 MMHG
B PARAP IS1001 DNA NPH QL NAA+NON-PROBE: NOT DETECTED
B PERT.PT PRMT NPH QL NAA+NON-PROBE: NOT DETECTED
BASOPHILS # BLD AUTO: 0.07 THOUSANDS/ÂΜL (ref 0–0.1)
BASOPHILS NFR BLD AUTO: 1 % (ref 0–1)
BILIRUB SERPL-MCNC: 0.61 MG/DL (ref 0.2–1)
BSA FOR ECHO PROCEDURE: 1.61 M2
BUN SERPL-MCNC: 17 MG/DL (ref 5–25)
C PNEUM DNA NPH QL NAA+NON-PROBE: NOT DETECTED
CALCIUM SERPL-MCNC: 8.8 MG/DL (ref 8.4–10.2)
CARDIAC TROPONIN I PNL SERPL HS: 36 NG/L (ref 8–18)
CHLORIDE SERPL-SCNC: 93 MMOL/L (ref 96–108)
CO2 SERPL-SCNC: 43 MMOL/L (ref 21–32)
CREAT SERPL-MCNC: 0.84 MG/DL (ref 0.6–1.3)
CREAT UR-MCNC: 3.4 MG/DL
DOP CALC LVOT AREA: 2.27 CM2
DOP CALC LVOT DIAMETER: 1.7 CM
E WAVE DECELERATION TIME: 338 MS
E/A RATIO: 137
EOSINOPHIL # BLD AUTO: 0.09 THOUSAND/ÂΜL (ref 0–0.61)
EOSINOPHIL NFR BLD AUTO: 1 % (ref 0–6)
ERYTHROCYTE [DISTWIDTH] IN BLOOD BY AUTOMATED COUNT: 19.1 % (ref 11.6–15.1)
FLUAV RNA NPH QL NAA+NON-PROBE: NOT DETECTED
FLUBV RNA NPH QL NAA+NON-PROBE: NOT DETECTED
FRACTIONAL SHORTENING: 34 (ref 28–44)
GFR SERPL CREATININE-BSD FRML MDRD: 65 ML/MIN/1.73SQ M
GLUCOSE SERPL-MCNC: 167 MG/DL (ref 65–140)
HADV DNA NPH QL NAA+NON-PROBE: NOT DETECTED
HCOV 229E RNA NPH QL NAA+NON-PROBE: NOT DETECTED
HCOV HKU1 RNA NPH QL NAA+NON-PROBE: NOT DETECTED
HCOV NL63 RNA NPH QL NAA+NON-PROBE: NOT DETECTED
HCOV OC43 RNA NPH QL NAA+NON-PROBE: NOT DETECTED
HCT VFR BLD AUTO: 32 % (ref 34.8–46.1)
HGB BLD-MCNC: 8.5 G/DL (ref 11.5–15.4)
HMPV RNA NPH QL NAA+NON-PROBE: NOT DETECTED
HPIV1 RNA NPH QL NAA+NON-PROBE: NOT DETECTED
HPIV2 RNA NPH QL NAA+NON-PROBE: NOT DETECTED
HPIV3 RNA NPH QL NAA+NON-PROBE: NOT DETECTED
HPIV4 RNA NPH QL NAA+NON-PROBE: NOT DETECTED
IMM GRANULOCYTES # BLD AUTO: 0.02 THOUSAND/UL (ref 0–0.2)
IMM GRANULOCYTES NFR BLD AUTO: 0 % (ref 0–2)
INTERVENTRICULAR SEPTUM IN DIASTOLE (PARASTERNAL SHORT AXIS VIEW): 1.3 CM
INTERVENTRICULAR SEPTUM: 1.3 CM (ref 0.6–1.1)
LAAS-AP2: 34.5 CM2
LAAS-AP4: 26.8 CM2
LEFT ATRIUM SIZE: 4.5 CM
LEFT ATRIUM VOLUME (MOD BIPLANE): 114 ML
LEFT ATRIUM VOLUME INDEX (MOD BIPLANE): 70.8 ML/M2
LEFT INTERNAL DIMENSION IN SYSTOLE: 3.5 CM (ref 2.1–4)
LEFT VENTRICULAR INTERNAL DIMENSION IN DIASTOLE: 5.3 CM (ref 3.5–6)
LEFT VENTRICULAR POSTERIOR WALL IN END DIASTOLE: 1.2 CM
LEFT VENTRICULAR STROKE VOLUME: 82 ML
LV EF US.2D.A4C+ESTIMATED: 53 %
LVSV (TEICH): 82 ML
LYMPHOCYTES # BLD AUTO: 0.85 THOUSANDS/ÂΜL (ref 0.6–4.47)
LYMPHOCYTES NFR BLD AUTO: 11 % (ref 14–44)
M PNEUMO DNA NPH QL NAA+NON-PROBE: NOT DETECTED
MAGNESIUM SERPL-MCNC: 1.8 MG/DL (ref 1.9–2.7)
MCH RBC QN AUTO: 21.7 PG (ref 26.8–34.3)
MCHC RBC AUTO-ENTMCNC: 26.6 G/DL (ref 31.4–37.4)
MCV RBC AUTO: 82 FL (ref 82–98)
MICROALBUMIN UR-MCNC: <7 MG/L
MONOCYTES # BLD AUTO: 0.6 THOUSAND/ÂΜL (ref 0.17–1.22)
MONOCYTES NFR BLD AUTO: 8 % (ref 4–12)
MV E'TISSUE VEL-LAT: 5 CM/S
MV E'TISSUE VEL-SEP: 3 CM/S
MV PEAK A VEL: 0.01 M/S
MV PEAK E VEL: 137 CM/S
MV STENOSIS PRESSURE HALF TIME: 98 MS
MV VALVE AREA P 1/2 METHOD: 2.24
NEUTROPHILS # BLD AUTO: 6.33 THOUSANDS/ÂΜL (ref 1.85–7.62)
NEUTS SEG NFR BLD AUTO: 79 % (ref 43–75)
NRBC BLD AUTO-RTO: 0 /100 WBCS
PHOSPHATE SERPL-MCNC: 3.9 MG/DL (ref 2.3–4.1)
PLATELET # BLD AUTO: 145 THOUSANDS/UL (ref 149–390)
PMV BLD AUTO: 10.3 FL (ref 8.9–12.7)
POTASSIUM SERPL-SCNC: 3.7 MMOL/L (ref 3.5–5.3)
PROCALCITONIN SERPL-MCNC: <0.05 NG/ML
PROT SERPL-MCNC: 6.1 G/DL (ref 6.4–8.4)
QRS AXIS: 256 DEGREES
QRSD INTERVAL: 106 MS
QT INTERVAL: 458 MS
QTC INTERVAL: 425 MS
RBC # BLD AUTO: 3.92 MILLION/UL (ref 3.81–5.12)
RIGHT ATRIUM AREA SYSTOLE A4C: 15.6 CM2
RIGHT VENTRICLE ID DIMENSION: 2.7 CM
RSV RNA NPH QL NAA+NON-PROBE: NOT DETECTED
RV+EV RNA NPH QL NAA+NON-PROBE: NOT DETECTED
SARS-COV-2 RNA NPH QL NAA+NON-PROBE: NOT DETECTED
SL CV LEFT ATRIUM LENGTH A2C: 8 CM
SL CV LV EF: 55
SL CV PED ECHO LEFT VENTRICLE DIASTOLIC VOLUME (MOD BIPLANE) 2D: 133 ML
SL CV PED ECHO LEFT VENTRICLE SYSTOLIC VOLUME (MOD BIPLANE) 2D: 51 ML
SODIUM SERPL-SCNC: 141 MMOL/L (ref 135–147)
T WAVE AXIS: 53 DEGREES
TRICUSPID VALVE PEAK REGURGITATION VELOCITY: 1.6 M/S
VENTRICULAR RATE: 52 BPM
WBC # BLD AUTO: 7.96 THOUSAND/UL (ref 4.31–10.16)

## 2025-05-14 PROCEDURE — 80053 COMPREHEN METABOLIC PANEL: CPT | Performed by: INTERNAL MEDICINE

## 2025-05-14 PROCEDURE — 94664 DEMO&/EVAL PT USE INHALER: CPT

## 2025-05-14 PROCEDURE — 83735 ASSAY OF MAGNESIUM: CPT | Performed by: INTERNAL MEDICINE

## 2025-05-14 PROCEDURE — 94760 N-INVAS EAR/PLS OXIMETRY 1: CPT

## 2025-05-14 PROCEDURE — 85025 COMPLETE CBC W/AUTO DIFF WBC: CPT | Performed by: INTERNAL MEDICINE

## 2025-05-14 PROCEDURE — 84100 ASSAY OF PHOSPHORUS: CPT | Performed by: INTERNAL MEDICINE

## 2025-05-14 PROCEDURE — 99223 1ST HOSP IP/OBS HIGH 75: CPT | Performed by: INTERNAL MEDICINE

## 2025-05-14 PROCEDURE — 84145 PROCALCITONIN (PCT): CPT | Performed by: INTERNAL MEDICINE

## 2025-05-14 PROCEDURE — 97167 OT EVAL HIGH COMPLEX 60 MIN: CPT

## 2025-05-14 PROCEDURE — 99232 SBSQ HOSP IP/OBS MODERATE 35: CPT | Performed by: INTERNAL MEDICINE

## 2025-05-14 PROCEDURE — 93306 TTE W/DOPPLER COMPLETE: CPT

## 2025-05-14 PROCEDURE — 97163 PT EVAL HIGH COMPLEX 45 MIN: CPT

## 2025-05-14 PROCEDURE — 84484 ASSAY OF TROPONIN QUANT: CPT | Performed by: INTERNAL MEDICINE

## 2025-05-14 PROCEDURE — 93010 ELECTROCARDIOGRAM REPORT: CPT | Performed by: INTERNAL MEDICINE

## 2025-05-14 PROCEDURE — 80074 ACUTE HEPATITIS PANEL: CPT | Performed by: INTERNAL MEDICINE

## 2025-05-14 PROCEDURE — 93306 TTE W/DOPPLER COMPLETE: CPT | Performed by: INTERNAL MEDICINE

## 2025-05-14 RX ORDER — ALBUTEROL SULFATE 0.83 MG/ML
2.5 SOLUTION RESPIRATORY (INHALATION) EVERY 4 HOURS PRN
Status: DISCONTINUED | OUTPATIENT
Start: 2025-05-14 | End: 2025-05-20 | Stop reason: HOSPADM

## 2025-05-14 RX ORDER — POTASSIUM CHLORIDE 1500 MG/1
20 TABLET, EXTENDED RELEASE ORAL ONCE
Status: COMPLETED | OUTPATIENT
Start: 2025-05-14 | End: 2025-05-14

## 2025-05-14 RX ORDER — MAGNESIUM SULFATE HEPTAHYDRATE 40 MG/ML
2 INJECTION, SOLUTION INTRAVENOUS ONCE
Status: COMPLETED | OUTPATIENT
Start: 2025-05-14 | End: 2025-05-15

## 2025-05-14 RX ADMIN — ENOXAPARIN SODIUM 40 MG: 40 INJECTION SUBCUTANEOUS at 18:15

## 2025-05-14 RX ADMIN — BUDESONIDE AND FORMOTEROL FUMARATE DIHYDRATE 2 PUFF: 80; 4.5 AEROSOL RESPIRATORY (INHALATION) at 17:25

## 2025-05-14 RX ADMIN — SERTRALINE HYDROCHLORIDE 50 MG: 50 TABLET ORAL at 09:35

## 2025-05-14 RX ADMIN — ATORVASTATIN CALCIUM 40 MG: 40 TABLET, FILM COATED ORAL at 17:24

## 2025-05-14 RX ADMIN — LISINOPRIL 10 MG: 10 TABLET ORAL at 09:35

## 2025-05-14 RX ADMIN — QUETIAPINE FUMARATE 25 MG: 25 TABLET ORAL at 21:08

## 2025-05-14 RX ADMIN — FUROSEMIDE 40 MG: 10 INJECTION, SOLUTION INTRAMUSCULAR; INTRAVENOUS at 17:24

## 2025-05-14 RX ADMIN — ASPIRIN 81 MG: 81 TABLET, COATED ORAL at 09:35

## 2025-05-14 RX ADMIN — POTASSIUM CHLORIDE 20 MEQ: 1500 TABLET, EXTENDED RELEASE ORAL at 13:38

## 2025-05-14 RX ADMIN — GABAPENTIN 100 MG: 100 CAPSULE ORAL at 17:24

## 2025-05-14 RX ADMIN — GABAPENTIN 100 MG: 100 CAPSULE ORAL at 09:35

## 2025-05-14 RX ADMIN — FUROSEMIDE 40 MG: 10 INJECTION, SOLUTION INTRAMUSCULAR; INTRAVENOUS at 09:35

## 2025-05-14 RX ADMIN — MAGNESIUM SULFATE HEPTAHYDRATE 2 G: 40 INJECTION, SOLUTION INTRAVENOUS at 13:38

## 2025-05-14 RX ADMIN — ACETAMINOPHEN 325 MG: 325 TABLET ORAL at 00:34

## 2025-05-14 RX ADMIN — UMECLIDINIUM 1 PUFF: 62.5 AEROSOL, POWDER ORAL at 09:35

## 2025-05-14 RX ADMIN — BUDESONIDE AND FORMOTEROL FUMARATE DIHYDRATE 2 PUFF: 80; 4.5 AEROSOL RESPIRATORY (INHALATION) at 09:35

## 2025-05-14 NOTE — PHYSICAL THERAPY NOTE
PHYSICAL THERAPY EVALUATION  NAME:  Tiffani Francois  DATE: 05/14/25    AGE:   80 y.o.  Mrn:   [id number]  ADMIT DX:  Hypomagnesemia [E83.42]  Hypercarbia [R06.89]  SOB (shortness of breath) [R06.02]  Pleural effusion [J90]  Acute exacerbation of CHF (congestive heart failure) ([facility]) [I50.9]  Acute CHF (congestive heart failure) ([facility]) [I50.9]    Past Medical History:   Diagnosis Date    Acute on chronic respiratory failure with hypoxia and hypercapnia ([facility]) 10/18/2021    JOHN (acute kidney injury) ([facility]) 03/03/2023    COPD with acute exacerbation ([facility]) 09/22/2022    History of intracranial hemorrhage 08/14/2022    Hyperlipidemia     last assessed  8/24/17    Hypertension     last assessed 10/2/14    Hypokalemia 08/14/2022    Hypomagnesemia 09/22/2022    Sepsis due to pneumonia ([facility]) 03/03/2023     Length Of Stay: 1  Performed at least 2 patient identifiers during session: Name and ID [email or web address]  PHYSICAL THERAPY EVALUATION :    05/14/25 0931   PT Last Visit   PT Visit Date 05/14/25   Note Type   Note type Evaluation   Pain Assessment   Pain Assessment Tool 0-10   Pain Score No Pain   Restrictions/Precautions   Weight Bearing Precautions Per Order No   Other Precautions Fall Risk;Telemetry;Multiple lines;Bed Alarm;Chair Alarm;Cognitive   Home Living   Type of Home House   Home Layout One level;Stairs to enter with rails  (3 CLARK)   Bathroom Shower/Tub Tub/shower unit   Bathroom Toilet Standard   Bathroom Equipment Grab bars in shower;Shower chair   Bathroom Accessibility Accessible   Home Equipment Walker;Cane   Additional Comments pt reports use of cane   Prior Function   Level of [place] Independent with functional mobility;Needs assistance with ADLs;Needs assistance with IADLS   Lives With Daughter   Receives Help From Family   IADLs Family/Friend/Other provides transportation   Falls in the last 6 months 1 to 4  (true # unknown d/t cognition)   Vocational Retired   General   Family/Caregiver Present No   Cognition  "  Overall Cognitive Status Impaired   Arousal/Participation Alert   Orientation Level Oriented to person;Disoriented to place;Disoriented to time;Disoriented to situation   Following Commands Follows one step commands with increased time or repetition   Comments poor safety awareness and problem solving   Subjective   Subjective \"What do I do now baby?\"   RLE Assessment   RLE Assessment X  (4-/5 grossly; poor ankle and hip strategy during ambulation)   LLE Assessment   LLE Assessment X  (4-/5 grossly; poor ankle and hip strategy during ambulation)   Bed Mobility   Supine to Sit 5  Supervision   Additional items HOB elevated;Bedrails;Increased time required;Verbal cues   Sit to Supine   (pt OOB at end of session)   Transfers   Sit to Stand 4  Minimal assistance   Additional items Assist x 2;Increased time required;Verbal cues   Stand to Sit 4  Minimal assistance   Additional items Assist x 2;Increased time required;Verbal cues   Toilet transfer 4  Minimal assistance   Additional items Assist x 2;Increased time required;Verbal cues;Standard toilet   Additional Comments RW used; VCs for hand placement and safety awareness   Ambulation/Elevation   Gait pattern Excessively slow;Ataxia;Short stride;Retropulsion;Improper Weight shift;Poor UE support   Gait Assistance 4  Minimal assist   Additional items Assist x 2;Verbal cues   Assistive Device Rolling walker   Distance 10', 15'   Ambulation/Elevation Additional Comments ambulation distance limited 2* to safety concerns and significant postural sway   Balance   Static Sitting Good   Dynamic Sitting Fair +   Static Standing Fair -   Dynamic Standing Poor +   Ambulatory Poor +   Endurance Deficit   Endurance Deficit Yes   Endurance Deficit Description pt appears fatigued with minimal ambulation; SpO2 decreasing with mobility on 3L O2, however poor pleth on [place], so accuracy is questionable   Activity Tolerance   Activity Tolerance Patient limited by fatigue   Assessment "   Prognosis Good   Problem List Decreased strength;Decreased endurance;Impaired balance;Decreased mobility;Decreased cognition;Decreased safety awareness;Impaired judgement   Goals   Patient Goals to go home   LTG Expiration Date 05/28/25   Plan   Treatment/Interventions Functional transfer training;LE strengthening/ROM;Elevations;Therapeutic exercise;Endurance training;Bed mobility;Gait training   PT Frequency 3-5x/wk   Discharge Recommendation   Rehab Resource Intensity Level, PT I (Maximum Resource Intensity)   AM-PAC Basic Mobility Inpatient   Turning in Flat Bed Without Bedrails 3   Lying on Back to Sitting on Edge of Flat Bed Without Bedrails 3   Moving Bed to Chair 3   Standing Up From Chair Using Arms 3   Walk in Room 2   Climb 3-5 Stairs With Railing 2   Basic Mobility Inpatient Raw Score 16   Basic Mobility Standardized Score 38.32   [facility] Highest Level Of Mobility   -HL Goal 5: Stand one or more mins   -HLM Achieved 6: Walk 10 steps or more   End of Consult   Patient Position at End of Consult Bedside chair;Bed/Chair alarm activated;All needs within reach       Pt requires PT/OT co-eval due to medical complexity, safety concerns, fall risk, significant assistance with mobility and/or cognitive-behavioral impairments.    (Please find full objective findings from PT assessment regarding body systems outlined above).     Assessment: Pt is a 80 y.o. female seen for PT evaluation s/p admission to [facility] on 5/13/2025 with Acute CHF (congestive heart failure) ([facility]).  Order placed for PT services.  Upon evaluation: Pt is presenting with impaired functional mobility due to decreased strength, decreased endurance, impaired balance, gait deviations, impaired cognition, decreased safety awareness, and fall risk requiring SUP assistance for bed mobility and min x 2 assistance for transfers and ambulation with RW. Pt's clinical presentation is currently unstable/unpredictable given the  functional mobility deficits above, especially weakness, decreased endurance, impaired balance, gait deviations, decreased functional mobility tolerance, decreased safety awareness, impaired judgement, and decreased cognition, and combined with medical complications of abnormal H&H, abnormal CBC, low SpO2 values, new onset O2 use, abnormal CO2 values, and need for input for mobility technique/safety.  Pt's PMHx and comorbidities that may affect physical performance and progress include: permanent a-fib, CHF, chronic respiratory failure, ambulatory dysfunction, DM, pulmonary emphysema. Personal factors affecting pt at time of IE include: step(s) to enter environment, advanced age, past experience, inability to perform IADLs, inability to perform ADLs, inability to navigate level surfaces without external assistance, inability to navigate community distances, limited insight into impairments, and recent fall(s)/fall history. Pt will benefit from continued skilled PT services to address deficits as defined above and to maximize level of functional mobility to facilitate return toward PLOF and improved QOL. From PT/mobility standpoint, recommendation at time of d/c would be Level I (Maximum Resource Intensity) in order to reduce fall risk and maximize pt's functional independence and consistency with mobility.      The patient's AM-[facility] Basic Mobility Inpatient Short Form Raw Score is 16. A Raw score of less than or equal to 16 suggests the patient may benefit from discharge to post-acute rehabilitation services. Please also refer to the recommendation of the Physical Therapist for safe discharge planning.       Goals: Pt will participate in B LE strengthening exercises to facilitate improved functional activity tolerance. Pt will perform all functional transfers and bed mobility mod(I) with good safety awareness. Pt will ambulate 250' mod(I) with LRAD while maintaining good functional dynamic balance. Pt will  ascend/descend 3 stairs with HR and SUP to reflect the ability to safely navigate the home.     Bonita Sánchez, PT,DPT

## 2025-05-14 NOTE — ASSESSMENT & PLAN NOTE
Check an iron panel, vitamin B12 level, and folate level  Follow the CBC  Transfuse for a hemoglobin less than 7 g/dl

## 2025-05-14 NOTE — UTILIZATION REVIEW
Initial Clinical Review    Admission: Date/Time/Statement:   Admission Orders (From admission, onward)       Ordered        25 1805  INPATIENT ADMISSION  Once                          Orders Placed This Encounter   Procedures    INPATIENT ADMISSION     Standing Status:   Standing     Number of Occurrences:   1     Level of Care:   Med Surg [16]     Estimated length of stay:   More than 2 Midnights     Certification:   I certify that inpatient services are medically necessary for this patient for a duration of greater than two midnights. See H&P and MD Progress Notes for additional information about the patient's course of treatment.     ED Arrival Information       Expected   -    Arrival   2025 15:39    Acuity   Urgent              Means of arrival   Ambulance    Escorted by   [facility]    Service   Hospitalist    Admission type   Emergency              Arrival complaint   SOB             Chief Complaint   Patient presents with    Shortness of Breath     Home care stated pt has been stating in the low 80's pt states it only happens when she is moving. Ems had her in 3L stating 95%. Pt has some pitting edema but takes lasix's daily        Initial Presentation: 80 y.o. female presents to ed from home as advised by home health aide for evaluation and treatment of shortness of breath,  spo2 72% ra, pitting edema.  Patient uses 3L O2nc intermittently.  PMHX:  COPD baseline 3L O2nc.    Clinical assessment significant for volume overload, respirations 26-36, bradycardia, respiratory distress, wheezing,+1 pitting edema.  , CO2 41, MAG 1.8, VB.310 / 71.2 / 25.2 / 35, HB 9.1.  Imaging shows cardiomegaly, small R pleural effusion, pericardial effusion, pulmonary arterial hypertension, ground glass opacity throughout the lungs.  Possible left lower lobe/lingular infiltrate somewhat difficult to assess secondary to cardiomegaly.  Initially treated with duo neb x1, iv mag x1, iv lasix  x1.  Admit to inpatient med surg for acute CHF.  Plan includes iv lasix 40 mg bid, possible thoracentesis, TTE, consult cardiology for acute CHF, daily wt, I/O, PT/OT evaluations.      Anticipated Length of Stay/Certification Statement: Patient will be admitted on an inpatient basis with an anticipated length of stay of greater than 2 midnights secondary to the need for IV Lasix treatment, for a transthoracic echocardiogram, for a Cardiology consultation, and with an increased supplemental oxygen requirement to maintain adequate oxygen saturation levels.     Date: 5-14-25    Day 2: inpatient med surg  Certification Statement: The patient will continue to require additional inpatient hospital stay due to the need for IV Lasix treatment.  Discharge Plan: Anticipate discharge in 24-48 hrs to home with home services.    Consult cardiology:  good response to iv lasix. Remains mildly volume overloaded.  Continue current iv lasix for 24 more hours. ECHO: wall thickness increased, LV ef 55%m Diastolic function due to atrial fibrillation.  Atrium severely dilated.  Moderate pericardial effusion. Troponin 36.  Follow up hepatitis panel ( acute).    UA culture, urine protein/creatinine ratio, albumin/ creatinine ratio pending.  Mag 1.8- received iv Mag 2 g x1.  Repeat Mag level.     Date: 5-15-25   Day 3: Has surpassed a 2nd midnight with active treatments and services.   Certification Statement: The patient will continue to require additional inpatient hospital stay due to the need for IV antibiotic treatment, to monitor the patient's renal function, and for close respiratory status monitoring.  Discharge Plan: Anticipate discharge in 24-48 hrs to home with home services.  97.5, 109/ 46, spo2 91 % on 3L O3 resting.  Net output -748ml in 24 hrs. UA:  E coli. BUN 28, CR 1.32.     The shortness of breath is improving. On exam: bibasilar crackles. Telemetry: Afib with HR averaging 51-68.  Arrhthymias requiring medical therapy.    Continue telemetry.   Start iv ceftriaxone q24 hr, continue scheduled formoterol, albuterol prn. Discontinued iv lasix today.      ED Treatment-Medication Administration from 05/13/2025 1539 to 05/13/2025 1841         Date/Time Order Dose Route Action     05/13/2025 1630 ipratropium-albuterol (DUO-NEB) 0.5-2.5 mg/3 mL inhalation solution 3 mL 3 mL Nebulization Given     05/13/2025 1720 magnesium sulfate 2 g/50 mL IVPB (premix) 2 g 2 g Intravenous New Bag     05/13/2025 1816 furosemide (LASIX) injection 40 mg 40 mg Intravenous Given       Scheduled Medications:    aspirin, 81 mg, Oral, Daily  atorvastatin, 40 mg, Oral, Daily With Dinner  budesonide-formoterol, 2 puff, Inhalation, BID  enoxaparin, 40 mg, Subcutaneous, Q24H    furosemide, 40 mg, Intravenous, BID    gabapentin, 100 mg, Oral, BID  lisinopril, 10 mg, Oral, Daily  QUEtiapine, 25 mg, Oral, HS  sertraline, 50 mg, Oral, Daily  umeclidinium, 1 puff, Inhalation, Daily      Continuous IV Infusions:     PRN Meds:  acetaminophen, 325 mg, Oral, Q6H PRN  albuterol, 2.5 mg, Nebulization, Q4H PRN  sodium chloride (PF), 3 mL, Intravenous, Q1H PRN      ED Triage Vitals   Temperature Pulse Respirations Blood Pressure SpO2 Pain Score   05/13/25 1541 05/13/25 1541 05/13/25 1541 05/13/25 1541 05/13/25 1620 05/13/25 1541   97.7 °F (36.5 °C) 59 20 148/74 98 % No Pain     Weight (last 2 days)       Date/Time Weight    05/14/25 07:04:25 55.7 (122.8)    05/14/25 0600 55.7 (122.8)    05/13/25 19:01:20 55.6 (122.6)    05/13/25 1848 55.6 (122.6)    05/13/25 1541 59.2 (130.51)            Vital Signs (last 3 days)       Date/Time Temp Pulse Resp BP MAP  SpO2 Nasal Cannula O2 Flow Rate (L/min) [place] Coma Scale Score Pain    05/14/25 09:39:57 -- 66 -- 149/65 93 97 % -- -- --    05/14/25 07:04:25 -- 53 -- 152/67 -- 100 % -- -- --    05/14/25 0034 -- -- -- -- -- -- -- -- 2    05/14/25 0020 -- 76 22 -- -- 95 % 3 L/min -- --    05/13/25 2300 -- 65 18 112/49 70 90 % 4 L/min -- --     05/13/25 21:29:33 98.6 °F (37 °C) 77 22 192/143 159 94 % 4 L/min -- --    05/13/25 1930 -- -- -- -- -- -- -- 14 --    05/13/25 19:01:20 98.7 °F (37.1 °C) -- 18 171/81 111 95 % 4 L/min -- --    05/13/25 18:55:37 98.7 °F (37.1 °C) -- 20 204/87 126 99 % -- -- --    05/13/25 1845 -- -- -- -- -- -- -- -- No Pain    05/13/25 1830 -- 60 36 183/77 110 98 % 4 L/min -- No Pain    05/13/25 1620 -- 63 26 143/73 -- 98 % 4 L/min -- --    05/13/25 1608 -- -- -- -- -- -- -- 15 No Pain    05/13/25 1607 -- -- -- -- -- -- -- -- --    05/13/25 1541 97.7 °F (36.5 °C) 59 20 148/74 -- -- 3 L/min -- No Pain         Pertinent Labs/Diagnostic Test Results:     Radiology:    CTA chest pe study   Final  (05/13 2155)      No evidence of pulmonary embolus      Wedge-shaped consolidation at the anterior left lower lobe with associated volume loss compatible with atelectasis.      CHF. Small right pleural effusion      Diffuse mosaic attenuation which may be seen with small vessel or small airways disease         CT chest without contrast   Final (05/13 2530)      1.  Cardiomegaly. Small right pleural effusion. Trace pericardial effusion.   2.  Mosaic groundglass opacity throughout the lungs may represent chronic small airway or chronic small vessel disease.   3.  Left lung base atelectasis.   4.  Pulmonary arterial hypertension.            X-ray chest 2 views   Final  (05/13 3099)      Opacity in the left lower chest which appears to be combination of left pleural effusion and lung base atelectasis or pneumonia.      There is also pulmonary vascular congestion and some prominence of the heart        Cardiology:  Echo complete w/ contrast if indicated   Final (05/14 8345)        Left Ventricle: Left ventricular cavity size is normal. Wall thickness    is moderately increased. There is moderate concentric hypertrophy. The    left ventricular ejection fraction is 55%. Systolic function is normal.    Wall motion is normal. Unable to assess diastolic  function due to atrial    fibrillation.     Right Ventricle: Right ventricular cavity size is normal. Systolic    function is mildly reduced.     Left Atrium: The atrium is severely dilated (>48 mL/m2).     Right Atrium: The atrium is mildly dilated.     Mitral Valve: There is mild annular calcification. There is mild    regurgitation.     Tricuspid Valve: There is mild regurgitation.     Pericardium: There is a moderate pericardial effusion along the LV free    wall measuring 1.3 cm.  The fluid exhibits no internal echoes. There is no    echocardiographic evidence of tamponade. There is a right pleural    effusion.        Results from last 7 days   Lab Units 05/13/25  1911   SARS-COV-2  Not Detected     Results from last 7 days   Lab Units 05/14/25  0451 05/13/25  1628   WBC Thousand/uL 7.96 8.28   HEMOGLOBIN g/dL 8.5* 9.1*   HEMATOCRIT % 32.0* 33.9*   PLATELETS Thousands/uL 145* 143*   TOTAL NEUT ABS Thousands/µL 6.33 6.67         Results from last 7 days   Lab Units 05/14/25  0451 05/13/25  1628   SODIUM mmol/L 141 140   POTASSIUM mmol/L 3.7 3.9   CHLORIDE mmol/L 93* 94*   CO2 mmol/L 43* 41*   ANION GAP mmol/L 5 5   BUN mg/dL 17 21   CREATININE mg/dL 0.84 0.85   EGFR ml/min/1.73sq m 65 64   CALCIUM mg/dL 8.8 9.2   MAGNESIUM mg/dL 1.8* 1.8*   PHOSPHORUS mg/dL 3.9  --      Results from last 7 days   Lab Units 05/14/25  0451   AST U/L 13   ALT U/L 5*   ALK PHOS U/L 49   TOTAL PROTEIN g/dL 6.1*   ALBUMIN g/dL 3.9   TOTAL BILIRUBIN mg/dL 0.61         Results from last 7 days   Lab Units 05/14/25  0451 05/13/25  1628   GLUCOSE RANDOM mg/dL 167* 110     Results from last 7 days   Lab Units 05/13/25  1628   PH HELEN  7.310   PCO2 HELEN mm Hg 71.2*   PO2 HELEN mm Hg 25.2*   HCO3 HELEN mmol/L 35.0*   BASE EXC HELEN mmol/L 7.0   O2 CONTENT HELEN ml/dL 5.5   O2 HGB, VENOUS % 38.0*       Results from last 7 days   Lab Units 05/13/25 2008 05/13/25 1903 05/13/25  1628   HS TNI 0HR ng/L  --   --  24   HS TNI 2HR ng/L  --  22  --    HSTNI  D2 ng/L  --  -2  --    HS TNI 4HR ng/L 26  --   --    HSTNI D4 ng/L 2  --   --      Results from last 7 days   Lab Units 05/14/25  0451   PROCALCITONIN ng/ml <0.05     Results from last 7 days   Lab Units 05/13/25  1628   LACTIC ACID mmol/L 1.7     Results from last 7 days   Lab Units 05/13/25  1628   BNP pg/mL 648*     Results from last 7 days   Lab Units 05/13/25  1911   CLARITY UA  Clear   COLOR UA  Colorless   SPEC GRAV UA  1.015   PH UA  7.5   GLUCOSE UA mg/dl Negative   KETONES UA mg/dl Negative   BLOOD UA  Negative   PROTEIN UA mg/dl Negative   NITRITE UA  Negative   BILIRUBIN UA  Negative   UROBILINOGEN UA (BE) mg/dl <2.0   LEUKOCYTES UA  Trace*   WBC UA /hpf 0-1*   RBC UA /hpf None Seen   BACTERIA UA /hpf None Seen   EPITHELIAL CELLS WET PREP /hpf Occasional   CREATININE UR mg/dL 3.4  3.6   PROTEIN UR mg/dL <4.0     Results from last 7 days   Lab Units 05/13/25  1911   INFLUENZA B  Not Detected   RESPIRATORY SYNCYTIAL VIRUS  Not Detected     Results from last 7 days   Lab Units 05/13/25  1911   ADENOVIRUS  Not Detected   BORDETELLA PARAPERTUSSIS  Not Detected   BORDETELLA PERTUSSIS  Not Detected   CHLAMYDIA PNEUMONIAE  Not Detected   CORONAVIRUS 229E  Not Detected   CORONAVIRUS HKU1  Not Detected   CORONAVIRUS NL63  Not Detected   CORONAVIRUS OC43  Not Detected   METAPNEUMOVIRUS  Not Detected   RHINOVIRUS  Not Detected   MYCOPLASMA PNEUMONIAE  Not Detected   PARAINFLUENZA 1  Not Detected   PARAINFLUENZA 2  Not Detected   PARAINFLUENZA 3  Not Detected   PARAINFLUENZA 4  Not Detected       Past Medical History:   Diagnosis Date    Acute on chronic respiratory failure with hypoxia and hypercapnia ([facility]) 10/18/2021    JOHN (acute kidney injury) ([facility]) 03/03/2023    COPD with acute exacerbation ([facility]) 09/22/2022    History of intracranial hemorrhage 08/14/2022    Hyperlipidemia     last assessed  8/24/17    Hypertension     last assessed 10/2/14    Hypokalemia 08/14/2022    Hypomagnesemia 09/22/2022    Sepsis due  to pneumonia (HCC) 03/03/2023     Present on Admission:     Permanent atrial fibrillation (HCC)   Hypomagnesemia      Admitting Diagnosis:     Hypomagnesemia [E83.42]  Hypercarbia [R06.89]  SOB (shortness of breath) [R06.02]  Pleural effusion [J90]  Acute exacerbation of CHF (congestive heart failure) (HCC) [I50.9]  Acute CHF (congestive heart failure) (HCC) [I50.9]    Age/Sex: 80 y.o. female    Network Utilization Review Department  ATTENTION: Please call with any questions or concerns to [phone] and carefully listen to the prompts so that you are directed to the right person. All voicemails are confidential.   For Discharge needs, contact Care Management DC Support Team at [phone] opt. 2  Send all requests for admission clinical reviews, approved or denied determinations and any other requests to dedicated fax number below belonging to the campus where the patient is receiving treatment. List of dedicated fax numbers for the Facilities:  FACILITY NAME UR FAX NUMBER   ADMISSION DENIALS (Administrative/Medical Necessity) [phone]   DISCHARGE SUPPORT TEAM (NETWORK) [phone]   PARENT CHILD HEALTH (Maternity/NICU/Pediatrics) [phone]   [facility] [phone]   [facility] [phone]   [facility] [phone]   [facility] [phone]   [facility] [phone]   [facility] [phone]   [facility] [phone]   [facility] [phone]   [facility] [phone]   [facility] [phone]   [facility] [phone]   [facility] [phone]

## 2025-05-14 NOTE — PLAN OF CARE
Problem: PAIN - ADULT  Goal: Verbalizes/displays adequate comfort level or baseline comfort level  Description: Interventions:  - Encourage patient to monitor pain and request assistance  - Assess pain using appropriate pain scale  - Administer analgesics based on type and severity of pain and evaluate response  - Implement non-pharmacological measures as appropriate and evaluate response  - Consider cultural and social influences on pain and pain management  - Notify physician/advanced practitioner if interventions unsuccessful or patient reports new pain  Outcome: Progressing     Problem: INFECTION - ADULT  Goal: Absence or prevention of progression during hospitalization  Description: INTERVENTIONS:  - Assess and monitor for signs and symptoms of infection  - Monitor lab/diagnostic results  - Monitor all insertion sites, i.e. indwelling lines, tubes, and drains  - Monitor endotracheal if appropriate and nasal secretions for changes in amount and color  - [place] appropriate cooling/warming therapies per order  - Administer medications as ordered  - Instruct and encourage patient and family to use good hand hygiene technique  - Identify and instruct in appropriate isolation precautions for identified infection/condition  Outcome: Progressing     Problem: SAFETY ADULT  Goal: Patient will remain free of falls  Description: INTERVENTIONS:  - Educate patient/family on patient safety including physical limitations  - Instruct patient to call for assistance with activity   - Consult OT/PT to assist with strengthening/mobility   - Keep Call bell within reach  - Keep bed low and locked with side rails adjusted as appropriate  - Keep care items and personal belongings within reach  - Initiate and maintain comfort rounds  - Make Fall Risk Sign visible to staff  - Offer Toileting every 2 Hours, in advance of need  - Initiate/Maintain bed/chair alarm  - Obtain necessary fall risk management equipment: nonskid footwear  -  Apply yellow socks and bracelet for high fall risk patients  - Consider moving patient to room near nurses station  Outcome: Progressing  Goal: Maintain or return to baseline ADL function  Description: INTERVENTIONS:  -  Assess patient's ability to carry out ADLs; assess patient's baseline for ADL function and identify physical deficits which impact ability to perform ADLs (bathing, care of mouth/teeth, toileting, grooming, dressing, etc.)  - Assess/evaluate cause of self-care deficits   - Assess range of motion  - Assess patient's mobility; develop plan if impaired  - Assess patient's need for assistive devices and provide as appropriate  - Encourage maximum independence but intervene and supervise when necessary  - Involve family in performance of ADLs  - Assess for home care needs following discharge   - Consider OT consult to assist with ADL evaluation and planning for discharge  - Provide patient education as appropriate  Outcome: Progressing     Problem: DISCHARGE PLANNING  Goal: Discharge to home or other facility with appropriate resources  Description: INTERVENTIONS:  - Identify barriers to discharge w/patient and caregiver  - Arrange for needed discharge resources and transportation as appropriate  - Identify discharge learning needs (meds, wound care, etc.)  - Arrange for interpretive services to assist at discharge as needed  - Refer to Case Management Department for coordinating discharge planning if the patient needs post-hospital services based on physician/advanced practitioner order or complex needs related to functional status, cognitive ability, or social support system  Outcome: Progressing     Problem: Knowledge Deficit  Goal: Patient/family/caregiver demonstrates understanding of disease process, treatment plan, medications, and discharge instructions  Description: Complete learning assessment and assess knowledge base.  Interventions:  - Provide teaching at level of understanding  - Provide  teaching via preferred learning methods  Outcome: Progressing     Problem: CARDIOVASCULAR - ADULT  Goal: Maintains optimal cardiac output and hemodynamic stability  Description: INTERVENTIONS:  - Monitor I/O, vital signs and rhythm  - Monitor for S/S and trends of decreased cardiac output  - Administer and titrate ordered vasoactive medications to optimize hemodynamic stability  - Assess quality of pulses, skin color and temperature  - Assess for signs of decreased coronary artery perfusion  - Instruct patient to report change in severity of symptoms  Outcome: Progressing  Goal: Absence of cardiac dysrhythmias or at baseline rhythm  Description: INTERVENTIONS:  - Continuous cardiac monitoring, vital signs, obtain 12 lead EKG if ordered  - Administer antiarrhythmic and heart rate control medications as ordered  - Monitor electrolytes and administer replacement therapy as ordered  Outcome: Progressing

## 2025-05-14 NOTE — QUICK NOTE
Patient found on side of bed almost on floor.. extremely confused and incontinent.. Found stripping off clothes and removing all wires. Patient cleaned up and nurse made aware. Bed alarm back on patient and doing frequent rounds.

## 2025-05-14 NOTE — ASSESSMENT & PLAN NOTE
Give magnesium sulfate 2 grams IV x 1 dose on 05/14/2025  Follow the magnesium level    Results from last 7 days   Lab Units 05/14/25  0451 05/13/25  1628   MAGNESIUM mg/dL 1.8* 1.8*

## 2025-05-14 NOTE — CASE MANAGEMENT
Case Management Assessment & Discharge Planning Note    Patient name Tiffani Francois  Location /409-01 MRN [id number]  : 1945 Date 2025       Current Admission Date: 2025  Current Admission Diagnosis:Acute CHF (congestive heart failure) (HCC)   Patient Active Problem List    Diagnosis Date Noted    Acute CHF (congestive heart failure) (HCC) 2025    Acute on chronic respiratory failure with hypoxia and hypercapnia (HCC) 2025    Pericardial effusion 2025    Pleural effusion on right 2025    Pulmonary emphysema (HCC) 2023    Stress incontinence 2023    Mediastinal lymphadenopathy 2023    Oral candidiasis 2023    Hypercalcemia 2022    Microscopic hematuria 2022    Permanent atrial fibrillation (HCC) 2022    Hypomagnesemia 2022    Multiple pulmonary nodules 2022    Hyponatremia 2022    Hyperbilirubinemia 2022    Type 2 diabetes mellitus with hyperglycemia, without long-term current use of insulin (HCC) 2022    Vitamin D deficiency 2022    Chronic atrial fibrillation (HCC) 2022    Dermatitis 2022    Ambulatory dysfunction 10/18/2021    Tobacco use 2018    Hyperlipidemia 10/02/2014    Essential hypertension 10/02/2014      LOS (days): 1  Geometric Mean LOS (GMLOS) (days): 3.9  Days to GMLOS:3.1     OBJECTIVE:    Risk of Unplanned Readmission Score: 18.26         Current admission status: Inpatient       Preferred Pharmacy:   [id number] - [place] - [address]  [address] [facility] [place] [address]  Phone: [phone] Fax: [phone]    [facility] - [place] - [address]  [address]  [place] [address]  Phone: [phone] Fax: [phone]    Primary Care Provider: Maude Baker DO    Primary Insurance: [facility]  Secondary Insurance:     ASSESSMENT:  Active Health Care Proxies        Razia Hudson Health Care Representative - Daughter   Primary Phone: [phone] (Home)                 Advance Directives  Does patient have a Health Care POA?: No  Was patient offered paperwork?: Yes (declines)  Does patient currently have a Health Care decision maker?: Yes, please see Health Care Proxy section  Does patient have Advance Directives?: No  Was patient offered paperwork?: Yes (declines)  Primary Contact: Razia Hudson (Daughter)   [phone] (H)         Readmission Root Cause  30 Day Readmission: No    Patient Information  Admitted from:: Home  Mental Status: Alert  During Assessment patient was accompanied by: Not accompanied during assessment  Assessment information provided by:: Patient, Daughter  Primary Caregiver: Child (Razia Hudson:daughter)  Support Systems: Daughter  [facility]  What city do you live in?: [place]  Home entry access options. Select all that apply.: Stairs  Number of steps to enter home.: 3  Type of Current Residence: 2 story home  Upon entering residence, is there a bedroom on the main floor (no further steps)?: Yes  Upon entering residence, is there a bathroom on the main floor (no further steps)?: Yes  Living Arrangements: Lives w/ Daughter  Is patient a ?: No    Activities of Daily Living Prior to Admission  Functional Status: Assistance  Completes ADLs independently?: No  Level of ADL dependence: Assistance  Ambulates independently?: No  Level of ambulatory dependence: Assistance  Does patient use assisted devices?: Yes  Assisted Devices (DME) used: Walker, Straight Cane, Wheelchair, Nebulizer, Portable Oxygen tanks, Home Oxygen concentrator  DME Company Name (respiratory supplies): [employer]  O2 Rate(s): 3l  Does patient currently own DME?: Yes  What DME does the patient currently own?: Portable Oxygen tanks, Wheelchair, Home Oxygen concentrator, Straight Cane, Nebulizer  Does patient have a history of Outpatient Therapy (PT/OT)?: No  Does  the patient have a history of Short-Term Rehab?: Yes  Does patient have a history of HHC?: Yes  Does patient currently have HHC?: Yes    Current Home Health Care  Type of Current Home Care Services: Home PT, Home OT, Nurse visit  [facility] Agency Name:: [facility]  Current Home Health Follow-Up Provider:: FRANDY    Patient Information Continued  Income Source: Pension/longterm  Does patient have prescription coverage?: Yes  Can the patient afford their medications and any related supplies (such as glucometers or test strips)?: Yes  Does patient receive dialysis treatments?: No  Does patient have a history of substance abuse?: No  Does patient have a history of Mental Health Diagnosis?: No         Means of Transportation  Means of Transport to Appts:: Other (Comment) (medical transport)          DISCHARGE DETAILS:    Discharge planning discussed with:: Razia Hudson (Daughter)   [phone] (H)  Freedom of Choice: Yes  Comments - Freedom of Choice: declining STR and plans home w resumption of [facility]  CM contacted family/caregiver?: Yes  Were Treatment Team discharge recommendations reviewed with patient/caregiver?: Yes  Did patient/caregiver verbalize understanding of patient care needs?: Yes  Were patient/caregiver advised of the risks associated with not following Treatment Team discharge recommendations?: Yes         Requested Home Health Care         Is the patient interested in HHC at discharge?: Yes  Home Health Discipline requested:: Nursing, Occupational Therapy, Physical Therapy  Home Health Agency Name:: [facility]  HHA External Referral Reason (only applicable if external HHA name selected): Patient has established relationship with provider  Home Health Follow-Up Provider:: FRANDY  Home Health Services Needed:: Heart Failure Management, Strengthening/Theraputic Exercises to Improve Function, Gait/ADL Training, Evaluate Functional Status and Safety  Homebound Criteria Met:: Requires the Assistance of  Another Person for Safe Ambulation or to Leave the Home  Supporting Clincal Findings:: Limited Endurance              Would you like to participate in our [facility] service program?  : No - Declined    Treatment Team Recommendation: Short Term Rehab, SNF (daughter declining)  Discharge Destination Plan:: Home with Home Health Care([facility])  Transport at Discharge : [facility] (medical transport home)

## 2025-05-14 NOTE — ASSESSMENT & PLAN NOTE
No signs of cardiac tamponade    Transthoracic echocardiogram (05/14/2025):  Pericardium: There is a moderate pericardial effusion along the LV free wall measuring 1.3 cm. The fluid exhibits no internal echoes. There is no echocardiographic evidence of tamponade. There is a right pleural effusion.

## 2025-05-14 NOTE — PLAN OF CARE
Problem: PHYSICAL THERAPY ADULT  Goal: Performs mobility at highest level of function for planned discharge setting.  See evaluation for individualized goals.  Description: Treatment/Interventions: Functional transfer training, LE strengthening/ROM, Elevations, Therapeutic exercise, Endurance training, Bed mobility, Gait training          See flowsheet documentation for full assessment, interventions and recommendations.  Note: Prognosis: Good  Problem List: Decreased strength, Decreased endurance, Impaired balance, Decreased mobility, Decreased cognition, Decreased safety awareness, Impaired judgement      Assessment: Pt is a 80 y.o. female seen for PT evaluation s/p admission to [facility] on 5/13/2025 with Acute CHF (congestive heart failure) (HCC).  Order placed for PT services.  Upon evaluation: Pt is presenting with impaired functional mobility due to decreased strength, decreased endurance, impaired balance, gait deviations, impaired cognition, decreased safety awareness, and fall risk requiring SUP assistance for bed mobility and min x 2 assistance for transfers and ambulation with RW. Pt's clinical presentation is currently unstable/unpredictable given the functional mobility deficits above, especially weakness, decreased endurance, impaired balance, gait deviations, decreased functional mobility tolerance, decreased safety awareness, impaired judgement, and decreased cognition, and combined with medical complications of abnormal H&H, abnormal CBC, low SpO2 values, new onset O2 use, abnormal CO2 values, and need for input for mobility technique/safety.  Pt's PMHx and comorbidities that may affect physical performance and progress include: permanent a-fib, CHF, chronic respiratory failure, ambulatory dysfunction, DM, pulmonary emphysema. Personal factors affecting pt at time of IE include: step(s) to enter environment, advanced age, past experience, inability to perform IADLs, inability to  perform ADLs, inability to navigate level surfaces without external assistance, inability to navigate community distances, limited insight into impairments, and recent fall(s)/fall history. Pt will benefit from continued skilled PT services to address deficits as defined above and to maximize level of functional mobility to facilitate return toward PLOF and improved QOL. From PT/mobility standpoint, recommendation at time of d/c would be Level I (Maximum Resource Intensity) in order to reduce fall risk and maximize pt's functional independence and consistency with mobility.     Rehab Resource Intensity Level, PT: I (Maximum Resource Intensity)    See flowsheet documentation for full assessment.

## 2025-05-14 NOTE — RESPIRATORY THERAPY NOTE
RT Protocol Note  Tiffani Francois 80 y.o. female MRN: [id number]  Unit/Bed#: 409-01 Encounter: [id number]    Assessment    Principal Problem:    Acute CHF (congestive heart failure) (formerly [facility])  Active Problems:    Hypomagnesemia    Permanent atrial fibrillation (HCC)    Acute on chronic respiratory failure with hypoxia and hypercapnia (formerly [facility])    Pericardial effusion    Pleural effusion on right      Home Pulmonary Medications:    Home Devices/Therapy: Home O2, Other (Comment) (Patient wears 3 LPM NC chronically.  She denies any regular use of nebulizers, but is prescribed Incruse and Symbicort.  She denies any PAP therapy for NEISHA.)    Past Medical History:   Diagnosis Date    Acute on chronic respiratory failure with hypoxia and hypercapnia (formerly [facility]) 10/18/2021    JOHN (acute kidney injury) (formerly [facility]) 03/03/2023    COPD with acute exacerbation (formerly [facility]) 09/22/2022    History of intracranial hemorrhage 08/14/2022    Hyperlipidemia     last assessed  8/24/17    Hypertension     last assessed 10/2/14    Hypokalemia 08/14/2022    Hypomagnesemia 09/22/2022    Sepsis due to pneumonia (formerly [facility]) 03/03/2023     Social History     Socioeconomic History    Marital status:      Spouse name: None    Number of children: None    Years of education: None    Highest education level: None   Occupational History    None   Tobacco Use    Smoking status: Former     Current packs/day: 1.00     Types: Cigarettes    Smokeless tobacco: Never   Vaping Use    Vaping status: Never Used   Substance and Sexual Activity    Alcohol use: Never    Drug use: No    Sexual activity: Not Currently   Other Topics Concern    None   Social History Narrative    Always uses seat belt    Always uses sunscreen    Dental care occasionally    No living will     Social Drivers of Health     Financial Resource Strain: Medium Risk (5/20/2023)    Overall Financial Resource Strain (CARDIA)     Difficulty of Paying Living Expenses: Somewhat hard   Food Insecurity: No Food Insecurity  (2025)    Nursing - Inadequate Food Risk Classification     Worried About Running Out of Food in the Last Year: Never true     Ran Out of Food in the Last Year: Never true     Ran Out of Food in the Last Year: Never true   Transportation Needs: No Transportation Needs (2025)    Nursing - Transportation Risk Classification     Lack of Transportation: Not on file     Lack of Transportation: No   Physical Activity: Not on file   Stress: Not on file   Social Connections: Unknown (2024)    Received from [facility]    Social [employer]tag     How often do you feel lonely or isolated from those around you? (Adult - for ages 18 years and over): Not on file   Intimate Partner Violence: Unknown (2025)    Nursing IPS     Feels Physically and Emotionally Safe: Not on file     Physically Hurt by Someone: Not on file     Humiliated or Emotionally Abused by Someone: Not on file     Physically Hurt by Someone: No     Hurt or Threatened by Someone: No   Housing Stability: Unknown (2025)    Nursing: Inadequate Housing Risk Classification     Has Housing: Not on file     Worried About Losing Housing: Not on file     Unable to Get Utilities: Not on file     Unable to Pay for Housing in the Last Year: No     Has Housin       Subjective     Patient assessed per RT protocol.  Patient presented to the ER with shortness of breath over the last several days, which was worsened on exertion.  It is to my understanding that she has been admitted and being treated for CHF.  Patient does have a PMH of COPD.  She is a former smoker as well.  Upon my physical assessment, her lung sounds are diminished with basilar crackles.  No wheezing is present at this time.  Patient takes Symbicort/Incruse inhalers at home, which are on her prescribed medications list.  Inhalers are ordered.  Patient stated that she doesn't regularly use any nebulizer therapy lately.  Patient is otherwise resting comfortably, and not in any visible  "distress.  Will move forward with IS therapy to promote deep breathing.  Also will order PRN Albuterol for any wheezes that occur.      Objective    Physical Exam:   Assessment Type: Assess only  General Appearance: Alert, Awake  Respiratory Pattern: Normal  Chest Assessment: Chest expansion symmetrical  Bilateral Breath Sounds: Diminished, Crackles  Cough: None    Vitals:  Blood pressure (!) 192/143, pulse 76, temperature 98.6 °F (37 °C), temperature source Oral, resp. rate 22, height 5' 5\" (1.651 m), weight 55.6 kg (122 lb 9.6 oz), SpO2 95%.          Imaging and other studies: Results Review Statement: I personally reviewed the following image studies/reports in PACS and discussed pertinent findings with Radiology: chest xray and CT chest. My interpretation of the radiology images/reports is:  .          Plan    Respiratory Plan: Mild Distress pathway  Airway Clearance Plan: Incentive Spirometer     PRN Albuterol     Resp Comments: Patient assessed per RT protocol   "

## 2025-05-14 NOTE — PROGRESS NOTES
Progress Note - Hospitalist   Name: Tiffani Francois 80 y.o. female I MRN: [id number]  Unit/Bed#: 409-01 I Date of Admission: 5/13/2025   Date of Service: 5/14/2025 I Hospital Day: 1    Assessment & Plan  Acute heart failure with preserved ejection fraction (HCC)  Wt Readings from Last 3 Encounters:   05/14/25 55.7 kg (122 lb 12.7 oz)   10/06/23 58.5 kg (129 lb)   05/20/23 55.6 kg (122 lb 9.6 oz)     I appreciate Cardiology's input.  Continue Lasix 40 mg IV BID  Monitor daily weights and strict intake/output measurements  PT/OT evaluations    Transthoracic echocardiogram (05/14/2025):    Interpretation Summary    Left Ventricle: Left ventricular cavity size is normal. Wall thickness is moderately increased. There is moderate concentric hypertrophy. The left ventricular ejection fraction is 55%. Systolic function is normal. Wall motion is normal. Unable to assess diastolic function due to atrial fibrillation.    Right Ventricle: Right ventricular cavity size is normal. Systolic function is mildly reduced.    Left Atrium: The atrium is severely dilated (>48 mL/m2).    Right Atrium: The atrium is mildly dilated.    Mitral Valve: There is mild annular calcification. There is mild regurgitation.    Tricuspid Valve: There is mild regurgitation.    Pericardium: There is a moderate pericardial effusion along the LV free wall measuring 1.3 cm.  The fluid exhibits no internal echoes. There is no echocardiographic evidence of tamponade. There is a right pleural effusion.  Acute on chronic respiratory failure with hypoxia and hypercapnia (HCC)  Presented with hypoxia with an oxygen saturation in the low 80's%  Chronically on 3 Lpm of continuous supplemental oxygen  Required 4 Lpm of continuous supplemental oxygen on 05/13/2025 with signs of air hunger including observed accessory muscle use  Weaned to 3 Lpm of continuous supplemental oxygen on 05/14/2025  Incentive spirometry  Respiratory protocol  Permanent atrial fibrillation  (HCC)  Currently with a controlled ventricular rate without any AV-mariano blocking agents  Not on anticoagulation with a history of an intracranial hemorrhage  Continue aspirin 81 mg PO Qdaily  Pericardial effusion  No signs of cardiac tamponade    Transthoracic echocardiogram (05/14/2025):  Pericardium: There is a moderate pericardial effusion along the LV free wall measuring 1.3 cm. The fluid exhibits no internal echoes. There is no echocardiographic evidence of tamponade. There is a right pleural effusion.   Pleural effusion on right  Continue Lasix 40 mg IV BID  May require a thoracentesis  Hypomagnesemia  Give magnesium sulfate 2 grams IV x 1 dose on 05/14/2025  Follow the magnesium level    Results from last 7 days   Lab Units 05/14/25  0451 05/13/25  1628   MAGNESIUM mg/dL 1.8* 1.8*       Thrombocytopenia (HCC)  Monitor for any signs of bleeding  Follow the platelet count  Anemia  Check an iron panel, vitamin B12 level, and folate level  Follow the CBC  Transfuse for a hemoglobin less than 7 g/dl    VTE Pharmacologic Prophylaxis: VTE Score: 9 High Risk (Score >/= 5) - Pharmacological DVT Prophylaxis Ordered: enoxaparin (Lovenox). Sequential Compression Devices Ordered.    Mobility:   Basic Mobility Inpatient Raw Score: 16  JH-HLM Goal: 5: Stand one or more mins  JH-HLM Achieved: 6: Walk 10 steps or more  JH-HLM Goal achieved. Continue to encourage appropriate mobility.    Patient Centered Rounds: I performed bedside rounds with nursing staff today.   Discussions with Specialists or Other Care Team Provider: I discussed the case with Cardiology.    Current Length of Stay: 1 day(s)  Current Patient Status: Inpatient   Certification Statement: The patient will continue to require additional inpatient hospital stay due to the need for IV Lasix treatment.  Discharge Plan: Anticipate discharge in 24-48 hrs to home with home services.    Code Status: Level 1 - Full Code    Subjective   The patient was seen and  examined.  The patient is doing better.  The shortness of breath is improving.  No chest pain.  No abdominal pain.  No nausea or vomiting.      Objective :  Temp:  [97.7 °F (36.5 °C)-98.7 °F (37.1 °C)] 98.6 °F (37 °C)  HR:  [53-77] 66  BP: (112-204)/() 149/65  Resp:  [18-36] 22  SpO2:  [90 %-100 %] 95 %  O2 Device: Nasal cannula  Nasal Cannula O2 Flow Rate (L/min):  [3 L/min-4 L/min] 3 L/min    Body mass index is 20.43 kg/m².     Input and Output Summary (last 24 hours):     Intake/Output Summary (Last 24 hours) at 5/14/2025 1311  Last data filed at 5/14/2025 1141  Gross per 24 hour   Intake --   Output 5076 ml   Net -5076 ml       Physical Exam  General:  NAD, follows commands  HEENT:  NC/AT, mucous membranes moist  Neck:  Supple, No JVP elevation  CV:  + S1, + S2, Irregularly irregular rhythm, Intermittent bradycardia  Pulm:  Lung fields are CTA bilaterally  Abd:  Soft, Non-tender, Non-distended  Ext:  No clubbing/cyanosis, Edema of the bilateral lower extremities  Skin:  No rashes  Neuro:  Awake, alert, oriented  Psych:  Normal mood and affect      Lines/Drains:        Telemetry:  Telemetry Orders (From admission, onward)               24 Hour Telemetry Monitoring  Continuous x 24 Hours (Telem)        Expiring   Question:  Reason for 24 Hour Telemetry  Answer:  Arrhythmias requiring acute medical intervention / PPM or ICD malfunction                     Telemetry Reviewed: Atrial fibrillation. HR averaging 53-77 bpm  Indication for Continued Telemetry Use: Arrthymias requiring medical therapy               Lab Results: I have reviewed the following results:   Results from last 7 days   Lab Units 05/14/25  0451   WBC Thousand/uL 7.96   HEMOGLOBIN g/dL 8.5*   HEMATOCRIT % 32.0*   PLATELETS Thousands/uL 145*   SEGS PCT % 79*   LYMPHO PCT % 11*   MONO PCT % 8   EOS PCT % 1     Results from last 7 days   Lab Units 05/14/25  0451   SODIUM mmol/L 141   POTASSIUM mmol/L 3.7   CHLORIDE mmol/L 93*   CO2 mmol/L 43*    BUN mg/dL 17   CREATININE mg/dL 0.84   ANION GAP mmol/L 5   CALCIUM mg/dL 8.8   ALBUMIN g/dL 3.9   TOTAL BILIRUBIN mg/dL 0.61   ALK PHOS U/L 49   ALT U/L 5*   AST U/L 13   GLUCOSE RANDOM mg/dL 167*                 Results from last 7 days   Lab Units 05/14/25  0451 05/13/25  1628   LACTIC ACID mmol/L  --  1.7   PROCALCITONIN ng/ml <0.05  --        Recent Cultures (last 7 days):         Imaging Results Review: No pertinent imaging studies reviewed.  Other Study Results Review: No additional pertinent studies reviewed.    Last 24 Hours Medication List:     Current Facility-Administered Medications:     acetaminophen (TYLENOL) tablet 325 mg, Q6H PRN    albuterol inhalation solution 2.5 mg, Q4H PRN    aspirin (ECOTRIN LOW STRENGTH) EC tablet 81 mg, Daily    atorvastatin (LIPITOR) tablet 40 mg, Daily With Dinner    budesonide-formoterol (SYMBICORT) 80-4.5 MCG/ACT inhaler 2 puff, BID    enoxaparin (LOVENOX) subcutaneous injection 40 mg, Q24H    furosemide (LASIX) injection 40 mg, BID    gabapentin (NEURONTIN) capsule 100 mg, BID    lisinopril (ZESTRIL) tablet 10 mg, Daily    magnesium sulfate 2 g/50 mL IVPB (premix) 2 g, Once    potassium chloride (Klor-Con M20) CR tablet 20 mEq, Once    QUEtiapine (SEROquel) tablet 25 mg, HS    sertraline (ZOLOFT) tablet 50 mg, Daily    Insert peripheral IV, Once **AND** sodium chloride (PF) 0.9 % injection 3 mL, Q1H PRN    umeclidinium 62.5 mcg/actuation inhaler AEPB 1 puff, Daily    Administrative Statements   Today, Patient Was Seen By: Per Sanyd, DO  I have spent a total time of 35 minutes in caring for this patient on the day of the visit/encounter including Diagnostic results, Prognosis, Risks and benefits of tx options, Instructions for management, Counseling / Coordination of care, and Documenting in the medical record.    **Please Note: This note may have been constructed using a voice recognition system.**

## 2025-05-14 NOTE — CONSULTS
Consultation - Cardiology   Tiffani Francois 80 y.o. female MRN: [id number]  Unit/Bed#: 409-01 Encounter: [id number]    Inpatient consult to Cardiology  Consult performed by: Juan Ryan PA-C  Consult ordered by: Per Sandy DO      Physician Requesting Consult: Per Sandy DO  Reason for Consult / Principal Problem: Acute CHF    Assessment/Plan:  Acute HFpEF:  Volume status previously maintained on Lasix 20 mg daily  Now presenting with symptoms of shortness of breath at rest, worse upon exertion, lower extremity edema for several days    Imaging shows cardiomegaly, small right pleural effusion, trace pericardial effusion  Dry weight unclear, patient does not weigh herself at home and guessed she would be about 140 pounds, chart review shows patient has been anywhere between 120-160 pounds over the last several years  Current weight 122 pounds on standing scale 5/13  I/O: 4.4 L urine output, intake not logged  2022 echocardiogram LVEF 60%, mild-moderate mitral regurgitation.  Updated echocardiogram this admission showing LVEF 55%, severely dilated left atrium, mild mitral regurgitation, moderate pericardial effusion without evidence of tamponade  Creatinine 0.84, potassium 3.7  Diminished breath sounds, mild lower extremity edema bilaterally  Continue IV Lasix 40 mg twice daily  Monitor daily standing weights, fluid/sodium restriction, strict I/O's, BMP    Atrial fibrillation:  History of persistent atrial fibrillation  [id number] score >7, however patient not on systemic oral anticoagulation due to history of intracranial hemorrhage  Has been maintained rates have been controlled without AV mariano blockers  Telemetry showing atrial fibrillation with rates in the high 50s-low 60s    Hypertension:  On lisinopril 10 mg daily  SBP has been elevated, as high as 200  Most recent /67  Will continue to monitor with diuresis    Pericardial effusion:  Moderate on echocardiogram  without signs of tamponade  Continue to monitor    Pleural effusion:  Small right pleural effusion on imaging  This morning patient tells me she is not short of breath at all  Diminished lung sounds on auscultation  Continue to monitor with diuresis    Hyperlipidemia:  Lipid profile 6/6/2023: C 152, T 71, H 59, L 79  Continue statin    Tobacco abuse:  60-pack-year smoking history, currently still smoking    History of Present Illness   HPI: Tiffani Francois is a 80 y.o. year old female who has a history of chronic HFpEF volume previously maintained on Lasix 20 mg daily, permanent atrial fibrillation not on anticoagulation due to history of intracranial hemorrhage, hypertension, hyperlipidemia, diabetes who has not formally established with a cardiologist.          She presented to the ER on 5/13 for evaluation of several days of worsening shortness of breath at rest and with exertion, lower extremity edema.  Labs on arrival showing , troponin 24, 22, 26, 36.  Chest imaging showing small right pleural effusion, trace Pericardial effusion.  Patient received IV Lasix 40 mg in the ER, was started on 40 mg twice daily.  Cardiology consulted for further evaluation and management.    Upon meeting the patient today she is lying comfortably in bed in no acute distress.  Oxygen saturation is in the 70s, she has her nasal cannula around her neck.  This quickly rises to 98% once placed in her nose.  She tells me she cannot remember why she is here, has no complaints currently.  Denies chest pain, shortness of breath, dyspnea on exertion.  Denies palpitations, lightheadedness, dizziness, syncope.  Denies lower extremity edema, orthopnea, PND.  She tells me she has only been taking Lasix as needed, not 20 mg daily as prescribed.  She does not weigh herself frequently, guesses she is about 140 pounds although she was 122 pounds on a standing scale yesterday.  She notes increased urine output since receiving IV diuretics  "yesterday, 4.4 L urine output tracked.  She lives at home with her daughter.  She is still currently smoking 1 pack/day, she has done so for >60 years.       Review of Systems   Cardiovascular:  Positive for leg swelling.   Respiratory:  Positive for shortness of breath.    All other systems reviewed and are negative.    Historical Information   Past Medical History:   Diagnosis Date    Acute on chronic respiratory failure with hypoxia and hypercapnia ([facility]) 10/18/2021    JOHN (acute kidney injury) ([facility]) 2023    COPD with acute exacerbation ([facility]) 2022    History of intracranial hemorrhage 2022    Hyperlipidemia     last assessed  17    Hypertension     last assessed 10/2/14    Hypokalemia 2022    Hypomagnesemia 2022    Sepsis due to pneumonia ([facility]) 2023     Past Surgical History:   Procedure Laterality Date    CRANIOTOMY       Social History     Substance and Sexual Activity   Alcohol Use Never     Social History     Substance and Sexual Activity   Drug Use No     Tobacco Use History[1]  Family History:   Family History   Problem Relation Age of Onset    Breast cancer Mother     Ovarian cancer Mother     Diabetes Father        Meds/Allergies   all current active meds have been reviewed       Allergies[2]    Objective   Vitals: Blood pressure 152/67, pulse (!) 53, temperature 98.6 °F (37 °C), temperature source Oral, resp. rate 22, height 5' 5\" (1.651 m), weight 55.7 kg (122 lb 12.7 oz), SpO2 100%., Body mass index is 20.43 kg/m².,   Orthostatic Blood Pressures      Flowsheet Row Most Recent Value   Blood Pressure 152/67 filed at 2025 0704   Patient Position - Orthostatic VS Lying filed at 2025 2300          Systolic (24hrs), Av , Min:112 , Max:204     Diastolic (24hrs), Av, Min:49, Max:143      Intake/Output Summary (Last 24 hours) at 2025 0855  Last data filed at 2025 0537  Gross per 24 hour   Intake --   Output 4438 ml   Net -4438 ml "       Weight (last 2 days)       Date/Time Weight    05/14/25 07:04:25 55.7 (122.8)    05/14/25 0600 55.7 (122.8)    05/13/25 19:01:20 55.6 (122.6)    05/13/25 1848 55.6 (122.6)    05/13/25 1541 59.2 (130.51)            Invasive Devices       Peripheral Intravenous Line  Duration             Peripheral IV 05/13/25 Right Antecubital <1 day                      Physical Exam  Vitals reviewed.   Constitutional:       General: She is not in acute distress.     Appearance: She is not diaphoretic.   HENT:      Head: Normocephalic and atraumatic.     Eyes:      Pupils: Pupils are equal, round, and reactive to light.     Neck:      Vascular: No carotid bruit.     Cardiovascular:      Rate and Rhythm: Normal rate. Rhythm irregular.      Pulses: Normal pulses.      Heart sounds: Normal heart sounds. No murmur heard.  Pulmonary:      Effort: Pulmonary effort is normal.      Breath sounds: Decreased breath sounds present.      Comments: On 3 L NC  Abdominal:      General: There is no distension.      Palpations: Abdomen is soft.      Tenderness: There is no abdominal tenderness.     Musculoskeletal:      Cervical back: Normal range of motion.      Right lower leg: Edema present.      Left lower leg: Edema present.     Skin:     General: Skin is warm.      Capillary Refill: Capillary refill takes less than 2 seconds.     Neurological:      General: No focal deficit present.      Mental Status: She is alert and oriented to person, place, and time. Mental status is at baseline.      Gait: Gait normal.     Psychiatric:         Mood and Affect: Mood normal.         Behavior: Behavior normal.         Thought Content: Thought content normal.             Laboratory Results:        CBC with diff:   Results from last 7 days   Lab Units 05/14/25  0451 05/13/25  1628   WBC Thousand/uL 7.96 8.28   HEMOGLOBIN g/dL 8.5* 9.1*   HEMATOCRIT % 32.0* 33.9*   MCV fL 82 84   PLATELETS Thousands/uL 145* 143*   RBC Million/uL 3.92 4.05   MCH pg 21.7*  22.5*   MCHC g/dL 26.6* 26.8*   RDW % 19.1* 18.8*   MPV fL 10.3 10.8   NRBC AUTO /100 WBCs 0 0         CMP:  Results from last 7 days   Lab Units 05/14/25  0451 05/13/25  1628   POTASSIUM mmol/L 3.7 3.9   CHLORIDE mmol/L 93* 94*   CO2 mmol/L 43* 41*   BUN mg/dL 17 21   CREATININE mg/dL 0.84 0.85   CALCIUM mg/dL 8.8 9.2   AST U/L 13  --    ALT U/L 5*  --    ALK PHOS U/L 49  --    EGFR ml/min/1.73sq m 65 64         BMP:  Results from last 7 days   Lab Units 05/14/25  0451 05/13/25  1628   POTASSIUM mmol/L 3.7 3.9   CHLORIDE mmol/L 93* 94*   CO2 mmol/L 43* 41*   BUN mg/dL 17 21   CREATININE mg/dL 0.84 0.85   CALCIUM mg/dL 8.8 9.2       BNP:    Recent Labs     05/13/25  1628   *       Magnesium:   Results from last 7 days   Lab Units 05/14/25  0451 05/13/25  1628   MAGNESIUM mg/dL 1.8* 1.8*       Coags:       TSH:       Hemoglobin A1C       Lipid Profile:         Cardiac testing:   No results found for this or any previous visit.    No results found for this or any previous visit.    No results found for this or any previous visit.    No results found for this or any previous visit.        Imaging: Results Review Statement: I reviewed radiology reports from this admission including: chest xray, CT chest, and Echocardiogram.  Echo complete w/ contrast if indicated  Result Date: 5/14/2025  Narrative:   Left Ventricle: Left ventricular cavity size is normal. Wall thickness is moderately increased. There is moderate concentric hypertrophy. The left ventricular ejection fraction is 55%. Systolic function is normal. Wall motion is normal. Unable to assess diastolic function due to atrial fibrillation.   Right Ventricle: Right ventricular cavity size is normal. Systolic function is mildly reduced.   Left Atrium: The atrium is severely dilated (>48 mL/m2).   Right Atrium: The atrium is mildly dilated.   Mitral Valve: There is mild annular calcification. There is mild regurgitation.   Tricuspid Valve: There is mild  regurgitation.   Pericardium: There is a moderate pericardial effusion along the LV free wall measuring 1.3 cm.  The fluid exhibits no internal echoes. There is no echocardiographic evidence of tamponade. There is a right pleural effusion. Technically difficult study.     CTA chest pe study  Result Date: 5/13/2025  Narrative: CTA - CHEST WITH IV CONTRAST - PULMONARY ANGIOGRAM INDICATION: Hypoxia, possible PE, shortness of breath. COMPARISON: 5/13/2025 TECHNIQUE: CTA examination of the chest was performed using angiographic technique according to a protocol specifically tailored to evaluate for pulmonary embolism. Multiplanar 2D reformatted images were created from the source data. In addition, coronal  3D MIP postprocessing was performed on the acquisition scanner. Radiation dose length product (DLP) for this visit: 208 mGy-cm. . This examination, like all CT scans performed in the [facility], was performed utilizing techniques to minimize radiation dose exposure, including the use of iterative reconstruction and automated exposure control. IV Contrast: 85 mL of iohexol (OMNIPAQUE) FINDINGS: PULMONARY ARTERIAL TREE:  No pulmonary embolus. LUNGS: Wedge-shaped consolidation at the anterior left lower lobe with associated volume loss. Diffuse interlobar septal thickening. Diffuse mosaic attenuation with paucity of vessels in hyperlucent areas. No tracheal or endobronchial lesion. PLEURA: Small right pleural effusion HEART/GREAT VESSELS: Heavy atherosclerotic coronary artery calcification. Cardiomegaly. No thoracic aortic aneurysm. MEDIASTINUM AND MICHAEL: Unremarkable. CHEST WALL AND LOWER NECK: Unremarkable. VISUALIZED STRUCTURES IN THE UPPER ABDOMEN: Unremarkable. OSSEOUS STRUCTURES: No acute fracture or destructive osseous lesion. Pectus excavatum.     Impression: No evidence of pulmonary embolus Wedge-shaped consolidation at the anterior left lower lobe with associated volume loss compatible with  atelectasis. CHF. Small right pleural effusion Diffuse mosaic attenuation which may be seen with small vessel or small airways disease Workstation performed: [id number]     CT chest without contrast  Result Date: 5/13/2025  Narrative: CT CHEST WITHOUT IV CONTRAST INDICATION: COPD history with worsening hypoxemia.  Noncontrast chest x-ray showing definite volume overload but possible left basilar infiltrates.  Cardiomegaly. COMPARISON: Chest x-ray 5/13/2025, CT 3/3/2023 TECHNIQUE: CT examination of the chest was performed without intravenous contrast. Multiplanar 2D reformatted images were created from the source data. This examination, like all CT scans performed in the [facility], was performed utilizing techniques to minimize radiation dose exposure, including the use of iterative reconstruction and automated exposure control. Radiation dose length product (DLP) for this visit: 230 mGy-cm. FINDINGS: LUNGS: Left lung base atelectasis. Mosaic groundglass opacity throughout the lungs with decreased caliber and number of the pulmonary vessels in the more hyperlucent areas. Findings may represent chronic small airway or chronic small vessel disease. AIRWAYS: Patent. No endotracheal or endobronchial lesion. PLEURA: Small right pleural effusion. Trace pericardial effusion. HEART/GREAT VESSELS: Cardiomegaly. Aortic atherosclerotic disease without aneurysm. Enlarged central pulmonary artery may represent pulmonary arterial hypertension. MEDIASTINUM AND MICHAEL: Mildly prominent pretracheal and precarinal lymphadenopathy, similar to prior. CHEST WALL AND LOWER NECK: Cachexia. VISUALIZED STRUCTURES IN THE UPPER ABDOMEN: Unremarkable. OSSEOUS STRUCTURES: No acute fracture or destructive osseous lesion.     Impression: 1.  Cardiomegaly. Small right pleural effusion. Trace pericardial effusion. 2.  Mosaic groundglass opacity throughout the lungs may represent chronic small airway or chronic small vessel disease. 3.   Left lung base atelectasis. 4.  Pulmonary arterial hypertension. Workstation performed: [id number]     X-ray chest 2 views  Result Date: 5/13/2025  Narrative: XR CHEST PA AND LATERAL INDICATION: dyspnea/hypoxemia. COMPARISON: None FINDINGS: Opacity in the left lung base is noted and this could be pneumonia or atelectasis. There appears to be left pleural effusion as well. There is some vascular prominence in the lungs. Skinfold over the right side of the chest. No pneumothorax seen. Stable prominence of the heart. The aorta is atherosclerotic. Bones are unremarkable for age. Normal upper abdomen.     Impression: Opacity in the left lower chest which appears to be combination of left pleural effusion and lung base atelectasis or pneumonia. There is also pulmonary vascular congestion and some prominence of the heart ER aware Workstation performed: [id number]       EKG reviewed personally: Atrial fibrillation with slow ventricular response 52 bpm    Telemetry reviewed personally: Atrial fibrillation, rates 60s    Code Status: Level 1 - Full Code         [1]   Social History  Tobacco Use   Smoking Status Former    Current packs/day: 1.00    Types: Cigarettes   Smokeless Tobacco Never   [2] No Known Allergies

## 2025-05-14 NOTE — PROGRESS NOTES
Patient:    MRN:  [id number]    Lupe Request ID:  [id number]    Level of care reserved:  Home Health Agency    Partner Reserved:  [facility], [place] [id number] [phone]    Clinical needs requested:    Geography searched:  [phone]    Start of Service:    Request sent:  12:50pm EDT on 5/14/2025 by Caity Gill    Partner reserved:  1:19pm EDT on 5/14/2025 by Caity Gill    Choice list shared:  1:18pm EDT on 5/14/2025 by Caity Gill

## 2025-05-14 NOTE — PLAN OF CARE
Problem: OCCUPATIONAL THERAPY ADULT  Goal: Performs self-care activities at highest level of function for planned discharge setting.  See evaluation for individualized goals.  Description: Treatment Interventions: ADL retraining, Functional transfer training, UE strengthening/ROM, Endurance training, Patient/family training, Cognitive reorientation, Equipment evaluation/education, Activityengagement, Compensatory technique education          See flowsheet documentation for full assessment, interventions and recommendations.   Note: Limitation: Decreased ADL status, Decreased UE strength, Decreased Safe judgement during ADL, Decreased cognition, Decreased endurance, Decreased self-care trans, Decreased high-level ADLs      Pt is a 80 y.o. female seen for OT evaluation s/p admit to [facility] on 5/13/2025 w/ Acute CHF (congestive heart failure) (HCC). Pt with PMHx impacting their performance during ADL tasks, including: HTN, cognitive deficits, COPD. Prior to admission to the hospital Pt was performing ADLs with physical assistance. IADLs with physical assistance. Functional transfers/ambulation without physical assistance. Cognitive status PTA was A&Ox2 (assumed based on current cognitive status). OT order placed to assess Pt's ADLs, cognitive status, and performance during functional tasks in order to maximize safety and independence while making most appropriate d/c recommendations. Pt's clinical presentation is currently unstable/unpredictable given new onset deficits that affect Pt's occupational performance and ability to safely return to OF including decrease activity tolerance, decrease standing balance, decrease performance during ADL tasks, decrease cognition, decrease safety awareness , increase impulsiveness, decrease UB MS, decrease generalized strength, decrease activity engagement, decrease performance during functional transfers, frequent falls, high fall risk, and limited insight to deficits combined  with medical complications of change in mental status, A-fib, low SpO2 values, new onset O2 use, abnormal CO2 values, impulsivity during admission, incontinence, and need for input for mobility technique/safety. 0 O2 needs. Personal factors affecting Pt at time of initial evaluation include: behavioral pattern, inability to perform IADLs, inability to perform ADLs, new need for AD, inability to ambulate household distances, inability to navigate community distances, and decreased initiation and engagement. The patient's raw score on the -PAC Daily Activity Inpatient Short Form is 19. A raw score of greater than or equal to 19 suggests the patient may benefit from discharge to post-acute rehabilitation services. Please refer to the recommendation of the Occupational Therapist for safe discharge planning. Pt benefited from co-evaluation of skilled OT and PT therapists in order to most appropriately address functional deficits d/t x2 person assistance required for safe functional mobility, decreased activity tolerance, and regression from functioning level prior to admission and/or onset of present illness. OT/PT objectives were addressed separately; please see PT note for specific goal areas targeted. Pt will benefit from continued skilled OT services to address deficits as defined above and to maximize level independence/participation during ADLs and functional tasks to facilitate return toward PLOF and improved quality of life. From an occupational therapy standpoint, Level I (Maximum Resource Intensity) is recommended upon d/c.      Rehab Resource Intensity Level, OT: I (Maximum Resource Intensity)

## 2025-05-14 NOTE — ASSESSMENT & PLAN NOTE
Wt Readings from Last 3 Encounters:   05/14/25 55.7 kg (122 lb 12.7 oz)   10/06/23 58.5 kg (129 lb)   05/20/23 55.6 kg (122 lb 9.6 oz)     I appreciate Cardiology's input.  Continue Lasix 40 mg IV BID  Monitor daily weights and strict intake/output measurements  PT/OT evaluations    Transthoracic echocardiogram (05/14/2025):    Interpretation Summary    Left Ventricle: Left ventricular cavity size is normal. Wall thickness is moderately increased. There is moderate concentric hypertrophy. The left ventricular ejection fraction is 55%. Systolic function is normal. Wall motion is normal. Unable to assess diastolic function due to atrial fibrillation.    Right Ventricle: Right ventricular cavity size is normal. Systolic function is mildly reduced.    Left Atrium: The atrium is severely dilated (>48 mL/m2).    Right Atrium: The atrium is mildly dilated.    Mitral Valve: There is mild annular calcification. There is mild regurgitation.    Tricuspid Valve: There is mild regurgitation.    Pericardium: There is a moderate pericardial effusion along the LV free wall measuring 1.3 cm.  The fluid exhibits no internal echoes. There is no echocardiographic evidence of tamponade. There is a right pleural effusion.

## 2025-05-14 NOTE — UTILIZATION REVIEW
NOTIFICATION OF INPATIENT ADMISSION   AUTHORIZATION REQUEST   SERVICING FACILITY:   [place] [address]  Tax ID:  [id number]  NPI: [id number] ATTENDING PROVIDER:  Attending Name and NPI#: Per Sandy Do [[id number]]  Address: [address] [place] [address]  Phone: [phone]     ADMISSION INFORMATION:  Place of Service: Inpatient [facility]  Place of Service Code: 21  Inpatient Admission Date/Time: 5/13/25  6:05 PM  Discharge Date/Time: No discharge date for patient encounter.  Admitting Diagnosis Code/Description:  Hypomagnesemia [E83.42]  Hypercarbia [R06.89]  SOB (shortness of breath) [R06.02]  Pleural effusion [J90]  Acute exacerbation of CHF (congestive heart failure) (HCC) [I50.9]  Acute CHF (congestive heart failure) (HCC) [I50.9]     UTILIZATION REVIEW CONTACT:  Anaya Shipley Utilization   Network Utilization Review Department  Phone: [phone]  Fax: [phone]  Email: [email or web address]  Contact for approvals/pending authorizations, clinical reviews, and discharge.  .     PHYSICIAN ADVISORY SERVICES:  Medical Necessity Denial & [id number]-[id number] Review  Phone: [phone]  Fax: [phone]  Email: [email or web address]     DISCHARGE SUPPORT TEAM:  For Patients Discharge Needs & Updates  Phone: [phone] opt. 2 Fax: [phone]  Email: [email or web address]

## 2025-05-14 NOTE — OCCUPATIONAL THERAPY NOTE
Occupational Therapy Evaluation     Patient Name: Tiffani Francois  Today's Date: 5/14/2025  Problem List  Principal Problem:    Acute CHF (congestive heart failure) ([facility])  Active Problems:    Hypomagnesemia    Permanent atrial fibrillation (HCC)    Acute on chronic respiratory failure with hypoxia and hypercapnia ([facility])    Pericardial effusion    Pleural effusion on right    Past Medical History  Past Medical History:   Diagnosis Date    Acute on chronic respiratory failure with hypoxia and hypercapnia ([facility]) 10/18/2021    JOHN (acute kidney injury) ([facility]) 03/03/2023    COPD with acute exacerbation ([facility]) 09/22/2022    History of intracranial hemorrhage 08/14/2022    Hyperlipidemia     last assessed  8/24/17    Hypertension     last assessed 10/2/14    Hypokalemia 08/14/2022    Hypomagnesemia 09/22/2022    Sepsis due to pneumonia ([facility]) 03/03/2023     Past Surgical History  Past Surgical History:   Procedure Laterality Date    CRANIOTOMY               05/14/25 0932   OT Last Visit   OT Visit Date 05/14/25   Note Type   Note type Evaluation   Pain Assessment   Pain Score No Pain   Restrictions/Precautions   Weight Bearing Precautions Per Order No   Other Precautions Fall Risk;Telemetry;Multiple lines;Chair Alarm;Bed Alarm;Cognitive   Home Living   Type of Home House   Home Layout One level;Performs ADLs on one level;Able to live on main level with bedroom/bathroom;Stairs to enter with rails  (3 CLARK c HR)   Bathroom Shower/Tub Tub/shower unit   Bathroom Toilet Standard   Bathroom Equipment Shower chair;Grab bars in shower   Bathroom Accessibility Accessible   Home Equipment Walker;Cane;Grab bars   Additional Comments pt reports use of SPC during functional mobility at baseline   Prior Function   Level of [place] Independent with functional mobility;Needs assistance with ADLs;Needs assistance with IADLS   Lives With Daughter   Receives Help From Family   IADLs Family/Friend/Other provides transportation   Falls in the  "last 6 months 1 to 4  (unknown-pt cognition)   Vocational Retired   Subjective   Subjective \"I don't think so\"   ADL   Where Assessed Edge of bed   Grooming Assistance 4  Minimal Assistance   Grooming Deficit Increased time to complete;Standing with assistive device;Other (Comment)  (standing at sink with min (A) for balance due to posterior lean)   UB Bathing Assistance 4  Minimal Assistance   LB Bathing Assistance 4  Minimal Assistance   UB Dressing Assistance 4  Minimal Assistance   LB Dressing Assistance 4  Minimal Assistance   LB Dressing Deficit Thread RLE into underwear;Thread LLE into underwear;Pull up over hips   Toileting Assistance  4  Minimal Assistance   Toileting Deficit Increased time to complete;Clothing management up;Clothing management down;Perineal hygiene   Additional Comments Given fxl performance skills + medical complexity, therapist suspecting via clinical judgement + skilled analysis; pt currently requires stated assist above to perform each area of ADL d/t limitations including: generalized muscle weakness, sitting/standing balance deficits, fxl activity tolerance limitations, difficulty performing bend/reach-anterior trunk flexion, requires external assist to complete transitional movements, decreased core strength, decreased postural control, instability in stance, cognitive deficits, safety awareness concerns, and decreased problem solving abilities.   Bed Mobility   Supine to Sit 5  Supervision   Additional items HOB elevated;Increased time required;Verbal cues  (RW)   Sit to Supine   (pt seated in chair at end of session)   Additional Comments pt on 3L O2 during session; difficult to obtain masimo reading due to sensor was present on her foot   Transfers   Sit to Stand 4  Minimal assistance   Additional items Assist x 2;Verbal cues;Impulsive  (RW)   Stand to Sit 4  Minimal assistance   Additional items Assist x 2;Impulsive;Verbal cues  (RW)   Toilet transfer 4  Minimal assistance "   Additional items Assist x 2;Increased time required;Verbal cues;Standard toilet  (RW)   Additional Comments pt performs functional transfers with significant posterior lean and several instances of LOB with physical and verbal (A) to correct   Functional Mobility   Functional Mobility 4  Minimal assistance   Additional Comments x2 with use of RW; performs functional mobility to and from bathroom within room; significant balance deficits and requires multimodal (A) for safe mobility during session and device use   Additional items Rolling walker   Balance   Static Sitting Good   Dynamic Sitting Fair +   Static Standing Fair -   Dynamic Standing Poor +   Ambulatory Poor +   Activity Tolerance   Activity Tolerance Patient limited by fatigue;Other (Comment)  (cognitive deficits)   RUE Assessment   RUE Assessment WFL   LUE Assessment   LUE Assessment WFL   Hand Function   Gross Motor Coordination Functional   Fine Motor Coordination Functional   Sensation   Light Touch No apparent deficits   Sharp/Dull No apparent deficits   Psychosocial   Psychosocial (WDL) X   Patient Behaviors/Mood Anxious;Withdrawn;Wandering   Cognition   Overall Cognitive Status Impaired   Arousal/Participation Alert;Cooperative   Attention Difficulty attending to directions   Orientation Level Oriented to person;Disoriented to place;Disoriented to time;Disoriented to situation   Memory Decreased long term memory;Decreased recall of biographical information;Decreased short term memory;Decreased recall of recent events   Following Commands Follows one step commands with increased time or repetition   Comments pt with significantly poor insight to deficits, impulsive behaviors, and problem solving difficulties   Assessment: Pt is a 80 y.o. female seen for OT evaluation s/p admit to [facility] on 5/13/2025 w/ Acute CHF (congestive heart failure) (HCC). Pt with PMHx impacting their performance during ADL tasks, including: HTN, cognitive deficits, COPD. Prior  to admission to the hospital Pt was performing ADLs with physical assistance. IADLs with physical assistance. Functional transfers/ambulation without physical assistance. Cognitive status PTA was A&Ox2 (assumed based on current cognitive status). OT order placed to assess Pt's ADLs, cognitive status, and performance during functional tasks in order to maximize safety and independence while making most appropriate d/c recommendations. Pt's clinical presentation is currently unstable/unpredictable given new onset deficits that affect Pt's occupational performance and ability to safely return to OF including decrease activity tolerance, decrease standing balance, decrease performance during ADL tasks, decrease cognition, decrease safety awareness , increase impulsiveness, decrease UB MS, decrease generalized strength, decrease activity engagement, decrease performance during functional transfers, frequent falls, high fall risk, and limited insight to deficits combined with medical complications of change in mental status, A-fib, low SpO2 values, new onset O2 use, abnormal CO2 values, impulsivity during admission, incontinence, and need for input for mobility technique/safety. 0 O2 needs. Personal factors affecting Pt at time of initial evaluation include: behavioral pattern, inability to perform IADLs, inability to perform ADLs, new need for AD, inability to ambulate household distances, inability to navigate community distances, and decreased initiation and engagement. The patient's raw score on the -PAC Daily Activity Inpatient Short Form is 19. A raw score of greater than or equal to 19 suggests the patient may benefit from discharge to post-acute rehabilitation services. Please refer to the recommendation of the Occupational Therapist for safe discharge planning. Pt benefited from co-evaluation of skilled OT and PT therapists in order to most appropriately address functional deficits d/t x2 person assistance  required for safe functional mobility, decreased activity tolerance, and regression from functioning level prior to admission and/or onset of present illness. OT/PT objectives were addressed separately; please see PT note for specific goal areas targeted. Pt will benefit from continued skilled OT services to address deficits as defined above and to maximize level independence/participation during ADLs and functional tasks to facilitate return toward PLOF and improved quality of life. From an occupational therapy standpoint, Level I (Maximum Resource Intensity) is recommended upon d/c.    Limitation Decreased ADL status;Decreased UE strength;Decreased Safe judgement during ADL;Decreased cognition;Decreased endurance;Decreased self-care trans;Decreased high-level ADLs   Goals   Patient Goals to go home   Short Term Goal  pt will perform UE strengthening exercises   Long Term Goal #1 pt will demonstrate toilet transfers and hygiene at (I) level with decreased verbal cues   Long Term Goal #2 pt will demonstrate UB/LB bathing and grooming tasks with min (A) level and verbal cues   Long Term Goal pt will perform functional mobility with RW at (S) level   Plan   Treatment Interventions ADL retraining;Functional transfer training;UE strengthening/ROM;Endurance training;Patient/family training;Cognitive reorientation;Equipment evaluation/education;Activityengagement;Compensatory technique education   Goal Expiration Date 05/28/25   OT Frequency 3-5x/wk   Discharge Recommendation   Rehab Resource Intensity Level, OT I (Maximum Resource Intensity)   AM-PAC Daily Activity Inpatient   Lower Body Dressing 3   Bathing 3   Toileting 3   Upper Body Dressing 3   Grooming 3   Eating 4   Daily Activity Raw Score 19   Daily Activity Standardized Score (Calc for Raw Score >=11) 40.22   AM-PAC Applied Cognition Inpatient   Following a Speech/Presentation 3   Understanding Ordinary Conversation 3   Taking Medications 1   Remembering Where  Things Are Placed or Put Away 2   Remembering List of 4-5 Errands 1   Taking Care of Complicated Tasks 1   Applied Cognition Raw Score 11   Applied Cognition Standardized Score 27.03

## 2025-05-14 NOTE — ASSESSMENT & PLAN NOTE
Presented with hypoxia with an oxygen saturation in the low 80's%  Chronically on 3 Lpm of continuous supplemental oxygen  Required 4 Lpm of continuous supplemental oxygen on 05/13/2025 with signs of air hunger including observed accessory muscle use  Weaned to 3 Lpm of continuous supplemental oxygen on 05/14/2025  Incentive spirometry  Respiratory protocol

## 2025-05-14 NOTE — RESPIRATORY THERAPY NOTE
05/14/25 1940   Respiratory Assessment   Assessment Type Assess only   General Appearance Alert;Awake   Respiratory Pattern Normal;Spontaneous;Symmetrical   Chest Assessment Chest expansion symmetrical   Bilateral Breath Sounds Diminished   Cough None   Resp Comments Pt OOB in chair on her baseline of 3L NC, BS are diminished, No SOB at this time.   O2 Device 3L NC   Oxygen Therapy/Pulse Ox   SpO2 90 %

## 2025-05-15 PROBLEM — N30.00 ACUTE CYSTITIS WITHOUT HEMATURIA: Status: ACTIVE | Noted: 2025-05-15

## 2025-05-15 PROBLEM — E83.39 HYPERPHOSPHATEMIA: Status: ACTIVE | Noted: 2025-05-15

## 2025-05-15 LAB
25(OH)D3 SERPL-MCNC: 21 NG/ML (ref 30–100)
ALBUMIN SERPL BCG-MCNC: 4 G/DL (ref 3.5–5)
ALP SERPL-CCNC: 50 U/L (ref 34–104)
ALT SERPL W P-5'-P-CCNC: 4 U/L (ref 7–52)
ANION GAP SERPL CALCULATED.3IONS-SCNC: 6 MMOL/L (ref 4–13)
AST SERPL W P-5'-P-CCNC: 12 U/L (ref 13–39)
BACTERIA UR CULT: ABNORMAL
BASOPHILS # BLD AUTO: 0.07 THOUSANDS/ÂΜL (ref 0–0.1)
BASOPHILS NFR BLD AUTO: 1 % (ref 0–1)
BILIRUB SERPL-MCNC: 0.66 MG/DL (ref 0.2–1)
BUN SERPL-MCNC: 28 MG/DL (ref 5–25)
CALCIUM SERPL-MCNC: 8.8 MG/DL (ref 8.4–10.2)
CHLORIDE SERPL-SCNC: 91 MMOL/L (ref 96–108)
CO2 SERPL-SCNC: 44 MMOL/L (ref 21–32)
CREAT SERPL-MCNC: 1.32 MG/DL (ref 0.6–1.3)
EOSINOPHIL # BLD AUTO: 0.26 THOUSAND/ÂΜL (ref 0–0.61)
EOSINOPHIL NFR BLD AUTO: 4 % (ref 0–6)
ERYTHROCYTE [DISTWIDTH] IN BLOOD BY AUTOMATED COUNT: 19.2 % (ref 11.6–15.1)
FERRITIN SERPL-MCNC: 12 NG/ML (ref 30–307)
FOLATE SERPL-MCNC: 14 NG/ML
GFR SERPL CREATININE-BSD FRML MDRD: 37 ML/MIN/1.73SQ M
GLUCOSE SERPL-MCNC: 139 MG/DL (ref 65–140)
HAV IGM SER QL: NORMAL
HBV CORE IGM SER QL: NORMAL
HBV SURFACE AG SER QL: NORMAL
HCT VFR BLD AUTO: 33.8 % (ref 34.8–46.1)
HCV AB SER QL: NORMAL
HGB BLD-MCNC: 9.2 G/DL (ref 11.5–15.4)
IMM GRANULOCYTES # BLD AUTO: 0.02 THOUSAND/UL (ref 0–0.2)
IMM GRANULOCYTES NFR BLD AUTO: 0 % (ref 0–2)
IRON SATN MFR SERPL: 6 % (ref 15–50)
IRON SERPL-MCNC: 26 UG/DL (ref 50–212)
LYMPHOCYTES # BLD AUTO: 1.11 THOUSANDS/ÂΜL (ref 0.6–4.47)
LYMPHOCYTES NFR BLD AUTO: 17 % (ref 14–44)
MAGNESIUM SERPL-MCNC: 2.3 MG/DL (ref 1.9–2.7)
MCH RBC QN AUTO: 22.5 PG (ref 26.8–34.3)
MCHC RBC AUTO-ENTMCNC: 27.2 G/DL (ref 31.4–37.4)
MCV RBC AUTO: 83 FL (ref 82–98)
MONOCYTES # BLD AUTO: 0.54 THOUSAND/ÂΜL (ref 0.17–1.22)
MONOCYTES NFR BLD AUTO: 8 % (ref 4–12)
NEUTROPHILS # BLD AUTO: 4.68 THOUSANDS/ÂΜL (ref 1.85–7.62)
NEUTS SEG NFR BLD AUTO: 70 % (ref 43–75)
NRBC BLD AUTO-RTO: 0 /100 WBCS
PHOSPHATE SERPL-MCNC: 4.5 MG/DL (ref 2.3–4.1)
PLATELET # BLD AUTO: 147 THOUSANDS/UL (ref 149–390)
PMV BLD AUTO: 10.7 FL (ref 8.9–12.7)
POTASSIUM SERPL-SCNC: 4 MMOL/L (ref 3.5–5.3)
PROCALCITONIN SERPL-MCNC: 0.07 NG/ML
PROT SERPL-MCNC: 6.1 G/DL (ref 6.4–8.4)
RBC # BLD AUTO: 4.09 MILLION/UL (ref 3.81–5.12)
SODIUM SERPL-SCNC: 141 MMOL/L (ref 135–147)
TIBC SERPL-MCNC: 441 UG/DL (ref 250–450)
TRANSFERRIN SERPL-MCNC: 315 MG/DL (ref 203–362)
UIBC SERPL-MCNC: 415 UG/DL (ref 155–355)
VIT B12 SERPL-MCNC: 268 PG/ML (ref 180–914)
WBC # BLD AUTO: 6.68 THOUSAND/UL (ref 4.31–10.16)

## 2025-05-15 PROCEDURE — 99232 SBSQ HOSP IP/OBS MODERATE 35: CPT | Performed by: INTERNAL MEDICINE

## 2025-05-15 PROCEDURE — 83540 ASSAY OF IRON: CPT | Performed by: INTERNAL MEDICINE

## 2025-05-15 PROCEDURE — 85025 COMPLETE CBC W/AUTO DIFF WBC: CPT | Performed by: INTERNAL MEDICINE

## 2025-05-15 PROCEDURE — 82607 VITAMIN B-12: CPT | Performed by: INTERNAL MEDICINE

## 2025-05-15 PROCEDURE — 97535 SELF CARE MNGMENT TRAINING: CPT

## 2025-05-15 PROCEDURE — 84100 ASSAY OF PHOSPHORUS: CPT | Performed by: INTERNAL MEDICINE

## 2025-05-15 PROCEDURE — 82306 VITAMIN D 25 HYDROXY: CPT | Performed by: INTERNAL MEDICINE

## 2025-05-15 PROCEDURE — 80053 COMPREHEN METABOLIC PANEL: CPT | Performed by: INTERNAL MEDICINE

## 2025-05-15 PROCEDURE — 94760 N-INVAS EAR/PLS OXIMETRY 1: CPT

## 2025-05-15 PROCEDURE — 97116 GAIT TRAINING THERAPY: CPT

## 2025-05-15 PROCEDURE — 83735 ASSAY OF MAGNESIUM: CPT | Performed by: INTERNAL MEDICINE

## 2025-05-15 PROCEDURE — 97110 THERAPEUTIC EXERCISES: CPT

## 2025-05-15 PROCEDURE — 84145 PROCALCITONIN (PCT): CPT | Performed by: INTERNAL MEDICINE

## 2025-05-15 PROCEDURE — 83550 IRON BINDING TEST: CPT | Performed by: INTERNAL MEDICINE

## 2025-05-15 PROCEDURE — 82746 ASSAY OF FOLIC ACID SERUM: CPT | Performed by: INTERNAL MEDICINE

## 2025-05-15 PROCEDURE — 82728 ASSAY OF FERRITIN: CPT | Performed by: INTERNAL MEDICINE

## 2025-05-15 RX ORDER — CEFTRIAXONE 1 G/50ML
1000 INJECTION, SOLUTION INTRAVENOUS EVERY 24 HOURS
Status: COMPLETED | OUTPATIENT
Start: 2025-05-15 | End: 2025-05-17

## 2025-05-15 RX ADMIN — ATORVASTATIN CALCIUM 40 MG: 40 TABLET, FILM COATED ORAL at 15:58

## 2025-05-15 RX ADMIN — BUDESONIDE AND FORMOTEROL FUMARATE DIHYDRATE 2 PUFF: 80; 4.5 AEROSOL RESPIRATORY (INHALATION) at 17:23

## 2025-05-15 RX ADMIN — BUDESONIDE AND FORMOTEROL FUMARATE DIHYDRATE 2 PUFF: 80; 4.5 AEROSOL RESPIRATORY (INHALATION) at 08:53

## 2025-05-15 RX ADMIN — UMECLIDINIUM 1 PUFF: 62.5 AEROSOL, POWDER ORAL at 08:53

## 2025-05-15 RX ADMIN — LISINOPRIL 10 MG: 10 TABLET ORAL at 09:02

## 2025-05-15 RX ADMIN — SERTRALINE HYDROCHLORIDE 50 MG: 50 TABLET ORAL at 08:54

## 2025-05-15 RX ADMIN — ENOXAPARIN SODIUM 40 MG: 40 INJECTION SUBCUTANEOUS at 17:42

## 2025-05-15 RX ADMIN — GABAPENTIN 100 MG: 100 CAPSULE ORAL at 17:23

## 2025-05-15 RX ADMIN — QUETIAPINE FUMARATE 25 MG: 25 TABLET ORAL at 22:34

## 2025-05-15 RX ADMIN — CEFTRIAXONE 1000 MG: 1 INJECTION, SOLUTION INTRAVENOUS at 08:55

## 2025-05-15 RX ADMIN — ASPIRIN 81 MG: 81 TABLET, COATED ORAL at 08:54

## 2025-05-15 RX ADMIN — GABAPENTIN 100 MG: 100 CAPSULE ORAL at 08:54

## 2025-05-15 NOTE — ASSESSMENT & PLAN NOTE
Resolved with magnesium sulfate 2 grams IV x 1 dose on 05/14/2025  Follow the magnesium level    Results from last 7 days   Lab Units 05/15/25  0448 05/14/25  0451 05/13/25  1628   MAGNESIUM mg/dL 2.3 1.8* 1.8*

## 2025-05-15 NOTE — PROGRESS NOTES
Progress Note - Cardiology   Name: Tiffani Francois 80 y.o. female I MRN: [id number]  Unit/Bed#: 409-01 I Date of Admission: 5/13/2025   Date of Service: 5/15/2025 I Hospital Day: 2     Assessment & Plan  Acute heart failure with preserved ejection fraction (HCC)  Wt Readings from Last 3 Encounters:   05/15/25 116 kg (256 lb 2.8 oz)   10/06/23 58.5 kg (129 lb)   05/20/23 55.6 kg (122 lb 9.6 oz)   Volume status previously maintained on Lasix 20 mg daily  Now presenting with symptoms of shortness of breath at rest, worse upon exertion, lower extremity edema for several days    Imaging shows cardiomegaly, small right pleural effusion, trace pericardial effusion  Dry weight unclear, patient does not weigh herself at home and guessed she would be about 140 pounds, chart review shows patient has been anywhere between 120-160 pounds over the last several years  Standing scale weights: 5/14 122 pounds, 5/15 not logged accurately, Net -5.2 L  2022 echocardiogram LVEF 60%, mild-moderate mitral regurgitation.  Updated echocardiogram this admission showing LVEF 55%, severely dilated left atrium, mild mitral regurgitation, moderate pericardial effusion without evidence of tamponade  Creatinine 0.84 => 1.32 on 5/15, no lower extremity edema on exam and JOHN so we will hold off on diuretics for today  Monitor daily standing weights, fluid/sodium restriction, strict I/O's, BMP  Permanent atrial fibrillation (HCC)  History of persistent atrial fibrillation  [id number] score >7, however patient not on systemic oral anticoagulation due to history of intracranial hemorrhage  Has been maintained rates have been controlled without AV mariano blockers  Telemetry showing atrial fibrillation with rates in the high 50s-low 60s  Essential hypertension  On lisinopril 10 mg daily  SBP has been elevated, as high as 200  Most recent /67  Will continue to monitor with diuresis  Pericardial effusion  Moderate on echocardiogram without  signs of tamponade  Continue to monitor  Pleural effusion on right  Small right pleural effusion on imaging  This morning patient tells me she is not short of breath at all  Diminished lung sounds on auscultation  Continue to monitor with diuresis  Hyperlipidemia  Lipid profile 6/6/2023: C 152, T 71, H 59, L 79  Continue statin  Tobacco use  60-pack-year smoking history, currently still smoking   Hypomagnesemia    Acute on chronic respiratory failure with hypoxia and hypercapnia (HCC)    Thrombocytopenia (HCC)    Anemia    Acute cystitis without hematuria      Subjective   Patient seen and examined at bedside.  She is sitting comfortably in her chair no acute distress.  She does not remember our conversation yesterday, does not know why she has not the hospital.  No new complaints.    Objective :  Temp:  [97.5 °F (36.4 °C)-98.7 °F (37.1 °C)] 98.7 °F (37.1 °C)  HR:  [51-68] 62  BP: (105-165)/(38-89) 118/43  Resp:  [17-21] 21  SpO2:  [71 %-100 %] 97 %  O2 Device: Nasal cannula  Nasal Cannula O2 Flow Rate (L/min):  [3 L/min] 3 L/min  Orthostatic Blood Pressures      Flowsheet Row Most Recent Value   Blood Pressure 118/43 filed at 05/15/2025 0901   Patient Position - Orthostatic VS Lying filed at 05/15/2025 0628          First Weight: Weight - Scale: 59.2 kg (130 lb 8.2 oz) (05/13/25 1541)  Vitals:    05/15/25 0600 05/15/25 0635   Weight: 117 kg (257 lb 8 oz) 116 kg (256 lb 2.8 oz)     Physical Exam  Vitals reviewed.   Constitutional:       General: She is not in acute distress.     Appearance: She is ill-appearing. She is not diaphoretic.   HENT:      Head: Normocephalic and atraumatic.     Eyes:      Pupils: Pupils are equal, round, and reactive to light.     Neck:      Vascular: No carotid bruit.     Cardiovascular:      Rate and Rhythm: Normal rate. Rhythm irregular.      Pulses: Normal pulses.      Heart sounds: Normal heart sounds. No murmur heard.  Pulmonary:      Effort: Pulmonary effort is normal.      Breath  sounds: Decreased breath sounds present.      Comments: On 3 L NC  Abdominal:      General: There is no distension.      Palpations: Abdomen is soft.      Tenderness: There is no abdominal tenderness.     Musculoskeletal:      Cervical back: Normal range of motion.      Right lower leg: No edema.      Left lower leg: No edema.     Skin:     Capillary Refill: Capillary refill takes less than 2 seconds.     Neurological:      General: No focal deficit present.      Mental Status: She is alert and oriented to person, place, and time. Mental status is at baseline.     Psychiatric:         Mood and Affect: Mood normal.         Behavior: Behavior normal.         Thought Content: Thought content normal.           Lab Results: I have reviewed the following results:  Results from last 7 days   Lab Units 05/15/25  0448 05/14/25  0451 05/13/25  1628   WBC Thousand/uL 6.68 7.96 8.28   HEMOGLOBIN g/dL 9.2* 8.5* 9.1*   HEMATOCRIT % 33.8* 32.0* 33.9*   PLATELETS Thousands/uL 147* 145* 143*     Results from last 7 days   Lab Units 05/15/25  0448 05/14/25  0451 05/13/25  1628   POTASSIUM mmol/L 4.0 3.7 3.9   CHLORIDE mmol/L 91* 93* 94*   CO2 mmol/L 44* 43* 41*   BUN mg/dL 28* 17 21   CREATININE mg/dL 1.32* 0.84 0.85   CALCIUM mg/dL 8.8 8.8 9.2         Lab Results   Component Value Date    HGBA1C 5.7 (H) 06/06/2023     Lab Results   Component Value Date    CKTOTAL 96 09/23/2022

## 2025-05-15 NOTE — PHYSICAL THERAPY NOTE
Physical Therapy Treatment    Patient Name: Tiffani Francois    Today's Date: 5/15/2025     Problem List  Principal Problem:    Acute heart failure with preserved ejection fraction (HCC)  Active Problems:    Hyperlipidemia    Essential hypertension    Tobacco use    Hypomagnesemia    Permanent atrial fibrillation (HCC)    Acute on chronic respiratory failure with hypoxia and hypercapnia (HCC)    Pericardial effusion    Pleural effusion on right    Thrombocytopenia (HCC)    Anemia    Acute cystitis without hematuria    Hyperphosphatemia       Past Medical History  Past Medical History:   Diagnosis Date    Acute on chronic respiratory failure with hypoxia and hypercapnia (HCC) 10/18/2021    JOHN (acute kidney injury) (HCC) 03/03/2023    COPD with acute exacerbation ([facility]) 09/22/2022    History of intracranial hemorrhage 08/14/2022    Hyperlipidemia     last assessed  8/24/17    Hypertension     last assessed 10/2/14    Hypokalemia 08/14/2022    Hypomagnesemia 09/22/2022    Sepsis due to pneumonia ([facility]) 03/03/2023        Past Surgical History  Past Surgical History:   Procedure Laterality Date    CRANIOTOMY             05/15/25 1409   PT Last Visit   PT Visit Date 05/15/25   Note Type   Note Type Treatment   Pain Assessment   Pain Assessment Tool 0-10   Pain Score No Pain   Restrictions/Precautions   Weight Bearing Precautions Per Order No   Other Precautions Fall Risk;O2;Multiple lines;Telemetry;Chair Alarm;Cognitive   General   Chart Reviewed Yes   Family/Caregiver Present No   Cognition   Overall Cognitive Status Impaired   Arousal/Participation Alert;Cooperative   Attention Attends with cues to redirect   Orientation Level Oriented to person;Disoriented to place;Disoriented to time;Disoriented to situation   Following Commands Follows one step commands without difficulty   Subjective   Subjective pt agreeable to LE TE and ambulation   Bed Mobility   Additional  Comments pt OOB at start/end of session   Transfers   Sit to Stand 4  Minimal assistance   Additional items Assist x 1;Increased time required;Verbal cues   Stand to Sit 4  Minimal assistance   Additional items Assist x 1;Increased time required;Verbal cues   Toilet transfer 4  Minimal assistance   Additional items Assist x 1;Increased time required;Verbal cues;Standard toilet   Additional Comments RW used   Ambulation/Elevation   Gait pattern Excessively slow;Short stride;Foward flexed;Scissoring;Decreased foot clearance;Narrow SHANA;Poor UE support   Gait Assistance 4  Minimal assist   Additional items Assist x 2;Verbal cues   Assistive Device Rolling walker   Distance 60'   Balance   Static Sitting Good   Dynamic Sitting Fair +   Static Standing Fair   Dynamic Standing Fair   Ambulatory Fair -   Endurance Deficit   Endurance Deficit Yes   Endurance Deficit Description pt appears fatigued with increased ambulation   Activity Tolerance   Activity Tolerance Patient limited by fatigue   Exercises   Hip Flexion Sitting;20 reps;AROM;Bilateral   Hip Adduction Sitting;20 reps;AROM;Bilateral   Knee AROM Long Arc Quad Sitting;20 reps;AROM;Bilateral   Ankle Pumps Sitting;20 reps;AROM;Bilateral   Assessment   Prognosis Good   Problem List Decreased strength;Decreased endurance;Impaired balance;Decreased mobility;Decreased cognition;Impaired judgement;Decreased safety awareness   Assessment Patient seen this date for PT treatment session to increase level of mobility and functional activity tolerance. This date, pt able to perform all functional mobility with Min A x 1-2, RW, and increased time. Occasional verbal cuing provided for safety awareness and sequencing. Seated rest break taken following 60' of ambulation d/t fatigue and SOB. HR and SpO2 remained WFL on 3L O2 throughout. No true LOB experienced. Pt tolerated seated B LE there ex well. The patient's AM-PAC Basic Mobility Inpatient Short Form Raw Score is 17. A Raw  score of greater than 16 suggests the patient may benefit from discharge to home. Please also refer to the recommendation of the Physical Therapist for safe discharge planning. Co treatment with OT secondary to complex medical condition of pt, possible A of 2 required to achieve and maintain transitional movements, requiring the need of skilled therapeutic intervention of 2 therapists to achieve delivery of services. D/c recommendation continues to be rehab resource intensity level I.   Goals   LTG Expiration Date 05/28/25   PT Treatment Day 1   Plan   Treatment/Interventions Functional transfer training;LE strengthening/ROM;Elevations;Therapeutic exercise;Endurance training;Bed mobility;Gait training   Progress Progressing toward goals   PT Frequency 3-5x/wk   Discharge Recommendation   Rehab Resource Intensity Level, PT I (Maximum Resource Intensity)   AM-PAC Basic Mobility Inpatient   Turning in Flat Bed Without Bedrails 3   Lying on Back to Sitting on Edge of Flat Bed Without Bedrails 3   Moving Bed to Chair 3   Standing Up From Chair Using Arms 3   Walk in Room 3   Climb 3-5 Stairs With Railing 2   Basic Mobility Inpatient Raw Score 17   Basic Mobility Standardized Score 39.67   [facility] Highest Level Of Mobility   -HLM Goal 5: Stand one or more mins   -HLM Achieved 7: Walk 25 feet or more   Education   Education Provided Mobility training;Assistive device   Patient Demonstrates acceptance/verbal understanding   End of Consult   Patient Position at End of Consult Bedside chair;Bed/Chair alarm activated;All needs within reach   End of Consult Comments OT present to continue treatment session

## 2025-05-15 NOTE — ASSESSMENT & PLAN NOTE
Small right pleural effusion on imaging  This morning patient tells me she is not short of breath at all  Diminished lung sounds on auscultation  Continue to monitor with diuresis

## 2025-05-15 NOTE — ASSESSMENT & PLAN NOTE
History of persistent atrial fibrillation  [id number] score >7, however patient not on systemic oral anticoagulation due to history of intracranial hemorrhage  Has been maintained rates have been controlled without AV mariano blockers  Telemetry showing atrial fibrillation with rates in the high 50s-low 60s

## 2025-05-15 NOTE — OCCUPATIONAL THERAPY NOTE
Occupational Therapy Progress Note     Patient Name: Tiffani Francois  Today's Date: 5/15/2025  Problem List  Principal Problem:    Acute heart failure with preserved ejection fraction (HCC)  Active Problems:    Hyperlipidemia    Essential hypertension    Tobacco use    Hypomagnesemia    Permanent atrial fibrillation (HCC)    Acute on chronic respiratory failure with hypoxia and hypercapnia (HCC)    Pericardial effusion    Pleural effusion on right    Thrombocytopenia (HCC)    Anemia    Acute cystitis without hematuria    Hyperphosphatemia              05/15/25 1425   OT Last Visit   OT Visit Date 05/15/25   Note Type   Note Type Treatment   Pain Assessment   Pain Score No Pain   Restrictions/Precautions   Weight Bearing Precautions Per Order No   Other Precautions Fall Risk;O2;Telemetry;Cognitive;Chair Alarm;Impulsive   ADL   Where Assessed Other (Comment)  (bathroom)   Grooming Assistance 4  Minimal Assistance   Grooming Deficit Teeth care;Brushing hair;Standing with assistive device;Increased time to complete   Grooming Comments pt stands at sink for ~5-6 min for grooming including teeth, hair, and hand washing   UB Bathing Assistance 4  Minimal Assistance   UB Bathing Comments (A) with back   LB Bathing Assistance 4  Minimal Assistance   LB Bathing Deficit Right lower leg including foot;Left lower leg including foot   LB Bathing Comments requires (A) for cleanliness with lulu hygiene bathing   UB Dressing Assistance 4  Minimal Assistance   UB Dressing Deficit   ((A) to don/doff gown)   LB Dressing Assistance 4  Minimal Assistance   LB Dressing Deficit Don/doff R sock;Don/doff L sock;Increased time to complete   Toileting Assistance  4  Minimal Assistance   Toileting Deficit Verbal cueing;Grab bar use;Increased time to complete;Perineal hygiene   Bed Mobility   Additional Comments pt seated in chair at start and end of session; SPO2 WFL with no complaints of SOB on 3L-difficult masNimble CRMo reading due to malfunctioning  "machine   Transfers   Sit to Stand 4  Minimal assistance   Additional items Assist x 1;Increased time required;Verbal cues  (RW)   Stand to Sit 4  Minimal assistance   Additional items Assist x 1;Increased time required;Verbal cues  (RW)   Toilet transfer 4  Minimal assistance   Additional items Assist x 1;Increased time required;Verbal cues;Standard toilet  (RW)   Additional Comments pt with impulsive behaviors and poor safety awareness with significant cues for the same   Functional Mobility   Functional Mobility 4  Minimal assistance   Additional Comments x2 with use of RW: performs functional mobility to and from bathroom with moderate instability and use of RW; cues for safety throughout sesssion   Additional items Rolling walker   Subjective   Subjective \"okay, lets get the bubbers\"   Cognition   Overall Cognitive Status Impaired   Arousal/Participation Alert   Attention Attends with cues to redirect   Orientation Level Oriented to person;Disoriented to place;Disoriented to time;Disoriented to situation   Memory Decreased long term memory;Decreased short term memory;Decreased recall of recent events;Decreased recall of biographical information   Following Commands Follows one step commands without difficulty   Activity Tolerance   Activity Tolerance Patient limited by fatigue;Other (Comment)  (cognitive deficits)   Assessment   Assessment Patient participated in Skilled OT session this date with interventions consisting of ADL re training with the use of correct body mechnaics, safety awareness and fall prevention techniques,  therapeutic activities to: increase activity tolerance, increase standing tolerance time with unilateral UE support to complete sink level ADLs, increase cardiovascular endurance , increase dynamic sit/ stand balance during functional activity , and increase OOB/ sitting tolerance . Partial co treatment with PT secondary to complex medical condition of pt, A of 2 required to achieve and " maintain transitional movements, requiring the need of skilled therapeutic intervention of 2 therapists to achieve delivery of services. Patient agreeable to OT treatment session, upon arrival patient was found seated OOB to Chair. Patient requiring frequent re direction, verbal cues for safety, verbal cues for correct technique, verbal cues for pacing thru activity steps, cognitive assistance to anticipate next step, and one step directives. Patient continues to be functioning below baseline level, occupational performance remains limited secondary to factors listed above and increased risk for falls and injury. The patient's raw score on the AM-PAC Daily Activity Inpatient Short Form is 18. A raw score of less than 19 suggests the patient may benefit from discharge to post-acute rehabilitation services. Please refer to the recommendation of the Occupational Therapist for safe discharge planning. From OT standpoint, recommendation at time of d/c would be level II moderate resource intensity.   Plan   Goal Expiration Date 05/28/25   OT Treatment Day 1   OT Frequency 3-5x/wk   Discharge Recommendation   Rehab Resource Intensity Level, OT II (Moderate Resource Intensity)   AM-PAC Daily Activity Inpatient   Lower Body Dressing 3   Bathing 3   Toileting 3   Upper Body Dressing 3   Grooming 3   Eating 3   Daily Activity Raw Score 18   Daily Activity Standardized Score (Calc for Raw Score >=11) 38.66   AM-PAC Applied Cognition Inpatient   Following a Speech/Presentation 3   Understanding Ordinary Conversation 3   Taking Medications 1   Remembering Where Things Are Placed or Put Away 1   Remembering List of 4-5 Errands 1   Taking Care of Complicated Tasks 1   Applied Cognition Raw Score 10   Applied Cognition Standardized Score 24.98

## 2025-05-15 NOTE — ASSESSMENT & PLAN NOTE
Wt Readings from Last 3 Encounters:   05/15/25 116 kg (256 lb 2.8 oz)   10/06/23 58.5 kg (129 lb)   05/20/23 55.6 kg (122 lb 9.6 oz)   Volume status previously maintained on Lasix 20 mg daily  Now presenting with symptoms of shortness of breath at rest, worse upon exertion, lower extremity edema for several days    Imaging shows cardiomegaly, small right pleural effusion, trace pericardial effusion  Dry weight unclear, patient does not weigh herself at home and guessed she would be about 140 pounds, chart review shows patient has been anywhere between 120-160 pounds over the last several years  Standing scale weights: 5/14 122 pounds, 5/15 not logged accurately, Net -5.2 L  2022 echocardiogram LVEF 60%, mild-moderate mitral regurgitation.  Updated echocardiogram this admission showing LVEF 55%, severely dilated left atrium, mild mitral regurgitation, moderate pericardial effusion without evidence of tamponade  Creatinine 0.84 => 1.32 on 5/15, no lower extremity edema on exam and JOHN so we will hold off on diuretics for today  Monitor daily standing weights, fluid/sodium restriction, strict I/O's, BMP

## 2025-05-15 NOTE — ASSESSMENT & PLAN NOTE
Presented with hypoxia with an oxygen saturation in the low 80's%  Chronically on 3 Lpm of continuous supplemental oxygen  Required 4 Lpm of continuous supplemental oxygen on 05/13/2025 with signs of air hunger including observed accessory muscle use  Weaned to 3 Lpm of continuous supplemental oxygen since 05/14/2025  Incentive spirometry  Respiratory protocol

## 2025-05-15 NOTE — ASSESSMENT & PLAN NOTE
Wt Readings from Last 3 Encounters:   05/15/25 116 kg (256 lb 2.8 oz)   10/06/23 58.5 kg (129 lb)   05/20/23 55.6 kg (122 lb 9.6 oz)     I appreciate Cardiology's input.  Treated with Lasix 40 mg IV BID, which was discontinued on 05/15/2025 due to an increased serum creatinine level  Monitor daily weights and strict intake/output measurements  PT/OT evaluations    Transthoracic echocardiogram (05/14/2025):    Interpretation Summary    Left Ventricle: Left ventricular cavity size is normal. Wall thickness is moderately increased. There is moderate concentric hypertrophy. The left ventricular ejection fraction is 55%. Systolic function is normal. Wall motion is normal. Unable to assess diastolic function due to atrial fibrillation.    Right Ventricle: Right ventricular cavity size is normal. Systolic function is mildly reduced.    Left Atrium: The atrium is severely dilated (>48 mL/m2).    Right Atrium: The atrium is mildly dilated.    Mitral Valve: There is mild annular calcification. There is mild regurgitation.    Tricuspid Valve: There is mild regurgitation.    Pericardium: There is a moderate pericardial effusion along the LV free wall measuring 1.3 cm.  The fluid exhibits no internal echoes. There is no echocardiographic evidence of tamponade. There is a right pleural effusion.

## 2025-05-15 NOTE — PLAN OF CARE
Problem: PAIN - ADULT  Goal: Verbalizes/displays adequate comfort level or baseline comfort level  Description: Interventions:  - Encourage patient to monitor pain and request assistance  - Assess pain using appropriate pain scale  - Administer analgesics based on type and severity of pain and evaluate response  - Implement non-pharmacological measures as appropriate and evaluate response  - Consider cultural and social influences on pain and pain management  - Notify physician/advanced practitioner if interventions unsuccessful or patient reports new pain  Outcome: Progressing     Problem: INFECTION - ADULT  Goal: Absence or prevention of progression during hospitalization  Description: INTERVENTIONS:  - Assess and monitor for signs and symptoms of infection  - Monitor lab/diagnostic results  - Monitor all insertion sites, i.e. indwelling lines, tubes, and drains  - Monitor endotracheal if appropriate and nasal secretions for changes in amount and color  - [place] appropriate cooling/warming therapies per order  - Administer medications as ordered  - Instruct and encourage patient and family to use good hand hygiene technique  - Identify and instruct in appropriate isolation precautions for identified infection/condition  Outcome: Progressing     Problem: SAFETY ADULT  Goal: Patient will remain free of falls  Description: INTERVENTIONS:  - Educate patient/family on patient safety including physical limitations  - Instruct patient to call for assistance with activity   - Consult OT/PT to assist with strengthening/mobility   - Keep Call bell within reach  - Keep bed low and locked with side rails adjusted as appropriate  - Keep care items and personal belongings within reach  - Initiate and maintain comfort rounds  - Make Fall Risk Sign visible to staff  - Offer Toileting every 2 Hours, in advance of need  - Initiate/Maintain bed/chair alarm  - Obtain necessary fall risk management equipment: nonskid footwear  -  Apply yellow socks and bracelet for high fall risk patients  - Consider moving patient to room near nurses station  Outcome: Progressing  Goal: Maintain or return to baseline ADL function  Description: INTERVENTIONS:  -  Assess patient's ability to carry out ADLs; assess patient's baseline for ADL function and identify physical deficits which impact ability to perform ADLs (bathing, care of mouth/teeth, toileting, grooming, dressing, etc.)  - Assess/evaluate cause of self-care deficits   - Assess range of motion  - Assess patient's mobility; develop plan if impaired  - Assess patient's need for assistive devices and provide as appropriate  - Encourage maximum independence but intervene and supervise when necessary  - Involve family in performance of ADLs  - Assess for home care needs following discharge   - Consider OT consult to assist with ADL evaluation and planning for discharge  - Provide patient education as appropriate  Outcome: Progressing     Problem: DISCHARGE PLANNING  Goal: Discharge to home or other facility with appropriate resources  Description: INTERVENTIONS:  - Identify barriers to discharge w/patient and caregiver  - Arrange for needed discharge resources and transportation as appropriate  - Identify discharge learning needs (meds, wound care, etc.)  - Arrange for interpretive services to assist at discharge as needed  - Refer to Case Management Department for coordinating discharge planning if the patient needs post-hospital services based on physician/advanced practitioner order or complex needs related to functional status, cognitive ability, or social support system  Outcome: Progressing     Problem: Knowledge Deficit  Goal: Patient/family/caregiver demonstrates understanding of disease process, treatment plan, medications, and discharge instructions  Description: Complete learning assessment and assess knowledge base.  Interventions:  - Provide teaching at level of understanding  - Provide  teaching via preferred learning methods  Outcome: Progressing     Problem: CARDIOVASCULAR - ADULT  Goal: Maintains optimal cardiac output and hemodynamic stability  Description: INTERVENTIONS:  - Monitor I/O, vital signs and rhythm  - Monitor for S/S and trends of decreased cardiac output  - Administer and titrate ordered vasoactive medications to optimize hemodynamic stability  - Assess quality of pulses, skin color and temperature  - Assess for signs of decreased coronary artery perfusion  - Instruct patient to report change in severity of symptoms  Outcome: Progressing  Goal: Absence of cardiac dysrhythmias or at baseline rhythm  Description: INTERVENTIONS:  - Continuous cardiac monitoring, vital signs, obtain 12 lead EKG if ordered  - Administer antiarrhythmic and heart rate control medications as ordered  - Monitor electrolytes and administer replacement therapy as ordered  Outcome: Progressing

## 2025-05-15 NOTE — PLAN OF CARE
Problem: OCCUPATIONAL THERAPY ADULT  Goal: Performs self-care activities at highest level of function for planned discharge setting.  See evaluation for individualized goals.  Description: Treatment Interventions: ADL retraining, Functional transfer training, UE strengthening/ROM, Endurance training, Patient/family training, Cognitive reorientation, Equipment evaluation/education, Activityengagement, Compensatory technique education          See flowsheet documentation for full assessment, interventions and recommendations.   Outcome: Progressing  Note: Limitation: Decreased ADL status, Decreased UE strength, Decreased Safe judgement during ADL, Decreased cognition, Decreased endurance, Decreased self-care trans, Decreased high-level ADLs     Assessment: Patient participated in Skilled OT session this date with interventions consisting of ADL re training with the use of correct body mechnaics, safety awareness and fall prevention techniques,  therapeutic activities to: increase activity tolerance, increase standing tolerance time with unilateral UE support to complete sink level ADLs, increase cardiovascular endurance , increase dynamic sit/ stand balance during functional activity , and increase OOB/ sitting tolerance . Partial co treatment with PT secondary to complex medical condition of pt, A of 2 required to achieve and maintain transitional movements, requiring the need of skilled therapeutic intervention of 2 therapists to achieve delivery of services. Patient agreeable to OT treatment session, upon arrival patient was found seated OOB to Chair. Patient requiring frequent re direction, verbal cues for safety, verbal cues for correct technique, verbal cues for pacing thru activity steps, cognitive assistance to anticipate next step, and one step directives. Patient continues to be functioning below baseline level, occupational performance remains limited secondary to factors listed above and increased risk  for falls and injury. The patient's raw score on the AM-PAC Daily Activity Inpatient Short Form is 18. A raw score of less than 19 suggests the patient may benefit from discharge to post-acute rehabilitation services. Please refer to the recommendation of the Occupational Therapist for safe discharge planning. From OT standpoint, recommendation at time of d/c would be level II moderate resource intensity.     Rehab Resource Intensity Level, OT: II (Moderate Resource Intensity)

## 2025-05-15 NOTE — PLAN OF CARE
Problem: PHYSICAL THERAPY ADULT  Goal: Performs mobility at highest level of function for planned discharge setting.  See evaluation for individualized goals.  Description: Treatment/Interventions: Functional transfer training, LE strengthening/ROM, Elevations, Therapeutic exercise, Endurance training, Bed mobility, Gait training          See flowsheet documentation for full assessment, interventions and recommendations.  Outcome: Progressing  Note: Prognosis: Good  Problem List: Decreased strength, Decreased endurance, Impaired balance, Decreased mobility, Decreased cognition, Impaired judgement, Decreased safety awareness  Assessment: Patient seen this date for PT treatment session to increase level of mobility and functional activity tolerance. This date, pt able to perform all functional mobility with Min A x 1-2, RW, and increased time. Occasional verbal cuing provided for safety awareness and sequencing. Seated rest break taken following 60' of ambulation d/t fatigue and SOB. HR and SpO2 remained WFL on 3L O2 throughout. No true LOB experienced. Pt tolerated seated B LE there ex well. The patient's AM-PAC Basic Mobility Inpatient Short Form Raw Score is 17. A Raw score of greater than 16 suggests the patient may benefit from discharge to home. Please also refer to the recommendation of the Physical Therapist for safe discharge planning. Co treatment with OT secondary to complex medical condition of pt, possible A of 2 required to achieve and maintain transitional movements, requiring the need of skilled therapeutic intervention of 2 therapists to achieve delivery of services. D/c recommendation continues to be rehab resource intensity level I.        Rehab Resource Intensity Level, PT: I (Maximum Resource Intensity)    See flowsheet documentation for full assessment.

## 2025-05-15 NOTE — ASSESSMENT & PLAN NOTE
On lisinopril 10 mg daily  SBP has been elevated, as high as 200  Most recent /67  Will continue to monitor with diuresis

## 2025-05-15 NOTE — PROGRESS NOTES
Progress Note - Hospitalist   Name: Tiffani Francois 80 y.o. female I MRN: [id number]  Unit/Bed#: 409-01 I Date of Admission: 5/13/2025   Date of Service: 5/15/2025 I Hospital Day: 2    Assessment & Plan  Acute heart failure with preserved ejection fraction (HCC)  Wt Readings from Last 3 Encounters:   05/15/25 116 kg (256 lb 2.8 oz)   10/06/23 58.5 kg (129 lb)   05/20/23 55.6 kg (122 lb 9.6 oz)     I appreciate Cardiology's input.  Treated with Lasix 40 mg IV BID, which was discontinued on 05/15/2025 due to an increased serum creatinine level  Monitor daily weights and strict intake/output measurements  PT/OT evaluations    Transthoracic echocardiogram (05/14/2025):    Interpretation Summary    Left Ventricle: Left ventricular cavity size is normal. Wall thickness is moderately increased. There is moderate concentric hypertrophy. The left ventricular ejection fraction is 55%. Systolic function is normal. Wall motion is normal. Unable to assess diastolic function due to atrial fibrillation.    Right Ventricle: Right ventricular cavity size is normal. Systolic function is mildly reduced.    Left Atrium: The atrium is severely dilated (>48 mL/m2).    Right Atrium: The atrium is mildly dilated.    Mitral Valve: There is mild annular calcification. There is mild regurgitation.    Tricuspid Valve: There is mild regurgitation.    Pericardium: There is a moderate pericardial effusion along the LV free wall measuring 1.3 cm.  The fluid exhibits no internal echoes. There is no echocardiographic evidence of tamponade. There is a right pleural effusion.  Acute on chronic respiratory failure with hypoxia and hypercapnia (HCC)  Presented with hypoxia with an oxygen saturation in the low 80's%  Chronically on 3 Lpm of continuous supplemental oxygen  Required 4 Lpm of continuous supplemental oxygen on 05/13/2025 with signs of air hunger including observed accessory muscle use  Weaned to 3 Lpm of continuous supplemental oxygen since  05/14/2025  Incentive spirometry  Respiratory protocol  Permanent atrial fibrillation (HCC)  Currently with a controlled ventricular rate without any AV-mariano blocking agents  Not on anticoagulation with a history of an intracranial hemorrhage  Continue aspirin 81 mg PO Qdaily  Pericardial effusion  No signs of cardiac tamponade    Transthoracic echocardiogram (05/14/2025):  Pericardium: There is a moderate pericardial effusion along the LV free wall measuring 1.3 cm. The fluid exhibits no internal echoes. There is no echocardiographic evidence of tamponade. There is a right pleural effusion.   Pleural effusion on right  Treated with Lasix 40 mg IV BID, which was discontinued on 05/15/2025 due to an increased serum creatinine level  May require a thoracentesis  Acute cystitis without hematuria  Treat with ceftriaxone 1000 mg IV every 24 hours x 3 doses  Hyperphosphatemia  No treatment is indicated at this time  Follow the phosphorus level  Hypomagnesemia  Resolved with magnesium sulfate 2 grams IV x 1 dose on 05/14/2025  Follow the magnesium level    Results from last 7 days   Lab Units 05/15/25  0448 05/14/25  0451 05/13/25  1628   MAGNESIUM mg/dL 2.3 1.8* 1.8*       Thrombocytopenia (HCC)  Monitor for any signs of bleeding  Follow the platelet count  Anemia  Check an iron panel, vitamin B12 level, and folate level  Follow the CBC  Transfuse for a hemoglobin less than 7 g/dl  Hyperlipidemia  Continue statin therapy  Essential hypertension  Hold lisinopril with an increased serum creatinine level  Tobacco use  Smoking cessation counseling was given to the patient during the hospitalization.    VTE Pharmacologic Prophylaxis: VTE Score: 9 High Risk (Score >/= 5) - Pharmacological DVT Prophylaxis Ordered: enoxaparin (Lovenox). Sequential Compression Devices Ordered.    Mobility:   Basic Mobility Inpatient Raw Score: 16  JH-HLM Goal: 5: Stand one or more mins  JH-HLM Achieved: 6: Walk 10 steps or more  JH-HLM Goal  achieved. Continue to encourage appropriate mobility.    Patient Centered Rounds: I performed bedside rounds with nursing staff today.   Discussions with Specialists or Other Care Team Provider: I discussed the case with Cardiology.    Current Length of Stay: 2 day(s)  Current Patient Status: Inpatient   Certification Statement: The patient will continue to require additional inpatient hospital stay due to the need for IV antibiotic treatment, to monitor the patient's renal function, and for close respiratory status monitoring.  Discharge Plan: Anticipate discharge in 24-48 hrs to home with home services.    Code Status: Level 1 - Full Code    Subjective   The patient was seen and examined.  The patient is doing better.  The shortness of breath is improving.  No chest pain.  No abdominal pain.  No nausea or vomiting.      Objective :  Temp:  [97.5 °F (36.4 °C)-98.7 °F (37.1 °C)] 98.7 °F (37.1 °C)  HR:  [51-68] 62  BP: (105-165)/(38-89) 118/43  Resp:  [17-21] 21  SpO2:  [71 %-100 %] 100 %  O2 Device: Nasal cannula  Nasal Cannula O2 Flow Rate (L/min):  [3 L/min] 3 L/min    Body mass index is 42.63 kg/m².     Input and Output Summary (last 24 hours):     Intake/Output Summary (Last 24 hours) at 5/15/2025 1102  Last data filed at 5/15/2025 0641  Gross per 24 hour   Intake 60 ml   Output 808 ml   Net -748 ml       Physical Exam  General:  NAD, follows commands  HEENT:  NC/AT, mucous membranes moist  Neck:  Supple, No JVP elevation  CV:  + S1, + S2, Irregularly irregular rhythm, Intermittent bradycardia  Pulm:  Scant bibasilar crackles  Abd:  Soft, Non-tender, Non-distended  Ext:  No clubbing/cyanosis/edema  Skin:  No rashes  Neuro:  Awake, alert, oriented  Psych:  Normal mood and affect      Lines/Drains:        Telemetry:  Telemetry Orders (From admission, onward)               24 Hour Telemetry Monitoring  Continuous x 24 Hours (Telem)        Expiring   Question:  Reason for 24 Hour Telemetry  Answer:  Arrhythmias  requiring acute medical intervention / PPM or ICD malfunction                     Telemetry Reviewed: Atrial fibrillation. HR averaging 51-68 bpm  Indication for Continued Telemetry Use: Arrthymias requiring medical therapy               Lab Results: I have reviewed the following results:  Results from last 7 days   Lab Units 05/15/25  0448   WBC Thousand/uL 6.68   HEMOGLOBIN g/dL 9.2*   HEMATOCRIT % 33.8*   PLATELETS Thousands/uL 147*   SEGS PCT % 70   LYMPHO PCT % 17   MONO PCT % 8   EOS PCT % 4     Results from last 7 days   Lab Units 05/15/25  0448   SODIUM mmol/L 141   POTASSIUM mmol/L 4.0   CHLORIDE mmol/L 91*   CO2 mmol/L 44*   BUN mg/dL 28*   CREATININE mg/dL 1.32*   ANION GAP mmol/L 6   CALCIUM mg/dL 8.8   ALBUMIN g/dL 4.0   TOTAL BILIRUBIN mg/dL 0.66   ALK PHOS U/L 50   ALT U/L 4*   AST U/L 12*   GLUCOSE RANDOM mg/dL 139                 Results from last 7 days   Lab Units 05/15/25  0448 05/14/25  0451 05/13/25  1628   LACTIC ACID mmol/L  --   --  1.7   PROCALCITONIN ng/ml 0.07 <0.05  --        Recent Cultures (last 7 days):   Results from last 7 days   Lab Units 05/13/25  1912   URINE CULTURE  30,000-39,000 cfu/ml Escherichia coli*       Imaging Results Review: No pertinent imaging studies reviewed.  Other Study Results Review: No additional pertinent studies reviewed.    Last 24 Hours Medication List:     Current Facility-Administered Medications:     acetaminophen (TYLENOL) tablet 325 mg, Q6H PRN    albuterol inhalation solution 2.5 mg, Q4H PRN    aspirin (ECOTRIN LOW STRENGTH) EC tablet 81 mg, Daily    atorvastatin (LIPITOR) tablet 40 mg, Daily With Dinner    budesonide-formoterol (SYMBICORT) 80-4.5 MCG/ACT inhaler 2 puff, BID    cefTRIAXone (ROCEPHIN) IVPB (premix in dextrose) 1,000 mg 50 mL, Q24H, Last Rate: 1,000 mg (05/15/25 0855)    enoxaparin (LOVENOX) subcutaneous injection 40 mg, Q24H    gabapentin (NEURONTIN) capsule 100 mg, BID    lisinopril (ZESTRIL) tablet 10 mg, Daily    QUEtiapine  (SEROquel) tablet 25 mg, HS    sertraline (ZOLOFT) tablet 50 mg, Daily    Insert peripheral IV, Once **AND** sodium chloride (PF) 0.9 % injection 3 mL, Q1H PRN    umeclidinium 62.5 mcg/actuation inhaler AEPB 1 puff, Daily    Administrative Statements   Today, Patient Was Seen By: Per Sandy, DO  I have spent a total time of 35 minutes in caring for this patient on the day of the visit/encounter including Diagnostic results, Prognosis, Risks and benefits of tx options, Instructions for management, Counseling / Coordination of care, and Documenting in the medical record.    **Please Note: This note may have been constructed using a voice recognition system.**

## 2025-05-16 PROBLEM — E55.9 VITAMIN D INSUFFICIENCY: Status: ACTIVE | Noted: 2025-05-16

## 2025-05-16 PROBLEM — D50.9 IRON DEFICIENCY ANEMIA: Status: ACTIVE | Noted: 2025-05-16

## 2025-05-16 PROBLEM — E53.8 VITAMIN B12 DEFICIENCY: Status: ACTIVE | Noted: 2025-05-16

## 2025-05-16 LAB
ALBUMIN SERPL BCG-MCNC: 3.8 G/DL (ref 3.5–5)
ALP SERPL-CCNC: 44 U/L (ref 34–104)
ALT SERPL W P-5'-P-CCNC: 5 U/L (ref 7–52)
ANION GAP SERPL CALCULATED.3IONS-SCNC: 2 MMOL/L (ref 4–13)
AST SERPL W P-5'-P-CCNC: 12 U/L (ref 13–39)
BASOPHILS # BLD AUTO: 0.08 THOUSANDS/ÂΜL (ref 0–0.1)
BASOPHILS NFR BLD AUTO: 1 % (ref 0–1)
BILIRUB SERPL-MCNC: 0.42 MG/DL (ref 0.2–1)
BUN SERPL-MCNC: 29 MG/DL (ref 5–25)
CALCIUM SERPL-MCNC: 8.8 MG/DL (ref 8.4–10.2)
CHLORIDE SERPL-SCNC: 95 MMOL/L (ref 96–108)
CO2 SERPL-SCNC: 43 MMOL/L (ref 21–32)
CREAT SERPL-MCNC: 0.87 MG/DL (ref 0.6–1.3)
EOSINOPHIL # BLD AUTO: 0.38 THOUSAND/ÂΜL (ref 0–0.61)
EOSINOPHIL NFR BLD AUTO: 6 % (ref 0–6)
ERYTHROCYTE [DISTWIDTH] IN BLOOD BY AUTOMATED COUNT: 19 % (ref 11.6–15.1)
GFR SERPL CREATININE-BSD FRML MDRD: 62 ML/MIN/1.73SQ M
GLUCOSE SERPL-MCNC: 139 MG/DL (ref 65–140)
HCT VFR BLD AUTO: 31.5 % (ref 34.8–46.1)
HGB BLD-MCNC: 8.3 G/DL (ref 11.5–15.4)
IMM GRANULOCYTES # BLD AUTO: 0.02 THOUSAND/UL (ref 0–0.2)
IMM GRANULOCYTES NFR BLD AUTO: 0 % (ref 0–2)
LYMPHOCYTES # BLD AUTO: 1.17 THOUSANDS/ÂΜL (ref 0.6–4.47)
LYMPHOCYTES NFR BLD AUTO: 19 % (ref 14–44)
MAGNESIUM SERPL-MCNC: 2.2 MG/DL (ref 1.9–2.7)
MCH RBC QN AUTO: 21.8 PG (ref 26.8–34.3)
MCHC RBC AUTO-ENTMCNC: 26.3 G/DL (ref 31.4–37.4)
MCV RBC AUTO: 83 FL (ref 82–98)
MONOCYTES # BLD AUTO: 0.58 THOUSAND/ÂΜL (ref 0.17–1.22)
MONOCYTES NFR BLD AUTO: 9 % (ref 4–12)
NEUTROPHILS # BLD AUTO: 4.05 THOUSANDS/ÂΜL (ref 1.85–7.62)
NEUTS SEG NFR BLD AUTO: 65 % (ref 43–75)
NRBC BLD AUTO-RTO: 0 /100 WBCS
PHOSPHATE SERPL-MCNC: 3.6 MG/DL (ref 2.3–4.1)
PLATELET # BLD AUTO: 137 THOUSANDS/UL (ref 149–390)
PMV BLD AUTO: 11.1 FL (ref 8.9–12.7)
POTASSIUM SERPL-SCNC: 3.9 MMOL/L (ref 3.5–5.3)
PROCALCITONIN SERPL-MCNC: <0.05 NG/ML
PROT SERPL-MCNC: 5.8 G/DL (ref 6.4–8.4)
RBC # BLD AUTO: 3.81 MILLION/UL (ref 3.81–5.12)
SODIUM SERPL-SCNC: 140 MMOL/L (ref 135–147)
WBC # BLD AUTO: 6.28 THOUSAND/UL (ref 4.31–10.16)

## 2025-05-16 PROCEDURE — 84100 ASSAY OF PHOSPHORUS: CPT | Performed by: INTERNAL MEDICINE

## 2025-05-16 PROCEDURE — 84145 PROCALCITONIN (PCT): CPT | Performed by: INTERNAL MEDICINE

## 2025-05-16 PROCEDURE — 83735 ASSAY OF MAGNESIUM: CPT | Performed by: INTERNAL MEDICINE

## 2025-05-16 PROCEDURE — 94760 N-INVAS EAR/PLS OXIMETRY 1: CPT

## 2025-05-16 PROCEDURE — 99232 SBSQ HOSP IP/OBS MODERATE 35: CPT | Performed by: INTERNAL MEDICINE

## 2025-05-16 PROCEDURE — 97116 GAIT TRAINING THERAPY: CPT

## 2025-05-16 PROCEDURE — 85025 COMPLETE CBC W/AUTO DIFF WBC: CPT | Performed by: INTERNAL MEDICINE

## 2025-05-16 PROCEDURE — 97110 THERAPEUTIC EXERCISES: CPT

## 2025-05-16 PROCEDURE — 80053 COMPREHEN METABOLIC PANEL: CPT | Performed by: INTERNAL MEDICINE

## 2025-05-16 RX ORDER — METHYLPREDNISOLONE SODIUM SUCCINATE 40 MG/ML
40 INJECTION, POWDER, LYOPHILIZED, FOR SOLUTION INTRAMUSCULAR; INTRAVENOUS EVERY 12 HOURS SCHEDULED
Status: DISCONTINUED | OUTPATIENT
Start: 2025-05-16 | End: 2025-05-19

## 2025-05-16 RX ORDER — CEFTRIAXONE 1 G/50ML
1000 INJECTION, SOLUTION INTRAVENOUS EVERY 24 HOURS
Status: DISCONTINUED | OUTPATIENT
Start: 2025-05-16 | End: 2025-05-16

## 2025-05-16 RX ORDER — IRON POLYSACCHARIDE COMPLEX 150 MG
150 CAPSULE ORAL DAILY
Status: DISCONTINUED | OUTPATIENT
Start: 2025-05-16 | End: 2025-05-20 | Stop reason: HOSPADM

## 2025-05-16 RX ADMIN — GABAPENTIN 100 MG: 100 CAPSULE ORAL at 08:30

## 2025-05-16 RX ADMIN — UMECLIDINIUM 1 PUFF: 62.5 AEROSOL, POWDER ORAL at 08:31

## 2025-05-16 RX ADMIN — CEFTRIAXONE 1000 MG: 1 INJECTION, SOLUTION INTRAVENOUS at 08:29

## 2025-05-16 RX ADMIN — METHYLPREDNISOLONE SODIUM SUCCINATE 40 MG: 40 INJECTION, POWDER, FOR SOLUTION INTRAMUSCULAR; INTRAVENOUS at 21:25

## 2025-05-16 RX ADMIN — BUDESONIDE AND FORMOTEROL FUMARATE DIHYDRATE 2 PUFF: 80; 4.5 AEROSOL RESPIRATORY (INHALATION) at 17:53

## 2025-05-16 RX ADMIN — POLYSACCHARIDE-IRON COMPLEX 150 MG: 150 CAPSULE ORAL at 14:38

## 2025-05-16 RX ADMIN — METHYLPREDNISOLONE SODIUM SUCCINATE 40 MG: 40 INJECTION, POWDER, FOR SOLUTION INTRAMUSCULAR; INTRAVENOUS at 14:38

## 2025-05-16 RX ADMIN — ASPIRIN 81 MG: 81 TABLET, COATED ORAL at 08:29

## 2025-05-16 RX ADMIN — CYANOCOBALAMIN TAB 500 MCG 500 MCG: 500 TAB at 14:37

## 2025-05-16 RX ADMIN — GABAPENTIN 100 MG: 100 CAPSULE ORAL at 17:53

## 2025-05-16 RX ADMIN — BUDESONIDE AND FORMOTEROL FUMARATE DIHYDRATE 2 PUFF: 80; 4.5 AEROSOL RESPIRATORY (INHALATION) at 08:31

## 2025-05-16 RX ADMIN — QUETIAPINE FUMARATE 25 MG: 25 TABLET ORAL at 21:25

## 2025-05-16 RX ADMIN — SERTRALINE HYDROCHLORIDE 50 MG: 50 TABLET ORAL at 08:30

## 2025-05-16 RX ADMIN — Medication 2000 UNITS: at 14:38

## 2025-05-16 RX ADMIN — ATORVASTATIN CALCIUM 40 MG: 40 TABLET, FILM COATED ORAL at 17:52

## 2025-05-16 RX ADMIN — ENOXAPARIN SODIUM 40 MG: 40 INJECTION SUBCUTANEOUS at 17:53

## 2025-05-16 NOTE — ASSESSMENT & PLAN NOTE
Presented with hypoxia with an oxygen saturation in the low 80's%  Chronically on 3 Lpm of continuous supplemental oxygen  Required 4 Lpm of continuous supplemental oxygen on 05/13/2025 with signs of air hunger including observed accessory muscle use  Currently requiring 2 Lpm of supplemental oxygen on 05/16/2025  Incentive spirometry  Respiratory protocol

## 2025-05-16 NOTE — PROGRESS NOTES
Progress Note - Cardiology   Name: Tiffani Francois 80 y.o. female I MRN: [id number]  Unit/Bed#: 409-01 I Date of Admission: 5/13/2025   Date of Service: 5/16/2025 I Hospital Day: 3     Assessment & Plan  Acute heart failure with preserved ejection fraction (HCC)  Wt Readings from Last 3 Encounters:   05/16/25 53.5 kg (117 lb 15.1 oz)   10/06/23 58.5 kg (129 lb)   05/20/23 55.6 kg (122 lb 9.6 oz)   Volume status previously maintained on Lasix 20 mg daily  Now presenting with symptoms of shortness of breath at rest, worse upon exertion, lower extremity edema for several days    Imaging shows cardiomegaly, small right pleural effusion, trace pericardial effusion  Dry weight unclear, patient does not weigh herself at home and guessed she would be about 140 pounds, chart review shows patient has been anywhere between 120-160 pounds over the last several years  Standing scale weights: 5/14 122 pounds, 5/15 not logged accurately, Net -5.2 L  2022 echocardiogram LVEF 60%, mild-moderate mitral regurgitation.  Updated echocardiogram this admission showing LVEF 55%, severely dilated left atrium, mild mitral regurgitation, moderate pericardial effusion without evidence of tamponade  5/16: Creatinine improved to 0.87 with diuretics held yesterday, weight 117 pounds down from 122 on admission, euvolemic on exam  Recommend resuming oral Lasix 20 mg daily moving forward, will need CHF follow-up visits scheduled  Will need to see if patient is still having home health care come to her house, to ensure euvolemia, medication compliance, daily weights  Permanent atrial fibrillation (HCC)  History of persistent atrial fibrillation  [id number] score >7, however patient not on systemic oral anticoagulation due to history of intracranial hemorrhage  Has been maintained rates have been controlled without AV mariano blockers  Telemetry showing atrial fibrillation with rates in the high 50s-low 60s  Essential hypertension  On lisinopril  10 mg daily  SBP has been elevated, as high as 200  Most recent /67  Will continue to monitor with diuresis  Pericardial effusion  Moderate on echocardiogram without signs of tamponade  Continue to monitor  Pleural effusion on right  Small right pleural effusion on imaging  This morning patient tells me she is not short of breath at all  Diminished lung sounds on auscultation  Continue to monitor with diuresis  Hyperlipidemia  Lipid profile 6/6/2023: C 152, T 71, H 59, L 79  Continue statin  Tobacco use  60-pack-year smoking history, currently still smoking   Hypomagnesemia    Acute on chronic respiratory failure with hypoxia and hypercapnia (HCC)    Thrombocytopenia (HCC)    Anemia    Acute cystitis without hematuria    Hyperphosphatemia      Subjective   Patient seen and examined at bedside.  Still denying shortness of breath, chest pain, palpitations.  No new complaints.    Objective :  Temp:  [98.6 °F (37 °C)-98.8 °F (37.1 °C)] 98.8 °F (37.1 °C)  HR:  [65] 65  BP: (135-150)/(49-66) 135/61  Resp:  [18] 18  SpO2:  [97 %-100 %] 97 %  O2 Device: Nasal cannula  Nasal Cannula O2 Flow Rate (L/min):  [2 L/min-3 L/min] 2 L/min  Orthostatic Blood Pressures      Flowsheet Row Most Recent Value   Blood Pressure 135/61 filed at 05/16/2025 0709   Patient Position - Orthostatic VS Lying filed at 05/15/2025 0628          First Weight: Weight - Scale: 59.2 kg (130 lb 8.2 oz) (05/13/25 1541)  Vitals:    05/15/25 1446 05/16/25 0600   Weight: 53.3 kg (117 lb 9.6 oz) 53.5 kg (117 lb 15.1 oz)     Physical Exam  Vitals reviewed.   Constitutional:       General: She is not in acute distress.     Appearance: She is ill-appearing. She is not diaphoretic.   HENT:      Head: Normocephalic and atraumatic.     Eyes:      Pupils: Pupils are equal, round, and reactive to light.     Neck:      Vascular: No carotid bruit.     Cardiovascular:      Rate and Rhythm: Normal rate. Rhythm irregular.      Pulses: Normal pulses.      Heart sounds:  Normal heart sounds. No murmur heard.  Pulmonary:      Effort: Pulmonary effort is normal.      Comments: On 2 L NC  Abdominal:      General: There is no distension.      Palpations: Abdomen is soft.      Tenderness: There is no abdominal tenderness.     Musculoskeletal:      Cervical back: Normal range of motion.      Right lower leg: No edema.      Left lower leg: No edema.     Skin:     General: Skin is warm.      Capillary Refill: Capillary refill takes less than 2 seconds.     Neurological:      General: No focal deficit present.      Mental Status: She is alert and oriented to person, place, and time. Mental status is at baseline.     Psychiatric:         Mood and Affect: Mood normal.         Behavior: Behavior normal.         Thought Content: Thought content normal.           Lab Results: I have reviewed the following results:  Results from last 7 days   Lab Units 05/16/25  0615 05/15/25  0448 05/14/25  0451   WBC Thousand/uL 6.28 6.68 7.96   HEMOGLOBIN g/dL 8.3* 9.2* 8.5*   HEMATOCRIT % 31.5* 33.8* 32.0*   PLATELETS Thousands/uL 137* 147* 145*     Results from last 7 days   Lab Units 05/16/25  0615 05/15/25  0448 05/14/25  0451   POTASSIUM mmol/L 3.9 4.0 3.7   CHLORIDE mmol/L 95* 91* 93*   CO2 mmol/L 43* 44* 43*   BUN mg/dL 29* 28* 17   CREATININE mg/dL 0.87 1.32* 0.84   CALCIUM mg/dL 8.8 8.8 8.8         Lab Results   Component Value Date    HGBA1C 5.7 (H) 06/06/2023     Lab Results   Component Value Date    CKTOTAL 96 09/23/2022

## 2025-05-16 NOTE — PLAN OF CARE
Problem: PAIN - ADULT  Goal: Verbalizes/displays adequate comfort level or baseline comfort level  Description: Interventions:  - Encourage patient to monitor pain and request assistance  - Assess pain using appropriate pain scale  - Administer analgesics based on type and severity of pain and evaluate response  - Implement non-pharmacological measures as appropriate and evaluate response  - Consider cultural and social influences on pain and pain management  - Notify physician/advanced practitioner if interventions unsuccessful or patient reports new pain  5/16/2025 0151 by Jessie Wade RN  Outcome: Progressing  5/16/2025 0151 by Jessie Wade RN  Outcome: Progressing     Problem: INFECTION - ADULT  Goal: Absence or prevention of progression during hospitalization  Description: INTERVENTIONS:  - Assess and monitor for signs and symptoms of infection  - Monitor lab/diagnostic results  - Monitor all insertion sites, i.e. indwelling lines, tubes, and drains  - Monitor endotracheal if appropriate and nasal secretions for changes in amount and color  - [place] appropriate cooling/warming therapies per order  - Administer medications as ordered  - Instruct and encourage patient and family to use good hand hygiene technique  - Identify and instruct in appropriate isolation precautions for identified infection/condition  5/16/2025 0151 by Jessie Wade RN  Outcome: Progressing  5/16/2025 0151 by Jessie Wade RN  Outcome: Progressing     Problem: SAFETY ADULT  Goal: Patient will remain free of falls  Description: INTERVENTIONS:  - Educate patient/family on patient safety including physical limitations  - Instruct patient to call for assistance with activity   - Consult OT/PT to assist with strengthening/mobility   - Keep Call bell within reach  - Keep bed low and locked with side rails adjusted as appropriate  - Keep care items and personal belongings within reach  - Initiate and maintain comfort rounds  - Make  Fall Risk Sign visible to staff  - Offer Toileting every 2 Hours, in advance of need  - Initiate/Maintain bed/chair alarm  - Obtain necessary fall risk management equipment: nonskid footwear  - Apply yellow socks and bracelet for high fall risk patients  - Consider moving patient to room near nurses station  5/16/2025 0151 by Jessie Wade RN  Outcome: Progressing  5/16/2025 0151 by Jessie Wade RN  Outcome: Progressing  Goal: Maintain or return to baseline ADL function  Description: INTERVENTIONS:  -  Assess patient's ability to carry out ADLs; assess patient's baseline for ADL function and identify physical deficits which impact ability to perform ADLs (bathing, care of mouth/teeth, toileting, grooming, dressing, etc.)  - Assess/evaluate cause of self-care deficits   - Assess range of motion  - Assess patient's mobility; develop plan if impaired  - Assess patient's need for assistive devices and provide as appropriate  - Encourage maximum independence but intervene and supervise when necessary  - Involve family in performance of ADLs  - Assess for home care needs following discharge   - Consider OT consult to assist with ADL evaluation and planning for discharge  - Provide patient education as appropriate  5/16/2025 0151 by Jessie Wade RN  Outcome: Progressing  5/16/2025 0151 by Jessie Wade RN  Outcome: Progressing     Problem: DISCHARGE PLANNING  Goal: Discharge to home or other facility with appropriate resources  Description: INTERVENTIONS:  - Identify barriers to discharge w/patient and caregiver  - Arrange for needed discharge resources and transportation as appropriate  - Identify discharge learning needs (meds, wound care, etc.)  - Arrange for interpretive services to assist at discharge as needed  - Refer to Case Management Department for coordinating discharge planning if the patient needs post-hospital services based on physician/advanced practitioner order or complex needs related to functional  status, cognitive ability, or social support system  5/16/2025 0151 by Jessie Wade RN  Outcome: Progressing  5/16/2025 0151 by Jessie Wade RN  Outcome: Progressing     Problem: Knowledge Deficit  Goal: Patient/family/caregiver demonstrates understanding of disease process, treatment plan, medications, and discharge instructions  Description: Complete learning assessment and assess knowledge base.  Interventions:  - Provide teaching at level of understanding  - Provide teaching via preferred learning methods  5/16/2025 0151 by Jessie Wade RN  Outcome: Progressing  5/16/2025 0151 by Jessie Wade RN  Outcome: Progressing     Problem: CARDIOVASCULAR - ADULT  Goal: Maintains optimal cardiac output and hemodynamic stability  Description: INTERVENTIONS:  - Monitor I/O, vital signs and rhythm  - Monitor for S/S and trends of decreased cardiac output  - Administer and titrate ordered vasoactive medications to optimize hemodynamic stability  - Assess quality of pulses, skin color and temperature  - Assess for signs of decreased coronary artery perfusion  - Instruct patient to report change in severity of symptoms  5/16/2025 0151 by Jessie Wade RN  Outcome: Progressing  5/16/2025 0151 by Jessie Wade RN  Outcome: Progressing  Goal: Absence of cardiac dysrhythmias or at baseline rhythm  Description: INTERVENTIONS:  - Continuous cardiac monitoring, vital signs, obtain 12 lead EKG if ordered  - Administer antiarrhythmic and heart rate control medications as ordered  - Monitor electrolytes and administer replacement therapy as ordered  5/16/2025 0151 by Jessie Wade RN  Outcome: Progressing  5/16/2025 0151 by Jessei Wade RN  Outcome: Progressing     Problem: METABOLIC, FLUID AND ELECTROLYTES - ADULT  Goal: Fluid balance maintained  Description: INTERVENTIONS:  - Monitor labs   - Monitor I/O and WT  - Instruct patient on fluid and nutrition as appropriate  - Assess for signs & symptoms of volume excess or  deficit  Outcome: Progressing

## 2025-05-16 NOTE — PROGRESS NOTES
Progress Note - Hospitalist   Name: Tiffani Francois 80 y.o. female I MRN: [id number]  Unit/Bed#: 409-01 I Date of Admission: 5/13/2025   Date of Service: 5/16/2025 I Hospital Day: 3    Assessment & Plan  Acute heart failure with preserved ejection fraction (HCC)  Wt Readings from Last 3 Encounters:   05/16/25 53.5 kg (117 lb 15.1 oz)   10/06/23 58.5 kg (129 lb)   05/20/23 55.6 kg (122 lb 9.6 oz)     I appreciate Cardiology's input.  Treated with Lasix 40 mg IV BID, which was discontinued on 05/15/2025 due to an increased serum creatinine level  Transition to Lasix 20 mg PO Qdaily per Cardiology  Monitor daily weights and strict intake/output measurements  PT/OT evaluations    Transthoracic echocardiogram (05/14/2025):    Interpretation Summary    Left Ventricle: Left ventricular cavity size is normal. Wall thickness is moderately increased. There is moderate concentric hypertrophy. The left ventricular ejection fraction is 55%. Systolic function is normal. Wall motion is normal. Unable to assess diastolic function due to atrial fibrillation.    Right Ventricle: Right ventricular cavity size is normal. Systolic function is mildly reduced.    Left Atrium: The atrium is severely dilated (>48 mL/m2).    Right Atrium: The atrium is mildly dilated.    Mitral Valve: There is mild annular calcification. There is mild regurgitation.    Tricuspid Valve: There is mild regurgitation.    Pericardium: There is a moderate pericardial effusion along the LV free wall measuring 1.3 cm.  The fluid exhibits no internal echoes. There is no echocardiographic evidence of tamponade. There is a right pleural effusion.  Acute on chronic respiratory failure with hypoxia and hypercapnia (HCC)  Presented with hypoxia with an oxygen saturation in the low 80's%  Chronically on 3 Lpm of continuous supplemental oxygen  Required 4 Lpm of continuous supplemental oxygen on 05/13/2025 with signs of air hunger including observed accessory muscle  use  Currently requiring 2 Lpm of supplemental oxygen on 05/16/2025  Incentive spirometry  Respiratory protocol  Permanent atrial fibrillation (HCC)  Currently with a controlled ventricular rate without any AV-mariano blocking agents  Not on anticoagulation with a history of an intracranial hemorrhage  Continue aspirin 81 mg PO Qdaily  COPD with acute exacerbation (HCC)  Treat with methylprednisolone 40 mg IV every 12 hours  Incentive spirometry  Respiratory protocol  Pericardial effusion  No signs of cardiac tamponade    Transthoracic echocardiogram (05/14/2025):  Pericardium: There is a moderate pericardial effusion along the LV free wall measuring 1.3 cm. The fluid exhibits no internal echoes. There is no echocardiographic evidence of tamponade. There is a right pleural effusion.   Pleural effusion on right  Treated with Lasix 40 mg IV BID, which was discontinued on 05/15/2025 due to an increased serum creatinine level  Transition to Lasix 20 mg PO Qdaily per Cardiology  May require a thoracentesis  Acute cystitis without hematuria  Treat with ceftriaxone 1000 mg IV every 24 hours x 3 doses  Hyperphosphatemia  No treatment is indicated at this time  Follow the phosphorus level  Hypomagnesemia  Resolved with magnesium sulfate 2 grams IV x 1 dose on 05/14/2025  Follow the magnesium level    Results from last 7 days   Lab Units 05/16/25  0615 05/15/25  0448 05/14/25  0451   MAGNESIUM mg/dL 2.2 2.3 1.8*       Thrombocytopenia (HCC)  Monitor for any signs of bleeding  Follow the platelet count  Hyperlipidemia  Continue statin therapy  Essential hypertension  Hold lisinopril with an increased serum creatinine level  Tobacco use  Smoking cessation counseling was given to the patient during the hospitalization.  Iron deficiency anemia  Initiated iron polysaccharides 150 mg PO Qdaily  Follow the CBC  Transfuse for a hemoglobin less than 7 g/dl  Vitamin B12 deficiency  Initiated cyanocobalamin 500 mcg PO Qdaily  Outpatient  follow-up with PCP in regards to this matter  Vitamin D insufficiency  Initiated cholecalciferol 2000 I.U. PO Qdaily  Outpatient follow-up with PCP in regards to this matter     Latest Reference Range & Units 05/15/25 04:48   VITAMIN D, 25-HYDROXY 30.0 - 100.0 ng/mL 21.0 (L)   (L): Data is abnormally low    VTE Pharmacologic Prophylaxis: VTE Score: 9 High Risk (Score >/= 5) - Pharmacological DVT Prophylaxis Ordered: enoxaparin (Lovenox). Sequential Compression Devices Ordered.    Mobility:   Basic Mobility Inpatient Raw Score: 17  JH-HLM Goal: 5: Stand one or more mins  JH-HLM Achieved: 6: Walk 10 steps or more  JH-HLM Goal achieved. Continue to encourage appropriate mobility.    Patient Centered Rounds: I performed bedside rounds with nursing staff today.   Discussions with Specialists or Other Care Team Provider: I discussed the case with Cardiology.    Education and Discussions with Family / Patient: Attempted to update  (daughter) via phone. Left voicemail.     Current Length of Stay: 3 day(s)  Current Patient Status: Inpatient   Certification Statement: The patient will continue to require additional inpatient hospital stay due to the need for IV antibiotic treatment, for IV methylprednisolone treatment, and with the patient requiring continuous supplemental oxygen to maintain adequate oxygen saturation levels.  Discharge Plan: Anticipate discharge in 24-48 hrs to home with home services.    Code Status: Level 1 - Full Code    Subjective   The patient was seen and examined.  The patient was experiencing hypoxia with an oxygen saturation level in the 70's% this morning on room air.  She was more lethargic this morning.    Objective :  Temp:  [98.6 °F (37 °C)-98.8 °F (37.1 °C)] 98.8 °F (37.1 °C)  HR:  [65] 65  BP: (135-150)/(49-66) 135/61  Resp:  [18] 18  SpO2:  [97 %-98 %] 97 %  O2 Device: Nasal cannula  Nasal Cannula O2 Flow Rate (L/min):  [2 L/min-3 L/min] 2 L/min    Body mass index is 19.63  kg/m².     Input and Output Summary (last 24 hours):     Intake/Output Summary (Last 24 hours) at 5/16/2025 1249  Last data filed at 5/16/2025 0909  Gross per 24 hour   Intake 240 ml   Output --   Net 240 ml       Physical Exam  General:  NAD, follows commands  HEENT:  NC/AT, mucous membranes moist  Neck:  Supple, No JVP elevation  CV:  + S1, + S2, Irregularly irregular rhythm, Regular rate  Pulm:  Decreased breath sounds bilaterally with expiratory wheezing bilaterally  Abd:  Soft, Non-tender, Non-distended  Ext:  No clubbing/cyanosis/edema  Skin:  No rashes  Neuro:  Lethargic, awakens to verbal stimuli  Psych:  Lethargic mood and affect      Lines/Drains:        Telemetry:  Telemetry Orders (From admission, onward)               24 Hour Telemetry Monitoring  Continuous x 24 Hours (Telem)        Expiring   Question:  Reason for 24 Hour Telemetry  Answer:  Arrhythmias requiring acute medical intervention / PPM or ICD malfunction                     Telemetry Reviewed: Atrial fibrillation. HR averaging 51-68 bpm  Indication for Continued Telemetry Use: Arrthymias requiring medical therapy               Lab Results: I have reviewed the following results:   Results from last 7 days   Lab Units 05/16/25  0615   WBC Thousand/uL 6.28   HEMOGLOBIN g/dL 8.3*   HEMATOCRIT % 31.5*   PLATELETS Thousands/uL 137*   SEGS PCT % 65   LYMPHO PCT % 19   MONO PCT % 9   EOS PCT % 6     Results from last 7 days   Lab Units 05/16/25  0615   SODIUM mmol/L 140   POTASSIUM mmol/L 3.9   CHLORIDE mmol/L 95*   CO2 mmol/L 43*   BUN mg/dL 29*   CREATININE mg/dL 0.87   ANION GAP mmol/L 2*   CALCIUM mg/dL 8.8   ALBUMIN g/dL 3.8   TOTAL BILIRUBIN mg/dL 0.42   ALK PHOS U/L 44   ALT U/L 5*   AST U/L 12*   GLUCOSE RANDOM mg/dL 139                 Results from last 7 days   Lab Units 05/16/25  0615 05/15/25  0448 05/14/25  0451 05/13/25  1628   LACTIC ACID mmol/L  --   --   --  1.7   PROCALCITONIN ng/ml <0.05 0.07 <0.05  --        Recent Cultures (last  7 days):   Results from last 7 days   Lab Units 05/13/25 1912   URINE CULTURE  30,000-39,000 cfu/ml Escherichia coli*       Imaging Results Review: No pertinent imaging studies reviewed.  Other Study Results Review: No additional pertinent studies reviewed.    Last 24 Hours Medication List:     Current Facility-Administered Medications:     acetaminophen (TYLENOL) tablet 325 mg, Q6H PRN    albuterol inhalation solution 2.5 mg, Q4H PRN    aspirin (ECOTRIN LOW STRENGTH) EC tablet 81 mg, Daily    atorvastatin (LIPITOR) tablet 40 mg, Daily With Dinner    budesonide-formoterol (SYMBICORT) 80-4.5 MCG/ACT inhaler 2 puff, BID    cefTRIAXone (ROCEPHIN) IVPB (premix in dextrose) 1,000 mg 50 mL, Q24H, Last Rate: 1,000 mg (05/16/25 0829)    Cholecalciferol (VITAMIN D3) tablet 2,000 Units, Daily    cyanocobalamin (VITAMIN B-12) tablet 500 mcg, Daily    enoxaparin (LOVENOX) subcutaneous injection 40 mg, Q24H    gabapentin (NEURONTIN) capsule 100 mg, BID    iron polysaccharides (FERREX) capsule 150 mg, Daily    methylPREDNISolone sodium succinate (Solu-MEDROL) injection 40 mg, Q12H [place]    QUEtiapine (SEROquel) tablet 25 mg, HS    sertraline (ZOLOFT) tablet 50 mg, Daily    Insert peripheral IV, Once **AND** sodium chloride (PF) 0.9 % injection 3 mL, Q1H PRN    umeclidinium 62.5 mcg/actuation inhaler AEPB 1 puff, Daily    Administrative Statements   Today, Patient Was Seen By: Per Sandy, DO  I have spent a total time of 35 minutes in caring for this patient on the day of the visit/encounter including Diagnostic results, Prognosis, Risks and benefits of tx options, Instructions for management, Counseling / Coordination of care, and Documenting in the medical record.    **Please Note: This note may have been constructed using a voice recognition system.**

## 2025-05-16 NOTE — ASSESSMENT & PLAN NOTE
Initiated iron polysaccharides 150 mg PO Qdaily  Follow the CBC  Transfuse for a hemoglobin less than 7 g/dl

## 2025-05-16 NOTE — ASSESSMENT & PLAN NOTE
Wt Readings from Last 3 Encounters:   05/16/25 53.5 kg (117 lb 15.1 oz)   10/06/23 58.5 kg (129 lb)   05/20/23 55.6 kg (122 lb 9.6 oz)   Volume status previously maintained on Lasix 20 mg daily  Now presenting with symptoms of shortness of breath at rest, worse upon exertion, lower extremity edema for several days    Imaging shows cardiomegaly, small right pleural effusion, trace pericardial effusion  Dry weight unclear, patient does not weigh herself at home and guessed she would be about 140 pounds, chart review shows patient has been anywhere between 120-160 pounds over the last several years  Standing scale weights: 5/14 122 pounds, 5/15 not logged accurately, Net -5.2 L  2022 echocardiogram LVEF 60%, mild-moderate mitral regurgitation.  Updated echocardiogram this admission showing LVEF 55%, severely dilated left atrium, mild mitral regurgitation, moderate pericardial effusion without evidence of tamponade  5/16: Creatinine improved to 0.87 with diuretics held yesterday, weight 117 pounds down from 122 on admission, euvolemic on exam  Recommend resuming oral Lasix 20 mg daily moving forward, will need CHF follow-up visits scheduled  Will need to see if patient is still having home health care come to her house, to ensure euvolemia, medication compliance, daily weights

## 2025-05-16 NOTE — PLAN OF CARE
Problem: PHYSICAL THERAPY ADULT  Goal: Performs mobility at highest level of function for planned discharge setting.  See evaluation for individualized goals.  Description: Treatment/Interventions: Functional transfer training, LE strengthening/ROM, Elevations, Therapeutic exercise, Endurance training, Bed mobility, Gait training          See flowsheet documentation for full assessment, interventions and recommendations.  Outcome: Progressing  Note: Prognosis: Good  Problem List: Decreased strength, Decreased endurance, Impaired balance, Decreased mobility, Decreased cognition, Impaired judgement, Decreased safety awareness  Assessment: Pt. seen for PT treatment session this date with interventions consisting of  therapeutic exercises,  transfers and  gait training w/ emphasis on improving pt's functional activity tolerance. Pt. Requiring frequent  cues for sequence and safety. In comparison to previous session, Pt. With improvement  in activity tolerance.   Pt is in need of continued activity in PT to improve strength balance endurance mobility transfers and ambulation with return to maximize LOF. From PT/mobility standpoint, recommendation at time of d/c would be Level II: moderate resource intensity in order to promote return to PLOF and independence.   The patient's AM-PAC Basic Mobility Inpatient Short Form Raw Score is 17. A Raw score of greater than 16 suggests the patient may benefit from discharge to home. Pt continues to be functioning below baseline level. PT will continue to see pt during current hospitalization in order to address the deficits listed above and provide interventions consistent w/ POC in effort to achieve goals.  Please also refer to physical therapist recommendation for safe DC planning.        Rehab Resource Intensity Level, PT: I (Maximum Resource Intensity)    See flowsheet documentation for full assessment.

## 2025-05-16 NOTE — ASSESSMENT & PLAN NOTE
Initiated cholecalciferol 2000 I.U. PO Qdaily  Outpatient follow-up with PCP in regards to this matter     Latest Reference Range & Units 05/15/25 04:48   VITAMIN D, 25-HYDROXY 30.0 - 100.0 ng/mL 21.0 (L)   (L): Data is abnormally low

## 2025-05-16 NOTE — ASSESSMENT & PLAN NOTE
Wt Readings from Last 3 Encounters:   05/16/25 53.5 kg (117 lb 15.1 oz)   10/06/23 58.5 kg (129 lb)   05/20/23 55.6 kg (122 lb 9.6 oz)     I appreciate Cardiology's input.  Treated with Lasix 40 mg IV BID, which was discontinued on 05/15/2025 due to an increased serum creatinine level  Transition to Lasix 20 mg PO Qdaily per Cardiology  Monitor daily weights and strict intake/output measurements  PT/OT evaluations    Transthoracic echocardiogram (05/14/2025):    Interpretation Summary    Left Ventricle: Left ventricular cavity size is normal. Wall thickness is moderately increased. There is moderate concentric hypertrophy. The left ventricular ejection fraction is 55%. Systolic function is normal. Wall motion is normal. Unable to assess diastolic function due to atrial fibrillation.    Right Ventricle: Right ventricular cavity size is normal. Systolic function is mildly reduced.    Left Atrium: The atrium is severely dilated (>48 mL/m2).    Right Atrium: The atrium is mildly dilated.    Mitral Valve: There is mild annular calcification. There is mild regurgitation.    Tricuspid Valve: There is mild regurgitation.    Pericardium: There is a moderate pericardial effusion along the LV free wall measuring 1.3 cm.  The fluid exhibits no internal echoes. There is no echocardiographic evidence of tamponade. There is a right pleural effusion.

## 2025-05-16 NOTE — ASSESSMENT & PLAN NOTE
History of persistent atrial fibrillation  [id number] score >7, however patient not on systemic oral anticoagulation due to history of intracranial hemorrhage  Has been maintained rates have been controlled without AV mariano blockers  Telemetry showing atrial fibrillation with rates in the high 50s-low 60s

## 2025-05-16 NOTE — ASSESSMENT & PLAN NOTE
Resolved with magnesium sulfate 2 grams IV x 1 dose on 05/14/2025  Follow the magnesium level    Results from last 7 days   Lab Units 05/16/25  0615 05/15/25  0448 05/14/25  0451   MAGNESIUM mg/dL 2.2 2.3 1.8*

## 2025-05-16 NOTE — PHYSICAL THERAPY NOTE
PHYSICAL THERAPY NOTE          Patient Name: Tiffani Francois  Today's Date: 5/16/2025 05/16/25 0910   PT Last Visit   PT Visit Date 05/16/25   Note Type   Note Type Treatment   Pain Assessment   Pain Assessment Tool 0-10   Pain Score No Pain   Restrictions/Precautions   Weight Bearing Precautions Per Order No   Other Precautions Fall Risk;O2;Telemetry;Cognitive;Chair Alarm   General   Chart Reviewed Yes   Family/Caregiver Present No   Cognition   Overall Cognitive Status Impaired   Arousal/Participation Alert;Cooperative   Attention Attends with cues to redirect   Orientation Level Oriented to person;Oriented to place   Following Commands Follows one step commands without difficulty   Subjective   Subjective Agreeable to therapy   Bed Mobility   Additional Comments OOB in chair start/end PT session   Transfers   Sit to Stand 4  Minimal assistance   Additional items Assist x 1;Armrests;Increased time required;Verbal cues   Stand to Sit 4  Minimal assistance   Additional items Assist x 1;Armrests;Increased time required;Verbal cues   Additional Comments RW used. Cues for hand placement and safety   Ambulation/Elevation   Gait pattern Excessively slow;Short stride;Foward flexed;Scissoring;Decreased foot clearance;Narrow SHANA;Poor UE support   Gait Assistance 4  Minimal assist   Additional items Assist x 1;Verbal cues  (+ A for lines)   Assistive Device Rolling walker   Distance 60' x 2   Balance   Static Sitting Good   Dynamic Sitting Fair +   Static Standing Fair   Dynamic Standing Fair   Ambulatory Fair -  (RW)   Endurance Deficit   Endurance Deficit Yes   Endurance Deficit Description SpO2 89-90% at rest RA, applied 2 L to ambulate SpO2 92-90with ambulation,no SOB   Activity Tolerance   Activity Tolerance Patient limited by fatigue   Exercises   Hip Flexion Sitting;20 reps;AROM;Bilateral   Hip Abduction Sitting;20 reps;AROM;Bilateral    Hip Adduction Sitting;20 reps;AROM;Bilateral   Knee AROM Long Arc Quad Sitting;20 reps;AROM;Bilateral   Ankle Pumps Sitting;20 reps;AROM;Bilateral   Marching Sitting;20 reps;AROM;Bilateral   Assessment   Prognosis Good   Problem List Decreased strength;Decreased endurance;Impaired balance;Decreased mobility;Decreased cognition;Impaired judgement;Decreased safety awareness   Assessment Pt. seen for PT treatment session this date with interventions consisting of  therapeutic exercises,  transfers and  gait training w/ emphasis on improving pt's functional activity tolerance. Pt. Requiring frequent  cues for sequence and safety. In comparison to previous session, Pt. With improvement  in activity tolerance.   Pt is in need of continued activity in PT to improve strength balance endurance mobility transfers and ambulation with return to maximize LOF. From PT/mobility standpoint, recommendation at time of d/c would be Level II: moderate resource intensity in order to promote return to PLOF and independence.   The patient's [facility] Basic Mobility Inpatient Short Form Raw Score is 17. A Raw score of greater than 16 suggests the patient may benefit from discharge to home. Pt continues to be functioning below baseline level. PT will continue to see pt during current hospitalization in order to address the deficits listed above and provide interventions consistent w/ POC in effort to achieve goals.  Please also refer to physical therapist recommendation for safe DC planning.   Goals   Patient Goals to go home   LTG Expiration Date 05/28/25   PT Treatment Day 2   Plan   Treatment/Interventions Functional transfer training;LE strengthening/ROM;Elevations;Therapeutic exercise;Endurance training;Bed mobility;Gait training   Progress Progressing toward goals   PT Frequency 3-5x/wk   Discharge Recommendation   Rehab Resource Intensity Level, PT I (Maximum Resource Intensity)   AM-PAC Basic Mobility Inpatient   Turning in Flat Bed  Without Bedrails 3   Lying on Back to Sitting on Edge of Flat Bed Without Bedrails 3   Moving Bed to Chair 3   Standing Up From Chair Using Arms 3   Walk in Room 3   Climb 3-5 Stairs With Railing 2   Basic Mobility Inpatient Raw Score 17   Basic Mobility Standardized Score 39.67   [facility] Highest Level Of Mobility   -[facility] Goal 5: Stand one or more mins   -HL Achieved 7: Walk 25 feet or more   Education   Education Provided Mobility training   Patient Demonstrates verbal understanding   End of Consult   Patient Position at End of Consult Bedside chair;Bed/Chair alarm activated;All needs within reach   End of Consult Comments discussed POC with PT

## 2025-05-16 NOTE — PLAN OF CARE
Problem: PAIN - ADULT  Goal: Verbalizes/displays adequate comfort level or baseline comfort level  Description: Interventions:  - Encourage patient to monitor pain and request assistance  - Assess pain using appropriate pain scale  - Administer analgesics based on type and severity of pain and evaluate response  - Implement non-pharmacological measures as appropriate and evaluate response  - Consider cultural and social influences on pain and pain management  - Notify physician/advanced practitioner if interventions unsuccessful or patient reports new pain  Outcome: Progressing     Problem: INFECTION - ADULT  Goal: Absence or prevention of progression during hospitalization  Description: INTERVENTIONS:  - Assess and monitor for signs and symptoms of infection  - Monitor lab/diagnostic results  - Monitor all insertion sites, i.e. indwelling lines, tubes, and drains  - Monitor endotracheal if appropriate and nasal secretions for changes in amount and color  - [place] appropriate cooling/warming therapies per order  - Administer medications as ordered  - Instruct and encourage patient and family to use good hand hygiene technique  - Identify and instruct in appropriate isolation precautions for identified infection/condition  Outcome: Progressing     Problem: SAFETY ADULT  Goal: Patient will remain free of falls  Description: INTERVENTIONS:  - Educate patient/family on patient safety including physical limitations  - Instruct patient to call for assistance with activity   - Consult OT/PT to assist with strengthening/mobility   - Keep Call bell within reach  - Keep bed low and locked with side rails adjusted as appropriate  - Keep care items and personal belongings within reach  - Initiate and maintain comfort rounds  - Make Fall Risk Sign visible to staff  - Offer Toileting every 2 Hours, in advance of need  - Initiate/Maintain bed/chair alarm  - Obtain necessary fall risk management equipment: nonskid footwear  -  Apply yellow socks and bracelet for high fall risk patients  - Consider moving patient to room near nurses station  Outcome: Progressing  Goal: Maintain or return to baseline ADL function  Description: INTERVENTIONS:  -  Assess patient's ability to carry out ADLs; assess patient's baseline for ADL function and identify physical deficits which impact ability to perform ADLs (bathing, care of mouth/teeth, toileting, grooming, dressing, etc.)  - Assess/evaluate cause of self-care deficits   - Assess range of motion  - Assess patient's mobility; develop plan if impaired  - Assess patient's need for assistive devices and provide as appropriate  - Encourage maximum independence but intervene and supervise when necessary  - Involve family in performance of ADLs  - Assess for home care needs following discharge   - Consider OT consult to assist with ADL evaluation and planning for discharge  - Provide patient education as appropriate  Outcome: Progressing     Problem: DISCHARGE PLANNING  Goal: Discharge to home or other facility with appropriate resources  Description: INTERVENTIONS:  - Identify barriers to discharge w/patient and caregiver  - Arrange for needed discharge resources and transportation as appropriate  - Identify discharge learning needs (meds, wound care, etc.)  - Arrange for interpretive services to assist at discharge as needed  - Refer to Case Management Department for coordinating discharge planning if the patient needs post-hospital services based on physician/advanced practitioner order or complex needs related to functional status, cognitive ability, or social support system  Outcome: Progressing     Problem: Knowledge Deficit  Goal: Patient/family/caregiver demonstrates understanding of disease process, treatment plan, medications, and discharge instructions  Description: Complete learning assessment and assess knowledge base.  Interventions:  - Provide teaching at level of understanding  - Provide  teaching via preferred learning methods  Outcome: Progressing     Problem: CARDIOVASCULAR - ADULT  Goal: Maintains optimal cardiac output and hemodynamic stability  Description: INTERVENTIONS:  - Monitor I/O, vital signs and rhythm  - Monitor for S/S and trends of decreased cardiac output  - Administer and titrate ordered vasoactive medications to optimize hemodynamic stability  - Assess quality of pulses, skin color and temperature  - Assess for signs of decreased coronary artery perfusion  - Instruct patient to report change in severity of symptoms  Outcome: Progressing  Goal: Absence of cardiac dysrhythmias or at baseline rhythm  Description: INTERVENTIONS:  - Continuous cardiac monitoring, vital signs, obtain 12 lead EKG if ordered  - Administer antiarrhythmic and heart rate control medications as ordered  - Monitor electrolytes and administer replacement therapy as ordered  Outcome: Progressing     Problem: METABOLIC, FLUID AND ELECTROLYTES - ADULT  Goal: Fluid balance maintained  Description: INTERVENTIONS:  - Monitor labs   - Monitor I/O and WT  - Instruct patient on fluid and nutrition as appropriate  - Assess for signs & symptoms of volume excess or deficit  Outcome: Progressing     Problem: Prexisting or High Potential for Compromised Skin Integrity  Goal: Skin integrity is maintained or improved  Description: INTERVENTIONS:  - Identify patients at risk for skin breakdown  - Assess and monitor skin integrity including under and around medical devices   - Assess and monitor nutrition and hydration status  - Monitor labs  - Assess for incontinence   - Turn and reposition patient  - Assist with mobility/ambulation  - Relieve pressure over gene prominences   - Avoid friction and shearing  - Provide appropriate hygiene as needed including keeping skin clean and dry  - Evaluate need for skin moisturizer/barrier cream  - Collaborate with interdisciplinary team  - Patient/family teaching  - Consider wound care  consult    Assess:  - Review Steven scale daily  - Clean and moisturize skin   - Inspect skin when repositioning, toileting, and assisting with ADLS  - Assess under medical devices   - Assess extremities for adequate circulation and sensation     Bed Management:  - Have minimal linens on bed & keep smooth, unwrinkled  - Change linens as needed when moist or perspiring  - Avoid sitting or lying in one position for more than 2 hours while in bed?Keep HOB at 30 degrees   - Toileting:  - Offer bedside commode  - Assess for incontinence   - Use incontinent care products after each incontinent episode     Activity:  - Mobilize patient 3 times a day  - Encourage activity and walks on unit  - Encourage or provide ROM exercises   - Turn and reposition patient every 2 Hours  - Use appropriate equipment to lift or move patient in bed  - Instruct/ Assist with weight shifting every 15 minutes when out of bed in chair  - Consider limitation of chair time 2 hour intervals    Skin Care:  - Avoid use of baby powder, tape, friction and shearing, hot water or constrictive clothing  - Relieve pressure over bony prominences   - Do not massage red bony areas    Next Steps:  - Teach patient strategies to minimize risks   - Consider consults to  interdisciplinary teams   Outcome: Progressing

## 2025-05-16 NOTE — ASSESSMENT & PLAN NOTE
Initiated cyanocobalamin 500 mcg PO Qdaily  Outpatient follow-up with PCP in regards to this matter

## 2025-05-16 NOTE — ASSESSMENT & PLAN NOTE
Treated with Lasix 40 mg IV BID, which was discontinued on 05/15/2025 due to an increased serum creatinine level  Transition to Lasix 20 mg PO Qdaily per Cardiology  May require a thoracentesis

## 2025-05-17 PROBLEM — Z22.322 MRSA COLONIZATION: Status: ACTIVE | Noted: 2025-05-17

## 2025-05-17 PROBLEM — R73.9 HYPERGLYCEMIA: Status: ACTIVE | Noted: 2025-05-17

## 2025-05-17 LAB
ALBUMIN SERPL BCG-MCNC: 4.2 G/DL (ref 3.5–5)
ALP SERPL-CCNC: 51 U/L (ref 34–104)
ALT SERPL W P-5'-P-CCNC: 6 U/L (ref 7–52)
ANION GAP SERPL CALCULATED.3IONS-SCNC: 4 MMOL/L (ref 4–13)
AST SERPL W P-5'-P-CCNC: 12 U/L (ref 13–39)
BASE EX.OXY STD BLDV CALC-SCNC: 70.5 % (ref 60–80)
BASE EXCESS BLDV CALC-SCNC: 8.4 MMOL/L
BASOPHILS # BLD AUTO: 0.03 THOUSANDS/ÂΜL (ref 0–0.1)
BASOPHILS NFR BLD AUTO: 1 % (ref 0–1)
BILIRUB SERPL-MCNC: 0.46 MG/DL (ref 0.2–1)
BUN SERPL-MCNC: 31 MG/DL (ref 5–25)
CALCIUM SERPL-MCNC: 9.4 MG/DL (ref 8.4–10.2)
CHLORIDE SERPL-SCNC: 95 MMOL/L (ref 96–108)
CO2 SERPL-SCNC: 40 MMOL/L (ref 21–32)
CREAT SERPL-MCNC: 0.75 MG/DL (ref 0.6–1.3)
EOSINOPHIL # BLD AUTO: 0 THOUSAND/ÂΜL (ref 0–0.61)
EOSINOPHIL NFR BLD AUTO: 0 % (ref 0–6)
ERYTHROCYTE [DISTWIDTH] IN BLOOD BY AUTOMATED COUNT: 19.1 % (ref 11.6–15.1)
GFR SERPL CREATININE-BSD FRML MDRD: 74 ML/MIN/1.73SQ M
GLUCOSE SERPL-MCNC: 233 MG/DL (ref 65–140)
HCO3 BLDV-SCNC: 34.2 MMOL/L (ref 24–30)
HCT VFR BLD AUTO: 32.1 % (ref 34.8–46.1)
HGB BLD-MCNC: 8.8 G/DL (ref 11.5–15.4)
IMM GRANULOCYTES # BLD AUTO: 0.02 THOUSAND/UL (ref 0–0.2)
IMM GRANULOCYTES NFR BLD AUTO: 0 % (ref 0–2)
LYMPHOCYTES # BLD AUTO: 0.52 THOUSANDS/ÂΜL (ref 0.6–4.47)
LYMPHOCYTES NFR BLD AUTO: 11 % (ref 14–44)
MAGNESIUM SERPL-MCNC: 2.1 MG/DL (ref 1.9–2.7)
MCH RBC QN AUTO: 22.4 PG (ref 26.8–34.3)
MCHC RBC AUTO-ENTMCNC: 27.4 G/DL (ref 31.4–37.4)
MCV RBC AUTO: 82 FL (ref 82–98)
MONOCYTES # BLD AUTO: 0.25 THOUSAND/ÂΜL (ref 0.17–1.22)
MONOCYTES NFR BLD AUTO: 5 % (ref 4–12)
MRSA NOSE QL CULT: ABNORMAL
MRSA NOSE QL CULT: ABNORMAL
NEUTROPHILS # BLD AUTO: 4.04 THOUSANDS/ÂΜL (ref 1.85–7.62)
NEUTS SEG NFR BLD AUTO: 83 % (ref 43–75)
NRBC BLD AUTO-RTO: 0 /100 WBCS
O2 CT BLDV-SCNC: 10 ML/DL
PCO2 BLDV: 53.9 MM HG (ref 42–50)
PH BLDV: 7.42 [PH] (ref 7.3–7.4)
PHOSPHATE SERPL-MCNC: 3.3 MG/DL (ref 2.3–4.1)
PLATELET # BLD AUTO: 141 THOUSANDS/UL (ref 149–390)
PMV BLD AUTO: 11.4 FL (ref 8.9–12.7)
PO2 BLDV: 40.4 MM HG (ref 35–45)
POTASSIUM SERPL-SCNC: 4.4 MMOL/L (ref 3.5–5.3)
PROCALCITONIN SERPL-MCNC: <0.05 NG/ML
PROT SERPL-MCNC: 6.6 G/DL (ref 6.4–8.4)
RBC # BLD AUTO: 3.92 MILLION/UL (ref 3.81–5.12)
SODIUM SERPL-SCNC: 139 MMOL/L (ref 135–147)
WBC # BLD AUTO: 4.86 THOUSAND/UL (ref 4.31–10.16)

## 2025-05-17 PROCEDURE — 82805 BLOOD GASES W/O2 SATURATION: CPT | Performed by: INTERNAL MEDICINE

## 2025-05-17 PROCEDURE — 99232 SBSQ HOSP IP/OBS MODERATE 35: CPT | Performed by: INTERNAL MEDICINE

## 2025-05-17 PROCEDURE — 83735 ASSAY OF MAGNESIUM: CPT | Performed by: INTERNAL MEDICINE

## 2025-05-17 PROCEDURE — 94760 N-INVAS EAR/PLS OXIMETRY 1: CPT

## 2025-05-17 PROCEDURE — 84100 ASSAY OF PHOSPHORUS: CPT | Performed by: INTERNAL MEDICINE

## 2025-05-17 PROCEDURE — 85025 COMPLETE CBC W/AUTO DIFF WBC: CPT | Performed by: INTERNAL MEDICINE

## 2025-05-17 PROCEDURE — 84145 PROCALCITONIN (PCT): CPT | Performed by: INTERNAL MEDICINE

## 2025-05-17 PROCEDURE — 80053 COMPREHEN METABOLIC PANEL: CPT | Performed by: INTERNAL MEDICINE

## 2025-05-17 RX ADMIN — Medication 2000 UNITS: at 09:32

## 2025-05-17 RX ADMIN — POLYSACCHARIDE-IRON COMPLEX 150 MG: 150 CAPSULE ORAL at 09:32

## 2025-05-17 RX ADMIN — CYANOCOBALAMIN TAB 500 MCG 500 MCG: 500 TAB at 09:32

## 2025-05-17 RX ADMIN — ASPIRIN 81 MG: 81 TABLET, COATED ORAL at 09:32

## 2025-05-17 RX ADMIN — BUDESONIDE AND FORMOTEROL FUMARATE DIHYDRATE 2 PUFF: 80; 4.5 AEROSOL RESPIRATORY (INHALATION) at 18:02

## 2025-05-17 RX ADMIN — METHYLPREDNISOLONE SODIUM SUCCINATE 40 MG: 40 INJECTION, POWDER, FOR SOLUTION INTRAMUSCULAR; INTRAVENOUS at 21:46

## 2025-05-17 RX ADMIN — SERTRALINE HYDROCHLORIDE 50 MG: 50 TABLET ORAL at 09:32

## 2025-05-17 RX ADMIN — GABAPENTIN 100 MG: 100 CAPSULE ORAL at 18:01

## 2025-05-17 RX ADMIN — ENOXAPARIN SODIUM 40 MG: 40 INJECTION SUBCUTANEOUS at 18:01

## 2025-05-17 RX ADMIN — QUETIAPINE FUMARATE 25 MG: 25 TABLET ORAL at 21:46

## 2025-05-17 RX ADMIN — CEFTRIAXONE 1000 MG: 1 INJECTION, SOLUTION INTRAVENOUS at 09:32

## 2025-05-17 RX ADMIN — UMECLIDINIUM 1 PUFF: 62.5 AEROSOL, POWDER ORAL at 09:33

## 2025-05-17 RX ADMIN — GABAPENTIN 100 MG: 100 CAPSULE ORAL at 09:32

## 2025-05-17 RX ADMIN — METHYLPREDNISOLONE SODIUM SUCCINATE 40 MG: 40 INJECTION, POWDER, FOR SOLUTION INTRAMUSCULAR; INTRAVENOUS at 09:32

## 2025-05-17 RX ADMIN — BUDESONIDE AND FORMOTEROL FUMARATE DIHYDRATE 2 PUFF: 80; 4.5 AEROSOL RESPIRATORY (INHALATION) at 09:33

## 2025-05-17 NOTE — PROGRESS NOTES
Progress Note - Hospitalist   Name: Tiffani Francois 80 y.o. female I MRN: [id number]  Unit/Bed#: 409-01 I Date of Admission: 5/13/2025   Date of Service: 5/17/2025 I Hospital Day: 4    Assessment & Plan  Acute heart failure with preserved ejection fraction (HCC)  Wt Readings from Last 3 Encounters:   05/17/25 53.8 kg (118 lb 9.7 oz)   10/06/23 58.5 kg (129 lb)   05/20/23 55.6 kg (122 lb 9.6 oz)     I appreciate Cardiology's input.  Treated with Lasix 40 mg IV BID, which was discontinued on 05/15/2025 due to an increased serum creatinine level  Transitioned to Lasix 20 mg PO Qdaily per Cardiology, which will be held on 05/17/2025 due to hypotension  Monitor daily weights and strict intake/output measurements  PT/OT evaluations    Transthoracic echocardiogram (05/14/2025):    Interpretation Summary    Left Ventricle: Left ventricular cavity size is normal. Wall thickness is moderately increased. There is moderate concentric hypertrophy. The left ventricular ejection fraction is 55%. Systolic function is normal. Wall motion is normal. Unable to assess diastolic function due to atrial fibrillation.    Right Ventricle: Right ventricular cavity size is normal. Systolic function is mildly reduced.    Left Atrium: The atrium is severely dilated (>48 mL/m2).    Right Atrium: The atrium is mildly dilated.    Mitral Valve: There is mild annular calcification. There is mild regurgitation.    Tricuspid Valve: There is mild regurgitation.    Pericardium: There is a moderate pericardial effusion along the LV free wall measuring 1.3 cm.  The fluid exhibits no internal echoes. There is no echocardiographic evidence of tamponade. There is a right pleural effusion.  Acute on chronic respiratory failure with hypoxia and hypercapnia (HCC)  Presented with hypoxia with an oxygen saturation in the low 80's%  Chronically on 3 Lpm of continuous supplemental oxygen  Required 4 Lpm of continuous supplemental oxygen on 05/13/2025 with signs of  air hunger including observed accessory muscle use  Currently requiring 3 Lpm of supplemental oxygen on 05/17/2025  Incentive spirometry  Respiratory protocol  COPD with acute exacerbation (HCC)  Continue methylprednisolone 40 mg IV every 12 hours  Incentive spirometry  Respiratory protocol  Permanent atrial fibrillation (HCC)  Currently with a controlled ventricular rate without any AV-mariano blocking agents  Not on anticoagulation with a history of an intracranial hemorrhage  Continue aspirin 81 mg PO Qdaily  Pericardial effusion  No signs of cardiac tamponade    Transthoracic echocardiogram (05/14/2025):  Pericardium: There is a moderate pericardial effusion along the LV free wall measuring 1.3 cm. The fluid exhibits no internal echoes. There is no echocardiographic evidence of tamponade. There is a right pleural effusion.   Pleural effusion on right  Treated with Lasix 40 mg IV BID, which was discontinued on 05/15/2025 due to an increased serum creatinine level  Transitioned to Lasix 20 mg PO Qdaily per Cardiology, which will be held on 05/17/2025 due to hypotension  May require a thoracentesis  Acute cystitis without hematuria  Treat with ceftriaxone 1000 mg IV every 24 hours x 3 doses  Hyperphosphatemia  No treatment is indicated at this time  Follow the phosphorus level  Hypomagnesemia  Resolved with magnesium sulfate 2 grams IV x 1 dose on 05/14/2025  Follow the magnesium level    Results from last 7 days   Lab Units 05/17/25  0457 05/16/25  0615 05/15/25  0448   MAGNESIUM mg/dL 2.1 2.2 2.3       Thrombocytopenia (HCC)  Monitor for any signs of bleeding  Follow the platelet count  Hyperlipidemia  Continue statin therapy  Essential hypertension  Hold lisinopril with an increased serum creatinine level  Tobacco use  Smoking cessation counseling was given to the patient during the hospitalization.  Iron deficiency anemia  Initiated iron polysaccharides 150 mg PO Qdaily  Follow the CBC  Transfuse for a hemoglobin  less than 7 g/dl  Vitamin B12 deficiency  Initiated cyanocobalamin 500 mcg PO Qdaily  Outpatient follow-up with PCP in regards to this matter  Vitamin D insufficiency  Initiated cholecalciferol 2000 I.U. PO Qdaily  Outpatient follow-up with PCP in regards to this matter     Latest Reference Range & Units 05/15/25 04:48   VITAMIN D, 25-HYDROXY 30.0 - 100.0 ng/mL 21.0 (L)   (L): Data is abnormally low  Hyperglycemia  Likely due to IV methylprednisolone treatment  Check a HgbA1c level  MRSA colonization  Utilize the MRSA decolonization protocol    VTE Pharmacologic Prophylaxis: VTE Score: 9 High Risk (Score >/= 5) - Pharmacological DVT Prophylaxis Ordered: enoxaparin (Lovenox). Sequential Compression Devices Ordered.    Mobility:   Basic Mobility Inpatient Raw Score: 17  JH-HLM Goal: 5: Stand one or more mins  JH-HLM Achieved: 6: Walk 10 steps or more  JH-HLM Goal achieved. Continue to encourage appropriate mobility.    Patient Centered Rounds: I performed bedside rounds with nursing staff today.    Education and Discussions with Family / Patient: Updated  (daughter) via phone. (Razia)    Current Length of Stay: 4 day(s)  Current Patient Status: Inpatient   Certification Statement: The patient will continue to require additional inpatient hospital stay due to the need for IV antibiotic treatment and for IV methylprednisolone treatment.  Discharge Plan: Anticipate discharge in 48-72 hrs to home with home services.    Code Status: Level 1 - Full Code    Subjective   The patient was seen and examined.  The patient is experiencing hypotension this morning.  No chest pain.  No shortness of breath.  No abdominal pain.  No nausea or vomiting.      Objective :  Temp:  [98.1 °F (36.7 °C)-99.1 °F (37.3 °C)] 98.1 °F (36.7 °C)  HR:  [56] 56  BP: ()/(59-89) 136/77  Resp:  [18] 18  SpO2:  [94 %-99 %] 94 %  O2 Device: Nasal cannula  Nasal Cannula O2 Flow Rate (L/min):  [3 L/min] 3 L/min    Body mass index is  19.74 kg/m².     Input and Output Summary (last 24 hours):     Intake/Output Summary (Last 24 hours) at 5/17/2025 1203  Last data filed at 5/17/2025 0756  Gross per 24 hour   Intake 240 ml   Output --   Net 240 ml       Physical Exam  General:  NAD, follows commands  HEENT:  NC/AT, mucous membranes moist  Neck:  Supple, No JVP elevation  CV:  + S1, + S2, Bradycardic, Irregularly irregular rhythm  Pulm:  Expiratory wheezing bilaterally  Abd:  Soft, Non-tender, Non-distended  Ext:  No clubbing/cyanosis/edema  Skin:  No rashes  Neuro:  Awake, alert, oriented  Psych:  Normal mood and affect      Lines/Drains:        Telemetry:  Telemetry Orders (From admission, onward)               24 Hour Telemetry Monitoring  Continuous x 24 Hours (Telem)        Expiring   Question:  Reason for 24 Hour Telemetry  Answer:  Arrhythmias requiring acute medical intervention / PPM or ICD malfunction                     Telemetry Reviewed: Atrial fibrillation. HR averaging 52-65 bpm  Indication for Continued Telemetry Use: Arrthymias requiring medical therapy               Lab Results: I have reviewed the following results:   Results from last 7 days   Lab Units 05/17/25 0457   WBC Thousand/uL 4.86   HEMOGLOBIN g/dL 8.8*   HEMATOCRIT % 32.1*   PLATELETS Thousands/uL 141*   SEGS PCT % 83*   LYMPHO PCT % 11*   MONO PCT % 5   EOS PCT % 0     Results from last 7 days   Lab Units 05/17/25 0457   SODIUM mmol/L 139   POTASSIUM mmol/L 4.4   CHLORIDE mmol/L 95*   CO2 mmol/L 40*   BUN mg/dL 31*   CREATININE mg/dL 0.75   ANION GAP mmol/L 4   CALCIUM mg/dL 9.4   ALBUMIN g/dL 4.2   TOTAL BILIRUBIN mg/dL 0.46   ALK PHOS U/L 51   ALT U/L 6*   AST U/L 12*   GLUCOSE RANDOM mg/dL 233*                 Results from last 7 days   Lab Units 05/17/25  0457 05/16/25  0615 05/15/25  0448 05/14/25  0451 05/13/25  1628   LACTIC ACID mmol/L  --   --   --   --  1.7   PROCALCITONIN ng/ml <0.05 <0.05 0.07 <0.05  --        Recent Cultures (last 7 days):   Results from  last 7 days   Lab Units 05/13/25 1912   URINE CULTURE  30,000-39,000 cfu/ml Escherichia coli*       Imaging Results Review: No pertinent imaging studies reviewed.  Other Study Results Review: No additional pertinent studies reviewed.    Last 24 Hours Medication List:     Current Facility-Administered Medications:     acetaminophen (TYLENOL) tablet 325 mg, Q6H PRN    albuterol inhalation solution 2.5 mg, Q4H PRN    aspirin (ECOTRIN LOW STRENGTH) EC tablet 81 mg, Daily    atorvastatin (LIPITOR) tablet 40 mg, Daily With Dinner    budesonide-formoterol (SYMBICORT) 80-4.5 MCG/ACT inhaler 2 puff, BID    Cholecalciferol (VITAMIN D3) tablet 2,000 Units, Daily    cyanocobalamin (VITAMIN B-12) tablet 500 mcg, Daily    enoxaparin (LOVENOX) subcutaneous injection 40 mg, Q24H    gabapentin (NEURONTIN) capsule 100 mg, BID    iron polysaccharides (FERREX) capsule 150 mg, Daily    methylPREDNISolone sodium succinate (Solu-MEDROL) injection 40 mg, Q12H [place]    QUEtiapine (SEROquel) tablet 25 mg, HS    sertraline (ZOLOFT) tablet 50 mg, Daily    Insert peripheral IV, Once **AND** sodium chloride (PF) 0.9 % injection 3 mL, Q1H PRN    umeclidinium 62.5 mcg/actuation inhaler AEPB 1 puff, Daily    Administrative Statements   Today, Patient Was Seen By: Per Sandy, DO  I have spent a total time of 35 minutes in caring for this patient on the day of the visit/encounter including Diagnostic results, Prognosis, Risks and benefits of tx options, Instructions for management, Counseling / Coordination of care, and Documenting in the medical record.    **Please Note: This note may have been constructed using a voice recognition system.**

## 2025-05-17 NOTE — PLAN OF CARE
Problem: PAIN - ADULT  Goal: Verbalizes/displays adequate comfort level or baseline comfort level  Description: Interventions:  - Encourage patient to monitor pain and request assistance  - Assess pain using appropriate pain scale  - Administer analgesics based on type and severity of pain and evaluate response  - Implement non-pharmacological measures as appropriate and evaluate response  - Consider cultural and social influences on pain and pain management  - Notify physician/advanced practitioner if interventions unsuccessful or patient reports new pain  Outcome: Progressing     Problem: INFECTION - ADULT  Goal: Absence or prevention of progression during hospitalization  Description: INTERVENTIONS:  - Assess and monitor for signs and symptoms of infection  - Monitor lab/diagnostic results  - Monitor all insertion sites, i.e. indwelling lines, tubes, and drains  - Monitor endotracheal if appropriate and nasal secretions for changes in amount and color  - Dustin appropriate cooling/warming therapies per order  - Administer medications as ordered  - Instruct and encourage patient and family to use good hand hygiene technique  - Identify and instruct in appropriate isolation precautions for identified infection/condition  Outcome: Progressing     Problem: SAFETY ADULT  Goal: Patient will remain free of falls  Description: INTERVENTIONS:  - Educate patient/family on patient safety including physical limitations  - Instruct patient to call for assistance with activity   - Consult OT/PT to assist with strengthening/mobility   - Keep Call bell within reach  - Keep bed low and locked with side rails adjusted as appropriate  - Keep care items and personal belongings within reach  - Initiate and maintain comfort rounds  - Make Fall Risk Sign visible to staff  - Offer Toileting every 2 Hours, in advance of need  - Initiate/Maintain bed/chair alarm  - Obtain necessary fall risk management equipment: nonskid footwear  -  Apply yellow socks and bracelet for high fall risk patients  - Consider moving patient to room near nurses station  Outcome: Progressing  Goal: Maintain or return to baseline ADL function  Description: INTERVENTIONS:  -  Assess patient's ability to carry out ADLs; assess patient's baseline for ADL function and identify physical deficits which impact ability to perform ADLs (bathing, care of mouth/teeth, toileting, grooming, dressing, etc.)  - Assess/evaluate cause of self-care deficits   - Assess range of motion  - Assess patient's mobility; develop plan if impaired  - Assess patient's need for assistive devices and provide as appropriate  - Encourage maximum independence but intervene and supervise when necessary  - Involve family in performance of ADLs  - Assess for home care needs following discharge   - Consider OT consult to assist with ADL evaluation and planning for discharge  - Provide patient education as appropriate  Outcome: Progressing     Problem: DISCHARGE PLANNING  Goal: Discharge to home or other facility with appropriate resources  Description: INTERVENTIONS:  - Identify barriers to discharge w/patient and caregiver  - Arrange for needed discharge resources and transportation as appropriate  - Identify discharge learning needs (meds, wound care, etc.)  - Arrange for interpretive services to assist at discharge as needed  - Refer to Case Management Department for coordinating discharge planning if the patient needs post-hospital services based on physician/advanced practitioner order or complex needs related to functional status, cognitive ability, or social support system  Outcome: Progressing     Problem: Knowledge Deficit  Goal: Patient/family/caregiver demonstrates understanding of disease process, treatment plan, medications, and discharge instructions  Description: Complete learning assessment and assess knowledge base.  Interventions:  - Provide teaching at level of understanding  - Provide  teaching via preferred learning methods  Outcome: Progressing

## 2025-05-17 NOTE — ASSESSMENT & PLAN NOTE
Resolved with magnesium sulfate 2 grams IV x 1 dose on 05/14/2025  Follow the magnesium level    Results from last 7 days   Lab Units 05/17/25  0457 05/16/25  0615 05/15/25  0448   MAGNESIUM mg/dL 2.1 2.2 2.3

## 2025-05-17 NOTE — ASSESSMENT & PLAN NOTE
Treated with Lasix 40 mg IV BID, which was discontinued on 05/15/2025 due to an increased serum creatinine level  Transitioned to Lasix 20 mg PO Qdaily per Cardiology, which will be held on 05/17/2025 due to hypotension  May require a thoracentesis

## 2025-05-17 NOTE — ASSESSMENT & PLAN NOTE
Wt Readings from Last 3 Encounters:   05/17/25 53.8 kg (118 lb 9.7 oz)   10/06/23 58.5 kg (129 lb)   05/20/23 55.6 kg (122 lb 9.6 oz)     I appreciate Cardiology's input.  Treated with Lasix 40 mg IV BID, which was discontinued on 05/15/2025 due to an increased serum creatinine level  Transitioned to Lasix 20 mg PO Qdaily per Cardiology, which will be held on 05/17/2025 due to hypotension  Monitor daily weights and strict intake/output measurements  PT/OT evaluations    Transthoracic echocardiogram (05/14/2025):    Interpretation Summary    Left Ventricle: Left ventricular cavity size is normal. Wall thickness is moderately increased. There is moderate concentric hypertrophy. The left ventricular ejection fraction is 55%. Systolic function is normal. Wall motion is normal. Unable to assess diastolic function due to atrial fibrillation.    Right Ventricle: Right ventricular cavity size is normal. Systolic function is mildly reduced.    Left Atrium: The atrium is severely dilated (>48 mL/m2).    Right Atrium: The atrium is mildly dilated.    Mitral Valve: There is mild annular calcification. There is mild regurgitation.    Tricuspid Valve: There is mild regurgitation.    Pericardium: There is a moderate pericardial effusion along the LV free wall measuring 1.3 cm.  The fluid exhibits no internal echoes. There is no echocardiographic evidence of tamponade. There is a right pleural effusion.

## 2025-05-17 NOTE — ASSESSMENT & PLAN NOTE
Presented with hypoxia with an oxygen saturation in the low 80's%  Chronically on 3 Lpm of continuous supplemental oxygen  Required 4 Lpm of continuous supplemental oxygen on 05/13/2025 with signs of air hunger including observed accessory muscle use  Currently requiring 3 Lpm of supplemental oxygen on 05/17/2025  Incentive spirometry  Respiratory protocol

## 2025-05-17 NOTE — ASSESSMENT & PLAN NOTE
Currently with a controlled ventricular rate without any AV-mariano blocking agents  Not on anticoagulation with a history of an intracranial hemorrhage  Continue aspirin 81 mg PO Qdaily   22

## 2025-05-18 LAB
ALBUMIN SERPL BCG-MCNC: 3.8 G/DL (ref 3.5–5)
ALP SERPL-CCNC: 47 U/L (ref 34–104)
ALT SERPL W P-5'-P-CCNC: 7 U/L (ref 7–52)
ANION GAP SERPL CALCULATED.3IONS-SCNC: 8 MMOL/L (ref 4–13)
ANISOCYTOSIS BLD QL SMEAR: PRESENT
AST SERPL W P-5'-P-CCNC: 15 U/L (ref 13–39)
BASOPHILS # BLD AUTO: 0.05 THOUSANDS/ÂΜL (ref 0–0.1)
BASOPHILS NFR BLD AUTO: 1 % (ref 0–1)
BILIRUB SERPL-MCNC: 0.36 MG/DL (ref 0.2–1)
BUN SERPL-MCNC: 32 MG/DL (ref 5–25)
CALCIUM SERPL-MCNC: 8.7 MG/DL (ref 8.4–10.2)
CHLORIDE SERPL-SCNC: 95 MMOL/L (ref 96–108)
CO2 SERPL-SCNC: 34 MMOL/L (ref 21–32)
CREAT SERPL-MCNC: 0.8 MG/DL (ref 0.6–1.3)
EOSINOPHIL # BLD AUTO: 0.02 THOUSAND/ÂΜL (ref 0–0.61)
EOSINOPHIL NFR BLD AUTO: 0 % (ref 0–6)
ERYTHROCYTE [DISTWIDTH] IN BLOOD BY AUTOMATED COUNT: 18.9 % (ref 11.6–15.1)
EST. AVERAGE GLUCOSE BLD GHB EST-MCNC: 154 MG/DL
GFR SERPL CREATININE-BSD FRML MDRD: 69 ML/MIN/1.73SQ M
GLUCOSE SERPL-MCNC: 160 MG/DL (ref 65–140)
GLUCOSE SERPL-MCNC: 275 MG/DL (ref 65–140)
GLUCOSE SERPL-MCNC: 281 MG/DL (ref 65–140)
GLUCOSE SERPL-MCNC: 296 MG/DL (ref 65–140)
HBA1C MFR BLD: 7 %
HCT VFR BLD AUTO: 29.7 % (ref 34.8–46.1)
HGB BLD-MCNC: 8.2 G/DL (ref 11.5–15.4)
IMM GRANULOCYTES # BLD AUTO: 0.02 THOUSAND/UL (ref 0–0.2)
IMM GRANULOCYTES NFR BLD AUTO: 0 % (ref 0–2)
LG PLATELETS BLD QL SMEAR: PRESENT
LYMPHOCYTES # BLD AUTO: 0.35 THOUSANDS/ÂΜL (ref 0.6–4.47)
LYMPHOCYTES NFR BLD AUTO: 5 % (ref 14–44)
MAGNESIUM SERPL-MCNC: 1.9 MG/DL (ref 1.9–2.7)
MCH RBC QN AUTO: 22.6 PG (ref 26.8–34.3)
MCHC RBC AUTO-ENTMCNC: 27.6 G/DL (ref 31.4–37.4)
MCV RBC AUTO: 82 FL (ref 82–98)
MICROCYTES BLD QL AUTO: PRESENT
MONOCYTES # BLD AUTO: 0.07 THOUSAND/ÂΜL (ref 0.17–1.22)
MONOCYTES NFR BLD AUTO: 1 % (ref 4–12)
NEUTROPHILS # BLD AUTO: 7.26 THOUSANDS/ÂΜL (ref 1.85–7.62)
NEUTS SEG NFR BLD AUTO: 93 % (ref 43–75)
NRBC BLD AUTO-RTO: 0 /100 WBCS
PHOSPHATE SERPL-MCNC: 2.7 MG/DL (ref 2.3–4.1)
PLATELET # BLD AUTO: 128 THOUSANDS/UL (ref 149–390)
PLATELET BLD QL SMEAR: ABNORMAL
PMV BLD AUTO: 11.5 FL (ref 8.9–12.7)
POTASSIUM SERPL-SCNC: 5.1 MMOL/L (ref 3.5–5.3)
PROCALCITONIN SERPL-MCNC: <0.05 NG/ML
PROT SERPL-MCNC: 6.1 G/DL (ref 6.4–8.4)
RBC # BLD AUTO: 3.63 MILLION/UL (ref 3.81–5.12)
RBC MORPH BLD: PRESENT
SODIUM SERPL-SCNC: 137 MMOL/L (ref 135–147)
WBC # BLD AUTO: 7.77 THOUSAND/UL (ref 4.31–10.16)

## 2025-05-18 PROCEDURE — 94760 N-INVAS EAR/PLS OXIMETRY 1: CPT

## 2025-05-18 PROCEDURE — 85025 COMPLETE CBC W/AUTO DIFF WBC: CPT | Performed by: INTERNAL MEDICINE

## 2025-05-18 PROCEDURE — 83735 ASSAY OF MAGNESIUM: CPT | Performed by: INTERNAL MEDICINE

## 2025-05-18 PROCEDURE — 84145 PROCALCITONIN (PCT): CPT | Performed by: INTERNAL MEDICINE

## 2025-05-18 PROCEDURE — 83036 HEMOGLOBIN GLYCOSYLATED A1C: CPT | Performed by: INTERNAL MEDICINE

## 2025-05-18 PROCEDURE — 84100 ASSAY OF PHOSPHORUS: CPT | Performed by: INTERNAL MEDICINE

## 2025-05-18 PROCEDURE — 82948 REAGENT STRIP/BLOOD GLUCOSE: CPT

## 2025-05-18 PROCEDURE — 80053 COMPREHEN METABOLIC PANEL: CPT | Performed by: INTERNAL MEDICINE

## 2025-05-18 PROCEDURE — 99232 SBSQ HOSP IP/OBS MODERATE 35: CPT | Performed by: INTERNAL MEDICINE

## 2025-05-18 RX ORDER — FUROSEMIDE 20 MG/1
20 TABLET ORAL DAILY
Status: DISCONTINUED | OUTPATIENT
Start: 2025-05-18 | End: 2025-05-20 | Stop reason: HOSPADM

## 2025-05-18 RX ORDER — INSULIN LISPRO 100 [IU]/ML
1-5 INJECTION, SOLUTION INTRAVENOUS; SUBCUTANEOUS
Status: DISCONTINUED | OUTPATIENT
Start: 2025-05-18 | End: 2025-05-20 | Stop reason: HOSPADM

## 2025-05-18 RX ORDER — LANOLIN ALCOHOL/MO/W.PET/CERES
400 CREAM (GRAM) TOPICAL ONCE
Status: COMPLETED | OUTPATIENT
Start: 2025-05-18 | End: 2025-05-18

## 2025-05-18 RX ORDER — MUPIROCIN 20 MG/G
OINTMENT TOPICAL 2 TIMES DAILY
Qty: 30 G | Refills: 0 | Status: SHIPPED | OUTPATIENT
Start: 2025-05-18 | End: 2025-05-23

## 2025-05-18 RX ORDER — CHLORHEXIDINE GLUCONATE 40 MG/ML
1 SOLUTION TOPICAL DAILY
Qty: 946 ML | Refills: 0 | Status: SHIPPED | OUTPATIENT
Start: 2025-05-18

## 2025-05-18 RX ADMIN — ASPIRIN 81 MG: 81 TABLET, COATED ORAL at 09:29

## 2025-05-18 RX ADMIN — FUROSEMIDE 20 MG: 20 TABLET ORAL at 11:50

## 2025-05-18 RX ADMIN — INSULIN LISPRO 1 UNITS: 100 INJECTION, SOLUTION INTRAVENOUS; SUBCUTANEOUS at 21:31

## 2025-05-18 RX ADMIN — CYANOCOBALAMIN TAB 500 MCG 500 MCG: 500 TAB at 09:29

## 2025-05-18 RX ADMIN — INSULIN LISPRO 2 UNITS: 100 INJECTION, SOLUTION INTRAVENOUS; SUBCUTANEOUS at 12:06

## 2025-05-18 RX ADMIN — GABAPENTIN 100 MG: 100 CAPSULE ORAL at 09:29

## 2025-05-18 RX ADMIN — UMECLIDINIUM 1 PUFF: 62.5 AEROSOL, POWDER ORAL at 09:32

## 2025-05-18 RX ADMIN — BUDESONIDE AND FORMOTEROL FUMARATE DIHYDRATE 2 PUFF: 80; 4.5 AEROSOL RESPIRATORY (INHALATION) at 09:32

## 2025-05-18 RX ADMIN — ATORVASTATIN CALCIUM 40 MG: 40 TABLET, FILM COATED ORAL at 17:32

## 2025-05-18 RX ADMIN — QUETIAPINE FUMARATE 25 MG: 25 TABLET ORAL at 21:31

## 2025-05-18 RX ADMIN — GABAPENTIN 100 MG: 100 CAPSULE ORAL at 17:32

## 2025-05-18 RX ADMIN — Medication 400 MG: at 09:29

## 2025-05-18 RX ADMIN — MUPIROCIN 1 APPLICATION: 20 OINTMENT TOPICAL at 09:29

## 2025-05-18 RX ADMIN — ENOXAPARIN SODIUM 40 MG: 40 INJECTION SUBCUTANEOUS at 17:36

## 2025-05-18 RX ADMIN — Medication 2000 UNITS: at 09:29

## 2025-05-18 RX ADMIN — INSULIN LISPRO 2 UNITS: 100 INJECTION, SOLUTION INTRAVENOUS; SUBCUTANEOUS at 17:28

## 2025-05-18 RX ADMIN — POLYSACCHARIDE-IRON COMPLEX 150 MG: 150 CAPSULE ORAL at 09:29

## 2025-05-18 RX ADMIN — MUPIROCIN 1 APPLICATION: 20 OINTMENT TOPICAL at 21:31

## 2025-05-18 RX ADMIN — BUDESONIDE AND FORMOTEROL FUMARATE DIHYDRATE 2 PUFF: 80; 4.5 AEROSOL RESPIRATORY (INHALATION) at 17:28

## 2025-05-18 RX ADMIN — METHYLPREDNISOLONE SODIUM SUCCINATE 40 MG: 40 INJECTION, POWDER, FOR SOLUTION INTRAMUSCULAR; INTRAVENOUS at 21:23

## 2025-05-18 RX ADMIN — SERTRALINE HYDROCHLORIDE 50 MG: 50 TABLET ORAL at 09:29

## 2025-05-18 RX ADMIN — METHYLPREDNISOLONE SODIUM SUCCINATE 40 MG: 40 INJECTION, POWDER, FOR SOLUTION INTRAMUSCULAR; INTRAVENOUS at 14:32

## 2025-05-18 NOTE — PROGRESS NOTES
Progress Note - Hospitalist   Name: Tiffani Francois 80 y.o. female I MRN: [id number]  Unit/Bed#: 409-01 I Date of Admission: 5/13/2025   Date of Service: 5/18/2025 I Hospital Day: 5    Assessment & Plan  Acute heart failure with preserved ejection fraction (HCC)  Wt Readings from Last 3 Encounters:   05/18/25 54.5 kg (120 lb 2.4 oz)   10/06/23 58.5 kg (129 lb)   05/20/23 55.6 kg (122 lb 9.6 oz)     I appreciate Cardiology's input.  Treated with Lasix 40 mg IV BID, which was discontinued on 05/15/2025 due to an increased serum creatinine level  Transitioned to Lasix 20 mg PO Qdaily per Cardiology, which was held on 05/17/2025 due to hypotension  Trial of Lasix 20 mg PO Qdaily on 05/18/2025  Monitor daily weights and strict intake/output measurements  PT/OT evaluations    Transthoracic echocardiogram (05/14/2025):    Interpretation Summary    Left Ventricle: Left ventricular cavity size is normal. Wall thickness is moderately increased. There is moderate concentric hypertrophy. The left ventricular ejection fraction is 55%. Systolic function is normal. Wall motion is normal. Unable to assess diastolic function due to atrial fibrillation.    Right Ventricle: Right ventricular cavity size is normal. Systolic function is mildly reduced.    Left Atrium: The atrium is severely dilated (>48 mL/m2).    Right Atrium: The atrium is mildly dilated.    Mitral Valve: There is mild annular calcification. There is mild regurgitation.    Tricuspid Valve: There is mild regurgitation.    Pericardium: There is a moderate pericardial effusion along the LV free wall measuring 1.3 cm.  The fluid exhibits no internal echoes. There is no echocardiographic evidence of tamponade. There is a right pleural effusion.  Acute on chronic respiratory failure with hypoxia and hypercapnia (HCC)  Presented with hypoxia with an oxygen saturation in the low 80's%  Chronically on 3 Lpm of continuous supplemental oxygen  Required 4 Lpm of continuous  supplemental oxygen on 05/13/2025 with signs of air hunger including observed accessory muscle use  Currently requiring 2 Lpm of supplemental oxygen on 05/18/2025  Incentive spirometry  Respiratory protocol  COPD with acute exacerbation (HCC)  Continue methylprednisolone 40 mg IV every 12 hours  Incentive spirometry  Respiratory protocol  Permanent atrial fibrillation (HCC)  Currently with a controlled ventricular rate without any AV-mariano blocking agents  Not on anticoagulation with a history of an intracranial hemorrhage  Continue aspirin 81 mg PO Qdaily  Pericardial effusion  No signs of cardiac tamponade    Transthoracic echocardiogram (05/14/2025):  Pericardium: There is a moderate pericardial effusion along the LV free wall measuring 1.3 cm. The fluid exhibits no internal echoes. There is no echocardiographic evidence of tamponade. There is a right pleural effusion.   Pleural effusion on right  Treated with Lasix 40 mg IV BID, which was discontinued on 05/15/2025 due to an increased serum creatinine level  Transitioned to Lasix 20 mg PO Qdaily per Cardiology, which will be held on 05/17/2025 due to hypotension  May require a thoracentesis  Acute cystitis without hematuria  Completed a 3-day course of ceftriaxone 1000 mg IV every 24 hours during the hospitalization  Hyperphosphatemia  No treatment is indicated at this time  Follow the phosphorus level  Hypomagnesemia  Resolved with magnesium sulfate 2 grams IV x 1 dose on 05/14/2025  Follow the magnesium level    Results from last 7 days   Lab Units 05/18/25  0326 05/17/25  0457 05/16/25  0615   MAGNESIUM mg/dL 1.9 2.1 2.2     Thrombocytopenia (HCC)  Monitor for any signs of bleeding  Follow the platelet count  Hyperlipidemia  Continue statin therapy  Essential hypertension  Hold lisinopril with an increased serum creatinine level  Tobacco use  Smoking cessation counseling was given to the patient during the hospitalization.  Iron deficiency anemia  Initiated  iron polysaccharides 150 mg PO Qdaily  Follow the CBC  Transfuse for a hemoglobin less than 7 g/dl  Vitamin B12 deficiency  Initiated cyanocobalamin 500 mcg PO Qdaily, which should be continued upon discharge  Outpatient follow-up with PCP in regards to this matter  Vitamin D insufficiency  Initiated cholecalciferol 2000 I.U. PO Qdaily, which should be continued upon discharge  Outpatient follow-up with PCP in regards to this matter     Latest Reference Range & Units 05/15/25 04:48   VITAMIN D, 25-HYDROXY 30.0 - 100.0 ng/mL 21.0 (L)   (L): Data is abnormally low  Hyperglycemia  Likely due to IV methylprednisolone treatment  Check a HgbA1c level  MRSA colonization  Utilize the MRSA decolonization protocol    VTE Pharmacologic Prophylaxis: VTE Score: 9- Pharmacological DVT Prophylaxis Lovenox 40 mg SQ every 24 hours. Sequential Compression Devices Ordered.    Mobility:   Basic Mobility Inpatient Raw Score: 17  JH-HLM Goal: 5: Stand one or more mins  JH-HLM Achieved: 6: Walk 10 steps or more  JH-HLM Goal achieved. Continue to encourage appropriate mobility.    Patient Centered Rounds: I performed bedside rounds with nursing staff today.     Education and Discussions with Family / Patient: Updated  (daughter) via phone.    Current Length of Stay: 5 day(s)  Current Patient Status: Inpatient   Certification Statement: The patient will continue to require additional inpatient hospital stay due to the need for   Discharge Plan: Anticipate discharge in 24-48 hrs to home with home services.    Code Status: Level 1 - Full Code    Subjective   The patient was seen and examined.  The patient is sleeping comfortable this morning.  She experienced an episode of confusion earlier and pulled out her peripheral IV site.      Objective :  Temp:  [97.8 °F (36.6 °C)-98.5 °F (36.9 °C)] 98.5 °F (36.9 °C)  HR:  [57-58] 58  BP: (133-145)/(53-76) 140/65  Resp:  [3-18] 16  SpO2:  [94 %-98 %] 98 %  O2 Device: Nasal cannula  Nasal  Cannula O2 Flow Rate (L/min):  [2 L/min] 2 L/min    Body mass index is 19.99 kg/m².     Input and Output Summary (last 24 hours):     Intake/Output Summary (Last 24 hours) at 5/18/2025 1104  Last data filed at 5/18/2025 0339  Gross per 24 hour   Intake 1080 ml   Output 948 ml   Net 132 ml       Physical Exam  General:  NAD, follows commands  HEENT:  NC/AT, mucous membranes moist  Neck:  Supple, No JVP elevation  CV:  + S1, + S2, Bradycardic, Irregularly irregular rhythm  Pulm:  Improving expiratory wheezing bilaterally  Abd:  Soft, Non-tender, Non-distended  Ext:  No clubbing/cyanosis/edema  Skin:  No rashes  Neuro:  Awakens to verbal stimuli, confused, not oriented  Psych:  Confused mood and affect      Lines/Drains:        Telemetry:  Telemetry Orders (From admission, onward)               24 Hour Telemetry Monitoring  Continuous x 24 Hours (Telem)        Expiring   Question:  Reason for 24 Hour Telemetry  Answer:  Arrhythmias requiring acute medical intervention / PPM or ICD malfunction                     Telemetry Reviewed: Atrial fibrillation. HR averaging 57-58 bpm  Indication for Continued Telemetry Use: Arrthymias requiring medical therapy               Lab Results: I have reviewed the following results:   Results from last 7 days   Lab Units 05/18/25  0326   WBC Thousand/uL 7.77   HEMOGLOBIN g/dL 8.2*   HEMATOCRIT % 29.7*   PLATELETS Thousands/uL 128*   SEGS PCT % 93*   LYMPHO PCT % 5*   MONO PCT % 1*   EOS PCT % 0     Results from last 7 days   Lab Units 05/18/25  0326   SODIUM mmol/L 137   POTASSIUM mmol/L 5.1   CHLORIDE mmol/L 95*   CO2 mmol/L 34*   BUN mg/dL 32*   CREATININE mg/dL 0.80   ANION GAP mmol/L 8   CALCIUM mg/dL 8.7   ALBUMIN g/dL 3.8   TOTAL BILIRUBIN mg/dL 0.36   ALK PHOS U/L 47   ALT U/L 7   AST U/L 15   GLUCOSE RANDOM mg/dL 275*                 Results from last 7 days   Lab Units 05/18/25  0326 05/17/25  0457 05/16/25  0615 05/15/25  0448 05/14/25  0451 05/13/25  1628   LACTIC ACID  mmol/L  --   --   --   --   --  1.7   PROCALCITONIN ng/ml <0.05 <0.05 <0.05 0.07 <0.05  --        Recent Cultures (last 7 days):   Results from last 7 days   Lab Units 05/13/25 1912   URINE CULTURE  30,000-39,000 cfu/ml Escherichia coli*       Imaging Results Review: No pertinent imaging studies reviewed.  Other Study Results Review: No additional pertinent studies reviewed.    Last 24 Hours Medication List:     Current Facility-Administered Medications:     acetaminophen (TYLENOL) tablet 325 mg, Q6H PRN    albuterol inhalation solution 2.5 mg, Q4H PRN    aspirin (ECOTRIN LOW STRENGTH) EC tablet 81 mg, Daily    atorvastatin (LIPITOR) tablet 40 mg, Daily With Dinner    budesonide-formoterol (SYMBICORT) 80-4.5 MCG/ACT inhaler 2 puff, BID    Cholecalciferol (VITAMIN D3) tablet 2,000 Units, Daily    cyanocobalamin (VITAMIN B-12) tablet 500 mcg, Daily    enoxaparin (LOVENOX) subcutaneous injection 40 mg, Q24H    gabapentin (NEURONTIN) capsule 100 mg, BID    insulin lispro (HumALOG/ADMELOG) 100 units/mL subcutaneous injection 1-5 Units, TID AC **AND** Fingerstick Glucose (POCT), TID AC    insulin lispro (HumALOG/ADMELOG) 100 units/mL subcutaneous injection 1-5 Units, HS    iron polysaccharides (FERREX) capsule 150 mg, Daily    methylPREDNISolone sodium succinate (Solu-MEDROL) injection 40 mg, Q12H [place]    mupirocin (BACTROBAN) 2 % nasal ointment, Q12H [place]    QUEtiapine (SEROquel) tablet 25 mg, HS    sertraline (ZOLOFT) tablet 50 mg, Daily    Insert peripheral IV, Once **AND** sodium chloride (PF) 0.9 % injection 3 mL, Q1H PRN    umeclidinium 62.5 mcg/actuation inhaler AEPB 1 puff, Daily    Administrative Statements   Today, Patient Was Seen By: Per Sandy, DO  I have spent a total time of 35 minutes in caring for this patient on the day of the visit/encounter including Diagnostic results, Prognosis, Risks and benefits of tx options, Instructions for management, Counseling / Coordination of care, Documenting in  the medical record, and Reviewing/placing orders in the medical record (including tests, medications, and/or procedures).    **Please Note: This note may have been constructed using a voice recognition system.**

## 2025-05-18 NOTE — PROGRESS NOTES
Pt ripped out IV at 0800. Replaced with a 22g in L wrist. Wrapped in kerlix dressing. Pt ripped out replacement IV  at 1500. Pt currently refusing new IV. Will re-attempt later

## 2025-05-18 NOTE — ASSESSMENT & PLAN NOTE
Initiated cyanocobalamin 500 mcg PO Qdaily, which should be continued upon discharge  Outpatient follow-up with PCP in regards to this matter

## 2025-05-18 NOTE — PLAN OF CARE
Problem: PAIN - ADULT  Goal: Verbalizes/displays adequate comfort level or baseline comfort level  Description: Interventions:  - Encourage patient to monitor pain and request assistance  - Assess pain using appropriate pain scale  - Administer analgesics based on type and severity of pain and evaluate response  - Implement non-pharmacological measures as appropriate and evaluate response  - Consider cultural and social influences on pain and pain management  - Notify physician/advanced practitioner if interventions unsuccessful or patient reports new pain  Outcome: Progressing     Problem: INFECTION - ADULT  Goal: Absence or prevention of progression during hospitalization  Description: INTERVENTIONS:  - Assess and monitor for signs and symptoms of infection  - Monitor lab/diagnostic results  - Monitor all insertion sites, i.e. indwelling lines, tubes, and drains  - Monitor endotracheal if appropriate and nasal secretions for changes in amount and color  - [place] appropriate cooling/warming therapies per order  - Administer medications as ordered  - Instruct and encourage patient and family to use good hand hygiene technique  - Identify and instruct in appropriate isolation precautions for identified infection/condition  Outcome: Progressing     Problem: SAFETY ADULT  Goal: Patient will remain free of falls  Description: INTERVENTIONS:  - Educate patient/family on patient safety including physical limitations  - Instruct patient to call for assistance with activity   - Consult OT/PT to assist with strengthening/mobility   - Keep Call bell within reach  - Keep bed low and locked with side rails adjusted as appropriate  - Keep care items and personal belongings within reach  - Initiate and maintain comfort rounds  - Make Fall Risk Sign visible to staff  - Offer Toileting every 2 Hours, in advance of need  - Initiate/Maintain bed/chair alarm  - Obtain necessary fall risk management equipment: nonskid footwear  -  Apply yellow socks and bracelet for high fall risk patients  - Consider moving patient to room near nurses station  Outcome: Progressing  Goal: Maintain or return to baseline ADL function  Description: INTERVENTIONS:  -  Assess patient's ability to carry out ADLs; assess patient's baseline for ADL function and identify physical deficits which impact ability to perform ADLs (bathing, care of mouth/teeth, toileting, grooming, dressing, etc.)  - Assess/evaluate cause of self-care deficits   - Assess range of motion  - Assess patient's mobility; develop plan if impaired  - Assess patient's need for assistive devices and provide as appropriate  - Encourage maximum independence but intervene and supervise when necessary  - Involve family in performance of ADLs  - Assess for home care needs following discharge   - Consider OT consult to assist with ADL evaluation and planning for discharge  - Provide patient education as appropriate  Outcome: Progressing     Problem: DISCHARGE PLANNING  Goal: Discharge to home or other facility with appropriate resources  Description: INTERVENTIONS:  - Identify barriers to discharge w/patient and caregiver  - Arrange for needed discharge resources and transportation as appropriate  - Identify discharge learning needs (meds, wound care, etc.)  - Arrange for interpretive services to assist at discharge as needed  - Refer to Case Management Department for coordinating discharge planning if the patient needs post-hospital services based on physician/advanced practitioner order or complex needs related to functional status, cognitive ability, or social support system  Outcome: Progressing

## 2025-05-18 NOTE — ASSESSMENT & PLAN NOTE
Initiated cholecalciferol 2000 I.U. PO Qdaily, which should be continued upon discharge  Outpatient follow-up with PCP in regards to this matter     Latest Reference Range & Units 05/15/25 04:48   VITAMIN D, 25-HYDROXY 30.0 - 100.0 ng/mL 21.0 (L)   (L): Data is abnormally low

## 2025-05-18 NOTE — ASSESSMENT & PLAN NOTE
Resolved with magnesium sulfate 2 grams IV x 1 dose on 05/14/2025  Follow the magnesium level    Results from last 7 days   Lab Units 05/18/25  0326 05/17/25  0457 05/16/25  0615   MAGNESIUM mg/dL 1.9 2.1 2.2

## 2025-05-18 NOTE — ASSESSMENT & PLAN NOTE
Wt Readings from Last 3 Encounters:   05/18/25 54.5 kg (120 lb 2.4 oz)   10/06/23 58.5 kg (129 lb)   05/20/23 55.6 kg (122 lb 9.6 oz)     I appreciate Cardiology's input.  Treated with Lasix 40 mg IV BID, which was discontinued on 05/15/2025 due to an increased serum creatinine level  Transitioned to Lasix 20 mg PO Qdaily per Cardiology, which was held on 05/17/2025 due to hypotension  Trial of Lasix 20 mg PO Qdaily on 05/18/2025  Monitor daily weights and strict intake/output measurements  PT/OT evaluations    Transthoracic echocardiogram (05/14/2025):    Interpretation Summary    Left Ventricle: Left ventricular cavity size is normal. Wall thickness is moderately increased. There is moderate concentric hypertrophy. The left ventricular ejection fraction is 55%. Systolic function is normal. Wall motion is normal. Unable to assess diastolic function due to atrial fibrillation.    Right Ventricle: Right ventricular cavity size is normal. Systolic function is mildly reduced.    Left Atrium: The atrium is severely dilated (>48 mL/m2).    Right Atrium: The atrium is mildly dilated.    Mitral Valve: There is mild annular calcification. There is mild regurgitation.    Tricuspid Valve: There is mild regurgitation.    Pericardium: There is a moderate pericardial effusion along the LV free wall measuring 1.3 cm.  The fluid exhibits no internal echoes. There is no echocardiographic evidence of tamponade. There is a right pleural effusion.

## 2025-05-18 NOTE — ASSESSMENT & PLAN NOTE
Presented with hypoxia with an oxygen saturation in the low 80's%  Chronically on 3 Lpm of continuous supplemental oxygen  Required 4 Lpm of continuous supplemental oxygen on 05/13/2025 with signs of air hunger including observed accessory muscle use  Currently requiring 2 Lpm of supplemental oxygen on 05/18/2025  Incentive spirometry  Respiratory protocol

## 2025-05-19 LAB
ALBUMIN SERPL BCG-MCNC: 3.6 G/DL (ref 3.5–5)
ALP SERPL-CCNC: 42 U/L (ref 34–104)
ALT SERPL W P-5'-P-CCNC: 8 U/L (ref 7–52)
ANION GAP SERPL CALCULATED.3IONS-SCNC: 4 MMOL/L (ref 4–13)
AST SERPL W P-5'-P-CCNC: 15 U/L (ref 13–39)
BASOPHILS # BLD AUTO: 0.09 THOUSANDS/ÂΜL (ref 0–0.1)
BASOPHILS NFR BLD AUTO: 1 % (ref 0–1)
BILIRUB SERPL-MCNC: 0.37 MG/DL (ref 0.2–1)
BUN SERPL-MCNC: 33 MG/DL (ref 5–25)
CALCIUM SERPL-MCNC: 8.7 MG/DL (ref 8.4–10.2)
CHLORIDE SERPL-SCNC: 97 MMOL/L (ref 96–108)
CO2 SERPL-SCNC: 37 MMOL/L (ref 21–32)
CREAT SERPL-MCNC: 0.84 MG/DL (ref 0.6–1.3)
EOSINOPHIL # BLD AUTO: 0.23 THOUSAND/ÂΜL (ref 0–0.61)
EOSINOPHIL NFR BLD AUTO: 3 % (ref 0–6)
ERYTHROCYTE [DISTWIDTH] IN BLOOD BY AUTOMATED COUNT: 19.1 % (ref 11.6–15.1)
GFR SERPL CREATININE-BSD FRML MDRD: 65 ML/MIN/1.73SQ M
GLUCOSE SERPL-MCNC: 156 MG/DL (ref 65–140)
GLUCOSE SERPL-MCNC: 179 MG/DL (ref 65–140)
GLUCOSE SERPL-MCNC: 221 MG/DL (ref 65–140)
GLUCOSE SERPL-MCNC: 285 MG/DL (ref 65–140)
GLUCOSE SERPL-MCNC: 432 MG/DL (ref 65–140)
HCT VFR BLD AUTO: 29.6 % (ref 34.8–46.1)
HGB BLD-MCNC: 8.1 G/DL (ref 11.5–15.4)
IMM GRANULOCYTES # BLD AUTO: 0.02 THOUSAND/UL (ref 0–0.2)
IMM GRANULOCYTES NFR BLD AUTO: 0 % (ref 0–2)
LYMPHOCYTES # BLD AUTO: 1.72 THOUSANDS/ÂΜL (ref 0.6–4.47)
LYMPHOCYTES NFR BLD AUTO: 20 % (ref 14–44)
MAGNESIUM SERPL-MCNC: 1.9 MG/DL (ref 1.9–2.7)
MCH RBC QN AUTO: 22.3 PG (ref 26.8–34.3)
MCHC RBC AUTO-ENTMCNC: 27.4 G/DL (ref 31.4–37.4)
MCV RBC AUTO: 82 FL (ref 82–98)
MONOCYTES # BLD AUTO: 0.66 THOUSAND/ÂΜL (ref 0.17–1.22)
MONOCYTES NFR BLD AUTO: 8 % (ref 4–12)
NEUTROPHILS # BLD AUTO: 5.96 THOUSANDS/ÂΜL (ref 1.85–7.62)
NEUTS SEG NFR BLD AUTO: 68 % (ref 43–75)
NRBC BLD AUTO-RTO: 0 /100 WBCS
PHOSPHATE SERPL-MCNC: 2.9 MG/DL (ref 2.3–4.1)
PLATELET # BLD AUTO: 138 THOUSANDS/UL (ref 149–390)
PMV BLD AUTO: 11.1 FL (ref 8.9–12.7)
POTASSIUM SERPL-SCNC: 4.9 MMOL/L (ref 3.5–5.3)
PROT SERPL-MCNC: 5.7 G/DL (ref 6.4–8.4)
RBC # BLD AUTO: 3.63 MILLION/UL (ref 3.81–5.12)
SODIUM SERPL-SCNC: 138 MMOL/L (ref 135–147)
WBC # BLD AUTO: 8.68 THOUSAND/UL (ref 4.31–10.16)

## 2025-05-19 PROCEDURE — 80053 COMPREHEN METABOLIC PANEL: CPT | Performed by: INTERNAL MEDICINE

## 2025-05-19 PROCEDURE — 97116 GAIT TRAINING THERAPY: CPT

## 2025-05-19 PROCEDURE — 99233 SBSQ HOSP IP/OBS HIGH 50: CPT | Performed by: HOSPITALIST

## 2025-05-19 PROCEDURE — 85025 COMPLETE CBC W/AUTO DIFF WBC: CPT | Performed by: INTERNAL MEDICINE

## 2025-05-19 PROCEDURE — 84100 ASSAY OF PHOSPHORUS: CPT | Performed by: INTERNAL MEDICINE

## 2025-05-19 PROCEDURE — 83735 ASSAY OF MAGNESIUM: CPT | Performed by: INTERNAL MEDICINE

## 2025-05-19 PROCEDURE — 97535 SELF CARE MNGMENT TRAINING: CPT

## 2025-05-19 PROCEDURE — 82948 REAGENT STRIP/BLOOD GLUCOSE: CPT

## 2025-05-19 RX ORDER — METHYLPREDNISOLONE SODIUM SUCCINATE 40 MG/ML
40 INJECTION, POWDER, LYOPHILIZED, FOR SOLUTION INTRAMUSCULAR; INTRAVENOUS DAILY
Status: DISCONTINUED | OUTPATIENT
Start: 2025-05-19 | End: 2025-05-19

## 2025-05-19 RX ORDER — PREDNISONE 20 MG/1
40 TABLET ORAL DAILY
Status: DISCONTINUED | OUTPATIENT
Start: 2025-05-19 | End: 2025-05-20

## 2025-05-19 RX ORDER — LISINOPRIL 10 MG/1
10 TABLET ORAL DAILY
Status: DISCONTINUED | OUTPATIENT
Start: 2025-05-19 | End: 2025-05-20 | Stop reason: HOSPADM

## 2025-05-19 RX ADMIN — ASPIRIN 81 MG: 81 TABLET, COATED ORAL at 08:36

## 2025-05-19 RX ADMIN — BUDESONIDE AND FORMOTEROL FUMARATE DIHYDRATE 2 PUFF: 80; 4.5 AEROSOL RESPIRATORY (INHALATION) at 08:37

## 2025-05-19 RX ADMIN — INSULIN LISPRO 1 UNITS: 100 INJECTION, SOLUTION INTRAVENOUS; SUBCUTANEOUS at 08:37

## 2025-05-19 RX ADMIN — MUPIROCIN 1 APPLICATION: 20 OINTMENT TOPICAL at 21:07

## 2025-05-19 RX ADMIN — Medication 2000 UNITS: at 08:35

## 2025-05-19 RX ADMIN — INSULIN LISPRO 1 UNITS: 100 INJECTION, SOLUTION INTRAVENOUS; SUBCUTANEOUS at 12:04

## 2025-05-19 RX ADMIN — GABAPENTIN 100 MG: 100 CAPSULE ORAL at 08:36

## 2025-05-19 RX ADMIN — UMECLIDINIUM 1 PUFF: 62.5 AEROSOL, POWDER ORAL at 08:37

## 2025-05-19 RX ADMIN — INSULIN LISPRO 4 UNITS: 100 INJECTION, SOLUTION INTRAVENOUS; SUBCUTANEOUS at 21:14

## 2025-05-19 RX ADMIN — FUROSEMIDE 20 MG: 20 TABLET ORAL at 08:36

## 2025-05-19 RX ADMIN — PREDNISONE 40 MG: 20 TABLET ORAL at 10:54

## 2025-05-19 RX ADMIN — LISINOPRIL 10 MG: 10 TABLET ORAL at 08:35

## 2025-05-19 RX ADMIN — QUETIAPINE FUMARATE 25 MG: 25 TABLET ORAL at 21:07

## 2025-05-19 RX ADMIN — MUPIROCIN 1 APPLICATION: 20 OINTMENT TOPICAL at 08:38

## 2025-05-19 RX ADMIN — SERTRALINE HYDROCHLORIDE 50 MG: 50 TABLET ORAL at 08:36

## 2025-05-19 RX ADMIN — GABAPENTIN 100 MG: 100 CAPSULE ORAL at 17:14

## 2025-05-19 RX ADMIN — ATORVASTATIN CALCIUM 40 MG: 40 TABLET, FILM COATED ORAL at 17:14

## 2025-05-19 RX ADMIN — POLYSACCHARIDE-IRON COMPLEX 150 MG: 150 CAPSULE ORAL at 08:35

## 2025-05-19 RX ADMIN — BUDESONIDE AND FORMOTEROL FUMARATE DIHYDRATE 2 PUFF: 80; 4.5 AEROSOL RESPIRATORY (INHALATION) at 17:14

## 2025-05-19 RX ADMIN — CYANOCOBALAMIN TAB 500 MCG 500 MCG: 500 TAB at 08:36

## 2025-05-19 RX ADMIN — ENOXAPARIN SODIUM 40 MG: 40 INJECTION SUBCUTANEOUS at 17:42

## 2025-05-19 RX ADMIN — INSULIN LISPRO 2 UNITS: 100 INJECTION, SOLUTION INTRAVENOUS; SUBCUTANEOUS at 17:15

## 2025-05-19 NOTE — PROGRESS NOTES
Progress Note - Hospitalist   Name: Tiffani Francois 80 y.o. female I MRN: [id number]  Unit/Bed#: 409-01 I Date of Admission: 5/13/2025   Date of Service: 5/19/2025 I Hospital Day: 6    Assessment & Plan  Acute heart failure with preserved ejection fraction (HCC)  Wt Readings from Last 3 Encounters:   05/19/25 54.4 kg (119 lb 14.9 oz)   10/06/23 58.5 kg (129 lb)   05/20/23 55.6 kg (122 lb 9.6 oz)     I appreciate Cardiology's input.  Treated with Lasix 40 mg IV BID, which was discontinued on 05/15/2025 due to an increased serum creatinine level  Transitioned to Lasix 20 mg PO Qdaily per Cardiology, which was held on 05/17/2025 due to hypotension  Trial of Lasix 20 mg PO Qdaily on 05/18/2025  Monitor daily weights and strict intake/output measurements    Transthoracic echocardiogram (05/14/2025):    Interpretation Summary    Left Ventricle: Left ventricular cavity size is normal. Wall thickness is moderately increased. There is moderate concentric hypertrophy. The left ventricular ejection fraction is 55%. Systolic function is normal. Wall motion is normal. Unable to assess diastolic function due to atrial fibrillation.    Right Ventricle: Right ventricular cavity size is normal. Systolic function is mildly reduced.    Left Atrium: The atrium is severely dilated (>48 mL/m2).    Right Atrium: The atrium is mildly dilated.    Mitral Valve: There is mild annular calcification. There is mild regurgitation.    Tricuspid Valve: There is mild regurgitation.    Pericardium: There is a moderate pericardial effusion along the LV free wall measuring 1.3 cm.  The fluid exhibits no internal echoes. There is no echocardiographic evidence of tamponade. There is a right pleural effusion.  Acute on chronic respiratory failure with hypoxia and hypercapnia (HCC)  Presented with hypoxia with an oxygen saturation in the low 80's%  Chronically on 3 Lpm of continuous supplemental oxygen  Required 4 Lpm of continuous supplemental oxygen on  05/13/2025 with signs of air hunger including observed accessory muscle use  Currently requiring 2 Lpm of supplemental oxygen  Incentive spirometry  Respiratory protocol  COPD with acute exacerbation (HCC)  Continue methylprednisolone 40 mg IV every 12 hours  Incentive spirometry  Respiratory protocol  Permanent atrial fibrillation (HCC)  Currently with a controlled ventricular rate without any AV-mariano blocking agents  Not on anticoagulation with a history of an intracranial hemorrhage  Continue aspirin 81 mg PO Qdaily  Pericardial effusion  No signs of cardiac tamponade    Transthoracic echocardiogram (05/14/2025):  Pericardium: There is a moderate pericardial effusion along the LV free wall measuring 1.3 cm. The fluid exhibits no internal echoes. There is no echocardiographic evidence of tamponade. There is a right pleural effusion.   Pleural effusion on right  Treated with Lasix 40 mg IV BID, which was discontinued on 05/15/2025 due to an increased serum creatinine level  Transitioned to Lasix 20 mg PO Qdaily per Cardiology  Acute cystitis without hematuria  Completed a 3-day course of ceftriaxone 1000 mg IV every 24 hours during the hospitalization  Hyperphosphatemia  No treatment is indicated at this time  Follow the phosphorus level  Hypomagnesemia  Resolved with magnesium sulfate 2 grams IV x 1 dose on 05/14/2025  Follow the magnesium level    Results from last 7 days   Lab Units 05/19/25  0348 05/18/25  0326 05/17/25  0457   MAGNESIUM mg/dL 1.9 1.9 2.1     Thrombocytopenia (HCC)  Monitor for any signs of bleeding  Follow the platelet count  Hyperlipidemia  Continue statin therapy  Essential hypertension  Hold lisinopril with an increased serum creatinine level  Tobacco use  Smoking cessation counseling was given to the patient during the hospitalization.  Iron deficiency anemia  Initiated iron polysaccharides 150 mg PO Qdaily  Follow the CBC  Transfuse for a hemoglobin less than 7 g/dl  Vitamin B12  deficiency  Initiated cyanocobalamin 500 mcg PO Qdaily, which should be continued upon discharge  Outpatient follow-up with PCP in regards to this matter  Vitamin D insufficiency  Initiated cholecalciferol 2000 I.U. PO Qdaily, which should be continued upon discharge  Outpatient follow-up with PCP in regards to this matter     Latest Reference Range & Units 05/15/25 04:48   VITAMIN D, 25-HYDROXY 30.0 - 100.0 ng/mL 21.0 (L)   (L): Data is abnormally low  Hyperglycemia  Likely due to IV methylprednisolone treatment  Hemoglobin A1c 7.0  Consider adding low-dose metformin, and follow-up with PCP  MRSA colonization  Utilize the MRSA decolonization protocol    VTE Pharmacologic Prophylaxis: VTE Score: 9 High Risk (Score >/= 5) - Pharmacological DVT Prophylaxis Ordered: enoxaparin (Lovenox). Sequential Compression Devices Ordered.    Mobility:   Basic Mobility Inpatient Raw Score: 18  JH-HLM Goal: 6: Walk 10 steps or more  JH-HLM Achieved: 7: Walk 25 feet or more  JH-HLM Goal achieved. Continue to encourage appropriate mobility.    Patient Centered Rounds: I performed bedside rounds with nursing staff today.   Discussions with Specialists or Other Care Team Provider: none    Education and Discussions with Family / Patient: Updated  (daughter) via phone.    Current Length of Stay: 6 day(s)  Current Patient Status: Inpatient   Certification Statement: The patient will continue to require additional inpatient hospital stay due to ongoing monitoring, CHF, further clinical improvement  Discharge Plan: Anticipate discharge tomorrow to home.    Code Status: Level 1 - Full Code    Subjective   Patient offers no complaints    Objective :  Temp:  [98.3 °F (36.8 °C)-100 °F (37.8 °C)] 98.3 °F (36.8 °C)  HR:  [52-77] 73  BP: (136-152)/() 144/56  Resp:  [15-22] 22  SpO2:  [89 %-99 %] 92 %  O2 Device: Nasal cannula  Nasal Cannula O2 Flow Rate (L/min):  [2 L/min] 2 L/min    Body mass index is 19.96 kg/m².     Input and  Output Summary (last 24 hours):     Intake/Output Summary (Last 24 hours) at 5/19/2025 1644  Last data filed at 5/19/2025 1227  Gross per 24 hour   Intake 480 ml   Output 546 ml   Net -66 ml       Physical Exam  Vitals and nursing note reviewed.   Constitutional:       General: She is not in acute distress.     Appearance: She is well-developed.   HENT:      Head: Normocephalic and atraumatic.     Eyes:      Conjunctiva/sclera: Conjunctivae normal.       Cardiovascular:      Rate and Rhythm: Normal rate. Rhythm irregular.      Heart sounds: No murmur heard.  Pulmonary:      Effort: Pulmonary effort is normal. No respiratory distress.      Breath sounds: Normal breath sounds.   Abdominal:      Palpations: Abdomen is soft.      Tenderness: There is no abdominal tenderness.     Musculoskeletal:         General: No swelling.      Cervical back: Neck supple.     Skin:     General: Skin is warm and dry.     Neurological:      Mental Status: She is alert.      Comments: Pleasant, answers simple questions appropriately         Lines/Drains:        Telemetry:  Telemetry Orders (From admission, onward)               24 Hour Telemetry Monitoring  Continuous x 24 Hours (Telem)        Expiring   Question:  Reason for 24 Hour Telemetry  Answer:  Arrhythmias requiring acute medical intervention / PPM or ICD malfunction                     Telemetry Reviewed: Atrial fibrillation. HR averaging 70s  Indication for Continued Telemetry Use: No indication for continued use. Will discontinue.                Lab Results: I have reviewed the following results:   Results from last 7 days   Lab Units 05/19/25  0348   WBC Thousand/uL 8.68   HEMOGLOBIN g/dL 8.1*   HEMATOCRIT % 29.6*   PLATELETS Thousands/uL 138*   SEGS PCT % 68   LYMPHO PCT % 20   MONO PCT % 8   EOS PCT % 3     Results from last 7 days   Lab Units 05/19/25  0348   SODIUM mmol/L 138   POTASSIUM mmol/L 4.9   CHLORIDE mmol/L 97   CO2 mmol/L 37*   BUN mg/dL 33*   CREATININE mg/dL  0.84   ANION GAP mmol/L 4   CALCIUM mg/dL 8.7   ALBUMIN g/dL 3.6   TOTAL BILIRUBIN mg/dL 0.37   ALK PHOS U/L 42   ALT U/L 8   AST U/L 15   GLUCOSE RANDOM mg/dL 179*         Results from last 7 days   Lab Units 05/19/25  1621 05/19/25  1119 05/19/25  0712 05/18/25  2047 05/18/25  1625 05/18/25  1202   POC GLUCOSE mg/dl 285* 221* 156* 160* 281* 296*     Results from last 7 days   Lab Units 05/18/25  0326   HEMOGLOBIN A1C % 7.0*     Results from last 7 days   Lab Units 05/18/25  0326 05/17/25  0457 05/16/25  0615 05/15/25  0448 05/14/25  0451 05/13/25  1628   LACTIC ACID mmol/L  --   --   --   --   --  1.7   PROCALCITONIN ng/ml <0.05 <0.05 <0.05 0.07 <0.05  --        Recent Cultures (last 7 days):   Results from last 7 days   Lab Units 05/13/25  1912   URINE CULTURE  30,000-39,000 cfu/ml Escherichia coli*       Imaging Results Review: No pertinent imaging studies reviewed.  Other Study Results Review: No additional pertinent studies reviewed.    Last 24 Hours Medication List:     Current Facility-Administered Medications:     acetaminophen (TYLENOL) tablet 325 mg, Q6H PRN    albuterol inhalation solution 2.5 mg, Q4H PRN    aspirin (ECOTRIN LOW STRENGTH) EC tablet 81 mg, Daily    atorvastatin (LIPITOR) tablet 40 mg, Daily With Dinner    budesonide-formoterol (SYMBICORT) 80-4.5 MCG/ACT inhaler 2 puff, BID    Cholecalciferol (VITAMIN D3) tablet 2,000 Units, Daily    cyanocobalamin (VITAMIN B-12) tablet 500 mcg, Daily    enoxaparin (LOVENOX) subcutaneous injection 40 mg, Q24H    furosemide (LASIX) tablet 20 mg, Daily    gabapentin (NEURONTIN) capsule 100 mg, BID    insulin lispro (HumALOG/ADMELOG) 100 units/mL subcutaneous injection 1-5 Units, TID AC **AND** Fingerstick Glucose (POCT), TID AC    insulin lispro (HumALOG/ADMELOG) 100 units/mL subcutaneous injection 1-5 Units, HS    iron polysaccharides (FERREX) capsule 150 mg, Daily    lisinopril (ZESTRIL) tablet 10 mg, Daily    mupirocin (BACTROBAN) 2 % nasal ointment, Q12H  [place]    predniSONE tablet 40 mg, Daily    QUEtiapine (SEROquel) tablet 25 mg, HS    sertraline (ZOLOFT) tablet 50 mg, Daily    Insert peripheral IV, Once **AND** sodium chloride (PF) 0.9 % injection 3 mL, Q1H PRN    umeclidinium 62.5 mcg/actuation inhaler AEPB 1 puff, Daily    Administrative Statements   Today, Patient Was Seen By: Magdi Neal Counts, DO      **Please Note: This note may have been constructed using a voice recognition system.**

## 2025-05-19 NOTE — ASSESSMENT & PLAN NOTE
Resolved with magnesium sulfate 2 grams IV x 1 dose on 05/14/2025  Follow the magnesium level    Results from last 7 days   Lab Units 05/19/25  0348 05/18/25  0326 05/17/25  0457   MAGNESIUM mg/dL 1.9 1.9 2.1

## 2025-05-19 NOTE — PHYSICAL THERAPY NOTE
Physical Therapy Treatment    Patient Name: Tiffani Francois    Today's Date: 5/19/2025     Problem List  Principal Problem:    Acute heart failure with preserved ejection fraction (HCC)  Active Problems:    Hyperlipidemia    Essential hypertension    Tobacco use    Hypomagnesemia    COPD with acute exacerbation (HCC)    Permanent atrial fibrillation (HCC)    Acute on chronic respiratory failure with hypoxia and hypercapnia (HCC)    Pericardial effusion    Pleural effusion on right    Thrombocytopenia (HCC)    Anemia    Acute cystitis without hematuria    Hyperphosphatemia    Iron deficiency anemia    Vitamin B12 deficiency    Vitamin D insufficiency    Hyperglycemia    MRSA colonization       Past Medical History  Past Medical History:   Diagnosis Date    Acute on chronic respiratory failure with hypoxia and hypercapnia (HCC) 10/18/2021    JOHN (acute kidney injury) (HCC) 03/03/2023    COPD with acute exacerbation (HCC) 09/22/2022    History of intracranial hemorrhage 08/14/2022    Hyperlipidemia     last assessed  8/24/17    Hypertension     last assessed 10/2/14    Hypokalemia 08/14/2022    Hypomagnesemia 09/22/2022    Sepsis due to pneumonia (HCC) 03/03/2023        Past Surgical History  Past Surgical History:   Procedure Laterality Date    CRANIOTOMY             05/19/25 1117   PT Last Visit   PT Visit Date 05/19/25   Note Type   Note Type Treatment   Pain Assessment   Pain Assessment Tool 0-10   Pain Score No Pain   Restrictions/Precautions   Weight Bearing Precautions Per Order No   Other Precautions Fall Risk;O2;Multiple lines;Cognitive;Chair Alarm   General   Chart Reviewed Yes   Family/Caregiver Present No   Cognition   Overall Cognitive Status Impaired   Arousal/Participation Alert;Cooperative   Attention Attends with cues to redirect   Orientation Level Oriented to person;Disoriented to place;Disoriented to time;Disoriented to situation   Following  Commands Follows one step commands without difficulty   Subjective   Subjective pt appears excited to ambulate; pleasantly confused   Bed Mobility   Additional Comments pt OOB at start/end of session   Transfers   Sit to Stand 5  Supervision   Additional items Armrests;Increased time required;Verbal cues   Stand to Sit 5  Supervision   Additional items Increased time required;Verbal cues;Armrests   Additional Comments RW used   Ambulation/Elevation   Gait pattern Excessively slow;Short stride;Foward flexed;Decreased foot clearance;Narrow SHANA   Gait Assistance 4  Minimal assist   Additional items Assist x 1;Verbal cues   Assistive Device Rolling walker   Distance 150'   Ambulation/Elevation Additional Comments seated rest break taken following this distance d/t reports of calf weakness   Balance   Static Sitting Good   Dynamic Sitting Fair +   Static Standing Fair   Dynamic Standing Fair   Ambulatory Fair -   Endurance Deficit   Endurance Deficit Yes   Endurance Deficit Description pt appears fatigued with ambulation, however true vitals unable to be obtained d/t poor pleth of Masimo; pt on 3L O2 throughout   Assessment   Prognosis Good   Problem List Decreased strength;Decreased endurance;Impaired balance;Decreased mobility;Decreased cognition;Impaired judgement;Decreased safety awareness   Assessment Patient seen this date for PT treatment session to increase level of mobility and functional activity tolerance. This date, pt able to perform all functional mobility with Supervision - Kendall, RW, and increased time. Occasional verbal cuing provided for safety awareness and sequencing. No true LOB experienced. The patient's AM-PAC Basic Mobility Inpatient Short Form Raw Score is 18. A Raw score of greater than 16 suggests the patient may benefit from discharge to home. Please also refer to the recommendation of the Physical Therapist for safe discharge planning. Co treatment with OT secondary to complex medical  condition of pt, possible A of 2 required to achieve and maintain transitional movements, requiring the need of skilled therapeutic intervention of 2 therapists to achieve delivery of services. D/c recommendation now rehab resource intensity level II.   Goals   LTG Expiration Date 05/28/25   PT Treatment Day 3   Plan   Treatment/Interventions Functional transfer training;LE strengthening/ROM;Elevations;Therapeutic exercise;Endurance training;Bed mobility;Gait training   Progress Progressing toward goals   PT Frequency 3-5x/wk   Discharge Recommendation   Rehab Resource Intensity Level, PT II (Moderate Resource Intensity)   AM-PAC Basic Mobility Inpatient   Turning in Flat Bed Without Bedrails 3   Lying on Back to Sitting on Edge of Flat Bed Without Bedrails 3   Moving Bed to Chair 3   Standing Up From Chair Using Arms 3   Walk in Room 3   Climb 3-5 Stairs With Railing 3   Basic Mobility Inpatient Raw Score 18   Basic Mobility Standardized Score 41.05   [facility] Highest Level Of Mobility   JH-HLM Goal 6: Walk 10 steps or more   -HLM Achieved 7: Walk 25 feet or more   Education   Education Provided Mobility training;Assistive device   Patient Demonstrates acceptance/verbal understanding;Reinforcement needed   End of Consult   Patient Position at End of Consult Bedside chair;Bed/Chair alarm activated;All needs within reach

## 2025-05-19 NOTE — ASSESSMENT & PLAN NOTE
Wt Readings from Last 3 Encounters:   05/19/25 54.4 kg (119 lb 14.9 oz)   10/06/23 58.5 kg (129 lb)   05/20/23 55.6 kg (122 lb 9.6 oz)     I appreciate Cardiology's input.  Treated with Lasix 40 mg IV BID, which was discontinued on 05/15/2025 due to an increased serum creatinine level  Transitioned to Lasix 20 mg PO Qdaily per Cardiology, which was held on 05/17/2025 due to hypotension  Trial of Lasix 20 mg PO Qdaily on 05/18/2025  Monitor daily weights and strict intake/output measurements    Transthoracic echocardiogram (05/14/2025):    Interpretation Summary    Left Ventricle: Left ventricular cavity size is normal. Wall thickness is moderately increased. There is moderate concentric hypertrophy. The left ventricular ejection fraction is 55%. Systolic function is normal. Wall motion is normal. Unable to assess diastolic function due to atrial fibrillation.    Right Ventricle: Right ventricular cavity size is normal. Systolic function is mildly reduced.    Left Atrium: The atrium is severely dilated (>48 mL/m2).    Right Atrium: The atrium is mildly dilated.    Mitral Valve: There is mild annular calcification. There is mild regurgitation.    Tricuspid Valve: There is mild regurgitation.    Pericardium: There is a moderate pericardial effusion along the LV free wall measuring 1.3 cm.  The fluid exhibits no internal echoes. There is no echocardiographic evidence of tamponade. There is a right pleural effusion.

## 2025-05-19 NOTE — OCCUPATIONAL THERAPY NOTE
Occupational Therapy Progress Note     Patient Name: Tiffani Francois  Today's Date: 5/19/2025  Problem List  Principal Problem:    Acute heart failure with preserved ejection fraction (HCC)  Active Problems:    Hyperlipidemia    Essential hypertension    Tobacco use    Hypomagnesemia    COPD with acute exacerbation (HCC)    Permanent atrial fibrillation (HCC)    Acute on chronic respiratory failure with hypoxia and hypercapnia (HCC)    Pericardial effusion    Pleural effusion on right    Thrombocytopenia (HCC)    Anemia    Acute cystitis without hematuria    Hyperphosphatemia    Iron deficiency anemia    Vitamin B12 deficiency    Vitamin D insufficiency    Hyperglycemia    MRSA colonization              05/19/25 1108   OT Last Visit   OT Visit Date 05/19/25   Note Type   Note Type Treatment for insurance authorization   Pain Assessment   Pain Score No Pain   Restrictions/Precautions   Weight Bearing Precautions Per Order No   Other Precautions Fall Risk;Cognitive;Chair Alarm;Impulsive   ADL   Where Assessed Chair   Grooming Assistance 5  Supervision/Setup   Grooming Deficit Brushing hair   Grooming Comments seated in chair; cues to initiate   UB Bathing Assistance 4  Minimal Assistance   UB Bathing Deficit Other (Comment)  ((A) with back)   LB Bathing Assistance 4  Minimal Assistance   LB Bathing Deficit Right lower leg including foot;Left lower leg including foot   UB Dressing Assistance 4  Minimal Assistance   UB Dressing Deficit Other (Comment)  (don/doff gown)   LB Dressing Assistance 4  Minimal Assistance   LB Dressing Deficit Don/doff R sock;Don/doff L sock;Pull up over hips  (cues for task initiation and folllow through)   Toileting Assistance  3  Moderate Assistance   Toileting Comments (A) for lulu hygiene due to incontinence   Bed Mobility   Additional Comments pt seated in chair at start and end of session; SpO2 WFL with no complaints of SOB on 3L   Transfers   Sit to Stand 5  Supervision   Additional  "items Armrests;Impulsive;Verbal cues  (RW)   Stand to Sit 5  Supervision   Additional items Verbal cues;Impulsive  (RW)   Additional Comments pt performs functional transfers with RW; no significant LOB, however mid instability and cues for stability   Functional Mobility   Additional Comments pt does not perform functional mobility this session   Subjective   Subjective \"I am doing good kid\"   Cognition   Overall Cognitive Status Impaired   Arousal/Participation Alert   Attention Attends with cues to redirect   Orientation Level Oriented to person;Disoriented to place;Disoriented to time;Disoriented to situation   Memory Decreased long term memory;Decreased short term memory;Decreased recall of biographical information;Decreased recall of recent events   Following Commands Follows one step commands without difficulty   Activity Tolerance   Activity Tolerance Patient limited by fatigue;Other (Comment)  (cognition)   Assessment   Assessment Patient participated in Skilled OT session this date with interventions consisting of ADL re training with the use of correct body mechnaics, safety awareness and fall prevention techniques,  therapeutic activities to: increase activity tolerance, increase standing tolerance time with unilateral UE support to complete sink level ADLs, increase dynamic sit/ stand balance during functional activity , and increase OOB/ sitting tolerance . Patient agreeable to OT treatment session, upon arrival patient was found seated OOB to Chair. Patient requiring frequent re direction, verbal cues for safety, verbal cues for correct technique, verbal cues for pacing thru activity steps, cognitive assistance to anticipate next step, one step directives, and frequent rest periods. Patient continues to be functioning below baseline level, occupational performance remains limited secondary to factors listed above and increased risk for falls and injury. The patient's raw score on the AM-PAC Daily " Activity Inpatient Short Form is 18. A raw score of less than 19 suggests the patient may benefit from discharge to post-acute rehabilitation services. Please refer to the recommendation of the Occupational Therapist for safe discharge planning. From OT standpoint, recommendation at time of d/c would be level II moderate resource intensity.   Plan   Goal Expiration Date 05/28/25   OT Treatment Day 2   OT Frequency 3-5x/wk   Discharge Recommendation   Rehab Resource Intensity Level, OT II (Moderate Resource Intensity)   AM-PAC Daily Activity Inpatient   Lower Body Dressing 3   Bathing 3   Toileting 3   Upper Body Dressing 3   Grooming 3   Eating 3   Daily Activity Raw Score 18   Daily Activity Standardized Score (Calc for Raw Score >=11) 38.66   AM-PAC Applied Cognition Inpatient   Following a Speech/Presentation 3   Understanding Ordinary Conversation 3   Taking Medications 1   Remembering Where Things Are Placed or Put Away 1   Remembering List of 4-5 Errands 1   Taking Care of Complicated Tasks 1   Applied Cognition Raw Score 10   Applied Cognition Standardized Score 24.98

## 2025-05-19 NOTE — PLAN OF CARE
Problem: OCCUPATIONAL THERAPY ADULT  Goal: Performs self-care activities at highest level of function for planned discharge setting.  See evaluation for individualized goals.  Description: Treatment Interventions: ADL retraining, Functional transfer training, UE strengthening/ROM, Endurance training, Patient/family training, Cognitive reorientation, Equipment evaluation/education, Activityengagement, Compensatory technique education          See flowsheet documentation for full assessment, interventions and recommendations.   Outcome: Progressing  Note: Limitation: Decreased ADL status, Decreased UE strength, Decreased Safe judgement during ADL, Decreased cognition, Decreased endurance, Decreased self-care trans, Decreased high-level ADLs     Assessment: Patient participated in Skilled OT session this date with interventions consisting of ADL re training with the use of correct body mechnaics, safety awareness and fall prevention techniques,  therapeutic activities to: increase activity tolerance, increase standing tolerance time with unilateral UE support to complete sink level ADLs, increase dynamic sit/ stand balance during functional activity , and increase OOB/ sitting tolerance . Patient agreeable to OT treatment session, upon arrival patient was found seated OOB to Chair. Patient requiring frequent re direction, verbal cues for safety, verbal cues for correct technique, verbal cues for pacing thru activity steps, cognitive assistance to anticipate next step, one step directives, and frequent rest periods. Patient continues to be functioning below baseline level, occupational performance remains limited secondary to factors listed above and increased risk for falls and injury. The patient's raw score on the -PAC Daily Activity Inpatient Short Form is 18. A raw score of less than 19 suggests the patient may benefit from discharge to post-acute rehabilitation services. Please refer to the recommendation of  the Occupational Therapist for safe discharge planning. From OT standpoint, recommendation at time of d/c would be level II moderate resource intensity.     Rehab Resource Intensity Level, OT: II (Moderate Resource Intensity)

## 2025-05-19 NOTE — ASSESSMENT & PLAN NOTE
Likely due to IV methylprednisolone treatment  Hemoglobin A1c 7.0  Consider adding low-dose metformin, and follow-up with PCP

## 2025-05-19 NOTE — ASSESSMENT & PLAN NOTE
Presented with hypoxia with an oxygen saturation in the low 80's%  Chronically on 3 Lpm of continuous supplemental oxygen  Required 4 Lpm of continuous supplemental oxygen on 05/13/2025 with signs of air hunger including observed accessory muscle use  Currently requiring 2 Lpm of supplemental oxygen  Incentive spirometry  Respiratory protocol

## 2025-05-19 NOTE — ASSESSMENT & PLAN NOTE
Monitor for any signs of bleeding  Follow the platelet count   [Alert] : alert [Healthy Appearance] : healthy appearance [No Acute Distress] : no acute distress [No Proptosis] : no proptosis [No Lid Lag] : no lid lag [Thyroid Not Enlarged] : the thyroid was not enlarged [No Thyroid Nodules] : no palpable thyroid nodules [No Respiratory Distress] : no respiratory distress [No Accessory Muscle Use] : no accessory muscle use [Clear to Auscultation] : lungs were clear to auscultation bilaterally [No Murmurs] : no murmurs [Regular Rhythm] : with a regular rhythm [No Edema] : no peripheral edema [Soft] : abdomen soft [No Stigmata of Cushings Syndrome] : no stigmata of Cushings Syndrome [Oriented x3] : oriented to person, place, and time [de-identified] : left upper arm omnipod

## 2025-05-19 NOTE — PLAN OF CARE
Problem: PAIN - ADULT  Goal: Verbalizes/displays adequate comfort level or baseline comfort level  Description: Interventions:  - Encourage patient to monitor pain and request assistance  - Assess pain using appropriate pain scale  - Administer analgesics based on type and severity of pain and evaluate response  - Implement non-pharmacological measures as appropriate and evaluate response  - Consider cultural and social influences on pain and pain management  - Notify physician/advanced practitioner if interventions unsuccessful or patient reports new pain  Outcome: Progressing     Problem: INFECTION - ADULT  Goal: Absence or prevention of progression during hospitalization  Description: INTERVENTIONS:  - Assess and monitor for signs and symptoms of infection  - Monitor lab/diagnostic results  - Monitor all insertion sites, i.e. indwelling lines, tubes, and drains  - Monitor endotracheal if appropriate and nasal secretions for changes in amount and color  - [place] appropriate cooling/warming therapies per order  - Administer medications as ordered  - Instruct and encourage patient and family to use good hand hygiene technique  - Identify and instruct in appropriate isolation precautions for identified infection/condition  Outcome: Progressing     Problem: SAFETY ADULT  Goal: Patient will remain free of falls  Description: INTERVENTIONS:  - Educate patient/family on patient safety including physical limitations  - Instruct patient to call for assistance with activity   - Consult OT/PT to assist with strengthening/mobility   - Keep Call bell within reach  - Keep bed low and locked with side rails adjusted as appropriate  - Keep care items and personal belongings within reach  - Initiate and maintain comfort rounds  - Make Fall Risk Sign visible to staff  - Offer Toileting every 2 Hours, in advance of need  - Initiate/Maintain bed/chair alarm  - Obtain necessary fall risk management equipment: nonskid footwear  -  Apply yellow socks and bracelet for high fall risk patients  - Consider moving patient to room near nurses station  Outcome: Progressing  Goal: Maintain or return to baseline ADL function  Description: INTERVENTIONS:  -  Assess patient's ability to carry out ADLs; assess patient's baseline for ADL function and identify physical deficits which impact ability to perform ADLs (bathing, care of mouth/teeth, toileting, grooming, dressing, etc.)  - Assess/evaluate cause of self-care deficits   - Assess range of motion  - Assess patient's mobility; develop plan if impaired  - Assess patient's need for assistive devices and provide as appropriate  - Encourage maximum independence but intervene and supervise when necessary  - Involve family in performance of ADLs  - Assess for home care needs following discharge   - Consider OT consult to assist with ADL evaluation and planning for discharge  - Provide patient education as appropriate  Outcome: Progressing     Problem: DISCHARGE PLANNING  Goal: Discharge to home or other facility with appropriate resources  Description: INTERVENTIONS:  - Identify barriers to discharge w/patient and caregiver  - Arrange for needed discharge resources and transportation as appropriate  - Identify discharge learning needs (meds, wound care, etc.)  - Arrange for interpretive services to assist at discharge as needed  - Refer to Case Management Department for coordinating discharge planning if the patient needs post-hospital services based on physician/advanced practitioner order or complex needs related to functional status, cognitive ability, or social support system  Outcome: Progressing     Problem: Knowledge Deficit  Goal: Patient/family/caregiver demonstrates understanding of disease process, treatment plan, medications, and discharge instructions  Description: Complete learning assessment and assess knowledge base.  Interventions:  - Provide teaching at level of understanding  - Provide  teaching via preferred learning methods  Outcome: Progressing     Problem: CARDIOVASCULAR - ADULT  Goal: Maintains optimal cardiac output and hemodynamic stability  Description: INTERVENTIONS:  - Monitor I/O, vital signs and rhythm  - Monitor for S/S and trends of decreased cardiac output  - Administer and titrate ordered vasoactive medications to optimize hemodynamic stability  - Assess quality of pulses, skin color and temperature  - Assess for signs of decreased coronary artery perfusion  - Instruct patient to report change in severity of symptoms  Outcome: Progressing  Goal: Absence of cardiac dysrhythmias or at baseline rhythm  Description: INTERVENTIONS:  - Continuous cardiac monitoring, vital signs, obtain 12 lead EKG if ordered  - Administer antiarrhythmic and heart rate control medications as ordered  - Monitor electrolytes and administer replacement therapy as ordered  Outcome: Progressing     Problem: METABOLIC, FLUID AND ELECTROLYTES - ADULT  Goal: Fluid balance maintained  Description: INTERVENTIONS:  - Monitor labs   - Monitor I/O and WT  - Instruct patient on fluid and nutrition as appropriate  - Assess for signs & symptoms of volume excess or deficit  Outcome: Progressing     Problem: Prexisting or High Potential for Compromised Skin Integrity  Goal: Skin integrity is maintained or improved  Description: INTERVENTIONS:  - Identify patients at risk for skin breakdown  - Assess and monitor skin integrity including under and around medical devices   - Assess and monitor nutrition and hydration status  - Monitor labs  - Assess for incontinence   - Turn and reposition patient  - Assist with mobility/ambulation  - Relieve pressure over gene prominences   - Avoid friction and shearing  - Provide appropriate hygiene as needed including keeping skin clean and dry  - Evaluate need for skin moisturizer/barrier cream  - Collaborate with interdisciplinary team  - Patient/family teaching  - Consider wound care  consult    Bed Management:  - Have minimal linens on bed & keep smooth, unwrinkled  - Change linens as needed when moist or perspiring  - Avoid sitting or lying in one position for more than 2 hours while in bed?Keep HOB at 30degrees   - Toileting:  - Offer bedside commode  - Assess for incontinence every 2  - Use incontinent care products after each incontinent episode such as wipes    Activity:  - Mobilize patient 3 times a day  - Encourage activity and walks on unit  - Encourage or provide ROM exercises   - Turn and reposition patient every 2 Hours  - Use appropriate equipment to lift or move patient in bed  - Instruct/ Assist with weight shifting every 2 when out of bed in chair  - Consider limitation of chair time 2 hour intervals    Skin Care:  - Avoid use of baby powder, tape, friction and shearing, hot water or constrictive clothing  - Relieve pressure over bony prominences using pillows, wedges   - Do not massage red bony areas    Outcome: Progressing

## 2025-05-19 NOTE — PLAN OF CARE
Problem: PHYSICAL THERAPY ADULT  Goal: Performs mobility at highest level of function for planned discharge setting.  See evaluation for individualized goals.  Description: Treatment/Interventions: Functional transfer training, LE strengthening/ROM, Elevations, Therapeutic exercise, Endurance training, Bed mobility, Gait training          See flowsheet documentation for full assessment, interventions and recommendations.  Outcome: Progressing  Note: Prognosis: Good  Problem List: Decreased strength, Decreased endurance, Impaired balance, Decreased mobility, Decreased cognition, Impaired judgement, Decreased safety awareness  Assessment: Patient seen this date for PT treatment session to increase level of mobility and functional activity tolerance. This date, pt able to perform all functional mobility with Supervision - Kendall, RW, and increased time. Occasional verbal cuing provided for safety awareness and sequencing. No true LOB experienced. The patient's AM-PAC Basic Mobility Inpatient Short Form Raw Score is 18. A Raw score of greater than 16 suggests the patient may benefit from discharge to home. Please also refer to the recommendation of the Physical Therapist for safe discharge planning. Co treatment with OT secondary to complex medical condition of pt, possible A of 2 required to achieve and maintain transitional movements, requiring the need of skilled therapeutic intervention of 2 therapists to achieve delivery of services. D/c recommendation now rehab resource intensity level II.        Rehab Resource Intensity Level, PT: II (Moderate Resource Intensity)    See flowsheet documentation for full assessment.

## 2025-05-19 NOTE — ASSESSMENT & PLAN NOTE
Treated with Lasix 40 mg IV BID, which was discontinued on 05/15/2025 due to an increased serum creatinine level  Transitioned to Lasix 20 mg PO Qdaily per Cardiology

## 2025-05-20 ENCOUNTER — TRANSITIONAL CARE MANAGEMENT (OUTPATIENT)
Dept: FAMILY MEDICINE CLINIC | Facility: CLINIC | Age: 80
End: 2025-05-20

## 2025-05-20 VITALS
HEIGHT: 65 IN | HEART RATE: 57 BPM | BODY MASS INDEX: 20.02 KG/M2 | OXYGEN SATURATION: 100 % | SYSTOLIC BLOOD PRESSURE: 120 MMHG | DIASTOLIC BLOOD PRESSURE: 50 MMHG | RESPIRATION RATE: 23 BRPM | TEMPERATURE: 98.2 F | WEIGHT: 120.15 LBS

## 2025-05-20 LAB
GLUCOSE SERPL-MCNC: 144 MG/DL (ref 65–140)
GLUCOSE SERPL-MCNC: 297 MG/DL (ref 65–140)
GLUCOSE SERPL-MCNC: 333 MG/DL (ref 65–140)

## 2025-05-20 PROCEDURE — 97116 GAIT TRAINING THERAPY: CPT

## 2025-05-20 PROCEDURE — 97530 THERAPEUTIC ACTIVITIES: CPT

## 2025-05-20 PROCEDURE — 99239 HOSP IP/OBS DSCHRG MGMT >30: CPT

## 2025-05-20 PROCEDURE — 82948 REAGENT STRIP/BLOOD GLUCOSE: CPT

## 2025-05-20 RX ORDER — PREDNISONE 20 MG/1
20 TABLET ORAL DAILY
Status: DISCONTINUED | OUTPATIENT
Start: 2025-05-25 | End: 2025-05-20 | Stop reason: HOSPADM

## 2025-05-20 RX ORDER — IRON POLYSACCHARIDE COMPLEX 150 MG
150 CAPSULE ORAL DAILY
Qty: 30 CAPSULE | Refills: 0 | Status: SHIPPED | OUTPATIENT
Start: 2025-05-20

## 2025-05-20 RX ORDER — INSULIN LISPRO 100 [IU]/ML
1-5 INJECTION, SOLUTION INTRAVENOUS; SUBCUTANEOUS
Status: DISCONTINUED | OUTPATIENT
Start: 2025-05-20 | End: 2025-05-20 | Stop reason: HOSPADM

## 2025-05-20 RX ORDER — PREDNISONE 20 MG/1
40 TABLET ORAL DAILY
Status: DISCONTINUED | OUTPATIENT
Start: 2025-05-20 | End: 2025-05-20 | Stop reason: HOSPADM

## 2025-05-20 RX ORDER — PREDNISONE 10 MG/1
10 TABLET ORAL DAILY
Status: DISCONTINUED | OUTPATIENT
Start: 2025-05-28 | End: 2025-05-20 | Stop reason: HOSPADM

## 2025-05-20 RX ORDER — PREDNISONE 10 MG/1
TABLET ORAL
Qty: 22 TABLET | Refills: 0 | Status: SHIPPED | OUTPATIENT
Start: 2025-05-21 | End: 2025-05-31

## 2025-05-20 RX ADMIN — UMECLIDINIUM 1 PUFF: 62.5 AEROSOL, POWDER ORAL at 08:28

## 2025-05-20 RX ADMIN — Medication 2000 UNITS: at 08:28

## 2025-05-20 RX ADMIN — POLYSACCHARIDE-IRON COMPLEX 150 MG: 150 CAPSULE ORAL at 08:29

## 2025-05-20 RX ADMIN — FUROSEMIDE 20 MG: 20 TABLET ORAL at 08:33

## 2025-05-20 RX ADMIN — MUPIROCIN 1 APPLICATION: 20 OINTMENT TOPICAL at 08:29

## 2025-05-20 RX ADMIN — ASPIRIN 81 MG: 81 TABLET, COATED ORAL at 08:28

## 2025-05-20 RX ADMIN — CYANOCOBALAMIN TAB 500 MCG 500 MCG: 500 TAB at 08:29

## 2025-05-20 RX ADMIN — LISINOPRIL 10 MG: 10 TABLET ORAL at 08:33

## 2025-05-20 RX ADMIN — SERTRALINE HYDROCHLORIDE 50 MG: 50 TABLET ORAL at 08:29

## 2025-05-20 RX ADMIN — INSULIN LISPRO 3 UNITS: 100 INJECTION, SOLUTION INTRAVENOUS; SUBCUTANEOUS at 12:08

## 2025-05-20 RX ADMIN — PREDNISONE 40 MG: 20 TABLET ORAL at 08:29

## 2025-05-20 RX ADMIN — BUDESONIDE AND FORMOTEROL FUMARATE DIHYDRATE 2 PUFF: 80; 4.5 AEROSOL RESPIRATORY (INHALATION) at 08:28

## 2025-05-20 RX ADMIN — GABAPENTIN 100 MG: 100 CAPSULE ORAL at 08:29

## 2025-05-20 RX ADMIN — INSULIN LISPRO 2 UNITS: 100 INJECTION, SOLUTION INTRAVENOUS; SUBCUTANEOUS at 02:24

## 2025-05-20 NOTE — DISCHARGE SUMMARY
[facility]  Discharge Summary  Name: Tiffani Francois I MRN: [id number]  Unit/Bed#: I Date of Admission: 5/13/2025   Date of Service: 5/13/2025  I Hospital Day: 7    * Acute heart failure with preserved ejection fraction (HCC)  Assessment & Plan  Wt Readings from Last 3 Encounters:   05/20/25 54.5 kg (120 lb 2.4 oz)   10/06/23 58.5 kg (129 lb)   05/20/23 55.6 kg (122 lb 9.6 oz)     I appreciate Cardiology's input.  Treated with Lasix 40 mg IV BID, which was discontinued on 05/15/2025 due to an increased serum creatinine level  Transitioned to Lasix 20 mg PO Qdaily per Cardiology, which was held on 05/17/2025 due to hypotension  Trial of Lasix 20 mg PO Qdaily on 05/18/2025  Monitor daily weights and strict intake/output measurements    Transthoracic echocardiogram (05/14/2025):    Interpretation Summary    Left Ventricle: Left ventricular cavity size is normal. Wall thickness is moderately increased. There is moderate concentric hypertrophy. The left ventricular ejection fraction is 55%. Systolic function is normal. Wall motion is normal. Unable to assess diastolic function due to atrial fibrillation.    Right Ventricle: Right ventricular cavity size is normal. Systolic function is mildly reduced.    Left Atrium: The atrium is severely dilated (>48 mL/m2).    Right Atrium: The atrium is mildly dilated.    Mitral Valve: There is mild annular calcification. There is mild regurgitation.    Tricuspid Valve: There is mild regurgitation.    Pericardium: There is a moderate pericardial effusion along the LV free wall measuring 1.3 cm.  The fluid exhibits no internal echoes. There is no echocardiographic evidence of tamponade. There is a right pleural effusion.    MRSA colonization  Assessment & Plan  Utilize the MRSA decolonization protocol    Hyperglycemia  Assessment & Plan  Likely due to IV methylprednisolone treatment  Hemoglobin A1c 7.0  Resume home Janumet on discharge    Vitamin D  insufficiency  Assessment & Plan  Initiated cholecalciferol 2000 I.U. PO Qdaily, which should be continued upon discharge  Outpatient follow-up with PCP in regards to this matter     Latest Reference Range & Units 05/15/25 04:48   VITAMIN D, 25-HYDROXY 30.0 - 100.0 ng/mL 21.0 (L)   (L): Data is abnormally low    Vitamin B12 deficiency  Assessment & Plan  Initiated cyanocobalamin 500 mcg PO Qdaily, which should be continued upon discharge  Outpatient follow-up with PCP in regards to this matter    Iron deficiency anemia  Assessment & Plan  Initiated iron polysaccharides 150 mg PO Qdaily  Follow the CBC  Transfuse for a hemoglobin less than 7 g/dl    Hyperphosphatemia  Assessment & Plan  No treatment is indicated at this time  Follow the phosphorus level    Acute cystitis without hematuria  Assessment & Plan  Completed a 3-day course of ceftriaxone 1000 mg IV every 24 hours during the hospitalization    Anemia  Assessment & Plan  Iron panel consistent with iron deficiency anemia, started on supplementation  Vitamin B12 borderline low, continue supplementation  Continue to trend as needed  Transfuse for a hemoglobin less than 7 g/dl    Thrombocytopenia (HCC)  Assessment & Plan  Monitor for any signs of bleeding  Follow the platelet count    Pleural effusion on right  Assessment & Plan  Treated with Lasix 40 mg IV BID, which was discontinued on 05/15/2025 due to an increased serum creatinine level  Transitioned to Lasix 20 mg PO Qdaily per Cardiology    Pericardial effusion  Assessment & Plan  No signs of cardiac tamponade    Transthoracic echocardiogram (05/14/2025):  Pericardium: There is a moderate pericardial effusion along the LV free wall measuring 1.3 cm. The fluid exhibits no internal echoes. There is no echocardiographic evidence of tamponade. There is a right pleural effusion.     Acute on chronic respiratory failure with hypoxia and hypercapnia (HCC)  Assessment & Plan  Presented with hypoxia with an oxygen  saturation in the low 80's%  Chronically on 3 Lpm of continuous supplemental oxygen  Required 4 Lpm of continuous supplemental oxygen on 05/13/2025 with signs of air hunger including observed accessory muscle use  Currently requiring 2 Lpm of supplemental oxygen  Incentive spirometry  Respiratory protocol    Permanent atrial fibrillation (HCC)  Assessment & Plan  Currently with a controlled ventricular rate without any AV-mariano blocking agents  Not on anticoagulation with a history of an intracranial hemorrhage  Continue aspirin 81 mg PO Qdaily    COPD with acute exacerbation (HCC)  Assessment & Plan  Continue Prednisone 40 mg daily, wean regimen on discharge  Incentive spirometry  Respiratory protocol    Hypomagnesemia  Assessment & Plan  Resolved with magnesium sulfate 2 grams IV x 1 dose on 05/14/2025  Follow the magnesium level    Results from last 7 days   Lab Units 05/19/25  0348 05/18/25  0326 05/17/25  0457   MAGNESIUM mg/dL 1.9 1.9 2.1         Tobacco use  Assessment & Plan  Smoking cessation counseling was given to the patient during the hospitalization.    Essential hypertension  Assessment & Plan  Patient with a history of HTN  Home medication regimen: Lisinopril 10 mg daily and Lasix 20 mg daily  Continue home medications  Most Recent Blood Pressure: 157/58   Continue monitoring BP     Hyperlipidemia  Assessment & Plan  Continue statin therapy        Medical Problems       Resolved Problems  Date Reviewed: 4/15/2025   None       Discharging Physician / Practitioner: BONIFACIO Barton  PCP: Maude Baker DO  Admission Date:   Admission Orders (From admission, onward)       Ordered        05/13/25 1805  INPATIENT ADMISSION  Once                          Discharge Date: 05/20/25    Consultations During Hospital Stay:  IP CONSULT TO CARDIOLOGY  IP CONSULT FOR DISCHARGE SUPPORT    Procedures Performed:   None    Significant Findings / Test Results:   CTA chest pe study  Result Date: 5/13/2025  No  evidence of pulmonary embolus Wedge-shaped consolidation at the anterior left lower lobe with associated volume loss compatible with atelectasis. CHF. Small right pleural effusion Diffuse mosaic attenuation which may be seen with small vessel or small airways disease Workstation performed: [id number]     CT chest without contrast  Result Date: 5/13/2025  1.  Cardiomegaly. Small right pleural effusion. Trace pericardial effusion. 2.  Mosaic groundglass opacity throughout the lungs may represent chronic small airway or chronic small vessel disease. 3.  Left lung base atelectasis. 4.  Pulmonary arterial hypertension. Workstation performed: [id number]     X-ray chest 2 views  Result Date: 5/13/2025  Opacity in the left lower chest which appears to be combination of left pleural effusion and lung base atelectasis or pneumonia. There is also pulmonary vascular congestion and some prominence of the heart ER aware Workstation performed: [id number]     Incidental Findings:   None     Test Results Pending at Discharge (will require follow up):   None     Outpatient Tests Requested:  BMP and CBC in 2 weeks    Complications:  None    Reason for Admission: Acute heart failure with preserved ejection fraction    Hospital Course:   Tiffani Francois is a 80 y.o. female patient with  has a past medical history of Acute on chronic respiratory failure with hypoxia and hypercapnia ([facility]) (10/18/2021), JOHN (acute kidney injury) ([facility]) (03/03/2023), COPD with acute exacerbation ([facility]) (09/22/2022), History of intracranial hemorrhage (08/14/2022), Hyperlipidemia, Hypertension, Hypokalemia (08/14/2022), Hypomagnesemia (09/22/2022), and Sepsis due to pneumonia ([facility]) (03/03/2023). who originally presented to the hospital on 5/13/2025 due to Shortness of Breath (Home care stated pt has been stating in the low 80's pt states it only happens when she is moving. Ems had her in 3L stating 95%. Pt has some pitting edema but takes lasix's daily ). Treated  "with IV diuretics for acute heart failure.  Echocardiogram performed during stay with EF 55%.  Patient was evaluated by cardiology and transitioned to p.o. diuretics which will be continued on discharge.  Patient was also treated for mild COPD exacerbation with IV then p.o. steroids.  On day of discharge she was requiring less than baseline oxygen requirements.  PT OT recommended short-term rehab for patient on discharge but patient and family refused this, opting instead for home with home health care.    Please see above list of diagnoses and related plan for additional information.     Condition at Discharge: fair    Discharge Day Visit / Exam:   Subjective:  Patient denies pain, constipation, or diarrhea.  She is oriented to person place and somewhat situation.  She is disoriented to time.  All questions and concerns addressed.  Vitals: Blood Pressure: 120/50 (05/20/25 0831)  Pulse: 57 (05/20/25 0831)  Temperature: 98.2 °F (36.8 °C) (05/20/25 0646)  Temp Source: Oral (05/19/25 2106)  Respirations: (!) 23 (05/20/25 0646)  Height: 5' 5\" (165.1 cm) (05/19/25 0100)  Weight - Scale: 54.5 kg (120 lb 2.4 oz) (05/20/25 0600)  SpO2: 100 % (05/20/25 0831)  Exam:   Physical Exam  Vitals and nursing note reviewed.   Constitutional:       General: She is not in acute distress.     Appearance: Normal appearance. She is ill-appearing.      Interventions: Nasal cannula in place.   HENT:      Head: Normocephalic.      Mouth/Throat:      Mouth: Mucous membranes are moist.      Pharynx: Oropharynx is clear.     Eyes:      Conjunctiva/sclera: Conjunctivae normal.       Cardiovascular:      Rate and Rhythm: Normal rate and regular rhythm.      Pulses: Normal pulses.      Heart sounds: Normal heart sounds. No murmur heard.  Pulmonary:      Effort: Pulmonary effort is normal. No respiratory distress.      Breath sounds: Normal breath sounds. No wheezing or rhonchi.   Abdominal:      General: Bowel sounds are normal. There is no " distension.      Palpations: Abdomen is soft.      Tenderness: There is no abdominal tenderness. There is no guarding.     Musculoskeletal:      Right lower leg: No edema.      Left lower leg: No edema.     Skin:     General: Skin is warm and dry.     Neurological:      Mental Status: She is alert. Mental status is at baseline.     Psychiatric:         Mood and Affect: Mood normal.         Thought Content: Thought content normal.          Discussion with Family: Attempted to update  (daughter) via phone. Unable to contact.  Attempted x 2 to call but no answer and voicemail box full.    Discharge instructions/Information to patient and family:   See after visit summary for information provided to patient and family.      Provisions for Follow-Up Care:  See after visit summary for information related to follow-up care and any pertinent home health orders.      Mobility at time of Discharge:   Basic Mobility Inpatient Raw Score: 18  JH-HLM Goal: 6: Walk 10 steps or more  JH-HLM Achieved: 7: Walk 25 feet or more  HLM Goal achieved. Continue to encourage appropriate mobility.     Disposition:   Home with VNA Services (Reminder: Complete face to face encounter)    Planned Readmission: None     Discharge Statement:  I spent 48 minutes discharging the patient. This time was spent on the day of discharge. I had direct contact with the patient on the day of discharge. Greater than 50% of the total time was spent examining patient, answering all patient questions, arranging and discussing plan of care with patient as well as directly providing post-discharge instructions.  Additional time then spent on discharge activities.    Discharge Medications:  See after visit summary for reconciled discharge medications provided to patient and/or family.      **Please Note: This note may have been constructed using a voice recognition system**

## 2025-05-20 NOTE — ASSESSMENT & PLAN NOTE
Patient with a history of HTN  Home medication regimen: Lisinopril 10 mg daily and Lasix 20 mg daily  Continue home medications  Most Recent Blood Pressure: 157/58   Continue monitoring BP

## 2025-05-20 NOTE — ASSESSMENT & PLAN NOTE
Likely due to IV methylprednisolone treatment  Hemoglobin A1c 7.0  Resume home Janumet on discharge

## 2025-05-20 NOTE — CASE MANAGEMENT
Case Management Discharge Planning Note    Patient name Tiffani Francois  Location /409-01 MRN [id number]  : 1945 Date 2025       Current Admission Date: 2025  Current Admission Diagnosis:Acute heart failure with preserved ejection fraction (HCC)   Patient Active Problem List    Diagnosis Date Noted    Hyperglycemia 2025    MRSA colonization 2025    Iron deficiency anemia 2025    Vitamin B12 deficiency 2025    Vitamin D insufficiency 2025    Acute cystitis without hematuria 05/15/2025    Hyperphosphatemia 05/15/2025    Thrombocytopenia (HCC) 2025    Anemia 2025    Acute heart failure with preserved ejection fraction (HCC) 2025    Acute on chronic respiratory failure with hypoxia and hypercapnia (HCC) 2025    Pericardial effusion 2025    Pleural effusion on right 2025    Pulmonary emphysema (HCC) 2023    Stress incontinence 2023    Mediastinal lymphadenopathy 2023    Oral candidiasis 2023    Hypercalcemia 2022    Microscopic hematuria 2022    Permanent atrial fibrillation (HCC) 2022    Hypomagnesemia 2022    COPD with acute exacerbation (HCC) 2022    Multiple pulmonary nodules 2022    Hyponatremia 2022    Hyperbilirubinemia 2022    Type 2 diabetes mellitus with hyperglycemia, without long-term current use of insulin (HCC) 2022    Vitamin D deficiency 2022    Chronic atrial fibrillation (HCC) 2022    Dermatitis 2022    Ambulatory dysfunction 10/18/2021    Tobacco use 2018    Hyperlipidemia 10/02/2014    Essential hypertension 10/02/2014      LOS (days): 7  Geometric Mean LOS (GMLOS) (days): 3.9  Days to GMLOS:-2.7     OBJECTIVE:  Risk of Unplanned Readmission Score: 25.52         Current admission status: Inpatient   Preferred Pharmacy:   [id number] - [place] - [address]  [address] [place]  [place]  [address]  Phone: [phone] Fax: [phone]    [facility] - [place] - [address]  [address]  [place] [address]  Phone: [phone] Fax: [phone]    Primary Care Provider: Maude Baker DO    Primary Insurance: [facility]  Secondary Insurance:     DISCHARGE DETAILS:  Pt is a probable dc home on this date. Daughter declining STR and plan home w family support and hhc (active [facility]).

## 2025-05-20 NOTE — PLAN OF CARE
Problem: PAIN - ADULT  Goal: Verbalizes/displays adequate comfort level or baseline comfort level  Description: Interventions:  - Encourage patient to monitor pain and request assistance  - Assess pain using appropriate pain scale  - Administer analgesics based on type and severity of pain and evaluate response  - Implement non-pharmacological measures as appropriate and evaluate response  - Consider cultural and social influences on pain and pain management  - Notify physician/advanced practitioner if interventions unsuccessful or patient reports new pain  Outcome: Progressing     Problem: INFECTION - ADULT  Goal: Absence or prevention of progression during hospitalization  Description: INTERVENTIONS:  - Assess and monitor for signs and symptoms of infection  - Monitor lab/diagnostic results  - Monitor all insertion sites, i.e. indwelling lines, tubes, and drains  - Monitor endotracheal if appropriate and nasal secretions for changes in amount and color  - Boley appropriate cooling/warming therapies per order  - Administer medications as ordered  - Instruct and encourage patient and family to use good hand hygiene technique  - Identify and instruct in appropriate isolation precautions for identified infection/condition  Outcome: Progressing     Problem: SAFETY ADULT  Goal: Patient will remain free of falls  Description: INTERVENTIONS:  - Educate patient/family on patient safety including physical limitations  - Instruct patient to call for assistance with activity   - Consult OT/PT to assist with strengthening/mobility   - Keep Call bell within reach  - Keep bed low and locked with side rails adjusted as appropriate  - Keep care items and personal belongings within reach  - Initiate and maintain comfort rounds  - Make Fall Risk Sign visible to staff  - Offer Toileting every 2 Hours, in advance of need  - Initiate/Maintain bed/chair alarm  - Obtain necessary fall risk management equipment: nonskid footwear  -  Apply yellow socks and bracelet for high fall risk patients  - Consider moving patient to room near nurses station  Outcome: Progressing  Goal: Maintain or return to baseline ADL function  Description: INTERVENTIONS:  -  Assess patient's ability to carry out ADLs; assess patient's baseline for ADL function and identify physical deficits which impact ability to perform ADLs (bathing, care of mouth/teeth, toileting, grooming, dressing, etc.)  - Assess/evaluate cause of self-care deficits   - Assess range of motion  - Assess patient's mobility; develop plan if impaired  - Assess patient's need for assistive devices and provide as appropriate  - Encourage maximum independence but intervene and supervise when necessary  - Involve family in performance of ADLs  - Assess for home care needs following discharge   - Consider OT consult to assist with ADL evaluation and planning for discharge  - Provide patient education as appropriate  Outcome: Progressing     Problem: DISCHARGE PLANNING  Goal: Discharge to home or other facility with appropriate resources  Description: INTERVENTIONS:  - Identify barriers to discharge w/patient and caregiver  - Arrange for needed discharge resources and transportation as appropriate  - Identify discharge learning needs (meds, wound care, etc.)  - Arrange for interpretive services to assist at discharge as needed  - Refer to Case Management Department for coordinating discharge planning if the patient needs post-hospital services based on physician/advanced practitioner order or complex needs related to functional status, cognitive ability, or social support system  Outcome: Progressing     Problem: Knowledge Deficit  Goal: Patient/family/caregiver demonstrates understanding of disease process, treatment plan, medications, and discharge instructions  Description: Complete learning assessment and assess knowledge base.  Interventions:  - Provide teaching at level of understanding  - Provide  teaching via preferred learning methods  Outcome: Progressing     Problem: CARDIOVASCULAR - ADULT  Goal: Maintains optimal cardiac output and hemodynamic stability  Description: INTERVENTIONS:  - Monitor I/O, vital signs and rhythm  - Monitor for S/S and trends of decreased cardiac output  - Administer and titrate ordered vasoactive medications to optimize hemodynamic stability  - Assess quality of pulses, skin color and temperature  - Assess for signs of decreased coronary artery perfusion  - Instruct patient to report change in severity of symptoms  Outcome: Progressing  Goal: Absence of cardiac dysrhythmias or at baseline rhythm  Description: INTERVENTIONS:  - Continuous cardiac monitoring, vital signs, obtain 12 lead EKG if ordered  - Administer antiarrhythmic and heart rate control medications as ordered  - Monitor electrolytes and administer replacement therapy as ordered  Outcome: Progressing     Problem: METABOLIC, FLUID AND ELECTROLYTES - ADULT  Goal: Fluid balance maintained  Description: INTERVENTIONS:  - Monitor labs   - Monitor I/O and WT  - Instruct patient on fluid and nutrition as appropriate  - Assess for signs & symptoms of volume excess or deficit  Outcome: Progressing     Problem: Prexisting or High Potential for Compromised Skin Integrity  Goal: Skin integrity is maintained or improved  Description: INTERVENTIONS:  - Identify patients at risk for skin breakdown  - Assess and monitor skin integrity including under and around medical devices   - Assess and monitor nutrition and hydration status  - Monitor labs  - Assess for incontinence   - Turn and reposition patient  - Assist with mobility/ambulation  - Relieve pressure over gene prominences   - Avoid friction and shearing  - Provide appropriate hygiene as needed including keeping skin clean and dry  - Evaluate need for skin moisturizer/barrier cream  - Collaborate with interdisciplinary team  - Patient/family teaching  - Consider wound care  consult    Assess:  - Review Steven scale daily  - Clean and moisturize skin every shift  - Inspect skin when repositioning, toileting, and assisting with ADLS  - Assess extremities for adequate circulation and sensation     Bed Management:  - Have minimal linens on bed & keep smooth, unwrinkled  - Change linens as needed when moist or perspiring  - Avoid sitting or lying in one position for more than 2 hours while in bed?Keep HOB at 30 degrees   - Toileting:  - Offer bedside commode  - Assess for incontinence every 2 hours  - Use incontinent care products after each incontinent episode    Activity:  - Mobilize patient 3 times a day  - Encourage activity and walks on unit  - Encourage or provide ROM exercises   - Turn and reposition patient every 2 Hours  - Use appropriate equipment to lift or move patient in bed  - Instruct/ Assist with weight shifting every hour when out of bed in chair  - Consider limitation of chair time 1 hour intervals    Skin Care:  - Avoid use of baby powder, tape, friction and shearing, hot water or constrictive clothing  - Relieve pressure over bony prominences  - Do not massage red bony areas    Next Steps:  - Teach patient strategies to minimize risk  - Consider consults to  interdisciplinary teams  Outcome: Progressing

## 2025-05-20 NOTE — CASE MANAGEMENT
Case Management Discharge Planning Note    Patient name Tiffani Francois  Location /409-01 MRN [id number]  : 1945 Date 2025       Current Admission Date: 2025  Current Admission Diagnosis:Acute heart failure with preserved ejection fraction (HCC)   Patient Active Problem List    Diagnosis Date Noted    Hyperglycemia 2025    MRSA colonization 2025    Iron deficiency anemia 2025    Vitamin B12 deficiency 2025    Vitamin D insufficiency 2025    Acute cystitis without hematuria 05/15/2025    Hyperphosphatemia 05/15/2025    Thrombocytopenia (HCC) 2025    Anemia 2025    Acute heart failure with preserved ejection fraction (HCC) 2025    Acute on chronic respiratory failure with hypoxia and hypercapnia (HCC) 2025    Pericardial effusion 2025    Pleural effusion on right 2025    Pulmonary emphysema (HCC) 2023    Stress incontinence 2023    Mediastinal lymphadenopathy 2023    Oral candidiasis 2023    Hypercalcemia 2022    Microscopic hematuria 2022    Permanent atrial fibrillation (HCC) 2022    Hypomagnesemia 2022    COPD with acute exacerbation (HCC) 2022    Multiple pulmonary nodules 2022    Hyponatremia 2022    Hyperbilirubinemia 2022    Type 2 diabetes mellitus with hyperglycemia, without long-term current use of insulin (HCC) 2022    Vitamin D deficiency 2022    Chronic atrial fibrillation (HCC) 2022    Dermatitis 2022    Ambulatory dysfunction 10/18/2021    Tobacco use 2018    Hyperlipidemia 10/02/2014    Essential hypertension 10/02/2014      LOS (days): 7  Geometric Mean LOS (GMLOS) (days): 3.9  Days to GMLOS:-2.7     OBJECTIVE:  Risk of Unplanned Readmission Score: 25.52         Current admission status: Inpatient   Preferred Pharmacy:   [id number] - [place] - [address]  [address] [place]  [place]  [address]  Phone: [phone] Fax: [phone]    [facility] - [place] - [address]  [address]  [place] [address]  Phone: [phone] Fax: [phone]    Primary Care Provider: Maude Baker DO    Primary Insurance: [facility]  Secondary Insurance:     DISCHARGE DETAILS:  Notified [facility] in [facility] of dc and AVS sent over.

## 2025-05-20 NOTE — PHYSICAL THERAPY NOTE
PHYSICAL THERAPY NOTE          Patient Name: Tiffani Francois  Today's Date: 5/20/2025 05/20/25 0858   PT Last Visit   PT Visit Date 05/20/25   Note Type   Note Type Treatment   Pain Assessment   Pain Assessment Tool 0-10   Pain Score No Pain   Restrictions/Precautions   Weight Bearing Precautions Per Order No   Other Precautions Fall Risk;O2;Multiple lines;Cognitive;Chair Alarm   General   Chart Reviewed Yes   Response to Previous Treatment Patient unable to report, no changes reported from family or staff   Family/Caregiver Present No   Cognition   Overall Cognitive Status Impaired   Arousal/Participation Alert;Cooperative   Attention Attends with cues to redirect   Orientation Level Oriented to person;Disoriented to place;Disoriented to time;Disoriented to situation   Following Commands Follows one step commands without difficulty   Subjective   Subjective Ptwouldlike to ambulate   Bed Mobility   Additional Comments OOB in chairstart/end PT session   Transfers   Sit to Stand 5  Supervision   Additional items Armrests;Increased time required;Verbal cues   Stand to Sit 5  Supervision   Additional items Armrests;Increased time required;Verbal cues   Additional Comments RW used.Performed sit<>stand x 10 with constant cues for hand placement and safety carryover ~ 10%of time.   Ambulation/Elevation   Gait pattern Excessively slow;Short stride;Foward flexed;Decreased foot clearance;Narrow SHANA   Gait Assistance 4  Minimal assist   Additional items Assist x 1;Verbal cues   Assistive Device Rolling walker   Distance 150'   Balance   Static Sitting Good   Dynamic Sitting Fair +   Static Standing Fair   Dynamic Standing Fair   Ambulatory Fair -  (RW)   Endurance Deficit   Endurance Deficit Yes   Activity Tolerance   Activity Tolerance Patient limited by fatigue   Exercises   Hip Flexion Sitting;10 reps;AROM;Bilateral   Hip Abduction Sitting;10  reps;AROM;Bilateral   Hip Adduction Sitting;10 reps;AROM;Bilateral   Knee AROM Long Arc Quad Sitting;10 reps;AROM;Bilateral   Ankle Pumps Sitting;10 reps;AROM;Bilateral   Assessment   Prognosis Good   Problem List Decreased strength;Decreased endurance;Impaired balance;Decreased mobility;Decreased cognition;Impaired judgement;Decreased safety awareness   Assessment Pt seen this date for PT treatment session to increase level of mobility and functional activity tolerance. PT treatment session consisting of  therapeutic exercises, transfers and  gait training. Pt. Currently performing tx and ambulation at  min  x 1 level of function with use of RW . In comparison to previous session, Pt. With improvement  activity tolerance. Pt is in need of continued activity in PT to improve strength balance endurance mobility transfers and ambulation with return to maximize LOF. From PT/mobility standpoint, recommendation at time of d/c would be Level II:  moderate resource intensity  in order to promote return to PLOF and independence.  The patient's [facility] Mobility Inpatient Short Form Raw Score is 18. A Raw score of greater than 16 suggests the patient may benefit from discharge to home. Please also refer to the recommendation of the Physical Therapist for safe discharge planning. Pt continues to be functioning below baseline level. PT will continue to see pt during current hospitalization in order to address the deficits listed above and provide interventions consistent w/ POC in effort to achieve goals.   Goals   Patient Goals to go home   LTG Expiration Date 05/28/25   PT Treatment Day 4   Plan   Treatment/Interventions Functional transfer training;LE strengthening/ROM;Elevations;Therapeutic exercise;Endurance training;Bed mobility;Gait training   Progress Progressing toward goals   PT Frequency 3-5x/wk   Discharge Recommendation   Rehab Resource Intensity Level, PT II (Moderate Resource Intensity)   AM-PAC Basic  Mobility Inpatient   Turning in Flat Bed Without Bedrails 3   Lying on Back to Sitting on Edge of Flat Bed Without Bedrails 3   Moving Bed to Chair 3   Standing Up From Chair Using Arms 3   Walk in Room 3   Climb 3-5 Stairs With Railing 3   Basic Mobility Inpatient Raw Score 18   Basic Mobility Standardized Score 41.05   [facility] Highest Level Of Mobility   -HLM Goal 6: Walk 10 steps or more   -HLM Achieved 7: Walk 25 feet or more   Education   Education Provided Mobility training   Patient Reinforcement needed   End of Consult   Patient Position at End of Consult Bedside chair;Bed/Chair alarm activated;All needs within reach   End of Consult Comments discussed POC with PT

## 2025-05-20 NOTE — DISCHARGE INSTR - AVS FIRST PAGE
Dear Tiffani Francois,     [facility]  It is our hope that we were always able to exceed the expected standards for your care during your stay.  You were hospitalized due to acute on chronic heart failure.  You were cared for on the fourth floor by BONIFACIO Barton under the service of Magdi Marina DO with the [facility]ist [facility] who covers for your primary care physician (PCP), Maude Baker DO, while you were hospitalized.  If you have any questions or concerns related to this hospitalization, you may contact us at .  For follow up as well as any medication refills, we recommend that you follow up with your primary care physician.  A registered nurse will reach out to you by phone within a few days after your discharge to answer any additional questions that you may have after going home.  However, at this time we provide for you here, the most important instructions / recommendations at discharge:     Notable Medication Adjustments -   Start taking prednisone 4 tablets (40 mg total) daily for 1 day, THEN 3 tablets (30 mg) daily for 3 days, THEN 2 tablets (20 mg) daily for 3 days, THEN 1 tablet (10 mg) daily for 3 days and then stop medication.  Start taking cholecalciferol (vitamin D3) 2000 units (2 tablets) daily for vitamin D supplementation  Start taking cyanocobalamin (vitamin B12) 500 mcg daily for vitamin B12 supplementation  Start taking iron polysaccharide's Ferrex 150 mg daily for iron supplementation  Start taking Bactroban ointment applied to both nares/nostrils twice a day for 5 days for MRSA decolonization  Start using chlorhexidine Hibiclens washing liquid from the neck down to the toes daily with bathing/shower for 2 weeks then weekly for 3 months for MRSA decolonization  Testing Required after Discharge -   BMP and CBC in 2 weeks  ** Please contact your PCP to request  testing orders for any of the testing recommended here **  Important follow up information -   Please follow-up with PCP in 1 to 2 weeks  Please follow-up with cardiology in 1 month  Other Instructions -   Please weigh yourself daily and record weights.  If you have gained 2 to 3 pounds in a day or 5 pounds in a week, please call PCP or come to hospital.  If having shortness of breath or swelling in legs please call PCP or come to hospital.  Please review this entire after visit summary as additional general instructions including medication list, appointments, activity, diet, any pertinent wound care, and other additional recommendations from your care team that may be provided for you.      Sincerely,     BONIFACIO Barton

## 2025-05-20 NOTE — ASSESSMENT & PLAN NOTE
Wt Readings from Last 3 Encounters:   05/20/25 54.5 kg (120 lb 2.4 oz)   10/06/23 58.5 kg (129 lb)   05/20/23 55.6 kg (122 lb 9.6 oz)     I appreciate Cardiology's input.  Treated with Lasix 40 mg IV BID, which was discontinued on 05/15/2025 due to an increased serum creatinine level  Transitioned to Lasix 20 mg PO Qdaily per Cardiology, which was held on 05/17/2025 due to hypotension  Trial of Lasix 20 mg PO Qdaily on 05/18/2025  Monitor daily weights and strict intake/output measurements    Transthoracic echocardiogram (05/14/2025):    Interpretation Summary  •  Left Ventricle: Left ventricular cavity size is normal. Wall thickness is moderately increased. There is moderate concentric hypertrophy. The left ventricular ejection fraction is 55%. Systolic function is normal. Wall motion is normal. Unable to assess diastolic function due to atrial fibrillation.  •  Right Ventricle: Right ventricular cavity size is normal. Systolic function is mildly reduced.  •  Left Atrium: The atrium is severely dilated (>48 mL/m2).  •  Right Atrium: The atrium is mildly dilated.  •  Mitral Valve: There is mild annular calcification. There is mild regurgitation.  •  Tricuspid Valve: There is mild regurgitation.  •  Pericardium: There is a moderate pericardial effusion along the LV free wall measuring 1.3 cm.  The fluid exhibits no internal echoes. There is no echocardiographic evidence of tamponade. There is a right pleural effusion.

## 2025-05-20 NOTE — ASSESSMENT & PLAN NOTE
Iron panel consistent with iron deficiency anemia, started on supplementation  Vitamin B12 borderline low, continue supplementation  Continue to trend as needed  Transfuse for a hemoglobin less than 7 g/dl

## 2025-05-20 NOTE — CASE MANAGEMENT
Case Management Discharge Planning Note    Patient name Tiffani Francois  Location /409-01 MRN [id number]  : 1945 Date 2025       Current Admission Date: 2025  Current Admission Diagnosis:Acute heart failure with preserved ejection fraction (HCC)   Patient Active Problem List    Diagnosis Date Noted    Hyperglycemia 2025    MRSA colonization 2025    Iron deficiency anemia 2025    Vitamin B12 deficiency 2025    Vitamin D insufficiency 2025    Acute cystitis without hematuria 05/15/2025    Hyperphosphatemia 05/15/2025    Thrombocytopenia (HCC) 2025    Anemia 2025    Acute heart failure with preserved ejection fraction (HCC) 2025    Acute on chronic respiratory failure with hypoxia and hypercapnia (HCC) 2025    Pericardial effusion 2025    Pleural effusion on right 2025    Pulmonary emphysema (HCC) 2023    Stress incontinence 2023    Mediastinal lymphadenopathy 2023    Oral candidiasis 2023    Hypercalcemia 2022    Microscopic hematuria 2022    Permanent atrial fibrillation (HCC) 2022    Hypomagnesemia 2022    COPD with acute exacerbation (HCC) 2022    Multiple pulmonary nodules 2022    Hyponatremia 2022    Hyperbilirubinemia 2022    Type 2 diabetes mellitus with hyperglycemia, without long-term current use of insulin (HCC) 2022    Vitamin D deficiency 2022    Chronic atrial fibrillation (HCC) 2022    Dermatitis 2022    Ambulatory dysfunction 10/18/2021    Tobacco use 2018    Hyperlipidemia 10/02/2014    Essential hypertension 10/02/2014      LOS (days): 7  Geometric Mean LOS (GMLOS) (days): 3.9  Days to GMLOS:-2.7     OBJECTIVE:  Risk of Unplanned Readmission Score: 25.52         Current admission status: Inpatient   Preferred Pharmacy:   [id number] - [place] - [address]  [address]  [place]  [address]  Phone: [phone] Fax: [phone]    [facility] - [place], [place] - [address]  [address]  [place] [address]  Phone: [phone] Fax: [phone]    Primary Care Provider: Maude Baker DO    Primary Insurance: [facility]  Secondary Insurance:     DISCHARGE DETAILS:    Discharge planning discussed with:: Razia Hudson (Daughter)   [phone] (H)          Discharge Destination Plan:: Home with Home Health Care ([facility])  Transport at Discharge : [facility]        Transported by (Company and Unit #): [employer]  ETA of Transport (Date): 05/20/25  ETA of Transport (Time): 1215              IMM Given (Date):: 05/20/25 (re reviewed with daughter:Razia Hudson)  IMM Given to:: Family

## 2025-05-20 NOTE — ASSESSMENT & PLAN NOTE
Continue Prednisone 40 mg daily, wean regimen on discharge  Incentive spirometry  Respiratory protocol

## 2025-05-20 NOTE — DISCHARGE SUPPORT
Case Management Assessment & Discharge Planning Note    Patient name Tiffani Francois  Location /409-01 MRN [id number]  : 1945 Date 2025       Current Admission Date: 2025  Current Admission Diagnosis:Acute heart failure with preserved ejection fraction (HCC)   Patient Active Problem List    Diagnosis Date Noted    Hyperglycemia 2025    MRSA colonization 2025    Iron deficiency anemia 2025    Vitamin B12 deficiency 2025    Vitamin D insufficiency 2025    Acute cystitis without hematuria 05/15/2025    Hyperphosphatemia 05/15/2025    Thrombocytopenia (HCC) 2025    Anemia 2025    Acute heart failure with preserved ejection fraction (HCC) 2025    Acute on chronic respiratory failure with hypoxia and hypercapnia (HCC) 2025    Pericardial effusion 2025    Pleural effusion on right 2025    Pulmonary emphysema (HCC) 2023    Stress incontinence 2023    Mediastinal lymphadenopathy 2023    Oral candidiasis 2023    Hypercalcemia 2022    Microscopic hematuria 2022    Permanent atrial fibrillation (HCC) 2022    Hypomagnesemia 2022    COPD with acute exacerbation (HCC) 2022    Multiple pulmonary nodules 2022    Hyponatremia 2022    Hyperbilirubinemia 2022    Type 2 diabetes mellitus with hyperglycemia, without long-term current use of insulin (HCC) 2022    Vitamin D deficiency 2022    Chronic atrial fibrillation (HCC) 2022    Dermatitis 2022    Ambulatory dysfunction 10/18/2021    Tobacco use 2018    Hyperlipidemia 10/02/2014    Essential hypertension 10/02/2014      LOS (days): 7  Geometric Mean LOS (GMLOS) (days): 3.9  Days to GMLOS:-2.8   Transport Auth Initiated  [facility] was tasked to submit transport authorization by Care Manager: Caity BUSH  Date of Transport: 25  Type of Transport: BLS  Transport Company Name:  [employer]  Transport Company NPI: [id number]  Start Location: [facility]  End Location: Home  Medical Necessity: fall risk; has 3 CLARK unable to safely to; cognitive impairment/confused; 02 3l unable to self administer; medical  attendant required for safe transport  Authorization initiated by contacting insurance: [employer] [facility]  Submitted via: Fax ([phone])   notified: Caity BUSH     Please reach out to  for updates on any clinical information.

## 2025-05-20 NOTE — PLAN OF CARE
Problem: PHYSICAL THERAPY ADULT  Goal: Performs mobility at highest level of function for planned discharge setting.  See evaluation for individualized goals.  Description: Treatment/Interventions: Functional transfer training, LE strengthening/ROM, Elevations, Therapeutic exercise, Endurance training, Bed mobility, Gait training          See flowsheet documentation for full assessment, interventions and recommendations.  Outcome: Progressing  Note: Prognosis: Good  Problem List: Decreased strength, Decreased endurance, Impaired balance, Decreased mobility, Decreased cognition, Impaired judgement, Decreased safety awareness  Assessment: Pt seen this date for PT treatment session to increase level of mobility and functional activity tolerance. PT treatment session consisting of  therapeutic exercises, transfers and  gait training. Pt. Currently performing tx and ambulation at  min  x 1 level of function with use of RW . In comparison to previous session, Pt. With improvement  activity tolerance. Pt is in need of continued activity in PT to improve strength balance endurance mobility transfers and ambulation with return to maximize LOF. From PT/mobility standpoint, recommendation at time of d/c would be Level II:  moderate resource intensity  in order to promote return to PLOF and independence.  The patient's -[facility] Basic Mobility Inpatient Short Form Raw Score is 18. A Raw score of greater than 16 suggests the patient may benefit from discharge to home. Please also refer to the recommendation of the Physical Therapist for safe discharge planning. Pt continues to be functioning below baseline level. PT will continue to see pt during current hospitalization in order to address the deficits listed above and provide interventions consistent w/ POC in effort to achieve goals.        Rehab Resource Intensity Level, PT: II (Moderate Resource Intensity)    See flowsheet documentation for full assessment.

## 2025-05-21 ENCOUNTER — TELEMEDICINE (OUTPATIENT)
Dept: FAMILY MEDICINE CLINIC | Facility: CLINIC | Age: 80
End: 2025-05-21
Payer: COMMERCIAL

## 2025-05-21 ENCOUNTER — TELEPHONE (OUTPATIENT)
Age: 80
End: 2025-05-21

## 2025-05-21 ENCOUNTER — PATIENT OUTREACH (OUTPATIENT)
Dept: CASE MANAGEMENT | Facility: HOSPITAL | Age: 80
End: 2025-05-21

## 2025-05-21 ENCOUNTER — PATIENT OUTREACH (OUTPATIENT)
Dept: CASE MANAGEMENT | Facility: OTHER | Age: 80
End: 2025-05-21

## 2025-05-21 VITALS — HEIGHT: 65 IN | BODY MASS INDEX: 19.99 KG/M2 | WEIGHT: 120 LBS

## 2025-05-21 DIAGNOSIS — I31.39 PERICARDIAL EFFUSION: ICD-10-CM

## 2025-05-21 DIAGNOSIS — I48.21 PERMANENT ATRIAL FIBRILLATION (HCC): ICD-10-CM

## 2025-05-21 DIAGNOSIS — I50.31 ACUTE HEART FAILURE WITH PRESERVED EJECTION FRACTION (HCC): Primary | ICD-10-CM

## 2025-05-21 DIAGNOSIS — J44.1 COPD WITH ACUTE EXACERBATION (HCC): ICD-10-CM

## 2025-05-21 PROCEDURE — 99495 TRANSJ CARE MGMT MOD F2F 14D: CPT | Performed by: INTERNAL MEDICINE

## 2025-05-21 NOTE — TELEPHONE ENCOUNTER
Daughter called stating patient is going to switch from [facility] pharmacy to [facility].  Pharmacy has been updated in patient's chart.

## 2025-05-21 NOTE — PROGRESS NOTES
HRR referral received.     Chart reviewed. Pt admitted to IP at [facility] 5/13-5/20 with CHF. Patient presented with c/o sob at rest worsening on exertion, bilateral LE edema and fatigue. Hypoxic on presentation, O2 in low 80's. Cardiology consulted, IV Lasix transitioned to po. Transthoracic Echo 5/14 revealed mod pericardial effusion.    Weight at discharge: 120 lbs and 2.4 oz    Patient lives with daughter Razia who is her CG. Patient needs assist with ADL's and ambulates with walker. She also has home O2 through [facility]. Patient discharged home with services through [facility].    Patient had PCP [facility] earlier today. Daughter to call and make Cardiology f/u apt. Pt rec for STR but patient declines, daughter feels patient doing well enough at home, uses walker for amb. No medication changes.  CTA chest: No evidence of pulmonary embolus Wedge-shaped consolidation at the anterior left lower lobe with associated volume loss compatible with atelectasis. CHF. Small right pleural effusion Diffuse mosaic attenuation which may be seen with small vessel or small airways disease     PMH: CHF, COPD, 2 L O2 chronic, h/o intracranial hemorrhage s/p craniotomy, HLD, HTN, AF, T2DM, A1C 7.0 5/18/25    Per notes patients daughter changing patients pharmacy from [facility] to [facility].     Call placed to patient daughter Razia. She says patient is doing very well. She says she is her mothers FT care giver and manages all of her medications. She says she has all of the new ones prescribed. She states she is unable to talk because she is in the middle of caring for patient. She will return my call.    CCM to follow.

## 2025-05-21 NOTE — ASSESSMENT & PLAN NOTE
Stable on 2 liters of oxygen She did see Pulmonary in the hospital and encouraged followup but priority is cardiology followup

## 2025-05-21 NOTE — ASSESSMENT & PLAN NOTE
Continue current rx Homecare eval today for continued monitoring and her daughter is aware to schedule Cardio followup

## 2025-05-21 NOTE — DISCHARGE SUPPORT
Called [facility] ([phone]) to check to see if auth was received. Spoke to  Shwetha who stated auth request received and pending at this time. Pending ref: [id number].  notified: Caity BUSH

## 2025-05-21 NOTE — ASSESSMENT & PLAN NOTE
Wt Readings from Last 3 Encounters:   05/21/25 54.4 kg (120 lb)   05/20/25 54.5 kg (120 lb 2.4 oz)   10/06/23 58.5 kg (129 lb)     Daily weight Homecare eval is anjel   Pt/OT eval     Pt daughter will call to setup cardiology followup

## 2025-05-21 NOTE — PROGRESS NOTES
Transition of Care Visit:  Name: Tiffani Francois      : 1945      MRN: [id number]  Encounter Provider: Maude Baker DO  Encounter Date: 2025   Encounter department: [facility]        Administrative Statements   Encounter provider Maude Baker DO    The Patient is located at Home and in the following state in which I hold an active license [place].    The patient was identified by name and date of birth. Tiffani Francois was informed that this is a telemedicine visit and that the visit is being conducted through the Epic Embedded platform. She agrees to proceed..  My office door was closed. No one else was in the room.  She acknowledged consent and understanding of privacy and security of the video platform. The patient has agreed to participate and understands they can discontinue the visit at any time.    I have spent a total time of 25 minutes in caring for this patient on the day of the visit/encounter including Instructions for management, Patient and family education, Importance of tx compliance, Counseling / Coordination of care, Documenting in the medical record, Reviewing/placing orders in the medical record (including tests, medications, and/or procedures), and Obtaining or reviewing history  , not including the time spent for establishing the audio/video connection.    Assessment & Plan  Acute heart failure with preserved ejection fraction (HCC)  Wt Readings from Last 3 Encounters:   25 54.4 kg (120 lb)   25 54.5 kg (120 lb 2.4 oz)   10/06/23 58.5 kg (129 lb)     Daily weight Homecare eval is fridada   Pt/OT eval     Pt daughter will call to setup cardiology followup        Permanent atrial fibrillation (HCC)  Continue current rx Homecare eval today for continued monitoring and her daughter is aware to schedule Cardio followup       Pericardial effusion  Pt will setup Cardio followup No s/s tamponade       COPD with acute exacerbation (HCC)  Stable on 2 liters of oxygen  She did see Pulmonary in the hospital and encouraged followup but priority is cardiology followup        Rto following Cardio followup hopefully in person     History of Present Illness     Transitional Care Management Review:   Tiffani Francois is a 80 y.o. female here for TCM follow up.     During the TCM phone call patient stated:  TCM Call (since 5/7/2025)       Date and time call was made  5/20/2025  1:37 PM    Hospital care reviewed  Records reviewed    Patient was hospitialized at  [facility]    Date of Admission  05/13/25    Date of discharge  05/20/25    Diagnosis  acute heart failure    Disposition  Home; Home health services    Were the patients medications reviewed and updated  No    Current Symptoms  None          TCM Call (since 5/7/2025)       Post hospital issues  None    Scheduled for follow up?  Yes    Patients specialists  Cardiologist    Did you obtain your prescribed medications  Yes    Do you need help managing your prescriptions or medications  No    Is transportation to your appointment needed  Yes    I have advised the patient to call PCP with any new or worsening symptoms  Zoila Bermudez,           HPI  Pt home after admission for worsening sob She had not had outpt followup with cardio or pulmonary and found to be fluid overloaded as well as have pericardial effusion She is home on 2 liters continuous oxygen and feels better overall Appetite is better She was encouraged to consider str but did not feel needed Nursing is coming today for eval Her daughter feels patient is getting around ok in the home No falls She uses walker for ambulation Prior to hospital pt had not been getting out of the home Pt daughter aware she will need cardiology followup kirti for pericardial effusion No chest pain Less sob She has dyspnea with exertion but better than it was prehospital She tires easily She did not yet start Vit d and B12 supplements   Review of Systems   Constitutional:   "Negative for chills and fever.   HENT: Negative.     Eyes:  Negative for visual disturbance.   Respiratory:  Positive for shortness of breath. Negative for cough and wheezing.    Cardiovascular:  Negative for chest pain, palpitations and leg swelling.   Gastrointestinal: Negative.    Genitourinary:  Positive for frequency.   Musculoskeletal:  Positive for arthralgias and gait problem.   Neurological:  Negative for dizziness, light-headedness and headaches.   Psychiatric/Behavioral:  Negative for sleep disturbance. The patient is not nervous/anxious.      Objective   Ht 5' 5\" (1.651 m)   Wt 54.4 kg (120 lb)   BMI 19.97 kg/m²     Physical Exam  Constitutional:       General: She is not in acute distress.     Appearance: She is ill-appearing. She is not toxic-appearing or diaphoretic.   HENT:      Head: Normocephalic and atraumatic.      Right Ear: External ear normal.      Left Ear: External ear normal.      Nose: Nose normal.      Mouth/Throat:      Mouth: Mucous membranes are dry.   Pulmonary:      Effort: Pulmonary effort is normal. No respiratory distress.   Abdominal:      Palpations: Abdomen is soft.     Musculoskeletal:      Cervical back: Normal range of motion.      Right lower leg: No edema.      Left lower leg: No edema.     Skin:     General: Skin is warm and dry.      Coloration: Skin is pale.     Neurological:      General: No focal deficit present.      Mental Status: She is alert and oriented to person, place, and time. Mental status is at baseline.      Cranial Nerves: No cranial nerve deficit.     Psychiatric:         Mood and Affect: Mood normal.         Behavior: Behavior normal.         Thought Content: Thought content normal.         Judgment: Judgment normal.       Medications have been reviewed by provider in current encounter      "

## 2025-05-22 NOTE — DISCHARGE SUPPORT
Called [facility] ([phone]) to check status of authorization. Spoke to Tayler who stated auth request still pending at this time.  notified: Caity BUSH

## 2025-05-23 ENCOUNTER — TELEPHONE (OUTPATIENT)
Dept: CARDIOLOGY CLINIC | Facility: HOSPITAL | Age: 80
End: 2025-05-23

## 2025-05-23 NOTE — DISCHARGE SUPPORT
Called [facility] ([phone]) to check status of authorization. Spoke to Yina who stated auth still pending at this time. Due date is 6/2.  notified: Caity BUSH

## 2025-05-23 NOTE — TELEPHONE ENCOUNTER
Tried to reach pt, voice mail full. Reaching out to give pt appt date reminders for upcoming chf appts and phone calls that will be coming up.

## 2025-05-27 ENCOUNTER — TELEPHONE (OUTPATIENT)
Age: 80
End: 2025-05-27

## 2025-05-27 NOTE — TELEPHONE ENCOUNTER
[facility] Did  a PT Eval today. They  will be seeing patient once a week for 4 weeks. Please advise

## 2025-05-28 ENCOUNTER — PATIENT OUTREACH (OUTPATIENT)
Dept: CASE MANAGEMENT | Facility: HOSPITAL | Age: 80
End: 2025-05-28

## 2025-05-28 NOTE — PROGRESS NOTES
Complex Care Management Note:    Chart reviewed. Last CM outreach was on 5/21. Patients daughter Razia was to call me back but did not. Patient with recent IP admit 5/13-5/20 with CHF. Patient has services through [facility]. Patient had Virtual PCP [facility] apt on 5/21.    Call placed to patients jaylin Randle. No answer and VMB full-unable to leave message.    RNCM to follow.

## 2025-05-30 NOTE — TELEPHONE ENCOUNTER
Matthias called back just to leave a note for pcp. OT was out yesterday 5/29 to visit pt. They will be doing 3 visits in total with her.

## 2025-06-04 ENCOUNTER — PATIENT OUTREACH (OUTPATIENT)
Dept: CASE MANAGEMENT | Facility: HOSPITAL | Age: 80
End: 2025-06-04

## 2025-06-04 NOTE — PROGRESS NOTES
Complex Care Management Note:     Chart reviewed. Last CM outreach was on 5/28. Per notes 5/27 patient will receive PT 4 x a week and OT 3 x a week x 4 weeks through [facility]. Patient has an apt with Cardiology on 6/11 at 2 PM for HF follow up.    Call placed to patient and daughter Razia (both have same contact number). Phone rings for a while then call is dropped. Unable to leave message. An UTR letter mailed and CCM referral closed.

## 2025-06-11 PROBLEM — I50.32 CHRONIC HEART FAILURE WITH PRESERVED EJECTION FRACTION (HFPEF) (HCC): Status: ACTIVE | Noted: 2025-06-11

## 2025-06-12 DIAGNOSIS — J44.1 COPD EXACERBATION (HCC): ICD-10-CM

## 2025-06-12 RX ORDER — ALBUTEROL SULFATE 0.83 MG/ML
SOLUTION RESPIRATORY (INHALATION)
Qty: 360 ML | Refills: 5 | Status: SHIPPED | OUTPATIENT
Start: 2025-06-12

## 2025-06-13 ENCOUNTER — TELEPHONE (OUTPATIENT)
Age: 80
End: 2025-06-13

## 2025-06-13 NOTE — TELEPHONE ENCOUNTER
ELIZABETH:  Ngozi from [facility] going to recertify patient for skilled nursing , PT, OT and home care

## 2025-06-14 PROBLEM — N30.00 ACUTE CYSTITIS WITHOUT HEMATURIA: Status: RESOLVED | Noted: 2025-05-15 | Resolved: 2025-06-14

## 2025-06-19 ENCOUNTER — TELEPHONE (OUTPATIENT)
Age: 80
End: 2025-06-19

## 2025-06-19 NOTE — TELEPHONE ENCOUNTER
Allyn from [facility] called. She says patient has had a 3lb weight gain, original weight 116. Today 120lbs, yesterday 117.    No increased swelling.  No crackle in lungs, BP okay. Oxygen around 94.  Denies SOB.     Takes take lasix 20mg a day.  Should an extra 1/2 pill be given for the extra fluid today?  Please call Allyn from [facility] back.    Allyn:   [phone]

## 2025-06-20 ENCOUNTER — TELEPHONE (OUTPATIENT)
Age: 80
End: 2025-06-20

## 2025-06-20 NOTE — TELEPHONE ENCOUNTER
Hope is trying to get verbal orders for patient to receive OT services for ADL retraining, balance, strength training. Orders will be faxed, however she was trying to see the patient today. Hope plans on seeing patient once a week for at least 4 weeks for now. Please review with provider and if appropriate call [phone] to give verbal order. Thank you

## 2025-06-23 ENCOUNTER — TELEPHONE (OUTPATIENT)
Dept: CARDIOLOGY CLINIC | Facility: HOSPITAL | Age: 80
End: 2025-06-23

## 2025-06-23 NOTE — TELEPHONE ENCOUNTER
Called to follow up on Tiffani for CHF , no answer and no voicemail box, patient did cancelled last two CHF visits

## 2025-06-25 NOTE — TELEPHONE ENCOUNTER
Mera from [facility] called to check on status of PCP response to her message and triage nurse informed her that the PCP communicated with patient/daughter. All taken care of.

## 2025-07-02 ENCOUNTER — TELEPHONE (OUTPATIENT)
Dept: FAMILY MEDICINE CLINIC | Facility: CLINIC | Age: 80
End: 2025-07-02

## 2025-07-08 ENCOUNTER — TELEPHONE (OUTPATIENT)
Age: 80
End: 2025-07-08

## 2025-07-08 NOTE — TELEPHONE ENCOUNTER
Allyn from [facility] called to report pt had a 3 lb weight gain since last week.  Pt weighed 123.2 lb this week.  Pt did not have any increase in swelling.  Left calf is 29 inches and right calf is 27 inches which is pt's normal baseline.  Pt denies SOB.  BP was 130/72, HR 60.  Pt's O2 sat was 93% on 2LNC.  Lung sounds are diminished but no crackles heard.    Pt's daughter, Razia, admits that over the weekend pt had a hamberger, a hot dog and baked beans.      Please review and advise if PCP would like pt to take any extra lasix at this time.  Please give Allyn a call back with update as well as Razia.  Allyn's contact number:  [phone].

## 2025-07-18 DIAGNOSIS — E78.2 MIXED HYPERLIPIDEMIA: ICD-10-CM

## 2025-07-18 DIAGNOSIS — J44.1 COPD EXACERBATION (HCC): ICD-10-CM

## 2025-07-18 DIAGNOSIS — Z72.0 TOBACCO USE: ICD-10-CM

## 2025-07-18 DIAGNOSIS — F32.A DEPRESSION, UNSPECIFIED DEPRESSION TYPE: ICD-10-CM

## 2025-07-18 DIAGNOSIS — R60.9 EDEMA, UNSPECIFIED TYPE: ICD-10-CM

## 2025-07-18 DIAGNOSIS — G47.9 SLEEPING DIFFICULTY: ICD-10-CM

## 2025-07-18 DIAGNOSIS — I10 PRIMARY HYPERTENSION: ICD-10-CM

## 2025-07-18 DIAGNOSIS — E11.9 TYPE 2 DIABETES MELLITUS WITHOUT COMPLICATION, WITHOUT LONG-TERM CURRENT USE OF INSULIN (HCC): ICD-10-CM

## 2025-07-18 DIAGNOSIS — M54.40 BILATERAL LOW BACK PAIN WITH SCIATICA, SCIATICA LATERALITY UNSPECIFIED, UNSPECIFIED CHRONICITY: ICD-10-CM

## 2025-07-18 DIAGNOSIS — J44.9 CHRONIC OBSTRUCTIVE PULMONARY DISEASE, UNSPECIFIED COPD TYPE (HCC): ICD-10-CM

## 2025-07-18 RX ORDER — UMECLIDINIUM 62.5 UG/1
1 AEROSOL, POWDER ORAL DAILY
Qty: 30 BLISTER | Refills: 5 | Status: SHIPPED | OUTPATIENT
Start: 2025-07-18

## 2025-07-18 RX ORDER — SITAGLIPTIN AND METFORMIN HYDROCHLORIDE 500; 50 MG/1; MG/1
1 TABLET, FILM COATED ORAL 2 TIMES DAILY WITH MEALS
Qty: 180 TABLET | Refills: 1 | Status: SHIPPED | OUTPATIENT
Start: 2025-07-18

## 2025-07-18 RX ORDER — FUROSEMIDE 20 MG/1
TABLET ORAL
Qty: 90 TABLET | Refills: 1 | Status: SHIPPED | OUTPATIENT
Start: 2025-07-18

## 2025-07-18 RX ORDER — SIMVASTATIN 40 MG
40 TABLET ORAL DAILY
Qty: 90 TABLET | Refills: 0 | Status: CANCELLED | OUTPATIENT
Start: 2025-07-18

## 2025-07-18 RX ORDER — GABAPENTIN 100 MG/1
100 CAPSULE ORAL 2 TIMES DAILY
Qty: 60 CAPSULE | Refills: 5 | Status: SHIPPED | OUTPATIENT
Start: 2025-07-18

## 2025-07-18 RX ORDER — BUDESONIDE AND FORMOTEROL FUMARATE DIHYDRATE 80; 4.5 UG/1; UG/1
2 AEROSOL RESPIRATORY (INHALATION) 2 TIMES DAILY
Qty: 10.2 G | Refills: 2 | Status: SHIPPED | OUTPATIENT
Start: 2025-07-18

## 2025-07-18 RX ORDER — LISINOPRIL 10 MG/1
10 TABLET ORAL DAILY
Qty: 90 TABLET | Refills: 1 | Status: SHIPPED | OUTPATIENT
Start: 2025-07-18

## 2025-07-18 RX ORDER — QUETIAPINE FUMARATE 25 MG/1
25 TABLET, FILM COATED ORAL
Qty: 90 TABLET | Refills: 0 | Status: SHIPPED | OUTPATIENT
Start: 2025-07-18

## 2025-07-18 RX ORDER — SIMVASTATIN 40 MG
40 TABLET ORAL DAILY
Qty: 90 TABLET | Refills: 0 | Status: SHIPPED | OUTPATIENT
Start: 2025-07-18

## 2025-07-18 NOTE — TELEPHONE ENCOUNTER
Pharmacy Change - Patient needs medications prior to weekend. [facility] informed patient that they were going to forward all medications to new pharmacy and never did. Please review.     Reason for call:   [x] Refill   [] Prior Auth  [] Other:     Office:   [x] PCP/Provider - Maude Baker DO   [] Specialty/Provider -     Medication:   simvastatin (ZOCOR) 40 mg tablet /  take 1 tablet by mouth daily     sertraline (ZOLOFT) 50 mg tablet / Take 1 tablet (50 mg total) by mouth daily    QUEtiapine (SEROquel) 25 mg tablet / Take 1 tablet (25 mg total) by mouth daily at bedtime     lisinopril (ZESTRIL) 10 mg tablet / Take 1 tablet (10 mg total) by mouth daily     gabapentin (Neurontin) 100 mg capsule / Take 1 capsule (100 mg total) by mouth 2 (two) times a day     furosemide (LASIX) 20 mg tablet /  One po daily     budesonide-formoterol (SYMBICORT) 80-4.5 MCG/ACT inhaler /  inhale 2 puffs by mouth twice a day Rinse mouth after use     umeclidinium (Incruse Ellipta) 62.5 mcg/actuation AEPB inhaler / INHALE 1 PUFF BY MOUTH DAILY     sitaGLIPtin-metFORMIN (Janumet)  MG per tablet / Take 1 tablet by mouth 2 (two) times a day with meals     Pharmacy: [facility] - [place] - [address]   Does the patient have enough for 3 days?   [] Yes   [x] No - Send as HP to POD

## 2025-08-23 DIAGNOSIS — E11.9 TYPE 2 DIABETES MELLITUS WITHOUT COMPLICATION, WITHOUT LONG-TERM CURRENT USE OF INSULIN (HCC): ICD-10-CM

## 2025-08-23 RX ORDER — SITAGLIPTIN AND METFORMIN HYDROCHLORIDE 500; 50 MG/1; MG/1
1 TABLET, FILM COATED ORAL 2 TIMES DAILY WITH MEALS
Qty: 30 TABLET | Refills: 0 | Status: SHIPPED | OUTPATIENT
Start: 2025-08-23 | End: 2025-08-26